# Patient Record
Sex: FEMALE | Race: WHITE | NOT HISPANIC OR LATINO | Employment: OTHER | URBAN - NONMETROPOLITAN AREA
[De-identification: names, ages, dates, MRNs, and addresses within clinical notes are randomized per-mention and may not be internally consistent; named-entity substitution may affect disease eponyms.]

---

## 2019-12-23 ENCOUNTER — TRANSCRIBE ORDERS (OUTPATIENT)
Dept: ADMINISTRATIVE | Facility: HOSPITAL | Age: 74
End: 2019-12-23

## 2019-12-23 ENCOUNTER — HOSPITAL ENCOUNTER (OUTPATIENT)
Dept: GENERAL RADIOLOGY | Facility: HOSPITAL | Age: 74
Discharge: HOME | End: 2019-12-23
Admitting: INTERNAL MEDICINE

## 2019-12-23 DIAGNOSIS — T14.8XXA FX: Primary | ICD-10-CM

## 2019-12-23 DIAGNOSIS — T14.8XXA FX: ICD-10-CM

## 2019-12-23 PROCEDURE — 73630 X-RAY EXAM OF FOOT: CPT | Performed by: RADIOLOGY

## 2019-12-23 PROCEDURE — 73630 X-RAY EXAM OF FOOT: CPT

## 2020-03-09 ENCOUNTER — TRANSCRIBE ORDERS (OUTPATIENT)
Dept: ADMINISTRATIVE | Facility: HOSPITAL | Age: 75
End: 2020-03-09

## 2020-03-09 ENCOUNTER — APPOINTMENT (OUTPATIENT)
Dept: LAB | Facility: HOSPITAL | Age: 75
End: 2020-03-09

## 2020-03-09 DIAGNOSIS — Z20.828 EXPOSURE TO THE FLU: Primary | ICD-10-CM

## 2020-03-09 LAB
FLUAV AG NPH QL: NEGATIVE
FLUBV AG NPH QL IA: NEGATIVE

## 2020-03-09 PROCEDURE — 87804 INFLUENZA ASSAY W/OPTIC: CPT | Performed by: INTERNAL MEDICINE

## 2020-03-21 ENCOUNTER — LAB REQUISITION (OUTPATIENT)
Dept: LAB | Facility: HOSPITAL | Age: 75
End: 2020-03-21

## 2020-03-21 DIAGNOSIS — R19.7 DIARRHEA, UNSPECIFIED: ICD-10-CM

## 2020-03-21 DIAGNOSIS — R11.2 NAUSEA WITH VOMITING, UNSPECIFIED: ICD-10-CM

## 2020-03-21 LAB
ALBUMIN SERPL-MCNC: 3.41 G/DL (ref 3.5–5.2)
ALBUMIN/GLOB SERPL: 1.2 G/DL
ALP SERPL-CCNC: 81 U/L (ref 39–117)
ALT SERPL W P-5'-P-CCNC: 11 U/L (ref 1–33)
ANION GAP SERPL CALCULATED.3IONS-SCNC: 21.5 MMOL/L (ref 5–15)
AST SERPL-CCNC: 18 U/L (ref 1–32)
BASOPHILS # BLD MANUAL: 0.18 10*3/MM3 (ref 0–0.2)
BASOPHILS NFR BLD AUTO: 1 % (ref 0–1.5)
BILIRUB SERPL-MCNC: 0.5 MG/DL (ref 0.2–1.2)
BUN BLD-MCNC: 29 MG/DL (ref 8–23)
BUN/CREAT SERPL: 29 (ref 7–25)
CALCIUM SPEC-SCNC: 9 MG/DL (ref 8.6–10.5)
CHLORIDE SERPL-SCNC: 84 MMOL/L (ref 98–107)
CO2 SERPL-SCNC: 28.5 MMOL/L (ref 22–29)
CREAT BLD-MCNC: 1 MG/DL (ref 0.57–1)
DEPRECATED RDW RBC AUTO: 51 FL (ref 37–54)
ERYTHROCYTE [DISTWIDTH] IN BLOOD BY AUTOMATED COUNT: 15.3 % (ref 12.3–15.4)
GFR SERPL CREATININE-BSD FRML MDRD: 54 ML/MIN/1.73
GLOBULIN UR ELPH-MCNC: 2.8 GM/DL
GLUCOSE BLD-MCNC: 109 MG/DL (ref 65–99)
HCT VFR BLD AUTO: 44.8 % (ref 34–46.6)
HGB BLD-MCNC: 14.6 G/DL (ref 12–15.9)
LYMPHOCYTES # BLD MANUAL: 1.27 10*3/MM3 (ref 0.7–3.1)
LYMPHOCYTES NFR BLD MANUAL: 26 % (ref 5–12)
LYMPHOCYTES NFR BLD MANUAL: 7 % (ref 19.6–45.3)
MCH RBC QN AUTO: 30.9 PG (ref 26.6–33)
MCHC RBC AUTO-ENTMCNC: 32.6 G/DL (ref 31.5–35.7)
MCV RBC AUTO: 94.7 FL (ref 79–97)
METAMYELOCYTES NFR BLD MANUAL: 4 % (ref 0–0)
MONOCYTES # BLD AUTO: 4.71 10*3/MM3 (ref 0.1–0.9)
NEUTROPHILS # BLD AUTO: 11.24 10*3/MM3 (ref 1.7–7)
NEUTROPHILS NFR BLD MANUAL: 45 % (ref 42.7–76)
NEUTS BAND NFR BLD MANUAL: 17 % (ref 0–5)
PLAT MORPH BLD: NORMAL
PLATELET # BLD AUTO: 204 10*3/MM3 (ref 140–450)
PMV BLD AUTO: 11 FL (ref 6–12)
POTASSIUM BLD-SCNC: 4.3 MMOL/L (ref 3.5–5.2)
PROT SERPL-MCNC: 6.2 G/DL (ref 6–8.5)
RBC # BLD AUTO: 4.73 10*6/MM3 (ref 3.77–5.28)
RBC MORPH BLD: NORMAL
SCAN SLIDE: NORMAL
SODIUM BLD-SCNC: 134 MMOL/L (ref 136–145)
WBC NRBC COR # BLD: 18.13 10*3/MM3 (ref 3.4–10.8)

## 2020-03-21 PROCEDURE — 85025 COMPLETE CBC W/AUTO DIFF WBC: CPT | Performed by: INTERNAL MEDICINE

## 2020-03-21 PROCEDURE — 80053 COMPREHEN METABOLIC PANEL: CPT | Performed by: INTERNAL MEDICINE

## 2020-03-22 ENCOUNTER — APPOINTMENT (OUTPATIENT)
Dept: CT IMAGING | Facility: HOSPITAL | Age: 75
End: 2020-03-22

## 2020-03-22 ENCOUNTER — HOSPITAL ENCOUNTER (EMERGENCY)
Facility: HOSPITAL | Age: 75
Discharge: NURSING, REHAB OR LONG-TERM CARE | End: 2020-03-22
Attending: EMERGENCY MEDICINE | Admitting: EMERGENCY MEDICINE

## 2020-03-22 ENCOUNTER — APPOINTMENT (OUTPATIENT)
Dept: GENERAL RADIOLOGY | Facility: HOSPITAL | Age: 75
End: 2020-03-22

## 2020-03-22 ENCOUNTER — LAB REQUISITION (OUTPATIENT)
Dept: LAB | Facility: HOSPITAL | Age: 75
End: 2020-03-22

## 2020-03-22 VITALS
OXYGEN SATURATION: 96 % | HEIGHT: 65 IN | TEMPERATURE: 98.1 F | BODY MASS INDEX: 18.83 KG/M2 | DIASTOLIC BLOOD PRESSURE: 77 MMHG | RESPIRATION RATE: 20 BRPM | HEART RATE: 75 BPM | WEIGHT: 113 LBS | SYSTOLIC BLOOD PRESSURE: 165 MMHG

## 2020-03-22 DIAGNOSIS — R11.2 NAUSEA WITH VOMITING, UNSPECIFIED: ICD-10-CM

## 2020-03-22 DIAGNOSIS — J44.1 COPD WITH ACUTE EXACERBATION (HCC): Primary | ICD-10-CM

## 2020-03-22 LAB
A-A DO2: 33.1 MMHG (ref 0–300)
A-A DO2: 45.6 MMHG (ref 0–300)
ALBUMIN SERPL-MCNC: 3.12 G/DL (ref 3.5–5.2)
ALBUMIN SERPL-MCNC: 3.3 G/DL (ref 3.5–5.2)
ALBUMIN/GLOB SERPL: 0.9 G/DL
ALBUMIN/GLOB SERPL: 1.2 G/DL
ALP SERPL-CCNC: 76 U/L (ref 39–117)
ALP SERPL-CCNC: 92 U/L (ref 39–117)
ALT SERPL W P-5'-P-CCNC: 10 U/L (ref 1–33)
ALT SERPL W P-5'-P-CCNC: 11 U/L (ref 1–33)
ANION GAP SERPL CALCULATED.3IONS-SCNC: 11.9 MMOL/L (ref 5–15)
ANION GAP SERPL CALCULATED.3IONS-SCNC: 12.9 MMOL/L (ref 5–15)
ANISOCYTOSIS BLD QL: ABNORMAL
ARTERIAL PATENCY WRIST A: POSITIVE
ARTERIAL PATENCY WRIST A: POSITIVE
AST SERPL-CCNC: 14 U/L (ref 1–32)
AST SERPL-CCNC: 18 U/L (ref 1–32)
ATMOSPHERIC PRESS: 731 MMHG
ATMOSPHERIC PRESS: 732 MMHG
BACTERIA UR QL AUTO: ABNORMAL /HPF
BASE EXCESS BLDA CALC-SCNC: 12.1 MMOL/L (ref 0–2)
BASE EXCESS BLDA CALC-SCNC: 14.6 MMOL/L (ref 0–2)
BDY SITE: ABNORMAL
BDY SITE: ABNORMAL
BILIRUB SERPL-MCNC: 0.4 MG/DL (ref 0.2–1.2)
BILIRUB SERPL-MCNC: 0.4 MG/DL (ref 0.2–1.2)
BILIRUB UR QL STRIP: ABNORMAL
BODY TEMPERATURE: 0 C
BODY TEMPERATURE: 0 C
BUN BLD-MCNC: 21 MG/DL (ref 8–23)
BUN BLD-MCNC: 23 MG/DL (ref 8–23)
BUN/CREAT SERPL: 24.1 (ref 7–25)
BUN/CREAT SERPL: 30.7 (ref 7–25)
CALCIUM SPEC-SCNC: 8.7 MG/DL (ref 8.6–10.5)
CALCIUM SPEC-SCNC: 9.3 MG/DL (ref 8.6–10.5)
CHLORIDE SERPL-SCNC: 90 MMOL/L (ref 98–107)
CHLORIDE SERPL-SCNC: 91 MMOL/L (ref 98–107)
CLARITY UR: CLEAR
CO2 BLDA-SCNC: 41.1 MMOL/L (ref 22–33)
CO2 BLDA-SCNC: 44.5 MMOL/L (ref 22–33)
CO2 SERPL-SCNC: 34.1 MMOL/L (ref 22–29)
CO2 SERPL-SCNC: 36.1 MMOL/L (ref 22–29)
COHGB MFR BLD: 1.8 % (ref 0–5)
COHGB MFR BLD: 2.2 % (ref 0–5)
COLOR UR: ABNORMAL
CREAT BLD-MCNC: 0.75 MG/DL (ref 0.57–1)
CREAT BLD-MCNC: 0.87 MG/DL (ref 0.57–1)
CRP SERPL-MCNC: 30.34 MG/DL (ref 0–0.5)
D-LACTATE SERPL-SCNC: 0.9 MMOL/L (ref 0.5–2)
D-LACTATE SERPL-SCNC: 1 MMOL/L (ref 0.5–2)
DEPRECATED RDW RBC AUTO: 51.6 FL (ref 37–54)
DEPRECATED RDW RBC AUTO: 52.8 FL (ref 37–54)
ERYTHROCYTE [DISTWIDTH] IN BLOOD BY AUTOMATED COUNT: 15.6 % (ref 12.3–15.4)
ERYTHROCYTE [DISTWIDTH] IN BLOOD BY AUTOMATED COUNT: 15.7 % (ref 12.3–15.4)
GFR SERPL CREATININE-BSD FRML MDRD: 64 ML/MIN/1.73
GFR SERPL CREATININE-BSD FRML MDRD: 76 ML/MIN/1.73
GLOBULIN UR ELPH-MCNC: 2.7 GM/DL
GLOBULIN UR ELPH-MCNC: 3.6 GM/DL
GLUCOSE BLD-MCNC: 196 MG/DL (ref 65–99)
GLUCOSE BLD-MCNC: 196 MG/DL (ref 65–99)
GLUCOSE UR STRIP-MCNC: NEGATIVE MG/DL
HCO3 BLDA-SCNC: 39.3 MMOL/L (ref 20–26)
HCO3 BLDA-SCNC: 42.5 MMOL/L (ref 20–26)
HCT VFR BLD AUTO: 42.9 % (ref 34–46.6)
HCT VFR BLD AUTO: 44.2 % (ref 34–46.6)
HCT VFR BLD CALC: 42.1 % (ref 38–51)
HCT VFR BLD CALC: 44.1 % (ref 38–51)
HGB BLD-MCNC: 14 G/DL (ref 12–15.9)
HGB BLD-MCNC: 14.2 G/DL (ref 12–15.9)
HGB BLDA-MCNC: 13.7 G/DL (ref 13.5–17.5)
HGB BLDA-MCNC: 14.4 G/DL (ref 13.5–17.5)
HGB UR QL STRIP.AUTO: NEGATIVE
HOROWITZ INDEX BLD+IHG-RTO: 21 %
HOROWITZ INDEX BLD+IHG-RTO: 32 %
HYALINE CASTS UR QL AUTO: ABNORMAL /LPF
HYPOCHROMIA BLD QL: ABNORMAL
KETONES UR QL STRIP: ABNORMAL
LEUKOCYTE ESTERASE UR QL STRIP.AUTO: ABNORMAL
LYMPHOCYTES # BLD MANUAL: 0.43 10*3/MM3 (ref 0.7–3.1)
LYMPHOCYTES # BLD MANUAL: 1.22 10*3/MM3 (ref 0.7–3.1)
LYMPHOCYTES NFR BLD MANUAL: 10 % (ref 5–12)
LYMPHOCYTES NFR BLD MANUAL: 16 % (ref 5–12)
LYMPHOCYTES NFR BLD MANUAL: 2 % (ref 19.6–45.3)
LYMPHOCYTES NFR BLD MANUAL: 6 % (ref 19.6–45.3)
Lab: ABNORMAL
MACROCYTES BLD QL SMEAR: ABNORMAL
MCH RBC QN AUTO: 30.8 PG (ref 26.6–33)
MCH RBC QN AUTO: 30.9 PG (ref 26.6–33)
MCHC RBC AUTO-ENTMCNC: 32.1 G/DL (ref 31.5–35.7)
MCHC RBC AUTO-ENTMCNC: 32.6 G/DL (ref 31.5–35.7)
MCV RBC AUTO: 94.3 FL (ref 79–97)
MCV RBC AUTO: 96.1 FL (ref 79–97)
METAMYELOCYTES NFR BLD MANUAL: 3 % (ref 0–0)
METHGB BLD QL: 0.6 % (ref 0–3)
METHGB BLD QL: 0.6 % (ref 0–3)
MODALITY: ABNORMAL
MODALITY: ABNORMAL
MONOCYTES # BLD AUTO: 2.17 10*3/MM3 (ref 0.1–0.9)
MONOCYTES # BLD AUTO: 3.24 10*3/MM3 (ref 0.1–0.9)
MUCOUS THREADS URNS QL MICRO: ABNORMAL /HPF
NEUTROPHILS # BLD AUTO: 15.2 10*3/MM3 (ref 1.7–7)
NEUTROPHILS # BLD AUTO: 19.07 10*3/MM3 (ref 1.7–7)
NEUTROPHILS NFR BLD MANUAL: 50 % (ref 42.7–76)
NEUTROPHILS NFR BLD MANUAL: 79 % (ref 42.7–76)
NEUTS BAND NFR BLD MANUAL: 25 % (ref 0–5)
NEUTS BAND NFR BLD MANUAL: 9 % (ref 0–5)
NITRITE UR QL STRIP: NEGATIVE
NOTE: ABNORMAL
NOTE: ABNORMAL
NOTIFIED BY: ABNORMAL
NOTIFIED WHO: ABNORMAL
NT-PROBNP SERPL-MCNC: 1477 PG/ML (ref 5–900)
OXYHGB MFR BLDV: 69.3 % (ref 94–99)
OXYHGB MFR BLDV: 95.7 % (ref 94–99)
PCO2 BLDA: 61 MM HG (ref 35–45)
PCO2 BLDA: 65.1 MM HG (ref 35–45)
PCO2 TEMP ADJ BLD: ABNORMAL MM[HG]
PCO2 TEMP ADJ BLD: ABNORMAL MM[HG]
PH BLDA: 7.42 PH UNITS (ref 7.35–7.45)
PH BLDA: 7.42 PH UNITS (ref 7.35–7.45)
PH UR STRIP.AUTO: <=5 [PH] (ref 5–8)
PH, TEMP CORRECTED: ABNORMAL
PH, TEMP CORRECTED: ABNORMAL
PLAT MORPH BLD: NORMAL
PLAT MORPH BLD: NORMAL
PLATELET # BLD AUTO: 182 10*3/MM3 (ref 140–450)
PLATELET # BLD AUTO: 196 10*3/MM3 (ref 140–450)
PMV BLD AUTO: 10.3 FL (ref 6–12)
PMV BLD AUTO: 10.6 FL (ref 6–12)
PO2 BLDA: 106 MM HG (ref 83–108)
PO2 BLDA: 37.2 MM HG (ref 83–108)
PO2 TEMP ADJ BLD: ABNORMAL MM[HG]
PO2 TEMP ADJ BLD: ABNORMAL MM[HG]
POTASSIUM BLD-SCNC: 3.6 MMOL/L (ref 3.5–5.2)
POTASSIUM BLD-SCNC: 4.3 MMOL/L (ref 3.5–5.2)
PROT SERPL-MCNC: 5.8 G/DL (ref 6–8.5)
PROT SERPL-MCNC: 6.9 G/DL (ref 6–8.5)
PROT UR QL STRIP: ABNORMAL
RBC # BLD AUTO: 4.55 10*6/MM3 (ref 3.77–5.28)
RBC # BLD AUTO: 4.6 10*6/MM3 (ref 3.77–5.28)
RBC # UR: ABNORMAL /HPF
RBC MORPH BLD: NORMAL
REF LAB TEST METHOD: ABNORMAL
SAO2 % BLDCOA: 71.3 % (ref 94–99)
SAO2 % BLDCOA: 98.1 % (ref 94–99)
SCAN SLIDE: NORMAL
SCAN SLIDE: NORMAL
SODIUM BLD-SCNC: 137 MMOL/L (ref 136–145)
SODIUM BLD-SCNC: 139 MMOL/L (ref 136–145)
SP GR UR STRIP: 1.02 (ref 1–1.03)
SQUAMOUS #/AREA URNS HPF: ABNORMAL /HPF
TROPONIN T SERPL-MCNC: 0.02 NG/ML (ref 0–0.03)
UROBILINOGEN UR QL STRIP: ABNORMAL
VENTILATOR MODE: ABNORMAL
VENTILATOR MODE: ABNORMAL
WBC NRBC COR # BLD: 20.26 10*3/MM3 (ref 3.4–10.8)
WBC NRBC COR # BLD: 21.67 10*3/MM3 (ref 3.4–10.8)
WBC UR QL AUTO: ABNORMAL /HPF

## 2020-03-22 PROCEDURE — 25010000002 ONDANSETRON PER 1 MG: Performed by: EMERGENCY MEDICINE

## 2020-03-22 PROCEDURE — 86140 C-REACTIVE PROTEIN: CPT | Performed by: EMERGENCY MEDICINE

## 2020-03-22 PROCEDURE — 25010000002 PIPERACILLIN SOD-TAZOBACTAM PER 1 G: Performed by: EMERGENCY MEDICINE

## 2020-03-22 PROCEDURE — 83605 ASSAY OF LACTIC ACID: CPT | Performed by: INTERNAL MEDICINE

## 2020-03-22 PROCEDURE — 82375 ASSAY CARBOXYHB QUANT: CPT

## 2020-03-22 PROCEDURE — 83605 ASSAY OF LACTIC ACID: CPT | Performed by: EMERGENCY MEDICINE

## 2020-03-22 PROCEDURE — 96375 TX/PRO/DX INJ NEW DRUG ADDON: CPT

## 2020-03-22 PROCEDURE — 96367 TX/PROPH/DG ADDL SEQ IV INF: CPT

## 2020-03-22 PROCEDURE — 71250 CT THORAX DX C-: CPT

## 2020-03-22 PROCEDURE — 87040 BLOOD CULTURE FOR BACTERIA: CPT | Performed by: EMERGENCY MEDICINE

## 2020-03-22 PROCEDURE — 82805 BLOOD GASES W/O2 SATURATION: CPT

## 2020-03-22 PROCEDURE — 80053 COMPREHEN METABOLIC PANEL: CPT | Performed by: EMERGENCY MEDICINE

## 2020-03-22 PROCEDURE — 85025 COMPLETE CBC W/AUTO DIFF WBC: CPT | Performed by: INTERNAL MEDICINE

## 2020-03-22 PROCEDURE — 81001 URINALYSIS AUTO W/SCOPE: CPT | Performed by: EMERGENCY MEDICINE

## 2020-03-22 PROCEDURE — P9612 CATHETERIZE FOR URINE SPEC: HCPCS

## 2020-03-22 PROCEDURE — 85007 BL SMEAR W/DIFF WBC COUNT: CPT | Performed by: EMERGENCY MEDICINE

## 2020-03-22 PROCEDURE — 36600 WITHDRAWAL OF ARTERIAL BLOOD: CPT

## 2020-03-22 PROCEDURE — 99285 EMERGENCY DEPT VISIT HI MDM: CPT

## 2020-03-22 PROCEDURE — 83880 ASSAY OF NATRIURETIC PEPTIDE: CPT | Performed by: EMERGENCY MEDICINE

## 2020-03-22 PROCEDURE — 83050 HGB METHEMOGLOBIN QUAN: CPT

## 2020-03-22 PROCEDURE — 96365 THER/PROPH/DIAG IV INF INIT: CPT

## 2020-03-22 PROCEDURE — 71045 X-RAY EXAM CHEST 1 VIEW: CPT

## 2020-03-22 PROCEDURE — 85025 COMPLETE CBC W/AUTO DIFF WBC: CPT | Performed by: EMERGENCY MEDICINE

## 2020-03-22 PROCEDURE — 80053 COMPREHEN METABOLIC PANEL: CPT | Performed by: INTERNAL MEDICINE

## 2020-03-22 PROCEDURE — 25010000002 VANCOMYCIN 5 G RECONSTITUTED SOLUTION 5,000 MG VIAL: Performed by: EMERGENCY MEDICINE

## 2020-03-22 PROCEDURE — 84484 ASSAY OF TROPONIN QUANT: CPT | Performed by: EMERGENCY MEDICINE

## 2020-03-22 RX ORDER — ONDANSETRON 2 MG/ML
4 INJECTION INTRAMUSCULAR; INTRAVENOUS ONCE
Status: COMPLETED | OUTPATIENT
Start: 2020-03-22 | End: 2020-03-22

## 2020-03-22 RX ORDER — DOXYCYCLINE 100 MG/1
100 CAPSULE ORAL 2 TIMES DAILY
Qty: 14 CAPSULE | Refills: 0 | Status: ON HOLD | OUTPATIENT
Start: 2020-03-22 | End: 2020-07-17

## 2020-03-22 RX ADMIN — PIPERACILLIN SODIUM AND TAZOBACTAM SODIUM 3.38 G: 3; .375 INJECTION, POWDER, LYOPHILIZED, FOR SOLUTION INTRAVENOUS at 19:48

## 2020-03-22 RX ADMIN — ONDANSETRON 4 MG: 2 INJECTION INTRAMUSCULAR; INTRAVENOUS at 18:44

## 2020-03-22 RX ADMIN — VANCOMYCIN HYDROCHLORIDE 1000 MG: 5 INJECTION, POWDER, LYOPHILIZED, FOR SOLUTION INTRAVENOUS at 20:43

## 2020-03-22 RX ADMIN — SODIUM CHLORIDE 1000 ML: 9 INJECTION, SOLUTION INTRAVENOUS at 18:44

## 2020-03-23 ENCOUNTER — LAB REQUISITION (OUTPATIENT)
Dept: LAB | Facility: HOSPITAL | Age: 75
End: 2020-03-23

## 2020-03-23 DIAGNOSIS — E86.0 DEHYDRATION: ICD-10-CM

## 2020-03-23 DIAGNOSIS — D72.829 ELEVATED WHITE BLOOD CELL COUNT, UNSPECIFIED: ICD-10-CM

## 2020-03-23 LAB
ANION GAP SERPL CALCULATED.3IONS-SCNC: 11.9 MMOL/L (ref 5–15)
ANISOCYTOSIS BLD QL: ABNORMAL
BUN BLD-MCNC: 18 MG/DL (ref 8–23)
BUN/CREAT SERPL: 24.7 (ref 7–25)
CALCIUM SPEC-SCNC: 8.9 MG/DL (ref 8.6–10.5)
CHLORIDE SERPL-SCNC: 93 MMOL/L (ref 98–107)
CO2 SERPL-SCNC: 33.1 MMOL/L (ref 22–29)
CREAT BLD-MCNC: 0.73 MG/DL (ref 0.57–1)
DEPRECATED RDW RBC AUTO: 52.8 FL (ref 37–54)
ERYTHROCYTE [DISTWIDTH] IN BLOOD BY AUTOMATED COUNT: 15.8 % (ref 12.3–15.4)
GFR SERPL CREATININE-BSD FRML MDRD: 78 ML/MIN/1.73
GLUCOSE BLD-MCNC: 178 MG/DL (ref 65–99)
HCT VFR BLD AUTO: 43.4 % (ref 34–46.6)
HGB BLD-MCNC: 14.2 G/DL (ref 12–15.9)
LYMPHOCYTES # BLD MANUAL: 1.37 10*3/MM3 (ref 0.7–3.1)
LYMPHOCYTES NFR BLD MANUAL: 15 % (ref 5–12)
LYMPHOCYTES NFR BLD MANUAL: 6 % (ref 19.6–45.3)
MCH RBC QN AUTO: 30.9 PG (ref 26.6–33)
MCHC RBC AUTO-ENTMCNC: 32.7 G/DL (ref 31.5–35.7)
MCV RBC AUTO: 94.3 FL (ref 79–97)
METAMYELOCYTES NFR BLD MANUAL: 1 % (ref 0–0)
MONOCYTES # BLD AUTO: 3.43 10*3/MM3 (ref 0.1–0.9)
NEUTROPHILS # BLD AUTO: 17.85 10*3/MM3 (ref 1.7–7)
NEUTROPHILS NFR BLD MANUAL: 61 % (ref 42.7–76)
NEUTS BAND NFR BLD MANUAL: 17 % (ref 0–5)
PLAT MORPH BLD: NORMAL
PLATELET # BLD AUTO: 186 10*3/MM3 (ref 140–450)
PMV BLD AUTO: 10.5 FL (ref 6–12)
POTASSIUM BLD-SCNC: 3.7 MMOL/L (ref 3.5–5.2)
RBC # BLD AUTO: 4.6 10*6/MM3 (ref 3.77–5.28)
SCAN SLIDE: NORMAL
SODIUM BLD-SCNC: 138 MMOL/L (ref 136–145)
WBC NRBC COR # BLD: 22.88 10*3/MM3 (ref 3.4–10.8)

## 2020-03-23 PROCEDURE — 85025 COMPLETE CBC W/AUTO DIFF WBC: CPT | Performed by: INTERNAL MEDICINE

## 2020-03-23 PROCEDURE — 80048 BASIC METABOLIC PNL TOTAL CA: CPT | Performed by: INTERNAL MEDICINE

## 2020-03-24 ENCOUNTER — LAB REQUISITION (OUTPATIENT)
Dept: LAB | Facility: HOSPITAL | Age: 75
End: 2020-03-24

## 2020-03-24 DIAGNOSIS — I10 ESSENTIAL (PRIMARY) HYPERTENSION: ICD-10-CM

## 2020-03-24 DIAGNOSIS — D64.9 ANEMIA, UNSPECIFIED: ICD-10-CM

## 2020-03-24 DIAGNOSIS — R11.2 NAUSEA WITH VOMITING, UNSPECIFIED: ICD-10-CM

## 2020-03-24 DIAGNOSIS — I50.9 HEART FAILURE, UNSPECIFIED (HCC): ICD-10-CM

## 2020-03-24 DIAGNOSIS — J18.9 PNEUMONIA, UNSPECIFIED ORGANISM: ICD-10-CM

## 2020-03-24 LAB
ALBUMIN SERPL-MCNC: 3.06 G/DL (ref 3.5–5.2)
ALBUMIN/GLOB SERPL: 1.4 G/DL
ALP SERPL-CCNC: 83 U/L (ref 39–117)
ALT SERPL W P-5'-P-CCNC: 9 U/L (ref 1–33)
AMYLASE SERPL-CCNC: 16 U/L (ref 28–100)
ANION GAP SERPL CALCULATED.3IONS-SCNC: 11.7 MMOL/L (ref 5–15)
ANION GAP SERPL CALCULATED.3IONS-SCNC: 12.5 MMOL/L (ref 5–15)
ANISOCYTOSIS BLD QL: ABNORMAL
ANISOCYTOSIS BLD QL: ABNORMAL
AST SERPL-CCNC: 13 U/L (ref 1–32)
BILIRUB SERPL-MCNC: 0.5 MG/DL (ref 0.2–1.2)
BUN BLD-MCNC: 17 MG/DL (ref 8–23)
BUN BLD-MCNC: 18 MG/DL (ref 8–23)
BUN/CREAT SERPL: 26.1 (ref 7–25)
BUN/CREAT SERPL: 26.2 (ref 7–25)
CALCIUM SPEC-SCNC: 8.6 MG/DL (ref 8.6–10.5)
CALCIUM SPEC-SCNC: 8.7 MG/DL (ref 8.6–10.5)
CHLORIDE SERPL-SCNC: 96 MMOL/L (ref 98–107)
CHLORIDE SERPL-SCNC: 97 MMOL/L (ref 98–107)
CO2 SERPL-SCNC: 31.5 MMOL/L (ref 22–29)
CO2 SERPL-SCNC: 33.3 MMOL/L (ref 22–29)
CREAT BLD-MCNC: 0.65 MG/DL (ref 0.57–1)
CREAT BLD-MCNC: 0.69 MG/DL (ref 0.57–1)
CRP SERPL-MCNC: 16.05 MG/DL (ref 0–0.5)
CRP SERPL-MCNC: 20.59 MG/DL (ref 0–0.5)
D-LACTATE SERPL-SCNC: 0.6 MMOL/L (ref 0.5–2)
DEPRECATED RDW RBC AUTO: 52.3 FL (ref 37–54)
DEPRECATED RDW RBC AUTO: 54 FL (ref 37–54)
EOSINOPHIL # BLD MANUAL: 0.2 10*3/MM3 (ref 0–0.4)
EOSINOPHIL NFR BLD MANUAL: 1 % (ref 0.3–6.2)
ERYTHROCYTE [DISTWIDTH] IN BLOOD BY AUTOMATED COUNT: 15.6 % (ref 12.3–15.4)
ERYTHROCYTE [DISTWIDTH] IN BLOOD BY AUTOMATED COUNT: 15.9 % (ref 12.3–15.4)
GFR SERPL CREATININE-BSD FRML MDRD: 83 ML/MIN/1.73
GFR SERPL CREATININE-BSD FRML MDRD: 89 ML/MIN/1.73
GLOBULIN UR ELPH-MCNC: 2.2 GM/DL
GLUCOSE BLD-MCNC: 111 MG/DL (ref 65–99)
GLUCOSE BLD-MCNC: 117 MG/DL (ref 65–99)
HCT VFR BLD AUTO: 40.1 % (ref 34–46.6)
HCT VFR BLD AUTO: 41.2 % (ref 34–46.6)
HGB BLD-MCNC: 13.1 G/DL (ref 12–15.9)
HGB BLD-MCNC: 13.4 G/DL (ref 12–15.9)
HYPOCHROMIA BLD QL: ABNORMAL
LIPASE SERPL-CCNC: 14 U/L (ref 13–60)
LYMPHOCYTES # BLD MANUAL: 0.74 10*3/MM3 (ref 0.7–3.1)
LYMPHOCYTES # BLD MANUAL: 0.82 10*3/MM3 (ref 0.7–3.1)
LYMPHOCYTES NFR BLD MANUAL: 12 % (ref 5–12)
LYMPHOCYTES NFR BLD MANUAL: 4 % (ref 19.6–45.3)
LYMPHOCYTES NFR BLD MANUAL: 4 % (ref 19.6–45.3)
LYMPHOCYTES NFR BLD MANUAL: 7 % (ref 5–12)
MACROCYTES BLD QL SMEAR: ABNORMAL
MACROCYTES BLD QL SMEAR: ABNORMAL
MCH RBC QN AUTO: 31 PG (ref 26.6–33)
MCH RBC QN AUTO: 31.2 PG (ref 26.6–33)
MCHC RBC AUTO-ENTMCNC: 32.5 G/DL (ref 31.5–35.7)
MCHC RBC AUTO-ENTMCNC: 32.7 G/DL (ref 31.5–35.7)
MCV RBC AUTO: 95.4 FL (ref 79–97)
MCV RBC AUTO: 95.5 FL (ref 79–97)
METAMYELOCYTES NFR BLD MANUAL: 4 % (ref 0–0)
MONOCYTES # BLD AUTO: 1.29 10*3/MM3 (ref 0.1–0.9)
MONOCYTES # BLD AUTO: 2.45 10*3/MM3 (ref 0.1–0.9)
NEUTROPHILS # BLD AUTO: 15.64 10*3/MM3 (ref 1.7–7)
NEUTROPHILS # BLD AUTO: 16.96 10*3/MM3 (ref 1.7–7)
NEUTROPHILS NFR BLD MANUAL: 73 % (ref 42.7–76)
NEUTROPHILS NFR BLD MANUAL: 74 % (ref 42.7–76)
NEUTS BAND NFR BLD MANUAL: 12 % (ref 0–5)
NEUTS BAND NFR BLD MANUAL: 9 % (ref 0–5)
PLAT MORPH BLD: NORMAL
PLAT MORPH BLD: NORMAL
PLATELET # BLD AUTO: 177 10*3/MM3 (ref 140–450)
PLATELET # BLD AUTO: 179 10*3/MM3 (ref 140–450)
PMV BLD AUTO: 11.1 FL (ref 6–12)
PMV BLD AUTO: 11.2 FL (ref 6–12)
POTASSIUM BLD-SCNC: 3.4 MMOL/L (ref 3.5–5.2)
POTASSIUM BLD-SCNC: 3.4 MMOL/L (ref 3.5–5.2)
PROT SERPL-MCNC: 5.3 G/DL (ref 6–8.5)
RBC # BLD AUTO: 4.2 10*6/MM3 (ref 3.77–5.28)
RBC # BLD AUTO: 4.32 10*6/MM3 (ref 3.77–5.28)
SCAN SLIDE: NORMAL
SCAN SLIDE: NORMAL
SODIUM BLD-SCNC: 141 MMOL/L (ref 136–145)
SODIUM BLD-SCNC: 141 MMOL/L (ref 136–145)
WBC NRBC COR # BLD: 18.4 10*3/MM3 (ref 3.4–10.8)
WBC NRBC COR # BLD: 20.43 10*3/MM3 (ref 3.4–10.8)

## 2020-03-24 PROCEDURE — 83690 ASSAY OF LIPASE: CPT | Performed by: INTERNAL MEDICINE

## 2020-03-24 PROCEDURE — 85025 COMPLETE CBC W/AUTO DIFF WBC: CPT | Performed by: INTERNAL MEDICINE

## 2020-03-24 PROCEDURE — 80053 COMPREHEN METABOLIC PANEL: CPT | Performed by: INTERNAL MEDICINE

## 2020-03-24 PROCEDURE — 83605 ASSAY OF LACTIC ACID: CPT | Performed by: INTERNAL MEDICINE

## 2020-03-24 PROCEDURE — 86140 C-REACTIVE PROTEIN: CPT | Performed by: INTERNAL MEDICINE

## 2020-03-24 PROCEDURE — 82150 ASSAY OF AMYLASE: CPT | Performed by: INTERNAL MEDICINE

## 2020-03-26 ENCOUNTER — LAB REQUISITION (OUTPATIENT)
Dept: LAB | Facility: HOSPITAL | Age: 75
End: 2020-03-26

## 2020-03-26 DIAGNOSIS — D64.9 ANEMIA, UNSPECIFIED: ICD-10-CM

## 2020-03-26 DIAGNOSIS — I10 ESSENTIAL (PRIMARY) HYPERTENSION: ICD-10-CM

## 2020-03-26 DIAGNOSIS — R50.9 FEVER, UNSPECIFIED: ICD-10-CM

## 2020-03-26 LAB
DEPRECATED RDW RBC AUTO: 59.1 FL (ref 37–54)
ERYTHROCYTE [DISTWIDTH] IN BLOOD BY AUTOMATED COUNT: 16.6 % (ref 12.3–15.4)
HCT VFR BLD AUTO: 46.1 % (ref 34–46.6)
HGB BLD-MCNC: 14.4 G/DL (ref 12–15.9)
MCH RBC QN AUTO: 31.3 PG (ref 26.6–33)
MCHC RBC AUTO-ENTMCNC: 31.2 G/DL (ref 31.5–35.7)
MCV RBC AUTO: 100.2 FL (ref 79–97)
PLATELET # BLD AUTO: 206 10*3/MM3 (ref 140–450)
PMV BLD AUTO: 10.6 FL (ref 6–12)
RBC # BLD AUTO: 4.6 10*6/MM3 (ref 3.77–5.28)
WBC NRBC COR # BLD: 14.68 10*3/MM3 (ref 3.4–10.8)

## 2020-03-26 PROCEDURE — 80048 BASIC METABOLIC PNL TOTAL CA: CPT | Performed by: INTERNAL MEDICINE

## 2020-03-26 PROCEDURE — 85027 COMPLETE CBC AUTOMATED: CPT | Performed by: INTERNAL MEDICINE

## 2020-03-27 LAB
ANION GAP SERPL CALCULATED.3IONS-SCNC: 23.9 MMOL/L (ref 5–15)
BACTERIA SPEC AEROBE CULT: NORMAL
BACTERIA SPEC AEROBE CULT: NORMAL
BUN BLD-MCNC: 11 MG/DL (ref 8–23)
BUN/CREAT SERPL: 13.6 (ref 7–25)
CALCIUM SPEC-SCNC: 8.2 MG/DL (ref 8.6–10.5)
CHLORIDE SERPL-SCNC: 98 MMOL/L (ref 98–107)
CO2 SERPL-SCNC: 22.1 MMOL/L (ref 22–29)
CREAT BLD-MCNC: 0.81 MG/DL (ref 0.57–1)
GFR SERPL CREATININE-BSD FRML MDRD: 69 ML/MIN/1.73
GLUCOSE BLD-MCNC: 151 MG/DL (ref 65–99)
POTASSIUM BLD-SCNC: 3.6 MMOL/L (ref 3.5–5.2)
SODIUM BLD-SCNC: 144 MMOL/L (ref 136–145)

## 2020-06-05 ENCOUNTER — LAB (OUTPATIENT)
Dept: LAB | Facility: HOSPITAL | Age: 75
End: 2020-06-05

## 2020-06-05 ENCOUNTER — TRANSCRIBE ORDERS (OUTPATIENT)
Dept: ADMINISTRATIVE | Facility: HOSPITAL | Age: 75
End: 2020-06-05

## 2020-06-05 DIAGNOSIS — Z01.818 PRE-OPERATIVE CLEARANCE: ICD-10-CM

## 2020-06-05 DIAGNOSIS — Z01.818 PRE-OPERATIVE CLEARANCE: Primary | ICD-10-CM

## 2020-06-05 LAB
REF LAB TEST METHOD: NORMAL
SARS-COV-2 RNA RESP QL NAA+PROBE: NOT DETECTED

## 2020-06-05 PROCEDURE — U0002 COVID-19 LAB TEST NON-CDC: HCPCS

## 2020-06-05 PROCEDURE — U0004 COV-19 TEST NON-CDC HGH THRU: HCPCS

## 2020-06-09 ENCOUNTER — OFFICE VISIT (OUTPATIENT)
Dept: SURGERY | Facility: CLINIC | Age: 75
End: 2020-06-09

## 2020-06-09 VITALS — HEIGHT: 65 IN | WEIGHT: 113 LBS | BODY MASS INDEX: 18.83 KG/M2

## 2020-06-09 DIAGNOSIS — K82.8 GALLBLADDER SLUDGE: Primary | ICD-10-CM

## 2020-06-09 PROCEDURE — 99203 OFFICE O/P NEW LOW 30 MIN: CPT | Performed by: SURGERY

## 2020-06-09 RX ORDER — ATORVASTATIN CALCIUM 10 MG/1
10 TABLET, FILM COATED ORAL NIGHTLY
COMMUNITY
Start: 2020-06-09 | End: 2021-06-01 | Stop reason: HOSPADM

## 2020-06-09 RX ORDER — PROMETHAZINE HYDROCHLORIDE 12.5 MG/1
12.5 TABLET ORAL EVERY 8 HOURS PRN
COMMUNITY
Start: 2020-04-10

## 2020-06-09 RX ORDER — LEVOTHYROXINE SODIUM 175 UG/1
175 TABLET ORAL DAILY
Status: ON HOLD | COMMUNITY
Start: 2020-06-04 | End: 2021-05-09

## 2020-06-09 RX ORDER — PANTOPRAZOLE SODIUM 20 MG/1
20 TABLET, DELAYED RELEASE ORAL DAILY
Status: ON HOLD | COMMUNITY
Start: 2020-05-13 | End: 2021-06-01 | Stop reason: SDUPTHER

## 2020-06-09 RX ORDER — ONDANSETRON 4 MG/1
TABLET, FILM COATED ORAL
Status: ON HOLD | COMMUNITY
Start: 2020-03-13 | End: 2020-07-17

## 2020-06-09 RX ORDER — POTASSIUM CHLORIDE 20 MEQ/1
20 TABLET, EXTENDED RELEASE ORAL DAILY
COMMUNITY
Start: 2020-05-17 | End: 2021-04-30 | Stop reason: HOSPADM

## 2020-06-09 RX ORDER — PIPERACILLIN SODIUM, TAZOBACTAM SODIUM 3; .375 G/15ML; G/15ML
INJECTION, POWDER, LYOPHILIZED, FOR SOLUTION INTRAVENOUS
Status: ON HOLD | COMMUNITY
Start: 2020-03-23 | End: 2020-07-17

## 2020-06-09 RX ORDER — METOPROLOL SUCCINATE 50 MG/1
TABLET, EXTENDED RELEASE ORAL
Status: ON HOLD | COMMUNITY
Start: 2013-07-08 | End: 2020-07-17

## 2020-06-09 RX ORDER — SODIUM CHLORIDE 9 MG/ML
INJECTION, SOLUTION INTRAVENOUS
Status: ON HOLD | COMMUNITY
Start: 2020-03-23 | End: 2020-07-17

## 2020-06-09 RX ORDER — TRAZODONE HYDROCHLORIDE 50 MG/1
50 TABLET ORAL NIGHTLY
COMMUNITY
Start: 2020-05-20 | End: 2021-11-20

## 2020-06-09 RX ORDER — PREDNISONE 1 MG/1
5 TABLET ORAL DAILY
COMMUNITY
Start: 2020-05-17 | End: 2021-04-30 | Stop reason: HOSPADM

## 2020-06-09 RX ORDER — BUMETANIDE 1 MG/1
1 TABLET ORAL DAILY
Status: ON HOLD | COMMUNITY
Start: 2020-06-08 | End: 2020-08-15 | Stop reason: SDUPTHER

## 2020-06-09 RX ORDER — MONTELUKAST SODIUM 10 MG/1
10 TABLET ORAL EVERY EVENING
COMMUNITY
Start: 2020-05-17

## 2020-06-09 RX ORDER — LISINOPRIL 10 MG/1
TABLET ORAL
Status: ON HOLD | COMMUNITY
Start: 2020-05-13 | End: 2020-07-17

## 2020-06-09 RX ORDER — SITAGLIPTIN 100 MG/1
TABLET, FILM COATED ORAL
Status: ON HOLD | COMMUNITY
Start: 2020-05-29 | End: 2020-08-11

## 2020-06-09 RX ORDER — MELOXICAM 7.5 MG/1
TABLET ORAL
Status: ON HOLD | COMMUNITY
Start: 2020-05-10 | End: 2020-08-11

## 2020-06-09 RX ORDER — TIOTROPIUM BROMIDE AND OLODATEROL 3.124; 2.736 UG/1; UG/1
2 SPRAY, METERED RESPIRATORY (INHALATION) DAILY
Status: ON HOLD | COMMUNITY
Start: 2020-06-06 | End: 2022-01-16

## 2020-06-09 NOTE — PROGRESS NOTES
Subjective   Mary Ly is a 74 y.o. female.     Chief Complaint:     History of Present Illness She has had upper abdominal pain with nausea and vomiting for a couple of months. She has sludge on US of gallbladder. No prior abdominal surgery. She does smoke and has not seen a cardiologist.     The following portions of the patient's history were reviewed and updated as appropriate: current medications, past family history, past medical history, past social history, past surgical history and problem list.    Review of Systems   Constitutional: Negative for activity change, appetite change, chills, fever and unexpected weight change.   HENT: Negative for congestion, facial swelling and sore throat.    Eyes: Negative for photophobia and visual disturbance.   Respiratory: Negative for chest tightness, shortness of breath and wheezing.    Cardiovascular: Negative for chest pain, palpitations and leg swelling.   Gastrointestinal: Positive for abdominal pain, nausea and vomiting. Negative for abdominal distention, anal bleeding, blood in stool, constipation, diarrhea and rectal pain.   Endocrine: Negative for cold intolerance, heat intolerance, polydipsia and polyuria.   Genitourinary: Negative for difficulty urinating, dysuria, flank pain and urgency.   Musculoskeletal: Negative for back pain and myalgias.   Skin: Negative for rash and wound.   Allergic/Immunologic: Negative for immunocompromised state.   Neurological: Negative for dizziness, seizures, syncope, light-headedness, numbness and headaches.   Hematological: Negative for adenopathy. Does not bruise/bleed easily.   Psychiatric/Behavioral: Negative for behavioral problems and confusion. The patient is not nervous/anxious.        Objective   Physical Exam   Constitutional: She is oriented to person, place, and time. She appears well-developed and well-nourished. She does not appear ill. No distress.   HENT:   Head: Normocephalic. Head is without laceration.  Hair is normal.   Right Ear: Hearing and ear canal normal.   Left Ear: Hearing and ear canal normal.   Nose: Nose normal. No sinus tenderness. No epistaxis. Right sinus exhibits no maxillary sinus tenderness and no frontal sinus tenderness. Left sinus exhibits no maxillary sinus tenderness and no frontal sinus tenderness.   Eyes: Pupils are equal, round, and reactive to light. Conjunctivae and lids are normal.   Neck: Normal range of motion. No JVD present. No tracheal tenderness present. No tracheal deviation present. No thyroid mass and no thyromegaly present.   Cardiovascular: Normal rate and regular rhythm. Exam reveals no gallop.   No murmur heard.  Pulmonary/Chest: Effort normal and breath sounds normal. No stridor. She has no wheezes. She exhibits no tenderness.   Abdominal: Soft. Bowel sounds are normal. She exhibits no distension, no ascites and no mass. There is no tenderness. There is no rebound and no guarding. No hernia.   Musculoskeletal: She exhibits no edema or deformity.   Lymphadenopathy:     She has no cervical adenopathy.     She has no axillary adenopathy.        Right: No inguinal and no supraclavicular adenopathy present.        Left: No inguinal and no supraclavicular adenopathy present.   Neurological: She is alert and oriented to person, place, and time. She exhibits normal muscle tone.   Skin: Skin is warm, dry and intact. No rash noted. No erythema. No pallor.   Psychiatric: She has a normal mood and affect. Her behavior is normal. Thought content normal.   Vitals reviewed.      Assessment/Plan   Mary was seen today for cholelithiasis.    Diagnoses and all orders for this visit:    Gallbladder sludge    do lap kiran if cardiac clearance is obtained.

## 2020-06-10 DIAGNOSIS — Z01.818 PREOPERATIVE CLEARANCE: Primary | ICD-10-CM

## 2020-06-14 ENCOUNTER — LAB (OUTPATIENT)
Dept: LAB | Facility: HOSPITAL | Age: 75
End: 2020-06-14

## 2020-06-14 ENCOUNTER — TRANSCRIBE ORDERS (OUTPATIENT)
Dept: ADMINISTRATIVE | Facility: HOSPITAL | Age: 75
End: 2020-06-14

## 2020-06-14 DIAGNOSIS — Z20.828 EXPOSURE TO SARS-ASSOCIATED CORONAVIRUS: Primary | ICD-10-CM

## 2020-06-14 DIAGNOSIS — Z20.828 EXPOSURE TO SARS-ASSOCIATED CORONAVIRUS: ICD-10-CM

## 2020-06-14 PROCEDURE — U0002 COVID-19 LAB TEST NON-CDC: HCPCS

## 2020-06-14 PROCEDURE — C9803 HOPD COVID-19 SPEC COLLECT: HCPCS

## 2020-06-14 PROCEDURE — U0004 COV-19 TEST NON-CDC HGH THRU: HCPCS

## 2020-06-15 LAB
REF LAB TEST METHOD: NORMAL
SARS-COV-2 RNA RESP QL NAA+PROBE: NOT DETECTED

## 2020-07-14 DIAGNOSIS — Z01.818 PREOP TESTING: Primary | ICD-10-CM

## 2020-07-15 ENCOUNTER — LAB REQUISITION (OUTPATIENT)
Dept: LAB | Facility: HOSPITAL | Age: 75
End: 2020-07-15

## 2020-07-15 ENCOUNTER — LAB (OUTPATIENT)
Dept: LAB | Facility: HOSPITAL | Age: 75
End: 2020-07-15

## 2020-07-15 DIAGNOSIS — Z01.818 PREOP TESTING: ICD-10-CM

## 2020-07-15 DIAGNOSIS — Z01.818 ENCOUNTER FOR OTHER PREPROCEDURAL EXAMINATION: ICD-10-CM

## 2020-07-15 LAB — SARS-COV-2 RNA RESP QL NAA+PROBE: NOT DETECTED

## 2020-07-15 PROCEDURE — 87635 SARS-COV-2 COVID-19 AMP PRB: CPT | Performed by: INTERNAL MEDICINE

## 2020-07-17 ENCOUNTER — HOSPITAL ENCOUNTER (OUTPATIENT)
Facility: HOSPITAL | Age: 75
Setting detail: HOSPITAL OUTPATIENT SURGERY
Discharge: HOME | End: 2020-07-17
Attending: SURGERY | Admitting: SURGERY

## 2020-07-17 ENCOUNTER — ANESTHESIA (OUTPATIENT)
Dept: PERIOP | Facility: HOSPITAL | Age: 75
End: 2020-07-17

## 2020-07-17 ENCOUNTER — ANESTHESIA EVENT (OUTPATIENT)
Dept: PERIOP | Facility: HOSPITAL | Age: 75
End: 2020-07-17

## 2020-07-17 VITALS
TEMPERATURE: 97.8 F | HEART RATE: 79 BPM | OXYGEN SATURATION: 98 % | SYSTOLIC BLOOD PRESSURE: 177 MMHG | BODY MASS INDEX: 18.18 KG/M2 | RESPIRATION RATE: 18 BRPM | DIASTOLIC BLOOD PRESSURE: 89 MMHG | WEIGHT: 113.13 LBS | HEIGHT: 66 IN

## 2020-07-17 DIAGNOSIS — K82.8 GALLBLADDER SLUDGE: ICD-10-CM

## 2020-07-17 LAB
GLUCOSE BLDC GLUCOMTR-MCNC: 115 MG/DL (ref 70–130)
GLUCOSE BLDC GLUCOMTR-MCNC: 124 MG/DL (ref 70–130)

## 2020-07-17 PROCEDURE — 47562 LAPAROSCOPIC CHOLECYSTECTOMY: CPT | Performed by: SURGERY

## 2020-07-17 PROCEDURE — 25010000002 NEOSTIGMINE 10 MG/10ML SOLUTION: Performed by: NURSE ANESTHETIST, CERTIFIED REGISTERED

## 2020-07-17 PROCEDURE — 94799 UNLISTED PULMONARY SVC/PX: CPT

## 2020-07-17 PROCEDURE — S0260 H&P FOR SURGERY: HCPCS | Performed by: SURGERY

## 2020-07-17 PROCEDURE — 25010000002 FENTANYL CITRATE (PF) 100 MCG/2ML SOLUTION: Performed by: NURSE ANESTHETIST, CERTIFIED REGISTERED

## 2020-07-17 PROCEDURE — 94640 AIRWAY INHALATION TREATMENT: CPT

## 2020-07-17 PROCEDURE — 88304 TISSUE EXAM BY PATHOLOGIST: CPT | Performed by: SURGERY

## 2020-07-17 PROCEDURE — 25010000002 PROPOFOL 10 MG/ML EMULSION: Performed by: NURSE ANESTHETIST, CERTIFIED REGISTERED

## 2020-07-17 PROCEDURE — 82962 GLUCOSE BLOOD TEST: CPT

## 2020-07-17 PROCEDURE — 25010000002 ONDANSETRON PER 1 MG: Performed by: NURSE ANESTHETIST, CERTIFIED REGISTERED

## 2020-07-17 DEVICE — ABSORBABLE HEMOSTAT (OXIDIZED REGENERATED CELLULOSE)
Type: IMPLANTABLE DEVICE | Site: LIVER | Status: FUNCTIONAL
Brand: SURGICEL NU-KNIT

## 2020-07-17 RX ORDER — MAGNESIUM HYDROXIDE 1200 MG/15ML
LIQUID ORAL AS NEEDED
Status: DISCONTINUED | OUTPATIENT
Start: 2020-07-17 | End: 2020-07-17 | Stop reason: HOSPADM

## 2020-07-17 RX ORDER — LIDOCAINE HYDROCHLORIDE 20 MG/ML
INJECTION, SOLUTION INFILTRATION; PERINEURAL AS NEEDED
Status: DISCONTINUED | OUTPATIENT
Start: 2020-07-17 | End: 2020-07-17 | Stop reason: SURG

## 2020-07-17 RX ORDER — BUPIVACAINE HYDROCHLORIDE AND EPINEPHRINE 5; 5 MG/ML; UG/ML
INJECTION, SOLUTION PERINEURAL AS NEEDED
Status: DISCONTINUED | OUTPATIENT
Start: 2020-07-17 | End: 2020-07-17 | Stop reason: HOSPADM

## 2020-07-17 RX ORDER — SODIUM CHLORIDE 9 MG/ML
INJECTION, SOLUTION INTRAVENOUS AS NEEDED
Status: DISCONTINUED | OUTPATIENT
Start: 2020-07-17 | End: 2020-07-17 | Stop reason: HOSPADM

## 2020-07-17 RX ORDER — HYDROCODONE BITARTRATE AND ACETAMINOPHEN 5; 325 MG/1; MG/1
1 TABLET ORAL EVERY 4 HOURS PRN
Status: DISCONTINUED | OUTPATIENT
Start: 2020-07-17 | End: 2020-07-17 | Stop reason: HOSPADM

## 2020-07-17 RX ORDER — GUAIFENESIN 200 MG/1
TABLET ORAL 2 TIMES DAILY
Status: ON HOLD | COMMUNITY
End: 2020-08-11

## 2020-07-17 RX ORDER — IPRATROPIUM BROMIDE AND ALBUTEROL SULFATE 2.5; .5 MG/3ML; MG/3ML
3 SOLUTION RESPIRATORY (INHALATION) ONCE AS NEEDED
Status: COMPLETED | OUTPATIENT
Start: 2020-07-17 | End: 2020-07-17

## 2020-07-17 RX ORDER — ONDANSETRON 2 MG/ML
4 INJECTION INTRAMUSCULAR; INTRAVENOUS AS NEEDED
Status: DISCONTINUED | OUTPATIENT
Start: 2020-07-17 | End: 2020-07-17 | Stop reason: HOSPADM

## 2020-07-17 RX ORDER — MEPERIDINE HYDROCHLORIDE 25 MG/ML
12.5 INJECTION INTRAMUSCULAR; INTRAVENOUS; SUBCUTANEOUS
Status: DISCONTINUED | OUTPATIENT
Start: 2020-07-17 | End: 2020-07-17 | Stop reason: HOSPADM

## 2020-07-17 RX ORDER — NEOSTIGMINE METHYLSULFATE 1 MG/ML
INJECTION, SOLUTION INTRAVENOUS AS NEEDED
Status: DISCONTINUED | OUTPATIENT
Start: 2020-07-17 | End: 2020-07-17 | Stop reason: SURG

## 2020-07-17 RX ORDER — SODIUM CHLORIDE, SODIUM LACTATE, POTASSIUM CHLORIDE, CALCIUM CHLORIDE 600; 310; 30; 20 MG/100ML; MG/100ML; MG/100ML; MG/100ML
125 INJECTION, SOLUTION INTRAVENOUS CONTINUOUS
Status: DISCONTINUED | OUTPATIENT
Start: 2020-07-17 | End: 2020-07-17 | Stop reason: HOSPADM

## 2020-07-17 RX ORDER — OXYCODONE HYDROCHLORIDE AND ACETAMINOPHEN 5; 325 MG/1; MG/1
1 TABLET ORAL ONCE AS NEEDED
Status: COMPLETED | OUTPATIENT
Start: 2020-07-17 | End: 2020-07-17

## 2020-07-17 RX ORDER — SODIUM CHLORIDE 0.9 % (FLUSH) 0.9 %
10 SYRINGE (ML) INJECTION EVERY 12 HOURS SCHEDULED
Status: DISCONTINUED | OUTPATIENT
Start: 2020-07-17 | End: 2020-07-17 | Stop reason: HOSPADM

## 2020-07-17 RX ORDER — HYDROCODONE BITARTRATE AND ACETAMINOPHEN 5; 325 MG/1; MG/1
1 TABLET ORAL EVERY 4 HOURS PRN
Qty: 15 TABLET | Refills: 0 | Status: SHIPPED | OUTPATIENT
Start: 2020-07-17 | End: 2020-07-27

## 2020-07-17 RX ORDER — METOPROLOL TARTRATE 5 MG/5ML
INJECTION INTRAVENOUS AS NEEDED
Status: DISCONTINUED | OUTPATIENT
Start: 2020-07-17 | End: 2020-07-17 | Stop reason: SURG

## 2020-07-17 RX ORDER — SODIUM CHLORIDE 0.9 % (FLUSH) 0.9 %
10 SYRINGE (ML) INJECTION AS NEEDED
Status: DISCONTINUED | OUTPATIENT
Start: 2020-07-17 | End: 2020-07-17 | Stop reason: HOSPADM

## 2020-07-17 RX ORDER — HYDROCODONE BITARTRATE AND ACETAMINOPHEN 5; 325 MG/1; MG/1
1 TABLET ORAL EVERY 4 HOURS PRN
Status: ON HOLD | COMMUNITY
End: 2020-08-11

## 2020-07-17 RX ORDER — ROCURONIUM BROMIDE 10 MG/ML
INJECTION, SOLUTION INTRAVENOUS AS NEEDED
Status: DISCONTINUED | OUTPATIENT
Start: 2020-07-17 | End: 2020-07-17 | Stop reason: SURG

## 2020-07-17 RX ORDER — DOCUSATE SODIUM 250 MG
250 CAPSULE ORAL DAILY
COMMUNITY

## 2020-07-17 RX ORDER — FAMOTIDINE 10 MG/ML
INJECTION, SOLUTION INTRAVENOUS AS NEEDED
Status: DISCONTINUED | OUTPATIENT
Start: 2020-07-17 | End: 2020-07-17 | Stop reason: SURG

## 2020-07-17 RX ORDER — BENZONATATE 100 MG/1
100 CAPSULE ORAL 3 TIMES DAILY PRN
Status: ON HOLD | COMMUNITY
End: 2020-08-11

## 2020-07-17 RX ORDER — AZELASTINE 1 MG/ML
1 SPRAY, METERED NASAL 2 TIMES DAILY
COMMUNITY
End: 2021-04-23

## 2020-07-17 RX ORDER — ONDANSETRON 2 MG/ML
INJECTION INTRAMUSCULAR; INTRAVENOUS AS NEEDED
Status: DISCONTINUED | OUTPATIENT
Start: 2020-07-17 | End: 2020-07-17 | Stop reason: SURG

## 2020-07-17 RX ORDER — PROPOFOL 10 MG/ML
VIAL (ML) INTRAVENOUS AS NEEDED
Status: DISCONTINUED | OUTPATIENT
Start: 2020-07-17 | End: 2020-07-17 | Stop reason: SURG

## 2020-07-17 RX ORDER — FENTANYL CITRATE 50 UG/ML
INJECTION, SOLUTION INTRAMUSCULAR; INTRAVENOUS AS NEEDED
Status: DISCONTINUED | OUTPATIENT
Start: 2020-07-17 | End: 2020-07-17 | Stop reason: SURG

## 2020-07-17 RX ORDER — MIDAZOLAM HYDROCHLORIDE 1 MG/ML
1 INJECTION INTRAMUSCULAR; INTRAVENOUS
Status: DISCONTINUED | OUTPATIENT
Start: 2020-07-17 | End: 2020-07-17 | Stop reason: HOSPADM

## 2020-07-17 RX ORDER — ASCORBIC ACID 500 MG
1 TABLET ORAL NIGHTLY
COMMUNITY

## 2020-07-17 RX ORDER — BISACODYL 10 MG
10 SUPPOSITORY, RECTAL RECTAL DAILY PRN
COMMUNITY
End: 2021-04-30 | Stop reason: HOSPADM

## 2020-07-17 RX ORDER — FENTANYL CITRATE 50 UG/ML
50 INJECTION, SOLUTION INTRAMUSCULAR; INTRAVENOUS
Status: DISCONTINUED | OUTPATIENT
Start: 2020-07-17 | End: 2020-07-17 | Stop reason: HOSPADM

## 2020-07-17 RX ORDER — LACTULOSE 10 G/15ML
10 SOLUTION ORAL DAILY PRN
COMMUNITY
End: 2021-04-30 | Stop reason: HOSPADM

## 2020-07-17 RX ORDER — SENNA PLUS 8.6 MG/1
1 TABLET ORAL DAILY PRN
COMMUNITY
End: 2021-11-20

## 2020-07-17 RX ORDER — GLYCOPYRROLATE 0.2 MG/ML
INJECTION INTRAMUSCULAR; INTRAVENOUS AS NEEDED
Status: DISCONTINUED | OUTPATIENT
Start: 2020-07-17 | End: 2020-07-17 | Stop reason: SURG

## 2020-07-17 RX ORDER — IPRATROPIUM BROMIDE AND ALBUTEROL SULFATE 2.5; .5 MG/3ML; MG/3ML
3 SOLUTION RESPIRATORY (INHALATION) 4 TIMES DAILY PRN
COMMUNITY

## 2020-07-17 RX ORDER — CARVEDILOL 6.25 MG/1
6.25 TABLET ORAL 2 TIMES DAILY WITH MEALS
COMMUNITY
End: 2021-04-23

## 2020-07-17 RX ADMIN — FENTANYL CITRATE 100 MCG: 50 INJECTION INTRAMUSCULAR; INTRAVENOUS at 09:42

## 2020-07-17 RX ADMIN — OXYCODONE HYDROCHLORIDE AND ACETAMINOPHEN 1 TABLET: 5; 325 TABLET ORAL at 11:24

## 2020-07-17 RX ADMIN — METOPROLOL TARTRATE 2 MG: 5 INJECTION, SOLUTION INTRAVENOUS at 10:07

## 2020-07-17 RX ADMIN — SODIUM CHLORIDE, POTASSIUM CHLORIDE, SODIUM LACTATE AND CALCIUM CHLORIDE 125 ML/HR: 600; 310; 30; 20 INJECTION, SOLUTION INTRAVENOUS at 09:34

## 2020-07-17 RX ADMIN — LIDOCAINE HYDROCHLORIDE 60 MG: 20 INJECTION, SOLUTION INFILTRATION; PERINEURAL at 09:42

## 2020-07-17 RX ADMIN — ROCURONIUM BROMIDE 15 MG: 10 SOLUTION INTRAVENOUS at 09:50

## 2020-07-17 RX ADMIN — IPRATROPIUM BROMIDE AND ALBUTEROL SULFATE 3 ML: .5; 3 SOLUTION RESPIRATORY (INHALATION) at 10:27

## 2020-07-17 RX ADMIN — PROPOFOL 30 MG: 10 INJECTION, EMULSION INTRAVENOUS at 09:50

## 2020-07-17 RX ADMIN — FAMOTIDINE 20 MG: 10 INJECTION INTRAVENOUS at 09:42

## 2020-07-17 RX ADMIN — GLYCOPYRROLATE 0.4 MG: 0.2 INJECTION, SOLUTION INTRAMUSCULAR; INTRAVENOUS at 10:13

## 2020-07-17 RX ADMIN — SODIUM CHLORIDE, POTASSIUM CHLORIDE, SODIUM LACTATE AND CALCIUM CHLORIDE: 600; 310; 30; 20 INJECTION, SOLUTION INTRAVENOUS at 09:42

## 2020-07-17 RX ADMIN — NEOSTIGMINE METHYLSULFATE 2 MG: 1 INJECTION, SOLUTION INTRAVENOUS at 10:13

## 2020-07-17 RX ADMIN — ONDANSETRON 4 MG: 2 INJECTION INTRAMUSCULAR; INTRAVENOUS at 09:42

## 2020-07-17 NOTE — CONSULTS
Mary Ly is a 74 y.o. female.      Chief Complaint:      History of Present Illness She has had upper abdominal pain with nausea and vomiting for a couple of months. She has sludge on US of gallbladder. No prior abdominal surgery. She does smoke and has not seen a cardiologist.      The following portions of the patient's history were reviewed and updated as appropriate: current medications, past family history, past medical history, past social history, past surgical history and problem list.     Review of Systems   Constitutional: Negative for activity change, appetite change, chills, fever and unexpected weight change.   HENT: Negative for congestion, facial swelling and sore throat.    Eyes: Negative for photophobia and visual disturbance.   Respiratory: Negative for chest tightness, shortness of breath and wheezing.    Cardiovascular: Negative for chest pain, palpitations and leg swelling.   Gastrointestinal: Positive for abdominal pain, nausea and vomiting. Negative for abdominal distention, anal bleeding, blood in stool, constipation, diarrhea and rectal pain.   Endocrine: Negative for cold intolerance, heat intolerance, polydipsia and polyuria.   Genitourinary: Negative for difficulty urinating, dysuria, flank pain and urgency.   Musculoskeletal: Negative for back pain and myalgias.   Skin: Negative for rash and wound.   Allergic/Immunologic: Negative for immunocompromised state.   Neurological: Negative for dizziness, seizures, syncope, light-headedness, numbness and headaches.   Hematological: Negative for adenopathy. Does not bruise/bleed easily.   Psychiatric/Behavioral: Negative for behavioral problems and confusion. The patient is not nervous/anxious.             Objective      Physical Exam   Constitutional: She is oriented to person, place, and time. She appears well-developed and well-nourished. She does not appear ill. No distress.   HENT:   Head: Normocephalic. Head is without laceration. Hair  is normal.   Right Ear: Hearing and ear canal normal.   Left Ear: Hearing and ear canal normal.   Nose: Nose normal. No sinus tenderness. No epistaxis. Right sinus exhibits no maxillary sinus tenderness and no frontal sinus tenderness. Left sinus exhibits no maxillary sinus tenderness and no frontal sinus tenderness.   Eyes: Pupils are equal, round, and reactive to light. Conjunctivae and lids are normal.   Neck: Normal range of motion. No JVD present. No tracheal tenderness present. No tracheal deviation present. No thyroid mass and no thyromegaly present.   Cardiovascular: Normal rate and regular rhythm. Exam reveals no gallop.   No murmur heard.  Pulmonary/Chest: Effort normal and breath sounds normal. No stridor. She has no wheezes. She exhibits no tenderness.   Abdominal: Soft. Bowel sounds are normal. She exhibits no distension, no ascites and no mass. There is no tenderness. There is no rebound and no guarding. No hernia.   Musculoskeletal: She exhibits no edema or deformity.   Lymphadenopathy:     She has no cervical adenopathy.     She has no axillary adenopathy.        Right: No inguinal and no supraclavicular adenopathy present.        Left: No inguinal and no supraclavicular adenopathy present.   Neurological: She is alert and oriented to person, place, and time. She exhibits normal muscle tone.   Skin: Skin is warm, dry and intact. No rash noted. No erythema. No pallor.   Psychiatric: She has a normal mood and affect. Her behavior is normal. Thought content normal.   Vitals reviewed.           Assessment/Plan      Mary was seen today for cholelithiasis.     Diagnoses and all orders for this visit:     Gallbladder sludge     do lap kiran

## 2020-07-17 NOTE — ANESTHESIA POSTPROCEDURE EVALUATION
Patient: Mary Ly    Procedure Summary     Date:  07/17/20 Room / Location:  [facility] OR  /  [facility] OR    Anesthesia Start:  0943 Anesthesia Stop:  1019    Procedure:  CHOLECYSTECTOMY LAPAROSCOPIC (N/A Abdomen) Diagnosis:       Gallbladder sludge      (Gallbladder sludge [K82.8])    Surgeon:  Lonnie Meneses MD Provider:  Christopher Damian MD    Anesthesia Type:  general ASA Status:  3          Anesthesia Type: general    Vitals  Vitals Value Taken Time   /95 7/17/2020 10:19 AM   Temp 97.3 °F (36.3 °C) 7/17/2020 10:19 AM   Pulse 87 7/17/2020 10:19 AM   Resp 16 7/17/2020 10:19 AM   SpO2 74 % 7/17/2020 10:19 AM           Post Anesthesia Care and Evaluation    Patient location during evaluation: bedside  Patient participation: complete - patient participated  Level of consciousness: awake and alert  Pain score: 1  Pain management: adequate  Airway patency: patent  Anesthetic complications: No anesthetic complications  PONV Status: none  Cardiovascular status: acceptable  Respiratory status: acceptable  Hydration status: acceptable

## 2020-07-17 NOTE — OP NOTE
CHOLECYSTECTOMY LAPAROSCOPIC  Procedure Note    Mary Ly  7/17/2020    Pre-op Diagnosis:   Gallbladder sludge [K82.8]    Post-op Diagnosis: same        Procedure(s):  CHOLECYSTECTOMY LAPAROSCOPIC    Surgeon(s):  Lonnie Meneses MD    Anesthesia: General    Staff:   Circulator: Cornelia Turcios RN  Scrub Person: SalasAgnieszka  Vendor Representative: Reta Pena  Assistant: Amandeep Cortez    Estimated Blood Loss: minimal    Specimens:                Order Name Source Comment Collection Info Order Time   SURGICAL PATHOLOGY EXAM Gallbladder  Collected By: Lonnie Meneses MD 7/17/2020 10:02 AM     Collection Date   7/17/2020          Collection Time   10:02 AM              Drains: * No LDAs found *    Procedure: The abdomen was prepped and draped. Two 5 mm and one 11 mm port placed. The cystic duct and artery were dissected out, clipped and divided. The gallbladder was taken off the liver with cautery and removed from the upper midline port. The liver oozed some so cautery was used and surgicel. After it was dried up the gas was allowed to escape and the ports removed. The port sites were closed with vicryl and local injected.     Findings:      Complications: none   Grafts / Implants N/A    Lonnie Meneses MD     Date: 7/17/2020  Time: 10:19

## 2020-07-17 NOTE — ANESTHESIA PROCEDURE NOTES
Airway  Urgency: elective    Date/Time: 7/17/2020 9:51 AM  Airway not difficult    General Information and Staff    Patient location during procedure: OR  Anesthesiologist: Christopher Damian MD  CRNA: Jimena Mcnally CRNA    Indications and Patient Condition  Indications for airway management: airway protection    Preoxygenated: yes  MILS maintained throughout  Mask difficulty assessment: 2 - vent by mask + OA or adjuvant +/- NMBA    Final Airway Details  Final airway type: endotracheal airway      Successful airway: ETT  Cuffed: yes   Successful intubation technique: direct laryngoscopy  Endotracheal tube insertion site: oral  Blade: Emmanuel  Blade size: 2  ETT size (mm): 7.0  Cormack-Lehane Classification: grade IIa - partial view of glottis  Placement verified by: chest auscultation   Number of attempts at approach: 1  Assessment: lips, teeth, and gum same as pre-op and atraumatic intubation

## 2020-07-17 NOTE — ANESTHESIA PREPROCEDURE EVALUATION
Anesthesia Evaluation     no history of anesthetic complications:  NPO Solid Status: > 8 hours  NPO Liquid Status: > 8 hours           Airway   Mallampati: II  TM distance: >3 FB  Neck ROM: full  No difficulty expected  Dental    (+) upper dentures and poor dentition    Pulmonary - normal exam   (+) COPD,   Cardiovascular - normal exam    (+) hypertension, CAD,       Neuro/Psych  (+) CVA,     GI/Hepatic/Renal/Endo    (+)  GERD,  diabetes mellitus,     Musculoskeletal     Abdominal  - normal exam   Substance History      OB/GYN          Other                      Anesthesia Plan    ASA 3     general     intravenous induction     Anesthetic plan, all risks, benefits, and alternatives have been provided, discussed and informed consent has been obtained with: patient.

## 2020-07-19 ENCOUNTER — HOSPITAL ENCOUNTER (EMERGENCY)
Facility: HOSPITAL | Age: 75
Discharge: ANOTHER HOSPITAL | End: 2020-07-20
Attending: FAMILY MEDICINE | Admitting: FAMILY MEDICINE

## 2020-07-19 DIAGNOSIS — J96.02 ACUTE RESPIRATORY FAILURE WITH HYPERCAPNIA (HCC): Primary | ICD-10-CM

## 2020-07-19 PROCEDURE — 99285 EMERGENCY DEPT VISIT HI MDM: CPT

## 2020-07-19 PROCEDURE — 93010 ELECTROCARDIOGRAM REPORT: CPT | Performed by: INTERNAL MEDICINE

## 2020-07-19 PROCEDURE — 96374 THER/PROPH/DIAG INJ IV PUSH: CPT

## 2020-07-20 ENCOUNTER — APPOINTMENT (OUTPATIENT)
Dept: CT IMAGING | Facility: HOSPITAL | Age: 75
End: 2020-07-20

## 2020-07-20 ENCOUNTER — APPOINTMENT (OUTPATIENT)
Dept: GENERAL RADIOLOGY | Facility: HOSPITAL | Age: 75
End: 2020-07-20

## 2020-07-20 VITALS
SYSTOLIC BLOOD PRESSURE: 130 MMHG | DIASTOLIC BLOOD PRESSURE: 87 MMHG | WEIGHT: 117 LBS | HEIGHT: 65 IN | OXYGEN SATURATION: 97 % | BODY MASS INDEX: 19.49 KG/M2 | HEART RATE: 73 BPM | RESPIRATION RATE: 20 BRPM | TEMPERATURE: 98.7 F

## 2020-07-20 LAB
A-A DO2: 43.2 MMHG (ref 0–300)
A-A DO2: 49.3 MMHG (ref 0–300)
A-A DO2: 57.8 MMHG (ref 0–300)
A-A DO2: 64 MMHG (ref 0–300)
ALBUMIN SERPL-MCNC: 3.68 G/DL (ref 3.5–5.2)
ALBUMIN/GLOB SERPL: 1.4 G/DL
ALP SERPL-CCNC: 58 U/L (ref 39–117)
ALT SERPL W P-5'-P-CCNC: 101 U/L (ref 1–33)
ANION GAP SERPL CALCULATED.3IONS-SCNC: 11.4 MMOL/L (ref 5–15)
ARTERIAL PATENCY WRIST A: ABNORMAL
ARTERIAL PATENCY WRIST A: POSITIVE
AST SERPL-CCNC: 67 U/L (ref 1–32)
ATMOSPHERIC PRESS: 728 MMHG
ATMOSPHERIC PRESS: 729 MMHG
BASE EXCESS BLDA CALC-SCNC: 12.1 MMOL/L (ref 0–2)
BASE EXCESS BLDA CALC-SCNC: 13.8 MMOL/L (ref 0–2)
BASE EXCESS BLDA CALC-SCNC: 14 MMOL/L (ref 0–2)
BASE EXCESS BLDA CALC-SCNC: 16.1 MMOL/L (ref 0–2)
BASOPHILS # BLD AUTO: 0.05 10*3/MM3 (ref 0–0.2)
BASOPHILS NFR BLD AUTO: 0.4 % (ref 0–1.5)
BDY SITE: ABNORMAL
BILIRUB SERPL-MCNC: 0.3 MG/DL (ref 0–1.2)
BODY TEMPERATURE: 0 C
BUN SERPL-MCNC: 24 MG/DL (ref 8–23)
BUN/CREAT SERPL: 28.6 (ref 7–25)
CALCIUM SPEC-SCNC: 9.5 MG/DL (ref 8.6–10.5)
CHLORIDE SERPL-SCNC: 86 MMOL/L (ref 98–107)
CO2 BLDA-SCNC: 42.9 MMOL/L (ref 22–33)
CO2 BLDA-SCNC: 44.4 MMOL/L (ref 22–33)
CO2 BLDA-SCNC: 44.6 MMOL/L (ref 22–33)
CO2 BLDA-SCNC: 47.7 MMOL/L (ref 22–33)
CO2 SERPL-SCNC: 37.6 MMOL/L (ref 22–29)
COHGB MFR BLD: 2.9 % (ref 0–5)
COHGB MFR BLD: 3.1 % (ref 0–5)
COHGB MFR BLD: 3.3 % (ref 0–5)
COHGB MFR BLD: 3.8 % (ref 0–5)
CREAT SERPL-MCNC: 0.84 MG/DL (ref 0.57–1)
CRP SERPL-MCNC: 2.75 MG/DL (ref 0–0.5)
DEPRECATED RDW RBC AUTO: 50.3 FL (ref 37–54)
EOSINOPHIL # BLD AUTO: 0.16 10*3/MM3 (ref 0–0.4)
EOSINOPHIL NFR BLD AUTO: 1.2 % (ref 0.3–6.2)
EPAP: 6
EPAP: 6
ERYTHROCYTE [DISTWIDTH] IN BLOOD BY AUTOMATED COUNT: 13.6 % (ref 12.3–15.4)
FERRITIN SERPL-MCNC: 88.85 NG/ML (ref 13–150)
GAS FLOW AIRWAY: 2 LPM
GFR SERPL CREATININE-BSD FRML MDRD: 66 ML/MIN/1.73
GLOBULIN UR ELPH-MCNC: 2.7 GM/DL
GLUCOSE SERPL-MCNC: 113 MG/DL (ref 65–99)
HCO3 BLDA-SCNC: 40.7 MMOL/L (ref 20–26)
HCO3 BLDA-SCNC: 42.2 MMOL/L (ref 20–26)
HCO3 BLDA-SCNC: 42.4 MMOL/L (ref 20–26)
HCO3 BLDA-SCNC: 45.3 MMOL/L (ref 20–26)
HCT VFR BLD AUTO: 40.5 % (ref 34–46.6)
HCT VFR BLD CALC: 39.8 % (ref 38–51)
HCT VFR BLD CALC: 39.8 % (ref 38–51)
HCT VFR BLD CALC: 40 % (ref 38–51)
HCT VFR BLD CALC: 40.5 % (ref 38–51)
HGB BLD-MCNC: 12.6 G/DL (ref 12–15.9)
HGB BLDA-MCNC: 13 G/DL (ref 13.5–17.5)
HGB BLDA-MCNC: 13 G/DL (ref 13.5–17.5)
HGB BLDA-MCNC: 13.1 G/DL (ref 13.5–17.5)
HGB BLDA-MCNC: 13.2 G/DL (ref 13.5–17.5)
HOLD SPECIMEN: NORMAL
HOLD SPECIMEN: NORMAL
IMM GRANULOCYTES # BLD AUTO: 0.03 10*3/MM3 (ref 0–0.05)
IMM GRANULOCYTES NFR BLD AUTO: 0.2 % (ref 0–0.5)
INHALED O2 CONCENTRATION: 28 %
INHALED O2 CONCENTRATION: 30 %
INHALED O2 CONCENTRATION: 30 %
INHALED O2 CONCENTRATION: 32 %
IPAP: 16
IPAP: 16
LAB AP CASE REPORT: NORMAL
LDH SERPL-CCNC: 234 U/L (ref 135–214)
LYMPHOCYTES # BLD AUTO: 1.02 10*3/MM3 (ref 0.7–3.1)
LYMPHOCYTES NFR BLD AUTO: 7.8 % (ref 19.6–45.3)
Lab: ABNORMAL
MCH RBC QN AUTO: 30.9 PG (ref 26.6–33)
MCHC RBC AUTO-ENTMCNC: 31.1 G/DL (ref 31.5–35.7)
MCV RBC AUTO: 99.3 FL (ref 79–97)
METHGB BLD QL: 0.1 % (ref 0–3)
METHGB BLD QL: 0.1 % (ref 0–3)
METHGB BLD QL: 0.4 % (ref 0–3)
METHGB BLD QL: 0.4 % (ref 0–3)
MODALITY: ABNORMAL
MONOCYTES # BLD AUTO: 1.23 10*3/MM3 (ref 0.1–0.9)
MONOCYTES NFR BLD AUTO: 9.5 % (ref 5–12)
NEUTROPHILS NFR BLD AUTO: 10.52 10*3/MM3 (ref 1.7–7)
NEUTROPHILS NFR BLD AUTO: 80.9 % (ref 42.7–76)
NOTE: ABNORMAL
NOTIFIED BY: ABNORMAL
NOTIFIED WHO: ABNORMAL
NRBC BLD AUTO-RTO: 0 /100 WBC (ref 0–0.2)
NT-PROBNP SERPL-MCNC: 2722 PG/ML (ref 0–900)
OXYHGB MFR BLDV: 82.9 % (ref 94–99)
OXYHGB MFR BLDV: 86.3 % (ref 94–99)
OXYHGB MFR BLDV: 89.6 % (ref 94–99)
OXYHGB MFR BLDV: 93.4 % (ref 94–99)
PATH REPORT.FINAL DX SPEC: NORMAL
PCO2 BLDA: 71.2 MM HG (ref 35–45)
PCO2 BLDA: 71.5 MM HG (ref 35–45)
PCO2 BLDA: 72.2 MM HG (ref 35–45)
PCO2 BLDA: 78.2 MM HG (ref 35–45)
PCO2 TEMP ADJ BLD: ABNORMAL MM[HG]
PH BLDA: 7.36 PH UNITS (ref 7.35–7.45)
PH BLDA: 7.37 PH UNITS (ref 7.35–7.45)
PH BLDA: 7.38 PH UNITS (ref 7.35–7.45)
PH BLDA: 7.38 PH UNITS (ref 7.35–7.45)
PH, TEMP CORRECTED: ABNORMAL
PLATELET # BLD AUTO: 163 10*3/MM3 (ref 140–450)
PMV BLD AUTO: 11.1 FL (ref 6–12)
PO2 BLDA: 54.1 MM HG (ref 83–108)
PO2 BLDA: 60.1 MM HG (ref 83–108)
PO2 BLDA: 67 MM HG (ref 83–108)
PO2 BLDA: 95.6 MM HG (ref 83–108)
PO2 TEMP ADJ BLD: ABNORMAL MM[HG]
POTASSIUM SERPL-SCNC: 4.5 MMOL/L (ref 3.5–5.2)
PROCALCITONIN SERPL-MCNC: 0.17 NG/ML (ref 0–0.25)
PROT SERPL-MCNC: 6.4 G/DL (ref 6–8.5)
RBC # BLD AUTO: 4.08 10*6/MM3 (ref 3.77–5.28)
SAO2 % BLDCOA: 86.1 % (ref 94–99)
SAO2 % BLDCOA: 89.2 % (ref 94–99)
SAO2 % BLDCOA: 92.6 % (ref 94–99)
SAO2 % BLDCOA: 97.2 % (ref 94–99)
SARS-COV-2 RDRP RESP QL NAA+PROBE: NOT DETECTED
SET MECH RESP RATE: 18
SET MECH RESP RATE: 18
SODIUM SERPL-SCNC: 135 MMOL/L (ref 136–145)
TROPONIN T SERPL-MCNC: 0.03 NG/ML (ref 0–0.03)
VENTILATOR MODE: ABNORMAL
WBC # BLD AUTO: 13.01 10*3/MM3 (ref 3.4–10.8)
WHOLE BLOOD HOLD SPECIMEN: NORMAL
WHOLE BLOOD HOLD SPECIMEN: NORMAL

## 2020-07-20 PROCEDURE — 94799 UNLISTED PULMONARY SVC/PX: CPT

## 2020-07-20 PROCEDURE — 87635 SARS-COV-2 COVID-19 AMP PRB: CPT | Performed by: PHYSICIAN ASSISTANT

## 2020-07-20 PROCEDURE — 82805 BLOOD GASES W/O2 SATURATION: CPT

## 2020-07-20 PROCEDURE — 71260 CT THORAX DX C+: CPT

## 2020-07-20 PROCEDURE — C9803 HOPD COVID-19 SPEC COLLECT: HCPCS

## 2020-07-20 PROCEDURE — 36600 WITHDRAWAL OF ARTERIAL BLOOD: CPT

## 2020-07-20 PROCEDURE — 82375 ASSAY CARBOXYHB QUANT: CPT

## 2020-07-20 PROCEDURE — 94640 AIRWAY INHALATION TREATMENT: CPT

## 2020-07-20 PROCEDURE — 93005 ELECTROCARDIOGRAM TRACING: CPT | Performed by: PHYSICIAN ASSISTANT

## 2020-07-20 PROCEDURE — 25010000002 METHYLPREDNISOLONE PER 125 MG: Performed by: PHYSICIAN ASSISTANT

## 2020-07-20 PROCEDURE — 82728 ASSAY OF FERRITIN: CPT | Performed by: PHYSICIAN ASSISTANT

## 2020-07-20 PROCEDURE — 94660 CPAP INITIATION&MGMT: CPT

## 2020-07-20 PROCEDURE — 71045 X-RAY EXAM CHEST 1 VIEW: CPT

## 2020-07-20 PROCEDURE — 96374 THER/PROPH/DIAG INJ IV PUSH: CPT

## 2020-07-20 PROCEDURE — 87040 BLOOD CULTURE FOR BACTERIA: CPT | Performed by: PHYSICIAN ASSISTANT

## 2020-07-20 PROCEDURE — 80053 COMPREHEN METABOLIC PANEL: CPT | Performed by: PHYSICIAN ASSISTANT

## 2020-07-20 PROCEDURE — 83880 ASSAY OF NATRIURETIC PEPTIDE: CPT | Performed by: PHYSICIAN ASSISTANT

## 2020-07-20 PROCEDURE — 0 IOVERSOL 68 % SOLUTION: Performed by: FAMILY MEDICINE

## 2020-07-20 PROCEDURE — 84145 PROCALCITONIN (PCT): CPT | Performed by: PHYSICIAN ASSISTANT

## 2020-07-20 PROCEDURE — 85025 COMPLETE CBC W/AUTO DIFF WBC: CPT | Performed by: PHYSICIAN ASSISTANT

## 2020-07-20 PROCEDURE — 86140 C-REACTIVE PROTEIN: CPT | Performed by: PHYSICIAN ASSISTANT

## 2020-07-20 PROCEDURE — 83050 HGB METHEMOGLOBIN QUAN: CPT

## 2020-07-20 PROCEDURE — 84484 ASSAY OF TROPONIN QUANT: CPT | Performed by: PHYSICIAN ASSISTANT

## 2020-07-20 PROCEDURE — 83615 LACTATE (LD) (LDH) ENZYME: CPT | Performed by: PHYSICIAN ASSISTANT

## 2020-07-20 RX ORDER — IPRATROPIUM BROMIDE AND ALBUTEROL SULFATE 2.5; .5 MG/3ML; MG/3ML
3 SOLUTION RESPIRATORY (INHALATION) ONCE
Status: COMPLETED | OUTPATIENT
Start: 2020-07-20 | End: 2020-07-20

## 2020-07-20 RX ORDER — METHYLPREDNISOLONE SODIUM SUCCINATE 125 MG/2ML
125 INJECTION, POWDER, LYOPHILIZED, FOR SOLUTION INTRAMUSCULAR; INTRAVENOUS ONCE
Status: COMPLETED | OUTPATIENT
Start: 2020-07-20 | End: 2020-07-20

## 2020-07-20 RX ORDER — SODIUM CHLORIDE 0.9 % (FLUSH) 0.9 %
10 SYRINGE (ML) INJECTION AS NEEDED
Status: DISCONTINUED | OUTPATIENT
Start: 2020-07-20 | End: 2020-07-20 | Stop reason: HOSPADM

## 2020-07-20 RX ADMIN — IPRATROPIUM BROMIDE AND ALBUTEROL SULFATE 3 ML: .5; 3 SOLUTION RESPIRATORY (INHALATION) at 03:32

## 2020-07-20 RX ADMIN — IOVERSOL 70 ML: 678 INJECTION INTRA-ARTERIAL; INTRAVENOUS at 01:47

## 2020-07-20 RX ADMIN — METHYLPREDNISOLONE SODIUM SUCCINATE 125 MG: 125 INJECTION, POWDER, FOR SOLUTION INTRAMUSCULAR; INTRAVENOUS at 03:51

## 2020-07-20 NOTE — ED NOTES
Called [facility] and had hospitalist paged for SATNAM Lynch.     Reta Brar  07/20/20 0003

## 2020-07-20 NOTE — ED PROVIDER NOTES
Subjective   Patient is a 74-year-old female with COPD, GERD, hypertension, coronary artery disease, hypothyroidism, diabetes, and history of stroke that presents to the ED with low oxygen level.  She is a resident at local nursing home.  She went out for a smoke this evening when she came back and she felt like she was in a pass out.  She states the nursing staff checked her O2 saturation and it was 45%.  She she denies shortness of breath or chest pain at the time.  EMS was called and she was brought to the emergency room.  She continues to deny chest pain, shortness of breath, fever, chills,, abdominal pain, or dysuria.  She is 3 days status post cholecystectomy.      History provided by:  Patient   used: No    Shortness of Breath   Severity:  Moderate  Onset quality:  Sudden  Duration:  1 day  Timing:  Constant  Progression:  Worsening  Chronicity:  New  Context: activity    Relieved by:  Nothing  Worsened by:  Nothing  Ineffective treatments:  None tried  Associated symptoms: cough and wheezing    Associated symptoms: no abdominal pain, no chest pain, no ear pain, no fever, no headaches, no sore throat, no sputum production, no syncope and no vomiting    Risk factors: recent surgery        Review of Systems   Constitutional: Negative.  Negative for chills, fatigue and fever.   HENT: Negative.  Negative for congestion, drooling, ear discharge, ear pain, postnasal drip, rhinorrhea, sinus pressure, sinus pain, sneezing, sore throat, tinnitus and trouble swallowing.    Eyes: Negative.  Negative for pain, discharge, redness and itching.   Respiratory: Positive for cough, shortness of breath and wheezing. Negative for sputum production.    Cardiovascular: Negative.  Negative for chest pain and syncope.   Gastrointestinal: Negative.  Negative for abdominal pain, diarrhea, nausea and vomiting.   Endocrine: Negative.    Genitourinary: Negative.  Negative for dysuria, flank pain, frequency and  urgency.   Musculoskeletal: Negative.    Skin: Negative.    Neurological: Negative.  Negative for dizziness, weakness, numbness and headaches.   Psychiatric/Behavioral: Negative.  Negative for confusion.   All other systems reviewed and are negative.      Past Medical History:   Diagnosis Date   • Arthritis    • COPD (chronic obstructive pulmonary disease) (CMS/HCC)    • Coronary artery disease    • Diabetes mellitus (CMS/HCC)    • Disease of thyroid gland    • Dysfunctional gallbladder    • GERD (gastroesophageal reflux disease)    • H/O heart artery stent    • Hypertension    • Stroke (CMS/[facility])        Allergies   Allergen Reactions   • Haldol [Haloperidol] Hallucinations       Past Surgical History:   Procedure Laterality Date   • BACK SURGERY     • CARDIAC CATHETERIZATION     • COLONOSCOPY     • CORONARY STENT PLACEMENT     • ENDOSCOPY     • TONSILLECTOMY         No family history on file.    Social History     Socioeconomic History   • Marital status:      Spouse name: Not on file   • Number of children: Not on file   • Years of education: Not on file   • Highest education level: Not on file   Tobacco Use   • Smoking status: Current Every Day Smoker     Packs/day: 1.00     Years: 55.00     Pack years: 55.00     Types: Cigarettes   • Smokeless tobacco: Never Used   Substance and Sexual Activity   • Alcohol use: Never     Frequency: Never   • Drug use: Never   • Sexual activity: Defer           Objective   Physical Exam   Constitutional: She is oriented to person, place, and time. She appears well-developed and well-nourished. No distress.   HENT:   Head: Normocephalic and atraumatic.   Right Ear: External ear normal.   Left Ear: External ear normal.   Nose: Nose normal.   Eyes: Pupils are equal, round, and reactive to light. Conjunctivae and EOM are normal.   Neck: Normal range of motion. Neck supple. No JVD present. No tracheal deviation present.   Cardiovascular: Normal rate, regular rhythm and normal  heart sounds.   No murmur heard.  Pulmonary/Chest: Effort normal. No respiratory distress. She has wheezes.   Abdominal: Soft. Bowel sounds are normal. She exhibits no distension and no ascites. There is no tenderness. There is no rebound and no guarding.   Musculoskeletal: Normal range of motion. She exhibits no edema or deformity.        Right lower leg: Normal.        Left lower leg: Normal.   Neurological: She is alert and oriented to person, place, and time. No cranial nerve deficit.   Skin: Skin is warm and dry. No rash noted. She is not diaphoretic. No erythema. No pallor.   Psychiatric: She has a normal mood and affect. Her behavior is normal. Thought content normal.   Nursing note and vitals reviewed.      Procedures           ED Course  ED Course as of Jul 20 0559   Mon Jul 20, 2020   0121 IMPRESSION:  1.  Possible mild diffuse or multifocal interstitial pulmonary infiltrates.  2.  Stable mild cardiomegaly.     Signer Name: Nicolas Pavon MD   Signed: 7/20/2020 12:58 AM   XR Chest AP [MH]   0313 IMPRESSION:  1.  No evidence of pulmonary embolism or other acute vascular abnormality within the chest.  2.  Mild emphysema. Central bronchial wall thickening and multifocal pulmonary air trapping likely reflecting acute or chronic bronchitis. No acute pulmonary infiltrate.  3.  Three indeterminate pulmonary nodules as previously detailed, unchanged since recent study 4 months ago. Continued CT imaging follow-up as previously recommended.  4.  Distal esophageal wall thickening may reflect esophagitis.  5.  Moderately fluid distended stomach. Postop changes recent cholecystectomy.     Signer Name: Nicolas Pavon MD   Signed: 7/20/2020 3:05 AM   CT Chest With Contrast [MH]   0339 Discussed case with Dr. Moya.    [MH]   1441 Discussed case with Dr. Chacko at [facility].  He does accept patient for admission of acute respiratory failure with hypercapnia.    [MH]      ED Course User Index  [MH] Cris  Ruchi Toney PA-C                                           [facility]    Final diagnoses:   Acute respiratory failure with hypercapnia (CMS/HCC)            Ruchi Lynch PA-C  07/20/20 0559

## 2020-07-20 NOTE — ED NOTES
HAL consent signed at this time; placed on patients chart at this time.      Joelle Figueroa RN  07/20/20 0624

## 2020-07-20 NOTE — ED NOTES
Contacted [facility] to let them know they could come  pt at this time.     Reta Brar  07/20/20 0604

## 2020-07-20 NOTE — ED NOTES
Report given to MANJINDER Lai. Patient alert and oriented. IV patent. Vital signs stable.     Collette, Ashley N, RN  07/20/20 0234

## 2020-07-20 NOTE — ED NOTES
Report given to MANJINDER Mckenna at [facility].      Joelle Figueroa RN  07/20/20 0617

## 2020-07-20 NOTE — ED NOTES
Called [facility] for weather check to [facility], transfer accepted, but 79 on stand by at this time.     Reta Brar  07/20/20 0604

## 2020-07-25 LAB
BACTERIA SPEC AEROBE CULT: NORMAL
BACTERIA SPEC AEROBE CULT: NORMAL

## 2020-07-30 ENCOUNTER — OFFICE VISIT (OUTPATIENT)
Dept: SURGERY | Facility: CLINIC | Age: 75
End: 2020-07-30

## 2020-07-30 VITALS — WEIGHT: 117 LBS | HEIGHT: 65 IN | BODY MASS INDEX: 19.49 KG/M2

## 2020-07-30 DIAGNOSIS — Z90.49 STATUS POST LAPAROSCOPIC CHOLECYSTECTOMY: Primary | ICD-10-CM

## 2020-07-30 PROCEDURE — 99024 POSTOP FOLLOW-UP VISIT: CPT | Performed by: SURGERY

## 2020-07-30 NOTE — PROGRESS NOTES
Subjective   Mary Ly is a 74 y.o. female.     Chief Complaint: post lap kiran    History of Present Illness she is doing well post lap kiran    The following portions of the patient's history were reviewed and updated as appropriate: current medications, past family history, past medical history, past social history, past surgical history and problem list.    Review of Systems    Objective   Physical Exam wounds ok,    Assessment/Plan   Mary was seen today for post-op follow-up.    Diagnoses and all orders for this visit:    Status post laparoscopic cholecystectomy    return prn

## 2020-08-10 ENCOUNTER — APPOINTMENT (OUTPATIENT)
Dept: CT IMAGING | Facility: HOSPITAL | Age: 75
End: 2020-08-10

## 2020-08-10 ENCOUNTER — HOSPITAL ENCOUNTER (INPATIENT)
Facility: HOSPITAL | Age: 75
LOS: 5 days | Discharge: NURSING, REHAB OR LONG-TERM CARE | End: 2020-08-15
Attending: EMERGENCY MEDICINE | Admitting: INTERNAL MEDICINE

## 2020-08-10 ENCOUNTER — APPOINTMENT (OUTPATIENT)
Dept: GENERAL RADIOLOGY | Facility: HOSPITAL | Age: 75
End: 2020-08-10

## 2020-08-10 DIAGNOSIS — T81.44XA: Primary | ICD-10-CM

## 2020-08-10 DIAGNOSIS — Z90.49 STATUS POST LAPAROSCOPIC CHOLECYSTECTOMY: ICD-10-CM

## 2020-08-10 PROBLEM — A41.9 SEPSIS (HCC): Status: ACTIVE | Noted: 2020-08-10

## 2020-08-10 LAB
ALBUMIN SERPL-MCNC: 3.01 G/DL (ref 3.5–5.2)
ALBUMIN/GLOB SERPL: 0.7 G/DL
ALP SERPL-CCNC: 119 U/L (ref 39–117)
ALT SERPL W P-5'-P-CCNC: 19 U/L (ref 1–33)
ANION GAP SERPL CALCULATED.3IONS-SCNC: 22.7 MMOL/L (ref 5–15)
AST SERPL-CCNC: 26 U/L (ref 1–32)
BILIRUB SERPL-MCNC: 0.2 MG/DL (ref 0–1.2)
BUN SERPL-MCNC: 63 MG/DL (ref 8–23)
BUN/CREAT SERPL: 38.4 (ref 7–25)
CALCIUM SPEC-SCNC: 10.1 MG/DL (ref 8.6–10.5)
CHLORIDE SERPL-SCNC: 88 MMOL/L (ref 98–107)
CK SERPL-CCNC: 25 U/L (ref 20–180)
CO2 SERPL-SCNC: 25.3 MMOL/L (ref 22–29)
CREAT SERPL-MCNC: 1.64 MG/DL (ref 0.57–1)
CRP SERPL-MCNC: 51.53 MG/DL (ref 0–0.5)
D-LACTATE SERPL-SCNC: 1.1 MMOL/L (ref 0.5–2)
DEPRECATED RDW RBC AUTO: 47.6 FL (ref 37–54)
ERYTHROCYTE [DISTWIDTH] IN BLOOD BY AUTOMATED COUNT: 13.7 % (ref 12.3–15.4)
ERYTHROCYTE [SEDIMENTATION RATE] IN BLOOD: 16 MM/HR (ref 0–30)
GFR SERPL CREATININE-BSD FRML MDRD: 31 ML/MIN/1.73
GLOBULIN UR ELPH-MCNC: 4.5 GM/DL
GLUCOSE SERPL-MCNC: 172 MG/DL (ref 65–99)
HCT VFR BLD AUTO: 46.8 % (ref 34–46.6)
HGB BLD-MCNC: 14.9 G/DL (ref 12–15.9)
HOLD SPECIMEN: NORMAL
HOLD SPECIMEN: NORMAL
HYPOCHROMIA BLD QL: ABNORMAL
INR PPP: 1.2 (ref 0.9–1.1)
LDH SERPL-CCNC: 235 U/L (ref 135–214)
LYMPHOCYTES # BLD MANUAL: 0.39 10*3/MM3 (ref 0.7–3.1)
LYMPHOCYTES NFR BLD MANUAL: 1 % (ref 19.6–45.3)
LYMPHOCYTES NFR BLD MANUAL: 7 % (ref 5–12)
MACROCYTES BLD QL SMEAR: ABNORMAL
MAGNESIUM SERPL-MCNC: 1.7 MG/DL (ref 1.6–2.4)
MCH RBC QN AUTO: 30.1 PG (ref 26.6–33)
MCHC RBC AUTO-ENTMCNC: 31.8 G/DL (ref 31.5–35.7)
MCV RBC AUTO: 94.5 FL (ref 79–97)
MONOCYTES # BLD AUTO: 2.76 10*3/MM3 (ref 0.1–0.9)
NEUTROPHILS # BLD AUTO: 36.33 10*3/MM3 (ref 1.7–7)
NEUTROPHILS NFR BLD MANUAL: 90 % (ref 42.7–76)
NEUTS BAND NFR BLD MANUAL: 2 % (ref 0–5)
NT-PROBNP SERPL-MCNC: 934.2 PG/ML (ref 0–900)
PHOSPHATE SERPL-MCNC: 5 MG/DL (ref 2.5–4.5)
PLAT MORPH BLD: NORMAL
PLATELET # BLD AUTO: 254 10*3/MM3 (ref 140–450)
PMV BLD AUTO: 11.5 FL (ref 6–12)
POTASSIUM SERPL-SCNC: 5 MMOL/L (ref 3.5–5.2)
PROT SERPL-MCNC: 7.5 G/DL (ref 6–8.5)
PROTHROMBIN TIME: 15 SECONDS (ref 11.9–14.1)
RBC # BLD AUTO: 4.95 10*6/MM3 (ref 3.77–5.28)
SARS-COV-2 RDRP RESP QL NAA+PROBE: NOT DETECTED
SCAN SLIDE: NORMAL
SODIUM SERPL-SCNC: 136 MMOL/L (ref 136–145)
T4 FREE SERPL-MCNC: 1.27 NG/DL (ref 0.93–1.7)
TROPONIN T SERPL-MCNC: 0.04 NG/ML (ref 0–0.03)
TSH SERPL DL<=0.05 MIU/L-ACNC: 0.59 UIU/ML (ref 0.27–4.2)
WBC # BLD AUTO: 39.49 10*3/MM3 (ref 3.4–10.8)
WHOLE BLOOD HOLD SPECIMEN: NORMAL
WHOLE BLOOD HOLD SPECIMEN: NORMAL

## 2020-08-10 PROCEDURE — 84145 PROCALCITONIN (PCT): CPT | Performed by: EMERGENCY MEDICINE

## 2020-08-10 PROCEDURE — 71045 X-RAY EXAM CHEST 1 VIEW: CPT | Performed by: RADIOLOGY

## 2020-08-10 PROCEDURE — 74176 CT ABD & PELVIS W/O CONTRAST: CPT

## 2020-08-10 PROCEDURE — 84100 ASSAY OF PHOSPHORUS: CPT | Performed by: EMERGENCY MEDICINE

## 2020-08-10 PROCEDURE — 83605 ASSAY OF LACTIC ACID: CPT | Performed by: EMERGENCY MEDICINE

## 2020-08-10 PROCEDURE — 71045 X-RAY EXAM CHEST 1 VIEW: CPT

## 2020-08-10 PROCEDURE — 25010000002 PIPERACILLIN-TAZOBACTAM: Performed by: EMERGENCY MEDICINE

## 2020-08-10 PROCEDURE — 80053 COMPREHEN METABOLIC PANEL: CPT | Performed by: EMERGENCY MEDICINE

## 2020-08-10 PROCEDURE — 82550 ASSAY OF CK (CPK): CPT | Performed by: EMERGENCY MEDICINE

## 2020-08-10 PROCEDURE — 93010 ELECTROCARDIOGRAM REPORT: CPT | Performed by: INTERNAL MEDICINE

## 2020-08-10 PROCEDURE — 84439 ASSAY OF FREE THYROXINE: CPT | Performed by: EMERGENCY MEDICINE

## 2020-08-10 PROCEDURE — 87040 BLOOD CULTURE FOR BACTERIA: CPT | Performed by: EMERGENCY MEDICINE

## 2020-08-10 PROCEDURE — 85025 COMPLETE CBC W/AUTO DIFF WBC: CPT | Performed by: EMERGENCY MEDICINE

## 2020-08-10 PROCEDURE — 83735 ASSAY OF MAGNESIUM: CPT | Performed by: EMERGENCY MEDICINE

## 2020-08-10 PROCEDURE — 85007 BL SMEAR W/DIFF WBC COUNT: CPT | Performed by: EMERGENCY MEDICINE

## 2020-08-10 PROCEDURE — 84484 ASSAY OF TROPONIN QUANT: CPT | Performed by: EMERGENCY MEDICINE

## 2020-08-10 PROCEDURE — 83615 LACTATE (LD) (LDH) ENZYME: CPT | Performed by: EMERGENCY MEDICINE

## 2020-08-10 PROCEDURE — 93005 ELECTROCARDIOGRAM TRACING: CPT | Performed by: EMERGENCY MEDICINE

## 2020-08-10 PROCEDURE — 86140 C-REACTIVE PROTEIN: CPT | Performed by: EMERGENCY MEDICINE

## 2020-08-10 PROCEDURE — 83880 ASSAY OF NATRIURETIC PEPTIDE: CPT | Performed by: EMERGENCY MEDICINE

## 2020-08-10 PROCEDURE — 36415 COLL VENOUS BLD VENIPUNCTURE: CPT

## 2020-08-10 PROCEDURE — 85652 RBC SED RATE AUTOMATED: CPT | Performed by: EMERGENCY MEDICINE

## 2020-08-10 PROCEDURE — 87635 SARS-COV-2 COVID-19 AMP PRB: CPT | Performed by: EMERGENCY MEDICINE

## 2020-08-10 PROCEDURE — P9612 CATHETERIZE FOR URINE SPEC: HCPCS

## 2020-08-10 PROCEDURE — 84443 ASSAY THYROID STIM HORMONE: CPT | Performed by: EMERGENCY MEDICINE

## 2020-08-10 PROCEDURE — 85610 PROTHROMBIN TIME: CPT | Performed by: EMERGENCY MEDICINE

## 2020-08-10 PROCEDURE — 99223 1ST HOSP IP/OBS HIGH 75: CPT | Performed by: INTERNAL MEDICINE

## 2020-08-10 PROCEDURE — 99284 EMERGENCY DEPT VISIT MOD MDM: CPT

## 2020-08-10 RX ORDER — SODIUM CHLORIDE 0.9 % (FLUSH) 0.9 %
10 SYRINGE (ML) INJECTION AS NEEDED
Status: DISCONTINUED | OUTPATIENT
Start: 2020-08-10 | End: 2020-08-15 | Stop reason: HOSPADM

## 2020-08-10 RX ADMIN — SODIUM CHLORIDE 1000 ML: 9 INJECTION, SOLUTION INTRAVENOUS at 20:31

## 2020-08-10 RX ADMIN — VANCOMYCIN HYDROCHLORIDE 1000 MG: 1 INJECTION, POWDER, LYOPHILIZED, FOR SOLUTION INTRAVENOUS at 22:39

## 2020-08-10 RX ADMIN — SODIUM CHLORIDE 1000 ML: 9 INJECTION, SOLUTION INTRAVENOUS at 21:33

## 2020-08-10 RX ADMIN — PIPERACILLIN AND TAZOBACTAM 4.5 G: 4; .5 INJECTION, POWDER, LYOPHILIZED, FOR SOLUTION INTRAVENOUS; PARENTERAL at 21:33

## 2020-08-11 ENCOUNTER — APPOINTMENT (OUTPATIENT)
Dept: CT IMAGING | Facility: HOSPITAL | Age: 75
End: 2020-08-11

## 2020-08-11 PROBLEM — N17.9 AKI (ACUTE KIDNEY INJURY) (HCC): Status: ACTIVE | Noted: 2020-08-11

## 2020-08-11 PROBLEM — R10.33 PERIUMBILICAL ABDOMINAL PAIN: Status: ACTIVE | Noted: 2020-08-11

## 2020-08-11 LAB
ALBUMIN SERPL-MCNC: 2.38 G/DL (ref 3.5–5.2)
ALBUMIN/GLOB SERPL: 0.7 G/DL
ALP SERPL-CCNC: 100 U/L (ref 39–117)
ALT SERPL W P-5'-P-CCNC: 16 U/L (ref 1–33)
ANION GAP SERPL CALCULATED.3IONS-SCNC: 20 MMOL/L (ref 5–15)
AST SERPL-CCNC: 24 U/L (ref 1–32)
BILIRUB SERPL-MCNC: 0.3 MG/DL (ref 0–1.2)
BUN SERPL-MCNC: 57 MG/DL (ref 8–23)
BUN/CREAT SERPL: 43.2 (ref 7–25)
CALCIUM SPEC-SCNC: 8.7 MG/DL (ref 8.6–10.5)
CHLORIDE SERPL-SCNC: 99 MMOL/L (ref 98–107)
CO2 SERPL-SCNC: 21 MMOL/L (ref 22–29)
CREAT SERPL-MCNC: 1.32 MG/DL (ref 0.57–1)
CRP SERPL-MCNC: 45.07 MG/DL (ref 0–0.5)
D-LACTATE SERPL-SCNC: 1.2 MMOL/L (ref 0.5–2)
DEPRECATED RDW RBC AUTO: 48.9 FL (ref 37–54)
ERYTHROCYTE [DISTWIDTH] IN BLOOD BY AUTOMATED COUNT: 13.9 % (ref 12.3–15.4)
GFR SERPL CREATININE-BSD FRML MDRD: 39 ML/MIN/1.73
GLOBULIN UR ELPH-MCNC: 3.6 GM/DL
GLUCOSE BLDC GLUCOMTR-MCNC: 111 MG/DL (ref 70–130)
GLUCOSE BLDC GLUCOMTR-MCNC: 114 MG/DL (ref 70–130)
GLUCOSE BLDC GLUCOMTR-MCNC: 136 MG/DL (ref 70–130)
GLUCOSE BLDC GLUCOMTR-MCNC: 148 MG/DL (ref 70–130)
GLUCOSE SERPL-MCNC: 151 MG/DL (ref 65–99)
HCT VFR BLD AUTO: 41.5 % (ref 34–46.6)
HGB BLD-MCNC: 13.3 G/DL (ref 12–15.9)
HYPOCHROMIA BLD QL: ABNORMAL
LYMPHOCYTES # BLD MANUAL: 0.78 10*3/MM3 (ref 0.7–3.1)
LYMPHOCYTES NFR BLD MANUAL: 2 % (ref 19.6–45.3)
LYMPHOCYTES NFR BLD MANUAL: 6 % (ref 5–12)
MACROCYTES BLD QL SMEAR: ABNORMAL
MCH RBC QN AUTO: 30.5 PG (ref 26.6–33)
MCHC RBC AUTO-ENTMCNC: 32 G/DL (ref 31.5–35.7)
MCV RBC AUTO: 95.2 FL (ref 79–97)
METAMYELOCYTES NFR BLD MANUAL: 1 % (ref 0–0)
MONOCYTES # BLD AUTO: 2.34 10*3/MM3 (ref 0.1–0.9)
NEUTROPHILS # BLD AUTO: 35.42 10*3/MM3 (ref 1.7–7)
NEUTROPHILS NFR BLD MANUAL: 84 % (ref 42.7–76)
NEUTS BAND NFR BLD MANUAL: 7 % (ref 0–5)
PLAT MORPH BLD: NORMAL
PLATELET # BLD AUTO: 195 10*3/MM3 (ref 140–450)
PMV BLD AUTO: 11.1 FL (ref 6–12)
POTASSIUM SERPL-SCNC: 5.1 MMOL/L (ref 3.5–5.2)
PROCALCITONIN SERPL-MCNC: 1.01 NG/ML (ref 0–0.25)
PROT SERPL-MCNC: 6 G/DL (ref 6–8.5)
RBC # BLD AUTO: 4.36 10*6/MM3 (ref 3.77–5.28)
SODIUM SERPL-SCNC: 140 MMOL/L (ref 136–145)
TROPONIN T SERPL-MCNC: 0.02 NG/ML (ref 0–0.03)
WBC # BLD AUTO: 38.92 10*3/MM3 (ref 3.4–10.8)

## 2020-08-11 PROCEDURE — 85007 BL SMEAR W/DIFF WBC COUNT: CPT | Performed by: INTERNAL MEDICINE

## 2020-08-11 PROCEDURE — 99232 SBSQ HOSP IP/OBS MODERATE 35: CPT | Performed by: INTERNAL MEDICINE

## 2020-08-11 PROCEDURE — 84484 ASSAY OF TROPONIN QUANT: CPT | Performed by: INTERNAL MEDICINE

## 2020-08-11 PROCEDURE — 99222 1ST HOSP IP/OBS MODERATE 55: CPT | Performed by: SURGERY

## 2020-08-11 PROCEDURE — 86140 C-REACTIVE PROTEIN: CPT | Performed by: INTERNAL MEDICINE

## 2020-08-11 PROCEDURE — 25010000002 PROMETHAZINE PER 50 MG: Performed by: INTERNAL MEDICINE

## 2020-08-11 PROCEDURE — 82962 GLUCOSE BLOOD TEST: CPT

## 2020-08-11 PROCEDURE — 25010000002 CEFEPIME PER 500 MG: Performed by: INTERNAL MEDICINE

## 2020-08-11 PROCEDURE — 83605 ASSAY OF LACTIC ACID: CPT | Performed by: INTERNAL MEDICINE

## 2020-08-11 PROCEDURE — 25010000002 HEPARIN (PORCINE) PER 1000 UNITS: Performed by: INTERNAL MEDICINE

## 2020-08-11 PROCEDURE — 80053 COMPREHEN METABOLIC PANEL: CPT | Performed by: INTERNAL MEDICINE

## 2020-08-11 PROCEDURE — 25010000003 MAGNESIUM SULFATE 4 GM/100ML SOLUTION: Performed by: INTERNAL MEDICINE

## 2020-08-11 PROCEDURE — 94799 UNLISTED PULMONARY SVC/PX: CPT

## 2020-08-11 PROCEDURE — 85025 COMPLETE CBC W/AUTO DIFF WBC: CPT | Performed by: INTERNAL MEDICINE

## 2020-08-11 PROCEDURE — 25010000002 MORPHINE PER 10 MG: Performed by: INTERNAL MEDICINE

## 2020-08-11 PROCEDURE — 63710000001 PREDNISONE PER 5 MG: Performed by: INTERNAL MEDICINE

## 2020-08-11 RX ORDER — SODIUM CHLORIDE 0.9 % (FLUSH) 0.9 %
10 SYRINGE (ML) INJECTION AS NEEDED
Status: DISCONTINUED | OUTPATIENT
Start: 2020-08-11 | End: 2020-08-15 | Stop reason: HOSPADM

## 2020-08-11 RX ORDER — NICOTINE 21 MG/24HR
1 PATCH, TRANSDERMAL 24 HOURS TRANSDERMAL
Status: DISCONTINUED | OUTPATIENT
Start: 2020-08-11 | End: 2020-08-15 | Stop reason: HOSPADM

## 2020-08-11 RX ORDER — LISINOPRIL 10 MG/1
10 TABLET ORAL DAILY
COMMUNITY
End: 2020-08-15 | Stop reason: HOSPADM

## 2020-08-11 RX ORDER — DOCUSATE SODIUM 100 MG/1
200 CAPSULE, LIQUID FILLED ORAL DAILY
Status: CANCELLED | OUTPATIENT
Start: 2020-08-11

## 2020-08-11 RX ORDER — AZELASTINE 1 MG/ML
1 SPRAY, METERED NASAL 2 TIMES DAILY
Status: CANCELLED | OUTPATIENT
Start: 2020-08-11

## 2020-08-11 RX ORDER — CARVEDILOL 6.25 MG/1
6.25 TABLET ORAL 2 TIMES DAILY WITH MEALS
Status: DISCONTINUED | OUTPATIENT
Start: 2020-08-11 | End: 2020-08-15 | Stop reason: HOSPADM

## 2020-08-11 RX ORDER — LEVOTHYROXINE SODIUM 175 UG/1
175 TABLET ORAL DAILY
Status: DISCONTINUED | OUTPATIENT
Start: 2020-08-11 | End: 2020-08-15 | Stop reason: HOSPADM

## 2020-08-11 RX ORDER — TRAZODONE HYDROCHLORIDE 50 MG/1
25 TABLET ORAL NIGHTLY
Status: DISCONTINUED | OUTPATIENT
Start: 2020-08-11 | End: 2020-08-15 | Stop reason: HOSPADM

## 2020-08-11 RX ORDER — PANTOPRAZOLE SODIUM 20 MG/1
20 TABLET, DELAYED RELEASE ORAL DAILY
Status: CANCELLED | OUTPATIENT
Start: 2020-08-11

## 2020-08-11 RX ORDER — MAGNESIUM SULFATE HEPTAHYDRATE 40 MG/ML
2 INJECTION, SOLUTION INTRAVENOUS AS NEEDED
Status: DISCONTINUED | OUTPATIENT
Start: 2020-08-11 | End: 2020-08-15 | Stop reason: HOSPADM

## 2020-08-11 RX ORDER — LACTULOSE 10 G/15ML
20 SOLUTION ORAL DAILY PRN
Status: CANCELLED | OUTPATIENT
Start: 2020-08-11

## 2020-08-11 RX ORDER — POTASSIUM CHLORIDE 20 MEQ/1
20 TABLET, EXTENDED RELEASE ORAL DAILY
Status: CANCELLED | OUTPATIENT
Start: 2020-08-11

## 2020-08-11 RX ORDER — DEXTROSE MONOHYDRATE 25 G/50ML
25 INJECTION, SOLUTION INTRAVENOUS
Status: DISCONTINUED | OUTPATIENT
Start: 2020-08-11 | End: 2020-08-15 | Stop reason: HOSPADM

## 2020-08-11 RX ORDER — SODIUM CHLORIDE 9 MG/ML
100 INJECTION, SOLUTION INTRAVENOUS CONTINUOUS
Status: DISCONTINUED | OUTPATIENT
Start: 2020-08-11 | End: 2020-08-12

## 2020-08-11 RX ORDER — SENNA PLUS 8.6 MG/1
1 TABLET ORAL AS NEEDED
Status: CANCELLED | OUTPATIENT
Start: 2020-08-11

## 2020-08-11 RX ORDER — L.ACID,PARA/B.BIFIDUM/S.THERM 8B CELL
1 CAPSULE ORAL DAILY
Status: DISCONTINUED | OUTPATIENT
Start: 2020-08-11 | End: 2020-08-15 | Stop reason: HOSPADM

## 2020-08-11 RX ORDER — MAGNESIUM SULFATE HEPTAHYDRATE 40 MG/ML
4 INJECTION, SOLUTION INTRAVENOUS ONCE
Status: COMPLETED | OUTPATIENT
Start: 2020-08-11 | End: 2020-08-11

## 2020-08-11 RX ORDER — PANTOPRAZOLE SODIUM 40 MG/1
40 TABLET, DELAYED RELEASE ORAL DAILY
Status: CANCELLED | OUTPATIENT
Start: 2020-08-11

## 2020-08-11 RX ORDER — IPRATROPIUM BROMIDE AND ALBUTEROL SULFATE 2.5; .5 MG/3ML; MG/3ML
3 SOLUTION RESPIRATORY (INHALATION)
Status: CANCELLED | OUTPATIENT
Start: 2020-08-11

## 2020-08-11 RX ORDER — NICOTINE POLACRILEX 4 MG
15 LOZENGE BUCCAL
Status: DISCONTINUED | OUTPATIENT
Start: 2020-08-11 | End: 2020-08-15 | Stop reason: HOSPADM

## 2020-08-11 RX ORDER — NITROGLYCERIN 0.4 MG/1
0.4 TABLET SUBLINGUAL
Status: DISCONTINUED | OUTPATIENT
Start: 2020-08-11 | End: 2020-08-15 | Stop reason: HOSPADM

## 2020-08-11 RX ORDER — HYDROCODONE BITARTRATE AND ACETAMINOPHEN 5; 325 MG/1; MG/1
1 TABLET ORAL EVERY 4 HOURS PRN
Status: CANCELLED | OUTPATIENT
Start: 2020-08-11

## 2020-08-11 RX ORDER — LISINOPRIL 10 MG/1
10 TABLET ORAL DAILY
Status: CANCELLED | OUTPATIENT
Start: 2020-08-11

## 2020-08-11 RX ORDER — MULTIVITAMIN
1 TABLET ORAL DAILY
Status: DISCONTINUED | OUTPATIENT
Start: 2020-08-11 | End: 2020-08-15 | Stop reason: HOSPADM

## 2020-08-11 RX ORDER — GUAIFENESIN 400 MG/1
400 TABLET ORAL EVERY 12 HOURS SCHEDULED
COMMUNITY
End: 2021-04-30 | Stop reason: HOSPADM

## 2020-08-11 RX ORDER — MONTELUKAST SODIUM 10 MG/1
10 TABLET ORAL DAILY
Status: DISCONTINUED | OUTPATIENT
Start: 2020-08-11 | End: 2020-08-15 | Stop reason: HOSPADM

## 2020-08-11 RX ORDER — HYDROCODONE BITARTRATE AND ACETAMINOPHEN 5; 325 MG/1; MG/1
1 TABLET ORAL EVERY 4 HOURS PRN
Status: ON HOLD | COMMUNITY
End: 2020-08-15 | Stop reason: SDUPTHER

## 2020-08-11 RX ORDER — MAGNESIUM SULFATE HEPTAHYDRATE 40 MG/ML
4 INJECTION, SOLUTION INTRAVENOUS AS NEEDED
Status: DISCONTINUED | OUTPATIENT
Start: 2020-08-11 | End: 2020-08-15 | Stop reason: HOSPADM

## 2020-08-11 RX ORDER — ATORVASTATIN CALCIUM 10 MG/1
10 TABLET, FILM COATED ORAL NIGHTLY
Status: DISCONTINUED | OUTPATIENT
Start: 2020-08-11 | End: 2020-08-15 | Stop reason: HOSPADM

## 2020-08-11 RX ORDER — GUAIFENESIN 600 MG/1
600 TABLET, EXTENDED RELEASE ORAL EVERY 12 HOURS SCHEDULED
Status: CANCELLED | OUTPATIENT
Start: 2020-08-11

## 2020-08-11 RX ORDER — IPRATROPIUM BROMIDE AND ALBUTEROL SULFATE 2.5; .5 MG/3ML; MG/3ML
3 SOLUTION RESPIRATORY (INHALATION) 4 TIMES DAILY PRN
Status: DISCONTINUED | OUTPATIENT
Start: 2020-08-11 | End: 2020-08-15 | Stop reason: HOSPADM

## 2020-08-11 RX ORDER — MORPHINE SULFATE 2 MG/ML
1 INJECTION, SOLUTION INTRAMUSCULAR; INTRAVENOUS EVERY 4 HOURS PRN
Status: DISCONTINUED | OUTPATIENT
Start: 2020-08-11 | End: 2020-08-15 | Stop reason: HOSPADM

## 2020-08-11 RX ORDER — PROMETHAZINE HYDROCHLORIDE 12.5 MG/1
12.5 TABLET ORAL EVERY 8 HOURS PRN
Status: DISCONTINUED | OUTPATIENT
Start: 2020-08-11 | End: 2020-08-15 | Stop reason: HOSPADM

## 2020-08-11 RX ORDER — PANTOPRAZOLE SODIUM 40 MG/10ML
40 INJECTION, POWDER, LYOPHILIZED, FOR SOLUTION INTRAVENOUS
Status: DISCONTINUED | OUTPATIENT
Start: 2020-08-11 | End: 2020-08-13 | Stop reason: ALTCHOICE

## 2020-08-11 RX ORDER — HEPARIN SODIUM 5000 [USP'U]/ML
5000 INJECTION, SOLUTION INTRAVENOUS; SUBCUTANEOUS EVERY 12 HOURS SCHEDULED
Status: DISCONTINUED | OUTPATIENT
Start: 2020-08-11 | End: 2020-08-15 | Stop reason: HOSPADM

## 2020-08-11 RX ORDER — SODIUM CHLORIDE 0.9 % (FLUSH) 0.9 %
10 SYRINGE (ML) INJECTION EVERY 12 HOURS SCHEDULED
Status: DISCONTINUED | OUTPATIENT
Start: 2020-08-11 | End: 2020-08-15 | Stop reason: HOSPADM

## 2020-08-11 RX ORDER — PREDNISONE 1 MG/1
5 TABLET ORAL DAILY
Status: DISCONTINUED | OUTPATIENT
Start: 2020-08-11 | End: 2020-08-15 | Stop reason: HOSPADM

## 2020-08-11 RX ORDER — BUMETANIDE 1 MG/1
1 TABLET ORAL DAILY
Status: CANCELLED | OUTPATIENT
Start: 2020-08-11

## 2020-08-11 RX ORDER — BISACODYL 10 MG
10 SUPPOSITORY, RECTAL RECTAL AS NEEDED
Status: CANCELLED | OUTPATIENT
Start: 2020-08-11

## 2020-08-11 RX ADMIN — MAGNESIUM SULFATE HEPTAHYDRATE 4 G: 40 INJECTION, SOLUTION INTRAVENOUS at 17:52

## 2020-08-11 RX ADMIN — ATORVASTATIN CALCIUM 10 MG: 10 TABLET, FILM COATED ORAL at 20:54

## 2020-08-11 RX ADMIN — Medication 1 CAPSULE: at 14:16

## 2020-08-11 RX ADMIN — MORPHINE SULFATE 1 MG: 2 INJECTION, SOLUTION INTRAMUSCULAR; INTRAVENOUS at 05:28

## 2020-08-11 RX ADMIN — METRONIDAZOLE 500 MG: 500 INJECTION, SOLUTION INTRAVENOUS at 18:30

## 2020-08-11 RX ADMIN — MONTELUKAST SODIUM 10 MG: 10 TABLET, COATED ORAL at 09:30

## 2020-08-11 RX ADMIN — CEFEPIME 2 G: 2 INJECTION, POWDER, FOR SOLUTION INTRAVENOUS at 02:45

## 2020-08-11 RX ADMIN — PROMETHAZINE HYDROCHLORIDE 12.5 MG: 25 INJECTION INTRAMUSCULAR; INTRAVENOUS at 05:28

## 2020-08-11 RX ADMIN — METRONIDAZOLE 500 MG: 500 INJECTION, SOLUTION INTRAVENOUS at 02:45

## 2020-08-11 RX ADMIN — Medication 1 TABLET: at 09:29

## 2020-08-11 RX ADMIN — LEVOTHYROXINE SODIUM 175 MCG: 175 TABLET ORAL at 09:29

## 2020-08-11 RX ADMIN — PANTOPRAZOLE SODIUM 40 MG: 40 INJECTION, POWDER, FOR SOLUTION INTRAVENOUS at 05:27

## 2020-08-11 RX ADMIN — METRONIDAZOLE 500 MG: 500 INJECTION, SOLUTION INTRAVENOUS at 10:33

## 2020-08-11 RX ADMIN — SODIUM CHLORIDE, PRESERVATIVE FREE 10 ML: 5 INJECTION INTRAVENOUS at 20:54

## 2020-08-11 RX ADMIN — CARVEDILOL 6.25 MG: 6.25 TABLET, FILM COATED ORAL at 09:30

## 2020-08-11 RX ADMIN — SODIUM CHLORIDE 100 ML/HR: 9 INJECTION, SOLUTION INTRAVENOUS at 14:12

## 2020-08-11 RX ADMIN — HEPARIN SODIUM 5000 UNITS: 5000 INJECTION INTRAVENOUS; SUBCUTANEOUS at 05:28

## 2020-08-11 RX ADMIN — TRAZODONE HYDROCHLORIDE 25 MG: 50 TABLET ORAL at 20:53

## 2020-08-11 RX ADMIN — HEPARIN SODIUM 5000 UNITS: 5000 INJECTION INTRAVENOUS; SUBCUTANEOUS at 20:53

## 2020-08-11 RX ADMIN — PREDNISONE 5 MG: 5 TABLET ORAL at 09:29

## 2020-08-11 RX ADMIN — CARVEDILOL 6.25 MG: 6.25 TABLET, FILM COATED ORAL at 18:30

## 2020-08-11 RX ADMIN — SODIUM CHLORIDE, PRESERVATIVE FREE 10 ML: 5 INJECTION INTRAVENOUS at 02:45

## 2020-08-12 ENCOUNTER — APPOINTMENT (OUTPATIENT)
Dept: CT IMAGING | Facility: HOSPITAL | Age: 75
End: 2020-08-12

## 2020-08-12 LAB
ALBUMIN SERPL-MCNC: 1.94 G/DL (ref 3.5–5.2)
ALBUMIN/GLOB SERPL: 0.6 G/DL
ALP SERPL-CCNC: 87 U/L (ref 39–117)
ALT SERPL W P-5'-P-CCNC: 12 U/L (ref 1–33)
ANION GAP SERPL CALCULATED.3IONS-SCNC: 16.1 MMOL/L (ref 5–15)
AST SERPL-CCNC: 19 U/L (ref 1–32)
BILIRUB SERPL-MCNC: 0.2 MG/DL (ref 0–1.2)
BUN SERPL-MCNC: 33 MG/DL (ref 8–23)
BUN/CREAT SERPL: 41.3 (ref 7–25)
CALCIUM SPEC-SCNC: 8.7 MG/DL (ref 8.6–10.5)
CHLORIDE SERPL-SCNC: 107 MMOL/L (ref 98–107)
CO2 SERPL-SCNC: 19.9 MMOL/L (ref 22–29)
CREAT SERPL-MCNC: 0.8 MG/DL (ref 0.57–1)
CRP SERPL-MCNC: 38.07 MG/DL (ref 0–0.5)
D-LACTATE SERPL-SCNC: 0.7 MMOL/L (ref 0.5–2)
DEPRECATED RDW RBC AUTO: 51 FL (ref 37–54)
ERYTHROCYTE [DISTWIDTH] IN BLOOD BY AUTOMATED COUNT: 14.5 % (ref 12.3–15.4)
GFR SERPL CREATININE-BSD FRML MDRD: 70 ML/MIN/1.73
GLOBULIN UR ELPH-MCNC: 3.5 GM/DL
GLUCOSE BLDC GLUCOMTR-MCNC: 100 MG/DL (ref 70–130)
GLUCOSE BLDC GLUCOMTR-MCNC: 101 MG/DL (ref 70–130)
GLUCOSE BLDC GLUCOMTR-MCNC: 120 MG/DL (ref 70–130)
GLUCOSE BLDC GLUCOMTR-MCNC: 142 MG/DL (ref 70–130)
GLUCOSE SERPL-MCNC: 123 MG/DL (ref 65–99)
HCT VFR BLD AUTO: 37.4 % (ref 34–46.6)
HGB BLD-MCNC: 12 G/DL (ref 12–15.9)
HYPOCHROMIA BLD QL: ABNORMAL
LYMPHOCYTES # BLD MANUAL: 0.37 10*3/MM3 (ref 0.7–3.1)
LYMPHOCYTES NFR BLD MANUAL: 1 % (ref 19.6–45.3)
LYMPHOCYTES NFR BLD MANUAL: 6 % (ref 5–12)
MACROCYTES BLD QL SMEAR: ABNORMAL
MAGNESIUM SERPL-MCNC: 2.1 MG/DL (ref 1.6–2.4)
MCH RBC QN AUTO: 30.8 PG (ref 26.6–33)
MCHC RBC AUTO-ENTMCNC: 32.1 G/DL (ref 31.5–35.7)
MCV RBC AUTO: 96.1 FL (ref 79–97)
MONOCYTES # BLD AUTO: 2.24 10*3/MM3 (ref 0.1–0.9)
NEUTROPHILS # BLD AUTO: 34.79 10*3/MM3 (ref 1.7–7)
NEUTROPHILS NFR BLD MANUAL: 78 % (ref 42.7–76)
NEUTS BAND NFR BLD MANUAL: 15 % (ref 0–5)
PHOSPHATE SERPL-MCNC: 2.4 MG/DL (ref 2.5–4.5)
PLAT MORPH BLD: NORMAL
PLATELET # BLD AUTO: 184 10*3/MM3 (ref 140–450)
PMV BLD AUTO: 11.2 FL (ref 6–12)
POTASSIUM SERPL-SCNC: 3.4 MMOL/L (ref 3.5–5.2)
PROT SERPL-MCNC: 5.4 G/DL (ref 6–8.5)
RBC # BLD AUTO: 3.89 10*6/MM3 (ref 3.77–5.28)
SCAN SLIDE: NORMAL
SODIUM SERPL-SCNC: 143 MMOL/L (ref 136–145)
WBC # BLD AUTO: 37.41 10*3/MM3 (ref 3.4–10.8)

## 2020-08-12 PROCEDURE — 25010000002 PROMETHAZINE PER 50 MG: Performed by: INTERNAL MEDICINE

## 2020-08-12 PROCEDURE — 84100 ASSAY OF PHOSPHORUS: CPT | Performed by: INTERNAL MEDICINE

## 2020-08-12 PROCEDURE — 25010000002 MORPHINE PER 10 MG: Performed by: INTERNAL MEDICINE

## 2020-08-12 PROCEDURE — 99231 SBSQ HOSP IP/OBS SF/LOW 25: CPT | Performed by: SURGERY

## 2020-08-12 PROCEDURE — 82962 GLUCOSE BLOOD TEST: CPT

## 2020-08-12 PROCEDURE — 63710000001 PREDNISONE PER 5 MG: Performed by: INTERNAL MEDICINE

## 2020-08-12 PROCEDURE — 75635 CT ANGIO ABDOMINAL ARTERIES: CPT | Performed by: RADIOLOGY

## 2020-08-12 PROCEDURE — 93010 ELECTROCARDIOGRAM REPORT: CPT | Performed by: INTERNAL MEDICINE

## 2020-08-12 PROCEDURE — 25010000002 HEPARIN (PORCINE) PER 1000 UNITS: Performed by: INTERNAL MEDICINE

## 2020-08-12 PROCEDURE — 25010000002 CEFEPIME PER 500 MG: Performed by: INTERNAL MEDICINE

## 2020-08-12 PROCEDURE — 83735 ASSAY OF MAGNESIUM: CPT | Performed by: INTERNAL MEDICINE

## 2020-08-12 PROCEDURE — 80053 COMPREHEN METABOLIC PANEL: CPT | Performed by: INTERNAL MEDICINE

## 2020-08-12 PROCEDURE — 85025 COMPLETE CBC W/AUTO DIFF WBC: CPT | Performed by: INTERNAL MEDICINE

## 2020-08-12 PROCEDURE — 99233 SBSQ HOSP IP/OBS HIGH 50: CPT | Performed by: INTERNAL MEDICINE

## 2020-08-12 PROCEDURE — 93005 ELECTROCARDIOGRAM TRACING: CPT | Performed by: INTERNAL MEDICINE

## 2020-08-12 PROCEDURE — 83605 ASSAY OF LACTIC ACID: CPT | Performed by: INTERNAL MEDICINE

## 2020-08-12 PROCEDURE — 85007 BL SMEAR W/DIFF WBC COUNT: CPT | Performed by: INTERNAL MEDICINE

## 2020-08-12 PROCEDURE — 25010000002 VANCOMYCIN 5 G RECONSTITUTED SOLUTION 5,000 MG VIAL: Performed by: INTERNAL MEDICINE

## 2020-08-12 PROCEDURE — C1751 CATH, INF, PER/CENT/MIDLINE: HCPCS

## 2020-08-12 PROCEDURE — 36410 VNPNXR 3YR/> PHY/QHP DX/THER: CPT

## 2020-08-12 PROCEDURE — 86140 C-REACTIVE PROTEIN: CPT | Performed by: INTERNAL MEDICINE

## 2020-08-12 PROCEDURE — 75635 CT ANGIO ABDOMINAL ARTERIES: CPT

## 2020-08-12 PROCEDURE — 0 IOVERSOL 74 % SOLUTION: Performed by: INTERNAL MEDICINE

## 2020-08-12 PROCEDURE — 94799 UNLISTED PULMONARY SVC/PX: CPT

## 2020-08-12 RX ORDER — SODIUM CHLORIDE 0.9 % (FLUSH) 0.9 %
10 SYRINGE (ML) INJECTION EVERY 12 HOURS SCHEDULED
Status: DISCONTINUED | OUTPATIENT
Start: 2020-08-12 | End: 2020-08-15 | Stop reason: HOSPADM

## 2020-08-12 RX ORDER — POTASSIUM CHLORIDE 750 MG/1
40 TABLET, FILM COATED, EXTENDED RELEASE ORAL AS NEEDED
Status: DISCONTINUED | OUTPATIENT
Start: 2020-08-12 | End: 2020-08-15 | Stop reason: HOSPADM

## 2020-08-12 RX ORDER — SODIUM CHLORIDE 0.9 % (FLUSH) 0.9 %
10 SYRINGE (ML) INJECTION AS NEEDED
Status: DISCONTINUED | OUTPATIENT
Start: 2020-08-12 | End: 2020-08-15 | Stop reason: HOSPADM

## 2020-08-12 RX ORDER — POTASSIUM CHLORIDE 20 MEQ/1
40 TABLET, EXTENDED RELEASE ORAL EVERY 4 HOURS
Status: COMPLETED | OUTPATIENT
Start: 2020-08-12 | End: 2020-08-12

## 2020-08-12 RX ORDER — POTASSIUM CHLORIDE 1.5 G/1.77G
40 POWDER, FOR SOLUTION ORAL AS NEEDED
Status: DISCONTINUED | OUTPATIENT
Start: 2020-08-12 | End: 2020-08-15 | Stop reason: HOSPADM

## 2020-08-12 RX ORDER — POTASSIUM CHLORIDE 7.45 MG/ML
10 INJECTION INTRAVENOUS
Status: DISCONTINUED | OUTPATIENT
Start: 2020-08-12 | End: 2020-08-15 | Stop reason: HOSPADM

## 2020-08-12 RX ADMIN — SODIUM CHLORIDE, PRESERVATIVE FREE 10 ML: 5 INJECTION INTRAVENOUS at 20:31

## 2020-08-12 RX ADMIN — Medication 1 CAPSULE: at 08:38

## 2020-08-12 RX ADMIN — HEPARIN SODIUM 5000 UNITS: 5000 INJECTION INTRAVENOUS; SUBCUTANEOUS at 08:38

## 2020-08-12 RX ADMIN — Medication 1 TABLET: at 08:38

## 2020-08-12 RX ADMIN — VANCOMYCIN HYDROCHLORIDE 500 MG: 5 INJECTION, POWDER, LYOPHILIZED, FOR SOLUTION INTRAVENOUS at 00:22

## 2020-08-12 RX ADMIN — VANCOMYCIN HYDROCHLORIDE 500 MG: 5 INJECTION, POWDER, LYOPHILIZED, FOR SOLUTION INTRAVENOUS at 18:33

## 2020-08-12 RX ADMIN — POTASSIUM CHLORIDE 40 MEQ: 1500 TABLET, EXTENDED RELEASE ORAL at 17:14

## 2020-08-12 RX ADMIN — CARVEDILOL 6.25 MG: 6.25 TABLET, FILM COATED ORAL at 17:14

## 2020-08-12 RX ADMIN — PROMETHAZINE HYDROCHLORIDE 12.5 MG: 25 INJECTION INTRAMUSCULAR; INTRAVENOUS at 20:31

## 2020-08-12 RX ADMIN — HEPARIN SODIUM 5000 UNITS: 5000 INJECTION INTRAVENOUS; SUBCUTANEOUS at 20:32

## 2020-08-12 RX ADMIN — SODIUM CHLORIDE, PRESERVATIVE FREE 10 ML: 5 INJECTION INTRAVENOUS at 20:32

## 2020-08-12 RX ADMIN — MONTELUKAST SODIUM 10 MG: 10 TABLET, COATED ORAL at 08:38

## 2020-08-12 RX ADMIN — POTASSIUM CHLORIDE 40 MEQ: 1500 TABLET, EXTENDED RELEASE ORAL at 20:30

## 2020-08-12 RX ADMIN — PREDNISONE 5 MG: 5 TABLET ORAL at 08:38

## 2020-08-12 RX ADMIN — PANTOPRAZOLE SODIUM 40 MG: 40 INJECTION, POWDER, FOR SOLUTION INTRAVENOUS at 10:38

## 2020-08-12 RX ADMIN — ATORVASTATIN CALCIUM 10 MG: 10 TABLET, FILM COATED ORAL at 20:30

## 2020-08-12 RX ADMIN — LEVOTHYROXINE SODIUM 175 MCG: 175 TABLET ORAL at 08:38

## 2020-08-12 RX ADMIN — METRONIDAZOLE 500 MG: 500 INJECTION, SOLUTION INTRAVENOUS at 02:00

## 2020-08-12 RX ADMIN — METRONIDAZOLE 500 MG: 500 INJECTION, SOLUTION INTRAVENOUS at 17:54

## 2020-08-12 RX ADMIN — TRAZODONE HYDROCHLORIDE 25 MG: 50 TABLET ORAL at 20:30

## 2020-08-12 RX ADMIN — POTASSIUM PHOSPHATE, MONOBASIC AND POTASSIUM PHOSPHATE, DIBASIC 15 MMOL: 224; 236 INJECTION, SOLUTION, CONCENTRATE INTRAVENOUS at 17:14

## 2020-08-12 RX ADMIN — CEFEPIME 2 G: 2 INJECTION, POWDER, FOR SOLUTION INTRAVENOUS at 02:43

## 2020-08-12 RX ADMIN — METRONIDAZOLE 500 MG: 500 INJECTION, SOLUTION INTRAVENOUS at 10:38

## 2020-08-12 RX ADMIN — MORPHINE SULFATE 1 MG: 2 INJECTION, SOLUTION INTRAMUSCULAR; INTRAVENOUS at 20:30

## 2020-08-12 RX ADMIN — IOVERSOL 100 ML: 741 INJECTION INTRA-ARTERIAL; INTRAVENOUS at 14:36

## 2020-08-12 RX ADMIN — CARVEDILOL 6.25 MG: 6.25 TABLET, FILM COATED ORAL at 08:37

## 2020-08-13 LAB
027 TOXIN: NORMAL
6-ACETYL MORPHINE: NEGATIVE
ALBUMIN SERPL-MCNC: 2.05 G/DL (ref 3.5–5.2)
ALBUMIN/GLOB SERPL: 0.6 G/DL
ALP SERPL-CCNC: 99 U/L (ref 39–117)
ALT SERPL W P-5'-P-CCNC: 12 U/L (ref 1–33)
AMPHET+METHAMPHET UR QL: NEGATIVE
ANION GAP SERPL CALCULATED.3IONS-SCNC: 12.4 MMOL/L (ref 5–15)
AST SERPL-CCNC: 22 U/L (ref 1–32)
BACTERIA UR QL AUTO: ABNORMAL /HPF
BARBITURATES UR QL SCN: NEGATIVE
BENZODIAZ UR QL SCN: NEGATIVE
BILIRUB SERPL-MCNC: 0.3 MG/DL (ref 0–1.2)
BILIRUB UR QL STRIP: NEGATIVE
BUN SERPL-MCNC: 17 MG/DL (ref 8–23)
BUN/CREAT SERPL: 26.2 (ref 7–25)
BUPRENORPHINE SERPL-MCNC: NEGATIVE NG/ML
C DIFF TOX GENS STL QL NAA+PROBE: NEGATIVE
CALCIUM SPEC-SCNC: 8.4 MG/DL (ref 8.6–10.5)
CANNABINOIDS SERPL QL: NEGATIVE
CHLORIDE SERPL-SCNC: 111 MMOL/L (ref 98–107)
CLARITY UR: ABNORMAL
CO2 SERPL-SCNC: 19.6 MMOL/L (ref 22–29)
COCAINE UR QL: NEGATIVE
COLOR UR: ABNORMAL
CREAT SERPL-MCNC: 0.65 MG/DL (ref 0.57–1)
CRP SERPL-MCNC: 23.6 MG/DL (ref 0–0.5)
D-LACTATE SERPL-SCNC: 1 MMOL/L (ref 0.5–2)
DEPRECATED RDW RBC AUTO: 51.2 FL (ref 37–54)
ERYTHROCYTE [DISTWIDTH] IN BLOOD BY AUTOMATED COUNT: 14.7 % (ref 12.3–15.4)
GFR SERPL CREATININE-BSD FRML MDRD: 89 ML/MIN/1.73
GLOBULIN UR ELPH-MCNC: 3.5 GM/DL
GLUCOSE BLDC GLUCOMTR-MCNC: 124 MG/DL (ref 70–130)
GLUCOSE BLDC GLUCOMTR-MCNC: 138 MG/DL (ref 70–130)
GLUCOSE BLDC GLUCOMTR-MCNC: 158 MG/DL (ref 70–130)
GLUCOSE BLDC GLUCOMTR-MCNC: 188 MG/DL (ref 70–130)
GLUCOSE SERPL-MCNC: 162 MG/DL (ref 65–99)
GLUCOSE UR STRIP-MCNC: NEGATIVE MG/DL
HCT VFR BLD AUTO: 38.4 % (ref 34–46.6)
HGB BLD-MCNC: 12.4 G/DL (ref 12–15.9)
HGB UR QL STRIP.AUTO: NEGATIVE
HYALINE CASTS UR QL AUTO: ABNORMAL /LPF
HYPOCHROMIA BLD QL: ABNORMAL
KETONES UR QL STRIP: ABNORMAL
LEUKOCYTE ESTERASE UR QL STRIP.AUTO: ABNORMAL
LYMPHOCYTES # BLD MANUAL: 1.06 10*3/MM3 (ref 0.7–3.1)
LYMPHOCYTES NFR BLD MANUAL: 3 % (ref 19.6–45.3)
LYMPHOCYTES NFR BLD MANUAL: 3 % (ref 5–12)
MACROCYTES BLD QL SMEAR: ABNORMAL
MAGNESIUM SERPL-MCNC: 2 MG/DL (ref 1.6–2.4)
MCH RBC QN AUTO: 30.6 PG (ref 26.6–33)
MCHC RBC AUTO-ENTMCNC: 32.3 G/DL (ref 31.5–35.7)
MCV RBC AUTO: 94.8 FL (ref 79–97)
METAMYELOCYTES NFR BLD MANUAL: 1 % (ref 0–0)
METHADONE UR QL SCN: NEGATIVE
MONOCYTES # BLD AUTO: 1.06 10*3/MM3 (ref 0.1–0.9)
NEUTROPHILS # BLD AUTO: 32.99 10*3/MM3 (ref 1.7–7)
NEUTROPHILS NFR BLD MANUAL: 87 % (ref 42.7–76)
NEUTS BAND NFR BLD MANUAL: 6 % (ref 0–5)
NITRITE UR QL STRIP: NEGATIVE
OPIATES UR QL: NEGATIVE
OXYCODONE UR QL SCN: NEGATIVE
PCP UR QL SCN: NEGATIVE
PH UR STRIP.AUTO: 5.5 [PH] (ref 5–8)
PHOSPHATE SERPL-MCNC: 1.8 MG/DL (ref 2.5–4.5)
PLAT MORPH BLD: NORMAL
PLATELET # BLD AUTO: 196 10*3/MM3 (ref 140–450)
PMV BLD AUTO: 11.4 FL (ref 6–12)
POTASSIUM SERPL-SCNC: 4.3 MMOL/L (ref 3.5–5.2)
PROT SERPL-MCNC: 5.5 G/DL (ref 6–8.5)
PROT UR QL STRIP: ABNORMAL
RBC # BLD AUTO: 4.05 10*6/MM3 (ref 3.77–5.28)
RBC # UR: ABNORMAL /HPF
REF LAB TEST METHOD: ABNORMAL
SODIUM SERPL-SCNC: 143 MMOL/L (ref 136–145)
SP GR UR STRIP: >=1.03 (ref 1–1.03)
SQUAMOUS #/AREA URNS HPF: ABNORMAL /HPF
UROBILINOGEN UR QL STRIP: ABNORMAL
WBC # BLD AUTO: 35.47 10*3/MM3 (ref 3.4–10.8)
WBC UR QL AUTO: ABNORMAL /HPF

## 2020-08-13 PROCEDURE — 83735 ASSAY OF MAGNESIUM: CPT | Performed by: INTERNAL MEDICINE

## 2020-08-13 PROCEDURE — 84100 ASSAY OF PHOSPHORUS: CPT | Performed by: INTERNAL MEDICINE

## 2020-08-13 PROCEDURE — 80307 DRUG TEST PRSMV CHEM ANLYZR: CPT | Performed by: INTERNAL MEDICINE

## 2020-08-13 PROCEDURE — 63710000001 PREDNISONE PER 5 MG: Performed by: INTERNAL MEDICINE

## 2020-08-13 PROCEDURE — 80053 COMPREHEN METABOLIC PANEL: CPT | Performed by: INTERNAL MEDICINE

## 2020-08-13 PROCEDURE — 86140 C-REACTIVE PROTEIN: CPT | Performed by: INTERNAL MEDICINE

## 2020-08-13 PROCEDURE — 82962 GLUCOSE BLOOD TEST: CPT

## 2020-08-13 PROCEDURE — 87427 SHIGA-LIKE TOXIN AG IA: CPT | Performed by: INTERNAL MEDICINE

## 2020-08-13 PROCEDURE — 83605 ASSAY OF LACTIC ACID: CPT | Performed by: INTERNAL MEDICINE

## 2020-08-13 PROCEDURE — 25010000002 HEPARIN (PORCINE) PER 1000 UNITS: Performed by: INTERNAL MEDICINE

## 2020-08-13 PROCEDURE — 99232 SBSQ HOSP IP/OBS MODERATE 35: CPT | Performed by: INTERNAL MEDICINE

## 2020-08-13 PROCEDURE — 25010000002 PROMETHAZINE PER 50 MG: Performed by: INTERNAL MEDICINE

## 2020-08-13 PROCEDURE — 81001 URINALYSIS AUTO W/SCOPE: CPT | Performed by: INTERNAL MEDICINE

## 2020-08-13 PROCEDURE — 87045 FECES CULTURE AEROBIC BACT: CPT | Performed by: INTERNAL MEDICINE

## 2020-08-13 PROCEDURE — 25010000002 VANCOMYCIN 5 G RECONSTITUTED SOLUTION 5,000 MG VIAL: Performed by: INTERNAL MEDICINE

## 2020-08-13 PROCEDURE — 85007 BL SMEAR W/DIFF WBC COUNT: CPT | Performed by: INTERNAL MEDICINE

## 2020-08-13 PROCEDURE — 85025 COMPLETE CBC W/AUTO DIFF WBC: CPT | Performed by: INTERNAL MEDICINE

## 2020-08-13 PROCEDURE — 87493 C DIFF AMPLIFIED PROBE: CPT | Performed by: INTERNAL MEDICINE

## 2020-08-13 PROCEDURE — 25010000002 CEFEPIME PER 500 MG: Performed by: INTERNAL MEDICINE

## 2020-08-13 PROCEDURE — 87046 STOOL CULTR AEROBIC BACT EA: CPT | Performed by: INTERNAL MEDICINE

## 2020-08-13 PROCEDURE — 94799 UNLISTED PULMONARY SVC/PX: CPT

## 2020-08-13 RX ORDER — ACETAMINOPHEN 325 MG/1
650 TABLET ORAL EVERY 6 HOURS PRN
Status: DISCONTINUED | OUTPATIENT
Start: 2020-08-13 | End: 2020-08-15 | Stop reason: HOSPADM

## 2020-08-13 RX ORDER — FAMOTIDINE 10 MG/ML
20 INJECTION, SOLUTION INTRAVENOUS DAILY
Status: DISCONTINUED | OUTPATIENT
Start: 2020-08-13 | End: 2020-08-15 | Stop reason: HOSPADM

## 2020-08-13 RX ORDER — LOPERAMIDE HYDROCHLORIDE 2 MG/1
2 CAPSULE ORAL 4 TIMES DAILY PRN
Status: DISCONTINUED | OUTPATIENT
Start: 2020-08-13 | End: 2020-08-15 | Stop reason: HOSPADM

## 2020-08-13 RX ADMIN — METRONIDAZOLE 500 MG: 500 INJECTION, SOLUTION INTRAVENOUS at 02:15

## 2020-08-13 RX ADMIN — LOPERAMIDE HYDROCHLORIDE 2 MG: 2 CAPSULE ORAL at 08:12

## 2020-08-13 RX ADMIN — TRAZODONE HYDROCHLORIDE 25 MG: 50 TABLET ORAL at 21:44

## 2020-08-13 RX ADMIN — FAMOTIDINE 20 MG: 10 INJECTION INTRAVENOUS at 08:12

## 2020-08-13 RX ADMIN — METRONIDAZOLE 500 MG: 500 INJECTION, SOLUTION INTRAVENOUS at 17:03

## 2020-08-13 RX ADMIN — POTASSIUM PHOSPHATE, MONOBASIC AND POTASSIUM PHOSPHATE, DIBASIC 15 MMOL: 224; 236 INJECTION, SOLUTION, CONCENTRATE INTRAVENOUS at 14:44

## 2020-08-13 RX ADMIN — Medication 1 CAPSULE: at 08:12

## 2020-08-13 RX ADMIN — PROMETHAZINE HYDROCHLORIDE 12.5 MG: 25 INJECTION INTRAMUSCULAR; INTRAVENOUS at 21:28

## 2020-08-13 RX ADMIN — CARVEDILOL 6.25 MG: 6.25 TABLET, FILM COATED ORAL at 08:12

## 2020-08-13 RX ADMIN — SODIUM CHLORIDE, PRESERVATIVE FREE 10 ML: 5 INJECTION INTRAVENOUS at 08:13

## 2020-08-13 RX ADMIN — PREDNISONE 5 MG: 5 TABLET ORAL at 08:12

## 2020-08-13 RX ADMIN — CEFEPIME 2 G: 2 INJECTION, POWDER, FOR SOLUTION INTRAVENOUS at 02:14

## 2020-08-13 RX ADMIN — ATORVASTATIN CALCIUM 10 MG: 10 TABLET, FILM COATED ORAL at 21:29

## 2020-08-13 RX ADMIN — CARVEDILOL 6.25 MG: 6.25 TABLET, FILM COATED ORAL at 17:03

## 2020-08-13 RX ADMIN — METRONIDAZOLE 500 MG: 500 INJECTION, SOLUTION INTRAVENOUS at 10:14

## 2020-08-13 RX ADMIN — HEPARIN SODIUM 5000 UNITS: 5000 INJECTION INTRAVENOUS; SUBCUTANEOUS at 08:12

## 2020-08-13 RX ADMIN — Medication 1 TABLET: at 08:12

## 2020-08-13 RX ADMIN — VANCOMYCIN HYDROCHLORIDE 500 MG: 5 INJECTION, POWDER, LYOPHILIZED, FOR SOLUTION INTRAVENOUS at 12:37

## 2020-08-13 RX ADMIN — MONTELUKAST SODIUM 10 MG: 10 TABLET, COATED ORAL at 08:12

## 2020-08-13 RX ADMIN — Medication: at 08:12

## 2020-08-13 RX ADMIN — HEPARIN SODIUM 5000 UNITS: 5000 INJECTION INTRAVENOUS; SUBCUTANEOUS at 21:29

## 2020-08-13 RX ADMIN — SODIUM PHOSPHATE, MONOBASIC, MONOHYDRATE 15 MMOL: 276; 142 INJECTION, SOLUTION INTRAVENOUS at 04:52

## 2020-08-13 RX ADMIN — LEVOTHYROXINE SODIUM 175 MCG: 175 TABLET ORAL at 08:12

## 2020-08-14 LAB
ALBUMIN SERPL-MCNC: 2.19 G/DL (ref 3.5–5.2)
ALBUMIN/GLOB SERPL: 0.7 G/DL
ALP SERPL-CCNC: 82 U/L (ref 39–117)
ALT SERPL W P-5'-P-CCNC: 11 U/L (ref 1–33)
ANION GAP SERPL CALCULATED.3IONS-SCNC: 8.3 MMOL/L (ref 5–15)
AST SERPL-CCNC: 20 U/L (ref 1–32)
BASOPHILS # BLD AUTO: 0.06 10*3/MM3 (ref 0–0.2)
BASOPHILS NFR BLD AUTO: 0.3 % (ref 0–1.5)
BILIRUB SERPL-MCNC: 0.2 MG/DL (ref 0–1.2)
BUN SERPL-MCNC: 11 MG/DL (ref 8–23)
BUN/CREAT SERPL: 20.4 (ref 7–25)
CALCIUM SPEC-SCNC: 8.1 MG/DL (ref 8.6–10.5)
CHLORIDE SERPL-SCNC: 109 MMOL/L (ref 98–107)
CO2 SERPL-SCNC: 22.7 MMOL/L (ref 22–29)
CREAT SERPL-MCNC: 0.54 MG/DL (ref 0.57–1)
CRP SERPL-MCNC: 9.73 MG/DL (ref 0–0.5)
D-LACTATE SERPL-SCNC: 1.2 MMOL/L (ref 0.5–2)
DEPRECATED RDW RBC AUTO: 52.1 FL (ref 37–54)
EOSINOPHIL # BLD AUTO: 0.03 10*3/MM3 (ref 0–0.4)
EOSINOPHIL NFR BLD AUTO: 0.1 % (ref 0.3–6.2)
ERYTHROCYTE [DISTWIDTH] IN BLOOD BY AUTOMATED COUNT: 15 % (ref 12.3–15.4)
GFR SERPL CREATININE-BSD FRML MDRD: 110 ML/MIN/1.73
GLOBULIN UR ELPH-MCNC: 3 GM/DL
GLUCOSE BLDC GLUCOMTR-MCNC: 139 MG/DL (ref 70–130)
GLUCOSE BLDC GLUCOMTR-MCNC: 173 MG/DL (ref 70–130)
GLUCOSE BLDC GLUCOMTR-MCNC: 198 MG/DL (ref 70–130)
GLUCOSE BLDC GLUCOMTR-MCNC: 239 MG/DL (ref 70–130)
GLUCOSE SERPL-MCNC: 170 MG/DL (ref 65–99)
HBA1C MFR BLD: 7.4 % (ref 4.8–5.6)
HCT VFR BLD AUTO: 34.3 % (ref 34–46.6)
HGB BLD-MCNC: 11 G/DL (ref 12–15.9)
IMM GRANULOCYTES # BLD AUTO: 0.43 10*3/MM3 (ref 0–0.05)
IMM GRANULOCYTES NFR BLD AUTO: 1.9 % (ref 0–0.5)
LYMPHOCYTES # BLD AUTO: 0.76 10*3/MM3 (ref 0.7–3.1)
LYMPHOCYTES NFR BLD AUTO: 3.4 % (ref 19.6–45.3)
MAGNESIUM SERPL-MCNC: 1.6 MG/DL (ref 1.6–2.4)
MCH RBC QN AUTO: 30.4 PG (ref 26.6–33)
MCHC RBC AUTO-ENTMCNC: 32.1 G/DL (ref 31.5–35.7)
MCV RBC AUTO: 94.8 FL (ref 79–97)
MONOCYTES # BLD AUTO: 0.93 10*3/MM3 (ref 0.1–0.9)
MONOCYTES NFR BLD AUTO: 4.1 % (ref 5–12)
NEUTROPHILS NFR BLD AUTO: 20.34 10*3/MM3 (ref 1.7–7)
NEUTROPHILS NFR BLD AUTO: 90.2 % (ref 42.7–76)
NRBC BLD AUTO-RTO: 0 /100 WBC (ref 0–0.2)
PHOSPHATE SERPL-MCNC: 1.8 MG/DL (ref 2.5–4.5)
PLATELET # BLD AUTO: 147 10*3/MM3 (ref 140–450)
PMV BLD AUTO: 10.8 FL (ref 6–12)
POTASSIUM SERPL-SCNC: 3.8 MMOL/L (ref 3.5–5.2)
PROT SERPL-MCNC: 5.2 G/DL (ref 6–8.5)
RBC # BLD AUTO: 3.62 10*6/MM3 (ref 3.77–5.28)
SODIUM SERPL-SCNC: 140 MMOL/L (ref 136–145)
VANCOMYCIN TROUGH SERPL-MCNC: 6.3 MCG/ML (ref 5–20)
WBC # BLD AUTO: 22.55 10*3/MM3 (ref 3.4–10.8)

## 2020-08-14 PROCEDURE — 63710000001 PROMETHAZINE PER 12.5 MG: Performed by: INTERNAL MEDICINE

## 2020-08-14 PROCEDURE — 82962 GLUCOSE BLOOD TEST: CPT

## 2020-08-14 PROCEDURE — 84100 ASSAY OF PHOSPHORUS: CPT | Performed by: INTERNAL MEDICINE

## 2020-08-14 PROCEDURE — 25010000003 MAGNESIUM SULFATE 4 GM/100ML SOLUTION: Performed by: INTERNAL MEDICINE

## 2020-08-14 PROCEDURE — 25010000002 HEPARIN (PORCINE) PER 1000 UNITS: Performed by: INTERNAL MEDICINE

## 2020-08-14 PROCEDURE — 86140 C-REACTIVE PROTEIN: CPT | Performed by: INTERNAL MEDICINE

## 2020-08-14 PROCEDURE — 83036 HEMOGLOBIN GLYCOSYLATED A1C: CPT | Performed by: INTERNAL MEDICINE

## 2020-08-14 PROCEDURE — 85025 COMPLETE CBC W/AUTO DIFF WBC: CPT | Performed by: INTERNAL MEDICINE

## 2020-08-14 PROCEDURE — 97162 PT EVAL MOD COMPLEX 30 MIN: CPT

## 2020-08-14 PROCEDURE — 25010000002 VANCOMYCIN 5 G RECONSTITUTED SOLUTION 5,000 MG VIAL: Performed by: INTERNAL MEDICINE

## 2020-08-14 PROCEDURE — 80053 COMPREHEN METABOLIC PANEL: CPT | Performed by: INTERNAL MEDICINE

## 2020-08-14 PROCEDURE — 25010000002 PROMETHAZINE PER 50 MG: Performed by: INTERNAL MEDICINE

## 2020-08-14 PROCEDURE — 83605 ASSAY OF LACTIC ACID: CPT | Performed by: INTERNAL MEDICINE

## 2020-08-14 PROCEDURE — 94799 UNLISTED PULMONARY SVC/PX: CPT

## 2020-08-14 PROCEDURE — 97166 OT EVAL MOD COMPLEX 45 MIN: CPT

## 2020-08-14 PROCEDURE — 25010000002 CEFEPIME PER 500 MG: Performed by: INTERNAL MEDICINE

## 2020-08-14 PROCEDURE — 83735 ASSAY OF MAGNESIUM: CPT | Performed by: INTERNAL MEDICINE

## 2020-08-14 PROCEDURE — 80202 ASSAY OF VANCOMYCIN: CPT | Performed by: INTERNAL MEDICINE

## 2020-08-14 PROCEDURE — 63710000001 PREDNISONE PER 5 MG: Performed by: INTERNAL MEDICINE

## 2020-08-14 PROCEDURE — 99232 SBSQ HOSP IP/OBS MODERATE 35: CPT | Performed by: INTERNAL MEDICINE

## 2020-08-14 RX ORDER — METRONIDAZOLE 250 MG/1
500 TABLET ORAL EVERY 8 HOURS SCHEDULED
Status: DISCONTINUED | OUTPATIENT
Start: 2020-08-14 | End: 2020-08-15 | Stop reason: HOSPADM

## 2020-08-14 RX ORDER — MAGNESIUM SULFATE HEPTAHYDRATE 40 MG/ML
4 INJECTION, SOLUTION INTRAVENOUS ONCE
Status: COMPLETED | OUTPATIENT
Start: 2020-08-14 | End: 2020-08-14

## 2020-08-14 RX ORDER — CEFDINIR 300 MG/1
300 CAPSULE ORAL EVERY 12 HOURS SCHEDULED
Status: DISCONTINUED | OUTPATIENT
Start: 2020-08-14 | End: 2020-08-15 | Stop reason: HOSPADM

## 2020-08-14 RX ADMIN — LEVOTHYROXINE SODIUM 175 MCG: 175 TABLET ORAL at 08:23

## 2020-08-14 RX ADMIN — PROMETHAZINE HYDROCHLORIDE 12.5 MG: 12.5 TABLET ORAL at 15:17

## 2020-08-14 RX ADMIN — POTASSIUM PHOSPHATE, MONOBASIC AND POTASSIUM PHOSPHATE, DIBASIC 15 MMOL: 224; 236 INJECTION, SOLUTION, CONCENTRATE INTRAVENOUS at 05:24

## 2020-08-14 RX ADMIN — Medication 1 TABLET: at 08:23

## 2020-08-14 RX ADMIN — SODIUM CHLORIDE, PRESERVATIVE FREE 10 ML: 5 INJECTION INTRAVENOUS at 08:25

## 2020-08-14 RX ADMIN — ACETAMINOPHEN 650 MG: 325 TABLET ORAL at 01:11

## 2020-08-14 RX ADMIN — HEPARIN SODIUM 5000 UNITS: 5000 INJECTION INTRAVENOUS; SUBCUTANEOUS at 08:23

## 2020-08-14 RX ADMIN — MAGNESIUM SULFATE HEPTAHYDRATE 4 G: 40 INJECTION, SOLUTION INTRAVENOUS at 02:29

## 2020-08-14 RX ADMIN — CEFDINIR 300 MG: 300 CAPSULE ORAL at 21:21

## 2020-08-14 RX ADMIN — METRONIDAZOLE 500 MG: 500 INJECTION, SOLUTION INTRAVENOUS at 17:54

## 2020-08-14 RX ADMIN — CARVEDILOL 6.25 MG: 6.25 TABLET, FILM COATED ORAL at 08:26

## 2020-08-14 RX ADMIN — CEFEPIME 2 G: 2 INJECTION, POWDER, FOR SOLUTION INTRAVENOUS at 02:12

## 2020-08-14 RX ADMIN — METRONIDAZOLE 500 MG: 500 INJECTION, SOLUTION INTRAVENOUS at 10:20

## 2020-08-14 RX ADMIN — LOPERAMIDE HYDROCHLORIDE 2 MG: 2 CAPSULE ORAL at 10:27

## 2020-08-14 RX ADMIN — VANCOMYCIN HYDROCHLORIDE 500 MG: 5 INJECTION, POWDER, LYOPHILIZED, FOR SOLUTION INTRAVENOUS at 10:26

## 2020-08-14 RX ADMIN — MONTELUKAST SODIUM 10 MG: 10 TABLET, COATED ORAL at 08:23

## 2020-08-14 RX ADMIN — SODIUM CHLORIDE, PRESERVATIVE FREE 10 ML: 5 INJECTION INTRAVENOUS at 21:22

## 2020-08-14 RX ADMIN — Medication: at 08:35

## 2020-08-14 RX ADMIN — METRONIDAZOLE 500 MG: 250 TABLET ORAL at 21:21

## 2020-08-14 RX ADMIN — ATORVASTATIN CALCIUM 10 MG: 10 TABLET, FILM COATED ORAL at 21:21

## 2020-08-14 RX ADMIN — TRAZODONE HYDROCHLORIDE 25 MG: 50 TABLET ORAL at 21:21

## 2020-08-14 RX ADMIN — CARVEDILOL 6.25 MG: 6.25 TABLET, FILM COATED ORAL at 17:53

## 2020-08-14 RX ADMIN — SODIUM CHLORIDE, PRESERVATIVE FREE 10 ML: 5 INJECTION INTRAVENOUS at 08:27

## 2020-08-14 RX ADMIN — LOPERAMIDE HYDROCHLORIDE 2 MG: 2 CAPSULE ORAL at 01:11

## 2020-08-14 RX ADMIN — METRONIDAZOLE 500 MG: 500 INJECTION, SOLUTION INTRAVENOUS at 01:09

## 2020-08-14 RX ADMIN — NICOTINE 1 PATCH: 21 PATCH TRANSDERMAL at 08:24

## 2020-08-14 RX ADMIN — PREDNISONE 5 MG: 5 TABLET ORAL at 08:23

## 2020-08-14 RX ADMIN — Medication 1 CAPSULE: at 08:24

## 2020-08-14 RX ADMIN — PROMETHAZINE HYDROCHLORIDE 12.5 MG: 25 INJECTION INTRAMUSCULAR; INTRAVENOUS at 22:33

## 2020-08-14 RX ADMIN — FAMOTIDINE 20 MG: 10 INJECTION INTRAVENOUS at 08:24

## 2020-08-14 RX ADMIN — HEPARIN SODIUM 5000 UNITS: 5000 INJECTION INTRAVENOUS; SUBCUTANEOUS at 21:21

## 2020-08-15 VITALS
WEIGHT: 105.1 LBS | SYSTOLIC BLOOD PRESSURE: 108 MMHG | OXYGEN SATURATION: 98 % | HEART RATE: 84 BPM | HEIGHT: 65 IN | TEMPERATURE: 98 F | RESPIRATION RATE: 20 BRPM | BODY MASS INDEX: 17.51 KG/M2 | DIASTOLIC BLOOD PRESSURE: 63 MMHG

## 2020-08-15 PROBLEM — N17.9 AKI (ACUTE KIDNEY INJURY): Status: RESOLVED | Noted: 2020-08-11 | Resolved: 2020-08-15

## 2020-08-15 PROBLEM — A41.9 SEPSIS: Status: RESOLVED | Noted: 2020-08-10 | Resolved: 2020-08-15

## 2020-08-15 PROBLEM — R10.33 PERIUMBILICAL ABDOMINAL PAIN: Status: RESOLVED | Noted: 2020-08-11 | Resolved: 2020-08-15

## 2020-08-15 LAB
ALBUMIN SERPL-MCNC: 2.23 G/DL (ref 3.5–5.2)
ALBUMIN/GLOB SERPL: 0.7 G/DL
ALP SERPL-CCNC: 80 U/L (ref 39–117)
ALT SERPL W P-5'-P-CCNC: 10 U/L (ref 1–33)
ANION GAP SERPL CALCULATED.3IONS-SCNC: 5.4 MMOL/L (ref 5–15)
AST SERPL-CCNC: 13 U/L (ref 1–32)
BACTERIA SPEC AEROBE CULT: NORMAL
BACTERIA SPEC AEROBE CULT: NORMAL
BILIRUB SERPL-MCNC: 0.2 MG/DL (ref 0–1.2)
BUN SERPL-MCNC: 7 MG/DL (ref 8–23)
BUN/CREAT SERPL: 13.7 (ref 7–25)
CALCIUM SPEC-SCNC: 7.7 MG/DL (ref 8.6–10.5)
CHLORIDE SERPL-SCNC: 103 MMOL/L (ref 98–107)
CO2 SERPL-SCNC: 22.6 MMOL/L (ref 22–29)
CREAT SERPL-MCNC: 0.51 MG/DL (ref 0.57–1)
CRP SERPL-MCNC: 4.92 MG/DL (ref 0–0.5)
DEPRECATED RDW RBC AUTO: 50.1 FL (ref 37–54)
EOSINOPHIL # BLD MANUAL: 0.42 10*3/MM3 (ref 0–0.4)
EOSINOPHIL NFR BLD MANUAL: 2 % (ref 0.3–6.2)
ERYTHROCYTE [DISTWIDTH] IN BLOOD BY AUTOMATED COUNT: 14.7 % (ref 12.3–15.4)
GFR SERPL CREATININE-BSD FRML MDRD: 118 ML/MIN/1.73
GLOBULIN UR ELPH-MCNC: 3.1 GM/DL
GLUCOSE BLDC GLUCOMTR-MCNC: 156 MG/DL (ref 70–130)
GLUCOSE BLDC GLUCOMTR-MCNC: 166 MG/DL (ref 70–130)
GLUCOSE SERPL-MCNC: 206 MG/DL (ref 65–99)
HCT VFR BLD AUTO: 39.3 % (ref 34–46.6)
HGB BLD-MCNC: 12.9 G/DL (ref 12–15.9)
LYMPHOCYTES # BLD MANUAL: 1.46 10*3/MM3 (ref 0.7–3.1)
LYMPHOCYTES NFR BLD MANUAL: 6 % (ref 5–12)
LYMPHOCYTES NFR BLD MANUAL: 7 % (ref 19.6–45.3)
MAGNESIUM SERPL-MCNC: 2 MG/DL (ref 1.6–2.4)
MCH RBC QN AUTO: 30.4 PG (ref 26.6–33)
MCHC RBC AUTO-ENTMCNC: 32.8 G/DL (ref 31.5–35.7)
MCV RBC AUTO: 92.5 FL (ref 79–97)
MONOCYTES # BLD AUTO: 1.25 10*3/MM3 (ref 0.1–0.9)
NEUTROPHILS # BLD AUTO: 17.73 10*3/MM3 (ref 1.7–7)
NEUTROPHILS NFR BLD MANUAL: 78 % (ref 42.7–76)
NEUTS BAND NFR BLD MANUAL: 7 % (ref 0–5)
PHOSPHATE SERPL-MCNC: 1.4 MG/DL (ref 2.5–4.5)
PLAT MORPH BLD: NORMAL
PLATELET # BLD AUTO: 163 10*3/MM3 (ref 140–450)
PMV BLD AUTO: 10.6 FL (ref 6–12)
POTASSIUM SERPL-SCNC: 3.7 MMOL/L (ref 3.5–5.2)
PROT SERPL-MCNC: 5.3 G/DL (ref 6–8.5)
RBC # BLD AUTO: 4.25 10*6/MM3 (ref 3.77–5.28)
RBC MORPH BLD: NORMAL
SODIUM SERPL-SCNC: 131 MMOL/L (ref 136–145)
WBC # BLD AUTO: 20.86 10*3/MM3 (ref 3.4–10.8)

## 2020-08-15 PROCEDURE — 25010000002 HEPARIN (PORCINE) PER 1000 UNITS: Performed by: INTERNAL MEDICINE

## 2020-08-15 PROCEDURE — 82962 GLUCOSE BLOOD TEST: CPT

## 2020-08-15 PROCEDURE — 86140 C-REACTIVE PROTEIN: CPT | Performed by: INTERNAL MEDICINE

## 2020-08-15 PROCEDURE — 85007 BL SMEAR W/DIFF WBC COUNT: CPT | Performed by: INTERNAL MEDICINE

## 2020-08-15 PROCEDURE — 83735 ASSAY OF MAGNESIUM: CPT | Performed by: INTERNAL MEDICINE

## 2020-08-15 PROCEDURE — 63710000001 PROMETHAZINE PER 12.5 MG: Performed by: INTERNAL MEDICINE

## 2020-08-15 PROCEDURE — 85025 COMPLETE CBC W/AUTO DIFF WBC: CPT | Performed by: INTERNAL MEDICINE

## 2020-08-15 PROCEDURE — 94799 UNLISTED PULMONARY SVC/PX: CPT

## 2020-08-15 PROCEDURE — 80053 COMPREHEN METABOLIC PANEL: CPT | Performed by: INTERNAL MEDICINE

## 2020-08-15 PROCEDURE — 84100 ASSAY OF PHOSPHORUS: CPT | Performed by: INTERNAL MEDICINE

## 2020-08-15 PROCEDURE — 63710000001 PREDNISONE PER 5 MG: Performed by: INTERNAL MEDICINE

## 2020-08-15 PROCEDURE — 99239 HOSP IP/OBS DSCHRG MGMT >30: CPT | Performed by: INTERNAL MEDICINE

## 2020-08-15 RX ORDER — METRONIDAZOLE 500 MG/1
500 TABLET ORAL EVERY 8 HOURS SCHEDULED
Qty: 16 TABLET | Refills: 0
Start: 2020-08-15 | End: 2020-08-21

## 2020-08-15 RX ORDER — BUMETANIDE 1 MG/1
1 TABLET ORAL DAILY PRN
Start: 2020-08-15 | End: 2021-04-23

## 2020-08-15 RX ORDER — HYDROCODONE BITARTRATE AND ACETAMINOPHEN 5; 325 MG/1; MG/1
1 TABLET ORAL EVERY 4 HOURS PRN
Qty: 18 TABLET | Refills: 0 | Status: SHIPPED | OUTPATIENT
Start: 2020-08-15 | End: 2020-08-18

## 2020-08-15 RX ORDER — CEFDINIR 300 MG/1
300 CAPSULE ORAL EVERY 12 HOURS SCHEDULED
Qty: 10 CAPSULE | Refills: 0
Start: 2020-08-15 | End: 2020-08-20

## 2020-08-15 RX ORDER — LOPERAMIDE HYDROCHLORIDE 2 MG/1
2 CAPSULE ORAL 4 TIMES DAILY PRN
Start: 2020-08-15 | End: 2021-04-23

## 2020-08-15 RX ADMIN — SODIUM CHLORIDE, PRESERVATIVE FREE 10 ML: 5 INJECTION INTRAVENOUS at 08:57

## 2020-08-15 RX ADMIN — PREDNISONE 5 MG: 5 TABLET ORAL at 08:56

## 2020-08-15 RX ADMIN — CARVEDILOL 6.25 MG: 6.25 TABLET, FILM COATED ORAL at 08:55

## 2020-08-15 RX ADMIN — LEVOTHYROXINE SODIUM 175 MCG: 175 TABLET ORAL at 08:56

## 2020-08-15 RX ADMIN — PROMETHAZINE HYDROCHLORIDE 12.5 MG: 12.5 TABLET ORAL at 06:46

## 2020-08-15 RX ADMIN — POTASSIUM PHOSPHATE, MONOBASIC AND POTASSIUM PHOSPHATE, DIBASIC 30 MMOL: 224; 236 INJECTION, SOLUTION, CONCENTRATE INTRAVENOUS at 06:48

## 2020-08-15 RX ADMIN — METRONIDAZOLE 500 MG: 250 TABLET ORAL at 05:33

## 2020-08-15 RX ADMIN — HEPARIN SODIUM 5000 UNITS: 5000 INJECTION INTRAVENOUS; SUBCUTANEOUS at 08:56

## 2020-08-15 RX ADMIN — Medication 1 CAPSULE: at 08:55

## 2020-08-15 RX ADMIN — ACETAMINOPHEN 650 MG: 325 TABLET ORAL at 02:15

## 2020-08-15 RX ADMIN — MONTELUKAST SODIUM 10 MG: 10 TABLET, COATED ORAL at 08:56

## 2020-08-15 RX ADMIN — FAMOTIDINE 20 MG: 10 INJECTION INTRAVENOUS at 08:56

## 2020-08-15 RX ADMIN — CEFDINIR 300 MG: 300 CAPSULE ORAL at 08:56

## 2020-08-15 RX ADMIN — Medication 1 TABLET: at 08:56

## 2020-08-15 RX ADMIN — NICOTINE 1 PATCH: 21 PATCH TRANSDERMAL at 08:58

## 2020-08-16 ENCOUNTER — READMISSION MANAGEMENT (OUTPATIENT)
Dept: CALL CENTER | Facility: HOSPITAL | Age: 75
End: 2020-08-16

## 2020-08-16 NOTE — OUTREACH NOTE
Prep Survey      Responses   [facility] facility patient discharged from?  [facility]   Is LACE score < 7 ?  No   Eligibility  Not Eligible   What are the reasons patient is not eligible?  [facility]   COVID-19 Test Status  Negative   Does the patient have one of the following disease processes/diagnoses(primary or secondary)?  Sepsis   Prep survey completed?  Yes          Yolanda Mireles RN

## 2020-08-17 LAB
CAMPYLOBACTER STL CULT: NORMAL
E COLI SXT STL QL IA: NEGATIVE
Lab: NORMAL
Lab: NORMAL
SALM + SHIG STL CULT: NORMAL

## 2020-08-20 ENCOUNTER — TRANSCRIBE ORDERS (OUTPATIENT)
Dept: ADMINISTRATIVE | Facility: HOSPITAL | Age: 75
End: 2020-08-20

## 2020-08-20 DIAGNOSIS — R10.817 GENERALIZED ABDOMINAL TENDERNESS, REBOUND TENDERNESS PRESENCE NOT SPECIFIED: Primary | ICD-10-CM

## 2020-08-22 ENCOUNTER — LAB REQUISITION (OUTPATIENT)
Dept: LAB | Facility: HOSPITAL | Age: 75
End: 2020-08-22

## 2020-08-22 DIAGNOSIS — R30.0 DYSURIA: ICD-10-CM

## 2020-08-22 LAB
BILIRUB UR QL STRIP: NEGATIVE
CLARITY UR: CLEAR
COLOR UR: YELLOW
GLUCOSE UR STRIP-MCNC: NEGATIVE MG/DL
HGB UR QL STRIP.AUTO: NEGATIVE
KETONES UR QL STRIP: NEGATIVE
LEUKOCYTE ESTERASE UR QL STRIP.AUTO: NEGATIVE
NITRITE UR QL STRIP: NEGATIVE
PH UR STRIP.AUTO: 7 [PH] (ref 5–8)
PROT UR QL STRIP: NEGATIVE
SP GR UR STRIP: 1.01 (ref 1–1.03)
UROBILINOGEN UR QL STRIP: NORMAL

## 2020-08-22 PROCEDURE — 81003 URINALYSIS AUTO W/O SCOPE: CPT | Performed by: INTERNAL MEDICINE

## 2020-08-25 ENCOUNTER — LAB REQUISITION (OUTPATIENT)
Dept: LAB | Facility: HOSPITAL | Age: 75
End: 2020-08-25

## 2020-08-25 DIAGNOSIS — I11.0 HYPERTENSIVE HEART DISEASE WITH HEART FAILURE (HCC): ICD-10-CM

## 2020-08-25 DIAGNOSIS — I50.22 CHRONIC SYSTOLIC (CONGESTIVE) HEART FAILURE (HCC): ICD-10-CM

## 2020-08-25 DIAGNOSIS — I10 ESSENTIAL (PRIMARY) HYPERTENSION: ICD-10-CM

## 2020-08-25 LAB
ANION GAP SERPL CALCULATED.3IONS-SCNC: 4.8 MMOL/L (ref 5–15)
BUN SERPL-MCNC: 11 MG/DL (ref 8–23)
BUN/CREAT SERPL: 23.4 (ref 7–25)
CALCIUM SPEC-SCNC: 8.7 MG/DL (ref 8.6–10.5)
CHLORIDE SERPL-SCNC: 96 MMOL/L (ref 98–107)
CO2 SERPL-SCNC: 36.2 MMOL/L (ref 22–29)
CREAT SERPL-MCNC: 0.47 MG/DL (ref 0.57–1)
CRP SERPL-MCNC: 1.12 MG/DL (ref 0–0.5)
GFR SERPL CREATININE-BSD FRML MDRD: 130 ML/MIN/1.73
GLUCOSE SERPL-MCNC: 112 MG/DL (ref 65–99)
POTASSIUM SERPL-SCNC: 4.5 MMOL/L (ref 3.5–5.2)
SODIUM SERPL-SCNC: 137 MMOL/L (ref 136–145)

## 2020-08-25 PROCEDURE — 80048 BASIC METABOLIC PNL TOTAL CA: CPT | Performed by: INTERNAL MEDICINE

## 2020-08-25 PROCEDURE — 86140 C-REACTIVE PROTEIN: CPT | Performed by: INTERNAL MEDICINE

## 2020-08-26 ENCOUNTER — APPOINTMENT (OUTPATIENT)
Dept: CT IMAGING | Facility: HOSPITAL | Age: 75
End: 2020-08-26

## 2020-09-01 ENCOUNTER — HOSPITAL ENCOUNTER (OUTPATIENT)
Dept: CT IMAGING | Facility: HOSPITAL | Age: 75
Discharge: HOME | End: 2020-09-01
Admitting: INTERNAL MEDICINE

## 2020-09-01 DIAGNOSIS — R10.817 GENERALIZED ABDOMINAL TENDERNESS, REBOUND TENDERNESS PRESENCE NOT SPECIFIED: ICD-10-CM

## 2020-09-01 LAB — CREAT BLDA-MCNC: 0.7 MG/DL (ref 0.6–1.3)

## 2020-09-01 PROCEDURE — 74150 CT ABDOMEN W/O CONTRAST: CPT

## 2020-09-01 PROCEDURE — 74177 CT ABD & PELVIS W/CONTRAST: CPT | Performed by: RADIOLOGY

## 2020-09-01 PROCEDURE — 82565 ASSAY OF CREATININE: CPT

## 2020-09-08 PROBLEM — E43 SEVERE MALNUTRITION (HCC): Status: ACTIVE | Noted: 2020-09-08

## 2020-12-31 ENCOUNTER — LAB REQUISITION (OUTPATIENT)
Dept: LAB | Facility: HOSPITAL | Age: 75
End: 2020-12-31

## 2020-12-31 DIAGNOSIS — I10 ESSENTIAL (PRIMARY) HYPERTENSION: ICD-10-CM

## 2020-12-31 DIAGNOSIS — I50.22 CHRONIC SYSTOLIC (CONGESTIVE) HEART FAILURE (HCC): ICD-10-CM

## 2020-12-31 LAB
ALBUMIN SERPL-MCNC: 3.99 G/DL (ref 3.5–5.2)
ALBUMIN/GLOB SERPL: 1.7 G/DL
ALP SERPL-CCNC: 66 U/L (ref 39–117)
ALT SERPL W P-5'-P-CCNC: 13 U/L (ref 1–33)
ANION GAP SERPL CALCULATED.3IONS-SCNC: 5.7 MMOL/L (ref 5–15)
AST SERPL-CCNC: 14 U/L (ref 1–32)
BACTERIA UR QL AUTO: ABNORMAL /HPF
BASOPHILS # BLD AUTO: 0.02 10*3/MM3 (ref 0–0.2)
BASOPHILS NFR BLD AUTO: 0.2 % (ref 0–1.5)
BILIRUB SERPL-MCNC: 0.2 MG/DL (ref 0–1.2)
BILIRUB UR QL STRIP: NEGATIVE
BUN SERPL-MCNC: 13 MG/DL (ref 8–23)
BUN/CREAT SERPL: 22 (ref 7–25)
CALCIUM SPEC-SCNC: 9.5 MG/DL (ref 8.6–10.5)
CHLORIDE SERPL-SCNC: 94 MMOL/L (ref 98–107)
CLARITY UR: CLEAR
CO2 SERPL-SCNC: 36.3 MMOL/L (ref 22–29)
COLOR UR: YELLOW
CREAT SERPL-MCNC: 0.59 MG/DL (ref 0.57–1)
DEPRECATED RDW RBC AUTO: 48.2 FL (ref 37–54)
EOSINOPHIL # BLD AUTO: 0.11 10*3/MM3 (ref 0–0.4)
EOSINOPHIL NFR BLD AUTO: 1.1 % (ref 0.3–6.2)
ERYTHROCYTE [DISTWIDTH] IN BLOOD BY AUTOMATED COUNT: 13.4 % (ref 12.3–15.4)
GFR SERPL CREATININE-BSD FRML MDRD: 99 ML/MIN/1.73
GLOBULIN UR ELPH-MCNC: 2.4 GM/DL
GLUCOSE SERPL-MCNC: 182 MG/DL (ref 65–99)
GLUCOSE UR STRIP-MCNC: NEGATIVE MG/DL
HCT VFR BLD AUTO: 45.6 % (ref 34–46.6)
HGB BLD-MCNC: 14.3 G/DL (ref 12–15.9)
HGB UR QL STRIP.AUTO: NEGATIVE
HYALINE CASTS UR QL AUTO: ABNORMAL /LPF
IMM GRANULOCYTES # BLD AUTO: 0.02 10*3/MM3 (ref 0–0.05)
IMM GRANULOCYTES NFR BLD AUTO: 0.2 % (ref 0–0.5)
KETONES UR QL STRIP: NEGATIVE
LEUKOCYTE ESTERASE UR QL STRIP.AUTO: ABNORMAL
LYMPHOCYTES # BLD AUTO: 1.38 10*3/MM3 (ref 0.7–3.1)
LYMPHOCYTES NFR BLD AUTO: 14.2 % (ref 19.6–45.3)
MCH RBC QN AUTO: 30.6 PG (ref 26.6–33)
MCHC RBC AUTO-ENTMCNC: 31.4 G/DL (ref 31.5–35.7)
MCV RBC AUTO: 97.6 FL (ref 79–97)
MONOCYTES # BLD AUTO: 0.65 10*3/MM3 (ref 0.1–0.9)
MONOCYTES NFR BLD AUTO: 6.7 % (ref 5–12)
NEUTROPHILS NFR BLD AUTO: 7.52 10*3/MM3 (ref 1.7–7)
NEUTROPHILS NFR BLD AUTO: 77.6 % (ref 42.7–76)
NITRITE UR QL STRIP: POSITIVE
NRBC BLD AUTO-RTO: 0 /100 WBC (ref 0–0.2)
PH UR STRIP.AUTO: 7 [PH] (ref 5–8)
PLATELET # BLD AUTO: 168 10*3/MM3 (ref 140–450)
PMV BLD AUTO: 11.1 FL (ref 6–12)
POTASSIUM SERPL-SCNC: 4.5 MMOL/L (ref 3.5–5.2)
PROT SERPL-MCNC: 6.4 G/DL (ref 6–8.5)
PROT UR QL STRIP: NEGATIVE
RBC # BLD AUTO: 4.67 10*6/MM3 (ref 3.77–5.28)
RBC # UR: ABNORMAL /HPF
REF LAB TEST METHOD: ABNORMAL
SODIUM SERPL-SCNC: 136 MMOL/L (ref 136–145)
SP GR UR STRIP: 1.01 (ref 1–1.03)
SQUAMOUS #/AREA URNS HPF: ABNORMAL /HPF
UROBILINOGEN UR QL STRIP: ABNORMAL
WBC # BLD AUTO: 9.7 10*3/MM3 (ref 3.4–10.8)
WBC UR QL AUTO: ABNORMAL /HPF

## 2020-12-31 PROCEDURE — 87086 URINE CULTURE/COLONY COUNT: CPT | Performed by: INTERNAL MEDICINE

## 2020-12-31 PROCEDURE — 87186 SC STD MICRODIL/AGAR DIL: CPT | Performed by: INTERNAL MEDICINE

## 2020-12-31 PROCEDURE — 80053 COMPREHEN METABOLIC PANEL: CPT | Performed by: INTERNAL MEDICINE

## 2020-12-31 PROCEDURE — 81001 URINALYSIS AUTO W/SCOPE: CPT | Performed by: INTERNAL MEDICINE

## 2020-12-31 PROCEDURE — 85025 COMPLETE CBC W/AUTO DIFF WBC: CPT | Performed by: INTERNAL MEDICINE

## 2020-12-31 PROCEDURE — 87077 CULTURE AEROBIC IDENTIFY: CPT | Performed by: INTERNAL MEDICINE

## 2021-01-02 LAB — BACTERIA SPEC AEROBE CULT: ABNORMAL

## 2021-01-04 ENCOUNTER — LAB REQUISITION (OUTPATIENT)
Dept: LAB | Facility: HOSPITAL | Age: 76
End: 2021-01-04

## 2021-01-04 DIAGNOSIS — E11.9 TYPE 2 DIABETES MELLITUS WITHOUT COMPLICATIONS (HCC): ICD-10-CM

## 2021-01-04 LAB — HBA1C MFR BLD: 7.6 % (ref 4.8–5.6)

## 2021-01-04 PROCEDURE — 83036 HEMOGLOBIN GLYCOSYLATED A1C: CPT | Performed by: INTERNAL MEDICINE

## 2021-03-09 ENCOUNTER — HOSPITAL ENCOUNTER (EMERGENCY)
Facility: HOSPITAL | Age: 76
Discharge: NURSING, REHAB OR LONG-TERM CARE | End: 2021-03-10
Attending: EMERGENCY MEDICINE | Admitting: EMERGENCY MEDICINE

## 2021-03-09 ENCOUNTER — APPOINTMENT (OUTPATIENT)
Dept: GENERAL RADIOLOGY | Facility: HOSPITAL | Age: 76
End: 2021-03-09

## 2021-03-09 DIAGNOSIS — J18.9 PNEUMONIA OF RIGHT LOWER LOBE DUE TO INFECTIOUS ORGANISM: Primary | ICD-10-CM

## 2021-03-09 LAB
A-A DO2: 57.8 MMHG (ref 0–300)
ALBUMIN SERPL-MCNC: 3.84 G/DL (ref 3.5–5.2)
ALBUMIN/GLOB SERPL: 1.4 G/DL
ALP SERPL-CCNC: 69 U/L (ref 39–117)
ALT SERPL W P-5'-P-CCNC: 15 U/L (ref 1–33)
ANION GAP SERPL CALCULATED.3IONS-SCNC: 8.3 MMOL/L (ref 5–15)
APTT PPP: 25 SECONDS (ref 25.6–35.3)
ARTERIAL PATENCY WRIST A: ABNORMAL
AST SERPL-CCNC: 15 U/L (ref 1–32)
ATMOSPHERIC PRESS: 735 MMHG
BASE EXCESS BLDA CALC-SCNC: 11.7 MMOL/L (ref 0–2)
BASOPHILS # BLD AUTO: 0.06 10*3/MM3 (ref 0–0.2)
BASOPHILS NFR BLD AUTO: 0.3 % (ref 0–1.5)
BDY SITE: ABNORMAL
BILIRUB SERPL-MCNC: 0.3 MG/DL (ref 0–1.2)
BODY TEMPERATURE: 0 C
BUN SERPL-MCNC: 17 MG/DL (ref 8–23)
BUN/CREAT SERPL: 32.1 (ref 7–25)
CALCIUM SPEC-SCNC: 9.5 MG/DL (ref 8.6–10.5)
CHLORIDE SERPL-SCNC: 97 MMOL/L (ref 98–107)
CK SERPL-CCNC: 78 U/L (ref 20–180)
CO2 BLDA-SCNC: 42.9 MMOL/L (ref 22–33)
CO2 SERPL-SCNC: 35.7 MMOL/L (ref 22–29)
COHGB MFR BLD: 2.4 % (ref 0–5)
CREAT SERPL-MCNC: 0.53 MG/DL (ref 0.57–1)
CRP SERPL-MCNC: <0.3 MG/DL (ref 0–0.5)
DEPRECATED RDW RBC AUTO: 50.9 FL (ref 37–54)
EOSINOPHIL # BLD AUTO: 0.22 10*3/MM3 (ref 0–0.4)
EOSINOPHIL NFR BLD AUTO: 1.1 % (ref 0.3–6.2)
ERYTHROCYTE [DISTWIDTH] IN BLOOD BY AUTOMATED COUNT: 13.9 % (ref 12.3–15.4)
ERYTHROCYTE [SEDIMENTATION RATE] IN BLOOD: 8 MM/HR (ref 0–30)
GFR SERPL CREATININE-BSD FRML MDRD: 112 ML/MIN/1.73
GLOBULIN UR ELPH-MCNC: 2.7 GM/DL
GLUCOSE SERPL-MCNC: 129 MG/DL (ref 65–99)
HCO3 BLDA-SCNC: 40.6 MMOL/L (ref 20–26)
HCT VFR BLD AUTO: 40.1 % (ref 34–46.6)
HCT VFR BLD CALC: 39.3 % (ref 38–51)
HGB BLD-MCNC: 12.4 G/DL (ref 12–15.9)
HGB BLDA-MCNC: 12.8 G/DL (ref 13.5–17.5)
HOLD SPECIMEN: NORMAL
HOLD SPECIMEN: NORMAL
IMM GRANULOCYTES # BLD AUTO: 0.08 10*3/MM3 (ref 0–0.05)
IMM GRANULOCYTES NFR BLD AUTO: 0.4 % (ref 0–0.5)
INHALED O2 CONCENTRATION: 32 %
INR PPP: 0.98 (ref 0.9–1.1)
LYMPHOCYTES # BLD AUTO: 1.28 10*3/MM3 (ref 0.7–3.1)
LYMPHOCYTES NFR BLD AUTO: 6.5 % (ref 19.6–45.3)
Lab: ABNORMAL
Lab: ABNORMAL
MAGNESIUM SERPL-MCNC: 1.8 MG/DL (ref 1.6–2.4)
MCH RBC QN AUTO: 31 PG (ref 26.6–33)
MCHC RBC AUTO-ENTMCNC: 30.9 G/DL (ref 31.5–35.7)
MCV RBC AUTO: 100.3 FL (ref 79–97)
METHGB BLD QL: 0 % (ref 0–3)
MODALITY: ABNORMAL
MONOCYTES # BLD AUTO: 1.51 10*3/MM3 (ref 0.1–0.9)
MONOCYTES NFR BLD AUTO: 7.6 % (ref 5–12)
NEUTROPHILS NFR BLD AUTO: 16.62 10*3/MM3 (ref 1.7–7)
NEUTROPHILS NFR BLD AUTO: 84.1 % (ref 42.7–76)
NOTE: ABNORMAL
NOTIFIED BY: ABNORMAL
NOTIFIED WHO: ABNORMAL
NRBC BLD AUTO-RTO: 0 /100 WBC (ref 0–0.2)
NT-PROBNP SERPL-MCNC: 1402 PG/ML (ref 0–1800)
OXYHGB MFR BLDV: 93 % (ref 94–99)
PCO2 BLDA: 75.1 MM HG (ref 35–45)
PCO2 TEMP ADJ BLD: ABNORMAL MM[HG]
PH BLDA: 7.34 PH UNITS (ref 7.35–7.45)
PH, TEMP CORRECTED: ABNORMAL
PLATELET # BLD AUTO: 210 10*3/MM3 (ref 140–450)
PMV BLD AUTO: 10.9 FL (ref 6–12)
PO2 BLDA: 79 MM HG (ref 83–108)
PO2 TEMP ADJ BLD: ABNORMAL MM[HG]
POTASSIUM SERPL-SCNC: 4.1 MMOL/L (ref 3.5–5.2)
PROT SERPL-MCNC: 6.5 G/DL (ref 6–8.5)
PROTHROMBIN TIME: 12.8 SECONDS (ref 11.9–14.1)
RBC # BLD AUTO: 4 10*6/MM3 (ref 3.77–5.28)
SAO2 % BLDCOA: 95.3 % (ref 94–99)
SODIUM SERPL-SCNC: 141 MMOL/L (ref 136–145)
TROPONIN T SERPL-MCNC: 0.01 NG/ML (ref 0–0.03)
VENTILATOR MODE: ABNORMAL
WBC # BLD AUTO: 19.77 10*3/MM3 (ref 3.4–10.8)
WHOLE BLOOD HOLD SPECIMEN: NORMAL
WHOLE BLOOD HOLD SPECIMEN: NORMAL

## 2021-03-09 PROCEDURE — 93010 ELECTROCARDIOGRAM REPORT: CPT | Performed by: INTERNAL MEDICINE

## 2021-03-09 PROCEDURE — 25010000002 PIPERACILLIN SOD-TAZOBACTAM PER 1 G: Performed by: EMERGENCY MEDICINE

## 2021-03-09 PROCEDURE — 80053 COMPREHEN METABOLIC PANEL: CPT | Performed by: EMERGENCY MEDICINE

## 2021-03-09 PROCEDURE — 82550 ASSAY OF CK (CPK): CPT | Performed by: EMERGENCY MEDICINE

## 2021-03-09 PROCEDURE — 83880 ASSAY OF NATRIURETIC PEPTIDE: CPT | Performed by: EMERGENCY MEDICINE

## 2021-03-09 PROCEDURE — 96365 THER/PROPH/DIAG IV INF INIT: CPT

## 2021-03-09 PROCEDURE — 82375 ASSAY CARBOXYHB QUANT: CPT

## 2021-03-09 PROCEDURE — 36600 WITHDRAWAL OF ARTERIAL BLOOD: CPT

## 2021-03-09 PROCEDURE — 83735 ASSAY OF MAGNESIUM: CPT | Performed by: EMERGENCY MEDICINE

## 2021-03-09 PROCEDURE — 93005 ELECTROCARDIOGRAM TRACING: CPT | Performed by: EMERGENCY MEDICINE

## 2021-03-09 PROCEDURE — 86140 C-REACTIVE PROTEIN: CPT | Performed by: EMERGENCY MEDICINE

## 2021-03-09 PROCEDURE — 85025 COMPLETE CBC W/AUTO DIFF WBC: CPT | Performed by: EMERGENCY MEDICINE

## 2021-03-09 PROCEDURE — 87636 SARSCOV2 & INF A&B AMP PRB: CPT | Performed by: EMERGENCY MEDICINE

## 2021-03-09 PROCEDURE — 71045 X-RAY EXAM CHEST 1 VIEW: CPT

## 2021-03-09 PROCEDURE — 82805 BLOOD GASES W/O2 SATURATION: CPT

## 2021-03-09 PROCEDURE — 85610 PROTHROMBIN TIME: CPT | Performed by: EMERGENCY MEDICINE

## 2021-03-09 PROCEDURE — 96375 TX/PRO/DX INJ NEW DRUG ADDON: CPT

## 2021-03-09 PROCEDURE — 99285 EMERGENCY DEPT VISIT HI MDM: CPT

## 2021-03-09 PROCEDURE — 36415 COLL VENOUS BLD VENIPUNCTURE: CPT

## 2021-03-09 PROCEDURE — 84484 ASSAY OF TROPONIN QUANT: CPT | Performed by: EMERGENCY MEDICINE

## 2021-03-09 PROCEDURE — 25010000002 HYDRALAZINE PER 20 MG: Performed by: EMERGENCY MEDICINE

## 2021-03-09 PROCEDURE — 83050 HGB METHEMOGLOBIN QUAN: CPT

## 2021-03-09 PROCEDURE — 25010000002 METHYLPREDNISOLONE PER 125 MG: Performed by: EMERGENCY MEDICINE

## 2021-03-09 PROCEDURE — 85730 THROMBOPLASTIN TIME PARTIAL: CPT | Performed by: EMERGENCY MEDICINE

## 2021-03-09 PROCEDURE — 85652 RBC SED RATE AUTOMATED: CPT | Performed by: EMERGENCY MEDICINE

## 2021-03-09 RX ORDER — HYDRALAZINE HYDROCHLORIDE 20 MG/ML
20 INJECTION INTRAMUSCULAR; INTRAVENOUS ONCE
Status: COMPLETED | OUTPATIENT
Start: 2021-03-09 | End: 2021-03-09

## 2021-03-09 RX ORDER — METHYLPREDNISOLONE SODIUM SUCCINATE 125 MG/2ML
125 INJECTION, POWDER, LYOPHILIZED, FOR SOLUTION INTRAMUSCULAR; INTRAVENOUS ONCE
Status: COMPLETED | OUTPATIENT
Start: 2021-03-09 | End: 2021-03-09

## 2021-03-09 RX ORDER — SODIUM CHLORIDE 0.9 % (FLUSH) 0.9 %
10 SYRINGE (ML) INJECTION AS NEEDED
Status: DISCONTINUED | OUTPATIENT
Start: 2021-03-09 | End: 2021-03-10 | Stop reason: HOSPADM

## 2021-03-09 RX ADMIN — PIPERACILLIN SODIUM AND TAZOBACTAM SODIUM 4.5 G: 4; .5 INJECTION, POWDER, LYOPHILIZED, FOR SOLUTION INTRAVENOUS at 23:23

## 2021-03-09 RX ADMIN — METHYLPREDNISOLONE SODIUM SUCCINATE 125 MG: 125 INJECTION, POWDER, FOR SOLUTION INTRAMUSCULAR; INTRAVENOUS at 21:52

## 2021-03-09 RX ADMIN — HYDRALAZINE HYDROCHLORIDE 20 MG: 20 INJECTION INTRAMUSCULAR; INTRAVENOUS at 22:08

## 2021-03-10 VITALS
SYSTOLIC BLOOD PRESSURE: 166 MMHG | HEIGHT: 65 IN | WEIGHT: 105 LBS | TEMPERATURE: 99.4 F | HEART RATE: 75 BPM | BODY MASS INDEX: 17.49 KG/M2 | RESPIRATION RATE: 22 BRPM | DIASTOLIC BLOOD PRESSURE: 74 MMHG | OXYGEN SATURATION: 96 %

## 2021-03-10 LAB
A-A DO2: 99.2 MMHG (ref 0–300)
ARTERIAL PATENCY WRIST A: ABNORMAL
ATMOSPHERIC PRESS: 735 MMHG
BASE EXCESS BLDA CALC-SCNC: 9.1 MMOL/L (ref 0–2)
BDY SITE: ABNORMAL
BODY TEMPERATURE: 0 C
CO2 BLDA-SCNC: 39.2 MMOL/L (ref 22–33)
COHGB MFR BLD: 1.8 % (ref 0–5)
EPAP: 6
FLUAV RNA RESP QL NAA+PROBE: NOT DETECTED
FLUBV RNA RESP QL NAA+PROBE: NOT DETECTED
HCO3 BLDA-SCNC: 37.1 MMOL/L (ref 20–26)
HCT VFR BLD CALC: 37.1 % (ref 38–51)
HGB BLDA-MCNC: 12.1 G/DL (ref 13.5–17.5)
INHALED O2 CONCENTRATION: 35 %
IPAP: 22
Lab: ABNORMAL
Lab: ABNORMAL
METHGB BLD QL: 0.3 % (ref 0–3)
MODALITY: ABNORMAL
NOTE: ABNORMAL
NOTIFIED BY: ABNORMAL
NOTIFIED WHO: ABNORMAL
OXYHGB MFR BLDV: 90.6 % (ref 94–99)
PCO2 BLDA: 67.9 MM HG (ref 35–45)
PCO2 TEMP ADJ BLD: ABNORMAL MM[HG]
PH BLDA: 7.35 PH UNITS (ref 7.35–7.45)
PH, TEMP CORRECTED: ABNORMAL
PO2 BLDA: 66.6 MM HG (ref 83–108)
PO2 TEMP ADJ BLD: ABNORMAL MM[HG]
QT INTERVAL: 348 MS
QTC INTERVAL: 459 MS
SAO2 % BLDCOA: 92.5 % (ref 94–99)
SARS-COV-2 RNA RESP QL NAA+PROBE: NOT DETECTED
SET MECH RESP RATE: 22
VENTILATOR MODE: ABNORMAL

## 2021-03-10 PROCEDURE — 82375 ASSAY CARBOXYHB QUANT: CPT

## 2021-03-10 PROCEDURE — 94799 UNLISTED PULMONARY SVC/PX: CPT

## 2021-03-10 PROCEDURE — 96367 TX/PROPH/DG ADDL SEQ IV INF: CPT

## 2021-03-10 PROCEDURE — 82805 BLOOD GASES W/O2 SATURATION: CPT

## 2021-03-10 PROCEDURE — 94660 CPAP INITIATION&MGMT: CPT

## 2021-03-10 PROCEDURE — 36600 WITHDRAWAL OF ARTERIAL BLOOD: CPT

## 2021-03-10 PROCEDURE — 83050 HGB METHEMOGLOBIN QUAN: CPT

## 2021-03-10 RX ORDER — CEFTRIAXONE 1 G/1
1 INJECTION, POWDER, FOR SOLUTION INTRAMUSCULAR; INTRAVENOUS EVERY 24 HOURS
Qty: 7 G | Refills: 0 | Status: SHIPPED | OUTPATIENT
Start: 2021-03-10 | End: 2021-03-17

## 2021-03-10 RX ADMIN — VANCOMYCIN HYDROCHLORIDE 1000 MG: 1 INJECTION, POWDER, LYOPHILIZED, FOR SOLUTION INTRAVENOUS at 00:17

## 2021-03-10 NOTE — ED NOTES
I called [facility] about transferring the patient, I spoke to house supervisor Laz. Jun called us back to speak with Dr. Singleton.     Brian Campbell  03/10/21 0146

## 2021-03-10 NOTE — ED NOTES
[facility] called and reports they have a truck in route now.      Leodan Venegas  03/10/21 0812

## 2021-03-10 NOTE — ED NOTES
Called EMS to check on the truck going back to nursing home. [facility] is calling [facility].      Leodan Venegas  03/10/21 0805

## 2021-03-10 NOTE — ED NOTES
I called [facility] to transport patient back to [facility].     Brian Campbell  03/10/21 0423

## 2021-03-10 NOTE — ED NOTES
I called [facility], I spoke with Leta, no tele or critical care beds.     Brian Campbell  03/10/21 0142

## 2021-03-10 NOTE — ED NOTES
I called [facility] about transferring the patient, I spoke with April, tiana chávez.     Brian Campbell  03/10/21 0141

## 2021-03-10 NOTE — ED NOTES
I called [facility] again about paging the truck, they said they would but they are currently out on a run so it may be the new crew coming in at shift change.     Brian Campbell  03/10/21 0700

## 2021-03-12 NOTE — ED PROVIDER NOTES
Subjective     History provided by:  Patient   used: No    Shortness of Breath  Severity:  Mild  Onset quality:  Gradual  Timing:  Constant  Progression:  Unchanged  Chronicity:  Chronic  Context: not activity, not animal exposure, not emotional upset, not fumes, not known allergens, not occupational exposure, not pollens, not smoke exposure, not strong odors, not URI and not weather changes    Context comment:  At rest.  Relieved by:  Nothing  Worsened by:  Activity, coughing, deep breathing, exertion and movement  Ineffective treatments:  Oxygen and rest  Associated symptoms: wheezing    Associated symptoms: no abdominal pain, no chest pain, no claudication, no cough, no diaphoresis, no ear pain, no fever, no headaches, no hemoptysis, no neck pain, no PND, no rash, no sore throat, no sputum production, no syncope, no swollen glands and no vomiting    Risk factors: no recent alcohol use, no family hx of DVT, no hx of cancer, no hx of PE/DVT, no obesity, no oral contraceptive use, no prolonged immobilization, no recent surgery and no tobacco use        Review of Systems   Constitutional: Negative for activity change, appetite change, chills, diaphoresis, fatigue and fever.   HENT: Negative for congestion, ear pain and sore throat.    Eyes: Negative for redness.   Respiratory: Positive for shortness of breath and wheezing. Negative for cough, hemoptysis, sputum production and chest tightness.    Cardiovascular: Negative for chest pain, palpitations, claudication, leg swelling, syncope and PND.   Gastrointestinal: Negative for abdominal pain, diarrhea, nausea and vomiting.   Genitourinary: Negative for dysuria and urgency.   Musculoskeletal: Negative for arthralgias, back pain, myalgias and neck pain.   Skin: Negative for pallor, rash and wound.   Neurological: Negative for dizziness, speech difficulty, weakness and headaches.   Psychiatric/Behavioral: Negative for agitation, behavioral problems,  confusion and decreased concentration.   All other systems reviewed and are negative.      Past Medical History:   Diagnosis Date   • Arthritis    • COPD (chronic obstructive pulmonary disease) (CMS/HCC)    • Coronary artery disease    • Diabetes mellitus (CMS/HCC)    • Disease of thyroid gland    • Dysfunctional gallbladder    • GERD (gastroesophageal reflux disease)    • H/O heart artery stent    • Hypertension    • Stroke (CMS/HCC)        Allergies   Allergen Reactions   • Haldol [Haloperidol] Hallucinations       Past Surgical History:   Procedure Laterality Date   • BACK SURGERY     • CARDIAC CATHETERIZATION     • CHOLECYSTECTOMY N/A 7/17/2020    Procedure: CHOLECYSTECTOMY LAPAROSCOPIC;  Surgeon: Lonnie Meneses MD;  Location: [facility];  Service: General;  Laterality: N/A;   • COLONOSCOPY     • CORONARY STENT PLACEMENT     • ENDOSCOPY     • TONSILLECTOMY         History reviewed. No pertinent family history.    Social History     Socioeconomic History   • Marital status:      Spouse name: Not on file   • Number of children: Not on file   • Years of education: Not on file   • Highest education level: Not on file   Tobacco Use   • Smoking status: Current Every Day Smoker     Packs/day: 1.00     Years: 55.00     Pack years: 55.00     Types: Cigarettes   • Smokeless tobacco: Never Used   Substance and Sexual Activity   • Alcohol use: Never   • Drug use: Never   • Sexual activity: Defer           Objective   Physical Exam  Vitals and nursing note reviewed.   Constitutional:       General: She is not in acute distress.     Appearance: Normal appearance. She is well-developed. She is ill-appearing. She is not toxic-appearing or diaphoretic.   HENT:      Head: Normocephalic and atraumatic.      Right Ear: External ear normal.      Left Ear: External ear normal.      Nose: Nose normal.      Mouth/Throat:      Pharynx: No oropharyngeal exudate.      Tonsils: No tonsillar exudate.   Eyes:      General: Lids are  normal.      Conjunctiva/sclera: Conjunctivae normal.      Pupils: Pupils are equal, round, and reactive to light.   Neck:      Thyroid: No thyromegaly.   Cardiovascular:      Rate and Rhythm: Normal rate and regular rhythm.      Pulses: Normal pulses.      Heart sounds: Normal heart sounds, S1 normal and S2 normal.   Pulmonary:      Effort: Pulmonary effort is normal. No tachypnea or respiratory distress.      Breath sounds: Decreased breath sounds and wheezing present. No rales.   Chest:      Chest wall: No tenderness.   Abdominal:      General: Bowel sounds are normal. There is no distension.      Palpations: Abdomen is soft.      Tenderness: There is no abdominal tenderness. There is no guarding or rebound.   Musculoskeletal:         General: No tenderness or deformity. Normal range of motion.      Cervical back: Full passive range of motion without pain, normal range of motion and neck supple.   Lymphadenopathy:      Cervical: No cervical adenopathy.   Skin:     General: Skin is warm and dry.      Coloration: Skin is not pale.      Findings: No erythema or rash.   Neurological:      Mental Status: She is alert and oriented to person, place, and time.      GCS: GCS eye subscore is 4. GCS verbal subscore is 5. GCS motor subscore is 6.      Cranial Nerves: No cranial nerve deficit.      Sensory: No sensory deficit.   Psychiatric:         Speech: Speech normal.         Behavior: Behavior normal.         Thought Content: Thought content normal.         Judgment: Judgment normal.         Procedures           ED Course  ED Course as of Mar 12 0119   Tue Mar 09, 2021   2300 IMPRESSION:  Right basilar pneumonia.   XR Chest 1 View [ES]   Fri Mar 12, 2021   0119 Vent. Rate : 105 BPM     Atrial Rate : 105 BPM     P-R Int : 184 ms          QRS Dur : 082 ms      QT Int : 348 ms       P-R-T Axes : 065 000 088 degrees     QTc Int : 459 ms     Sinus tachycardia  Possible Left atrial enlargement  Anterior infarct , age  undetermined  Abnormal ECG  When compared with ECG of 12-AUG-2020 08:53,  Sinus rhythm has replaced Atrial fibrillation  Nonspecific T wave abnormality no longer evident in Anterior leads   XR Chest 1 View [ES]      ED Course User Index  [ES] Erasmo Singleton MD                                           MDM  Number of Diagnoses or Management Options  Pneumonia of right lower lobe due to infectious organism: new and requires workup     Amount and/or Complexity of Data Reviewed  Clinical lab tests: reviewed and ordered  Tests in the radiology section of CPT®: reviewed and ordered  Tests in the medicine section of CPT®: reviewed and ordered  Review and summarize past medical records: yes  Independent visualization of images, tracings, or specimens: yes    Risk of Complications, Morbidity, and/or Mortality  Presenting problems: moderate  Diagnostic procedures: moderate  Management options: moderate    Patient Progress  Patient progress: stable      Final diagnoses:   Pneumonia of right lower lobe due to infectious organism            Erasmo Singleton MD  03/12/21 0119

## 2021-04-13 ENCOUNTER — APPOINTMENT (OUTPATIENT)
Dept: CT IMAGING | Facility: HOSPITAL | Age: 76
End: 2021-04-13

## 2021-04-13 ENCOUNTER — HOSPITAL ENCOUNTER (EMERGENCY)
Facility: HOSPITAL | Age: 76
Discharge: ANOTHER HOSPITAL | End: 2021-04-13
Attending: EMERGENCY MEDICINE | Admitting: EMERGENCY MEDICINE

## 2021-04-13 ENCOUNTER — APPOINTMENT (OUTPATIENT)
Dept: GENERAL RADIOLOGY | Facility: HOSPITAL | Age: 76
End: 2021-04-13

## 2021-04-13 VITALS
BODY MASS INDEX: 18.32 KG/M2 | WEIGHT: 114 LBS | SYSTOLIC BLOOD PRESSURE: 196 MMHG | DIASTOLIC BLOOD PRESSURE: 79 MMHG | TEMPERATURE: 99.3 F | HEIGHT: 66 IN | RESPIRATION RATE: 17 BRPM | OXYGEN SATURATION: 96 % | HEART RATE: 69 BPM

## 2021-04-13 DIAGNOSIS — A41.9 SEPSIS, DUE TO UNSPECIFIED ORGANISM, UNSPECIFIED WHETHER ACUTE ORGAN DYSFUNCTION PRESENT (HCC): ICD-10-CM

## 2021-04-13 DIAGNOSIS — R11.2 NAUSEA AND VOMITING, INTRACTABILITY OF VOMITING NOT SPECIFIED, UNSPECIFIED VOMITING TYPE: ICD-10-CM

## 2021-04-13 DIAGNOSIS — N30.01 ACUTE CYSTITIS WITH HEMATURIA: Primary | ICD-10-CM

## 2021-04-13 LAB
ALBUMIN SERPL-MCNC: 3.87 G/DL (ref 3.5–5.2)
ALBUMIN/GLOB SERPL: 1.2 G/DL
ALP SERPL-CCNC: 85 U/L (ref 39–117)
ALT SERPL W P-5'-P-CCNC: 15 U/L (ref 1–33)
ANION GAP SERPL CALCULATED.3IONS-SCNC: 16.4 MMOL/L (ref 5–15)
AST SERPL-CCNC: 22 U/L (ref 1–32)
BACTERIA UR QL AUTO: ABNORMAL /HPF
BASOPHILS # BLD AUTO: 0.1 10*3/MM3 (ref 0–0.2)
BASOPHILS NFR BLD AUTO: 0.3 % (ref 0–1.5)
BILIRUB SERPL-MCNC: 0.4 MG/DL (ref 0–1.2)
BILIRUB UR QL STRIP: NEGATIVE
BUN SERPL-MCNC: 34 MG/DL (ref 8–23)
BUN/CREAT SERPL: 39.5 (ref 7–25)
CALCIUM SPEC-SCNC: 10.1 MG/DL (ref 8.6–10.5)
CHLORIDE SERPL-SCNC: 87 MMOL/L (ref 98–107)
CK SERPL-CCNC: 54 U/L (ref 20–180)
CLARITY UR: ABNORMAL
CO2 SERPL-SCNC: 32.6 MMOL/L (ref 22–29)
COLOR UR: YELLOW
CREAT SERPL-MCNC: 0.86 MG/DL (ref 0.57–1)
CRP SERPL-MCNC: 12.84 MG/DL (ref 0–0.5)
D-LACTATE SERPL-SCNC: 0.9 MMOL/L (ref 0.5–2)
DEPRECATED RDW RBC AUTO: 47.2 FL (ref 37–54)
EOSINOPHIL # BLD AUTO: 0.01 10*3/MM3 (ref 0–0.4)
EOSINOPHIL NFR BLD AUTO: 0 % (ref 0.3–6.2)
ERYTHROCYTE [DISTWIDTH] IN BLOOD BY AUTOMATED COUNT: 13.2 % (ref 12.3–15.4)
ERYTHROCYTE [SEDIMENTATION RATE] IN BLOOD: 24 MM/HR (ref 0–30)
FLUAV RNA RESP QL NAA+PROBE: NOT DETECTED
FLUBV RNA RESP QL NAA+PROBE: NOT DETECTED
GFR SERPL CREATININE-BSD FRML MDRD: 64 ML/MIN/1.73
GLOBULIN UR ELPH-MCNC: 3.3 GM/DL
GLUCOSE SERPL-MCNC: 119 MG/DL (ref 65–99)
GLUCOSE UR STRIP-MCNC: NEGATIVE MG/DL
HCT VFR BLD AUTO: 40.6 % (ref 34–46.6)
HGB BLD-MCNC: 12.8 G/DL (ref 12–15.9)
HGB UR QL STRIP.AUTO: ABNORMAL
HYALINE CASTS UR QL AUTO: ABNORMAL /LPF
IMM GRANULOCYTES # BLD AUTO: 0.22 10*3/MM3 (ref 0–0.05)
IMM GRANULOCYTES NFR BLD AUTO: 0.7 % (ref 0–0.5)
KETONES UR QL STRIP: ABNORMAL
LEUKOCYTE ESTERASE UR QL STRIP.AUTO: ABNORMAL
LYMPHOCYTES # BLD AUTO: 1.03 10*3/MM3 (ref 0.7–3.1)
LYMPHOCYTES NFR BLD AUTO: 3.3 % (ref 19.6–45.3)
MAGNESIUM SERPL-MCNC: 1.7 MG/DL (ref 1.6–2.4)
MCH RBC QN AUTO: 31 PG (ref 26.6–33)
MCHC RBC AUTO-ENTMCNC: 31.5 G/DL (ref 31.5–35.7)
MCV RBC AUTO: 98.3 FL (ref 79–97)
MONOCYTES # BLD AUTO: 2.13 10*3/MM3 (ref 0.1–0.9)
MONOCYTES NFR BLD AUTO: 6.8 % (ref 5–12)
NEUTROPHILS NFR BLD AUTO: 27.64 10*3/MM3 (ref 1.7–7)
NEUTROPHILS NFR BLD AUTO: 88.9 % (ref 42.7–76)
NITRITE UR QL STRIP: POSITIVE
NRBC BLD AUTO-RTO: 0 /100 WBC (ref 0–0.2)
PH UR STRIP.AUTO: 6.5 [PH] (ref 5–8)
PLATELET # BLD AUTO: 281 10*3/MM3 (ref 140–450)
PMV BLD AUTO: 10.4 FL (ref 6–12)
POTASSIUM SERPL-SCNC: 4.1 MMOL/L (ref 3.5–5.2)
PROCALCITONIN SERPL-MCNC: 0.18 NG/ML (ref 0–0.25)
PROT SERPL-MCNC: 7.2 G/DL (ref 6–8.5)
PROT UR QL STRIP: NEGATIVE
RBC # BLD AUTO: 4.13 10*6/MM3 (ref 3.77–5.28)
RBC # UR: ABNORMAL /HPF
REF LAB TEST METHOD: ABNORMAL
SARS-COV-2 RNA RESP QL NAA+PROBE: NOT DETECTED
SODIUM SERPL-SCNC: 136 MMOL/L (ref 136–145)
SP GR UR STRIP: 1.01 (ref 1–1.03)
SQUAMOUS #/AREA URNS HPF: ABNORMAL /HPF
TROPONIN T SERPL-MCNC: 0.02 NG/ML (ref 0–0.03)
UROBILINOGEN UR QL STRIP: ABNORMAL
WBC # BLD AUTO: 31.13 10*3/MM3 (ref 3.4–10.8)
WBC UR QL AUTO: ABNORMAL /HPF

## 2021-04-13 PROCEDURE — 87040 BLOOD CULTURE FOR BACTERIA: CPT | Performed by: EMERGENCY MEDICINE

## 2021-04-13 PROCEDURE — 99284 EMERGENCY DEPT VISIT MOD MDM: CPT

## 2021-04-13 PROCEDURE — 96375 TX/PRO/DX INJ NEW DRUG ADDON: CPT

## 2021-04-13 PROCEDURE — 87186 SC STD MICRODIL/AGAR DIL: CPT | Performed by: EMERGENCY MEDICINE

## 2021-04-13 PROCEDURE — 80053 COMPREHEN METABOLIC PANEL: CPT | Performed by: EMERGENCY MEDICINE

## 2021-04-13 PROCEDURE — 87636 SARSCOV2 & INF A&B AMP PRB: CPT | Performed by: EMERGENCY MEDICINE

## 2021-04-13 PROCEDURE — 25010000002 ONDANSETRON PER 1 MG: Performed by: STUDENT IN AN ORGANIZED HEALTH CARE EDUCATION/TRAINING PROGRAM

## 2021-04-13 PROCEDURE — 25010000002 PIPERACILLIN-TAZOBACTAM: Performed by: EMERGENCY MEDICINE

## 2021-04-13 PROCEDURE — 74176 CT ABD & PELVIS W/O CONTRAST: CPT | Performed by: RADIOLOGY

## 2021-04-13 PROCEDURE — 96365 THER/PROPH/DIAG IV INF INIT: CPT

## 2021-04-13 PROCEDURE — 84484 ASSAY OF TROPONIN QUANT: CPT | Performed by: EMERGENCY MEDICINE

## 2021-04-13 PROCEDURE — 82550 ASSAY OF CK (CPK): CPT | Performed by: EMERGENCY MEDICINE

## 2021-04-13 PROCEDURE — 74176 CT ABD & PELVIS W/O CONTRAST: CPT

## 2021-04-13 PROCEDURE — 96376 TX/PRO/DX INJ SAME DRUG ADON: CPT

## 2021-04-13 PROCEDURE — 36415 COLL VENOUS BLD VENIPUNCTURE: CPT

## 2021-04-13 PROCEDURE — 87086 URINE CULTURE/COLONY COUNT: CPT | Performed by: EMERGENCY MEDICINE

## 2021-04-13 PROCEDURE — 25010000002 ONDANSETRON PER 1 MG: Performed by: EMERGENCY MEDICINE

## 2021-04-13 PROCEDURE — 83735 ASSAY OF MAGNESIUM: CPT | Performed by: EMERGENCY MEDICINE

## 2021-04-13 PROCEDURE — 96367 TX/PROPH/DG ADDL SEQ IV INF: CPT

## 2021-04-13 PROCEDURE — 85652 RBC SED RATE AUTOMATED: CPT | Performed by: EMERGENCY MEDICINE

## 2021-04-13 PROCEDURE — 84145 PROCALCITONIN (PCT): CPT | Performed by: EMERGENCY MEDICINE

## 2021-04-13 PROCEDURE — P9612 CATHETERIZE FOR URINE SPEC: HCPCS

## 2021-04-13 PROCEDURE — 71045 X-RAY EXAM CHEST 1 VIEW: CPT

## 2021-04-13 PROCEDURE — 83605 ASSAY OF LACTIC ACID: CPT | Performed by: EMERGENCY MEDICINE

## 2021-04-13 PROCEDURE — 81001 URINALYSIS AUTO W/SCOPE: CPT | Performed by: EMERGENCY MEDICINE

## 2021-04-13 PROCEDURE — 93010 ELECTROCARDIOGRAM REPORT: CPT | Performed by: INTERNAL MEDICINE

## 2021-04-13 PROCEDURE — 25010000002 PIPERACILLIN SOD-TAZOBACTAM PER 1 G: Performed by: STUDENT IN AN ORGANIZED HEALTH CARE EDUCATION/TRAINING PROGRAM

## 2021-04-13 PROCEDURE — 86140 C-REACTIVE PROTEIN: CPT | Performed by: EMERGENCY MEDICINE

## 2021-04-13 PROCEDURE — 93005 ELECTROCARDIOGRAM TRACING: CPT | Performed by: EMERGENCY MEDICINE

## 2021-04-13 PROCEDURE — 85025 COMPLETE CBC W/AUTO DIFF WBC: CPT | Performed by: EMERGENCY MEDICINE

## 2021-04-13 RX ORDER — ONDANSETRON 2 MG/ML
4 INJECTION INTRAMUSCULAR; INTRAVENOUS ONCE
Status: COMPLETED | OUTPATIENT
Start: 2021-04-13 | End: 2021-04-13

## 2021-04-13 RX ORDER — ONDANSETRON 2 MG/ML
4 INJECTION INTRAMUSCULAR; INTRAVENOUS EVERY 6 HOURS PRN
Status: DISCONTINUED | OUTPATIENT
Start: 2021-04-13 | End: 2021-04-13 | Stop reason: HOSPADM

## 2021-04-13 RX ORDER — SODIUM CHLORIDE 0.9 % (FLUSH) 0.9 %
10 SYRINGE (ML) INJECTION AS NEEDED
Status: DISCONTINUED | OUTPATIENT
Start: 2021-04-13 | End: 2021-04-13 | Stop reason: HOSPADM

## 2021-04-13 RX ORDER — FAMOTIDINE 10 MG/ML
20 INJECTION, SOLUTION INTRAVENOUS ONCE
Status: COMPLETED | OUTPATIENT
Start: 2021-04-13 | End: 2021-04-13

## 2021-04-13 RX ADMIN — FAMOTIDINE 20 MG: 10 INJECTION INTRAVENOUS at 11:39

## 2021-04-13 RX ADMIN — ONDANSETRON 4 MG: 2 INJECTION INTRAMUSCULAR; INTRAVENOUS at 20:32

## 2021-04-13 RX ADMIN — PIPERACILLIN SODIUM AND TAZOBACTAM SODIUM 3.38 G: 3; .375 INJECTION, POWDER, LYOPHILIZED, FOR SOLUTION INTRAVENOUS at 18:26

## 2021-04-13 RX ADMIN — BISMUTH SUBSALICYLATE 30 ML: 262 LIQUID ORAL at 13:57

## 2021-04-13 RX ADMIN — PIPERACILLIN AND TAZOBACTAM 4.5 G: 4; .5 INJECTION, POWDER, LYOPHILIZED, FOR SOLUTION INTRAVENOUS; PARENTERAL at 06:47

## 2021-04-13 RX ADMIN — SODIUM CHLORIDE 500 ML: 9 INJECTION, SOLUTION INTRAVENOUS at 06:46

## 2021-04-13 RX ADMIN — VANCOMYCIN HYDROCHLORIDE 1000 MG: 1 INJECTION, POWDER, LYOPHILIZED, FOR SOLUTION INTRAVENOUS at 08:03

## 2021-04-13 RX ADMIN — ONDANSETRON 4 MG: 2 INJECTION INTRAMUSCULAR; INTRAVENOUS at 06:46

## 2021-04-13 RX ADMIN — ONDANSETRON 4 MG: 2 INJECTION INTRAMUSCULAR; INTRAVENOUS at 08:10

## 2021-04-13 RX ADMIN — ONDANSETRON 4 MG: 2 INJECTION INTRAMUSCULAR; INTRAVENOUS at 13:57

## 2021-04-13 NOTE — ED NOTES
Called pt daughter michelle at [phone]. Provided update on poc.      Libertad Nunn RN  04/13/21 0939

## 2021-04-13 NOTE — ED NOTES
Pt had removed oxygen, oxygen therapy resumed at 2lpm nc. Education provided to pt on importance of oxygen use, pt verbalized understanding. Provider made aware.     Libertad Nunn, RN  04/13/21 8460

## 2021-04-13 NOTE — ED PROVIDER NOTES
Subjective     History provided by:  Patient   used: No    Abdominal Pain  Pain location:  Suprapubic  Pain quality: aching, cramping and dull    Pain radiates to:  Does not radiate  Pain severity:  Mild  Onset quality:  Gradual  Timing:  Constant  Progression:  Unchanged  Chronicity:  New  Context: not alcohol use, not awakening from sleep, not diet changes, not eating, not laxative use, not medication withdrawal, not previous surgeries, not recent illness, not recent sexual activity, not recent travel, not retching, not sick contacts, not suspicious food intake and not trauma    Relieved by:  Nothing  Worsened by:  Nothing  Ineffective treatments:  None tried  Associated symptoms: nausea and vomiting    Associated symptoms: no anorexia, no belching, no chest pain, no chills, no constipation, no cough, no diarrhea, no dysuria, no fatigue, no fever, no flatus, no hematemesis, no hematochezia, no hematuria, no melena, no shortness of breath, no sore throat, no vaginal bleeding and no vaginal discharge    Risk factors: being elderly    Risk factors: no alcohol abuse, no aspirin use, has not had multiple surgeries, no NSAID use, not obese, not pregnant and no recent hospitalization        Review of Systems   Constitutional: Negative for activity change, appetite change, chills, diaphoresis, fatigue and fever.   HENT: Negative for congestion, ear pain and sore throat.    Eyes: Negative for redness.   Respiratory: Negative for cough, chest tightness, shortness of breath and wheezing.    Cardiovascular: Negative for chest pain, palpitations and leg swelling.   Gastrointestinal: Positive for abdominal pain, nausea and vomiting. Negative for anorexia, constipation, diarrhea, flatus, hematemesis, hematochezia and melena.   Genitourinary: Negative for dysuria, hematuria, urgency, vaginal bleeding and vaginal discharge.   Musculoskeletal: Negative for arthralgias, back pain, myalgias and neck pain.   Skin:  Negative for pallor, rash and wound.   Neurological: Negative for dizziness, speech difficulty, weakness and headaches.   Psychiatric/Behavioral: Negative for agitation, behavioral problems, confusion and decreased concentration.   All other systems reviewed and are negative.      Past Medical History:   Diagnosis Date   • Arthritis    • COPD (chronic obstructive pulmonary disease) (CMS/HCC)    • Coronary artery disease    • Diabetes mellitus (CMS/HCC)    • Disease of thyroid gland    • Dysfunctional gallbladder    • GERD (gastroesophageal reflux disease)    • H/O heart artery stent    • Hypertension    • Stroke (CMS/HCC)        Allergies   Allergen Reactions   • Haldol [Haloperidol] Hallucinations       Past Surgical History:   Procedure Laterality Date   • BACK SURGERY     • CARDIAC CATHETERIZATION     • CHOLECYSTECTOMY N/A 7/17/2020    Procedure: CHOLECYSTECTOMY LAPAROSCOPIC;  Surgeon: Lonnie Meneses MD;  Location: [facility];  Service: General;  Laterality: N/A;   • COLONOSCOPY     • CORONARY STENT PLACEMENT     • ENDOSCOPY     • TONSILLECTOMY         History reviewed. No pertinent family history.    Social History     Socioeconomic History   • Marital status:      Spouse name: Not on file   • Number of children: Not on file   • Years of education: Not on file   • Highest education level: Not on file   Tobacco Use   • Smoking status: Current Every Day Smoker     Packs/day: 1.00     Years: 55.00     Pack years: 55.00     Types: Cigarettes   • Smokeless tobacco: Never Used   Substance and Sexual Activity   • Alcohol use: Never   • Drug use: Never   • Sexual activity: Defer           Objective   Physical Exam  Vitals and nursing note reviewed.   Constitutional:       General: She is not in acute distress.     Appearance: Normal appearance. She is well-developed. She is not toxic-appearing or diaphoretic.   HENT:      Head: Normocephalic and atraumatic.      Right Ear: External ear normal.      Left Ear:  External ear normal.      Nose: Nose normal.      Mouth/Throat:      Pharynx: No oropharyngeal exudate.      Tonsils: No tonsillar exudate.   Eyes:      General: Lids are normal.      Conjunctiva/sclera: Conjunctivae normal.      Pupils: Pupils are equal, round, and reactive to light.   Neck:      Thyroid: No thyromegaly.   Cardiovascular:      Rate and Rhythm: Normal rate and regular rhythm.      Pulses: Normal pulses.      Heart sounds: Normal heart sounds, S1 normal and S2 normal.   Pulmonary:      Effort: Pulmonary effort is normal. No tachypnea or respiratory distress.      Breath sounds: Normal breath sounds. No decreased breath sounds, wheezing or rales.   Chest:      Chest wall: No tenderness.   Abdominal:      General: Bowel sounds are normal. There is no distension.      Palpations: Abdomen is soft.      Tenderness: There is abdominal tenderness in the suprapubic area. There is no guarding or rebound.   Musculoskeletal:         General: No tenderness or deformity. Normal range of motion.      Cervical back: Full passive range of motion without pain, normal range of motion and neck supple.   Lymphadenopathy:      Cervical: No cervical adenopathy.   Skin:     General: Skin is warm and dry.      Coloration: Skin is not pale.      Findings: No erythema or rash.   Neurological:      Mental Status: She is alert and oriented to person, place, and time.      GCS: GCS eye subscore is 4. GCS verbal subscore is 5. GCS motor subscore is 6.      Cranial Nerves: No cranial nerve deficit.      Sensory: No sensory deficit.   Psychiatric:         Speech: Speech normal.         Behavior: Behavior normal.         Thought Content: Thought content normal.         Judgment: Judgment normal.         Procedures           ED Course  ED Course as of Apr 13 2107   Tue Apr 13, 2021   0634 Normal sinus rhythm  Left ventricular hypertrophy with repolarization abnormality  Abnormal ECG  When compared with ECG of 09-MAR-2021  21:34,  Inverted T waves have replaced nonspecific T wave abnormality in Lateral leads  Vent. rate 73 BPM  KS interval 174 ms  QRS duration 88 ms  QT/QTc 418/460 ms  P-R-T axes 67 31 127   ECG 12 Lead [ES]   0733 Paged our hospitalist to discuss the patient's improvement overnight and to see if they will reconsider admission at this time as there is not been a bed available at [facility] or [facility]    [JM]   0834 I spoke with Dr López. He says there are no beds and they are not boarding in the ED at this time      [JM]   0906 Spoke with the house supervisor unfortunately there is no beds available she has been moved to the top the list for admission she will likely require med telemetry.    [JM]   0906 Patient's abdomen is nontender to palpation.    [JM]   0906 Informed by nursing that the patient also just had a bowel movement , unlikely to be bowel obstruction.    [JM]   1030 Spoke with Dr De La Paz, she accepted the patient for inpatient treatment      [JM]   1136 EKG interpretation, ventricular rate 91, , QRS 82, QTc 418, normal sinus rhythm, no acute ST elevation or depression.    [JM]      ED Course User Index  [ES] Erasmo Singleton MD  [JM] Adan Toro, DO                                           MDM  Number of Diagnoses or Management Options     Amount and/or Complexity of Data Reviewed  Clinical lab tests: ordered and reviewed  Tests in the radiology section of CPT®: ordered and reviewed  Tests in the medicine section of CPT®: ordered and reviewed  Review and summarize past medical records: yes  Independent visualization of images, tracings, or specimens: yes    Risk of Complications, Morbidity, and/or Mortality  Presenting problems: moderate  Diagnostic procedures: moderate  Management options: moderate    Patient Progress  Patient progress: stable      Final diagnoses:   Acute cystitis with hematuria   Nausea and vomiting, intractability of vomiting not specified,  unspecified vomiting type   Sepsis, due to unspecified organism, unspecified whether acute organ dysfunction present (CMS/[facility])       ED Disposition  ED Disposition     ED Disposition Condition Comment    Transfer to Another Facility             No follow-up provider specified.       Medication List      No changes were made to your prescriptions during this visit.          Erasmo Singleton MD  04/13/21 7122

## 2021-04-13 NOTE — ED NOTES
PT COMPLAINING OF NAUSEA, PROVIDER MADE AWARE, WILL AWAIT NEW ORDERS.     Libertad Nunn, RN  04/13/21 0890

## 2021-04-13 NOTE — ED NOTES
Changed pt brief, pt had large amount of soft brown stool. Pt tolerated well.     Libertad Nunn, RN  04/13/21 0954

## 2021-04-13 NOTE — ED NOTES
[facility] called to notify us that they still have no beds. Will continue to update us.     Johana Mar  04/13/21 5104

## 2021-04-13 NOTE — ED NOTES
Annamarie with [facility] called to notify us of bed status. No beds as of now but will continue to update as they get discharges through the day. radha krause rn notified.      Johana Mar  04/13/21 3923

## 2021-04-13 NOTE — ED NOTES
[facility] accepted pt for transfer. State they do not have any open medsur beds at this time but are working on discharges, state they will call back when there is a bed available     Dinah Streeter RN  04/13/21 6135

## 2021-04-13 NOTE — ED NOTES
Changed pt brief, gown, and bed linens. Pt incontinent of urine, saturated bed and brief. Encouraged pt to use call light when she needs to void. Pt also placed on hospital bed at this time. poc updated, pt waiting on bed at [facility]. Call light within reach of pt.      Libertad Nunn, MANJINDER  04/13/21 1407

## 2021-04-13 NOTE — ED NOTES
Called [facility] for possible pt transfer. Daphne will page the hospitalist and call us back.      Johana Mar  04/13/21 1050

## 2021-04-13 NOTE — ED NOTES
Pt resting on stretcher with eyes closed, pt awakens to verbal stimuli, pt is alert and oriented, skin pwd, no resp distress. Pt remains on 2lpm nc. Ns noted. Pt able to move and self turn. Call light within reach of pt. Pt waiting on bed at [facility].      Libertad Nunn RN  04/13/21 3550

## 2021-04-13 NOTE — ED NOTES
Pt complains of nausea, provider made aware, will await new orders.     Libertad Nunn, RN  04/13/21 5477

## 2021-04-13 NOTE — ED NOTES
Pt resting on stretcher with eyes closed, pt awakens to verbal stimuli, pt is alert and oriented, skin pwd, no resp distress. Pt remains on 2lpm nc. Ns noted. Pt able to move and self turn.Call light within reach of pt. Pt waiting on bed at [facility].      Libertad Nunn RN  04/13/21 1158

## 2021-04-13 NOTE — ED NOTES
Dr. De La Paz from [facility] returning call for Dr. Toro at this time         Dinah Streeter, RN  04/13/21 1847

## 2021-04-13 NOTE — ED NOTES
Pt resting on stretcher, remains on 2lpm nc, pt able to self turn, pt is alert and oriented, skin pwd, no resp distress. Pt complains of abd discomfort, provider made aware, will await new orders. Call light within reach of pt.      Libertad Nunn, MANJINDER  04/13/21 0984

## 2021-04-13 NOTE — ED NOTES
Pt ate small amount of jello and 1 popsicle, reports it increased her nausea and she wasn't able to eat anymore.     Libertad Nunn, RN  04/13/21 1007

## 2021-04-13 NOTE — ED NOTES
Pt resting on stretcher with eyes closed, pt awakens to verbal stimuli, pt is alert and oriented, skin pwd, no resp distress. Pt remains on 2lpm nc. Ns noted. Call light within reach of pt. Pt waiting on bed at [facility].      Libertad Nunn RN  04/13/21 0206

## 2021-04-14 NOTE — ED NOTES
[facility] called back and stated that they toned a truck out but has no ETA.     Dre Hart  04/13/21 2031

## 2021-04-14 NOTE — ED NOTES
[facility] here to transfer pt at this time. Pt respirations even and unlabored, VSS, NADN. Pt IV patent and intact, pt's brief dry. Pt belongings being transferred with pt.        Alejo Oliveros, RN  04/13/21 7036

## 2021-04-14 NOTE — ED NOTES
Introduced myself to pt at this time. Pt is resting comfortably on hospital bed. Pt is updated on plan to transfer to [facility] once a bed becomes available. Pt is alert and oriented, respirations even and unlabored, VSWESLEY SANTOS. Pt voices no needs. Bed locked and low, call light in reach, will continue to monitor.     Alejo Oliveros, RN  04/13/21 7127

## 2021-04-15 LAB
BACTERIA SPEC AEROBE CULT: ABNORMAL
QT INTERVAL: 418 MS
QTC INTERVAL: 460 MS

## 2021-04-18 LAB
BACTERIA SPEC AEROBE CULT: NORMAL
BACTERIA SPEC AEROBE CULT: NORMAL

## 2021-04-22 ENCOUNTER — APPOINTMENT (OUTPATIENT)
Dept: CT IMAGING | Facility: HOSPITAL | Age: 76
End: 2021-04-22

## 2021-04-22 ENCOUNTER — APPOINTMENT (OUTPATIENT)
Dept: GENERAL RADIOLOGY | Facility: HOSPITAL | Age: 76
End: 2021-04-22

## 2021-04-22 ENCOUNTER — HOSPITAL ENCOUNTER (INPATIENT)
Facility: HOSPITAL | Age: 76
LOS: 7 days | Discharge: NURSING, REHAB OR LONG-TERM CARE | End: 2021-04-30
Attending: STUDENT IN AN ORGANIZED HEALTH CARE EDUCATION/TRAINING PROGRAM | Admitting: INTERNAL MEDICINE

## 2021-04-22 DIAGNOSIS — K52.9 COLITIS: Primary | ICD-10-CM

## 2021-04-22 DIAGNOSIS — A41.9 SEPSIS, DUE TO UNSPECIFIED ORGANISM, UNSPECIFIED WHETHER ACUTE ORGAN DYSFUNCTION PRESENT (HCC): ICD-10-CM

## 2021-04-22 PROCEDURE — 36415 COLL VENOUS BLD VENIPUNCTURE: CPT

## 2021-04-22 PROCEDURE — 74176 CT ABD & PELVIS W/O CONTRAST: CPT

## 2021-04-22 PROCEDURE — 85610 PROTHROMBIN TIME: CPT | Performed by: STUDENT IN AN ORGANIZED HEALTH CARE EDUCATION/TRAINING PROGRAM

## 2021-04-22 PROCEDURE — 85025 COMPLETE CBC W/AUTO DIFF WBC: CPT | Performed by: STUDENT IN AN ORGANIZED HEALTH CARE EDUCATION/TRAINING PROGRAM

## 2021-04-22 PROCEDURE — 93005 ELECTROCARDIOGRAM TRACING: CPT | Performed by: STUDENT IN AN ORGANIZED HEALTH CARE EDUCATION/TRAINING PROGRAM

## 2021-04-22 PROCEDURE — 87040 BLOOD CULTURE FOR BACTERIA: CPT | Performed by: STUDENT IN AN ORGANIZED HEALTH CARE EDUCATION/TRAINING PROGRAM

## 2021-04-22 PROCEDURE — 80074 ACUTE HEPATITIS PANEL: CPT | Performed by: INTERNAL MEDICINE

## 2021-04-22 PROCEDURE — 83036 HEMOGLOBIN GLYCOSYLATED A1C: CPT | Performed by: INTERNAL MEDICINE

## 2021-04-22 PROCEDURE — 71045 X-RAY EXAM CHEST 1 VIEW: CPT

## 2021-04-22 PROCEDURE — 85007 BL SMEAR W/DIFF WBC COUNT: CPT | Performed by: STUDENT IN AN ORGANIZED HEALTH CARE EDUCATION/TRAINING PROGRAM

## 2021-04-22 PROCEDURE — 85652 RBC SED RATE AUTOMATED: CPT | Performed by: STUDENT IN AN ORGANIZED HEALTH CARE EDUCATION/TRAINING PROGRAM

## 2021-04-22 PROCEDURE — 83605 ASSAY OF LACTIC ACID: CPT | Performed by: STUDENT IN AN ORGANIZED HEALTH CARE EDUCATION/TRAINING PROGRAM

## 2021-04-22 PROCEDURE — 99284 EMERGENCY DEPT VISIT MOD MDM: CPT

## 2021-04-22 RX ORDER — SODIUM CHLORIDE 0.9 % (FLUSH) 0.9 %
10 SYRINGE (ML) INJECTION AS NEEDED
Status: DISCONTINUED | OUTPATIENT
Start: 2021-04-22 | End: 2021-04-30 | Stop reason: HOSPADM

## 2021-04-22 RX ORDER — SODIUM CHLORIDE 9 MG/ML
125 INJECTION, SOLUTION INTRAVENOUS CONTINUOUS
Status: DISCONTINUED | OUTPATIENT
Start: 2021-04-22 | End: 2021-04-25

## 2021-04-23 PROBLEM — E03.9 HYPOTHYROIDISM: Chronic | Status: ACTIVE | Noted: 2021-04-23

## 2021-04-23 PROBLEM — E78.5 HYPERLIPIDEMIA: Chronic | Status: ACTIVE | Noted: 2021-04-23

## 2021-04-23 PROBLEM — E11.9 TYPE II DIABETES MELLITUS (HCC): Chronic | Status: ACTIVE | Noted: 2021-04-23

## 2021-04-23 PROBLEM — E44.0 MODERATE MALNUTRITION (HCC): Status: ACTIVE | Noted: 2021-04-23

## 2021-04-23 PROBLEM — K52.9 COLITIS: Status: ACTIVE | Noted: 2021-04-23

## 2021-04-23 PROBLEM — I50.32 DIASTOLIC CHF, CHRONIC (HCC): Chronic | Status: ACTIVE | Noted: 2021-04-23

## 2021-04-23 PROBLEM — J44.9 COPD (CHRONIC OBSTRUCTIVE PULMONARY DISEASE) (HCC): Chronic | Status: ACTIVE | Noted: 2021-04-23

## 2021-04-23 LAB
027 TOXIN: NORMAL
ADV 40+41 DNA STL QL NAA+NON-PROBE: NOT DETECTED
ALBUMIN SERPL-MCNC: 3.12 G/DL (ref 3.5–5.2)
ALBUMIN SERPL-MCNC: 3.2 G/DL (ref 3.5–5.2)
ALBUMIN/GLOB SERPL: 0.8 G/DL
ALBUMIN/GLOB SERPL: 0.9 G/DL
ALP SERPL-CCNC: 84 U/L (ref 39–117)
ALP SERPL-CCNC: 86 U/L (ref 39–117)
ALT SERPL W P-5'-P-CCNC: 76 U/L (ref 1–33)
ALT SERPL W P-5'-P-CCNC: 80 U/L (ref 1–33)
ANION GAP SERPL CALCULATED.3IONS-SCNC: 14.7 MMOL/L (ref 5–15)
ANION GAP SERPL CALCULATED.3IONS-SCNC: 16.7 MMOL/L (ref 5–15)
ANION GAP SERPL CALCULATED.3IONS-SCNC: 17 MMOL/L (ref 5–15)
AST SERPL-CCNC: 69 U/L (ref 1–32)
AST SERPL-CCNC: 85 U/L (ref 1–32)
ASTRO TYP 1-8 RNA STL QL NAA+NON-PROBE: NOT DETECTED
BACTERIA UR QL AUTO: ABNORMAL /HPF
BILIRUB SERPL-MCNC: 0.3 MG/DL (ref 0–1.2)
BILIRUB SERPL-MCNC: 0.3 MG/DL (ref 0–1.2)
BILIRUB UR QL STRIP: NEGATIVE
BUN SERPL-MCNC: 39 MG/DL (ref 8–23)
BUN SERPL-MCNC: 40 MG/DL (ref 8–23)
BUN SERPL-MCNC: 44 MG/DL (ref 8–23)
BUN/CREAT SERPL: 41.2 (ref 7–25)
BUN/CREAT SERPL: 47 (ref 7–25)
BUN/CREAT SERPL: 48.9 (ref 7–25)
C CAYETANENSIS DNA STL QL NAA+NON-PROBE: NOT DETECTED
C COLI+JEJ+UPSA DNA STL QL NAA+NON-PROBE: NOT DETECTED
C DIFF TOX GENS STL QL NAA+PROBE: NEGATIVE
CALCIUM SPEC-SCNC: 9.1 MG/DL (ref 8.6–10.5)
CALCIUM SPEC-SCNC: 9.4 MG/DL (ref 8.6–10.5)
CALCIUM SPEC-SCNC: 9.8 MG/DL (ref 8.6–10.5)
CHLORIDE SERPL-SCNC: 86 MMOL/L (ref 98–107)
CHLORIDE SERPL-SCNC: 87 MMOL/L (ref 98–107)
CHLORIDE SERPL-SCNC: 93 MMOL/L (ref 98–107)
CLARITY UR: ABNORMAL
CO2 SERPL-SCNC: 28.3 MMOL/L (ref 22–29)
CO2 SERPL-SCNC: 29.3 MMOL/L (ref 22–29)
CO2 SERPL-SCNC: 34 MMOL/L (ref 22–29)
COLOR UR: ABNORMAL
CREAT SERPL-MCNC: 0.83 MG/DL (ref 0.57–1)
CREAT SERPL-MCNC: 0.9 MG/DL (ref 0.57–1)
CREAT SERPL-MCNC: 0.97 MG/DL (ref 0.57–1)
CRP SERPL-MCNC: 18.67 MG/DL (ref 0–0.5)
CRP SERPL-MCNC: 20.02 MG/DL (ref 0–0.5)
CRYPTOSP DNA STL QL NAA+NON-PROBE: NOT DETECTED
D-LACTATE SERPL-SCNC: 0.9 MMOL/L (ref 0.5–2)
DEPRECATED RDW RBC AUTO: 47 FL (ref 37–54)
DEPRECATED RDW RBC AUTO: 49.1 FL (ref 37–54)
DEPRECATED RDW RBC AUTO: 51.7 FL (ref 37–54)
E HISTOLYT DNA STL QL NAA+NON-PROBE: NOT DETECTED
EAEC PAA PLAS AGGR+AATA ST NAA+NON-PRB: NOT DETECTED
EC STX1+STX2 GENES STL QL NAA+NON-PROBE: NOT DETECTED
EOSINOPHIL # BLD MANUAL: 0.66 10*3/MM3 (ref 0–0.4)
EOSINOPHIL NFR BLD MANUAL: 2 % (ref 0.3–6.2)
EPEC EAE GENE STL QL NAA+NON-PROBE: NOT DETECTED
ERYTHROCYTE [DISTWIDTH] IN BLOOD BY AUTOMATED COUNT: 13.2 % (ref 12.3–15.4)
ERYTHROCYTE [DISTWIDTH] IN BLOOD BY AUTOMATED COUNT: 13.5 % (ref 12.3–15.4)
ERYTHROCYTE [DISTWIDTH] IN BLOOD BY AUTOMATED COUNT: 13.6 % (ref 12.3–15.4)
ERYTHROCYTE [SEDIMENTATION RATE] IN BLOOD: 46 MM/HR (ref 0–30)
ETEC LTA+ST1A+ST1B TOX ST NAA+NON-PROBE: NOT DETECTED
FERRITIN SERPL-MCNC: 448.6 NG/ML (ref 13–150)
FLUAV RNA RESP QL NAA+PROBE: NOT DETECTED
FLUBV RNA RESP QL NAA+PROBE: NOT DETECTED
FOLATE SERPL-MCNC: >20 NG/ML (ref 4.78–24.2)
G LAMBLIA DNA STL QL NAA+NON-PROBE: NOT DETECTED
GFR SERPL CREATININE-BSD FRML MDRD: 56 ML/MIN/1.73
GFR SERPL CREATININE-BSD FRML MDRD: 61 ML/MIN/1.73
GFR SERPL CREATININE-BSD FRML MDRD: 67 ML/MIN/1.73
GLOBULIN UR ELPH-MCNC: 3.6 GM/DL
GLOBULIN UR ELPH-MCNC: 4.1 GM/DL
GLUCOSE BLDC GLUCOMTR-MCNC: 131 MG/DL (ref 70–130)
GLUCOSE BLDC GLUCOMTR-MCNC: 139 MG/DL (ref 70–130)
GLUCOSE BLDC GLUCOMTR-MCNC: 147 MG/DL (ref 70–130)
GLUCOSE SERPL-MCNC: 136 MG/DL (ref 65–99)
GLUCOSE SERPL-MCNC: 137 MG/DL (ref 65–99)
GLUCOSE SERPL-MCNC: 144 MG/DL (ref 65–99)
GLUCOSE UR STRIP-MCNC: NEGATIVE MG/DL
HAV IGM SERPL QL IA: NORMAL
HBA1C MFR BLD: 7.2 % (ref 4.8–5.6)
HBV CORE IGM SERPL QL IA: NORMAL
HBV SURFACE AG SERPL QL IA: NORMAL
HCT VFR BLD AUTO: 34 % (ref 34–46.6)
HCT VFR BLD AUTO: 34.4 % (ref 34–46.6)
HCT VFR BLD AUTO: 35.4 % (ref 34–46.6)
HCV AB SER DONR QL: NORMAL
HGB BLD-MCNC: 10.5 G/DL (ref 12–15.9)
HGB BLD-MCNC: 10.7 G/DL (ref 12–15.9)
HGB BLD-MCNC: 11.5 G/DL (ref 12–15.9)
HGB UR QL STRIP.AUTO: ABNORMAL
HOLD SPECIMEN: NORMAL
HOLD SPECIMEN: NORMAL
HYALINE CASTS UR QL AUTO: ABNORMAL /LPF
HYPOCHROMIA BLD QL: ABNORMAL
INR PPP: 1.24 (ref 0.9–1.1)
IRON 24H UR-MRATE: 27 MCG/DL (ref 37–145)
IRON SATN MFR SERPL: 10 % (ref 20–50)
KETONES UR QL STRIP: ABNORMAL
LEUKOCYTE ESTERASE UR QL STRIP.AUTO: NEGATIVE
LYMPHOCYTES # BLD MANUAL: 0.79 10*3/MM3 (ref 0.7–3.1)
LYMPHOCYTES # BLD MANUAL: 1.32 10*3/MM3 (ref 0.7–3.1)
LYMPHOCYTES NFR BLD MANUAL: 2 % (ref 19.6–45.3)
LYMPHOCYTES NFR BLD MANUAL: 4 % (ref 19.6–45.3)
LYMPHOCYTES NFR BLD MANUAL: 6 % (ref 5–12)
LYMPHOCYTES NFR BLD MANUAL: 9 % (ref 5–12)
MACROCYTES BLD QL SMEAR: ABNORMAL
MACROCYTES BLD QL SMEAR: ABNORMAL
MAGNESIUM SERPL-MCNC: 2.4 MG/DL (ref 1.6–2.4)
MCH RBC QN AUTO: 31.3 PG (ref 26.6–33)
MCH RBC QN AUTO: 31.4 PG (ref 26.6–33)
MCH RBC QN AUTO: 31.6 PG (ref 26.6–33)
MCHC RBC AUTO-ENTMCNC: 30.5 G/DL (ref 31.5–35.7)
MCHC RBC AUTO-ENTMCNC: 31.5 G/DL (ref 31.5–35.7)
MCHC RBC AUTO-ENTMCNC: 32.5 G/DL (ref 31.5–35.7)
MCV RBC AUTO: 100.3 FL (ref 79–97)
MCV RBC AUTO: 102.7 FL (ref 79–97)
MCV RBC AUTO: 96.7 FL (ref 79–97)
MONOCYTES # BLD AUTO: 1.99 10*3/MM3 (ref 0.1–0.9)
MONOCYTES # BLD AUTO: 3.54 10*3/MM3 (ref 0.1–0.9)
NEUTROPHILS # BLD AUTO: 29.13 10*3/MM3 (ref 1.7–7)
NEUTROPHILS # BLD AUTO: 34.99 10*3/MM3 (ref 1.7–7)
NEUTROPHILS NFR BLD MANUAL: 79 % (ref 42.7–76)
NEUTROPHILS NFR BLD MANUAL: 83 % (ref 42.7–76)
NEUTS BAND NFR BLD MANUAL: 6 % (ref 0–5)
NEUTS BAND NFR BLD MANUAL: 9 % (ref 0–5)
NITRITE UR QL STRIP: NEGATIVE
NOROVIRUS GI+II RNA STL QL NAA+NON-PROBE: NOT DETECTED
P SHIGELLOIDES DNA STL QL NAA+NON-PROBE: NOT DETECTED
PH UR STRIP.AUTO: <=5 [PH] (ref 5–8)
PHOSPHATE SERPL-MCNC: 4.6 MG/DL (ref 2.5–4.5)
PHOSPHATE SERPL-MCNC: 5.2 MG/DL (ref 2.5–4.5)
PLAT MORPH BLD: NORMAL
PLAT MORPH BLD: NORMAL
PLATELET # BLD AUTO: 322 10*3/MM3 (ref 140–450)
PLATELET # BLD AUTO: 353 10*3/MM3 (ref 140–450)
PLATELET # BLD AUTO: 366 10*3/MM3 (ref 140–450)
PMV BLD AUTO: 10.3 FL (ref 6–12)
PMV BLD AUTO: 10.6 FL (ref 6–12)
PMV BLD AUTO: 10.7 FL (ref 6–12)
POTASSIUM SERPL-SCNC: 4.1 MMOL/L (ref 3.5–5.2)
POTASSIUM SERPL-SCNC: 4.1 MMOL/L (ref 3.5–5.2)
POTASSIUM SERPL-SCNC: 4.4 MMOL/L (ref 3.5–5.2)
PROT SERPL-MCNC: 6.7 G/DL (ref 6–8.5)
PROT SERPL-MCNC: 7.3 G/DL (ref 6–8.5)
PROT UR QL STRIP: ABNORMAL
PROTHROMBIN TIME: 15.4 SECONDS (ref 11.9–14.1)
QT INTERVAL: 402 MS
QT INTERVAL: 442 MS
QTC INTERVAL: 454 MS
QTC INTERVAL: 490 MS
RBC # BLD AUTO: 3.35 10*6/MM3 (ref 3.77–5.28)
RBC # BLD AUTO: 3.39 10*6/MM3 (ref 3.77–5.28)
RBC # BLD AUTO: 3.66 10*6/MM3 (ref 3.77–5.28)
RBC # UR: ABNORMAL /HPF
REF LAB TEST METHOD: ABNORMAL
RVA RNA STL QL NAA+NON-PROBE: NOT DETECTED
S ENT+BONG DNA STL QL NAA+NON-PROBE: NOT DETECTED
SAPO I+II+IV+V RNA STL QL NAA+NON-PROBE: NOT DETECTED
SARS-COV-2 RNA RESP QL NAA+PROBE: NOT DETECTED
SCAN SLIDE: NORMAL
SHIGELLA SP+EIEC IPAH ST NAA+NON-PROBE: NOT DETECTED
SODIUM SERPL-SCNC: 132 MMOL/L (ref 136–145)
SODIUM SERPL-SCNC: 137 MMOL/L (ref 136–145)
SODIUM SERPL-SCNC: 137 MMOL/L (ref 136–145)
SP GR UR STRIP: 1.01 (ref 1–1.03)
SQUAMOUS #/AREA URNS HPF: ABNORMAL /HPF
TIBC SERPL-MCNC: 258 MCG/DL (ref 298–536)
TRANSFERRIN SERPL-MCNC: 173 MG/DL (ref 200–360)
TROPONIN T SERPL-MCNC: 0.02 NG/ML (ref 0–0.03)
TROPONIN T SERPL-MCNC: 0.02 NG/ML (ref 0–0.03)
TSH SERPL DL<=0.05 MIU/L-ACNC: 1.74 UIU/ML (ref 0.27–4.2)
UROBILINOGEN UR QL STRIP: ABNORMAL
V CHOL+PARA+VUL DNA STL QL NAA+NON-PROBE: NOT DETECTED
V CHOLERAE DNA STL QL NAA+NON-PROBE: NOT DETECTED
VIT B12 BLD-MCNC: 1311 PG/ML (ref 211–946)
WBC # BLD AUTO: 33.1 10*3/MM3 (ref 3.4–10.8)
WBC # BLD AUTO: 37.91 10*3/MM3 (ref 3.4–10.8)
WBC # BLD AUTO: 39.32 10*3/MM3 (ref 3.4–10.8)
WBC UR QL AUTO: ABNORMAL /HPF
WHOLE BLOOD HOLD SPECIMEN: NORMAL
WHOLE BLOOD HOLD SPECIMEN: NORMAL
Y ENTEROCOL DNA STL QL NAA+NON-PROBE: NOT DETECTED

## 2021-04-23 PROCEDURE — 80053 COMPREHEN METABOLIC PANEL: CPT | Performed by: STUDENT IN AN ORGANIZED HEALTH CARE EDUCATION/TRAINING PROGRAM

## 2021-04-23 PROCEDURE — 84100 ASSAY OF PHOSPHORUS: CPT | Performed by: PHYSICIAN ASSISTANT

## 2021-04-23 PROCEDURE — 84443 ASSAY THYROID STIM HORMONE: CPT | Performed by: STUDENT IN AN ORGANIZED HEALTH CARE EDUCATION/TRAINING PROGRAM

## 2021-04-23 PROCEDURE — 83735 ASSAY OF MAGNESIUM: CPT | Performed by: STUDENT IN AN ORGANIZED HEALTH CARE EDUCATION/TRAINING PROGRAM

## 2021-04-23 PROCEDURE — 86140 C-REACTIVE PROTEIN: CPT | Performed by: PHYSICIAN ASSISTANT

## 2021-04-23 PROCEDURE — 82607 VITAMIN B-12: CPT | Performed by: PHYSICIAN ASSISTANT

## 2021-04-23 PROCEDURE — 93005 ELECTROCARDIOGRAM TRACING: CPT | Performed by: STUDENT IN AN ORGANIZED HEALTH CARE EDUCATION/TRAINING PROGRAM

## 2021-04-23 PROCEDURE — 84466 ASSAY OF TRANSFERRIN: CPT | Performed by: PHYSICIAN ASSISTANT

## 2021-04-23 PROCEDURE — 81001 URINALYSIS AUTO W/SCOPE: CPT | Performed by: STUDENT IN AN ORGANIZED HEALTH CARE EDUCATION/TRAINING PROGRAM

## 2021-04-23 PROCEDURE — 99221 1ST HOSP IP/OBS SF/LOW 40: CPT | Performed by: SURGERY

## 2021-04-23 PROCEDURE — 82962 GLUCOSE BLOOD TEST: CPT

## 2021-04-23 PROCEDURE — 25010000002 ENOXAPARIN PER 10 MG: Performed by: STUDENT IN AN ORGANIZED HEALTH CARE EDUCATION/TRAINING PROGRAM

## 2021-04-23 PROCEDURE — 87636 SARSCOV2 & INF A&B AMP PRB: CPT | Performed by: STUDENT IN AN ORGANIZED HEALTH CARE EDUCATION/TRAINING PROGRAM

## 2021-04-23 PROCEDURE — 84484 ASSAY OF TROPONIN QUANT: CPT | Performed by: PHYSICIAN ASSISTANT

## 2021-04-23 PROCEDURE — 94799 UNLISTED PULMONARY SVC/PX: CPT

## 2021-04-23 PROCEDURE — 85025 COMPLETE CBC W/AUTO DIFF WBC: CPT | Performed by: PHYSICIAN ASSISTANT

## 2021-04-23 PROCEDURE — 25010000002 CEFEPIME PER 500 MG: Performed by: INTERNAL MEDICINE

## 2021-04-23 PROCEDURE — 85027 COMPLETE CBC AUTOMATED: CPT | Performed by: STUDENT IN AN ORGANIZED HEALTH CARE EDUCATION/TRAINING PROGRAM

## 2021-04-23 PROCEDURE — 0097U HC BIOFIRE FILMARRAY GI PANEL: CPT | Performed by: PHYSICIAN ASSISTANT

## 2021-04-23 PROCEDURE — 82746 ASSAY OF FOLIC ACID SERUM: CPT | Performed by: PHYSICIAN ASSISTANT

## 2021-04-23 PROCEDURE — 84100 ASSAY OF PHOSPHORUS: CPT | Performed by: STUDENT IN AN ORGANIZED HEALTH CARE EDUCATION/TRAINING PROGRAM

## 2021-04-23 PROCEDURE — 87040 BLOOD CULTURE FOR BACTERIA: CPT | Performed by: STUDENT IN AN ORGANIZED HEALTH CARE EDUCATION/TRAINING PROGRAM

## 2021-04-23 PROCEDURE — 25010000002 ONDANSETRON PER 1 MG: Performed by: STUDENT IN AN ORGANIZED HEALTH CARE EDUCATION/TRAINING PROGRAM

## 2021-04-23 PROCEDURE — 86140 C-REACTIVE PROTEIN: CPT | Performed by: STUDENT IN AN ORGANIZED HEALTH CARE EDUCATION/TRAINING PROGRAM

## 2021-04-23 PROCEDURE — P9612 CATHETERIZE FOR URINE SPEC: HCPCS

## 2021-04-23 PROCEDURE — 83540 ASSAY OF IRON: CPT | Performed by: PHYSICIAN ASSISTANT

## 2021-04-23 PROCEDURE — 85007 BL SMEAR W/DIFF WBC COUNT: CPT | Performed by: PHYSICIAN ASSISTANT

## 2021-04-23 PROCEDURE — 87493 C DIFF AMPLIFIED PROBE: CPT | Performed by: PHYSICIAN ASSISTANT

## 2021-04-23 PROCEDURE — 82728 ASSAY OF FERRITIN: CPT | Performed by: PHYSICIAN ASSISTANT

## 2021-04-23 PROCEDURE — 80053 COMPREHEN METABOLIC PANEL: CPT | Performed by: PHYSICIAN ASSISTANT

## 2021-04-23 PROCEDURE — 25010000002 MEROPENEM: Performed by: STUDENT IN AN ORGANIZED HEALTH CARE EDUCATION/TRAINING PROGRAM

## 2021-04-23 RX ORDER — CARVEDILOL 6.25 MG/1
12.5 TABLET ORAL 2 TIMES DAILY WITH MEALS
Status: CANCELLED | OUTPATIENT
Start: 2021-04-23

## 2021-04-23 RX ORDER — CHOLECALCIFEROL (VITAMIN D3) 125 MCG
5 CAPSULE ORAL NIGHTLY
COMMUNITY

## 2021-04-23 RX ORDER — L.ACID,PARA/B.BIFIDUM/S.THERM 8B CELL
1 CAPSULE ORAL DAILY
Status: DISCONTINUED | OUTPATIENT
Start: 2021-04-23 | End: 2021-04-30 | Stop reason: HOSPADM

## 2021-04-23 RX ORDER — TRAZODONE HYDROCHLORIDE 50 MG/1
50 TABLET ORAL NIGHTLY
Status: DISCONTINUED | OUTPATIENT
Start: 2021-04-23 | End: 2021-04-30 | Stop reason: HOSPADM

## 2021-04-23 RX ORDER — BISACODYL 10 MG
10 SUPPOSITORY, RECTAL RECTAL DAILY PRN
Status: CANCELLED | OUTPATIENT
Start: 2021-04-23

## 2021-04-23 RX ORDER — NICOTINE POLACRILEX 4 MG
15 LOZENGE BUCCAL
Status: DISCONTINUED | OUTPATIENT
Start: 2021-04-23 | End: 2021-04-30 | Stop reason: HOSPADM

## 2021-04-23 RX ORDER — LOPERAMIDE HYDROCHLORIDE 2 MG/1
2 CAPSULE ORAL EVERY 6 HOURS PRN
Status: CANCELLED | OUTPATIENT
Start: 2021-04-23

## 2021-04-23 RX ORDER — AMLODIPINE BESYLATE 10 MG/1
10 TABLET ORAL DAILY
COMMUNITY
End: 2021-10-08 | Stop reason: HOSPADM

## 2021-04-23 RX ORDER — IPRATROPIUM BROMIDE AND ALBUTEROL SULFATE 2.5; .5 MG/3ML; MG/3ML
3 SOLUTION RESPIRATORY (INHALATION) EVERY 4 HOURS PRN
Status: DISCONTINUED | OUTPATIENT
Start: 2021-04-23 | End: 2021-04-30 | Stop reason: HOSPADM

## 2021-04-23 RX ORDER — PANTOPRAZOLE SODIUM 40 MG/1
40 TABLET, DELAYED RELEASE ORAL DAILY
Status: CANCELLED | OUTPATIENT
Start: 2021-04-23

## 2021-04-23 RX ORDER — NITROGLYCERIN 0.4 MG/1
0.4 TABLET SUBLINGUAL
Status: DISCONTINUED | OUTPATIENT
Start: 2021-04-23 | End: 2021-04-30 | Stop reason: HOSPADM

## 2021-04-23 RX ORDER — ASPIRIN 81 MG/1
81 TABLET, CHEWABLE ORAL DAILY
COMMUNITY
End: 2021-10-08 | Stop reason: HOSPADM

## 2021-04-23 RX ORDER — PROMETHAZINE HYDROCHLORIDE 12.5 MG/1
12.5 TABLET ORAL EVERY 8 HOURS PRN
Status: CANCELLED | OUTPATIENT
Start: 2021-04-23

## 2021-04-23 RX ORDER — DEXTROSE MONOHYDRATE 25 G/50ML
25 INJECTION, SOLUTION INTRAVENOUS
Status: DISCONTINUED | OUTPATIENT
Start: 2021-04-23 | End: 2021-04-30 | Stop reason: HOSPADM

## 2021-04-23 RX ORDER — BUMETANIDE 1 MG/1
1 TABLET ORAL DAILY
Status: CANCELLED | OUTPATIENT
Start: 2021-04-23

## 2021-04-23 RX ORDER — LOPERAMIDE HYDROCHLORIDE 2 MG/1
2 CAPSULE ORAL EVERY 6 HOURS PRN
COMMUNITY
End: 2021-04-30 | Stop reason: HOSPADM

## 2021-04-23 RX ORDER — BUMETANIDE 1 MG/1
1 TABLET ORAL DAILY
COMMUNITY
End: 2021-04-30 | Stop reason: HOSPADM

## 2021-04-23 RX ORDER — BACLOFEN 10 MG/1
10 TABLET ORAL NIGHTLY
COMMUNITY
End: 2021-04-30 | Stop reason: HOSPADM

## 2021-04-23 RX ORDER — GUAIFENESIN 200 MG/1
400 TABLET ORAL EVERY 12 HOURS SCHEDULED
Status: CANCELLED | OUTPATIENT
Start: 2021-04-23

## 2021-04-23 RX ORDER — LISINOPRIL 10 MG/1
10 TABLET ORAL DAILY
Status: DISCONTINUED | OUTPATIENT
Start: 2021-04-23 | End: 2021-04-30 | Stop reason: HOSPADM

## 2021-04-23 RX ORDER — TRAZODONE HYDROCHLORIDE 50 MG/1
50 TABLET ORAL NIGHTLY
Status: CANCELLED | OUTPATIENT
Start: 2021-04-23

## 2021-04-23 RX ORDER — ASPIRIN 81 MG/1
81 TABLET, CHEWABLE ORAL DAILY
Status: CANCELLED | OUTPATIENT
Start: 2021-04-23

## 2021-04-23 RX ORDER — CHOLECALCIFEROL (VITAMIN D3) 125 MCG
5 CAPSULE ORAL NIGHTLY
Status: CANCELLED | OUTPATIENT
Start: 2021-04-23

## 2021-04-23 RX ORDER — CARVEDILOL 6.25 MG/1
12.5 TABLET ORAL 2 TIMES DAILY WITH MEALS
Status: DISCONTINUED | OUTPATIENT
Start: 2021-04-23 | End: 2021-04-30 | Stop reason: HOSPADM

## 2021-04-23 RX ORDER — ATORVASTATIN CALCIUM 10 MG/1
10 TABLET, FILM COATED ORAL NIGHTLY
Status: DISCONTINUED | OUTPATIENT
Start: 2021-04-23 | End: 2021-04-30 | Stop reason: HOSPADM

## 2021-04-23 RX ORDER — L. ACIDOPHILUS/PECTIN, CITRUS 25MM-100MG
1 TABLET ORAL 2 TIMES DAILY
COMMUNITY
End: 2021-04-30 | Stop reason: HOSPADM

## 2021-04-23 RX ORDER — ONDANSETRON 2 MG/ML
4 INJECTION INTRAMUSCULAR; INTRAVENOUS EVERY 6 HOURS PRN
Status: DISCONTINUED | OUTPATIENT
Start: 2021-04-23 | End: 2021-04-30 | Stop reason: HOSPADM

## 2021-04-23 RX ORDER — BACLOFEN 10 MG/1
10 TABLET ORAL NIGHTLY
Status: CANCELLED | OUTPATIENT
Start: 2021-04-23

## 2021-04-23 RX ORDER — L. ACIDOPHILUS/PECTIN, CITRUS 25MM-100MG
1 TABLET ORAL 2 TIMES DAILY
Status: CANCELLED | OUTPATIENT
Start: 2021-04-23

## 2021-04-23 RX ORDER — HYDROCODONE BITARTRATE AND ACETAMINOPHEN 5; 325 MG/1; MG/1
1 TABLET ORAL EVERY 8 HOURS PRN
COMMUNITY
End: 2021-04-30 | Stop reason: HOSPADM

## 2021-04-23 RX ORDER — POTASSIUM CHLORIDE 20 MEQ/1
20 TABLET, EXTENDED RELEASE ORAL DAILY
Status: CANCELLED | OUTPATIENT
Start: 2021-04-23

## 2021-04-23 RX ORDER — HYDROCODONE BITARTRATE AND ACETAMINOPHEN 5; 325 MG/1; MG/1
1 TABLET ORAL EVERY 8 HOURS PRN
Status: CANCELLED | OUTPATIENT
Start: 2021-04-23

## 2021-04-23 RX ORDER — CARVEDILOL 12.5 MG/1
12.5 TABLET ORAL 2 TIMES DAILY WITH MEALS
COMMUNITY
End: 2021-06-01 | Stop reason: HOSPADM

## 2021-04-23 RX ORDER — LISINOPRIL 10 MG/1
10 TABLET ORAL DAILY
Status: CANCELLED | OUTPATIENT
Start: 2021-04-23

## 2021-04-23 RX ORDER — LEVOTHYROXINE SODIUM 175 UG/1
175 TABLET ORAL DAILY
Status: CANCELLED | OUTPATIENT
Start: 2021-04-23

## 2021-04-23 RX ORDER — DIPHENOXYLATE HYDROCHLORIDE AND ATROPINE SULFATE 2.5; .025 MG/1; MG/1
1 TABLET ORAL NIGHTLY
Status: CANCELLED | OUTPATIENT
Start: 2021-04-23

## 2021-04-23 RX ORDER — SENNA PLUS 8.6 MG/1
1 TABLET ORAL DAILY PRN
Status: CANCELLED | OUTPATIENT
Start: 2021-04-23

## 2021-04-23 RX ORDER — ATORVASTATIN CALCIUM 10 MG/1
10 TABLET, FILM COATED ORAL NIGHTLY
Status: CANCELLED | OUTPATIENT
Start: 2021-04-23

## 2021-04-23 RX ORDER — LISINOPRIL 10 MG/1
10 TABLET ORAL DAILY
COMMUNITY
End: 2021-10-08 | Stop reason: HOSPADM

## 2021-04-23 RX ORDER — MONTELUKAST SODIUM 10 MG/1
10 TABLET ORAL DAILY
Status: CANCELLED | OUTPATIENT
Start: 2021-04-23

## 2021-04-23 RX ORDER — IPRATROPIUM BROMIDE AND ALBUTEROL SULFATE 2.5; .5 MG/3ML; MG/3ML
3 SOLUTION RESPIRATORY (INHALATION) 4 TIMES DAILY PRN
Status: CANCELLED | OUTPATIENT
Start: 2021-04-23

## 2021-04-23 RX ORDER — L.ACID,PARA/B.BIFIDUM/S.THERM 8B CELL
1 CAPSULE ORAL DAILY
Status: CANCELLED | OUTPATIENT
Start: 2021-04-23

## 2021-04-23 RX ORDER — ONDANSETRON 2 MG/ML
4 INJECTION INTRAMUSCULAR; INTRAVENOUS ONCE
Status: COMPLETED | OUTPATIENT
Start: 2021-04-23 | End: 2021-04-23

## 2021-04-23 RX ORDER — SODIUM CHLORIDE 0.9 % (FLUSH) 0.9 %
3 SYRINGE (ML) INJECTION EVERY 12 HOURS SCHEDULED
Status: DISCONTINUED | OUTPATIENT
Start: 2021-04-23 | End: 2021-04-30 | Stop reason: HOSPADM

## 2021-04-23 RX ORDER — DOCUSATE SODIUM 100 MG/1
200 CAPSULE, LIQUID FILLED ORAL DAILY
Status: CANCELLED | OUTPATIENT
Start: 2021-04-23

## 2021-04-23 RX ORDER — AMLODIPINE BESYLATE 10 MG/1
10 TABLET ORAL DAILY
Status: CANCELLED | OUTPATIENT
Start: 2021-04-23

## 2021-04-23 RX ORDER — SODIUM CHLORIDE 0.9 % (FLUSH) 0.9 %
3-10 SYRINGE (ML) INJECTION AS NEEDED
Status: DISCONTINUED | OUTPATIENT
Start: 2021-04-23 | End: 2021-04-30 | Stop reason: HOSPADM

## 2021-04-23 RX ORDER — LEVOTHYROXINE SODIUM 175 UG/1
175 TABLET ORAL DAILY
Status: DISCONTINUED | OUTPATIENT
Start: 2021-04-23 | End: 2021-04-30 | Stop reason: HOSPADM

## 2021-04-23 RX ORDER — LACTULOSE 10 G/15ML
10 SOLUTION ORAL DAILY PRN
Status: CANCELLED | OUTPATIENT
Start: 2021-04-23

## 2021-04-23 RX ORDER — PREDNISONE 1 MG/1
5 TABLET ORAL DAILY
Status: CANCELLED | OUTPATIENT
Start: 2021-04-23

## 2021-04-23 RX ORDER — FAMOTIDINE 10 MG/ML
20 INJECTION, SOLUTION INTRAVENOUS DAILY
Status: DISCONTINUED | OUTPATIENT
Start: 2021-04-23 | End: 2021-04-26

## 2021-04-23 RX ADMIN — CEFEPIME 2 G: 2 INJECTION, POWDER, FOR SOLUTION INTRAVENOUS at 17:28

## 2021-04-23 RX ADMIN — LISINOPRIL 10 MG: 10 TABLET ORAL at 15:01

## 2021-04-23 RX ADMIN — CARVEDILOL 12.5 MG: 6.25 TABLET, FILM COATED ORAL at 17:28

## 2021-04-23 RX ADMIN — NICOTINE 1 PATCH: 7 PATCH, EXTENDED RELEASE TRANSDERMAL at 10:13

## 2021-04-23 RX ADMIN — CARIPRAZINE 1.5 MG: 1.5 CAPSULE, GELATIN COATED ORAL at 15:01

## 2021-04-23 RX ADMIN — SODIUM CHLORIDE 125 ML/HR: 9 INJECTION, SOLUTION INTRAVENOUS at 15:01

## 2021-04-23 RX ADMIN — CEFEPIME 2 G: 2 INJECTION, POWDER, FOR SOLUTION INTRAVENOUS at 06:18

## 2021-04-23 RX ADMIN — METRONIDAZOLE 500 MG: 500 INJECTION, SOLUTION INTRAVENOUS at 15:01

## 2021-04-23 RX ADMIN — METRONIDAZOLE 500 MG: 500 INJECTION, SOLUTION INTRAVENOUS at 21:32

## 2021-04-23 RX ADMIN — SODIUM CHLORIDE, PRESERVATIVE FREE 3 ML: 5 INJECTION INTRAVENOUS at 21:32

## 2021-04-23 RX ADMIN — ONDANSETRON 4 MG: 2 INJECTION INTRAMUSCULAR; INTRAVENOUS at 23:50

## 2021-04-23 RX ADMIN — SODIUM CHLORIDE 125 ML/HR: 9 INJECTION, SOLUTION INTRAVENOUS at 00:05

## 2021-04-23 RX ADMIN — ATORVASTATIN CALCIUM 10 MG: 10 TABLET, FILM COATED ORAL at 21:32

## 2021-04-23 RX ADMIN — ENOXAPARIN SODIUM 50 MG: 60 INJECTION SUBCUTANEOUS at 10:13

## 2021-04-23 RX ADMIN — METRONIDAZOLE 500 MG: 500 INJECTION, SOLUTION INTRAVENOUS at 06:18

## 2021-04-23 RX ADMIN — FAMOTIDINE 20 MG: 10 INJECTION INTRAVENOUS at 10:13

## 2021-04-23 RX ADMIN — ONDANSETRON 4 MG: 2 INJECTION INTRAMUSCULAR; INTRAVENOUS at 04:51

## 2021-04-23 RX ADMIN — Medication 1 CAPSULE: at 10:13

## 2021-04-23 RX ADMIN — ONDANSETRON 4 MG: 2 INJECTION INTRAMUSCULAR; INTRAVENOUS at 17:30

## 2021-04-23 RX ADMIN — LEVOTHYROXINE SODIUM 175 MCG: 0.17 TABLET ORAL at 15:01

## 2021-04-23 RX ADMIN — MEROPENEM 1 G: 1 INJECTION, POWDER, FOR SOLUTION INTRAVENOUS at 02:52

## 2021-04-23 RX ADMIN — TRAZODONE HYDROCHLORIDE 50 MG: 50 TABLET ORAL at 21:32

## 2021-04-24 LAB
ALBUMIN SERPL-MCNC: 2.69 G/DL (ref 3.5–5.2)
ALBUMIN/GLOB SERPL: 0.8 G/DL
ALP SERPL-CCNC: 74 U/L (ref 39–117)
ALT SERPL W P-5'-P-CCNC: 56 U/L (ref 1–33)
ANION GAP SERPL CALCULATED.3IONS-SCNC: 13.1 MMOL/L (ref 5–15)
AST SERPL-CCNC: 39 U/L (ref 1–32)
BILIRUB SERPL-MCNC: 0.2 MG/DL (ref 0–1.2)
BUN SERPL-MCNC: 29 MG/DL (ref 8–23)
BUN/CREAT SERPL: 39.2 (ref 7–25)
CALCIUM SPEC-SCNC: 9.1 MG/DL (ref 8.6–10.5)
CHLORIDE SERPL-SCNC: 96 MMOL/L (ref 98–107)
CO2 SERPL-SCNC: 28.9 MMOL/L (ref 22–29)
CREAT SERPL-MCNC: 0.74 MG/DL (ref 0.57–1)
CRP SERPL-MCNC: 19.95 MG/DL (ref 0–0.5)
D-LACTATE SERPL-SCNC: 1 MMOL/L (ref 0.5–2)
DEPRECATED RDW RBC AUTO: 50.4 FL (ref 37–54)
ERYTHROCYTE [DISTWIDTH] IN BLOOD BY AUTOMATED COUNT: 13.6 % (ref 12.3–15.4)
GFR SERPL CREATININE-BSD FRML MDRD: 77 ML/MIN/1.73
GLOBULIN UR ELPH-MCNC: 3.2 GM/DL
GLUCOSE BLDC GLUCOMTR-MCNC: 127 MG/DL (ref 70–130)
GLUCOSE BLDC GLUCOMTR-MCNC: 141 MG/DL (ref 70–130)
GLUCOSE BLDC GLUCOMTR-MCNC: 151 MG/DL (ref 70–130)
GLUCOSE BLDC GLUCOMTR-MCNC: 156 MG/DL (ref 70–130)
GLUCOSE BLDC GLUCOMTR-MCNC: 157 MG/DL (ref 70–130)
GLUCOSE SERPL-MCNC: 142 MG/DL (ref 65–99)
HCT VFR BLD AUTO: 30.7 % (ref 34–46.6)
HGB BLD-MCNC: 9.5 G/DL (ref 12–15.9)
HYPOCHROMIA BLD QL: ABNORMAL
LYMPHOCYTES # BLD MANUAL: 1.49 10*3/MM3 (ref 0.7–3.1)
LYMPHOCYTES NFR BLD MANUAL: 4 % (ref 19.6–45.3)
LYMPHOCYTES NFR BLD MANUAL: 5 % (ref 5–12)
MACROCYTES BLD QL SMEAR: ABNORMAL
MAGNESIUM SERPL-MCNC: 1.8 MG/DL (ref 1.6–2.4)
MCH RBC QN AUTO: 31.4 PG (ref 26.6–33)
MCHC RBC AUTO-ENTMCNC: 30.9 G/DL (ref 31.5–35.7)
MCV RBC AUTO: 101.3 FL (ref 79–97)
MONOCYTES # BLD AUTO: 1.86 10*3/MM3 (ref 0.1–0.9)
NEUTROPHILS # BLD AUTO: 33.85 10*3/MM3 (ref 1.7–7)
NEUTROPHILS NFR BLD MANUAL: 85 % (ref 42.7–76)
NEUTS BAND NFR BLD MANUAL: 6 % (ref 0–5)
PLAT MORPH BLD: NORMAL
PLATELET # BLD AUTO: 317 10*3/MM3 (ref 140–450)
PMV BLD AUTO: 10.4 FL (ref 6–12)
POTASSIUM SERPL-SCNC: 3.5 MMOL/L (ref 3.5–5.2)
PROT SERPL-MCNC: 5.9 G/DL (ref 6–8.5)
RBC # BLD AUTO: 3.03 10*6/MM3 (ref 3.77–5.28)
SODIUM SERPL-SCNC: 138 MMOL/L (ref 136–145)
WBC # BLD AUTO: 37.2 10*3/MM3 (ref 3.4–10.8)

## 2021-04-24 PROCEDURE — 99233 SBSQ HOSP IP/OBS HIGH 50: CPT | Performed by: STUDENT IN AN ORGANIZED HEALTH CARE EDUCATION/TRAINING PROGRAM

## 2021-04-24 PROCEDURE — 99232 SBSQ HOSP IP/OBS MODERATE 35: CPT | Performed by: SURGERY

## 2021-04-24 PROCEDURE — 25010000002 ONDANSETRON PER 1 MG: Performed by: STUDENT IN AN ORGANIZED HEALTH CARE EDUCATION/TRAINING PROGRAM

## 2021-04-24 PROCEDURE — 94799 UNLISTED PULMONARY SVC/PX: CPT

## 2021-04-24 PROCEDURE — 63710000001 PROMETHAZINE PER 12.5 MG: Performed by: STUDENT IN AN ORGANIZED HEALTH CARE EDUCATION/TRAINING PROGRAM

## 2021-04-24 PROCEDURE — 83735 ASSAY OF MAGNESIUM: CPT | Performed by: STUDENT IN AN ORGANIZED HEALTH CARE EDUCATION/TRAINING PROGRAM

## 2021-04-24 PROCEDURE — 83605 ASSAY OF LACTIC ACID: CPT | Performed by: STUDENT IN AN ORGANIZED HEALTH CARE EDUCATION/TRAINING PROGRAM

## 2021-04-24 PROCEDURE — 86140 C-REACTIVE PROTEIN: CPT | Performed by: STUDENT IN AN ORGANIZED HEALTH CARE EDUCATION/TRAINING PROGRAM

## 2021-04-24 PROCEDURE — 80053 COMPREHEN METABOLIC PANEL: CPT | Performed by: STUDENT IN AN ORGANIZED HEALTH CARE EDUCATION/TRAINING PROGRAM

## 2021-04-24 PROCEDURE — 85025 COMPLETE CBC W/AUTO DIFF WBC: CPT | Performed by: STUDENT IN AN ORGANIZED HEALTH CARE EDUCATION/TRAINING PROGRAM

## 2021-04-24 PROCEDURE — 25010000002 ENOXAPARIN PER 10 MG: Performed by: STUDENT IN AN ORGANIZED HEALTH CARE EDUCATION/TRAINING PROGRAM

## 2021-04-24 PROCEDURE — 82962 GLUCOSE BLOOD TEST: CPT

## 2021-04-24 PROCEDURE — 25010000002 CEFEPIME PER 500 MG: Performed by: INTERNAL MEDICINE

## 2021-04-24 PROCEDURE — 85007 BL SMEAR W/DIFF WBC COUNT: CPT | Performed by: STUDENT IN AN ORGANIZED HEALTH CARE EDUCATION/TRAINING PROGRAM

## 2021-04-24 RX ORDER — PROMETHAZINE HYDROCHLORIDE 12.5 MG/1
12.5 TABLET ORAL EVERY 8 HOURS PRN
Status: DISCONTINUED | OUTPATIENT
Start: 2021-04-24 | End: 2021-04-30 | Stop reason: HOSPADM

## 2021-04-24 RX ORDER — DIPHENOXYLATE HYDROCHLORIDE AND ATROPINE SULFATE 2.5; .025 MG/1; MG/1
1 TABLET ORAL
Status: DISCONTINUED | OUTPATIENT
Start: 2021-04-24 | End: 2021-04-30 | Stop reason: HOSPADM

## 2021-04-24 RX ORDER — AMLODIPINE BESYLATE 10 MG/1
10 TABLET ORAL DAILY
Status: DISCONTINUED | OUTPATIENT
Start: 2021-04-24 | End: 2021-04-30 | Stop reason: HOSPADM

## 2021-04-24 RX ADMIN — PROMETHAZINE HYDROCHLORIDE 12.5 MG: 12.5 TABLET ORAL at 21:53

## 2021-04-24 RX ADMIN — CEFEPIME 2 G: 2 INJECTION, POWDER, FOR SOLUTION INTRAVENOUS at 06:09

## 2021-04-24 RX ADMIN — AMLODIPINE BESYLATE 10 MG: 10 TABLET ORAL at 16:59

## 2021-04-24 RX ADMIN — LISINOPRIL 10 MG: 10 TABLET ORAL at 08:32

## 2021-04-24 RX ADMIN — FAMOTIDINE 20 MG: 10 INJECTION INTRAVENOUS at 08:32

## 2021-04-24 RX ADMIN — ATORVASTATIN CALCIUM 10 MG: 10 TABLET, FILM COATED ORAL at 21:53

## 2021-04-24 RX ADMIN — TRAZODONE HYDROCHLORIDE 50 MG: 50 TABLET ORAL at 21:53

## 2021-04-24 RX ADMIN — ONDANSETRON 4 MG: 2 INJECTION INTRAMUSCULAR; INTRAVENOUS at 15:13

## 2021-04-24 RX ADMIN — NICOTINE 1 PATCH: 7 PATCH, EXTENDED RELEASE TRANSDERMAL at 08:33

## 2021-04-24 RX ADMIN — METRONIDAZOLE 500 MG: 500 INJECTION, SOLUTION INTRAVENOUS at 13:08

## 2021-04-24 RX ADMIN — ENOXAPARIN SODIUM 50 MG: 60 INJECTION SUBCUTANEOUS at 13:08

## 2021-04-24 RX ADMIN — CARVEDILOL 12.5 MG: 6.25 TABLET, FILM COATED ORAL at 17:00

## 2021-04-24 RX ADMIN — CARIPRAZINE 1.5 MG: 1.5 CAPSULE, GELATIN COATED ORAL at 08:32

## 2021-04-24 RX ADMIN — SODIUM CHLORIDE, PRESERVATIVE FREE 3 ML: 5 INJECTION INTRAVENOUS at 08:32

## 2021-04-24 RX ADMIN — DIPHENOXYLATE HYDROCHLORIDE AND ATROPINE SULFATE 1 TABLET: 2.5; .025 TABLET ORAL at 03:40

## 2021-04-24 RX ADMIN — SODIUM CHLORIDE, PRESERVATIVE FREE 3 ML: 5 INJECTION INTRAVENOUS at 21:53

## 2021-04-24 RX ADMIN — Medication 1 CAPSULE: at 08:32

## 2021-04-24 RX ADMIN — ENOXAPARIN SODIUM 50 MG: 60 INJECTION SUBCUTANEOUS at 00:31

## 2021-04-24 RX ADMIN — METRONIDAZOLE 500 MG: 500 INJECTION, SOLUTION INTRAVENOUS at 21:53

## 2021-04-24 RX ADMIN — CARVEDILOL 12.5 MG: 6.25 TABLET, FILM COATED ORAL at 08:32

## 2021-04-24 RX ADMIN — LEVOTHYROXINE SODIUM 175 MCG: 0.17 TABLET ORAL at 08:32

## 2021-04-24 RX ADMIN — METRONIDAZOLE 500 MG: 500 INJECTION, SOLUTION INTRAVENOUS at 06:09

## 2021-04-24 RX ADMIN — CEFEPIME 2 G: 2 INJECTION, POWDER, FOR SOLUTION INTRAVENOUS at 17:00

## 2021-04-24 RX ADMIN — SODIUM CHLORIDE 125 ML/HR: 9 INJECTION, SOLUTION INTRAVENOUS at 16:59

## 2021-04-25 LAB
ALBUMIN SERPL-MCNC: 2.36 G/DL (ref 3.5–5.2)
ALBUMIN/GLOB SERPL: 0.7 G/DL
ALP SERPL-CCNC: 72 U/L (ref 39–117)
ALT SERPL W P-5'-P-CCNC: 45 U/L (ref 1–33)
ANION GAP SERPL CALCULATED.3IONS-SCNC: 8.5 MMOL/L (ref 5–15)
AST SERPL-CCNC: 30 U/L (ref 1–32)
BASOPHILS # BLD AUTO: 0.09 10*3/MM3 (ref 0–0.2)
BASOPHILS NFR BLD AUTO: 0.3 % (ref 0–1.5)
BILIRUB SERPL-MCNC: <0.2 MG/DL (ref 0–1.2)
BUN SERPL-MCNC: 11 MG/DL (ref 8–23)
BUN/CREAT SERPL: 24.4 (ref 7–25)
CALCIUM SPEC-SCNC: 8.4 MG/DL (ref 8.6–10.5)
CHLORIDE SERPL-SCNC: 100 MMOL/L (ref 98–107)
CO2 SERPL-SCNC: 29.5 MMOL/L (ref 22–29)
CREAT SERPL-MCNC: 0.45 MG/DL (ref 0.57–1)
DEPRECATED RDW RBC AUTO: 50.1 FL (ref 37–54)
EOSINOPHIL # BLD AUTO: 0.02 10*3/MM3 (ref 0–0.4)
EOSINOPHIL NFR BLD AUTO: 0.1 % (ref 0.3–6.2)
ERYTHROCYTE [DISTWIDTH] IN BLOOD BY AUTOMATED COUNT: 13.6 % (ref 12.3–15.4)
GFR SERPL CREATININE-BSD FRML MDRD: 136 ML/MIN/1.73
GLOBULIN UR ELPH-MCNC: 3.2 GM/DL
GLUCOSE BLDC GLUCOMTR-MCNC: 117 MG/DL (ref 70–130)
GLUCOSE BLDC GLUCOMTR-MCNC: 123 MG/DL (ref 70–130)
GLUCOSE BLDC GLUCOMTR-MCNC: 156 MG/DL (ref 70–130)
GLUCOSE BLDC GLUCOMTR-MCNC: 157 MG/DL (ref 70–130)
GLUCOSE SERPL-MCNC: 127 MG/DL (ref 65–99)
HCT VFR BLD AUTO: 28.7 % (ref 34–46.6)
HGB BLD-MCNC: 8.9 G/DL (ref 12–15.9)
IMM GRANULOCYTES # BLD AUTO: 0.38 10*3/MM3 (ref 0–0.05)
IMM GRANULOCYTES NFR BLD AUTO: 1.2 % (ref 0–0.5)
LYMPHOCYTES # BLD AUTO: 1.23 10*3/MM3 (ref 0.7–3.1)
LYMPHOCYTES NFR BLD AUTO: 4 % (ref 19.6–45.3)
MCH RBC QN AUTO: 31.3 PG (ref 26.6–33)
MCHC RBC AUTO-ENTMCNC: 31 G/DL (ref 31.5–35.7)
MCV RBC AUTO: 101.1 FL (ref 79–97)
MONOCYTES # BLD AUTO: 1.21 10*3/MM3 (ref 0.1–0.9)
MONOCYTES NFR BLD AUTO: 3.9 % (ref 5–12)
NEUTROPHILS NFR BLD AUTO: 28.11 10*3/MM3 (ref 1.7–7)
NEUTROPHILS NFR BLD AUTO: 90.5 % (ref 42.7–76)
NRBC BLD AUTO-RTO: 0 /100 WBC (ref 0–0.2)
PLATELET # BLD AUTO: 311 10*3/MM3 (ref 140–450)
PMV BLD AUTO: 10.5 FL (ref 6–12)
POTASSIUM SERPL-SCNC: 3.1 MMOL/L (ref 3.5–5.2)
POTASSIUM SERPL-SCNC: 4 MMOL/L (ref 3.5–5.2)
PROT SERPL-MCNC: 5.6 G/DL (ref 6–8.5)
RBC # BLD AUTO: 2.84 10*6/MM3 (ref 3.77–5.28)
SODIUM SERPL-SCNC: 138 MMOL/L (ref 136–145)
WBC # BLD AUTO: 31.04 10*3/MM3 (ref 3.4–10.8)

## 2021-04-25 PROCEDURE — 99232 SBSQ HOSP IP/OBS MODERATE 35: CPT | Performed by: SURGERY

## 2021-04-25 PROCEDURE — 82962 GLUCOSE BLOOD TEST: CPT

## 2021-04-25 PROCEDURE — 63710000001 INSULIN ASPART PER 5 UNITS: Performed by: PHYSICIAN ASSISTANT

## 2021-04-25 PROCEDURE — 25010000002 ONDANSETRON PER 1 MG: Performed by: STUDENT IN AN ORGANIZED HEALTH CARE EDUCATION/TRAINING PROGRAM

## 2021-04-25 PROCEDURE — 63710000001 PROMETHAZINE PER 12.5 MG: Performed by: STUDENT IN AN ORGANIZED HEALTH CARE EDUCATION/TRAINING PROGRAM

## 2021-04-25 PROCEDURE — 25010000002 CEFEPIME PER 500 MG: Performed by: INTERNAL MEDICINE

## 2021-04-25 PROCEDURE — 94799 UNLISTED PULMONARY SVC/PX: CPT

## 2021-04-25 PROCEDURE — 99233 SBSQ HOSP IP/OBS HIGH 50: CPT | Performed by: STUDENT IN AN ORGANIZED HEALTH CARE EDUCATION/TRAINING PROGRAM

## 2021-04-25 PROCEDURE — 84132 ASSAY OF SERUM POTASSIUM: CPT | Performed by: STUDENT IN AN ORGANIZED HEALTH CARE EDUCATION/TRAINING PROGRAM

## 2021-04-25 PROCEDURE — 85025 COMPLETE CBC W/AUTO DIFF WBC: CPT | Performed by: STUDENT IN AN ORGANIZED HEALTH CARE EDUCATION/TRAINING PROGRAM

## 2021-04-25 PROCEDURE — 80053 COMPREHEN METABOLIC PANEL: CPT | Performed by: STUDENT IN AN ORGANIZED HEALTH CARE EDUCATION/TRAINING PROGRAM

## 2021-04-25 PROCEDURE — 25010000002 ENOXAPARIN PER 10 MG: Performed by: STUDENT IN AN ORGANIZED HEALTH CARE EDUCATION/TRAINING PROGRAM

## 2021-04-25 RX ORDER — POTASSIUM CHLORIDE 20 MEQ/1
40 TABLET, EXTENDED RELEASE ORAL AS NEEDED
Status: DISCONTINUED | OUTPATIENT
Start: 2021-04-25 | End: 2021-04-30 | Stop reason: HOSPADM

## 2021-04-25 RX ORDER — POTASSIUM CHLORIDE 20 MEQ/1
40 TABLET, EXTENDED RELEASE ORAL EVERY 4 HOURS
Status: COMPLETED | OUTPATIENT
Start: 2021-04-25 | End: 2021-04-25

## 2021-04-25 RX ORDER — POTASSIUM CHLORIDE 1.5 G/1.77G
40 POWDER, FOR SOLUTION ORAL AS NEEDED
Status: DISCONTINUED | OUTPATIENT
Start: 2021-04-25 | End: 2021-04-30 | Stop reason: HOSPADM

## 2021-04-25 RX ORDER — POTASSIUM CHLORIDE 7.45 MG/ML
10 INJECTION INTRAVENOUS
Status: DISCONTINUED | OUTPATIENT
Start: 2021-04-25 | End: 2021-04-30 | Stop reason: HOSPADM

## 2021-04-25 RX ADMIN — DIPHENOXYLATE HYDROCHLORIDE AND ATROPINE SULFATE 1 TABLET: 2.5; .025 TABLET ORAL at 13:33

## 2021-04-25 RX ADMIN — Medication 1 CAPSULE: at 08:12

## 2021-04-25 RX ADMIN — LEVOTHYROXINE SODIUM 175 MCG: 0.17 TABLET ORAL at 08:12

## 2021-04-25 RX ADMIN — ONDANSETRON 4 MG: 2 INJECTION INTRAMUSCULAR; INTRAVENOUS at 13:33

## 2021-04-25 RX ADMIN — CEFEPIME 2 G: 2 INJECTION, POWDER, FOR SOLUTION INTRAVENOUS at 17:44

## 2021-04-25 RX ADMIN — CARVEDILOL 12.5 MG: 6.25 TABLET, FILM COATED ORAL at 17:44

## 2021-04-25 RX ADMIN — NICOTINE 1 PATCH: 7 PATCH, EXTENDED RELEASE TRANSDERMAL at 08:12

## 2021-04-25 RX ADMIN — POTASSIUM CHLORIDE 40 MEQ: 20 TABLET, EXTENDED RELEASE ORAL at 11:02

## 2021-04-25 RX ADMIN — TRAZODONE HYDROCHLORIDE 50 MG: 50 TABLET ORAL at 21:25

## 2021-04-25 RX ADMIN — METRONIDAZOLE 500 MG: 500 INJECTION, SOLUTION INTRAVENOUS at 06:09

## 2021-04-25 RX ADMIN — SODIUM CHLORIDE, PRESERVATIVE FREE 3 ML: 5 INJECTION INTRAVENOUS at 21:25

## 2021-04-25 RX ADMIN — CARVEDILOL 12.5 MG: 6.25 TABLET, FILM COATED ORAL at 08:12

## 2021-04-25 RX ADMIN — AMLODIPINE BESYLATE 10 MG: 10 TABLET ORAL at 08:11

## 2021-04-25 RX ADMIN — ATORVASTATIN CALCIUM 10 MG: 10 TABLET, FILM COATED ORAL at 21:25

## 2021-04-25 RX ADMIN — METRONIDAZOLE 500 MG: 500 INJECTION, SOLUTION INTRAVENOUS at 13:33

## 2021-04-25 RX ADMIN — SODIUM CHLORIDE, PRESERVATIVE FREE 3 ML: 5 INJECTION INTRAVENOUS at 08:10

## 2021-04-25 RX ADMIN — INSULIN ASPART 2 UNITS: 100 INJECTION, SOLUTION INTRAVENOUS; SUBCUTANEOUS at 11:06

## 2021-04-25 RX ADMIN — METRONIDAZOLE 500 MG: 500 INJECTION, SOLUTION INTRAVENOUS at 21:25

## 2021-04-25 RX ADMIN — CARIPRAZINE 1.5 MG: 1.5 CAPSULE, GELATIN COATED ORAL at 08:11

## 2021-04-25 RX ADMIN — POTASSIUM CHLORIDE 40 MEQ: 20 TABLET, EXTENDED RELEASE ORAL at 08:11

## 2021-04-25 RX ADMIN — DIPHENOXYLATE HYDROCHLORIDE AND ATROPINE SULFATE 1 TABLET: 2.5; .025 TABLET ORAL at 11:02

## 2021-04-25 RX ADMIN — ENOXAPARIN SODIUM 50 MG: 60 INJECTION SUBCUTANEOUS at 11:06

## 2021-04-25 RX ADMIN — FAMOTIDINE 20 MG: 10 INJECTION INTRAVENOUS at 08:12

## 2021-04-25 RX ADMIN — APIXABAN 2.5 MG: 2.5 TABLET, FILM COATED ORAL at 21:25

## 2021-04-25 RX ADMIN — CEFEPIME 2 G: 2 INJECTION, POWDER, FOR SOLUTION INTRAVENOUS at 06:08

## 2021-04-25 RX ADMIN — LISINOPRIL 10 MG: 10 TABLET ORAL at 08:13

## 2021-04-25 RX ADMIN — PROMETHAZINE HYDROCHLORIDE 12.5 MG: 12.5 TABLET ORAL at 11:02

## 2021-04-26 ENCOUNTER — APPOINTMENT (OUTPATIENT)
Dept: CARDIOLOGY | Facility: HOSPITAL | Age: 76
End: 2021-04-26

## 2021-04-26 LAB
ANION GAP SERPL CALCULATED.3IONS-SCNC: 7.7 MMOL/L (ref 5–15)
BASOPHILS # BLD AUTO: 0.05 10*3/MM3 (ref 0–0.2)
BASOPHILS NFR BLD AUTO: 0.3 % (ref 0–1.5)
BH CV ECHO MEAS - % IVS THICK: -17 %
BH CV ECHO MEAS - % LVPW THICK: -16.5 %
BH CV ECHO MEAS - ACS: 1.5 CM
BH CV ECHO MEAS - AO MAX PG (FULL): 19.9 MMHG
BH CV ECHO MEAS - AO MAX PG: 22.9 MMHG
BH CV ECHO MEAS - AO MEAN PG (FULL): 8 MMHG
BH CV ECHO MEAS - AO MEAN PG: 9 MMHG
BH CV ECHO MEAS - AO ROOT AREA (BSA CORRECTED): 2.1
BH CV ECHO MEAS - AO ROOT AREA: 8 CM^2
BH CV ECHO MEAS - AO ROOT DIAM: 3.2 CM
BH CV ECHO MEAS - AO V2 MAX: 238.5 CM/SEC
BH CV ECHO MEAS - AO V2 MEAN: 129.5 CM/SEC
BH CV ECHO MEAS - AO V2 VTI: 46.4 CM
BH CV ECHO MEAS - AVA(I,A): 0.96 CM^2
BH CV ECHO MEAS - AVA(I,D): 0.96 CM^2
BH CV ECHO MEAS - AVA(V,A): 1.3 CM^2
BH CV ECHO MEAS - AVA(V,D): 1.3 CM^2
BH CV ECHO MEAS - BSA(HAYCOCK): 1.5 M^2
BH CV ECHO MEAS - BSA: 1.5 M^2
BH CV ECHO MEAS - BZI_BMI: 17.1 KILOGRAMS/M^2
BH CV ECHO MEAS - BZI_METRIC_HEIGHT: 167.6 CM
BH CV ECHO MEAS - BZI_METRIC_WEIGHT: 48.1 KG
BH CV ECHO MEAS - EDV(CUBED): 126.9 ML
BH CV ECHO MEAS - EDV(MOD-SP4): 99 ML
BH CV ECHO MEAS - EDV(TEICH): 119.6 ML
BH CV ECHO MEAS - EF(CUBED): 55.7 %
BH CV ECHO MEAS - EF(MOD-SP4): 53.9 %
BH CV ECHO MEAS - EF(TEICH): 47.2 %
BH CV ECHO MEAS - ESV(CUBED): 56.2 ML
BH CV ECHO MEAS - ESV(MOD-SP4): 45.6 ML
BH CV ECHO MEAS - ESV(TEICH): 63.1 ML
BH CV ECHO MEAS - FS: 23.8 %
BH CV ECHO MEAS - IVS/LVPW: 0.97
BH CV ECHO MEAS - IVSD: 1.2 CM
BH CV ECHO MEAS - IVSS: 1 CM
BH CV ECHO MEAS - LA DIMENSION: 3.6 CM
BH CV ECHO MEAS - LA/AO: 1.1
BH CV ECHO MEAS - LV DIASTOLIC VOL/BSA (35-75): 64.8 ML/M^2
BH CV ECHO MEAS - LV MASS(C)D: 242.5 GRAMS
BH CV ECHO MEAS - LV MASS(C)DI: 158.8 GRAMS/M^2
BH CV ECHO MEAS - LV MASS(C)S: 122.1 GRAMS
BH CV ECHO MEAS - LV MASS(C)SI: 80 GRAMS/M^2
BH CV ECHO MEAS - LV MAX PG: 3 MMHG
BH CV ECHO MEAS - LV MEAN PG: 1 MMHG
BH CV ECHO MEAS - LV SYSTOLIC VOL/BSA (12-30): 29.9 ML/M^2
BH CV ECHO MEAS - LV V1 MAX: 86.8 CM/SEC
BH CV ECHO MEAS - LV V1 MEAN: 45.2 CM/SEC
BH CV ECHO MEAS - LV V1 VTI: 12.8 CM
BH CV ECHO MEAS - LVIDD: 5 CM
BH CV ECHO MEAS - LVIDS: 3.8 CM
BH CV ECHO MEAS - LVLD AP4: 7.2 CM
BH CV ECHO MEAS - LVLS AP4: 5.2 CM
BH CV ECHO MEAS - LVOT AREA (M): 3.5 CM^2
BH CV ECHO MEAS - LVOT AREA: 3.5 CM^2
BH CV ECHO MEAS - LVOT DIAM: 2.1 CM
BH CV ECHO MEAS - LVPWD: 1.2 CM
BH CV ECHO MEAS - LVPWS: 1 CM
BH CV ECHO MEAS - MV A MAX VEL: 115 CM/SEC
BH CV ECHO MEAS - MV DEC SLOPE: 198 CM/SEC^2
BH CV ECHO MEAS - MV E MAX VEL: 69.4 CM/SEC
BH CV ECHO MEAS - MV E/A: 0.6
BH CV ECHO MEAS - MV P1/2T MAX VEL: 82.8 CM/SEC
BH CV ECHO MEAS - MV P1/2T: 122.5 MSEC
BH CV ECHO MEAS - MVA P1/2T LCG: 2.7 CM^2
BH CV ECHO MEAS - MVA(P1/2T): 1.8 CM^2
BH CV ECHO MEAS - PA ACC TIME: 0.11 SEC
BH CV ECHO MEAS - PA PR(ACCEL): 31.3 MMHG
BH CV ECHO MEAS - RAP SYSTOLE: 10 MMHG
BH CV ECHO MEAS - RVSP: 26.8 MMHG
BH CV ECHO MEAS - SI(AO): 244.1 ML/M^2
BH CV ECHO MEAS - SI(CUBED): 46.3 ML/M^2
BH CV ECHO MEAS - SI(LVOT): 29 ML/M^2
BH CV ECHO MEAS - SI(MOD-SP4): 35 ML/M^2
BH CV ECHO MEAS - SI(TEICH): 37 ML/M^2
BH CV ECHO MEAS - SV(AO): 372.8 ML
BH CV ECHO MEAS - SV(CUBED): 70.7 ML
BH CV ECHO MEAS - SV(LVOT): 44.3 ML
BH CV ECHO MEAS - SV(MOD-SP4): 53.4 ML
BH CV ECHO MEAS - SV(TEICH): 56.5 ML
BH CV ECHO MEAS - TR MAX VEL: 205 CM/SEC
BUN SERPL-MCNC: 6 MG/DL (ref 8–23)
BUN/CREAT SERPL: 15.4 (ref 7–25)
CALCIUM SPEC-SCNC: 8.1 MG/DL (ref 8.6–10.5)
CHLORIDE SERPL-SCNC: 99 MMOL/L (ref 98–107)
CO2 SERPL-SCNC: 29.3 MMOL/L (ref 22–29)
CREAT SERPL-MCNC: 0.39 MG/DL (ref 0.57–1)
DEPRECATED RDW RBC AUTO: 49.1 FL (ref 37–54)
EOSINOPHIL # BLD AUTO: 0.04 10*3/MM3 (ref 0–0.4)
EOSINOPHIL NFR BLD AUTO: 0.2 % (ref 0.3–6.2)
ERYTHROCYTE [DISTWIDTH] IN BLOOD BY AUTOMATED COUNT: 13.4 % (ref 12.3–15.4)
GFR SERPL CREATININE-BSD FRML MDRD: >150 ML/MIN/1.73
GLUCOSE BLDC GLUCOMTR-MCNC: 103 MG/DL (ref 70–130)
GLUCOSE BLDC GLUCOMTR-MCNC: 144 MG/DL (ref 70–130)
GLUCOSE BLDC GLUCOMTR-MCNC: 153 MG/DL (ref 70–130)
GLUCOSE BLDC GLUCOMTR-MCNC: 172 MG/DL (ref 70–130)
GLUCOSE SERPL-MCNC: 108 MG/DL (ref 65–99)
HCT VFR BLD AUTO: 26.2 % (ref 34–46.6)
HGB BLD-MCNC: 8.1 G/DL (ref 12–15.9)
IMM GRANULOCYTES # BLD AUTO: 0.09 10*3/MM3 (ref 0–0.05)
IMM GRANULOCYTES NFR BLD AUTO: 0.5 % (ref 0–0.5)
LYMPHOCYTES # BLD AUTO: 1.35 10*3/MM3 (ref 0.7–3.1)
LYMPHOCYTES NFR BLD AUTO: 7 % (ref 19.6–45.3)
MAXIMAL PREDICTED HEART RATE: 145 BPM
MCH RBC QN AUTO: 30.9 PG (ref 26.6–33)
MCHC RBC AUTO-ENTMCNC: 30.9 G/DL (ref 31.5–35.7)
MCV RBC AUTO: 100 FL (ref 79–97)
MONOCYTES # BLD AUTO: 1.08 10*3/MM3 (ref 0.1–0.9)
MONOCYTES NFR BLD AUTO: 5.6 % (ref 5–12)
NEUTROPHILS NFR BLD AUTO: 16.66 10*3/MM3 (ref 1.7–7)
NEUTROPHILS NFR BLD AUTO: 86.4 % (ref 42.7–76)
NRBC BLD AUTO-RTO: 0 /100 WBC (ref 0–0.2)
PLATELET # BLD AUTO: 323 10*3/MM3 (ref 140–450)
PMV BLD AUTO: 10.6 FL (ref 6–12)
POTASSIUM SERPL-SCNC: 3.7 MMOL/L (ref 3.5–5.2)
RBC # BLD AUTO: 2.62 10*6/MM3 (ref 3.77–5.28)
SODIUM SERPL-SCNC: 136 MMOL/L (ref 136–145)
STRESS TARGET HR: 123 BPM
WBC # BLD AUTO: 19.27 10*3/MM3 (ref 3.4–10.8)

## 2021-04-26 PROCEDURE — 82962 GLUCOSE BLOOD TEST: CPT

## 2021-04-26 PROCEDURE — 85025 COMPLETE CBC W/AUTO DIFF WBC: CPT | Performed by: STUDENT IN AN ORGANIZED HEALTH CARE EDUCATION/TRAINING PROGRAM

## 2021-04-26 PROCEDURE — 93306 TTE W/DOPPLER COMPLETE: CPT

## 2021-04-26 PROCEDURE — 99233 SBSQ HOSP IP/OBS HIGH 50: CPT | Performed by: INTERNAL MEDICINE

## 2021-04-26 PROCEDURE — 80048 BASIC METABOLIC PNL TOTAL CA: CPT | Performed by: STUDENT IN AN ORGANIZED HEALTH CARE EDUCATION/TRAINING PROGRAM

## 2021-04-26 PROCEDURE — 25010000002 CEFEPIME PER 500 MG: Performed by: INTERNAL MEDICINE

## 2021-04-26 PROCEDURE — 94799 UNLISTED PULMONARY SVC/PX: CPT

## 2021-04-26 PROCEDURE — 63710000001 PROMETHAZINE PER 12.5 MG: Performed by: STUDENT IN AN ORGANIZED HEALTH CARE EDUCATION/TRAINING PROGRAM

## 2021-04-26 PROCEDURE — 93306 TTE W/DOPPLER COMPLETE: CPT | Performed by: INTERNAL MEDICINE

## 2021-04-26 RX ORDER — PANTOPRAZOLE SODIUM 40 MG/1
40 TABLET, DELAYED RELEASE ORAL DAILY
Status: DISCONTINUED | OUTPATIENT
Start: 2021-04-26 | End: 2021-04-30 | Stop reason: HOSPADM

## 2021-04-26 RX ORDER — ASPIRIN 81 MG/1
81 TABLET, CHEWABLE ORAL DAILY
Status: DISCONTINUED | OUTPATIENT
Start: 2021-04-26 | End: 2021-04-30 | Stop reason: HOSPADM

## 2021-04-26 RX ADMIN — METRONIDAZOLE 500 MG: 500 INJECTION, SOLUTION INTRAVENOUS at 21:25

## 2021-04-26 RX ADMIN — Medication 1 CAPSULE: at 08:12

## 2021-04-26 RX ADMIN — FAMOTIDINE 20 MG: 10 INJECTION INTRAVENOUS at 08:12

## 2021-04-26 RX ADMIN — APIXABAN 2.5 MG: 2.5 TABLET, FILM COATED ORAL at 08:12

## 2021-04-26 RX ADMIN — PANTOPRAZOLE SODIUM 40 MG: 40 TABLET, DELAYED RELEASE ORAL at 11:31

## 2021-04-26 RX ADMIN — ASPIRIN 81 MG: 81 TABLET, CHEWABLE ORAL at 11:31

## 2021-04-26 RX ADMIN — METRONIDAZOLE 500 MG: 500 INJECTION, SOLUTION INTRAVENOUS at 06:32

## 2021-04-26 RX ADMIN — LISINOPRIL 10 MG: 10 TABLET ORAL at 08:12

## 2021-04-26 RX ADMIN — NICOTINE 1 PATCH: 7 PATCH, EXTENDED RELEASE TRANSDERMAL at 08:14

## 2021-04-26 RX ADMIN — SODIUM CHLORIDE, PRESERVATIVE FREE 3 ML: 5 INJECTION INTRAVENOUS at 21:25

## 2021-04-26 RX ADMIN — SODIUM CHLORIDE, PRESERVATIVE FREE 3 ML: 5 INJECTION INTRAVENOUS at 08:12

## 2021-04-26 RX ADMIN — TRAZODONE HYDROCHLORIDE 50 MG: 50 TABLET ORAL at 21:25

## 2021-04-26 RX ADMIN — CARIPRAZINE 1.5 MG: 1.5 CAPSULE, GELATIN COATED ORAL at 08:12

## 2021-04-26 RX ADMIN — PROMETHAZINE HYDROCHLORIDE 12.5 MG: 12.5 TABLET ORAL at 23:07

## 2021-04-26 RX ADMIN — PROMETHAZINE HYDROCHLORIDE 12.5 MG: 12.5 TABLET ORAL at 15:09

## 2021-04-26 RX ADMIN — METRONIDAZOLE 500 MG: 500 INJECTION, SOLUTION INTRAVENOUS at 15:08

## 2021-04-26 RX ADMIN — CEFEPIME 2 G: 2 INJECTION, POWDER, FOR SOLUTION INTRAVENOUS at 06:32

## 2021-04-26 RX ADMIN — CARVEDILOL 12.5 MG: 6.25 TABLET, FILM COATED ORAL at 08:11

## 2021-04-26 RX ADMIN — APIXABAN 2.5 MG: 2.5 TABLET, FILM COATED ORAL at 21:25

## 2021-04-26 RX ADMIN — AMLODIPINE BESYLATE 10 MG: 10 TABLET ORAL at 08:12

## 2021-04-26 RX ADMIN — CEFEPIME 2 G: 2 INJECTION, POWDER, FOR SOLUTION INTRAVENOUS at 17:33

## 2021-04-26 RX ADMIN — CARVEDILOL 12.5 MG: 6.25 TABLET, FILM COATED ORAL at 17:33

## 2021-04-26 RX ADMIN — DIPHENOXYLATE HYDROCHLORIDE AND ATROPINE SULFATE 1 TABLET: 2.5; .025 TABLET ORAL at 23:07

## 2021-04-26 RX ADMIN — LEVOTHYROXINE SODIUM 175 MCG: 0.17 TABLET ORAL at 08:12

## 2021-04-26 RX ADMIN — ATORVASTATIN CALCIUM 10 MG: 10 TABLET, FILM COATED ORAL at 21:25

## 2021-04-27 LAB
ANION GAP SERPL CALCULATED.3IONS-SCNC: 6.7 MMOL/L (ref 5–15)
BUN SERPL-MCNC: 5 MG/DL (ref 8–23)
BUN/CREAT SERPL: 13.2 (ref 7–25)
CALCIUM SPEC-SCNC: 8.4 MG/DL (ref 8.6–10.5)
CHLORIDE SERPL-SCNC: 97 MMOL/L (ref 98–107)
CO2 SERPL-SCNC: 32.3 MMOL/L (ref 22–29)
CREAT SERPL-MCNC: 0.38 MG/DL (ref 0.57–1)
DEPRECATED RDW RBC AUTO: 48.6 FL (ref 37–54)
ERYTHROCYTE [DISTWIDTH] IN BLOOD BY AUTOMATED COUNT: 13.4 % (ref 12.3–15.4)
GFR SERPL CREATININE-BSD FRML MDRD: >150 ML/MIN/1.73
GLUCOSE BLDC GLUCOMTR-MCNC: 124 MG/DL (ref 70–130)
GLUCOSE BLDC GLUCOMTR-MCNC: 137 MG/DL (ref 70–130)
GLUCOSE BLDC GLUCOMTR-MCNC: 169 MG/DL (ref 70–130)
GLUCOSE BLDC GLUCOMTR-MCNC: 174 MG/DL (ref 70–130)
GLUCOSE SERPL-MCNC: 159 MG/DL (ref 65–99)
HCT VFR BLD AUTO: 30.8 % (ref 34–46.6)
HGB BLD-MCNC: 9.7 G/DL (ref 12–15.9)
MCH RBC QN AUTO: 31.2 PG (ref 26.6–33)
MCHC RBC AUTO-ENTMCNC: 31.5 G/DL (ref 31.5–35.7)
MCV RBC AUTO: 99 FL (ref 79–97)
PLATELET # BLD AUTO: 361 10*3/MM3 (ref 140–450)
PMV BLD AUTO: 10.3 FL (ref 6–12)
POTASSIUM SERPL-SCNC: 3.5 MMOL/L (ref 3.5–5.2)
RBC # BLD AUTO: 3.11 10*6/MM3 (ref 3.77–5.28)
SODIUM SERPL-SCNC: 136 MMOL/L (ref 136–145)
WBC # BLD AUTO: 15.71 10*3/MM3 (ref 3.4–10.8)

## 2021-04-27 PROCEDURE — 99233 SBSQ HOSP IP/OBS HIGH 50: CPT | Performed by: INTERNAL MEDICINE

## 2021-04-27 PROCEDURE — 94799 UNLISTED PULMONARY SVC/PX: CPT

## 2021-04-27 PROCEDURE — 97165 OT EVAL LOW COMPLEX 30 MIN: CPT

## 2021-04-27 PROCEDURE — 25010000002 CEFEPIME PER 500 MG: Performed by: INTERNAL MEDICINE

## 2021-04-27 PROCEDURE — 80048 BASIC METABOLIC PNL TOTAL CA: CPT | Performed by: INTERNAL MEDICINE

## 2021-04-27 PROCEDURE — 97162 PT EVAL MOD COMPLEX 30 MIN: CPT

## 2021-04-27 PROCEDURE — 82962 GLUCOSE BLOOD TEST: CPT

## 2021-04-27 PROCEDURE — 25010000002 ONDANSETRON PER 1 MG: Performed by: STUDENT IN AN ORGANIZED HEALTH CARE EDUCATION/TRAINING PROGRAM

## 2021-04-27 PROCEDURE — 85027 COMPLETE CBC AUTOMATED: CPT | Performed by: INTERNAL MEDICINE

## 2021-04-27 PROCEDURE — 63710000001 PROMETHAZINE PER 12.5 MG: Performed by: STUDENT IN AN ORGANIZED HEALTH CARE EDUCATION/TRAINING PROGRAM

## 2021-04-27 RX ADMIN — SODIUM CHLORIDE, PRESERVATIVE FREE 3 ML: 5 INJECTION INTRAVENOUS at 09:00

## 2021-04-27 RX ADMIN — METRONIDAZOLE 500 MG: 500 INJECTION, SOLUTION INTRAVENOUS at 06:15

## 2021-04-27 RX ADMIN — ASPIRIN 81 MG: 81 TABLET, CHEWABLE ORAL at 08:59

## 2021-04-27 RX ADMIN — ONDANSETRON 4 MG: 2 INJECTION INTRAMUSCULAR; INTRAVENOUS at 03:04

## 2021-04-27 RX ADMIN — APIXABAN 2.5 MG: 2.5 TABLET, FILM COATED ORAL at 08:59

## 2021-04-27 RX ADMIN — CARVEDILOL 12.5 MG: 6.25 TABLET, FILM COATED ORAL at 17:26

## 2021-04-27 RX ADMIN — APIXABAN 2.5 MG: 2.5 TABLET, FILM COATED ORAL at 20:26

## 2021-04-27 RX ADMIN — NICOTINE 1 PATCH: 7 PATCH, EXTENDED RELEASE TRANSDERMAL at 09:02

## 2021-04-27 RX ADMIN — AMLODIPINE BESYLATE 10 MG: 10 TABLET ORAL at 08:59

## 2021-04-27 RX ADMIN — METRONIDAZOLE 500 MG: 500 INJECTION, SOLUTION INTRAVENOUS at 22:25

## 2021-04-27 RX ADMIN — CARIPRAZINE 1.5 MG: 1.5 CAPSULE, GELATIN COATED ORAL at 10:22

## 2021-04-27 RX ADMIN — TRAZODONE HYDROCHLORIDE 50 MG: 50 TABLET ORAL at 20:26

## 2021-04-27 RX ADMIN — SODIUM CHLORIDE, PRESERVATIVE FREE 3 ML: 5 INJECTION INTRAVENOUS at 20:27

## 2021-04-27 RX ADMIN — SODIUM CHLORIDE, PRESERVATIVE FREE 10 ML: 5 INJECTION INTRAVENOUS at 03:06

## 2021-04-27 RX ADMIN — Medication 1 CAPSULE: at 08:58

## 2021-04-27 RX ADMIN — CEFEPIME 2 G: 2 INJECTION, POWDER, FOR SOLUTION INTRAVENOUS at 06:15

## 2021-04-27 RX ADMIN — PROMETHAZINE HYDROCHLORIDE 12.5 MG: 12.5 TABLET ORAL at 17:26

## 2021-04-27 RX ADMIN — METRONIDAZOLE 500 MG: 500 INJECTION, SOLUTION INTRAVENOUS at 13:49

## 2021-04-27 RX ADMIN — PROMETHAZINE HYDROCHLORIDE 12.5 MG: 12.5 TABLET ORAL at 10:22

## 2021-04-27 RX ADMIN — LEVOTHYROXINE SODIUM 175 MCG: 0.17 TABLET ORAL at 08:58

## 2021-04-27 RX ADMIN — LISINOPRIL 10 MG: 10 TABLET ORAL at 08:58

## 2021-04-27 RX ADMIN — CEFEPIME 2 G: 2 INJECTION, POWDER, FOR SOLUTION INTRAVENOUS at 17:26

## 2021-04-27 RX ADMIN — PANTOPRAZOLE SODIUM 40 MG: 40 TABLET, DELAYED RELEASE ORAL at 08:58

## 2021-04-27 RX ADMIN — CARVEDILOL 12.5 MG: 6.25 TABLET, FILM COATED ORAL at 08:57

## 2021-04-27 RX ADMIN — ATORVASTATIN CALCIUM 10 MG: 10 TABLET, FILM COATED ORAL at 20:26

## 2021-04-28 LAB
ANION GAP SERPL CALCULATED.3IONS-SCNC: 7.4 MMOL/L (ref 5–15)
BACTERIA SPEC AEROBE CULT: NORMAL
BACTERIA SPEC AEROBE CULT: NORMAL
BUN SERPL-MCNC: 6 MG/DL (ref 8–23)
BUN/CREAT SERPL: 11.5 (ref 7–25)
CALCIUM SPEC-SCNC: 8.5 MG/DL (ref 8.6–10.5)
CHLORIDE SERPL-SCNC: 97 MMOL/L (ref 98–107)
CO2 SERPL-SCNC: 29.6 MMOL/L (ref 22–29)
CREAT SERPL-MCNC: 0.52 MG/DL (ref 0.57–1)
DEPRECATED RDW RBC AUTO: 49.5 FL (ref 37–54)
ERYTHROCYTE [DISTWIDTH] IN BLOOD BY AUTOMATED COUNT: 13.5 % (ref 12.3–15.4)
GFR SERPL CREATININE-BSD FRML MDRD: 115 ML/MIN/1.73
GLUCOSE BLDC GLUCOMTR-MCNC: 110 MG/DL (ref 70–130)
GLUCOSE BLDC GLUCOMTR-MCNC: 113 MG/DL (ref 70–130)
GLUCOSE BLDC GLUCOMTR-MCNC: 125 MG/DL (ref 70–130)
GLUCOSE BLDC GLUCOMTR-MCNC: 135 MG/DL (ref 70–130)
GLUCOSE SERPL-MCNC: 132 MG/DL (ref 65–99)
HCT VFR BLD AUTO: 30.3 % (ref 34–46.6)
HGB BLD-MCNC: 9.4 G/DL (ref 12–15.9)
MCH RBC QN AUTO: 30.9 PG (ref 26.6–33)
MCHC RBC AUTO-ENTMCNC: 31 G/DL (ref 31.5–35.7)
MCV RBC AUTO: 99.7 FL (ref 79–97)
PLATELET # BLD AUTO: 377 10*3/MM3 (ref 140–450)
PMV BLD AUTO: 10.2 FL (ref 6–12)
POTASSIUM SERPL-SCNC: 3.5 MMOL/L (ref 3.5–5.2)
POTASSIUM SERPL-SCNC: 4.3 MMOL/L (ref 3.5–5.2)
RBC # BLD AUTO: 3.04 10*6/MM3 (ref 3.77–5.28)
SODIUM SERPL-SCNC: 134 MMOL/L (ref 136–145)
WBC # BLD AUTO: 15.72 10*3/MM3 (ref 3.4–10.8)

## 2021-04-28 PROCEDURE — 25010000002 CEFEPIME PER 500 MG: Performed by: INTERNAL MEDICINE

## 2021-04-28 PROCEDURE — 80048 BASIC METABOLIC PNL TOTAL CA: CPT | Performed by: INTERNAL MEDICINE

## 2021-04-28 PROCEDURE — 82962 GLUCOSE BLOOD TEST: CPT

## 2021-04-28 PROCEDURE — 94799 UNLISTED PULMONARY SVC/PX: CPT

## 2021-04-28 PROCEDURE — 99232 SBSQ HOSP IP/OBS MODERATE 35: CPT | Performed by: INTERNAL MEDICINE

## 2021-04-28 PROCEDURE — 63710000001 PROMETHAZINE PER 12.5 MG: Performed by: STUDENT IN AN ORGANIZED HEALTH CARE EDUCATION/TRAINING PROGRAM

## 2021-04-28 PROCEDURE — 84132 ASSAY OF SERUM POTASSIUM: CPT | Performed by: INTERNAL MEDICINE

## 2021-04-28 PROCEDURE — 25010000002 ONDANSETRON PER 1 MG: Performed by: STUDENT IN AN ORGANIZED HEALTH CARE EDUCATION/TRAINING PROGRAM

## 2021-04-28 PROCEDURE — 85027 COMPLETE CBC AUTOMATED: CPT | Performed by: INTERNAL MEDICINE

## 2021-04-28 RX ORDER — POTASSIUM CHLORIDE 20 MEQ/1
40 TABLET, EXTENDED RELEASE ORAL EVERY 4 HOURS
Status: COMPLETED | OUTPATIENT
Start: 2021-04-28 | End: 2021-04-28

## 2021-04-28 RX ADMIN — POTASSIUM CHLORIDE 40 MEQ: 20 TABLET, EXTENDED RELEASE ORAL at 09:15

## 2021-04-28 RX ADMIN — TRAZODONE HYDROCHLORIDE 50 MG: 50 TABLET ORAL at 20:24

## 2021-04-28 RX ADMIN — ATORVASTATIN CALCIUM 10 MG: 10 TABLET, FILM COATED ORAL at 20:24

## 2021-04-28 RX ADMIN — METRONIDAZOLE 500 MG: 500 INJECTION, SOLUTION INTRAVENOUS at 15:05

## 2021-04-28 RX ADMIN — CEFEPIME 2 G: 2 INJECTION, POWDER, FOR SOLUTION INTRAVENOUS at 17:23

## 2021-04-28 RX ADMIN — NICOTINE 1 PATCH: 7 PATCH, EXTENDED RELEASE TRANSDERMAL at 09:15

## 2021-04-28 RX ADMIN — CARVEDILOL 12.5 MG: 6.25 TABLET, FILM COATED ORAL at 17:25

## 2021-04-28 RX ADMIN — ONDANSETRON 4 MG: 2 INJECTION INTRAMUSCULAR; INTRAVENOUS at 15:05

## 2021-04-28 RX ADMIN — DIPHENOXYLATE HYDROCHLORIDE AND ATROPINE SULFATE 1 TABLET: 2.5; .025 TABLET ORAL at 09:15

## 2021-04-28 RX ADMIN — APIXABAN 2.5 MG: 2.5 TABLET, FILM COATED ORAL at 09:17

## 2021-04-28 RX ADMIN — APIXABAN 2.5 MG: 2.5 TABLET, FILM COATED ORAL at 20:24

## 2021-04-28 RX ADMIN — Medication 1 CAPSULE: at 09:17

## 2021-04-28 RX ADMIN — METRONIDAZOLE 500 MG: 500 INJECTION, SOLUTION INTRAVENOUS at 21:59

## 2021-04-28 RX ADMIN — ASPIRIN 81 MG: 81 TABLET, CHEWABLE ORAL at 09:16

## 2021-04-28 RX ADMIN — AMLODIPINE BESYLATE 10 MG: 10 TABLET ORAL at 09:16

## 2021-04-28 RX ADMIN — METRONIDAZOLE 500 MG: 500 INJECTION, SOLUTION INTRAVENOUS at 06:04

## 2021-04-28 RX ADMIN — POTASSIUM CHLORIDE 40 MEQ: 20 TABLET, EXTENDED RELEASE ORAL at 15:05

## 2021-04-28 RX ADMIN — PANTOPRAZOLE SODIUM 40 MG: 40 TABLET, DELAYED RELEASE ORAL at 09:16

## 2021-04-28 RX ADMIN — SODIUM CHLORIDE, PRESERVATIVE FREE 3 ML: 5 INJECTION INTRAVENOUS at 20:24

## 2021-04-28 RX ADMIN — CARVEDILOL 12.5 MG: 6.25 TABLET, FILM COATED ORAL at 09:17

## 2021-04-28 RX ADMIN — CARIPRAZINE 1.5 MG: 1.5 CAPSULE, GELATIN COATED ORAL at 09:15

## 2021-04-28 RX ADMIN — LISINOPRIL 10 MG: 10 TABLET ORAL at 09:16

## 2021-04-28 RX ADMIN — LEVOTHYROXINE SODIUM 175 MCG: 0.17 TABLET ORAL at 09:15

## 2021-04-28 RX ADMIN — CEFEPIME 2 G: 2 INJECTION, POWDER, FOR SOLUTION INTRAVENOUS at 06:04

## 2021-04-28 RX ADMIN — PROMETHAZINE HYDROCHLORIDE 12.5 MG: 12.5 TABLET ORAL at 17:25

## 2021-04-28 RX ADMIN — ONDANSETRON 4 MG: 2 INJECTION INTRAMUSCULAR; INTRAVENOUS at 06:04

## 2021-04-28 RX ADMIN — ONDANSETRON 4 MG: 2 INJECTION INTRAMUSCULAR; INTRAVENOUS at 00:42

## 2021-04-29 LAB
ANION GAP SERPL CALCULATED.3IONS-SCNC: 6.3 MMOL/L (ref 5–15)
BUN SERPL-MCNC: 7 MG/DL (ref 8–23)
BUN/CREAT SERPL: 15.6 (ref 7–25)
CALCIUM SPEC-SCNC: 8.4 MG/DL (ref 8.6–10.5)
CHLORIDE SERPL-SCNC: 103 MMOL/L (ref 98–107)
CO2 SERPL-SCNC: 27.7 MMOL/L (ref 22–29)
CREAT SERPL-MCNC: 0.45 MG/DL (ref 0.57–1)
DEPRECATED RDW RBC AUTO: 50.5 FL (ref 37–54)
ERYTHROCYTE [DISTWIDTH] IN BLOOD BY AUTOMATED COUNT: 13.9 % (ref 12.3–15.4)
GFR SERPL CREATININE-BSD FRML MDRD: 136 ML/MIN/1.73
GLUCOSE BLDC GLUCOMTR-MCNC: 110 MG/DL (ref 70–130)
GLUCOSE BLDC GLUCOMTR-MCNC: 125 MG/DL (ref 70–130)
GLUCOSE BLDC GLUCOMTR-MCNC: 136 MG/DL (ref 70–130)
GLUCOSE BLDC GLUCOMTR-MCNC: 157 MG/DL (ref 70–130)
GLUCOSE SERPL-MCNC: 115 MG/DL (ref 65–99)
HCT VFR BLD AUTO: 29.1 % (ref 34–46.6)
HGB BLD-MCNC: 9 G/DL (ref 12–15.9)
MCH RBC QN AUTO: 31 PG (ref 26.6–33)
MCHC RBC AUTO-ENTMCNC: 30.9 G/DL (ref 31.5–35.7)
MCV RBC AUTO: 100.3 FL (ref 79–97)
PLATELET # BLD AUTO: 355 10*3/MM3 (ref 140–450)
PMV BLD AUTO: 10.4 FL (ref 6–12)
POTASSIUM SERPL-SCNC: 4.5 MMOL/L (ref 3.5–5.2)
RBC # BLD AUTO: 2.9 10*6/MM3 (ref 3.77–5.28)
SARS-COV-2 RDRP RESP QL NAA+PROBE: NORMAL
SODIUM SERPL-SCNC: 137 MMOL/L (ref 136–145)
WBC # BLD AUTO: 12.78 10*3/MM3 (ref 3.4–10.8)

## 2021-04-29 PROCEDURE — 82962 GLUCOSE BLOOD TEST: CPT

## 2021-04-29 PROCEDURE — 80048 BASIC METABOLIC PNL TOTAL CA: CPT | Performed by: INTERNAL MEDICINE

## 2021-04-29 PROCEDURE — 25010000002 ONDANSETRON PER 1 MG: Performed by: STUDENT IN AN ORGANIZED HEALTH CARE EDUCATION/TRAINING PROGRAM

## 2021-04-29 PROCEDURE — 85027 COMPLETE CBC AUTOMATED: CPT | Performed by: INTERNAL MEDICINE

## 2021-04-29 PROCEDURE — 99232 SBSQ HOSP IP/OBS MODERATE 35: CPT | Performed by: INTERNAL MEDICINE

## 2021-04-29 PROCEDURE — 63710000001 PROMETHAZINE PER 12.5 MG: Performed by: STUDENT IN AN ORGANIZED HEALTH CARE EDUCATION/TRAINING PROGRAM

## 2021-04-29 PROCEDURE — 87635 SARS-COV-2 COVID-19 AMP PRB: CPT | Performed by: INTERNAL MEDICINE

## 2021-04-29 PROCEDURE — 94799 UNLISTED PULMONARY SVC/PX: CPT

## 2021-04-29 PROCEDURE — 25010000002 CEFEPIME PER 500 MG: Performed by: INTERNAL MEDICINE

## 2021-04-29 RX ORDER — ACETAMINOPHEN 325 MG/1
650 TABLET ORAL EVERY 6 HOURS PRN
Status: DISCONTINUED | OUTPATIENT
Start: 2021-04-29 | End: 2021-04-30 | Stop reason: HOSPADM

## 2021-04-29 RX ORDER — HYDROCODONE BITARTRATE AND ACETAMINOPHEN 5; 325 MG/1; MG/1
1 TABLET ORAL ONCE AS NEEDED
Status: COMPLETED | OUTPATIENT
Start: 2021-04-29 | End: 2021-04-29

## 2021-04-29 RX ADMIN — METRONIDAZOLE 500 MG: 500 INJECTION, SOLUTION INTRAVENOUS at 06:03

## 2021-04-29 RX ADMIN — SODIUM CHLORIDE, PRESERVATIVE FREE 3 ML: 5 INJECTION INTRAVENOUS at 20:03

## 2021-04-29 RX ADMIN — ATORVASTATIN CALCIUM 10 MG: 10 TABLET, FILM COATED ORAL at 20:02

## 2021-04-29 RX ADMIN — PROMETHAZINE HYDROCHLORIDE 12.5 MG: 12.5 TABLET ORAL at 08:29

## 2021-04-29 RX ADMIN — HYDROCODONE BITARTRATE AND ACETAMINOPHEN 1 TABLET: 5; 325 TABLET ORAL at 08:37

## 2021-04-29 RX ADMIN — ONDANSETRON 4 MG: 2 INJECTION INTRAMUSCULAR; INTRAVENOUS at 08:37

## 2021-04-29 RX ADMIN — NICOTINE 1 PATCH: 7 PATCH, EXTENDED RELEASE TRANSDERMAL at 08:32

## 2021-04-29 RX ADMIN — METRONIDAZOLE 500 MG: 500 INJECTION, SOLUTION INTRAVENOUS at 13:09

## 2021-04-29 RX ADMIN — CARIPRAZINE 1.5 MG: 1.5 CAPSULE, GELATIN COATED ORAL at 08:29

## 2021-04-29 RX ADMIN — AMLODIPINE BESYLATE 10 MG: 10 TABLET ORAL at 08:38

## 2021-04-29 RX ADMIN — PROMETHAZINE HYDROCHLORIDE 12.5 MG: 12.5 TABLET ORAL at 17:38

## 2021-04-29 RX ADMIN — DIPHENOXYLATE HYDROCHLORIDE AND ATROPINE SULFATE 1 TABLET: 2.5; .025 TABLET ORAL at 08:37

## 2021-04-29 RX ADMIN — LISINOPRIL 10 MG: 10 TABLET ORAL at 08:29

## 2021-04-29 RX ADMIN — ONDANSETRON 4 MG: 2 INJECTION INTRAMUSCULAR; INTRAVENOUS at 20:02

## 2021-04-29 RX ADMIN — TRAZODONE HYDROCHLORIDE 50 MG: 50 TABLET ORAL at 20:02

## 2021-04-29 RX ADMIN — ASPIRIN 81 MG: 81 TABLET, CHEWABLE ORAL at 08:29

## 2021-04-29 RX ADMIN — CARVEDILOL 12.5 MG: 6.25 TABLET, FILM COATED ORAL at 17:38

## 2021-04-29 RX ADMIN — Medication 1 CAPSULE: at 08:29

## 2021-04-29 RX ADMIN — CEFEPIME 2 G: 2 INJECTION, POWDER, FOR SOLUTION INTRAVENOUS at 17:40

## 2021-04-29 RX ADMIN — CARVEDILOL 12.5 MG: 6.25 TABLET, FILM COATED ORAL at 08:29

## 2021-04-29 RX ADMIN — SODIUM CHLORIDE, PRESERVATIVE FREE 3 ML: 5 INJECTION INTRAVENOUS at 08:30

## 2021-04-29 RX ADMIN — PANTOPRAZOLE SODIUM 40 MG: 40 TABLET, DELAYED RELEASE ORAL at 08:29

## 2021-04-29 RX ADMIN — CEFEPIME 2 G: 2 INJECTION, POWDER, FOR SOLUTION INTRAVENOUS at 06:03

## 2021-04-29 RX ADMIN — APIXABAN 2.5 MG: 2.5 TABLET, FILM COATED ORAL at 08:29

## 2021-04-29 RX ADMIN — LEVOTHYROXINE SODIUM 175 MCG: 0.17 TABLET ORAL at 08:29

## 2021-04-29 RX ADMIN — APIXABAN 2.5 MG: 2.5 TABLET, FILM COATED ORAL at 20:02

## 2021-04-29 RX ADMIN — ACETAMINOPHEN 650 MG: 325 TABLET, FILM COATED ORAL at 23:03

## 2021-04-29 RX ADMIN — METRONIDAZOLE 500 MG: 500 INJECTION, SOLUTION INTRAVENOUS at 21:27

## 2021-04-30 VITALS
HEART RATE: 79 BPM | RESPIRATION RATE: 13 BRPM | HEIGHT: 66 IN | BODY MASS INDEX: 17.81 KG/M2 | OXYGEN SATURATION: 100 % | SYSTOLIC BLOOD PRESSURE: 154 MMHG | TEMPERATURE: 97 F | WEIGHT: 110.8 LBS | DIASTOLIC BLOOD PRESSURE: 100 MMHG

## 2021-04-30 LAB
ALBUMIN SERPL-MCNC: 2.27 G/DL (ref 3.5–5.2)
ALP SERPL-CCNC: 56 U/L (ref 39–117)
ALT SERPL W P-5'-P-CCNC: 19 U/L (ref 1–33)
ANION GAP SERPL CALCULATED.3IONS-SCNC: 8.7 MMOL/L (ref 5–15)
AST SERPL-CCNC: 13 U/L (ref 1–32)
BILIRUB CONJ SERPL-MCNC: <0.2 MG/DL (ref 0–0.3)
BILIRUB INDIRECT SERPL-MCNC: ABNORMAL MG/DL
BILIRUB SERPL-MCNC: <0.2 MG/DL (ref 0–1.2)
BUN SERPL-MCNC: 7 MG/DL (ref 8–23)
BUN/CREAT SERPL: 15.6 (ref 7–25)
CALCIUM SPEC-SCNC: 8.2 MG/DL (ref 8.6–10.5)
CHLORIDE SERPL-SCNC: 97 MMOL/L (ref 98–107)
CO2 SERPL-SCNC: 27.3 MMOL/L (ref 22–29)
CREAT SERPL-MCNC: 0.45 MG/DL (ref 0.57–1)
DEPRECATED RDW RBC AUTO: 51.5 FL (ref 37–54)
ERYTHROCYTE [DISTWIDTH] IN BLOOD BY AUTOMATED COUNT: 13.8 % (ref 12.3–15.4)
GFR SERPL CREATININE-BSD FRML MDRD: 136 ML/MIN/1.73
GLUCOSE BLDC GLUCOMTR-MCNC: 111 MG/DL (ref 70–130)
GLUCOSE BLDC GLUCOMTR-MCNC: 150 MG/DL (ref 70–130)
GLUCOSE SERPL-MCNC: 180 MG/DL (ref 65–99)
HCT VFR BLD AUTO: 28.5 % (ref 34–46.6)
HGB BLD-MCNC: 8.6 G/DL (ref 12–15.9)
MCH RBC QN AUTO: 30.6 PG (ref 26.6–33)
MCHC RBC AUTO-ENTMCNC: 30.2 G/DL (ref 31.5–35.7)
MCV RBC AUTO: 101.4 FL (ref 79–97)
PLATELET # BLD AUTO: 383 10*3/MM3 (ref 140–450)
PMV BLD AUTO: 10.5 FL (ref 6–12)
POTASSIUM SERPL-SCNC: 4.1 MMOL/L (ref 3.5–5.2)
PROT SERPL-MCNC: 5.6 G/DL (ref 6–8.5)
QT INTERVAL: 438 MS
QTC INTERVAL: 422 MS
RBC # BLD AUTO: 2.81 10*6/MM3 (ref 3.77–5.28)
SODIUM SERPL-SCNC: 133 MMOL/L (ref 136–145)
WBC # BLD AUTO: 12.74 10*3/MM3 (ref 3.4–10.8)

## 2021-04-30 PROCEDURE — 63710000001 INSULIN ASPART PER 5 UNITS: Performed by: PHYSICIAN ASSISTANT

## 2021-04-30 PROCEDURE — 85027 COMPLETE CBC AUTOMATED: CPT | Performed by: INTERNAL MEDICINE

## 2021-04-30 PROCEDURE — 25010000002 ONDANSETRON PER 1 MG: Performed by: STUDENT IN AN ORGANIZED HEALTH CARE EDUCATION/TRAINING PROGRAM

## 2021-04-30 PROCEDURE — 80048 BASIC METABOLIC PNL TOTAL CA: CPT | Performed by: INTERNAL MEDICINE

## 2021-04-30 PROCEDURE — 82962 GLUCOSE BLOOD TEST: CPT

## 2021-04-30 PROCEDURE — 93005 ELECTROCARDIOGRAM TRACING: CPT | Performed by: INTERNAL MEDICINE

## 2021-04-30 PROCEDURE — 99239 HOSP IP/OBS DSCHRG MGMT >30: CPT | Performed by: INTERNAL MEDICINE

## 2021-04-30 PROCEDURE — 93010 ELECTROCARDIOGRAM REPORT: CPT | Performed by: INTERNAL MEDICINE

## 2021-04-30 PROCEDURE — 94799 UNLISTED PULMONARY SVC/PX: CPT

## 2021-04-30 PROCEDURE — 80076 HEPATIC FUNCTION PANEL: CPT | Performed by: HOSPITALIST

## 2021-04-30 PROCEDURE — 63710000001 PROMETHAZINE PER 12.5 MG: Performed by: STUDENT IN AN ORGANIZED HEALTH CARE EDUCATION/TRAINING PROGRAM

## 2021-04-30 PROCEDURE — 25010000002 CEFEPIME PER 500 MG: Performed by: INTERNAL MEDICINE

## 2021-04-30 RX ADMIN — APIXABAN 2.5 MG: 2.5 TABLET, FILM COATED ORAL at 08:04

## 2021-04-30 RX ADMIN — CARVEDILOL 12.5 MG: 6.25 TABLET, FILM COATED ORAL at 08:04

## 2021-04-30 RX ADMIN — LISINOPRIL 10 MG: 10 TABLET ORAL at 08:04

## 2021-04-30 RX ADMIN — INSULIN ASPART 2 UNITS: 100 INJECTION, SOLUTION INTRAVENOUS; SUBCUTANEOUS at 08:03

## 2021-04-30 RX ADMIN — ONDANSETRON 4 MG: 2 INJECTION INTRAMUSCULAR; INTRAVENOUS at 04:51

## 2021-04-30 RX ADMIN — PROMETHAZINE HYDROCHLORIDE 12.5 MG: 12.5 TABLET ORAL at 01:24

## 2021-04-30 RX ADMIN — LEVOTHYROXINE SODIUM 175 MCG: 0.17 TABLET ORAL at 08:04

## 2021-04-30 RX ADMIN — CARIPRAZINE 1.5 MG: 1.5 CAPSULE, GELATIN COATED ORAL at 08:05

## 2021-04-30 RX ADMIN — ONDANSETRON 4 MG: 2 INJECTION INTRAMUSCULAR; INTRAVENOUS at 10:22

## 2021-04-30 RX ADMIN — ASPIRIN 81 MG: 81 TABLET, CHEWABLE ORAL at 08:04

## 2021-04-30 RX ADMIN — AMLODIPINE BESYLATE 10 MG: 10 TABLET ORAL at 08:04

## 2021-04-30 RX ADMIN — Medication 1 CAPSULE: at 08:04

## 2021-04-30 RX ADMIN — NICOTINE 1 PATCH: 7 PATCH, EXTENDED RELEASE TRANSDERMAL at 08:06

## 2021-04-30 RX ADMIN — CEFEPIME 2 G: 2 INJECTION, POWDER, FOR SOLUTION INTRAVENOUS at 05:00

## 2021-04-30 RX ADMIN — SODIUM CHLORIDE, PRESERVATIVE FREE 3 ML: 5 INJECTION INTRAVENOUS at 08:03

## 2021-04-30 RX ADMIN — ACETAMINOPHEN 650 MG: 325 TABLET, FILM COATED ORAL at 04:49

## 2021-04-30 RX ADMIN — PROMETHAZINE HYDROCHLORIDE 12.5 MG: 12.5 TABLET ORAL at 11:28

## 2021-04-30 RX ADMIN — PANTOPRAZOLE SODIUM 40 MG: 40 TABLET, DELAYED RELEASE ORAL at 08:04

## 2021-05-09 ENCOUNTER — APPOINTMENT (OUTPATIENT)
Dept: CT IMAGING | Facility: HOSPITAL | Age: 76
End: 2021-05-09

## 2021-05-09 ENCOUNTER — HOSPITAL ENCOUNTER (INPATIENT)
Facility: HOSPITAL | Age: 76
LOS: 8 days | Discharge: NURSING, REHAB OR LONG-TERM CARE | End: 2021-05-17
Attending: EMERGENCY MEDICINE | Admitting: STUDENT IN AN ORGANIZED HEALTH CARE EDUCATION/TRAINING PROGRAM

## 2021-05-09 ENCOUNTER — APPOINTMENT (OUTPATIENT)
Dept: GENERAL RADIOLOGY | Facility: HOSPITAL | Age: 76
End: 2021-05-09

## 2021-05-09 DIAGNOSIS — R10.32 LEFT LOWER QUADRANT ABDOMINAL PAIN: ICD-10-CM

## 2021-05-09 DIAGNOSIS — K52.9 COLITIS: Primary | ICD-10-CM

## 2021-05-09 DIAGNOSIS — E87.1 HYPONATREMIA: ICD-10-CM

## 2021-05-09 DIAGNOSIS — E83.42 HYPOMAGNESEMIA: ICD-10-CM

## 2021-05-09 DIAGNOSIS — E83.39 HYPOPHOSPHATEMIA: ICD-10-CM

## 2021-05-09 DIAGNOSIS — R10.12 LEFT UPPER QUADRANT ABDOMINAL PAIN: ICD-10-CM

## 2021-05-09 DIAGNOSIS — D72.829 LEUKOCYTOSIS, UNSPECIFIED TYPE: ICD-10-CM

## 2021-05-09 PROBLEM — A41.9 SEPSIS (HCC): Status: RESOLVED | Noted: 2020-08-10 | Resolved: 2021-05-09

## 2021-05-09 LAB
ALBUMIN SERPL-MCNC: 3.23 G/DL (ref 3.5–5.2)
ALBUMIN/GLOB SERPL: 0.8 G/DL
ALP SERPL-CCNC: 107 U/L (ref 39–117)
ALT SERPL W P-5'-P-CCNC: 18 U/L (ref 1–33)
ANION GAP SERPL CALCULATED.3IONS-SCNC: 14.4 MMOL/L (ref 5–15)
AST SERPL-CCNC: 33 U/L (ref 1–32)
BACTERIA UR QL AUTO: ABNORMAL /HPF
BASOPHILS # BLD AUTO: 0.08 10*3/MM3 (ref 0–0.2)
BASOPHILS NFR BLD AUTO: 0.3 % (ref 0–1.5)
BILIRUB SERPL-MCNC: 0.3 MG/DL (ref 0–1.2)
BILIRUB UR QL STRIP: NEGATIVE
BUN SERPL-MCNC: 31 MG/DL (ref 8–23)
BUN/CREAT SERPL: 36.5 (ref 7–25)
CALCIUM SPEC-SCNC: 9.7 MG/DL (ref 8.6–10.5)
CHLORIDE SERPL-SCNC: 81 MMOL/L (ref 98–107)
CLARITY UR: CLEAR
CO2 SERPL-SCNC: 34.6 MMOL/L (ref 22–29)
COLOR UR: YELLOW
CREAT SERPL-MCNC: 0.85 MG/DL (ref 0.57–1)
D-LACTATE SERPL-SCNC: 1.1 MMOL/L (ref 0.5–2)
DEPRECATED RDW RBC AUTO: 51.3 FL (ref 37–54)
EOSINOPHIL # BLD AUTO: 0.12 10*3/MM3 (ref 0–0.4)
EOSINOPHIL NFR BLD AUTO: 0.4 % (ref 0.3–6.2)
ERYTHROCYTE [DISTWIDTH] IN BLOOD BY AUTOMATED COUNT: 14.3 % (ref 12.3–15.4)
FLUAV RNA RESP QL NAA+PROBE: NOT DETECTED
FLUBV RNA RESP QL NAA+PROBE: NOT DETECTED
GFR SERPL CREATININE-BSD FRML MDRD: 65 ML/MIN/1.73
GLOBULIN UR ELPH-MCNC: 4 GM/DL
GLUCOSE BLDC GLUCOMTR-MCNC: 113 MG/DL (ref 70–130)
GLUCOSE BLDC GLUCOMTR-MCNC: 113 MG/DL (ref 70–130)
GLUCOSE SERPL-MCNC: 120 MG/DL (ref 65–99)
GLUCOSE UR STRIP-MCNC: ABNORMAL MG/DL
HCT VFR BLD AUTO: 30.7 % (ref 34–46.6)
HGB BLD-MCNC: 9.6 G/DL (ref 12–15.9)
HGB UR QL STRIP.AUTO: ABNORMAL
HOLD SPECIMEN: NORMAL
HYALINE CASTS UR QL AUTO: ABNORMAL /LPF
IMM GRANULOCYTES # BLD AUTO: 0.2 10*3/MM3 (ref 0–0.05)
IMM GRANULOCYTES NFR BLD AUTO: 0.7 % (ref 0–0.5)
KETONES UR QL STRIP: ABNORMAL
LEUKOCYTE ESTERASE UR QL STRIP.AUTO: ABNORMAL
LIPASE SERPL-CCNC: 16 U/L (ref 13–60)
LYMPHOCYTES # BLD AUTO: 1.14 10*3/MM3 (ref 0.7–3.1)
LYMPHOCYTES NFR BLD AUTO: 3.8 % (ref 19.6–45.3)
MAGNESIUM SERPL-MCNC: 1.7 MG/DL (ref 1.6–2.4)
MCH RBC QN AUTO: 30.6 PG (ref 26.6–33)
MCHC RBC AUTO-ENTMCNC: 31.3 G/DL (ref 31.5–35.7)
MCV RBC AUTO: 97.8 FL (ref 79–97)
MONOCYTES # BLD AUTO: 2.26 10*3/MM3 (ref 0.1–0.9)
MONOCYTES NFR BLD AUTO: 7.6 % (ref 5–12)
NEUTROPHILS NFR BLD AUTO: 25.98 10*3/MM3 (ref 1.7–7)
NEUTROPHILS NFR BLD AUTO: 87.2 % (ref 42.7–76)
NITRITE UR QL STRIP: NEGATIVE
NRBC BLD AUTO-RTO: 0 /100 WBC (ref 0–0.2)
PH UR STRIP.AUTO: <=5 [PH] (ref 5–8)
PHOSPHATE SERPL-MCNC: 3.4 MG/DL (ref 2.5–4.5)
PLATELET # BLD AUTO: 566 10*3/MM3 (ref 140–450)
PMV BLD AUTO: 10.4 FL (ref 6–12)
POTASSIUM SERPL-SCNC: 4.5 MMOL/L (ref 3.5–5.2)
PROT SERPL-MCNC: 7.2 G/DL (ref 6–8.5)
PROT UR QL STRIP: NEGATIVE
RBC # BLD AUTO: 3.14 10*6/MM3 (ref 3.77–5.28)
RBC # UR: ABNORMAL /HPF
REF LAB TEST METHOD: ABNORMAL
SARS-COV-2 RNA RESP QL NAA+PROBE: NOT DETECTED
SODIUM SERPL-SCNC: 130 MMOL/L (ref 136–145)
SP GR UR STRIP: 1.02 (ref 1–1.03)
SQUAMOUS #/AREA URNS HPF: ABNORMAL /HPF
TROPONIN T SERPL-MCNC: 0.01 NG/ML (ref 0–0.03)
UROBILINOGEN UR QL STRIP: ABNORMAL
WBC # BLD AUTO: 29.78 10*3/MM3 (ref 3.4–10.8)
WBC UR QL AUTO: ABNORMAL /HPF
WHOLE BLOOD HOLD SPECIMEN: NORMAL

## 2021-05-09 PROCEDURE — 87040 BLOOD CULTURE FOR BACTERIA: CPT | Performed by: EMERGENCY MEDICINE

## 2021-05-09 PROCEDURE — 25010000002 ENOXAPARIN PER 10 MG: Performed by: STUDENT IN AN ORGANIZED HEALTH CARE EDUCATION/TRAINING PROGRAM

## 2021-05-09 PROCEDURE — 80053 COMPREHEN METABOLIC PANEL: CPT | Performed by: EMERGENCY MEDICINE

## 2021-05-09 PROCEDURE — 84484 ASSAY OF TROPONIN QUANT: CPT | Performed by: PHYSICIAN ASSISTANT

## 2021-05-09 PROCEDURE — 83690 ASSAY OF LIPASE: CPT | Performed by: EMERGENCY MEDICINE

## 2021-05-09 PROCEDURE — 82962 GLUCOSE BLOOD TEST: CPT

## 2021-05-09 PROCEDURE — 74175 CTA ABDOMEN W/CONTRAST: CPT | Performed by: RADIOLOGY

## 2021-05-09 PROCEDURE — C1751 CATH, INF, PER/CENT/MIDLINE: HCPCS

## 2021-05-09 PROCEDURE — 83735 ASSAY OF MAGNESIUM: CPT | Performed by: PHYSICIAN ASSISTANT

## 2021-05-09 PROCEDURE — 74176 CT ABD & PELVIS W/O CONTRAST: CPT

## 2021-05-09 PROCEDURE — 71045 X-RAY EXAM CHEST 1 VIEW: CPT | Performed by: RADIOLOGY

## 2021-05-09 PROCEDURE — 85025 COMPLETE CBC W/AUTO DIFF WBC: CPT | Performed by: EMERGENCY MEDICINE

## 2021-05-09 PROCEDURE — 99284 EMERGENCY DEPT VISIT MOD MDM: CPT

## 2021-05-09 PROCEDURE — 81001 URINALYSIS AUTO W/SCOPE: CPT | Performed by: EMERGENCY MEDICINE

## 2021-05-09 PROCEDURE — 25010000002 CEFEPIME PER 500 MG: Performed by: STUDENT IN AN ORGANIZED HEALTH CARE EDUCATION/TRAINING PROGRAM

## 2021-05-09 PROCEDURE — 94799 UNLISTED PULMONARY SVC/PX: CPT

## 2021-05-09 PROCEDURE — 25010000002 MORPHINE PER 10 MG: Performed by: EMERGENCY MEDICINE

## 2021-05-09 PROCEDURE — 99223 1ST HOSP IP/OBS HIGH 75: CPT | Performed by: PHYSICIAN ASSISTANT

## 2021-05-09 PROCEDURE — 74175 CTA ABDOMEN W/CONTRAST: CPT

## 2021-05-09 PROCEDURE — 71045 X-RAY EXAM CHEST 1 VIEW: CPT

## 2021-05-09 PROCEDURE — 0 IOPAMIDOL PER 1 ML: Performed by: EMERGENCY MEDICINE

## 2021-05-09 PROCEDURE — 99222 1ST HOSP IP/OBS MODERATE 55: CPT | Performed by: SURGERY

## 2021-05-09 PROCEDURE — 25010000002 ONDANSETRON PER 1 MG: Performed by: EMERGENCY MEDICINE

## 2021-05-09 PROCEDURE — 83605 ASSAY OF LACTIC ACID: CPT | Performed by: EMERGENCY MEDICINE

## 2021-05-09 PROCEDURE — 25010000002 VANCOMYCIN 1 G RECONSTITUTED SOLUTION: Performed by: STUDENT IN AN ORGANIZED HEALTH CARE EDUCATION/TRAINING PROGRAM

## 2021-05-09 PROCEDURE — 87636 SARSCOV2 & INF A&B AMP PRB: CPT | Performed by: EMERGENCY MEDICINE

## 2021-05-09 PROCEDURE — 84100 ASSAY OF PHOSPHORUS: CPT | Performed by: PHYSICIAN ASSISTANT

## 2021-05-09 PROCEDURE — 25010000002 MORPHINE PER 10 MG: Performed by: STUDENT IN AN ORGANIZED HEALTH CARE EDUCATION/TRAINING PROGRAM

## 2021-05-09 PROCEDURE — 25010000002 ONDANSETRON PER 1 MG: Performed by: PHYSICIAN ASSISTANT

## 2021-05-09 RX ORDER — SODIUM CHLORIDE 0.9 % (FLUSH) 0.9 %
10 SYRINGE (ML) INJECTION AS NEEDED
Status: DISCONTINUED | OUTPATIENT
Start: 2021-05-09 | End: 2021-05-17 | Stop reason: HOSPADM

## 2021-05-09 RX ORDER — HYDROCODONE BITARTRATE AND ACETAMINOPHEN 5; 325 MG/1; MG/1
1 TABLET ORAL EVERY 8 HOURS PRN
Status: ON HOLD | COMMUNITY
End: 2021-05-17 | Stop reason: SDUPTHER

## 2021-05-09 RX ORDER — PREDNISONE 1 MG/1
5 TABLET ORAL EVERY MORNING
COMMUNITY
End: 2021-10-08 | Stop reason: HOSPADM

## 2021-05-09 RX ORDER — MORPHINE SULFATE 2 MG/ML
2 INJECTION, SOLUTION INTRAMUSCULAR; INTRAVENOUS EVERY 4 HOURS PRN
Status: DISPENSED | OUTPATIENT
Start: 2021-05-09 | End: 2021-05-16

## 2021-05-09 RX ORDER — IPRATROPIUM BROMIDE AND ALBUTEROL SULFATE 2.5; .5 MG/3ML; MG/3ML
3 SOLUTION RESPIRATORY (INHALATION) EVERY 6 HOURS PRN
Status: DISCONTINUED | OUTPATIENT
Start: 2021-05-09 | End: 2021-05-10 | Stop reason: DRUGHIGH

## 2021-05-09 RX ORDER — IPRATROPIUM BROMIDE AND ALBUTEROL SULFATE 2.5; .5 MG/3ML; MG/3ML
3 SOLUTION RESPIRATORY (INHALATION)
Status: DISCONTINUED | OUTPATIENT
Start: 2021-05-09 | End: 2021-05-11

## 2021-05-09 RX ORDER — SODIUM CHLORIDE 9 MG/ML
100 INJECTION, SOLUTION INTRAVENOUS CONTINUOUS
Status: DISCONTINUED | OUTPATIENT
Start: 2021-05-09 | End: 2021-05-10

## 2021-05-09 RX ORDER — ONDANSETRON 2 MG/ML
4 INJECTION INTRAMUSCULAR; INTRAVENOUS EVERY 6 HOURS PRN
Status: DISCONTINUED | OUTPATIENT
Start: 2021-05-09 | End: 2021-05-17 | Stop reason: HOSPADM

## 2021-05-09 RX ORDER — LACTULOSE 10 G/15ML
10 SOLUTION ORAL DAILY
COMMUNITY

## 2021-05-09 RX ORDER — BUMETANIDE 1 MG/1
1 TABLET ORAL DAILY
COMMUNITY
End: 2021-05-17 | Stop reason: HOSPADM

## 2021-05-09 RX ORDER — SODIUM CHLORIDE 0.9 % (FLUSH) 0.9 %
10 SYRINGE (ML) INJECTION EVERY 12 HOURS SCHEDULED
Status: DISCONTINUED | OUTPATIENT
Start: 2021-05-09 | End: 2021-05-17 | Stop reason: HOSPADM

## 2021-05-09 RX ORDER — POTASSIUM CHLORIDE 20 MEQ/1
20 TABLET, EXTENDED RELEASE ORAL DAILY
COMMUNITY
End: 2021-05-17 | Stop reason: HOSPADM

## 2021-05-09 RX ORDER — ONDANSETRON 2 MG/ML
4 INJECTION INTRAMUSCULAR; INTRAVENOUS ONCE
Status: COMPLETED | OUTPATIENT
Start: 2021-05-09 | End: 2021-05-09

## 2021-05-09 RX ORDER — LEVOTHYROXINE SODIUM 0.2 MG/1
200 TABLET ORAL DAILY
COMMUNITY
End: 2021-08-21

## 2021-05-09 RX ORDER — BACLOFEN 10 MG/1
10 TABLET ORAL NIGHTLY
COMMUNITY
End: 2021-11-20

## 2021-05-09 RX ORDER — EMPAGLIFLOZIN 10 MG/1
1 TABLET, FILM COATED ORAL EVERY MORNING
COMMUNITY
End: 2021-08-21

## 2021-05-09 RX ORDER — GUAIFENESIN 400 MG/1
400 TABLET ORAL EVERY 12 HOURS
COMMUNITY
End: 2021-09-21

## 2021-05-09 RX ORDER — LOPERAMIDE HYDROCHLORIDE 2 MG/1
2 CAPSULE ORAL EVERY 6 HOURS PRN
COMMUNITY
End: 2021-11-20

## 2021-05-09 RX ORDER — BISACODYL 10 MG
10 SUPPOSITORY, RECTAL RECTAL DAILY PRN
COMMUNITY
End: 2021-11-20

## 2021-05-09 RX ADMIN — ONDANSETRON 4 MG: 2 INJECTION INTRAMUSCULAR; INTRAVENOUS at 22:18

## 2021-05-09 RX ADMIN — CEFEPIME HYDROCHLORIDE 2 G: 2 INJECTION, POWDER, FOR SOLUTION INTRAVENOUS at 20:18

## 2021-05-09 RX ADMIN — VANCOMYCIN HYDROCHLORIDE 1000 MG: 1 INJECTION, POWDER, LYOPHILIZED, FOR SOLUTION INTRAVENOUS at 20:19

## 2021-05-09 RX ADMIN — MORPHINE SULFATE 4 MG: 4 INJECTION, SOLUTION INTRAMUSCULAR; INTRAVENOUS at 15:53

## 2021-05-09 RX ADMIN — MORPHINE SULFATE 2 MG: 2 INJECTION, SOLUTION INTRAMUSCULAR; INTRAVENOUS at 23:16

## 2021-05-09 RX ADMIN — SODIUM CHLORIDE 150 ML/HR: 9 INJECTION, SOLUTION INTRAVENOUS at 22:18

## 2021-05-09 RX ADMIN — ONDANSETRON 4 MG: 2 INJECTION INTRAMUSCULAR; INTRAVENOUS at 15:55

## 2021-05-09 RX ADMIN — DOXYCYCLINE 100 MG: 100 INJECTION, POWDER, LYOPHILIZED, FOR SOLUTION INTRAVENOUS at 20:18

## 2021-05-09 RX ADMIN — ENOXAPARIN SODIUM 40 MG: 40 INJECTION SUBCUTANEOUS at 20:19

## 2021-05-09 RX ADMIN — ONDANSETRON 4 MG: 2 INJECTION INTRAMUSCULAR; INTRAVENOUS at 08:11

## 2021-05-09 RX ADMIN — SODIUM CHLORIDE 1000 ML: 9 INJECTION, SOLUTION INTRAVENOUS at 08:11

## 2021-05-09 RX ADMIN — MORPHINE SULFATE 4 MG: 4 INJECTION, SOLUTION INTRAMUSCULAR; INTRAVENOUS at 08:11

## 2021-05-09 RX ADMIN — SODIUM CHLORIDE, PRESERVATIVE FREE 10 ML: 5 INJECTION INTRAVENOUS at 20:19

## 2021-05-09 RX ADMIN — SODIUM CHLORIDE 1000 ML: 9 INJECTION, SOLUTION INTRAVENOUS at 15:55

## 2021-05-09 RX ADMIN — IOPAMIDOL 60 ML: 755 INJECTION, SOLUTION INTRAVENOUS at 14:16

## 2021-05-10 ENCOUNTER — APPOINTMENT (OUTPATIENT)
Dept: GENERAL RADIOLOGY | Facility: HOSPITAL | Age: 76
End: 2021-05-10

## 2021-05-10 LAB
A-A DO2: 99.7 MMHG (ref 0–300)
ALBUMIN SERPL-MCNC: 2.52 G/DL (ref 3.5–5.2)
ALBUMIN/GLOB SERPL: 0.7 G/DL
ALP SERPL-CCNC: 134 U/L (ref 39–117)
ALT SERPL W P-5'-P-CCNC: 16 U/L (ref 1–33)
ANION GAP SERPL CALCULATED.3IONS-SCNC: 16.7 MMOL/L (ref 5–15)
ARTERIAL PATENCY WRIST A: ABNORMAL
AST SERPL-CCNC: 29 U/L (ref 1–32)
ATMOSPHERIC PRESS: 730 MMHG
BASE EXCESS BLDA CALC-SCNC: 8 MMOL/L (ref 0–2)
BDY SITE: ABNORMAL
BILIRUB SERPL-MCNC: 0.2 MG/DL (ref 0–1.2)
BODY TEMPERATURE: 0 C
BUN SERPL-MCNC: 28 MG/DL (ref 8–23)
BUN/CREAT SERPL: 37.8 (ref 7–25)
CALCIUM SPEC-SCNC: 8.3 MG/DL (ref 8.6–10.5)
CHLORIDE SERPL-SCNC: 95 MMOL/L (ref 98–107)
CO2 BLDA-SCNC: 35.9 MMOL/L (ref 22–33)
CO2 SERPL-SCNC: 27.3 MMOL/L (ref 22–29)
COHGB MFR BLD: 1.4 % (ref 0–5)
CREAT SERPL-MCNC: 0.74 MG/DL (ref 0.57–1)
CRP SERPL-MCNC: 26.63 MG/DL (ref 0–0.5)
DEPRECATED RDW RBC AUTO: 54.2 FL (ref 37–54)
ERYTHROCYTE [DISTWIDTH] IN BLOOD BY AUTOMATED COUNT: 14.6 % (ref 12.3–15.4)
GAS FLOW AIRWAY: 3 LPM
GFR SERPL CREATININE-BSD FRML MDRD: 77 ML/MIN/1.73
GLOBULIN UR ELPH-MCNC: 3.6 GM/DL
GLUCOSE BLDC GLUCOMTR-MCNC: 127 MG/DL (ref 70–130)
GLUCOSE BLDC GLUCOMTR-MCNC: 170 MG/DL (ref 70–130)
GLUCOSE SERPL-MCNC: 118 MG/DL (ref 65–99)
HCO3 BLDA-SCNC: 34.1 MMOL/L (ref 20–26)
HCT VFR BLD AUTO: 26.6 % (ref 34–46.6)
HCT VFR BLD CALC: 24.4 % (ref 38–51)
HGB BLD-MCNC: 8.1 G/DL (ref 12–15.9)
HGB BLDA-MCNC: 7.9 G/DL (ref 13.5–17.5)
HYPOCHROMIA BLD QL: ABNORMAL
INHALED O2 CONCENTRATION: 32 %
LYMPHOCYTES # BLD MANUAL: 1.62 10*3/MM3 (ref 0.7–3.1)
LYMPHOCYTES NFR BLD MANUAL: 5 % (ref 19.6–45.3)
LYMPHOCYTES NFR BLD MANUAL: 6 % (ref 5–12)
Lab: ABNORMAL
MACROCYTES BLD QL SMEAR: ABNORMAL
MCH RBC QN AUTO: 30.9 PG (ref 26.6–33)
MCHC RBC AUTO-ENTMCNC: 30.5 G/DL (ref 31.5–35.7)
MCV RBC AUTO: 101.5 FL (ref 79–97)
METAMYELOCYTES NFR BLD MANUAL: 1 % (ref 0–0)
METHGB BLD QL: <1 % (ref 0–3)
MODALITY: ABNORMAL
MONOCYTES # BLD AUTO: 1.95 10*3/MM3 (ref 0.1–0.9)
NEUTROPHILS # BLD AUTO: 28.58 10*3/MM3 (ref 1.7–7)
NEUTROPHILS NFR BLD MANUAL: 83 % (ref 42.7–76)
NEUTS BAND NFR BLD MANUAL: 5 % (ref 0–5)
NOTE: ABNORMAL
OXYHGB MFR BLDV: 86.4 % (ref 94–99)
PCO2 BLDA: 57.2 MM HG (ref 35–45)
PCO2 TEMP ADJ BLD: ABNORMAL MM[HG]
PH BLDA: 7.38 PH UNITS (ref 7.35–7.45)
PH, TEMP CORRECTED: ABNORMAL
PLAT MORPH BLD: NORMAL
PLATELET # BLD AUTO: 452 10*3/MM3 (ref 140–450)
PMV BLD AUTO: 10.5 FL (ref 6–12)
PO2 BLDA: 55.3 MM HG (ref 83–108)
PO2 TEMP ADJ BLD: ABNORMAL MM[HG]
POTASSIUM SERPL-SCNC: 4.4 MMOL/L (ref 3.5–5.2)
PROT SERPL-MCNC: 6.1 G/DL (ref 6–8.5)
RBC # BLD AUTO: 2.62 10*6/MM3 (ref 3.77–5.28)
SAO2 % BLDCOA: 87.9 % (ref 94–99)
SCAN SLIDE: NORMAL
SODIUM SERPL-SCNC: 139 MMOL/L (ref 136–145)
TROPONIN T SERPL-MCNC: 0.01 NG/ML (ref 0–0.03)
VENTILATOR MODE: ABNORMAL
WBC # BLD AUTO: 32.48 10*3/MM3 (ref 3.4–10.8)

## 2021-05-10 PROCEDURE — 84484 ASSAY OF TROPONIN QUANT: CPT | Performed by: PHYSICIAN ASSISTANT

## 2021-05-10 PROCEDURE — 82962 GLUCOSE BLOOD TEST: CPT

## 2021-05-10 PROCEDURE — 82375 ASSAY CARBOXYHB QUANT: CPT

## 2021-05-10 PROCEDURE — 97163 PT EVAL HIGH COMPLEX 45 MIN: CPT

## 2021-05-10 PROCEDURE — 63710000001 PREDNISONE PER 5 MG: Performed by: INTERNAL MEDICINE

## 2021-05-10 PROCEDURE — 25010000002 CEFEPIME PER 500 MG: Performed by: STUDENT IN AN ORGANIZED HEALTH CARE EDUCATION/TRAINING PROGRAM

## 2021-05-10 PROCEDURE — 86140 C-REACTIVE PROTEIN: CPT | Performed by: PHYSICIAN ASSISTANT

## 2021-05-10 PROCEDURE — 80053 COMPREHEN METABOLIC PANEL: CPT | Performed by: PHYSICIAN ASSISTANT

## 2021-05-10 PROCEDURE — 83050 HGB METHEMOGLOBIN QUAN: CPT

## 2021-05-10 PROCEDURE — 85007 BL SMEAR W/DIFF WBC COUNT: CPT | Performed by: PHYSICIAN ASSISTANT

## 2021-05-10 PROCEDURE — 36600 WITHDRAWAL OF ARTERIAL BLOOD: CPT

## 2021-05-10 PROCEDURE — 25010000003 MAGNESIUM SULFATE 4 GM/100ML SOLUTION

## 2021-05-10 PROCEDURE — 82805 BLOOD GASES W/O2 SATURATION: CPT

## 2021-05-10 PROCEDURE — 94799 UNLISTED PULMONARY SVC/PX: CPT

## 2021-05-10 PROCEDURE — 25010000002 FUROSEMIDE PER 20 MG: Performed by: INTERNAL MEDICINE

## 2021-05-10 PROCEDURE — 85025 COMPLETE CBC W/AUTO DIFF WBC: CPT | Performed by: PHYSICIAN ASSISTANT

## 2021-05-10 PROCEDURE — 99231 SBSQ HOSP IP/OBS SF/LOW 25: CPT | Performed by: SURGERY

## 2021-05-10 PROCEDURE — 25010000002 ONDANSETRON PER 1 MG: Performed by: PHYSICIAN ASSISTANT

## 2021-05-10 PROCEDURE — 71045 X-RAY EXAM CHEST 1 VIEW: CPT | Performed by: RADIOLOGY

## 2021-05-10 PROCEDURE — 94640 AIRWAY INHALATION TREATMENT: CPT

## 2021-05-10 PROCEDURE — 99233 SBSQ HOSP IP/OBS HIGH 50: CPT | Performed by: INTERNAL MEDICINE

## 2021-05-10 PROCEDURE — 71045 X-RAY EXAM CHEST 1 VIEW: CPT

## 2021-05-10 RX ORDER — LEVOTHYROXINE SODIUM 0.1 MG/1
200 TABLET ORAL
Status: DISCONTINUED | OUTPATIENT
Start: 2021-05-10 | End: 2021-05-17 | Stop reason: HOSPADM

## 2021-05-10 RX ORDER — L.ACID,PARA/B.BIFIDUM/S.THERM 8B CELL
1 CAPSULE ORAL 2 TIMES DAILY
Status: DISCONTINUED | OUTPATIENT
Start: 2021-05-10 | End: 2021-05-17 | Stop reason: HOSPADM

## 2021-05-10 RX ORDER — CHOLECALCIFEROL (VITAMIN D3) 125 MCG
5 CAPSULE ORAL NIGHTLY
Status: DISCONTINUED | OUTPATIENT
Start: 2021-05-10 | End: 2021-05-17 | Stop reason: HOSPADM

## 2021-05-10 RX ORDER — MAGNESIUM SULFATE HEPTAHYDRATE 40 MG/ML
4 INJECTION, SOLUTION INTRAVENOUS AS NEEDED
Status: DISCONTINUED | OUTPATIENT
Start: 2021-05-10 | End: 2021-05-17 | Stop reason: HOSPADM

## 2021-05-10 RX ORDER — POTASSIUM CHLORIDE 20 MEQ/1
20 TABLET, EXTENDED RELEASE ORAL
Status: DISCONTINUED | OUTPATIENT
Start: 2021-05-10 | End: 2021-05-16

## 2021-05-10 RX ORDER — PANTOPRAZOLE SODIUM 40 MG/1
40 TABLET, DELAYED RELEASE ORAL DAILY
Status: DISCONTINUED | OUTPATIENT
Start: 2021-05-10 | End: 2021-05-17 | Stop reason: HOSPADM

## 2021-05-10 RX ORDER — DIPHENOXYLATE HYDROCHLORIDE AND ATROPINE SULFATE 2.5; .025 MG/1; MG/1
1 TABLET ORAL NIGHTLY
Status: DISCONTINUED | OUTPATIENT
Start: 2021-05-10 | End: 2021-05-17 | Stop reason: HOSPADM

## 2021-05-10 RX ORDER — LACTULOSE 10 G/15ML
10 SOLUTION ORAL DAILY PRN
Status: DISCONTINUED | OUTPATIENT
Start: 2021-05-10 | End: 2021-05-17 | Stop reason: HOSPADM

## 2021-05-10 RX ORDER — DOCUSATE SODIUM 100 MG/1
200 CAPSULE, LIQUID FILLED ORAL DAILY
Status: DISCONTINUED | OUTPATIENT
Start: 2021-05-10 | End: 2021-05-17 | Stop reason: HOSPADM

## 2021-05-10 RX ORDER — MONTELUKAST SODIUM 10 MG/1
10 TABLET ORAL DAILY
Status: DISCONTINUED | OUTPATIENT
Start: 2021-05-10 | End: 2021-05-17 | Stop reason: HOSPADM

## 2021-05-10 RX ORDER — BACLOFEN 10 MG/1
10 TABLET ORAL NIGHTLY
Status: DISCONTINUED | OUTPATIENT
Start: 2021-05-10 | End: 2021-05-17 | Stop reason: HOSPADM

## 2021-05-10 RX ORDER — IPRATROPIUM BROMIDE AND ALBUTEROL SULFATE 2.5; .5 MG/3ML; MG/3ML
3 SOLUTION RESPIRATORY (INHALATION) 4 TIMES DAILY PRN
Status: DISCONTINUED | OUTPATIENT
Start: 2021-05-10 | End: 2021-05-17

## 2021-05-10 RX ORDER — ATORVASTATIN CALCIUM 10 MG/1
10 TABLET, FILM COATED ORAL NIGHTLY
Status: DISCONTINUED | OUTPATIENT
Start: 2021-05-10 | End: 2021-05-17 | Stop reason: HOSPADM

## 2021-05-10 RX ORDER — HYDROCODONE BITARTRATE AND ACETAMINOPHEN 5; 325 MG/1; MG/1
1 TABLET ORAL EVERY 8 HOURS PRN
Status: DISCONTINUED | OUTPATIENT
Start: 2021-05-10 | End: 2021-05-17 | Stop reason: HOSPADM

## 2021-05-10 RX ORDER — GUAIFENESIN 200 MG/1
400 TABLET ORAL EVERY 12 HOURS SCHEDULED
Status: DISCONTINUED | OUTPATIENT
Start: 2021-05-10 | End: 2021-05-17 | Stop reason: HOSPADM

## 2021-05-10 RX ORDER — MAGNESIUM SULFATE HEPTAHYDRATE 40 MG/ML
2 INJECTION, SOLUTION INTRAVENOUS AS NEEDED
Status: DISCONTINUED | OUTPATIENT
Start: 2021-05-10 | End: 2021-05-17 | Stop reason: HOSPADM

## 2021-05-10 RX ORDER — LOPERAMIDE HYDROCHLORIDE 2 MG/1
2 CAPSULE ORAL 4 TIMES DAILY PRN
Status: DISCONTINUED | OUTPATIENT
Start: 2021-05-10 | End: 2021-05-17 | Stop reason: HOSPADM

## 2021-05-10 RX ORDER — AMLODIPINE BESYLATE 10 MG/1
10 TABLET ORAL DAILY
Status: DISCONTINUED | OUTPATIENT
Start: 2021-05-10 | End: 2021-05-17 | Stop reason: HOSPADM

## 2021-05-10 RX ORDER — ARFORMOTEROL TARTRATE 15 UG/2ML
15 SOLUTION RESPIRATORY (INHALATION)
Status: DISCONTINUED | OUTPATIENT
Start: 2021-05-10 | End: 2021-05-11

## 2021-05-10 RX ORDER — MAGNESIUM SULFATE HEPTAHYDRATE 40 MG/ML
4 INJECTION, SOLUTION INTRAVENOUS ONCE
Status: COMPLETED | OUTPATIENT
Start: 2021-05-10 | End: 2021-05-10

## 2021-05-10 RX ORDER — BUMETANIDE 1 MG/1
1 TABLET ORAL DAILY
Status: CANCELLED | OUTPATIENT
Start: 2021-05-10

## 2021-05-10 RX ORDER — BISACODYL 10 MG
10 SUPPOSITORY, RECTAL RECTAL DAILY PRN
Status: DISCONTINUED | OUTPATIENT
Start: 2021-05-10 | End: 2021-05-17 | Stop reason: HOSPADM

## 2021-05-10 RX ORDER — SENNA PLUS 8.6 MG/1
1 TABLET ORAL DAILY PRN
Status: DISCONTINUED | OUTPATIENT
Start: 2021-05-10 | End: 2021-05-17 | Stop reason: HOSPADM

## 2021-05-10 RX ORDER — PROMETHAZINE HYDROCHLORIDE 12.5 MG/1
12.5 TABLET ORAL EVERY 8 HOURS PRN
Status: DISCONTINUED | OUTPATIENT
Start: 2021-05-10 | End: 2021-05-17 | Stop reason: HOSPADM

## 2021-05-10 RX ORDER — LISINOPRIL 10 MG/1
10 TABLET ORAL DAILY
Status: DISCONTINUED | OUTPATIENT
Start: 2021-05-10 | End: 2021-05-17 | Stop reason: HOSPADM

## 2021-05-10 RX ORDER — CARVEDILOL 6.25 MG/1
12.5 TABLET ORAL 2 TIMES DAILY WITH MEALS
Status: DISCONTINUED | OUTPATIENT
Start: 2021-05-10 | End: 2021-05-17 | Stop reason: HOSPADM

## 2021-05-10 RX ORDER — TRAZODONE HYDROCHLORIDE 50 MG/1
50 TABLET ORAL NIGHTLY
Status: DISCONTINUED | OUTPATIENT
Start: 2021-05-10 | End: 2021-05-17 | Stop reason: HOSPADM

## 2021-05-10 RX ORDER — PREDNISONE 1 MG/1
5 TABLET ORAL DAILY
Status: DISCONTINUED | OUTPATIENT
Start: 2021-05-10 | End: 2021-05-17 | Stop reason: HOSPADM

## 2021-05-10 RX ORDER — ASPIRIN 81 MG/1
81 TABLET, CHEWABLE ORAL DAILY
Status: DISCONTINUED | OUTPATIENT
Start: 2021-05-10 | End: 2021-05-17 | Stop reason: HOSPADM

## 2021-05-10 RX ORDER — FUROSEMIDE 10 MG/ML
20 INJECTION INTRAMUSCULAR; INTRAVENOUS ONCE
Status: COMPLETED | OUTPATIENT
Start: 2021-05-10 | End: 2021-05-10

## 2021-05-10 RX ADMIN — ONDANSETRON 4 MG: 2 INJECTION INTRAMUSCULAR; INTRAVENOUS at 08:16

## 2021-05-10 RX ADMIN — LISINOPRIL 10 MG: 10 TABLET ORAL at 11:52

## 2021-05-10 RX ADMIN — PREDNISONE 5 MG: 5 TABLET ORAL at 11:51

## 2021-05-10 RX ADMIN — Medication 1 CAPSULE: at 20:53

## 2021-05-10 RX ADMIN — Medication 1 TABLET: at 20:53

## 2021-05-10 RX ADMIN — CEFEPIME HYDROCHLORIDE 2 G: 2 INJECTION, POWDER, FOR SOLUTION INTRAVENOUS at 16:13

## 2021-05-10 RX ADMIN — AMLODIPINE BESYLATE 10 MG: 10 TABLET ORAL at 11:50

## 2021-05-10 RX ADMIN — CARVEDILOL 12.5 MG: 6.25 TABLET, FILM COATED ORAL at 11:52

## 2021-05-10 RX ADMIN — GUAIFENESIN 400 MG: 200 TABLET ORAL at 20:53

## 2021-05-10 RX ADMIN — SODIUM CHLORIDE 100 ML/HR: 9 INJECTION, SOLUTION INTRAVENOUS at 14:41

## 2021-05-10 RX ADMIN — BACLOFEN 10 MG: 10 TABLET ORAL at 20:53

## 2021-05-10 RX ADMIN — NICOTINE 1 PATCH: 7 PATCH, EXTENDED RELEASE TRANSDERMAL at 11:53

## 2021-05-10 RX ADMIN — ATORVASTATIN CALCIUM 10 MG: 10 TABLET, FILM COATED ORAL at 20:53

## 2021-05-10 RX ADMIN — LEVOTHYROXINE SODIUM 200 MCG: 100 TABLET ORAL at 11:51

## 2021-05-10 RX ADMIN — TRAZODONE HYDROCHLORIDE 50 MG: 50 TABLET ORAL at 20:53

## 2021-05-10 RX ADMIN — APIXABAN 2.5 MG: 2.5 TABLET, FILM COATED ORAL at 16:13

## 2021-05-10 RX ADMIN — POTASSIUM CHLORIDE 20 MEQ: 20 TABLET, EXTENDED RELEASE ORAL at 11:51

## 2021-05-10 RX ADMIN — MONTELUKAST SODIUM 10 MG: 10 TABLET, COATED ORAL at 11:52

## 2021-05-10 RX ADMIN — PANTOPRAZOLE SODIUM 40 MG: 40 TABLET, DELAYED RELEASE ORAL at 11:53

## 2021-05-10 RX ADMIN — FUROSEMIDE 20 MG: 10 INJECTION, SOLUTION INTRAMUSCULAR; INTRAVENOUS at 20:52

## 2021-05-10 RX ADMIN — METRONIDAZOLE 500 MG: 500 INJECTION, SOLUTION INTRAVENOUS at 21:08

## 2021-05-10 RX ADMIN — IPRATROPIUM BROMIDE AND ALBUTEROL SULFATE 3 ML: .5; 3 SOLUTION RESPIRATORY (INHALATION) at 18:30

## 2021-05-10 RX ADMIN — DOXYCYCLINE 100 MG: 100 INJECTION, POWDER, LYOPHILIZED, FOR SOLUTION INTRAVENOUS at 08:16

## 2021-05-10 RX ADMIN — DOCUSATE SODIUM 200 MG: 100 CAPSULE, LIQUID FILLED ORAL at 11:53

## 2021-05-10 RX ADMIN — SODIUM CHLORIDE 150 ML/HR: 9 INJECTION, SOLUTION INTRAVENOUS at 05:07

## 2021-05-10 RX ADMIN — Medication 1 CAPSULE: at 11:52

## 2021-05-10 RX ADMIN — GUAIFENESIN 400 MG: 200 TABLET ORAL at 11:52

## 2021-05-10 RX ADMIN — MAGNESIUM SULFATE HEPTAHYDRATE 4 G: 40 INJECTION, SOLUTION INTRAVENOUS at 11:55

## 2021-05-10 RX ADMIN — METRONIDAZOLE 500 MG: 500 INJECTION, SOLUTION INTRAVENOUS at 14:08

## 2021-05-10 RX ADMIN — CEFEPIME HYDROCHLORIDE 2 G: 2 INJECTION, POWDER, FOR SOLUTION INTRAVENOUS at 02:22

## 2021-05-10 RX ADMIN — Medication 5 MG: at 20:53

## 2021-05-10 RX ADMIN — APIXABAN 2.5 MG: 2.5 TABLET, FILM COATED ORAL at 20:53

## 2021-05-10 RX ADMIN — SODIUM CHLORIDE, PRESERVATIVE FREE 10 ML: 5 INJECTION INTRAVENOUS at 20:52

## 2021-05-11 LAB
ALBUMIN SERPL-MCNC: 2.32 G/DL (ref 3.5–5.2)
ALBUMIN/GLOB SERPL: 0.8 G/DL
ALP SERPL-CCNC: 83 U/L (ref 39–117)
ALT SERPL W P-5'-P-CCNC: 12 U/L (ref 1–33)
ANION GAP SERPL CALCULATED.3IONS-SCNC: 6.4 MMOL/L (ref 5–15)
AST SERPL-CCNC: 20 U/L (ref 1–32)
BASOPHILS # BLD AUTO: 0.04 10*3/MM3 (ref 0–0.2)
BASOPHILS NFR BLD AUTO: 0.1 % (ref 0–1.5)
BILIRUB SERPL-MCNC: 0.2 MG/DL (ref 0–1.2)
BUN SERPL-MCNC: 13 MG/DL (ref 8–23)
BUN/CREAT SERPL: 28.3 (ref 7–25)
CALCIUM SPEC-SCNC: 8.1 MG/DL (ref 8.6–10.5)
CHLORIDE SERPL-SCNC: 95 MMOL/L (ref 98–107)
CO2 SERPL-SCNC: 32.6 MMOL/L (ref 22–29)
CREAT SERPL-MCNC: 0.46 MG/DL (ref 0.57–1)
CRP SERPL-MCNC: 24.62 MG/DL (ref 0–0.5)
DEPRECATED RDW RBC AUTO: 52.3 FL (ref 37–54)
EOSINOPHIL # BLD AUTO: 0.11 10*3/MM3 (ref 0–0.4)
EOSINOPHIL NFR BLD AUTO: 0.4 % (ref 0.3–6.2)
ERYTHROCYTE [DISTWIDTH] IN BLOOD BY AUTOMATED COUNT: 14.5 % (ref 12.3–15.4)
GFR SERPL CREATININE-BSD FRML MDRD: 132 ML/MIN/1.73
GLOBULIN UR ELPH-MCNC: 3.1 GM/DL
GLUCOSE BLDC GLUCOMTR-MCNC: 123 MG/DL (ref 70–130)
GLUCOSE BLDC GLUCOMTR-MCNC: 129 MG/DL (ref 70–130)
GLUCOSE BLDC GLUCOMTR-MCNC: 159 MG/DL (ref 70–130)
GLUCOSE SERPL-MCNC: 152 MG/DL (ref 65–99)
HCT VFR BLD AUTO: 23.4 % (ref 34–46.6)
HGB BLD-MCNC: 7.3 G/DL (ref 12–15.9)
IMM GRANULOCYTES # BLD AUTO: 0.25 10*3/MM3 (ref 0–0.05)
IMM GRANULOCYTES NFR BLD AUTO: 0.9 % (ref 0–0.5)
LYMPHOCYTES # BLD AUTO: 0.87 10*3/MM3 (ref 0.7–3.1)
LYMPHOCYTES NFR BLD AUTO: 3 % (ref 19.6–45.3)
MAGNESIUM SERPL-MCNC: 2 MG/DL (ref 1.6–2.4)
MCH RBC QN AUTO: 31.1 PG (ref 26.6–33)
MCHC RBC AUTO-ENTMCNC: 31.2 G/DL (ref 31.5–35.7)
MCV RBC AUTO: 99.6 FL (ref 79–97)
MONOCYTES # BLD AUTO: 2.04 10*3/MM3 (ref 0.1–0.9)
MONOCYTES NFR BLD AUTO: 7.1 % (ref 5–12)
NEUTROPHILS NFR BLD AUTO: 25.24 10*3/MM3 (ref 1.7–7)
NEUTROPHILS NFR BLD AUTO: 88.5 % (ref 42.7–76)
NRBC BLD AUTO-RTO: 0 /100 WBC (ref 0–0.2)
PLATELET # BLD AUTO: 366 10*3/MM3 (ref 140–450)
PMV BLD AUTO: 10 FL (ref 6–12)
POTASSIUM SERPL-SCNC: 3 MMOL/L (ref 3.5–5.2)
PROT SERPL-MCNC: 5.4 G/DL (ref 6–8.5)
RBC # BLD AUTO: 2.35 10*6/MM3 (ref 3.77–5.28)
SODIUM SERPL-SCNC: 134 MMOL/L (ref 136–145)
WBC # BLD AUTO: 28.55 10*3/MM3 (ref 3.4–10.8)

## 2021-05-11 PROCEDURE — 94799 UNLISTED PULMONARY SVC/PX: CPT

## 2021-05-11 PROCEDURE — 85025 COMPLETE CBC W/AUTO DIFF WBC: CPT | Performed by: INTERNAL MEDICINE

## 2021-05-11 PROCEDURE — 99232 SBSQ HOSP IP/OBS MODERATE 35: CPT | Performed by: INTERNAL MEDICINE

## 2021-05-11 PROCEDURE — 25010000002 MORPHINE PER 10 MG: Performed by: STUDENT IN AN ORGANIZED HEALTH CARE EDUCATION/TRAINING PROGRAM

## 2021-05-11 PROCEDURE — 87305 ASPERGILLUS AG IA: CPT | Performed by: INTERNAL MEDICINE

## 2021-05-11 PROCEDURE — 80053 COMPREHEN METABOLIC PANEL: CPT | Performed by: INTERNAL MEDICINE

## 2021-05-11 PROCEDURE — 63710000001 PREDNISONE PER 5 MG: Performed by: INTERNAL MEDICINE

## 2021-05-11 PROCEDURE — 83735 ASSAY OF MAGNESIUM: CPT | Performed by: INTERNAL MEDICINE

## 2021-05-11 PROCEDURE — 82962 GLUCOSE BLOOD TEST: CPT

## 2021-05-11 PROCEDURE — 86635 COCCIDIOIDES ANTIBODY: CPT | Performed by: INTERNAL MEDICINE

## 2021-05-11 PROCEDURE — 25010000002 ONDANSETRON PER 1 MG: Performed by: PHYSICIAN ASSISTANT

## 2021-05-11 PROCEDURE — 25010000002 CEFEPIME PER 500 MG: Performed by: STUDENT IN AN ORGANIZED HEALTH CARE EDUCATION/TRAINING PROGRAM

## 2021-05-11 PROCEDURE — 86140 C-REACTIVE PROTEIN: CPT | Performed by: INTERNAL MEDICINE

## 2021-05-11 RX ORDER — POTASSIUM CHLORIDE 20 MEQ/1
40 TABLET, EXTENDED RELEASE ORAL EVERY 4 HOURS
Status: COMPLETED | OUTPATIENT
Start: 2021-05-11 | End: 2021-05-11

## 2021-05-11 RX ORDER — POTASSIUM CHLORIDE 750 MG/1
40 CAPSULE, EXTENDED RELEASE ORAL AS NEEDED
Status: DISCONTINUED | OUTPATIENT
Start: 2021-05-11 | End: 2021-05-17 | Stop reason: HOSPADM

## 2021-05-11 RX ORDER — POTASSIUM CHLORIDE 1.5 G/1.77G
40 POWDER, FOR SOLUTION ORAL AS NEEDED
Status: DISCONTINUED | OUTPATIENT
Start: 2021-05-11 | End: 2021-05-17 | Stop reason: HOSPADM

## 2021-05-11 RX ORDER — POTASSIUM CHLORIDE 7.45 MG/ML
10 INJECTION INTRAVENOUS
Status: DISCONTINUED | OUTPATIENT
Start: 2021-05-11 | End: 2021-05-17 | Stop reason: HOSPADM

## 2021-05-11 RX ADMIN — SODIUM CHLORIDE, PRESERVATIVE FREE 10 ML: 5 INJECTION INTRAVENOUS at 21:47

## 2021-05-11 RX ADMIN — Medication 5 MG: at 21:46

## 2021-05-11 RX ADMIN — MORPHINE SULFATE 2 MG: 2 INJECTION, SOLUTION INTRAMUSCULAR; INTRAVENOUS at 07:48

## 2021-05-11 RX ADMIN — LISINOPRIL 10 MG: 10 TABLET ORAL at 08:48

## 2021-05-11 RX ADMIN — CARIPRAZINE 1.5 MG: 1.5 CAPSULE, GELATIN COATED ORAL at 08:48

## 2021-05-11 RX ADMIN — AMLODIPINE BESYLATE 10 MG: 10 TABLET ORAL at 08:48

## 2021-05-11 RX ADMIN — GUAIFENESIN 400 MG: 200 TABLET ORAL at 21:46

## 2021-05-11 RX ADMIN — METRONIDAZOLE 500 MG: 500 INJECTION, SOLUTION INTRAVENOUS at 21:47

## 2021-05-11 RX ADMIN — APIXABAN 2.5 MG: 2.5 TABLET, FILM COATED ORAL at 08:48

## 2021-05-11 RX ADMIN — PANTOPRAZOLE SODIUM 40 MG: 40 TABLET, DELAYED RELEASE ORAL at 08:48

## 2021-05-11 RX ADMIN — ATORVASTATIN CALCIUM 10 MG: 10 TABLET, FILM COATED ORAL at 21:46

## 2021-05-11 RX ADMIN — TRAZODONE HYDROCHLORIDE 50 MG: 50 TABLET ORAL at 21:46

## 2021-05-11 RX ADMIN — METRONIDAZOLE 500 MG: 500 INJECTION, SOLUTION INTRAVENOUS at 06:03

## 2021-05-11 RX ADMIN — Medication 1 CAPSULE: at 21:46

## 2021-05-11 RX ADMIN — HYDROCODONE BITARTRATE AND ACETAMINOPHEN 1 TABLET: 5; 325 TABLET ORAL at 15:32

## 2021-05-11 RX ADMIN — LEVOTHYROXINE SODIUM 200 MCG: 100 TABLET ORAL at 06:03

## 2021-05-11 RX ADMIN — CEFEPIME HYDROCHLORIDE 2 G: 2 INJECTION, POWDER, FOR SOLUTION INTRAVENOUS at 14:49

## 2021-05-11 RX ADMIN — MORPHINE SULFATE 2 MG: 2 INJECTION, SOLUTION INTRAMUSCULAR; INTRAVENOUS at 21:55

## 2021-05-11 RX ADMIN — Medication 1 CAPSULE: at 08:48

## 2021-05-11 RX ADMIN — POTASSIUM CHLORIDE 40 MEQ: 20 TABLET, EXTENDED RELEASE ORAL at 13:38

## 2021-05-11 RX ADMIN — POTASSIUM CHLORIDE 40 MEQ: 20 TABLET, EXTENDED RELEASE ORAL at 21:46

## 2021-05-11 RX ADMIN — APIXABAN 2.5 MG: 2.5 TABLET, FILM COATED ORAL at 21:46

## 2021-05-11 RX ADMIN — METRONIDAZOLE 500 MG: 500 INJECTION, SOLUTION INTRAVENOUS at 14:49

## 2021-05-11 RX ADMIN — GUAIFENESIN 400 MG: 200 TABLET ORAL at 08:48

## 2021-05-11 RX ADMIN — POTASSIUM CHLORIDE 40 MEQ: 20 TABLET, EXTENDED RELEASE ORAL at 11:27

## 2021-05-11 RX ADMIN — CEFEPIME HYDROCHLORIDE 2 G: 2 INJECTION, POWDER, FOR SOLUTION INTRAVENOUS at 02:54

## 2021-05-11 RX ADMIN — MONTELUKAST SODIUM 10 MG: 10 TABLET, COATED ORAL at 08:48

## 2021-05-11 RX ADMIN — NICOTINE 1 PATCH: 7 PATCH, EXTENDED RELEASE TRANSDERMAL at 08:49

## 2021-05-11 RX ADMIN — DOCUSATE SODIUM 200 MG: 100 CAPSULE, LIQUID FILLED ORAL at 08:48

## 2021-05-11 RX ADMIN — BACLOFEN 10 MG: 10 TABLET ORAL at 21:46

## 2021-05-11 RX ADMIN — ONDANSETRON 4 MG: 2 INJECTION INTRAMUSCULAR; INTRAVENOUS at 18:21

## 2021-05-11 RX ADMIN — HYDROCODONE BITARTRATE AND ACETAMINOPHEN 1 TABLET: 5; 325 TABLET ORAL at 01:10

## 2021-05-11 RX ADMIN — Medication 1 TABLET: at 21:46

## 2021-05-11 RX ADMIN — ASPIRIN 81 MG: 81 TABLET, CHEWABLE ORAL at 08:48

## 2021-05-11 RX ADMIN — PREDNISONE 5 MG: 5 TABLET ORAL at 08:48

## 2021-05-12 LAB
A-A DO2: 69.9 MMHG (ref 0–300)
ALBUMIN SERPL-MCNC: 2.47 G/DL (ref 3.5–5.2)
ALBUMIN/GLOB SERPL: 0.7 G/DL
ALP SERPL-CCNC: 105 U/L (ref 39–117)
ALT SERPL W P-5'-P-CCNC: 13 U/L (ref 1–33)
ANION GAP SERPL CALCULATED.3IONS-SCNC: 8.3 MMOL/L (ref 5–15)
ARTERIAL PATENCY WRIST A: ABNORMAL
AST SERPL-CCNC: 18 U/L (ref 1–32)
ATMOSPHERIC PRESS: 733 MMHG
BASE EXCESS BLDA CALC-SCNC: 6.6 MMOL/L (ref 0–2)
BASOPHILS # BLD AUTO: 0.15 10*3/MM3 (ref 0–0.2)
BASOPHILS NFR BLD AUTO: 0.6 % (ref 0–1.5)
BDY SITE: ABNORMAL
BILIRUB SERPL-MCNC: 0.2 MG/DL (ref 0–1.2)
BODY TEMPERATURE: 0 C
BUN SERPL-MCNC: 9 MG/DL (ref 8–23)
BUN/CREAT SERPL: 22 (ref 7–25)
CALCIUM SPEC-SCNC: 8.8 MG/DL (ref 8.6–10.5)
CHLORIDE SERPL-SCNC: 95 MMOL/L (ref 98–107)
CO2 BLDA-SCNC: 33.9 MMOL/L (ref 22–33)
CO2 SERPL-SCNC: 29.7 MMOL/L (ref 22–29)
COHGB MFR BLD: 1.2 % (ref 0–5)
CREAT SERPL-MCNC: 0.41 MG/DL (ref 0.57–1)
CRP SERPL-MCNC: 22.25 MG/DL (ref 0–0.5)
DEPRECATED RDW RBC AUTO: 54.4 FL (ref 37–54)
EOSINOPHIL # BLD AUTO: 0.02 10*3/MM3 (ref 0–0.4)
EOSINOPHIL NFR BLD AUTO: 0.1 % (ref 0.3–6.2)
ERYTHROCYTE [DISTWIDTH] IN BLOOD BY AUTOMATED COUNT: 14.6 % (ref 12.3–15.4)
GAS FLOW AIRWAY: 2 LPM
GFR SERPL CREATININE-BSD FRML MDRD: >150 ML/MIN/1.73
GLOBULIN UR ELPH-MCNC: 3.7 GM/DL
GLUCOSE BLDC GLUCOMTR-MCNC: 102 MG/DL (ref 70–130)
GLUCOSE BLDC GLUCOMTR-MCNC: 106 MG/DL (ref 70–130)
GLUCOSE BLDC GLUCOMTR-MCNC: 112 MG/DL (ref 70–130)
GLUCOSE BLDC GLUCOMTR-MCNC: 116 MG/DL (ref 70–130)
GLUCOSE BLDC GLUCOMTR-MCNC: 118 MG/DL (ref 70–130)
GLUCOSE BLDC GLUCOMTR-MCNC: 98 MG/DL (ref 70–130)
GLUCOSE SERPL-MCNC: 113 MG/DL (ref 65–99)
HCO3 BLDA-SCNC: 32.3 MMOL/L (ref 20–26)
HCT VFR BLD AUTO: 29.5 % (ref 34–46.6)
HCT VFR BLD CALC: 22.5 % (ref 38–51)
HGB BLD-MCNC: 8.9 G/DL (ref 12–15.9)
HGB BLDA-MCNC: 7.3 G/DL (ref 13.5–17.5)
IMM GRANULOCYTES # BLD AUTO: 0.22 10*3/MM3 (ref 0–0.05)
IMM GRANULOCYTES NFR BLD AUTO: 0.9 % (ref 0–0.5)
INHALED O2 CONCENTRATION: 28 %
LYMPHOCYTES # BLD AUTO: 0.86 10*3/MM3 (ref 0.7–3.1)
LYMPHOCYTES NFR BLD AUTO: 3.5 % (ref 19.6–45.3)
Lab: ABNORMAL
MAGNESIUM SERPL-MCNC: 1.9 MG/DL (ref 1.6–2.4)
MCH RBC QN AUTO: 30.5 PG (ref 26.6–33)
MCHC RBC AUTO-ENTMCNC: 30.2 G/DL (ref 31.5–35.7)
MCV RBC AUTO: 101 FL (ref 79–97)
METHGB BLD QL: <1 % (ref 0–3)
MODALITY: ABNORMAL
MONOCYTES # BLD AUTO: 1.73 10*3/MM3 (ref 0.1–0.9)
MONOCYTES NFR BLD AUTO: 7 % (ref 5–12)
NEUTROPHILS NFR BLD AUTO: 21.82 10*3/MM3 (ref 1.7–7)
NEUTROPHILS NFR BLD AUTO: 87.9 % (ref 42.7–76)
NOTE: ABNORMAL
NRBC BLD AUTO-RTO: 0 /100 WBC (ref 0–0.2)
OXYHGB MFR BLDV: 91.6 % (ref 94–99)
PCO2 BLDA: 53 MM HG (ref 35–45)
PCO2 TEMP ADJ BLD: ABNORMAL MM[HG]
PH BLDA: 7.39 PH UNITS (ref 7.35–7.45)
PH, TEMP CORRECTED: ABNORMAL
PHOSPHATE SERPL-MCNC: 1.5 MG/DL (ref 2.5–4.5)
PHOSPHATE SERPL-MCNC: 2.2 MG/DL (ref 2.5–4.5)
PLATELET # BLD AUTO: 415 10*3/MM3 (ref 140–450)
PMV BLD AUTO: 9.9 FL (ref 6–12)
PO2 BLDA: 62.9 MM HG (ref 83–108)
PO2 TEMP ADJ BLD: ABNORMAL MM[HG]
POTASSIUM SERPL-SCNC: 3.7 MMOL/L (ref 3.5–5.2)
PROT SERPL-MCNC: 6.2 G/DL (ref 6–8.5)
RBC # BLD AUTO: 2.92 10*6/MM3 (ref 3.77–5.28)
SAO2 % BLDCOA: 92.8 % (ref 94–99)
SODIUM SERPL-SCNC: 133 MMOL/L (ref 136–145)
VENTILATOR MODE: ABNORMAL
WBC # BLD AUTO: 24.8 10*3/MM3 (ref 3.4–10.8)

## 2021-05-12 PROCEDURE — 82375 ASSAY CARBOXYHB QUANT: CPT

## 2021-05-12 PROCEDURE — 85060 BLOOD SMEAR INTERPRETATION: CPT | Performed by: INTERNAL MEDICINE

## 2021-05-12 PROCEDURE — 99232 SBSQ HOSP IP/OBS MODERATE 35: CPT | Performed by: INTERNAL MEDICINE

## 2021-05-12 PROCEDURE — 83050 HGB METHEMOGLOBIN QUAN: CPT

## 2021-05-12 PROCEDURE — 82805 BLOOD GASES W/O2 SATURATION: CPT

## 2021-05-12 PROCEDURE — 84100 ASSAY OF PHOSPHORUS: CPT | Performed by: INTERNAL MEDICINE

## 2021-05-12 PROCEDURE — 82962 GLUCOSE BLOOD TEST: CPT

## 2021-05-12 PROCEDURE — 85025 COMPLETE CBC W/AUTO DIFF WBC: CPT | Performed by: INTERNAL MEDICINE

## 2021-05-12 PROCEDURE — 83735 ASSAY OF MAGNESIUM: CPT | Performed by: INTERNAL MEDICINE

## 2021-05-12 PROCEDURE — 80053 COMPREHEN METABOLIC PANEL: CPT | Performed by: INTERNAL MEDICINE

## 2021-05-12 PROCEDURE — 36600 WITHDRAWAL OF ARTERIAL BLOOD: CPT

## 2021-05-12 PROCEDURE — 63710000001 PREDNISONE PER 5 MG: Performed by: INTERNAL MEDICINE

## 2021-05-12 PROCEDURE — 86140 C-REACTIVE PROTEIN: CPT | Performed by: INTERNAL MEDICINE

## 2021-05-12 PROCEDURE — 25010000002 CEFEPIME PER 500 MG: Performed by: STUDENT IN AN ORGANIZED HEALTH CARE EDUCATION/TRAINING PROGRAM

## 2021-05-12 PROCEDURE — 94799 UNLISTED PULMONARY SVC/PX: CPT

## 2021-05-12 RX ADMIN — APIXABAN 2.5 MG: 2.5 TABLET, FILM COATED ORAL at 08:40

## 2021-05-12 RX ADMIN — NICOTINE 1 PATCH: 7 PATCH, EXTENDED RELEASE TRANSDERMAL at 08:40

## 2021-05-12 RX ADMIN — BACLOFEN 10 MG: 10 TABLET ORAL at 20:19

## 2021-05-12 RX ADMIN — GUAIFENESIN 400 MG: 200 TABLET ORAL at 20:19

## 2021-05-12 RX ADMIN — METRONIDAZOLE 500 MG: 500 INJECTION, SOLUTION INTRAVENOUS at 13:57

## 2021-05-12 RX ADMIN — Medication 5 MG: at 20:19

## 2021-05-12 RX ADMIN — TRAZODONE HYDROCHLORIDE 50 MG: 50 TABLET ORAL at 20:20

## 2021-05-12 RX ADMIN — CARIPRAZINE 1.5 MG: 1.5 CAPSULE, GELATIN COATED ORAL at 08:40

## 2021-05-12 RX ADMIN — LISINOPRIL 10 MG: 10 TABLET ORAL at 08:40

## 2021-05-12 RX ADMIN — CEFEPIME HYDROCHLORIDE 2 G: 2 INJECTION, POWDER, FOR SOLUTION INTRAVENOUS at 02:57

## 2021-05-12 RX ADMIN — LEVOTHYROXINE SODIUM 200 MCG: 100 TABLET ORAL at 05:35

## 2021-05-12 RX ADMIN — PREDNISONE 5 MG: 5 TABLET ORAL at 08:39

## 2021-05-12 RX ADMIN — Medication 1 CAPSULE: at 08:39

## 2021-05-12 RX ADMIN — SODIUM CHLORIDE, PRESERVATIVE FREE 10 ML: 5 INJECTION INTRAVENOUS at 20:18

## 2021-05-12 RX ADMIN — MONTELUKAST SODIUM 10 MG: 10 TABLET, COATED ORAL at 08:39

## 2021-05-12 RX ADMIN — Medication 1 TABLET: at 20:20

## 2021-05-12 RX ADMIN — DOCUSATE SODIUM 200 MG: 100 CAPSULE, LIQUID FILLED ORAL at 08:39

## 2021-05-12 RX ADMIN — CEFEPIME HYDROCHLORIDE 2 G: 2 INJECTION, POWDER, FOR SOLUTION INTRAVENOUS at 13:57

## 2021-05-12 RX ADMIN — POTASSIUM CHLORIDE 20 MEQ: 20 TABLET, EXTENDED RELEASE ORAL at 08:39

## 2021-05-12 RX ADMIN — ASPIRIN 81 MG: 81 TABLET, CHEWABLE ORAL at 08:39

## 2021-05-12 RX ADMIN — AMLODIPINE BESYLATE 10 MG: 10 TABLET ORAL at 08:40

## 2021-05-12 RX ADMIN — GUAIFENESIN 400 MG: 200 TABLET ORAL at 08:39

## 2021-05-12 RX ADMIN — POTASSIUM PHOSPHATE, MONOBASIC AND POTASSIUM PHOSPHATE, DIBASIC 30 MMOL: 224; 236 INJECTION, SOLUTION, CONCENTRATE INTRAVENOUS at 05:35

## 2021-05-12 RX ADMIN — CARVEDILOL 12.5 MG: 6.25 TABLET, FILM COATED ORAL at 17:41

## 2021-05-12 RX ADMIN — Medication 1 CAPSULE: at 20:19

## 2021-05-12 RX ADMIN — CARVEDILOL 12.5 MG: 6.25 TABLET, FILM COATED ORAL at 08:39

## 2021-05-12 RX ADMIN — SODIUM CHLORIDE, PRESERVATIVE FREE 10 ML: 5 INJECTION INTRAVENOUS at 08:40

## 2021-05-12 RX ADMIN — METRONIDAZOLE 500 MG: 500 INJECTION, SOLUTION INTRAVENOUS at 05:35

## 2021-05-12 RX ADMIN — PANTOPRAZOLE SODIUM 40 MG: 40 TABLET, DELAYED RELEASE ORAL at 08:39

## 2021-05-12 RX ADMIN — APIXABAN 2.5 MG: 2.5 TABLET, FILM COATED ORAL at 20:19

## 2021-05-12 RX ADMIN — METRONIDAZOLE 500 MG: 500 INJECTION, SOLUTION INTRAVENOUS at 21:31

## 2021-05-12 RX ADMIN — ATORVASTATIN CALCIUM 10 MG: 10 TABLET, FILM COATED ORAL at 20:20

## 2021-05-13 LAB
ALBUMIN SERPL-MCNC: 1.97 G/DL (ref 3.5–5.2)
ALBUMIN/GLOB SERPL: 0.6 G/DL
ALP SERPL-CCNC: 71 U/L (ref 39–117)
ALT SERPL W P-5'-P-CCNC: 9 U/L (ref 1–33)
ANION GAP SERPL CALCULATED.3IONS-SCNC: 4 MMOL/L (ref 5–15)
AST SERPL-CCNC: 13 U/L (ref 1–32)
BASOPHILS # BLD AUTO: 0.06 10*3/MM3 (ref 0–0.2)
BASOPHILS NFR BLD AUTO: 0.3 % (ref 0–1.5)
BILIRUB SERPL-MCNC: 0.2 MG/DL (ref 0–1.2)
BUN SERPL-MCNC: 9 MG/DL (ref 8–23)
BUN/CREAT SERPL: 25 (ref 7–25)
CALCIUM SPEC-SCNC: 8 MG/DL (ref 8.6–10.5)
CHLORIDE SERPL-SCNC: 97 MMOL/L (ref 98–107)
CO2 SERPL-SCNC: 30 MMOL/L (ref 22–29)
CREAT SERPL-MCNC: 0.36 MG/DL (ref 0.57–1)
CRP SERPL-MCNC: 14.06 MG/DL (ref 0–0.5)
DEPRECATED RDW RBC AUTO: 53 FL (ref 37–54)
EOSINOPHIL # BLD AUTO: 0.07 10*3/MM3 (ref 0–0.4)
EOSINOPHIL NFR BLD AUTO: 0.4 % (ref 0.3–6.2)
ERYTHROCYTE [DISTWIDTH] IN BLOOD BY AUTOMATED COUNT: 14.7 % (ref 12.3–15.4)
GALACTOMANNAN AG SPEC IA-ACNC: 0.11 INDEX (ref 0–0.49)
GFR SERPL CREATININE-BSD FRML MDRD: >150 ML/MIN/1.73
GLOBULIN UR ELPH-MCNC: 3.1 GM/DL
GLUCOSE BLDC GLUCOMTR-MCNC: 139 MG/DL (ref 70–130)
GLUCOSE BLDC GLUCOMTR-MCNC: 142 MG/DL (ref 70–130)
GLUCOSE BLDC GLUCOMTR-MCNC: 176 MG/DL (ref 70–130)
GLUCOSE BLDC GLUCOMTR-MCNC: 191 MG/DL (ref 70–130)
GLUCOSE SERPL-MCNC: 121 MG/DL (ref 65–99)
HCT VFR BLD AUTO: 23.1 % (ref 34–46.6)
HCT VFR BLD AUTO: 24.3 % (ref 34–46.6)
HGB BLD-MCNC: 7.1 G/DL (ref 12–15.9)
HGB BLD-MCNC: 7.6 G/DL (ref 12–15.9)
IMM GRANULOCYTES # BLD AUTO: 0.22 10*3/MM3 (ref 0–0.05)
IMM GRANULOCYTES NFR BLD AUTO: 1.2 % (ref 0–0.5)
LYMPHOCYTES # BLD AUTO: 1.13 10*3/MM3 (ref 0.7–3.1)
LYMPHOCYTES NFR BLD AUTO: 6 % (ref 19.6–45.3)
MAGNESIUM SERPL-MCNC: 1.5 MG/DL (ref 1.6–2.4)
MCH RBC QN AUTO: 30.2 PG (ref 26.6–33)
MCHC RBC AUTO-ENTMCNC: 30.7 G/DL (ref 31.5–35.7)
MCV RBC AUTO: 98.3 FL (ref 79–97)
MONOCYTES # BLD AUTO: 1.51 10*3/MM3 (ref 0.1–0.9)
MONOCYTES NFR BLD AUTO: 8.1 % (ref 5–12)
NEUTROPHILS NFR BLD AUTO: 15.75 10*3/MM3 (ref 1.7–7)
NEUTROPHILS NFR BLD AUTO: 84 % (ref 42.7–76)
NRBC BLD AUTO-RTO: 0 /100 WBC (ref 0–0.2)
PHOSPHATE SERPL-MCNC: 1.6 MG/DL (ref 2.5–4.5)
PHOSPHATE SERPL-MCNC: 2.9 MG/DL (ref 2.5–4.5)
PLATELET # BLD AUTO: 358 10*3/MM3 (ref 140–450)
PMV BLD AUTO: 9.8 FL (ref 6–12)
POTASSIUM SERPL-SCNC: 4.6 MMOL/L (ref 3.5–5.2)
PROT SERPL-MCNC: 5.1 G/DL (ref 6–8.5)
RBC # BLD AUTO: 2.35 10*6/MM3 (ref 3.77–5.28)
SODIUM SERPL-SCNC: 131 MMOL/L (ref 136–145)
WBC # BLD AUTO: 18.74 10*3/MM3 (ref 3.4–10.8)

## 2021-05-13 PROCEDURE — 99232 SBSQ HOSP IP/OBS MODERATE 35: CPT | Performed by: INTERNAL MEDICINE

## 2021-05-13 PROCEDURE — 85025 COMPLETE CBC W/AUTO DIFF WBC: CPT | Performed by: INTERNAL MEDICINE

## 2021-05-13 PROCEDURE — 63710000001 PREDNISONE PER 5 MG: Performed by: INTERNAL MEDICINE

## 2021-05-13 PROCEDURE — 80053 COMPREHEN METABOLIC PANEL: CPT | Performed by: INTERNAL MEDICINE

## 2021-05-13 PROCEDURE — 85014 HEMATOCRIT: CPT | Performed by: INTERNAL MEDICINE

## 2021-05-13 PROCEDURE — 94799 UNLISTED PULMONARY SVC/PX: CPT

## 2021-05-13 PROCEDURE — 85018 HEMOGLOBIN: CPT | Performed by: INTERNAL MEDICINE

## 2021-05-13 PROCEDURE — 97165 OT EVAL LOW COMPLEX 30 MIN: CPT

## 2021-05-13 PROCEDURE — 25010000002 CEFEPIME PER 500 MG: Performed by: STUDENT IN AN ORGANIZED HEALTH CARE EDUCATION/TRAINING PROGRAM

## 2021-05-13 PROCEDURE — 86140 C-REACTIVE PROTEIN: CPT | Performed by: INTERNAL MEDICINE

## 2021-05-13 PROCEDURE — 83735 ASSAY OF MAGNESIUM: CPT | Performed by: INTERNAL MEDICINE

## 2021-05-13 PROCEDURE — 84100 ASSAY OF PHOSPHORUS: CPT | Performed by: INTERNAL MEDICINE

## 2021-05-13 PROCEDURE — 25010000002 MAGNESIUM SULFATE 2 GM/50ML SOLUTION: Performed by: INTERNAL MEDICINE

## 2021-05-13 PROCEDURE — 82962 GLUCOSE BLOOD TEST: CPT

## 2021-05-13 RX ORDER — MAGNESIUM SULFATE HEPTAHYDRATE 40 MG/ML
2 INJECTION, SOLUTION INTRAVENOUS
Status: COMPLETED | OUTPATIENT
Start: 2021-05-13 | End: 2021-05-13

## 2021-05-13 RX ORDER — ACETAMINOPHEN 325 MG/1
650 TABLET ORAL EVERY 6 HOURS PRN
Status: DISCONTINUED | OUTPATIENT
Start: 2021-05-13 | End: 2021-05-17 | Stop reason: HOSPADM

## 2021-05-13 RX ADMIN — CEFEPIME HYDROCHLORIDE 2 G: 2 INJECTION, POWDER, FOR SOLUTION INTRAVENOUS at 15:38

## 2021-05-13 RX ADMIN — POTASSIUM CHLORIDE 20 MEQ: 20 TABLET, EXTENDED RELEASE ORAL at 08:11

## 2021-05-13 RX ADMIN — APIXABAN 2.5 MG: 2.5 TABLET, FILM COATED ORAL at 22:03

## 2021-05-13 RX ADMIN — CARIPRAZINE 1.5 MG: 1.5 CAPSULE, GELATIN COATED ORAL at 08:10

## 2021-05-13 RX ADMIN — CARVEDILOL 12.5 MG: 6.25 TABLET, FILM COATED ORAL at 17:04

## 2021-05-13 RX ADMIN — LISINOPRIL 10 MG: 10 TABLET ORAL at 08:11

## 2021-05-13 RX ADMIN — MAGNESIUM SULFATE HEPTAHYDRATE 2 G: 40 INJECTION, SOLUTION INTRAVENOUS at 13:30

## 2021-05-13 RX ADMIN — Medication 1 CAPSULE: at 22:05

## 2021-05-13 RX ADMIN — NICOTINE 1 PATCH: 7 PATCH, EXTENDED RELEASE TRANSDERMAL at 08:12

## 2021-05-13 RX ADMIN — SODIUM CHLORIDE, PRESERVATIVE FREE 10 ML: 5 INJECTION INTRAVENOUS at 08:11

## 2021-05-13 RX ADMIN — MONTELUKAST SODIUM 10 MG: 10 TABLET, COATED ORAL at 08:11

## 2021-05-13 RX ADMIN — SODIUM CHLORIDE, PRESERVATIVE FREE 10 ML: 5 INJECTION INTRAVENOUS at 21:36

## 2021-05-13 RX ADMIN — MAGNESIUM SULFATE HEPTAHYDRATE 2 G: 40 INJECTION, SOLUTION INTRAVENOUS at 17:04

## 2021-05-13 RX ADMIN — CEFEPIME HYDROCHLORIDE 2 G: 2 INJECTION, POWDER, FOR SOLUTION INTRAVENOUS at 01:24

## 2021-05-13 RX ADMIN — GUAIFENESIN 400 MG: 200 TABLET ORAL at 22:04

## 2021-05-13 RX ADMIN — HYDROCODONE BITARTRATE AND ACETAMINOPHEN 1 TABLET: 5; 325 TABLET ORAL at 15:38

## 2021-05-13 RX ADMIN — PANTOPRAZOLE SODIUM 40 MG: 40 TABLET, DELAYED RELEASE ORAL at 08:11

## 2021-05-13 RX ADMIN — DOCUSATE SODIUM 200 MG: 100 CAPSULE, LIQUID FILLED ORAL at 08:11

## 2021-05-13 RX ADMIN — GUAIFENESIN 400 MG: 200 TABLET ORAL at 08:11

## 2021-05-13 RX ADMIN — SODIUM PHOSPHATE, MONOBASIC, MONOHYDRATE AND SODIUM PHOSPHATE, DIBASIC, ANHYDROUS 30 MMOL: 276; 142 INJECTION, SOLUTION INTRAVENOUS at 03:37

## 2021-05-13 RX ADMIN — METRONIDAZOLE 500 MG: 500 INJECTION, SOLUTION INTRAVENOUS at 05:40

## 2021-05-13 RX ADMIN — APIXABAN 2.5 MG: 2.5 TABLET, FILM COATED ORAL at 08:11

## 2021-05-13 RX ADMIN — TRAZODONE HYDROCHLORIDE 50 MG: 50 TABLET ORAL at 22:05

## 2021-05-13 RX ADMIN — ASPIRIN 81 MG: 81 TABLET, CHEWABLE ORAL at 08:11

## 2021-05-13 RX ADMIN — Medication 1 CAPSULE: at 08:11

## 2021-05-13 RX ADMIN — METRONIDAZOLE 500 MG: 500 INJECTION, SOLUTION INTRAVENOUS at 15:38

## 2021-05-13 RX ADMIN — ACETAMINOPHEN 650 MG: 325 TABLET ORAL at 21:36

## 2021-05-13 RX ADMIN — Medication 5 MG: at 22:04

## 2021-05-13 RX ADMIN — AMLODIPINE BESYLATE 10 MG: 10 TABLET ORAL at 08:11

## 2021-05-13 RX ADMIN — METRONIDAZOLE 500 MG: 500 INJECTION, SOLUTION INTRAVENOUS at 22:02

## 2021-05-13 RX ADMIN — ATORVASTATIN CALCIUM 10 MG: 10 TABLET, FILM COATED ORAL at 22:05

## 2021-05-13 RX ADMIN — BACLOFEN 10 MG: 10 TABLET ORAL at 22:05

## 2021-05-13 RX ADMIN — PREDNISONE 5 MG: 5 TABLET ORAL at 08:11

## 2021-05-13 RX ADMIN — LEVOTHYROXINE SODIUM 200 MCG: 100 TABLET ORAL at 05:40

## 2021-05-13 RX ADMIN — CARVEDILOL 12.5 MG: 6.25 TABLET, FILM COATED ORAL at 08:10

## 2021-05-13 RX ADMIN — Medication 1 TABLET: at 22:05

## 2021-05-13 RX ADMIN — MAGNESIUM SULFATE HEPTAHYDRATE 2 G: 40 INJECTION, SOLUTION INTRAVENOUS at 11:54

## 2021-05-14 LAB
ALBUMIN SERPL-MCNC: 2.15 G/DL (ref 3.5–5.2)
ALBUMIN/GLOB SERPL: 0.6 G/DL
ALP SERPL-CCNC: 78 U/L (ref 39–117)
ALT SERPL W P-5'-P-CCNC: 8 U/L (ref 1–33)
ANION GAP SERPL CALCULATED.3IONS-SCNC: 9.3 MMOL/L (ref 5–15)
AST SERPL-CCNC: 8 U/L (ref 1–32)
BACTERIA SPEC AEROBE CULT: NORMAL
BACTERIA SPEC AEROBE CULT: NORMAL
BASOPHILS # BLD AUTO: 0.05 10*3/MM3 (ref 0–0.2)
BASOPHILS NFR BLD AUTO: 0.2 % (ref 0–1.5)
BILIRUB SERPL-MCNC: <0.2 MG/DL (ref 0–1.2)
BUN SERPL-MCNC: 7 MG/DL (ref 8–23)
BUN/CREAT SERPL: 14.9 (ref 7–25)
C IMMITIS IGM SER-ACNC: 0.2 IV
CALCIUM SPEC-SCNC: 7.4 MG/DL (ref 8.6–10.5)
CHLORIDE SERPL-SCNC: 99 MMOL/L (ref 98–107)
CO2 SERPL-SCNC: 24.7 MMOL/L (ref 22–29)
CREAT SERPL-MCNC: 0.47 MG/DL (ref 0.57–1)
CRP SERPL-MCNC: 8.18 MG/DL (ref 0–0.5)
CYTOLOGIST CVX/VAG CYTO: NORMAL
DEPRECATED RDW RBC AUTO: 52 FL (ref 37–54)
EOSINOPHIL # BLD AUTO: 0.04 10*3/MM3 (ref 0–0.4)
EOSINOPHIL NFR BLD AUTO: 0.2 % (ref 0.3–6.2)
ERYTHROCYTE [DISTWIDTH] IN BLOOD BY AUTOMATED COUNT: 14.6 % (ref 12.3–15.4)
GFR SERPL CREATININE-BSD FRML MDRD: 129 ML/MIN/1.73
GLOBULIN UR ELPH-MCNC: 3.4 GM/DL
GLUCOSE BLDC GLUCOMTR-MCNC: 152 MG/DL (ref 70–130)
GLUCOSE BLDC GLUCOMTR-MCNC: 153 MG/DL (ref 70–130)
GLUCOSE BLDC GLUCOMTR-MCNC: 156 MG/DL (ref 70–130)
GLUCOSE BLDC GLUCOMTR-MCNC: 165 MG/DL (ref 70–130)
GLUCOSE BLDC GLUCOMTR-MCNC: 272 MG/DL (ref 70–130)
GLUCOSE SERPL-MCNC: 155 MG/DL (ref 65–99)
HCT VFR BLD AUTO: 22.6 % (ref 34–46.6)
HGB BLD-MCNC: 7.1 G/DL (ref 12–15.9)
IMM GRANULOCYTES # BLD AUTO: 0.32 10*3/MM3 (ref 0–0.05)
IMM GRANULOCYTES NFR BLD AUTO: 1.5 % (ref 0–0.5)
LYMPHOCYTES # BLD AUTO: 1.13 10*3/MM3 (ref 0.7–3.1)
LYMPHOCYTES NFR BLD AUTO: 5.4 % (ref 19.6–45.3)
MAGNESIUM SERPL-MCNC: 2.4 MG/DL (ref 1.6–2.4)
MCH RBC QN AUTO: 30.5 PG (ref 26.6–33)
MCHC RBC AUTO-ENTMCNC: 31.4 G/DL (ref 31.5–35.7)
MCV RBC AUTO: 97 FL (ref 79–97)
MONOCYTES # BLD AUTO: 1.98 10*3/MM3 (ref 0.1–0.9)
MONOCYTES NFR BLD AUTO: 9.5 % (ref 5–12)
NEUTROPHILS NFR BLD AUTO: 17.3 10*3/MM3 (ref 1.7–7)
NEUTROPHILS NFR BLD AUTO: 83.2 % (ref 42.7–76)
NRBC BLD AUTO-RTO: 0 /100 WBC (ref 0–0.2)
PATH INTERP BLD-IMP: NORMAL
PHOSPHATE SERPL-MCNC: 2.2 MG/DL (ref 2.5–4.5)
PHOSPHATE SERPL-MCNC: 2.3 MG/DL (ref 2.5–4.5)
PLATELET # BLD AUTO: 377 10*3/MM3 (ref 140–450)
PMV BLD AUTO: 9.6 FL (ref 6–12)
POTASSIUM SERPL-SCNC: 4 MMOL/L (ref 3.5–5.2)
PROT SERPL-MCNC: 5.5 G/DL (ref 6–8.5)
QT INTERVAL: 394 MS
QTC INTERVAL: 443 MS
RBC # BLD AUTO: 2.33 10*6/MM3 (ref 3.77–5.28)
SODIUM SERPL-SCNC: 133 MMOL/L (ref 136–145)
WBC # BLD AUTO: 20.82 10*3/MM3 (ref 3.4–10.8)

## 2021-05-14 PROCEDURE — 85025 COMPLETE CBC W/AUTO DIFF WBC: CPT | Performed by: INTERNAL MEDICINE

## 2021-05-14 PROCEDURE — 83735 ASSAY OF MAGNESIUM: CPT | Performed by: INTERNAL MEDICINE

## 2021-05-14 PROCEDURE — 25010000002 ONDANSETRON PER 1 MG: Performed by: INTERNAL MEDICINE

## 2021-05-14 PROCEDURE — 94799 UNLISTED PULMONARY SVC/PX: CPT

## 2021-05-14 PROCEDURE — 86140 C-REACTIVE PROTEIN: CPT | Performed by: INTERNAL MEDICINE

## 2021-05-14 PROCEDURE — 82962 GLUCOSE BLOOD TEST: CPT

## 2021-05-14 PROCEDURE — 25010000002 CEFEPIME PER 500 MG: Performed by: INTERNAL MEDICINE

## 2021-05-14 PROCEDURE — 84100 ASSAY OF PHOSPHORUS: CPT | Performed by: INTERNAL MEDICINE

## 2021-05-14 PROCEDURE — 05HA33Z INSERTION OF INFUSION DEVICE INTO LEFT BRACHIAL VEIN, PERCUTANEOUS APPROACH: ICD-10-PCS | Performed by: INTERNAL MEDICINE

## 2021-05-14 PROCEDURE — 25010000002 CEFEPIME PER 500 MG: Performed by: PHYSICIAN ASSISTANT

## 2021-05-14 PROCEDURE — 80053 COMPREHEN METABOLIC PANEL: CPT | Performed by: INTERNAL MEDICINE

## 2021-05-14 PROCEDURE — 63710000001 PROMETHAZINE PER 12.5 MG: Performed by: INTERNAL MEDICINE

## 2021-05-14 PROCEDURE — 84100 ASSAY OF PHOSPHORUS: CPT | Performed by: HOSPITALIST

## 2021-05-14 PROCEDURE — 93005 ELECTROCARDIOGRAM TRACING: CPT | Performed by: HOSPITALIST

## 2021-05-14 PROCEDURE — 63710000001 PREDNISONE PER 5 MG: Performed by: INTERNAL MEDICINE

## 2021-05-14 PROCEDURE — 99232 SBSQ HOSP IP/OBS MODERATE 35: CPT | Performed by: INTERNAL MEDICINE

## 2021-05-14 PROCEDURE — 93010 ELECTROCARDIOGRAM REPORT: CPT | Performed by: INTERNAL MEDICINE

## 2021-05-14 RX ORDER — METRONIDAZOLE 250 MG/1
500 TABLET ORAL EVERY 8 HOURS SCHEDULED
Status: DISCONTINUED | OUTPATIENT
Start: 2021-05-14 | End: 2021-05-17 | Stop reason: HOSPADM

## 2021-05-14 RX ADMIN — SODIUM CHLORIDE, PRESERVATIVE FREE 10 ML: 5 INJECTION INTRAVENOUS at 20:58

## 2021-05-14 RX ADMIN — TRAZODONE HYDROCHLORIDE 50 MG: 50 TABLET ORAL at 20:59

## 2021-05-14 RX ADMIN — Medication 1 TABLET: at 20:58

## 2021-05-14 RX ADMIN — POTASSIUM CHLORIDE 20 MEQ: 20 TABLET, EXTENDED RELEASE ORAL at 08:59

## 2021-05-14 RX ADMIN — LISINOPRIL 10 MG: 10 TABLET ORAL at 09:05

## 2021-05-14 RX ADMIN — BACLOFEN 10 MG: 10 TABLET ORAL at 20:58

## 2021-05-14 RX ADMIN — LEVOTHYROXINE SODIUM 200 MCG: 100 TABLET ORAL at 05:59

## 2021-05-14 RX ADMIN — METRONIDAZOLE 500 MG: 500 INJECTION, SOLUTION INTRAVENOUS at 05:19

## 2021-05-14 RX ADMIN — APIXABAN 2.5 MG: 2.5 TABLET, FILM COATED ORAL at 08:58

## 2021-05-14 RX ADMIN — GUAIFENESIN 400 MG: 200 TABLET ORAL at 08:58

## 2021-05-14 RX ADMIN — CEFEPIME HYDROCHLORIDE 2 G: 2 INJECTION, POWDER, FOR SOLUTION INTRAVENOUS at 14:13

## 2021-05-14 RX ADMIN — Medication 5 MG: at 20:58

## 2021-05-14 RX ADMIN — METRONIDAZOLE 500 MG: 250 TABLET ORAL at 21:00

## 2021-05-14 RX ADMIN — METRONIDAZOLE 500 MG: 250 TABLET ORAL at 14:13

## 2021-05-14 RX ADMIN — MONTELUKAST SODIUM 10 MG: 10 TABLET, COATED ORAL at 08:58

## 2021-05-14 RX ADMIN — HYDROCODONE BITARTRATE AND ACETAMINOPHEN 1 TABLET: 5; 325 TABLET ORAL at 16:16

## 2021-05-14 RX ADMIN — CARIPRAZINE 1.5 MG: 1.5 CAPSULE, GELATIN COATED ORAL at 09:01

## 2021-05-14 RX ADMIN — ASPIRIN 81 MG: 81 TABLET, CHEWABLE ORAL at 08:59

## 2021-05-14 RX ADMIN — GUAIFENESIN 400 MG: 200 TABLET ORAL at 20:58

## 2021-05-14 RX ADMIN — DOCUSATE SODIUM 200 MG: 100 CAPSULE, LIQUID FILLED ORAL at 09:04

## 2021-05-14 RX ADMIN — PREDNISONE 5 MG: 5 TABLET ORAL at 08:58

## 2021-05-14 RX ADMIN — PANTOPRAZOLE SODIUM 40 MG: 40 TABLET, DELAYED RELEASE ORAL at 08:58

## 2021-05-14 RX ADMIN — SODIUM CHLORIDE, PRESERVATIVE FREE 10 ML: 5 INJECTION INTRAVENOUS at 08:57

## 2021-05-14 RX ADMIN — SODIUM PHOSPHATE, MONOBASIC, MONOHYDRATE AND SODIUM PHOSPHATE, DIBASIC, ANHYDROUS 15 MMOL: 276; 142 INJECTION, SOLUTION INTRAVENOUS at 05:09

## 2021-05-14 RX ADMIN — Medication 1 CAPSULE: at 08:58

## 2021-05-14 RX ADMIN — CEFEPIME HYDROCHLORIDE 2 G: 2 INJECTION, POWDER, FOR SOLUTION INTRAVENOUS at 01:39

## 2021-05-14 RX ADMIN — ATORVASTATIN CALCIUM 10 MG: 10 TABLET, FILM COATED ORAL at 20:59

## 2021-05-14 RX ADMIN — AMLODIPINE BESYLATE 10 MG: 10 TABLET ORAL at 09:05

## 2021-05-14 RX ADMIN — APIXABAN 2.5 MG: 2.5 TABLET, FILM COATED ORAL at 20:58

## 2021-05-14 RX ADMIN — Medication 1 CAPSULE: at 20:58

## 2021-05-14 RX ADMIN — CARVEDILOL 12.5 MG: 6.25 TABLET, FILM COATED ORAL at 18:11

## 2021-05-14 RX ADMIN — CARVEDILOL 12.5 MG: 6.25 TABLET, FILM COATED ORAL at 09:05

## 2021-05-14 RX ADMIN — ONDANSETRON 4 MG: 2 INJECTION INTRAMUSCULAR; INTRAVENOUS at 06:00

## 2021-05-14 RX ADMIN — NICOTINE 1 PATCH: 7 PATCH, EXTENDED RELEASE TRANSDERMAL at 09:04

## 2021-05-14 RX ADMIN — PROMETHAZINE HYDROCHLORIDE 12.5 MG: 12.5 TABLET ORAL at 00:19

## 2021-05-15 LAB
ABO GROUP BLD: NORMAL
ABO GROUP BLD: NORMAL
ALBUMIN SERPL-MCNC: 2.11 G/DL (ref 3.5–5.2)
ALBUMIN/GLOB SERPL: 0.7 G/DL
ALP SERPL-CCNC: 67 U/L (ref 39–117)
ALT SERPL W P-5'-P-CCNC: 8 U/L (ref 1–33)
ANION GAP SERPL CALCULATED.3IONS-SCNC: 4.8 MMOL/L (ref 5–15)
AST SERPL-CCNC: 7 U/L (ref 1–32)
BASOPHILS # BLD AUTO: 0.04 10*3/MM3 (ref 0–0.2)
BASOPHILS NFR BLD AUTO: 0.2 % (ref 0–1.5)
BILIRUB SERPL-MCNC: <0.2 MG/DL (ref 0–1.2)
BLD GP AB SCN SERPL QL: NEGATIVE
BUN SERPL-MCNC: 10 MG/DL (ref 8–23)
BUN/CREAT SERPL: 26.3 (ref 7–25)
CALCIUM SPEC-SCNC: 7.4 MG/DL (ref 8.6–10.5)
CHLORIDE SERPL-SCNC: 99 MMOL/L (ref 98–107)
CO2 SERPL-SCNC: 28.2 MMOL/L (ref 22–29)
CREAT SERPL-MCNC: 0.38 MG/DL (ref 0.57–1)
CRP SERPL-MCNC: 4.71 MG/DL (ref 0–0.5)
DEPRECATED RDW RBC AUTO: 53.9 FL (ref 37–54)
EOSINOPHIL # BLD AUTO: 0.07 10*3/MM3 (ref 0–0.4)
EOSINOPHIL NFR BLD AUTO: 0.4 % (ref 0.3–6.2)
ERYTHROCYTE [DISTWIDTH] IN BLOOD BY AUTOMATED COUNT: 14.9 % (ref 12.3–15.4)
GFR SERPL CREATININE-BSD FRML MDRD: >150 ML/MIN/1.73
GLOBULIN UR ELPH-MCNC: 3 GM/DL
GLUCOSE BLDC GLUCOMTR-MCNC: 151 MG/DL (ref 70–130)
GLUCOSE BLDC GLUCOMTR-MCNC: 175 MG/DL (ref 70–130)
GLUCOSE BLDC GLUCOMTR-MCNC: 192 MG/DL (ref 70–130)
GLUCOSE BLDC GLUCOMTR-MCNC: 195 MG/DL (ref 70–130)
GLUCOSE SERPL-MCNC: 150 MG/DL (ref 65–99)
HCT VFR BLD AUTO: 19.8 % (ref 34–46.6)
HCT VFR BLD AUTO: 20.9 % (ref 34–46.6)
HCT VFR BLD AUTO: 26.8 % (ref 34–46.6)
HGB BLD-MCNC: 6.3 G/DL (ref 12–15.9)
HGB BLD-MCNC: 6.4 G/DL (ref 12–15.9)
HGB BLD-MCNC: 8.6 G/DL (ref 12–15.9)
IMM GRANULOCYTES # BLD AUTO: 0.43 10*3/MM3 (ref 0–0.05)
IMM GRANULOCYTES NFR BLD AUTO: 2.5 % (ref 0–0.5)
LYMPHOCYTES # BLD AUTO: 1.55 10*3/MM3 (ref 0.7–3.1)
LYMPHOCYTES NFR BLD AUTO: 9 % (ref 19.6–45.3)
MAGNESIUM SERPL-MCNC: 1.9 MG/DL (ref 1.6–2.4)
MCH RBC QN AUTO: 30.3 PG (ref 26.6–33)
MCHC RBC AUTO-ENTMCNC: 30.6 G/DL (ref 31.5–35.7)
MCV RBC AUTO: 99.1 FL (ref 79–97)
MONOCYTES # BLD AUTO: 1.99 10*3/MM3 (ref 0.1–0.9)
MONOCYTES NFR BLD AUTO: 11.6 % (ref 5–12)
NEUTROPHILS NFR BLD AUTO: 13.07 10*3/MM3 (ref 1.7–7)
NEUTROPHILS NFR BLD AUTO: 76.3 % (ref 42.7–76)
NRBC BLD AUTO-RTO: 0 /100 WBC (ref 0–0.2)
PHOSPHATE SERPL-MCNC: 1.9 MG/DL (ref 2.5–4.5)
PHOSPHATE SERPL-MCNC: 2.1 MG/DL (ref 2.5–4.5)
PLATELET # BLD AUTO: 360 10*3/MM3 (ref 140–450)
PMV BLD AUTO: 9.8 FL (ref 6–12)
POTASSIUM SERPL-SCNC: 4.5 MMOL/L (ref 3.5–5.2)
PROT SERPL-MCNC: 5.1 G/DL (ref 6–8.5)
RBC # BLD AUTO: 2.11 10*6/MM3 (ref 3.77–5.28)
RH BLD: POSITIVE
RH BLD: POSITIVE
SODIUM SERPL-SCNC: 132 MMOL/L (ref 136–145)
T&S EXPIRATION DATE: NORMAL
WBC # BLD AUTO: 17.15 10*3/MM3 (ref 3.4–10.8)

## 2021-05-15 PROCEDURE — 36430 TRANSFUSION BLD/BLD COMPNT: CPT

## 2021-05-15 PROCEDURE — 94799 UNLISTED PULMONARY SVC/PX: CPT

## 2021-05-15 PROCEDURE — 85014 HEMATOCRIT: CPT | Performed by: PHYSICIAN ASSISTANT

## 2021-05-15 PROCEDURE — 86901 BLOOD TYPING SEROLOGIC RH(D): CPT | Performed by: PHYSICIAN ASSISTANT

## 2021-05-15 PROCEDURE — P9016 RBC LEUKOCYTES REDUCED: HCPCS

## 2021-05-15 PROCEDURE — 82962 GLUCOSE BLOOD TEST: CPT

## 2021-05-15 PROCEDURE — 86850 RBC ANTIBODY SCREEN: CPT | Performed by: PHYSICIAN ASSISTANT

## 2021-05-15 PROCEDURE — 80053 COMPREHEN METABOLIC PANEL: CPT | Performed by: INTERNAL MEDICINE

## 2021-05-15 PROCEDURE — 86900 BLOOD TYPING SEROLOGIC ABO: CPT | Performed by: PHYSICIAN ASSISTANT

## 2021-05-15 PROCEDURE — 85014 HEMATOCRIT: CPT | Performed by: INTERNAL MEDICINE

## 2021-05-15 PROCEDURE — 85018 HEMOGLOBIN: CPT | Performed by: INTERNAL MEDICINE

## 2021-05-15 PROCEDURE — 63710000001 PREDNISONE PER 5 MG: Performed by: INTERNAL MEDICINE

## 2021-05-15 PROCEDURE — 86140 C-REACTIVE PROTEIN: CPT | Performed by: INTERNAL MEDICINE

## 2021-05-15 PROCEDURE — 86901 BLOOD TYPING SEROLOGIC RH(D): CPT

## 2021-05-15 PROCEDURE — 84100 ASSAY OF PHOSPHORUS: CPT | Performed by: INTERNAL MEDICINE

## 2021-05-15 PROCEDURE — 83735 ASSAY OF MAGNESIUM: CPT | Performed by: INTERNAL MEDICINE

## 2021-05-15 PROCEDURE — 99232 SBSQ HOSP IP/OBS MODERATE 35: CPT | Performed by: INTERNAL MEDICINE

## 2021-05-15 PROCEDURE — 25010000003 MAGNESIUM SULFATE 4 GM/100ML SOLUTION: Performed by: INTERNAL MEDICINE

## 2021-05-15 PROCEDURE — 86900 BLOOD TYPING SEROLOGIC ABO: CPT

## 2021-05-15 PROCEDURE — 85018 HEMOGLOBIN: CPT | Performed by: PHYSICIAN ASSISTANT

## 2021-05-15 PROCEDURE — 85025 COMPLETE CBC W/AUTO DIFF WBC: CPT | Performed by: INTERNAL MEDICINE

## 2021-05-15 PROCEDURE — 86923 COMPATIBILITY TEST ELECTRIC: CPT

## 2021-05-15 PROCEDURE — 25010000002 CEFEPIME PER 500 MG: Performed by: PHYSICIAN ASSISTANT

## 2021-05-15 RX ORDER — MAGNESIUM SULFATE HEPTAHYDRATE 40 MG/ML
4 INJECTION, SOLUTION INTRAVENOUS ONCE
Status: COMPLETED | OUTPATIENT
Start: 2021-05-15 | End: 2021-05-15

## 2021-05-15 RX ADMIN — NICOTINE 1 PATCH: 7 PATCH, EXTENDED RELEASE TRANSDERMAL at 12:49

## 2021-05-15 RX ADMIN — APIXABAN 2.5 MG: 2.5 TABLET, FILM COATED ORAL at 08:46

## 2021-05-15 RX ADMIN — PREDNISONE 5 MG: 5 TABLET ORAL at 08:47

## 2021-05-15 RX ADMIN — APIXABAN 2.5 MG: 2.5 TABLET, FILM COATED ORAL at 19:31

## 2021-05-15 RX ADMIN — LISINOPRIL 10 MG: 10 TABLET ORAL at 08:46

## 2021-05-15 RX ADMIN — SODIUM CHLORIDE, PRESERVATIVE FREE 10 ML: 5 INJECTION INTRAVENOUS at 08:47

## 2021-05-15 RX ADMIN — Medication 1 CAPSULE: at 08:46

## 2021-05-15 RX ADMIN — CEFEPIME HYDROCHLORIDE 2 G: 2 INJECTION, POWDER, FOR SOLUTION INTRAVENOUS at 01:40

## 2021-05-15 RX ADMIN — GUAIFENESIN 400 MG: 200 TABLET ORAL at 19:31

## 2021-05-15 RX ADMIN — CARVEDILOL 12.5 MG: 6.25 TABLET, FILM COATED ORAL at 08:46

## 2021-05-15 RX ADMIN — MAGNESIUM SULFATE 4 G: 4 INJECTION INTRAVENOUS at 12:48

## 2021-05-15 RX ADMIN — METRONIDAZOLE 500 MG: 250 TABLET ORAL at 05:38

## 2021-05-15 RX ADMIN — LEVOTHYROXINE SODIUM 200 MCG: 100 TABLET ORAL at 05:38

## 2021-05-15 RX ADMIN — SODIUM CHLORIDE, PRESERVATIVE FREE 10 ML: 5 INJECTION INTRAVENOUS at 19:32

## 2021-05-15 RX ADMIN — Medication 1 CAPSULE: at 19:32

## 2021-05-15 RX ADMIN — Medication 5 MG: at 19:32

## 2021-05-15 RX ADMIN — METRONIDAZOLE 500 MG: 250 TABLET ORAL at 19:32

## 2021-05-15 RX ADMIN — ASPIRIN 81 MG: 81 TABLET, CHEWABLE ORAL at 08:46

## 2021-05-15 RX ADMIN — BACLOFEN 10 MG: 10 TABLET ORAL at 19:32

## 2021-05-15 RX ADMIN — MONTELUKAST SODIUM 10 MG: 10 TABLET, COATED ORAL at 08:47

## 2021-05-15 RX ADMIN — HYDROCODONE BITARTRATE AND ACETAMINOPHEN 1 TABLET: 5; 325 TABLET ORAL at 19:31

## 2021-05-15 RX ADMIN — CARVEDILOL 12.5 MG: 6.25 TABLET, FILM COATED ORAL at 17:30

## 2021-05-15 RX ADMIN — TRAZODONE HYDROCHLORIDE 50 MG: 50 TABLET ORAL at 19:31

## 2021-05-15 RX ADMIN — CEFEPIME HYDROCHLORIDE 2 G: 2 INJECTION, POWDER, FOR SOLUTION INTRAVENOUS at 15:28

## 2021-05-15 RX ADMIN — HYDROCODONE BITARTRATE AND ACETAMINOPHEN 1 TABLET: 5; 325 TABLET ORAL at 01:40

## 2021-05-15 RX ADMIN — GUAIFENESIN 400 MG: 200 TABLET ORAL at 10:06

## 2021-05-15 RX ADMIN — PANTOPRAZOLE SODIUM 40 MG: 40 TABLET, DELAYED RELEASE ORAL at 08:46

## 2021-05-15 RX ADMIN — DOCUSATE SODIUM 200 MG: 100 CAPSULE, LIQUID FILLED ORAL at 08:47

## 2021-05-15 RX ADMIN — AMLODIPINE BESYLATE 10 MG: 10 TABLET ORAL at 10:07

## 2021-05-15 RX ADMIN — Medication 1 TABLET: at 19:32

## 2021-05-15 RX ADMIN — CARIPRAZINE 1.5 MG: 1.5 CAPSULE, GELATIN COATED ORAL at 08:47

## 2021-05-15 RX ADMIN — ATORVASTATIN CALCIUM 10 MG: 10 TABLET, FILM COATED ORAL at 19:32

## 2021-05-15 RX ADMIN — METRONIDAZOLE 500 MG: 250 TABLET ORAL at 15:31

## 2021-05-15 RX ADMIN — POTASSIUM CHLORIDE 20 MEQ: 20 TABLET, EXTENDED RELEASE ORAL at 08:46

## 2021-05-15 RX ADMIN — SODIUM PHOSPHATE, MONOBASIC, MONOHYDRATE AND SODIUM PHOSPHATE, DIBASIC, ANHYDROUS 15 MMOL: 276; 142 INJECTION, SOLUTION INTRAVENOUS at 08:46

## 2021-05-16 LAB
ALBUMIN SERPL-MCNC: 1.89 G/DL (ref 3.5–5.2)
ALBUMIN/GLOB SERPL: 0.6 G/DL
ALP SERPL-CCNC: 70 U/L (ref 39–117)
ALT SERPL W P-5'-P-CCNC: 9 U/L (ref 1–33)
ANION GAP SERPL CALCULATED.3IONS-SCNC: 1.6 MMOL/L (ref 5–15)
ANION GAP SERPL CALCULATED.3IONS-SCNC: 5 MMOL/L (ref 5–15)
ANISOCYTOSIS BLD QL: ABNORMAL
AST SERPL-CCNC: 23 U/L (ref 1–32)
BH BB BLOOD EXPIRATION DATE: NORMAL
BH BB BLOOD TYPE BARCODE: 7300
BH BB DISPENSE STATUS: NORMAL
BH BB PRODUCT CODE: NORMAL
BH BB UNIT NUMBER: NORMAL
BILIRUB SERPL-MCNC: <0.2 MG/DL (ref 0–1.2)
BUN SERPL-MCNC: 7 MG/DL (ref 8–23)
BUN SERPL-MCNC: 9 MG/DL (ref 8–23)
BUN/CREAT SERPL: 15.2 (ref 7–25)
BUN/CREAT SERPL: 20.9 (ref 7–25)
CALCIUM SPEC-SCNC: 7.7 MG/DL (ref 8.6–10.5)
CALCIUM SPEC-SCNC: 7.7 MG/DL (ref 8.6–10.5)
CHLORIDE SERPL-SCNC: 95 MMOL/L (ref 98–107)
CHLORIDE SERPL-SCNC: 96 MMOL/L (ref 98–107)
CO2 SERPL-SCNC: 28 MMOL/L (ref 22–29)
CO2 SERPL-SCNC: 32.4 MMOL/L (ref 22–29)
CREAT SERPL-MCNC: 0.43 MG/DL (ref 0.57–1)
CREAT SERPL-MCNC: 0.46 MG/DL (ref 0.57–1)
CROSSMATCH INTERPRETATION: NORMAL
CRP SERPL-MCNC: 3.27 MG/DL (ref 0–0.5)
DEPRECATED RDW RBC AUTO: 54 FL (ref 37–54)
EOSINOPHIL # BLD MANUAL: 0.16 10*3/MM3 (ref 0–0.4)
EOSINOPHIL NFR BLD MANUAL: 1 % (ref 0.3–6.2)
ERYTHROCYTE [DISTWIDTH] IN BLOOD BY AUTOMATED COUNT: 15.6 % (ref 12.3–15.4)
GFR SERPL CREATININE-BSD FRML MDRD: 132 ML/MIN/1.73
GFR SERPL CREATININE-BSD FRML MDRD: 143 ML/MIN/1.73
GLOBULIN UR ELPH-MCNC: 3.2 GM/DL
GLUCOSE BLDC GLUCOMTR-MCNC: 142 MG/DL (ref 70–130)
GLUCOSE BLDC GLUCOMTR-MCNC: 152 MG/DL (ref 70–130)
GLUCOSE BLDC GLUCOMTR-MCNC: 170 MG/DL (ref 70–130)
GLUCOSE BLDC GLUCOMTR-MCNC: 177 MG/DL (ref 70–130)
GLUCOSE SERPL-MCNC: 119 MG/DL (ref 65–99)
GLUCOSE SERPL-MCNC: 161 MG/DL (ref 65–99)
HCT VFR BLD AUTO: 23.9 % (ref 34–46.6)
HGB BLD-MCNC: 7.7 G/DL (ref 12–15.9)
HYPOCHROMIA BLD QL: ABNORMAL
LYMPHOCYTES # BLD MANUAL: 2.58 10*3/MM3 (ref 0.7–3.1)
LYMPHOCYTES NFR BLD MANUAL: 11 % (ref 5–12)
LYMPHOCYTES NFR BLD MANUAL: 16 % (ref 19.6–45.3)
MACROCYTES BLD QL SMEAR: ABNORMAL
MAGNESIUM SERPL-MCNC: 2.3 MG/DL (ref 1.6–2.4)
MCH RBC QN AUTO: 30.8 PG (ref 26.6–33)
MCHC RBC AUTO-ENTMCNC: 32.2 G/DL (ref 31.5–35.7)
MCV RBC AUTO: 95.6 FL (ref 79–97)
MONOCYTES # BLD AUTO: 1.78 10*3/MM3 (ref 0.1–0.9)
NEUTROPHILS # BLD AUTO: 11.63 10*3/MM3 (ref 1.7–7)
NEUTROPHILS NFR BLD MANUAL: 68 % (ref 42.7–76)
NEUTS BAND NFR BLD MANUAL: 4 % (ref 0–5)
PHOSPHATE SERPL-MCNC: 2.3 MG/DL (ref 2.5–4.5)
PHOSPHATE SERPL-MCNC: 2.5 MG/DL (ref 2.5–4.5)
PLATELET # BLD AUTO: 380 10*3/MM3 (ref 140–450)
PMV BLD AUTO: 10.7 FL (ref 6–12)
POTASSIUM SERPL-SCNC: 4.9 MMOL/L (ref 3.5–5.2)
POTASSIUM SERPL-SCNC: 5.6 MMOL/L (ref 3.5–5.2)
PROT SERPL-MCNC: 5.1 G/DL (ref 6–8.5)
RBC # BLD AUTO: 2.5 10*6/MM3 (ref 3.77–5.28)
SCAN SLIDE: NORMAL
SMALL PLATELETS BLD QL SMEAR: ADEQUATE
SODIUM SERPL-SCNC: 129 MMOL/L (ref 136–145)
SODIUM SERPL-SCNC: 129 MMOL/L (ref 136–145)
SODIUM UR-SCNC: 121 MMOL/L
UNIT  ABO: NORMAL
UNIT  RH: NORMAL
WBC # BLD AUTO: 16.15 10*3/MM3 (ref 3.4–10.8)

## 2021-05-16 PROCEDURE — 85025 COMPLETE CBC W/AUTO DIFF WBC: CPT | Performed by: INTERNAL MEDICINE

## 2021-05-16 PROCEDURE — 25010000002 CEFEPIME PER 500 MG: Performed by: PHYSICIAN ASSISTANT

## 2021-05-16 PROCEDURE — 63710000001 PREDNISONE PER 5 MG: Performed by: INTERNAL MEDICINE

## 2021-05-16 PROCEDURE — 86140 C-REACTIVE PROTEIN: CPT | Performed by: INTERNAL MEDICINE

## 2021-05-16 PROCEDURE — 80053 COMPREHEN METABOLIC PANEL: CPT | Performed by: INTERNAL MEDICINE

## 2021-05-16 PROCEDURE — 94799 UNLISTED PULMONARY SVC/PX: CPT

## 2021-05-16 PROCEDURE — 82962 GLUCOSE BLOOD TEST: CPT

## 2021-05-16 PROCEDURE — 85007 BL SMEAR W/DIFF WBC COUNT: CPT | Performed by: INTERNAL MEDICINE

## 2021-05-16 PROCEDURE — 84100 ASSAY OF PHOSPHORUS: CPT | Performed by: HOSPITALIST

## 2021-05-16 PROCEDURE — 99232 SBSQ HOSP IP/OBS MODERATE 35: CPT | Performed by: INTERNAL MEDICINE

## 2021-05-16 PROCEDURE — 25010000002 MORPHINE PER 10 MG: Performed by: INTERNAL MEDICINE

## 2021-05-16 PROCEDURE — 83735 ASSAY OF MAGNESIUM: CPT | Performed by: INTERNAL MEDICINE

## 2021-05-16 PROCEDURE — 84300 ASSAY OF URINE SODIUM: CPT | Performed by: INTERNAL MEDICINE

## 2021-05-16 PROCEDURE — 84100 ASSAY OF PHOSPHORUS: CPT | Performed by: INTERNAL MEDICINE

## 2021-05-16 RX ADMIN — SODIUM CHLORIDE, PRESERVATIVE FREE 10 ML: 5 INJECTION INTRAVENOUS at 07:54

## 2021-05-16 RX ADMIN — APIXABAN 2.5 MG: 2.5 TABLET, FILM COATED ORAL at 20:08

## 2021-05-16 RX ADMIN — GUAIFENESIN 400 MG: 200 TABLET ORAL at 07:56

## 2021-05-16 RX ADMIN — CARVEDILOL 12.5 MG: 6.25 TABLET, FILM COATED ORAL at 07:53

## 2021-05-16 RX ADMIN — METRONIDAZOLE 500 MG: 250 TABLET ORAL at 03:10

## 2021-05-16 RX ADMIN — TRAZODONE HYDROCHLORIDE 50 MG: 50 TABLET ORAL at 20:08

## 2021-05-16 RX ADMIN — HYDROCODONE BITARTRATE AND ACETAMINOPHEN 1 TABLET: 5; 325 TABLET ORAL at 20:08

## 2021-05-16 RX ADMIN — PREDNISONE 5 MG: 5 TABLET ORAL at 07:52

## 2021-05-16 RX ADMIN — CEFEPIME HYDROCHLORIDE 2 G: 2 INJECTION, POWDER, FOR SOLUTION INTRAVENOUS at 02:37

## 2021-05-16 RX ADMIN — HYDROCODONE BITARTRATE AND ACETAMINOPHEN 1 TABLET: 5; 325 TABLET ORAL at 06:21

## 2021-05-16 RX ADMIN — ATORVASTATIN CALCIUM 10 MG: 10 TABLET, FILM COATED ORAL at 20:09

## 2021-05-16 RX ADMIN — SODIUM PHOSPHATE, MONOBASIC, MONOHYDRATE AND SODIUM PHOSPHATE, DIBASIC, ANHYDROUS 15 MMOL: 276; 142 INJECTION, SOLUTION INTRAVENOUS at 03:07

## 2021-05-16 RX ADMIN — Medication 1 CAPSULE: at 07:56

## 2021-05-16 RX ADMIN — AMLODIPINE BESYLATE 10 MG: 10 TABLET ORAL at 07:52

## 2021-05-16 RX ADMIN — MORPHINE SULFATE 2 MG: 2 INJECTION, SOLUTION INTRAMUSCULAR; INTRAVENOUS at 02:42

## 2021-05-16 RX ADMIN — Medication 1 CAPSULE: at 20:08

## 2021-05-16 RX ADMIN — LEVOTHYROXINE SODIUM 200 MCG: 100 TABLET ORAL at 03:07

## 2021-05-16 RX ADMIN — Medication 1 TABLET: at 20:08

## 2021-05-16 RX ADMIN — SODIUM PHOSPHATE, MONOBASIC, MONOHYDRATE AND SODIUM PHOSPHATE, DIBASIC, ANHYDROUS 15 MMOL: 276; 142 INJECTION, SOLUTION INTRAVENOUS at 16:45

## 2021-05-16 RX ADMIN — CARVEDILOL 12.5 MG: 6.25 TABLET, FILM COATED ORAL at 16:46

## 2021-05-16 RX ADMIN — GUAIFENESIN 400 MG: 200 TABLET ORAL at 20:08

## 2021-05-16 RX ADMIN — APIXABAN 2.5 MG: 2.5 TABLET, FILM COATED ORAL at 07:52

## 2021-05-16 RX ADMIN — CEFEPIME HYDROCHLORIDE 2 G: 2 INJECTION, POWDER, FOR SOLUTION INTRAVENOUS at 14:11

## 2021-05-16 RX ADMIN — NICOTINE 1 PATCH: 7 PATCH, EXTENDED RELEASE TRANSDERMAL at 07:54

## 2021-05-16 RX ADMIN — MONTELUKAST SODIUM 10 MG: 10 TABLET, COATED ORAL at 07:53

## 2021-05-16 RX ADMIN — METRONIDAZOLE 500 MG: 250 TABLET ORAL at 14:56

## 2021-05-16 RX ADMIN — BACLOFEN 10 MG: 10 TABLET ORAL at 20:09

## 2021-05-16 RX ADMIN — ASPIRIN 81 MG: 81 TABLET, CHEWABLE ORAL at 07:52

## 2021-05-16 RX ADMIN — Medication 5 MG: at 20:08

## 2021-05-16 RX ADMIN — DOCUSATE SODIUM 200 MG: 100 CAPSULE, LIQUID FILLED ORAL at 07:56

## 2021-05-16 RX ADMIN — CARIPRAZINE 1.5 MG: 1.5 CAPSULE, GELATIN COATED ORAL at 07:51

## 2021-05-16 RX ADMIN — PANTOPRAZOLE SODIUM 40 MG: 40 TABLET, DELAYED RELEASE ORAL at 07:53

## 2021-05-16 RX ADMIN — LISINOPRIL 10 MG: 10 TABLET ORAL at 07:52

## 2021-05-16 RX ADMIN — METRONIDAZOLE 500 MG: 250 TABLET ORAL at 20:08

## 2021-05-16 RX ADMIN — SODIUM CHLORIDE, PRESERVATIVE FREE 10 ML: 5 INJECTION INTRAVENOUS at 20:09

## 2021-05-17 VITALS
RESPIRATION RATE: 18 BRPM | SYSTOLIC BLOOD PRESSURE: 111 MMHG | WEIGHT: 110.3 LBS | OXYGEN SATURATION: 96 % | HEIGHT: 66 IN | DIASTOLIC BLOOD PRESSURE: 55 MMHG | BODY MASS INDEX: 17.73 KG/M2 | TEMPERATURE: 98 F | HEART RATE: 75 BPM

## 2021-05-17 LAB
ALBUMIN SERPL-MCNC: 1.95 G/DL (ref 3.5–5.2)
ALBUMIN/GLOB SERPL: 0.6 G/DL
ALP SERPL-CCNC: 61 U/L (ref 39–117)
ALT SERPL W P-5'-P-CCNC: 8 U/L (ref 1–33)
ANION GAP SERPL CALCULATED.3IONS-SCNC: 2.1 MMOL/L (ref 5–15)
ANION GAP SERPL CALCULATED.3IONS-SCNC: 3.2 MMOL/L (ref 5–15)
ANION GAP SERPL CALCULATED.3IONS-SCNC: 6 MMOL/L (ref 5–15)
AST SERPL-CCNC: 21 U/L (ref 1–32)
BASOPHILS # BLD AUTO: 0.04 10*3/MM3 (ref 0–0.2)
BASOPHILS NFR BLD AUTO: 0.3 % (ref 0–1.5)
BILIRUB SERPL-MCNC: <0.2 MG/DL (ref 0–1.2)
BUN SERPL-MCNC: 10 MG/DL (ref 8–23)
BUN SERPL-MCNC: 11 MG/DL (ref 8–23)
BUN SERPL-MCNC: 11 MG/DL (ref 8–23)
BUN/CREAT SERPL: 22.9 (ref 7–25)
BUN/CREAT SERPL: 22.9 (ref 7–25)
BUN/CREAT SERPL: 24.4 (ref 7–25)
CALCIUM SPEC-SCNC: 7.8 MG/DL (ref 8.6–10.5)
CALCIUM SPEC-SCNC: 7.9 MG/DL (ref 8.6–10.5)
CALCIUM SPEC-SCNC: 7.9 MG/DL (ref 8.6–10.5)
CHLORIDE SERPL-SCNC: 94 MMOL/L (ref 98–107)
CHLORIDE SERPL-SCNC: 95 MMOL/L (ref 98–107)
CHLORIDE SERPL-SCNC: 96 MMOL/L (ref 98–107)
CO2 SERPL-SCNC: 30 MMOL/L (ref 22–29)
CO2 SERPL-SCNC: 30.9 MMOL/L (ref 22–29)
CO2 SERPL-SCNC: 32.8 MMOL/L (ref 22–29)
CREAT SERPL-MCNC: 0.41 MG/DL (ref 0.57–1)
CREAT SERPL-MCNC: 0.48 MG/DL (ref 0.57–1)
CREAT SERPL-MCNC: 0.48 MG/DL (ref 0.57–1)
CRP SERPL-MCNC: 2.1 MG/DL (ref 0–0.5)
DEPRECATED RDW RBC AUTO: 51.8 FL (ref 37–54)
EOSINOPHIL # BLD AUTO: 0.05 10*3/MM3 (ref 0–0.4)
EOSINOPHIL NFR BLD AUTO: 0.3 % (ref 0.3–6.2)
ERYTHROCYTE [DISTWIDTH] IN BLOOD BY AUTOMATED COUNT: 15.1 % (ref 12.3–15.4)
GFR SERPL CREATININE-BSD FRML MDRD: 126 ML/MIN/1.73
GFR SERPL CREATININE-BSD FRML MDRD: 126 ML/MIN/1.73
GFR SERPL CREATININE-BSD FRML MDRD: >150 ML/MIN/1.73
GLOBULIN UR ELPH-MCNC: 3.1 GM/DL
GLUCOSE BLDC GLUCOMTR-MCNC: 141 MG/DL (ref 70–130)
GLUCOSE BLDC GLUCOMTR-MCNC: 143 MG/DL (ref 70–130)
GLUCOSE BLDC GLUCOMTR-MCNC: 195 MG/DL (ref 70–130)
GLUCOSE BLDC GLUCOMTR-MCNC: 90 MG/DL (ref 70–130)
GLUCOSE SERPL-MCNC: 101 MG/DL (ref 65–99)
GLUCOSE SERPL-MCNC: 161 MG/DL (ref 65–99)
GLUCOSE SERPL-MCNC: 95 MG/DL (ref 65–99)
HCT VFR BLD AUTO: 23.7 % (ref 34–46.6)
HGB BLD-MCNC: 7.7 G/DL (ref 12–15.9)
IMM GRANULOCYTES # BLD AUTO: 0.29 10*3/MM3 (ref 0–0.05)
IMM GRANULOCYTES NFR BLD AUTO: 1.9 % (ref 0–0.5)
LYMPHOCYTES # BLD AUTO: 1.64 10*3/MM3 (ref 0.7–3.1)
LYMPHOCYTES NFR BLD AUTO: 10.6 % (ref 19.6–45.3)
MAGNESIUM SERPL-MCNC: 1.8 MG/DL (ref 1.6–2.4)
MCH RBC QN AUTO: 30.8 PG (ref 26.6–33)
MCHC RBC AUTO-ENTMCNC: 32.5 G/DL (ref 31.5–35.7)
MCV RBC AUTO: 94.8 FL (ref 79–97)
MONOCYTES # BLD AUTO: 1.55 10*3/MM3 (ref 0.1–0.9)
MONOCYTES NFR BLD AUTO: 10 % (ref 5–12)
NEUTROPHILS NFR BLD AUTO: 11.93 10*3/MM3 (ref 1.7–7)
NEUTROPHILS NFR BLD AUTO: 76.9 % (ref 42.7–76)
NRBC BLD AUTO-RTO: 0 /100 WBC (ref 0–0.2)
PHOSPHATE SERPL-MCNC: 2.8 MG/DL (ref 2.5–4.5)
PLATELET # BLD AUTO: 425 10*3/MM3 (ref 140–450)
PMV BLD AUTO: 10 FL (ref 6–12)
POTASSIUM SERPL-SCNC: 4.8 MMOL/L (ref 3.5–5.2)
POTASSIUM SERPL-SCNC: 4.9 MMOL/L (ref 3.5–5.2)
POTASSIUM SERPL-SCNC: 5.5 MMOL/L (ref 3.5–5.2)
PROT SERPL-MCNC: 5 G/DL (ref 6–8.5)
RBC # BLD AUTO: 2.5 10*6/MM3 (ref 3.77–5.28)
SODIUM SERPL-SCNC: 127 MMOL/L (ref 136–145)
SODIUM SERPL-SCNC: 131 MMOL/L (ref 136–145)
SODIUM SERPL-SCNC: 132 MMOL/L (ref 136–145)
WBC # BLD AUTO: 15.5 10*3/MM3 (ref 3.4–10.8)

## 2021-05-17 PROCEDURE — 86140 C-REACTIVE PROTEIN: CPT | Performed by: INTERNAL MEDICINE

## 2021-05-17 PROCEDURE — 25010000003 MAGNESIUM SULFATE 4 GM/100ML SOLUTION: Performed by: INTERNAL MEDICINE

## 2021-05-17 PROCEDURE — 97530 THERAPEUTIC ACTIVITIES: CPT

## 2021-05-17 PROCEDURE — 80053 COMPREHEN METABOLIC PANEL: CPT | Performed by: INTERNAL MEDICINE

## 2021-05-17 PROCEDURE — 25010000002 ONDANSETRON PER 1 MG: Performed by: INTERNAL MEDICINE

## 2021-05-17 PROCEDURE — 63710000001 PREDNISONE PER 5 MG: Performed by: INTERNAL MEDICINE

## 2021-05-17 PROCEDURE — 84100 ASSAY OF PHOSPHORUS: CPT | Performed by: HOSPITALIST

## 2021-05-17 PROCEDURE — 99239 HOSP IP/OBS DSCHRG MGMT >30: CPT | Performed by: INTERNAL MEDICINE

## 2021-05-17 PROCEDURE — 94799 UNLISTED PULMONARY SVC/PX: CPT

## 2021-05-17 PROCEDURE — 82962 GLUCOSE BLOOD TEST: CPT

## 2021-05-17 PROCEDURE — 63710000001 PROCHLORPERAZINE MALEATE PER 5 MG: Performed by: INTERNAL MEDICINE

## 2021-05-17 PROCEDURE — 83735 ASSAY OF MAGNESIUM: CPT | Performed by: INTERNAL MEDICINE

## 2021-05-17 PROCEDURE — 85025 COMPLETE CBC W/AUTO DIFF WBC: CPT | Performed by: INTERNAL MEDICINE

## 2021-05-17 PROCEDURE — 25010000002 CEFEPIME PER 500 MG: Performed by: PHYSICIAN ASSISTANT

## 2021-05-17 PROCEDURE — 25010000002 CEFEPIME 2 G/NS 100 ML SOLUTION: Performed by: PHYSICIAN ASSISTANT

## 2021-05-17 RX ORDER — MAGNESIUM SULFATE HEPTAHYDRATE 40 MG/ML
4 INJECTION, SOLUTION INTRAVENOUS ONCE
Status: COMPLETED | OUTPATIENT
Start: 2021-05-17 | End: 2021-05-17

## 2021-05-17 RX ORDER — METRONIDAZOLE 500 MG/1
500 TABLET ORAL EVERY 8 HOURS SCHEDULED
Qty: 6 TABLET | Refills: 0
Start: 2021-05-17 | End: 2021-05-19

## 2021-05-17 RX ORDER — HYDROCODONE BITARTRATE AND ACETAMINOPHEN 5; 325 MG/1; MG/1
1 TABLET ORAL EVERY 8 HOURS PRN
Qty: 9 TABLET | Refills: 0 | Status: SHIPPED | OUTPATIENT
Start: 2021-05-17 | End: 2021-11-20

## 2021-05-17 RX ORDER — PROCHLORPERAZINE MALEATE 5 MG/1
5 TABLET ORAL ONCE
Status: COMPLETED | OUTPATIENT
Start: 2021-05-17 | End: 2021-05-17

## 2021-05-17 RX ADMIN — PREDNISONE 5 MG: 5 TABLET ORAL at 08:55

## 2021-05-17 RX ADMIN — SODIUM CHLORIDE, PRESERVATIVE FREE 10 ML: 5 INJECTION INTRAVENOUS at 08:56

## 2021-05-17 RX ADMIN — ONDANSETRON 4 MG: 2 INJECTION INTRAMUSCULAR; INTRAVENOUS at 09:53

## 2021-05-17 RX ADMIN — ASPIRIN 81 MG: 81 TABLET, CHEWABLE ORAL at 08:55

## 2021-05-17 RX ADMIN — GUAIFENESIN 400 MG: 200 TABLET ORAL at 08:55

## 2021-05-17 RX ADMIN — METRONIDAZOLE 500 MG: 250 TABLET ORAL at 13:52

## 2021-05-17 RX ADMIN — CARVEDILOL 12.5 MG: 6.25 TABLET, FILM COATED ORAL at 08:55

## 2021-05-17 RX ADMIN — APIXABAN 2.5 MG: 2.5 TABLET, FILM COATED ORAL at 19:25

## 2021-05-17 RX ADMIN — Medication 1 CAPSULE: at 19:24

## 2021-05-17 RX ADMIN — METRONIDAZOLE 500 MG: 250 TABLET ORAL at 03:37

## 2021-05-17 RX ADMIN — APIXABAN 2.5 MG: 2.5 TABLET, FILM COATED ORAL at 08:55

## 2021-05-17 RX ADMIN — MAGNESIUM SULFATE HEPTAHYDRATE 4 G: 4 INJECTION, SOLUTION INTRAVENOUS at 08:54

## 2021-05-17 RX ADMIN — DOCUSATE SODIUM 200 MG: 100 CAPSULE, LIQUID FILLED ORAL at 08:55

## 2021-05-17 RX ADMIN — CEFEPIME HYDROCHLORIDE 2 G: 2 INJECTION, POWDER, FOR SOLUTION INTRAVENOUS at 01:18

## 2021-05-17 RX ADMIN — TRAZODONE HYDROCHLORIDE 50 MG: 50 TABLET ORAL at 19:25

## 2021-05-17 RX ADMIN — Medication 1 CAPSULE: at 08:55

## 2021-05-17 RX ADMIN — Medication 1 TABLET: at 19:24

## 2021-05-17 RX ADMIN — PROCHLORPERAZINE MALEATE 5 MG: 5 TABLET ORAL at 13:52

## 2021-05-17 RX ADMIN — ATORVASTATIN CALCIUM 10 MG: 10 TABLET, FILM COATED ORAL at 19:24

## 2021-05-17 RX ADMIN — GUAIFENESIN 400 MG: 200 TABLET ORAL at 19:24

## 2021-05-17 RX ADMIN — CARIPRAZINE 1.5 MG: 1.5 CAPSULE, GELATIN COATED ORAL at 08:55

## 2021-05-17 RX ADMIN — MONTELUKAST SODIUM 10 MG: 10 TABLET, COATED ORAL at 08:55

## 2021-05-17 RX ADMIN — HYDROCODONE BITARTRATE AND ACETAMINOPHEN 1 TABLET: 5; 325 TABLET ORAL at 03:33

## 2021-05-17 RX ADMIN — AMLODIPINE BESYLATE 10 MG: 10 TABLET ORAL at 08:55

## 2021-05-17 RX ADMIN — BACLOFEN 10 MG: 10 TABLET ORAL at 19:25

## 2021-05-17 RX ADMIN — LEVOTHYROXINE SODIUM 200 MCG: 100 TABLET ORAL at 03:38

## 2021-05-17 RX ADMIN — Medication 5 MG: at 19:25

## 2021-05-17 RX ADMIN — LISINOPRIL 10 MG: 10 TABLET ORAL at 08:55

## 2021-05-17 RX ADMIN — NICOTINE 1 PATCH: 7 PATCH, EXTENDED RELEASE TRANSDERMAL at 08:55

## 2021-05-17 RX ADMIN — PANTOPRAZOLE SODIUM 40 MG: 40 TABLET, DELAYED RELEASE ORAL at 08:55

## 2021-05-17 RX ADMIN — CEFEPIME HYDROCHLORIDE 2 G: 2 INJECTION, POWDER, FOR SOLUTION INTRAVENOUS at 13:52

## 2021-05-17 RX ADMIN — SODIUM CHLORIDE, PRESERVATIVE FREE 10 ML: 5 INJECTION INTRAVENOUS at 19:25

## 2021-05-21 ENCOUNTER — LAB REQUISITION (OUTPATIENT)
Dept: LAB | Facility: HOSPITAL | Age: 76
End: 2021-05-21

## 2021-05-21 DIAGNOSIS — E83.42 HYPOMAGNESEMIA: ICD-10-CM

## 2021-05-21 DIAGNOSIS — I10 ESSENTIAL (PRIMARY) HYPERTENSION: ICD-10-CM

## 2021-05-21 DIAGNOSIS — D50.0 IRON DEFICIENCY ANEMIA SECONDARY TO BLOOD LOSS (CHRONIC): ICD-10-CM

## 2021-05-21 DIAGNOSIS — I48.91 UNSPECIFIED ATRIAL FIBRILLATION (HCC): ICD-10-CM

## 2021-05-21 LAB
ANION GAP SERPL CALCULATED.3IONS-SCNC: 3 MMOL/L (ref 5–15)
BASOPHILS # BLD AUTO: 0.07 10*3/MM3 (ref 0–0.2)
BASOPHILS NFR BLD AUTO: 0.3 % (ref 0–1.5)
BUN SERPL-MCNC: 13 MG/DL (ref 8–23)
BUN/CREAT SERPL: 35.1 (ref 7–25)
CALCIUM SPEC-SCNC: 8.4 MG/DL (ref 8.6–10.5)
CHLORIDE SERPL-SCNC: 93 MMOL/L (ref 98–107)
CO2 SERPL-SCNC: 36 MMOL/L (ref 22–29)
CREAT SERPL-MCNC: 0.37 MG/DL (ref 0.57–1)
CRP SERPL-MCNC: 0.9 MG/DL (ref 0–0.5)
DEPRECATED RDW RBC AUTO: 49.9 FL (ref 37–54)
EOSINOPHIL # BLD AUTO: 0.01 10*3/MM3 (ref 0–0.4)
EOSINOPHIL NFR BLD AUTO: 0 % (ref 0.3–6.2)
ERYTHROCYTE [DISTWIDTH] IN BLOOD BY AUTOMATED COUNT: 14.6 % (ref 12.3–15.4)
GFR SERPL CREATININE-BSD FRML MDRD: >150 ML/MIN/1.73
GLUCOSE SERPL-MCNC: 147 MG/DL (ref 65–99)
HCT VFR BLD AUTO: 23.2 % (ref 34–46.6)
HGB BLD-MCNC: 7.5 G/DL (ref 12–15.9)
IMM GRANULOCYTES # BLD AUTO: 0.18 10*3/MM3 (ref 0–0.05)
IMM GRANULOCYTES NFR BLD AUTO: 0.8 % (ref 0–0.5)
LYMPHOCYTES # BLD AUTO: 1.6 10*3/MM3 (ref 0.7–3.1)
LYMPHOCYTES NFR BLD AUTO: 7.1 % (ref 19.6–45.3)
MAGNESIUM SERPL-MCNC: 1.7 MG/DL (ref 1.6–2.4)
MCH RBC QN AUTO: 30.5 PG (ref 26.6–33)
MCHC RBC AUTO-ENTMCNC: 32.3 G/DL (ref 31.5–35.7)
MCV RBC AUTO: 94.3 FL (ref 79–97)
MONOCYTES # BLD AUTO: 1.29 10*3/MM3 (ref 0.1–0.9)
MONOCYTES NFR BLD AUTO: 5.7 % (ref 5–12)
NEUTROPHILS NFR BLD AUTO: 19.36 10*3/MM3 (ref 1.7–7)
NEUTROPHILS NFR BLD AUTO: 86.1 % (ref 42.7–76)
NRBC BLD AUTO-RTO: 0 /100 WBC (ref 0–0.2)
PHOSPHATE SERPL-MCNC: 3.4 MG/DL (ref 2.5–4.5)
PLATELET # BLD AUTO: 562 10*3/MM3 (ref 140–450)
PMV BLD AUTO: 9.6 FL (ref 6–12)
POTASSIUM SERPL-SCNC: 4.4 MMOL/L (ref 3.5–5.2)
RBC # BLD AUTO: 2.46 10*6/MM3 (ref 3.77–5.28)
SODIUM SERPL-SCNC: 132 MMOL/L (ref 136–145)
WBC # BLD AUTO: 22.51 10*3/MM3 (ref 3.4–10.8)

## 2021-05-21 PROCEDURE — 83735 ASSAY OF MAGNESIUM: CPT | Performed by: INTERNAL MEDICINE

## 2021-05-21 PROCEDURE — 80048 BASIC METABOLIC PNL TOTAL CA: CPT | Performed by: INTERNAL MEDICINE

## 2021-05-21 PROCEDURE — 85025 COMPLETE CBC W/AUTO DIFF WBC: CPT | Performed by: INTERNAL MEDICINE

## 2021-05-21 PROCEDURE — 84100 ASSAY OF PHOSPHORUS: CPT | Performed by: INTERNAL MEDICINE

## 2021-05-21 PROCEDURE — 86140 C-REACTIVE PROTEIN: CPT | Performed by: INTERNAL MEDICINE

## 2021-05-22 ENCOUNTER — HOSPITAL ENCOUNTER (INPATIENT)
Facility: HOSPITAL | Age: 76
LOS: 9 days | Discharge: NURSING, REHAB OR LONG-TERM CARE | End: 2021-06-01
Attending: FAMILY MEDICINE | Admitting: INTERNAL MEDICINE

## 2021-05-22 DIAGNOSIS — K56.7 ILEUS (HCC): ICD-10-CM

## 2021-05-22 DIAGNOSIS — R11.2 INTRACTABLE VOMITING WITH NAUSEA, UNSPECIFIED VOMITING TYPE: Primary | ICD-10-CM

## 2021-05-22 LAB
A-A DO2: 23.1 MMHG (ref 0–300)
ALBUMIN SERPL-MCNC: 2.84 G/DL (ref 3.5–5.2)
ALBUMIN/GLOB SERPL: 0.8 G/DL
ALP SERPL-CCNC: 77 U/L (ref 39–117)
ALT SERPL W P-5'-P-CCNC: 10 U/L (ref 1–33)
ANION GAP SERPL CALCULATED.3IONS-SCNC: 10.2 MMOL/L (ref 5–15)
APTT PPP: 28.5 SECONDS (ref 25.6–35.3)
ARTERIAL PATENCY WRIST A: POSITIVE
AST SERPL-CCNC: 21 U/L (ref 1–32)
ATMOSPHERIC PRESS: 735 MMHG
BACTERIA UR QL AUTO: NORMAL /HPF
BASE EXCESS BLDA CALC-SCNC: 14.4 MMOL/L (ref 0–2)
BASOPHILS # BLD AUTO: 0.08 10*3/MM3 (ref 0–0.2)
BASOPHILS NFR BLD AUTO: 0.2 % (ref 0–1.5)
BDY SITE: ABNORMAL
BILIRUB SERPL-MCNC: 0.2 MG/DL (ref 0–1.2)
BILIRUB UR QL STRIP: NEGATIVE
BODY TEMPERATURE: 0 C
BUN SERPL-MCNC: 20 MG/DL (ref 8–23)
BUN/CREAT SERPL: 33.9 (ref 7–25)
CALCIUM SPEC-SCNC: 9.1 MG/DL (ref 8.6–10.5)
CHLORIDE SERPL-SCNC: 92 MMOL/L (ref 98–107)
CLARITY UR: CLEAR
CO2 BLDA-SCNC: 40.3 MMOL/L (ref 22–33)
CO2 SERPL-SCNC: 33.8 MMOL/L (ref 22–29)
COHGB MFR BLD: 2.5 % (ref 0–5)
COLOR UR: YELLOW
CREAT SERPL-MCNC: 0.59 MG/DL (ref 0.57–1)
CRP SERPL-MCNC: 3.36 MG/DL (ref 0–0.5)
D-LACTATE SERPL-SCNC: 1 MMOL/L (ref 0.5–2)
DEPRECATED RDW RBC AUTO: 49 FL (ref 37–54)
EOSINOPHIL # BLD AUTO: 0.04 10*3/MM3 (ref 0–0.4)
EOSINOPHIL NFR BLD AUTO: 0.1 % (ref 0.3–6.2)
ERYTHROCYTE [DISTWIDTH] IN BLOOD BY AUTOMATED COUNT: 14.6 % (ref 12.3–15.4)
GAS FLOW AIRWAY: 2 LPM
GFR SERPL CREATININE-BSD FRML MDRD: 99 ML/MIN/1.73
GLOBULIN UR ELPH-MCNC: 3.7 GM/DL
GLUCOSE SERPL-MCNC: 115 MG/DL (ref 65–99)
GLUCOSE UR STRIP-MCNC: ABNORMAL MG/DL
HCO3 BLDA-SCNC: 38.8 MMOL/L (ref 20–26)
HCT VFR BLD AUTO: 21.6 % (ref 34–46.6)
HCT VFR BLD CALC: 22.9 % (ref 38–51)
HGB BLD-MCNC: 7 G/DL (ref 12–15.9)
HGB BLDA-MCNC: 7.5 G/DL (ref 13.5–17.5)
HGB UR QL STRIP.AUTO: NEGATIVE
HYALINE CASTS UR QL AUTO: NORMAL /LPF
IMM GRANULOCYTES # BLD AUTO: 0.27 10*3/MM3 (ref 0–0.05)
IMM GRANULOCYTES NFR BLD AUTO: 0.7 % (ref 0–0.5)
INHALED O2 CONCENTRATION: 28 %
INR PPP: 1.26 (ref 0.9–1.1)
KETONES UR QL STRIP: NEGATIVE
LEUKOCYTE ESTERASE UR QL STRIP.AUTO: NEGATIVE
LIPASE SERPL-CCNC: 49 U/L (ref 13–60)
LYMPHOCYTES # BLD AUTO: 1.31 10*3/MM3 (ref 0.7–3.1)
LYMPHOCYTES NFR BLD AUTO: 3.6 % (ref 19.6–45.3)
Lab: ABNORMAL
MCH RBC QN AUTO: 30.7 PG (ref 26.6–33)
MCHC RBC AUTO-ENTMCNC: 32.4 G/DL (ref 31.5–35.7)
MCV RBC AUTO: 94.7 FL (ref 79–97)
METHGB BLD QL: 0.1 % (ref 0–3)
MODALITY: ABNORMAL
MONOCYTES # BLD AUTO: 1.47 10*3/MM3 (ref 0.1–0.9)
MONOCYTES NFR BLD AUTO: 4.1 % (ref 5–12)
NEUTROPHILS NFR BLD AUTO: 32.99 10*3/MM3 (ref 1.7–7)
NEUTROPHILS NFR BLD AUTO: 91.3 % (ref 42.7–76)
NITRITE UR QL STRIP: NEGATIVE
NOTE: ABNORMAL
NOTIFIED BY: ABNORMAL
NOTIFIED WHO: ABNORMAL
NRBC BLD AUTO-RTO: 0 /100 WBC (ref 0–0.2)
OXYHGB MFR BLDV: 96.6 % (ref 94–99)
PCO2 BLDA: 48.3 MM HG (ref 35–45)
PCO2 TEMP ADJ BLD: ABNORMAL MM[HG]
PH BLDA: 7.51 PH UNITS (ref 7.35–7.45)
PH UR STRIP.AUTO: 5.5 [PH] (ref 5–8)
PH, TEMP CORRECTED: ABNORMAL
PLATELET # BLD AUTO: 708 10*3/MM3 (ref 140–450)
PMV BLD AUTO: 9.4 FL (ref 6–12)
PO2 BLDA: 116 MM HG (ref 83–108)
PO2 TEMP ADJ BLD: ABNORMAL MM[HG]
POTASSIUM SERPL-SCNC: 5 MMOL/L (ref 3.5–5.2)
PROT SERPL-MCNC: 6.5 G/DL (ref 6–8.5)
PROT UR QL STRIP: ABNORMAL
PROTHROMBIN TIME: 15.6 SECONDS (ref 11.9–14.1)
RBC # BLD AUTO: 2.28 10*6/MM3 (ref 3.77–5.28)
RBC # UR: NORMAL /HPF
REF LAB TEST METHOD: NORMAL
SAO2 % BLDCOA: 99.2 % (ref 94–99)
SODIUM SERPL-SCNC: 136 MMOL/L (ref 136–145)
SP GR UR STRIP: 1.02 (ref 1–1.03)
SQUAMOUS #/AREA URNS HPF: NORMAL /HPF
TROPONIN T SERPL-MCNC: 0.01 NG/ML (ref 0–0.03)
UROBILINOGEN UR QL STRIP: ABNORMAL
VENTILATOR MODE: ABNORMAL
WBC # BLD AUTO: 36.16 10*3/MM3 (ref 3.4–10.8)
WBC UR QL AUTO: NORMAL /HPF

## 2021-05-22 PROCEDURE — 87040 BLOOD CULTURE FOR BACTERIA: CPT | Performed by: FAMILY MEDICINE

## 2021-05-22 PROCEDURE — 83050 HGB METHEMOGLOBIN QUAN: CPT

## 2021-05-22 PROCEDURE — 83690 ASSAY OF LIPASE: CPT | Performed by: FAMILY MEDICINE

## 2021-05-22 PROCEDURE — 83605 ASSAY OF LACTIC ACID: CPT | Performed by: FAMILY MEDICINE

## 2021-05-22 PROCEDURE — 99285 EMERGENCY DEPT VISIT HI MDM: CPT

## 2021-05-22 PROCEDURE — 82375 ASSAY CARBOXYHB QUANT: CPT

## 2021-05-22 PROCEDURE — 36600 WITHDRAWAL OF ARTERIAL BLOOD: CPT

## 2021-05-22 PROCEDURE — 84484 ASSAY OF TROPONIN QUANT: CPT | Performed by: FAMILY MEDICINE

## 2021-05-22 PROCEDURE — 82805 BLOOD GASES W/O2 SATURATION: CPT

## 2021-05-22 PROCEDURE — 86140 C-REACTIVE PROTEIN: CPT | Performed by: FAMILY MEDICINE

## 2021-05-22 PROCEDURE — 25010000002 ONDANSETRON PER 1 MG: Performed by: FAMILY MEDICINE

## 2021-05-22 PROCEDURE — 85730 THROMBOPLASTIN TIME PARTIAL: CPT | Performed by: FAMILY MEDICINE

## 2021-05-22 PROCEDURE — 81001 URINALYSIS AUTO W/SCOPE: CPT | Performed by: FAMILY MEDICINE

## 2021-05-22 PROCEDURE — 80053 COMPREHEN METABOLIC PANEL: CPT | Performed by: FAMILY MEDICINE

## 2021-05-22 PROCEDURE — P9612 CATHETERIZE FOR URINE SPEC: HCPCS

## 2021-05-22 PROCEDURE — 85025 COMPLETE CBC W/AUTO DIFF WBC: CPT | Performed by: FAMILY MEDICINE

## 2021-05-22 PROCEDURE — 85610 PROTHROMBIN TIME: CPT | Performed by: FAMILY MEDICINE

## 2021-05-22 RX ORDER — ONDANSETRON 2 MG/ML
4 INJECTION INTRAMUSCULAR; INTRAVENOUS ONCE
Status: COMPLETED | OUTPATIENT
Start: 2021-05-22 | End: 2021-05-22

## 2021-05-22 RX ORDER — SODIUM CHLORIDE 0.9 % (FLUSH) 0.9 %
10 SYRINGE (ML) INJECTION AS NEEDED
Status: DISCONTINUED | OUTPATIENT
Start: 2021-05-22 | End: 2021-06-01 | Stop reason: HOSPADM

## 2021-05-22 RX ADMIN — ONDANSETRON 4 MG: 2 INJECTION INTRAMUSCULAR; INTRAVENOUS at 22:33

## 2021-05-22 RX ADMIN — SODIUM CHLORIDE 1000 ML: 9 INJECTION, SOLUTION INTRAVENOUS at 22:33

## 2021-05-23 ENCOUNTER — APPOINTMENT (OUTPATIENT)
Dept: CT IMAGING | Facility: HOSPITAL | Age: 76
End: 2021-05-23

## 2021-05-23 ENCOUNTER — APPOINTMENT (OUTPATIENT)
Dept: GENERAL RADIOLOGY | Facility: HOSPITAL | Age: 76
End: 2021-05-23

## 2021-05-23 ENCOUNTER — APPOINTMENT (OUTPATIENT)
Dept: ULTRASOUND IMAGING | Facility: HOSPITAL | Age: 76
End: 2021-05-23

## 2021-05-23 PROBLEM — R11.10 VOMITING: Status: ACTIVE | Noted: 2021-05-23

## 2021-05-23 PROBLEM — K92.2 UPPER GI BLEED: Status: ACTIVE | Noted: 2021-05-23

## 2021-05-23 LAB
ABO GROUP BLD: NORMAL
ANION GAP SERPL CALCULATED.3IONS-SCNC: 7.9 MMOL/L (ref 5–15)
B PARAPERT DNA SPEC QL NAA+PROBE: NOT DETECTED
B PERT DNA SPEC QL NAA+PROBE: NOT DETECTED
BASOPHILS # BLD AUTO: 0.09 10*3/MM3 (ref 0–0.2)
BASOPHILS NFR BLD AUTO: 0.3 % (ref 0–1.5)
BLD GP AB SCN SERPL QL: NEGATIVE
BUN SERPL-MCNC: 15 MG/DL (ref 8–23)
BUN/CREAT SERPL: 34.1 (ref 7–25)
C PNEUM DNA NPH QL NAA+NON-PROBE: NOT DETECTED
CALCIUM SPEC-SCNC: 8.2 MG/DL (ref 8.6–10.5)
CHLORIDE SERPL-SCNC: 100 MMOL/L (ref 98–107)
CO2 SERPL-SCNC: 28.1 MMOL/L (ref 22–29)
CREAT SERPL-MCNC: 0.44 MG/DL (ref 0.57–1)
DEPRECATED RDW RBC AUTO: 49.5 FL (ref 37–54)
EOSINOPHIL # BLD AUTO: 0.01 10*3/MM3 (ref 0–0.4)
EOSINOPHIL NFR BLD AUTO: 0 % (ref 0.3–6.2)
ERYTHROCYTE [DISTWIDTH] IN BLOOD BY AUTOMATED COUNT: 14.8 % (ref 12.3–15.4)
FLUAV SUBTYP SPEC NAA+PROBE: NOT DETECTED
FLUBV RNA ISLT QL NAA+PROBE: NOT DETECTED
GFR SERPL CREATININE-BSD FRML MDRD: 139 ML/MIN/1.73
GLUCOSE BLDC GLUCOMTR-MCNC: 106 MG/DL (ref 70–130)
GLUCOSE BLDC GLUCOMTR-MCNC: 83 MG/DL (ref 70–130)
GLUCOSE BLDC GLUCOMTR-MCNC: 93 MG/DL (ref 70–130)
GLUCOSE BLDC GLUCOMTR-MCNC: 95 MG/DL (ref 70–130)
GLUCOSE SERPL-MCNC: 85 MG/DL (ref 65–99)
HADV DNA SPEC NAA+PROBE: NOT DETECTED
HCOV 229E RNA SPEC QL NAA+PROBE: NOT DETECTED
HCOV HKU1 RNA SPEC QL NAA+PROBE: NOT DETECTED
HCOV NL63 RNA SPEC QL NAA+PROBE: NOT DETECTED
HCOV OC43 RNA SPEC QL NAA+PROBE: NOT DETECTED
HCT VFR BLD AUTO: 18.9 % (ref 34–46.6)
HCT VFR BLD AUTO: 23.8 % (ref 34–46.6)
HGB BLD-MCNC: 6.2 G/DL (ref 12–15.9)
HGB BLD-MCNC: 7.4 G/DL (ref 12–15.9)
HMPV RNA NPH QL NAA+NON-PROBE: NOT DETECTED
HPIV1 RNA SPEC QL NAA+PROBE: NOT DETECTED
HPIV2 RNA SPEC QL NAA+PROBE: NOT DETECTED
HPIV3 RNA NPH QL NAA+PROBE: NOT DETECTED
HPIV4 P GENE NPH QL NAA+PROBE: NOT DETECTED
IMM GRANULOCYTES # BLD AUTO: 0.23 10*3/MM3 (ref 0–0.05)
IMM GRANULOCYTES NFR BLD AUTO: 0.8 % (ref 0–0.5)
LYMPHOCYTES # BLD AUTO: 1.7 10*3/MM3 (ref 0.7–3.1)
LYMPHOCYTES NFR BLD AUTO: 5.9 % (ref 19.6–45.3)
M PNEUMO IGG SER IA-ACNC: NOT DETECTED
MCH RBC QN AUTO: 31 PG (ref 26.6–33)
MCHC RBC AUTO-ENTMCNC: 32.8 G/DL (ref 31.5–35.7)
MCV RBC AUTO: 94.5 FL (ref 79–97)
MONOCYTES # BLD AUTO: 1.86 10*3/MM3 (ref 0.1–0.9)
MONOCYTES NFR BLD AUTO: 6.4 % (ref 5–12)
NEUTROPHILS NFR BLD AUTO: 24.97 10*3/MM3 (ref 1.7–7)
NEUTROPHILS NFR BLD AUTO: 86.6 % (ref 42.7–76)
NRBC BLD AUTO-RTO: 0 /100 WBC (ref 0–0.2)
PLATELET # BLD AUTO: 568 10*3/MM3 (ref 140–450)
PMV BLD AUTO: 9.2 FL (ref 6–12)
POTASSIUM SERPL-SCNC: 4.4 MMOL/L (ref 3.5–5.2)
RBC # BLD AUTO: 2 10*6/MM3 (ref 3.77–5.28)
RH BLD: POSITIVE
RHINOVIRUS RNA SPEC NAA+PROBE: NOT DETECTED
RSV RNA NPH QL NAA+NON-PROBE: NOT DETECTED
SARS-COV-2 RNA NPH QL NAA+NON-PROBE: NOT DETECTED
SODIUM SERPL-SCNC: 136 MMOL/L (ref 136–145)
T&S EXPIRATION DATE: NORMAL
TROPONIN T SERPL-MCNC: 0.01 NG/ML (ref 0–0.03)
WBC # BLD AUTO: 28.86 10*3/MM3 (ref 3.4–10.8)

## 2021-05-23 PROCEDURE — 85025 COMPLETE CBC W/AUTO DIFF WBC: CPT | Performed by: FAMILY MEDICINE

## 2021-05-23 PROCEDURE — 82962 GLUCOSE BLOOD TEST: CPT

## 2021-05-23 PROCEDURE — 86900 BLOOD TYPING SEROLOGIC ABO: CPT | Performed by: FAMILY MEDICINE

## 2021-05-23 PROCEDURE — 94799 UNLISTED PULMONARY SVC/PX: CPT

## 2021-05-23 PROCEDURE — 93975 VASCULAR STUDY: CPT | Performed by: RADIOLOGY

## 2021-05-23 PROCEDURE — 99223 1ST HOSP IP/OBS HIGH 75: CPT | Performed by: INTERNAL MEDICINE

## 2021-05-23 PROCEDURE — 85014 HEMATOCRIT: CPT | Performed by: INTERNAL MEDICINE

## 2021-05-23 PROCEDURE — 74177 CT ABD & PELVIS W/CONTRAST: CPT

## 2021-05-23 PROCEDURE — 86923 COMPATIBILITY TEST ELECTRIC: CPT

## 2021-05-23 PROCEDURE — 85018 HEMOGLOBIN: CPT | Performed by: INTERNAL MEDICINE

## 2021-05-23 PROCEDURE — 71045 X-RAY EXAM CHEST 1 VIEW: CPT

## 2021-05-23 PROCEDURE — 86900 BLOOD TYPING SEROLOGIC ABO: CPT

## 2021-05-23 PROCEDURE — 84484 ASSAY OF TROPONIN QUANT: CPT | Performed by: FAMILY MEDICINE

## 2021-05-23 PROCEDURE — 93005 ELECTROCARDIOGRAM TRACING: CPT | Performed by: FAMILY MEDICINE

## 2021-05-23 PROCEDURE — 80048 BASIC METABOLIC PNL TOTAL CA: CPT | Performed by: INTERNAL MEDICINE

## 2021-05-23 PROCEDURE — 86850 RBC ANTIBODY SCREEN: CPT | Performed by: FAMILY MEDICINE

## 2021-05-23 PROCEDURE — 25010000002 IOPAMIDOL 61 % SOLUTION: Performed by: FAMILY MEDICINE

## 2021-05-23 PROCEDURE — 93975 VASCULAR STUDY: CPT

## 2021-05-23 PROCEDURE — P9016 RBC LEUKOCYTES REDUCED: HCPCS

## 2021-05-23 PROCEDURE — 0202U NFCT DS 22 TRGT SARS-COV-2: CPT | Performed by: FAMILY MEDICINE

## 2021-05-23 PROCEDURE — 94640 AIRWAY INHALATION TREATMENT: CPT

## 2021-05-23 PROCEDURE — 86901 BLOOD TYPING SEROLOGIC RH(D): CPT | Performed by: FAMILY MEDICINE

## 2021-05-23 PROCEDURE — 36415 COLL VENOUS BLD VENIPUNCTURE: CPT

## 2021-05-23 PROCEDURE — 36430 TRANSFUSION BLD/BLD COMPNT: CPT

## 2021-05-23 PROCEDURE — 25010000002 CEFEPIME PER 500 MG: Performed by: INTERNAL MEDICINE

## 2021-05-23 PROCEDURE — 99222 1ST HOSP IP/OBS MODERATE 55: CPT | Performed by: SURGERY

## 2021-05-23 RX ORDER — LACTULOSE 10 G/15ML
10 SOLUTION ORAL DAILY PRN
Status: CANCELLED | OUTPATIENT
Start: 2021-05-23

## 2021-05-23 RX ORDER — IPRATROPIUM BROMIDE AND ALBUTEROL SULFATE 2.5; .5 MG/3ML; MG/3ML
3 SOLUTION RESPIRATORY (INHALATION) 4 TIMES DAILY PRN
Status: DISCONTINUED | OUTPATIENT
Start: 2021-05-23 | End: 2021-05-29

## 2021-05-23 RX ORDER — HYDROCODONE BITARTRATE AND ACETAMINOPHEN 5; 325 MG/1; MG/1
1 TABLET ORAL EVERY 8 HOURS PRN
Status: CANCELLED | OUTPATIENT
Start: 2021-05-23

## 2021-05-23 RX ORDER — PREDNISONE 1 MG/1
5 TABLET ORAL EVERY MORNING
Status: CANCELLED | OUTPATIENT
Start: 2021-05-23

## 2021-05-23 RX ORDER — MONTELUKAST SODIUM 10 MG/1
10 TABLET ORAL DAILY
Status: DISCONTINUED | OUTPATIENT
Start: 2021-05-23 | End: 2021-06-01 | Stop reason: HOSPADM

## 2021-05-23 RX ORDER — SODIUM CHLORIDE, SODIUM LACTATE, POTASSIUM CHLORIDE, CALCIUM CHLORIDE 600; 310; 30; 20 MG/100ML; MG/100ML; MG/100ML; MG/100ML
500 INJECTION, SOLUTION INTRAVENOUS CONTINUOUS
Status: ACTIVE | OUTPATIENT
Start: 2021-05-23 | End: 2021-05-23

## 2021-05-23 RX ORDER — PROMETHAZINE HYDROCHLORIDE 12.5 MG/1
12.5 TABLET ORAL EVERY 8 HOURS PRN
Status: DISCONTINUED | OUTPATIENT
Start: 2021-05-23 | End: 2021-06-01 | Stop reason: HOSPADM

## 2021-05-23 RX ORDER — PANTOPRAZOLE SODIUM 40 MG/10ML
40 INJECTION, POWDER, LYOPHILIZED, FOR SOLUTION INTRAVENOUS EVERY 12 HOURS SCHEDULED
Status: DISCONTINUED | OUTPATIENT
Start: 2021-05-23 | End: 2021-06-01 | Stop reason: HOSPADM

## 2021-05-23 RX ORDER — NITROGLYCERIN 0.4 MG/1
0.4 TABLET SUBLINGUAL
Status: DISCONTINUED | OUTPATIENT
Start: 2021-05-23 | End: 2021-06-01 | Stop reason: HOSPADM

## 2021-05-23 RX ORDER — ARFORMOTEROL TARTRATE 15 UG/2ML
15 SOLUTION RESPIRATORY (INHALATION)
Status: DISCONTINUED | OUTPATIENT
Start: 2021-05-23 | End: 2021-05-27

## 2021-05-23 RX ORDER — DEXTROSE MONOHYDRATE 25 G/50ML
25 INJECTION, SOLUTION INTRAVENOUS
Status: DISCONTINUED | OUTPATIENT
Start: 2021-05-23 | End: 2021-06-01 | Stop reason: HOSPADM

## 2021-05-23 RX ORDER — TRAZODONE HYDROCHLORIDE 50 MG/1
50 TABLET ORAL NIGHTLY
Status: CANCELLED | OUTPATIENT
Start: 2021-05-23

## 2021-05-23 RX ORDER — SODIUM CHLORIDE 0.9 % (FLUSH) 0.9 %
10 SYRINGE (ML) INJECTION EVERY 12 HOURS SCHEDULED
Status: DISCONTINUED | OUTPATIENT
Start: 2021-05-23 | End: 2021-06-01 | Stop reason: HOSPADM

## 2021-05-23 RX ORDER — ASPIRIN 81 MG/1
81 TABLET, CHEWABLE ORAL DAILY
Status: DISCONTINUED | OUTPATIENT
Start: 2021-05-23 | End: 2021-06-01 | Stop reason: HOSPADM

## 2021-05-23 RX ORDER — DIPHENOXYLATE HYDROCHLORIDE AND ATROPINE SULFATE 2.5; .025 MG/1; MG/1
1 TABLET ORAL NIGHTLY
Status: DISCONTINUED | OUTPATIENT
Start: 2021-05-23 | End: 2021-06-01 | Stop reason: HOSPADM

## 2021-05-23 RX ORDER — CHOLECALCIFEROL (VITAMIN D3) 125 MCG
5 CAPSULE ORAL NIGHTLY
Status: DISCONTINUED | OUTPATIENT
Start: 2021-05-23 | End: 2021-06-01 | Stop reason: HOSPADM

## 2021-05-23 RX ORDER — BISACODYL 10 MG
10 SUPPOSITORY, RECTAL RECTAL DAILY PRN
Status: DISCONTINUED | OUTPATIENT
Start: 2021-05-23 | End: 2021-06-01 | Stop reason: HOSPADM

## 2021-05-23 RX ORDER — ATORVASTATIN CALCIUM 10 MG/1
10 TABLET, FILM COATED ORAL NIGHTLY
Status: DISCONTINUED | OUTPATIENT
Start: 2021-05-23 | End: 2021-05-23

## 2021-05-23 RX ORDER — L.ACID,PARA/B.BIFIDUM/S.THERM 8B CELL
1 CAPSULE ORAL 2 TIMES DAILY
Status: CANCELLED | OUTPATIENT
Start: 2021-05-23

## 2021-05-23 RX ORDER — ATORVASTATIN CALCIUM 40 MG/1
40 TABLET, FILM COATED ORAL NIGHTLY
Status: DISCONTINUED | OUTPATIENT
Start: 2021-05-23 | End: 2021-06-01 | Stop reason: HOSPADM

## 2021-05-23 RX ORDER — PREDNISONE 1 MG/1
5 TABLET ORAL EVERY MORNING
Status: DISCONTINUED | OUTPATIENT
Start: 2021-05-23 | End: 2021-06-01 | Stop reason: HOSPADM

## 2021-05-23 RX ORDER — ASPIRIN 81 MG/1
81 TABLET, CHEWABLE ORAL DAILY
Status: CANCELLED | OUTPATIENT
Start: 2021-05-23

## 2021-05-23 RX ORDER — LISINOPRIL 10 MG/1
10 TABLET ORAL DAILY
Status: CANCELLED | OUTPATIENT
Start: 2021-05-23

## 2021-05-23 RX ORDER — LOPERAMIDE HYDROCHLORIDE 2 MG/1
2 CAPSULE ORAL 4 TIMES DAILY PRN
Status: CANCELLED | OUTPATIENT
Start: 2021-05-23

## 2021-05-23 RX ORDER — SODIUM CHLORIDE 0.9 % (FLUSH) 0.9 %
10 SYRINGE (ML) INJECTION AS NEEDED
Status: DISCONTINUED | OUTPATIENT
Start: 2021-05-23 | End: 2021-06-01 | Stop reason: HOSPADM

## 2021-05-23 RX ORDER — SENNA PLUS 8.6 MG/1
1 TABLET ORAL DAILY PRN
Status: DISCONTINUED | OUTPATIENT
Start: 2021-05-23 | End: 2021-06-01 | Stop reason: HOSPADM

## 2021-05-23 RX ORDER — DOCUSATE SODIUM 100 MG/1
200 CAPSULE, LIQUID FILLED ORAL DAILY
Status: DISCONTINUED | OUTPATIENT
Start: 2021-05-23 | End: 2021-06-01 | Stop reason: HOSPADM

## 2021-05-23 RX ORDER — GUAIFENESIN 200 MG/1
400 TABLET ORAL EVERY 12 HOURS SCHEDULED
Status: DISCONTINUED | OUTPATIENT
Start: 2021-05-23 | End: 2021-06-01 | Stop reason: HOSPADM

## 2021-05-23 RX ORDER — BACLOFEN 10 MG/1
10 TABLET ORAL NIGHTLY
Status: DISCONTINUED | OUTPATIENT
Start: 2021-05-23 | End: 2021-06-01 | Stop reason: HOSPADM

## 2021-05-23 RX ORDER — AMLODIPINE BESYLATE 10 MG/1
10 TABLET ORAL DAILY
Status: DISCONTINUED | OUTPATIENT
Start: 2021-05-23 | End: 2021-06-01 | Stop reason: HOSPADM

## 2021-05-23 RX ORDER — LEVOTHYROXINE SODIUM 0.1 MG/1
200 TABLET ORAL
Status: DISCONTINUED | OUTPATIENT
Start: 2021-05-24 | End: 2021-06-01 | Stop reason: HOSPADM

## 2021-05-23 RX ORDER — SODIUM CHLORIDE 9 MG/ML
50 INJECTION, SOLUTION INTRAVENOUS CONTINUOUS
Status: DISCONTINUED | OUTPATIENT
Start: 2021-05-23 | End: 2021-05-27

## 2021-05-23 RX ORDER — CARVEDILOL 6.25 MG/1
6.25 TABLET ORAL 2 TIMES DAILY WITH MEALS
Status: DISCONTINUED | OUTPATIENT
Start: 2021-05-23 | End: 2021-06-01 | Stop reason: HOSPADM

## 2021-05-23 RX ORDER — NICOTINE POLACRILEX 4 MG
15 LOZENGE BUCCAL
Status: DISCONTINUED | OUTPATIENT
Start: 2021-05-23 | End: 2021-06-01 | Stop reason: HOSPADM

## 2021-05-23 RX ORDER — PANTOPRAZOLE SODIUM 20 MG/1
20 TABLET, DELAYED RELEASE ORAL DAILY
Status: CANCELLED | OUTPATIENT
Start: 2021-05-23

## 2021-05-23 RX ADMIN — CEFEPIME HYDROCHLORIDE 2 G: 2 INJECTION, POWDER, FOR SOLUTION INTRAVENOUS at 20:25

## 2021-05-23 RX ADMIN — IOPAMIDOL 80 ML: 612 INJECTION, SOLUTION INTRAVENOUS at 01:22

## 2021-05-23 RX ADMIN — SODIUM CHLORIDE, PRESERVATIVE FREE 10 ML: 5 INJECTION INTRAVENOUS at 20:25

## 2021-05-23 RX ADMIN — BACLOFEN 10 MG: 10 TABLET ORAL at 20:25

## 2021-05-23 RX ADMIN — ATORVASTATIN CALCIUM 40 MG: 40 TABLET, FILM COATED ORAL at 20:25

## 2021-05-23 RX ADMIN — PANTOPRAZOLE SODIUM 40 MG: 40 INJECTION, POWDER, FOR SOLUTION INTRAVENOUS at 09:12

## 2021-05-23 RX ADMIN — CEFEPIME HYDROCHLORIDE 2 G: 2 INJECTION, POWDER, FOR SOLUTION INTRAVENOUS at 09:12

## 2021-05-23 RX ADMIN — METRONIDAZOLE 500 MG: 500 INJECTION, SOLUTION INTRAVENOUS at 17:19

## 2021-05-23 RX ADMIN — SODIUM CHLORIDE, POTASSIUM CHLORIDE, SODIUM LACTATE AND CALCIUM CHLORIDE 500 ML/HR: 600; 310; 30; 20 INJECTION, SOLUTION INTRAVENOUS at 14:55

## 2021-05-23 RX ADMIN — SODIUM CHLORIDE, PRESERVATIVE FREE 10 ML: 5 INJECTION INTRAVENOUS at 09:13

## 2021-05-23 RX ADMIN — IPRATROPIUM BROMIDE 0.5 MG: 0.5 SOLUTION RESPIRATORY (INHALATION) at 13:24

## 2021-05-23 RX ADMIN — PANTOPRAZOLE SODIUM 40 MG: 40 INJECTION, POWDER, FOR SOLUTION INTRAVENOUS at 20:25

## 2021-05-23 RX ADMIN — ARFORMOTEROL TARTRATE 15 MCG: 15 SOLUTION RESPIRATORY (INHALATION) at 13:22

## 2021-05-23 RX ADMIN — Medication 5 MG: at 20:25

## 2021-05-23 RX ADMIN — GUAIFENESIN 400 MG: 200 TABLET ORAL at 20:25

## 2021-05-23 RX ADMIN — METRONIDAZOLE 500 MG: 500 INJECTION, SOLUTION INTRAVENOUS at 09:43

## 2021-05-23 RX ADMIN — Medication 1 TABLET: at 20:25

## 2021-05-23 RX ADMIN — SODIUM CHLORIDE 1000 ML: 9 INJECTION, SOLUTION INTRAVENOUS at 05:19

## 2021-05-23 RX ADMIN — SODIUM CHLORIDE 50 ML/HR: 9 INJECTION, SOLUTION INTRAVENOUS at 12:29

## 2021-05-24 LAB
ALBUMIN SERPL-MCNC: 2.01 G/DL (ref 3.5–5.2)
ALBUMIN/GLOB SERPL: 0.7 G/DL
ALP SERPL-CCNC: 51 U/L (ref 39–117)
ALT SERPL W P-5'-P-CCNC: 8 U/L (ref 1–33)
ANION GAP SERPL CALCULATED.3IONS-SCNC: 5.7 MMOL/L (ref 5–15)
AST SERPL-CCNC: 19 U/L (ref 1–32)
BASOPHILS # BLD AUTO: 0.07 10*3/MM3 (ref 0–0.2)
BASOPHILS NFR BLD AUTO: 0.4 % (ref 0–1.5)
BH BB BLOOD EXPIRATION DATE: NORMAL
BH BB BLOOD TYPE BARCODE: 7300
BH BB DISPENSE STATUS: NORMAL
BH BB PRODUCT CODE: NORMAL
BH BB UNIT NUMBER: NORMAL
BILIRUB SERPL-MCNC: <0.2 MG/DL (ref 0–1.2)
BUN SERPL-MCNC: 8 MG/DL (ref 8–23)
BUN/CREAT SERPL: 21.6 (ref 7–25)
CALCIUM SPEC-SCNC: 7.6 MG/DL (ref 8.6–10.5)
CHLORIDE SERPL-SCNC: 101 MMOL/L (ref 98–107)
CO2 SERPL-SCNC: 29.3 MMOL/L (ref 22–29)
CREAT SERPL-MCNC: 0.37 MG/DL (ref 0.57–1)
CROSSMATCH INTERPRETATION: NORMAL
DEPRECATED RDW RBC AUTO: 51.8 FL (ref 37–54)
EOSINOPHIL # BLD AUTO: 0.03 10*3/MM3 (ref 0–0.4)
EOSINOPHIL NFR BLD AUTO: 0.2 % (ref 0.3–6.2)
ERYTHROCYTE [DISTWIDTH] IN BLOOD BY AUTOMATED COUNT: 15.4 % (ref 12.3–15.4)
GFR SERPL CREATININE-BSD FRML MDRD: >150 ML/MIN/1.73
GLOBULIN UR ELPH-MCNC: 2.7 GM/DL
GLUCOSE BLDC GLUCOMTR-MCNC: 118 MG/DL (ref 70–130)
GLUCOSE BLDC GLUCOMTR-MCNC: 144 MG/DL (ref 70–130)
GLUCOSE BLDC GLUCOMTR-MCNC: 97 MG/DL (ref 70–130)
GLUCOSE SERPL-MCNC: 100 MG/DL (ref 65–99)
HCT VFR BLD AUTO: 20.8 % (ref 34–46.6)
HCT VFR BLD AUTO: 21.3 % (ref 34–46.6)
HCT VFR BLD AUTO: 24.7 % (ref 34–46.6)
HGB BLD-MCNC: 6.6 G/DL (ref 12–15.9)
HGB BLD-MCNC: 6.9 G/DL (ref 12–15.9)
HGB BLD-MCNC: 8.1 G/DL (ref 12–15.9)
IMM GRANULOCYTES # BLD AUTO: 0.14 10*3/MM3 (ref 0–0.05)
IMM GRANULOCYTES NFR BLD AUTO: 0.7 % (ref 0–0.5)
LYMPHOCYTES # BLD AUTO: 1.33 10*3/MM3 (ref 0.7–3.1)
LYMPHOCYTES NFR BLD AUTO: 6.7 % (ref 19.6–45.3)
MCH RBC QN AUTO: 30.8 PG (ref 26.6–33)
MCHC RBC AUTO-ENTMCNC: 32.4 G/DL (ref 31.5–35.7)
MCV RBC AUTO: 95.1 FL (ref 79–97)
MONOCYTES # BLD AUTO: 1.72 10*3/MM3 (ref 0.1–0.9)
MONOCYTES NFR BLD AUTO: 8.7 % (ref 5–12)
NEUTROPHILS NFR BLD AUTO: 16.52 10*3/MM3 (ref 1.7–7)
NEUTROPHILS NFR BLD AUTO: 83.3 % (ref 42.7–76)
NRBC BLD AUTO-RTO: 0 /100 WBC (ref 0–0.2)
PLATELET # BLD AUTO: 476 10*3/MM3 (ref 140–450)
PMV BLD AUTO: 9.6 FL (ref 6–12)
POTASSIUM SERPL-SCNC: 3.6 MMOL/L (ref 3.5–5.2)
PROT SERPL-MCNC: 4.7 G/DL (ref 6–8.5)
QT INTERVAL: 398 MS
QT INTERVAL: 434 MS
QTC INTERVAL: 384 MS
QTC INTERVAL: 447 MS
RBC # BLD AUTO: 2.24 10*6/MM3 (ref 3.77–5.28)
SODIUM SERPL-SCNC: 136 MMOL/L (ref 136–145)
UNIT  ABO: NORMAL
UNIT  RH: NORMAL
WBC # BLD AUTO: 19.81 10*3/MM3 (ref 3.4–10.8)

## 2021-05-24 PROCEDURE — 93005 ELECTROCARDIOGRAM TRACING: CPT | Performed by: INTERNAL MEDICINE

## 2021-05-24 PROCEDURE — 94799 UNLISTED PULMONARY SVC/PX: CPT

## 2021-05-24 PROCEDURE — 85014 HEMATOCRIT: CPT | Performed by: INTERNAL MEDICINE

## 2021-05-24 PROCEDURE — P9016 RBC LEUKOCYTES REDUCED: HCPCS

## 2021-05-24 PROCEDURE — 85014 HEMATOCRIT: CPT | Performed by: PHYSICIAN ASSISTANT

## 2021-05-24 PROCEDURE — 63710000001 PREDNISONE PER 5 MG: Performed by: INTERNAL MEDICINE

## 2021-05-24 PROCEDURE — 85025 COMPLETE CBC W/AUTO DIFF WBC: CPT | Performed by: INTERNAL MEDICINE

## 2021-05-24 PROCEDURE — 80053 COMPREHEN METABOLIC PANEL: CPT | Performed by: INTERNAL MEDICINE

## 2021-05-24 PROCEDURE — 63710000001 DIPHENHYDRAMINE PER 50 MG: Performed by: PHYSICIAN ASSISTANT

## 2021-05-24 PROCEDURE — 25010000002 CEFEPIME PER 500 MG: Performed by: INTERNAL MEDICINE

## 2021-05-24 PROCEDURE — 82962 GLUCOSE BLOOD TEST: CPT

## 2021-05-24 PROCEDURE — 86900 BLOOD TYPING SEROLOGIC ABO: CPT

## 2021-05-24 PROCEDURE — 99233 SBSQ HOSP IP/OBS HIGH 50: CPT | Performed by: INTERNAL MEDICINE

## 2021-05-24 PROCEDURE — 85018 HEMOGLOBIN: CPT | Performed by: PHYSICIAN ASSISTANT

## 2021-05-24 PROCEDURE — 36430 TRANSFUSION BLD/BLD COMPNT: CPT

## 2021-05-24 PROCEDURE — 85018 HEMOGLOBIN: CPT | Performed by: INTERNAL MEDICINE

## 2021-05-24 RX ORDER — DIPHENHYDRAMINE HCL 25 MG
25 CAPSULE ORAL ONCE
Status: COMPLETED | OUTPATIENT
Start: 2021-05-24 | End: 2021-05-24

## 2021-05-24 RX ORDER — DIPHENHYDRAMINE HYDROCHLORIDE 50 MG/ML
25 INJECTION INTRAMUSCULAR; INTRAVENOUS ONCE
Status: COMPLETED | OUTPATIENT
Start: 2021-05-24 | End: 2021-05-24

## 2021-05-24 RX ORDER — ACETAMINOPHEN 160 MG/5ML
650 SOLUTION ORAL ONCE
Status: COMPLETED | OUTPATIENT
Start: 2021-05-24 | End: 2021-05-24

## 2021-05-24 RX ORDER — ACETAMINOPHEN 650 MG/1
650 SUPPOSITORY RECTAL ONCE
Status: COMPLETED | OUTPATIENT
Start: 2021-05-24 | End: 2021-05-24

## 2021-05-24 RX ORDER — ACETAMINOPHEN 325 MG/1
650 TABLET ORAL ONCE
Status: COMPLETED | OUTPATIENT
Start: 2021-05-24 | End: 2021-05-24

## 2021-05-24 RX ADMIN — SODIUM CHLORIDE, PRESERVATIVE FREE 10 ML: 5 INJECTION INTRAVENOUS at 20:02

## 2021-05-24 RX ADMIN — SODIUM CHLORIDE 50 ML/HR: 9 INJECTION, SOLUTION INTRAVENOUS at 17:13

## 2021-05-24 RX ADMIN — AMLODIPINE BESYLATE 10 MG: 10 TABLET ORAL at 08:31

## 2021-05-24 RX ADMIN — GUAIFENESIN 400 MG: 200 TABLET ORAL at 08:32

## 2021-05-24 RX ADMIN — ASPIRIN 81 MG: 81 TABLET, CHEWABLE ORAL at 08:31

## 2021-05-24 RX ADMIN — PANTOPRAZOLE SODIUM 40 MG: 40 INJECTION, POWDER, FOR SOLUTION INTRAVENOUS at 20:02

## 2021-05-24 RX ADMIN — LEVOTHYROXINE SODIUM 200 MCG: 100 TABLET ORAL at 05:16

## 2021-05-24 RX ADMIN — METRONIDAZOLE 500 MG: 500 INJECTION, SOLUTION INTRAVENOUS at 08:40

## 2021-05-24 RX ADMIN — SODIUM CHLORIDE, PRESERVATIVE FREE 10 ML: 5 INJECTION INTRAVENOUS at 08:41

## 2021-05-24 RX ADMIN — Medication 5 MG: at 20:02

## 2021-05-24 RX ADMIN — ATORVASTATIN CALCIUM 40 MG: 40 TABLET, FILM COATED ORAL at 20:02

## 2021-05-24 RX ADMIN — Medication 1 TABLET: at 20:02

## 2021-05-24 RX ADMIN — CEFEPIME HYDROCHLORIDE 2 G: 2 INJECTION, POWDER, FOR SOLUTION INTRAVENOUS at 20:01

## 2021-05-24 RX ADMIN — METRONIDAZOLE 500 MG: 500 INJECTION, SOLUTION INTRAVENOUS at 17:13

## 2021-05-24 RX ADMIN — CEFEPIME HYDROCHLORIDE 2 G: 2 INJECTION, POWDER, FOR SOLUTION INTRAVENOUS at 08:40

## 2021-05-24 RX ADMIN — GUAIFENESIN 400 MG: 200 TABLET ORAL at 20:02

## 2021-05-24 RX ADMIN — PANTOPRAZOLE SODIUM 40 MG: 40 INJECTION, POWDER, FOR SOLUTION INTRAVENOUS at 08:32

## 2021-05-24 RX ADMIN — MONTELUKAST SODIUM 10 MG: 10 TABLET, COATED ORAL at 08:32

## 2021-05-24 RX ADMIN — DOCUSATE SODIUM 200 MG: 100 CAPSULE, LIQUID FILLED ORAL at 08:32

## 2021-05-24 RX ADMIN — CARIPRAZINE 1.5 MG: 1.5 CAPSULE, GELATIN COATED ORAL at 08:31

## 2021-05-24 RX ADMIN — PREDNISONE 5 MG: 5 TABLET ORAL at 05:16

## 2021-05-24 RX ADMIN — METRONIDAZOLE 500 MG: 500 INJECTION, SOLUTION INTRAVENOUS at 02:23

## 2021-05-24 RX ADMIN — DIPHENHYDRAMINE HYDROCHLORIDE 25 MG: 25 CAPSULE ORAL at 08:32

## 2021-05-24 RX ADMIN — BACLOFEN 10 MG: 10 TABLET ORAL at 20:02

## 2021-05-24 RX ADMIN — ACETAMINOPHEN 650 MG: 325 TABLET ORAL at 08:39

## 2021-05-25 LAB
ANION GAP SERPL CALCULATED.3IONS-SCNC: 6.4 MMOL/L (ref 5–15)
BH BB BLOOD EXPIRATION DATE: NORMAL
BH BB BLOOD TYPE BARCODE: 7300
BH BB DISPENSE STATUS: NORMAL
BH BB PRODUCT CODE: NORMAL
BH BB UNIT NUMBER: NORMAL
BUN SERPL-MCNC: 5 MG/DL (ref 8–23)
BUN/CREAT SERPL: 14.7 (ref 7–25)
CALCIUM SPEC-SCNC: 7.7 MG/DL (ref 8.6–10.5)
CHLORIDE SERPL-SCNC: 98 MMOL/L (ref 98–107)
CO2 SERPL-SCNC: 30.6 MMOL/L (ref 22–29)
CREAT SERPL-MCNC: 0.34 MG/DL (ref 0.57–1)
CROSSMATCH INTERPRETATION: NORMAL
DEPRECATED RDW RBC AUTO: 50.6 FL (ref 37–54)
ERYTHROCYTE [DISTWIDTH] IN BLOOD BY AUTOMATED COUNT: 15.1 % (ref 12.3–15.4)
GFR SERPL CREATININE-BSD FRML MDRD: >150 ML/MIN/1.73
GLUCOSE BLDC GLUCOMTR-MCNC: 100 MG/DL (ref 70–130)
GLUCOSE BLDC GLUCOMTR-MCNC: 132 MG/DL (ref 70–130)
GLUCOSE BLDC GLUCOMTR-MCNC: 160 MG/DL (ref 70–130)
GLUCOSE BLDC GLUCOMTR-MCNC: 172 MG/DL (ref 70–130)
GLUCOSE BLDC GLUCOMTR-MCNC: 87 MG/DL (ref 70–130)
GLUCOSE SERPL-MCNC: 86 MG/DL (ref 65–99)
HCT VFR BLD AUTO: 26.2 % (ref 34–46.6)
HGB BLD-MCNC: 8.1 G/DL (ref 12–15.9)
MCH RBC QN AUTO: 30 PG (ref 26.6–33)
MCHC RBC AUTO-ENTMCNC: 30.9 G/DL (ref 31.5–35.7)
MCV RBC AUTO: 97 FL (ref 79–97)
PLATELET # BLD AUTO: 465 10*3/MM3 (ref 140–450)
PMV BLD AUTO: 9 FL (ref 6–12)
POTASSIUM SERPL-SCNC: 3.4 MMOL/L (ref 3.5–5.2)
RBC # BLD AUTO: 2.7 10*6/MM3 (ref 3.77–5.28)
SODIUM SERPL-SCNC: 135 MMOL/L (ref 136–145)
UNIT  ABO: NORMAL
UNIT  RH: NORMAL
WBC # BLD AUTO: 14 10*3/MM3 (ref 3.4–10.8)

## 2021-05-25 PROCEDURE — 99232 SBSQ HOSP IP/OBS MODERATE 35: CPT | Performed by: INTERNAL MEDICINE

## 2021-05-25 PROCEDURE — 25010000002 CEFEPIME PER 500 MG: Performed by: INTERNAL MEDICINE

## 2021-05-25 PROCEDURE — 82962 GLUCOSE BLOOD TEST: CPT

## 2021-05-25 PROCEDURE — 85027 COMPLETE CBC AUTOMATED: CPT | Performed by: INTERNAL MEDICINE

## 2021-05-25 PROCEDURE — 80048 BASIC METABOLIC PNL TOTAL CA: CPT | Performed by: INTERNAL MEDICINE

## 2021-05-25 PROCEDURE — 94799 UNLISTED PULMONARY SVC/PX: CPT

## 2021-05-25 PROCEDURE — 25010000002 CEFEPIME 2 G/NS 100 ML SOLUTION: Performed by: INTERNAL MEDICINE

## 2021-05-25 PROCEDURE — 63710000001 PREDNISONE PER 5 MG: Performed by: INTERNAL MEDICINE

## 2021-05-25 RX ADMIN — MONTELUKAST SODIUM 10 MG: 10 TABLET, COATED ORAL at 09:14

## 2021-05-25 RX ADMIN — CARVEDILOL 6.25 MG: 6.25 TABLET, FILM COATED ORAL at 16:25

## 2021-05-25 RX ADMIN — LEVOTHYROXINE SODIUM 200 MCG: 100 TABLET ORAL at 05:18

## 2021-05-25 RX ADMIN — DOCUSATE SODIUM 200 MG: 100 CAPSULE, LIQUID FILLED ORAL at 09:13

## 2021-05-25 RX ADMIN — APIXABAN 2.5 MG: 2.5 TABLET, FILM COATED ORAL at 20:48

## 2021-05-25 RX ADMIN — Medication 1 TABLET: at 20:49

## 2021-05-25 RX ADMIN — Medication 5 MG: at 20:48

## 2021-05-25 RX ADMIN — GUAIFENESIN 400 MG: 200 TABLET ORAL at 20:48

## 2021-05-25 RX ADMIN — CEFEPIME HYDROCHLORIDE 2 G: 2 INJECTION, POWDER, FOR SOLUTION INTRAVENOUS at 20:59

## 2021-05-25 RX ADMIN — CEFEPIME HYDROCHLORIDE 2 G: 2 INJECTION, POWDER, FOR SOLUTION INTRAVENOUS at 09:13

## 2021-05-25 RX ADMIN — METRONIDAZOLE 500 MG: 500 INJECTION, SOLUTION INTRAVENOUS at 01:18

## 2021-05-25 RX ADMIN — PANTOPRAZOLE SODIUM 40 MG: 40 INJECTION, POWDER, FOR SOLUTION INTRAVENOUS at 20:48

## 2021-05-25 RX ADMIN — METRONIDAZOLE 500 MG: 500 INJECTION, SOLUTION INTRAVENOUS at 11:05

## 2021-05-25 RX ADMIN — GUAIFENESIN 400 MG: 200 TABLET ORAL at 09:13

## 2021-05-25 RX ADMIN — AMLODIPINE BESYLATE 10 MG: 10 TABLET ORAL at 09:13

## 2021-05-25 RX ADMIN — PANTOPRAZOLE SODIUM 40 MG: 40 INJECTION, POWDER, FOR SOLUTION INTRAVENOUS at 09:13

## 2021-05-25 RX ADMIN — ATORVASTATIN CALCIUM 40 MG: 40 TABLET, FILM COATED ORAL at 20:48

## 2021-05-25 RX ADMIN — ASPIRIN 81 MG: 81 TABLET, CHEWABLE ORAL at 09:13

## 2021-05-25 RX ADMIN — CARVEDILOL 6.25 MG: 6.25 TABLET, FILM COATED ORAL at 09:14

## 2021-05-25 RX ADMIN — BACLOFEN 10 MG: 10 TABLET ORAL at 20:48

## 2021-05-25 RX ADMIN — METRONIDAZOLE 500 MG: 500 INJECTION, SOLUTION INTRAVENOUS at 16:25

## 2021-05-25 RX ADMIN — PREDNISONE 5 MG: 5 TABLET ORAL at 05:18

## 2021-05-25 RX ADMIN — SODIUM CHLORIDE, PRESERVATIVE FREE 10 ML: 5 INJECTION INTRAVENOUS at 20:49

## 2021-05-25 RX ADMIN — CARIPRAZINE 1.5 MG: 1.5 CAPSULE, GELATIN COATED ORAL at 09:14

## 2021-05-25 RX ADMIN — SODIUM CHLORIDE, PRESERVATIVE FREE 10 ML: 5 INJECTION INTRAVENOUS at 09:14

## 2021-05-25 RX ADMIN — APIXABAN 2.5 MG: 2.5 TABLET, FILM COATED ORAL at 14:25

## 2021-05-26 LAB
ANION GAP SERPL CALCULATED.3IONS-SCNC: 4.5 MMOL/L (ref 5–15)
BUN SERPL-MCNC: 6 MG/DL (ref 8–23)
BUN/CREAT SERPL: 15.4 (ref 7–25)
CALCIUM SPEC-SCNC: 8 MG/DL (ref 8.6–10.5)
CHLORIDE SERPL-SCNC: 96 MMOL/L (ref 98–107)
CO2 SERPL-SCNC: 33.5 MMOL/L (ref 22–29)
CREAT SERPL-MCNC: 0.39 MG/DL (ref 0.57–1)
DEPRECATED RDW RBC AUTO: 49 FL (ref 37–54)
ERYTHROCYTE [DISTWIDTH] IN BLOOD BY AUTOMATED COUNT: 15.4 % (ref 12.3–15.4)
GFR SERPL CREATININE-BSD FRML MDRD: >150 ML/MIN/1.73
GLUCOSE BLDC GLUCOMTR-MCNC: 142 MG/DL (ref 70–130)
GLUCOSE BLDC GLUCOMTR-MCNC: 152 MG/DL (ref 70–130)
GLUCOSE BLDC GLUCOMTR-MCNC: 157 MG/DL (ref 70–130)
GLUCOSE BLDC GLUCOMTR-MCNC: 190 MG/DL (ref 70–130)
GLUCOSE SERPL-MCNC: 115 MG/DL (ref 65–99)
HCT VFR BLD AUTO: 30.1 % (ref 34–46.6)
HGB BLD-MCNC: 9.6 G/DL (ref 12–15.9)
MCH RBC QN AUTO: 30.5 PG (ref 26.6–33)
MCHC RBC AUTO-ENTMCNC: 31.9 G/DL (ref 31.5–35.7)
MCV RBC AUTO: 95.6 FL (ref 79–97)
PLATELET # BLD AUTO: 559 10*3/MM3 (ref 140–450)
PMV BLD AUTO: 9.1 FL (ref 6–12)
POTASSIUM SERPL-SCNC: 3.4 MMOL/L (ref 3.5–5.2)
POTASSIUM SERPL-SCNC: 4.6 MMOL/L (ref 3.5–5.2)
RBC # BLD AUTO: 3.15 10*6/MM3 (ref 3.77–5.28)
SODIUM SERPL-SCNC: 134 MMOL/L (ref 136–145)
WBC # BLD AUTO: 9.77 10*3/MM3 (ref 3.4–10.8)

## 2021-05-26 PROCEDURE — 84132 ASSAY OF SERUM POTASSIUM: CPT | Performed by: INTERNAL MEDICINE

## 2021-05-26 PROCEDURE — 80048 BASIC METABOLIC PNL TOTAL CA: CPT | Performed by: INTERNAL MEDICINE

## 2021-05-26 PROCEDURE — 63710000001 PREDNISONE PER 5 MG: Performed by: INTERNAL MEDICINE

## 2021-05-26 PROCEDURE — 25010000002 CEFEPIME PER 500 MG: Performed by: INTERNAL MEDICINE

## 2021-05-26 PROCEDURE — 99232 SBSQ HOSP IP/OBS MODERATE 35: CPT | Performed by: INTERNAL MEDICINE

## 2021-05-26 PROCEDURE — 94799 UNLISTED PULMONARY SVC/PX: CPT

## 2021-05-26 PROCEDURE — 82962 GLUCOSE BLOOD TEST: CPT

## 2021-05-26 PROCEDURE — 85027 COMPLETE CBC AUTOMATED: CPT | Performed by: INTERNAL MEDICINE

## 2021-05-26 RX ORDER — POTASSIUM CHLORIDE 20 MEQ/1
40 TABLET, EXTENDED RELEASE ORAL AS NEEDED
Status: DISCONTINUED | OUTPATIENT
Start: 2021-05-26 | End: 2021-06-01 | Stop reason: HOSPADM

## 2021-05-26 RX ORDER — POTASSIUM CHLORIDE 1.5 G/1.77G
40 POWDER, FOR SOLUTION ORAL AS NEEDED
Status: DISCONTINUED | OUTPATIENT
Start: 2021-05-26 | End: 2021-06-01 | Stop reason: HOSPADM

## 2021-05-26 RX ORDER — POTASSIUM CHLORIDE 20 MEQ/1
40 TABLET, EXTENDED RELEASE ORAL EVERY 4 HOURS
Status: COMPLETED | OUTPATIENT
Start: 2021-05-26 | End: 2021-05-26

## 2021-05-26 RX ORDER — POTASSIUM CHLORIDE 7.45 MG/ML
10 INJECTION INTRAVENOUS
Status: DISCONTINUED | OUTPATIENT
Start: 2021-05-26 | End: 2021-06-01 | Stop reason: HOSPADM

## 2021-05-26 RX ADMIN — CEFEPIME HYDROCHLORIDE 2 G: 2 INJECTION, POWDER, FOR SOLUTION INTRAVENOUS at 09:11

## 2021-05-26 RX ADMIN — Medication 1 TABLET: at 20:09

## 2021-05-26 RX ADMIN — GUAIFENESIN 400 MG: 200 TABLET ORAL at 20:09

## 2021-05-26 RX ADMIN — PANTOPRAZOLE SODIUM 40 MG: 40 INJECTION, POWDER, FOR SOLUTION INTRAVENOUS at 09:13

## 2021-05-26 RX ADMIN — Medication 5 MG: at 20:09

## 2021-05-26 RX ADMIN — ATORVASTATIN CALCIUM 40 MG: 40 TABLET, FILM COATED ORAL at 20:09

## 2021-05-26 RX ADMIN — CARVEDILOL 6.25 MG: 6.25 TABLET, FILM COATED ORAL at 09:04

## 2021-05-26 RX ADMIN — APIXABAN 2.5 MG: 2.5 TABLET, FILM COATED ORAL at 09:04

## 2021-05-26 RX ADMIN — AMLODIPINE BESYLATE 10 MG: 10 TABLET ORAL at 09:04

## 2021-05-26 RX ADMIN — CARIPRAZINE 1.5 MG: 1.5 CAPSULE, GELATIN COATED ORAL at 09:04

## 2021-05-26 RX ADMIN — LEVOTHYROXINE SODIUM 200 MCG: 100 TABLET ORAL at 05:31

## 2021-05-26 RX ADMIN — APIXABAN 2.5 MG: 2.5 TABLET, FILM COATED ORAL at 20:09

## 2021-05-26 RX ADMIN — BACLOFEN 10 MG: 10 TABLET ORAL at 20:09

## 2021-05-26 RX ADMIN — METRONIDAZOLE 500 MG: 500 INJECTION, SOLUTION INTRAVENOUS at 17:37

## 2021-05-26 RX ADMIN — METRONIDAZOLE 500 MG: 500 INJECTION, SOLUTION INTRAVENOUS at 01:07

## 2021-05-26 RX ADMIN — POTASSIUM CHLORIDE 40 MEQ: 20 TABLET, EXTENDED RELEASE ORAL at 09:04

## 2021-05-26 RX ADMIN — PREDNISONE 5 MG: 5 TABLET ORAL at 06:00

## 2021-05-26 RX ADMIN — CARVEDILOL 6.25 MG: 6.25 TABLET, FILM COATED ORAL at 17:36

## 2021-05-26 RX ADMIN — SODIUM CHLORIDE, PRESERVATIVE FREE 10 ML: 5 INJECTION INTRAVENOUS at 09:13

## 2021-05-26 RX ADMIN — METRONIDAZOLE 500 MG: 500 INJECTION, SOLUTION INTRAVENOUS at 09:11

## 2021-05-26 RX ADMIN — CEFEPIME HYDROCHLORIDE 2 G: 2 INJECTION, POWDER, FOR SOLUTION INTRAVENOUS at 20:14

## 2021-05-26 RX ADMIN — POTASSIUM CHLORIDE 40 MEQ: 20 TABLET, EXTENDED RELEASE ORAL at 04:33

## 2021-05-26 RX ADMIN — GUAIFENESIN 400 MG: 200 TABLET ORAL at 09:04

## 2021-05-26 RX ADMIN — DOCUSATE SODIUM 200 MG: 100 CAPSULE, LIQUID FILLED ORAL at 09:04

## 2021-05-26 RX ADMIN — MONTELUKAST SODIUM 10 MG: 10 TABLET, COATED ORAL at 09:04

## 2021-05-26 RX ADMIN — PANTOPRAZOLE SODIUM 40 MG: 40 INJECTION, POWDER, FOR SOLUTION INTRAVENOUS at 20:09

## 2021-05-26 RX ADMIN — ASPIRIN 81 MG: 81 TABLET, CHEWABLE ORAL at 09:04

## 2021-05-27 LAB
ANION GAP SERPL CALCULATED.3IONS-SCNC: 5.1 MMOL/L (ref 5–15)
BACTERIA SPEC AEROBE CULT: NORMAL
BACTERIA SPEC AEROBE CULT: NORMAL
BUN SERPL-MCNC: 5 MG/DL (ref 8–23)
BUN/CREAT SERPL: 14.3 (ref 7–25)
CALCIUM SPEC-SCNC: 7.9 MG/DL (ref 8.6–10.5)
CHLORIDE SERPL-SCNC: 100 MMOL/L (ref 98–107)
CO2 SERPL-SCNC: 30.9 MMOL/L (ref 22–29)
CREAT SERPL-MCNC: 0.35 MG/DL (ref 0.57–1)
DEPRECATED RDW RBC AUTO: 48.6 FL (ref 37–54)
ERYTHROCYTE [DISTWIDTH] IN BLOOD BY AUTOMATED COUNT: 15.6 % (ref 12.3–15.4)
GFR SERPL CREATININE-BSD FRML MDRD: >150 ML/MIN/1.73
GLUCOSE BLDC GLUCOMTR-MCNC: 105 MG/DL (ref 70–130)
GLUCOSE BLDC GLUCOMTR-MCNC: 154 MG/DL (ref 70–130)
GLUCOSE BLDC GLUCOMTR-MCNC: 172 MG/DL (ref 70–130)
GLUCOSE BLDC GLUCOMTR-MCNC: 221 MG/DL (ref 70–130)
GLUCOSE SERPL-MCNC: 95 MG/DL (ref 65–99)
HCT VFR BLD AUTO: 28.4 % (ref 34–46.6)
HGB BLD-MCNC: 8.9 G/DL (ref 12–15.9)
MCH RBC QN AUTO: 30 PG (ref 26.6–33)
MCHC RBC AUTO-ENTMCNC: 31.3 G/DL (ref 31.5–35.7)
MCV RBC AUTO: 95.6 FL (ref 79–97)
PLATELET # BLD AUTO: 528 10*3/MM3 (ref 140–450)
PMV BLD AUTO: 9.2 FL (ref 6–12)
POTASSIUM SERPL-SCNC: 4 MMOL/L (ref 3.5–5.2)
RBC # BLD AUTO: 2.97 10*6/MM3 (ref 3.77–5.28)
SODIUM SERPL-SCNC: 136 MMOL/L (ref 136–145)
WBC # BLD AUTO: 5.74 10*3/MM3 (ref 3.4–10.8)

## 2021-05-27 PROCEDURE — 85027 COMPLETE CBC AUTOMATED: CPT | Performed by: INTERNAL MEDICINE

## 2021-05-27 PROCEDURE — 25010000002 CEFEPIME PER 500 MG: Performed by: INTERNAL MEDICINE

## 2021-05-27 PROCEDURE — 94799 UNLISTED PULMONARY SVC/PX: CPT

## 2021-05-27 PROCEDURE — 99231 SBSQ HOSP IP/OBS SF/LOW 25: CPT | Performed by: INTERNAL MEDICINE

## 2021-05-27 PROCEDURE — 82962 GLUCOSE BLOOD TEST: CPT

## 2021-05-27 PROCEDURE — 63710000001 PREDNISONE PER 5 MG: Performed by: INTERNAL MEDICINE

## 2021-05-27 PROCEDURE — 80048 BASIC METABOLIC PNL TOTAL CA: CPT | Performed by: INTERNAL MEDICINE

## 2021-05-27 RX ORDER — ARFORMOTEROL TARTRATE 15 UG/2ML
15 SOLUTION RESPIRATORY (INHALATION) 2 TIMES DAILY PRN
Status: DISCONTINUED | OUTPATIENT
Start: 2021-05-27 | End: 2021-05-29

## 2021-05-27 RX ADMIN — DOCUSATE SODIUM 200 MG: 100 CAPSULE, LIQUID FILLED ORAL at 08:48

## 2021-05-27 RX ADMIN — GUAIFENESIN 400 MG: 200 TABLET ORAL at 20:51

## 2021-05-27 RX ADMIN — SODIUM CHLORIDE, PRESERVATIVE FREE 10 ML: 5 INJECTION INTRAVENOUS at 20:52

## 2021-05-27 RX ADMIN — ATORVASTATIN CALCIUM 40 MG: 40 TABLET, FILM COATED ORAL at 20:51

## 2021-05-27 RX ADMIN — CEFEPIME HYDROCHLORIDE 2 G: 2 INJECTION, POWDER, FOR SOLUTION INTRAVENOUS at 20:51

## 2021-05-27 RX ADMIN — CARVEDILOL 6.25 MG: 6.25 TABLET, FILM COATED ORAL at 17:20

## 2021-05-27 RX ADMIN — Medication 5 MG: at 20:51

## 2021-05-27 RX ADMIN — APIXABAN 2.5 MG: 2.5 TABLET, FILM COATED ORAL at 20:51

## 2021-05-27 RX ADMIN — PANTOPRAZOLE SODIUM 40 MG: 40 INJECTION, POWDER, FOR SOLUTION INTRAVENOUS at 20:51

## 2021-05-27 RX ADMIN — AMLODIPINE BESYLATE 10 MG: 10 TABLET ORAL at 08:49

## 2021-05-27 RX ADMIN — GUAIFENESIN 400 MG: 200 TABLET ORAL at 08:49

## 2021-05-27 RX ADMIN — CARVEDILOL 6.25 MG: 6.25 TABLET, FILM COATED ORAL at 08:49

## 2021-05-27 RX ADMIN — Medication 1 TABLET: at 20:51

## 2021-05-27 RX ADMIN — SODIUM CHLORIDE, PRESERVATIVE FREE 10 ML: 5 INJECTION INTRAVENOUS at 08:49

## 2021-05-27 RX ADMIN — PREDNISONE 5 MG: 5 TABLET ORAL at 05:29

## 2021-05-27 RX ADMIN — CARIPRAZINE 1.5 MG: 1.5 CAPSULE, GELATIN COATED ORAL at 08:50

## 2021-05-27 RX ADMIN — PANTOPRAZOLE SODIUM 40 MG: 40 INJECTION, POWDER, FOR SOLUTION INTRAVENOUS at 08:49

## 2021-05-27 RX ADMIN — MONTELUKAST SODIUM 10 MG: 10 TABLET, COATED ORAL at 08:49

## 2021-05-27 RX ADMIN — BACLOFEN 10 MG: 10 TABLET ORAL at 20:51

## 2021-05-27 RX ADMIN — APIXABAN 2.5 MG: 2.5 TABLET, FILM COATED ORAL at 08:49

## 2021-05-27 RX ADMIN — METRONIDAZOLE 500 MG: 500 INJECTION, SOLUTION INTRAVENOUS at 01:23

## 2021-05-27 RX ADMIN — METRONIDAZOLE 500 MG: 500 INJECTION, SOLUTION INTRAVENOUS at 17:20

## 2021-05-27 RX ADMIN — ASPIRIN 81 MG: 81 TABLET, CHEWABLE ORAL at 08:49

## 2021-05-27 RX ADMIN — LEVOTHYROXINE SODIUM 200 MCG: 100 TABLET ORAL at 05:29

## 2021-05-27 RX ADMIN — CEFEPIME HYDROCHLORIDE 2 G: 2 INJECTION, POWDER, FOR SOLUTION INTRAVENOUS at 08:49

## 2021-05-27 RX ADMIN — METRONIDAZOLE 500 MG: 500 INJECTION, SOLUTION INTRAVENOUS at 08:49

## 2021-05-28 LAB
ANION GAP SERPL CALCULATED.3IONS-SCNC: 6.1 MMOL/L (ref 5–15)
BUN SERPL-MCNC: 13 MG/DL (ref 8–23)
BUN/CREAT SERPL: 29.5 (ref 7–25)
CALCIUM SPEC-SCNC: 7.9 MG/DL (ref 8.6–10.5)
CHLORIDE SERPL-SCNC: 99 MMOL/L (ref 98–107)
CO2 SERPL-SCNC: 29.9 MMOL/L (ref 22–29)
CREAT SERPL-MCNC: 0.44 MG/DL (ref 0.57–1)
DEPRECATED RDW RBC AUTO: 52.5 FL (ref 37–54)
ERYTHROCYTE [DISTWIDTH] IN BLOOD BY AUTOMATED COUNT: 16 % (ref 12.3–15.4)
GFR SERPL CREATININE-BSD FRML MDRD: 139 ML/MIN/1.73
GLUCOSE BLDC GLUCOMTR-MCNC: 123 MG/DL (ref 70–130)
GLUCOSE BLDC GLUCOMTR-MCNC: 166 MG/DL (ref 70–130)
GLUCOSE BLDC GLUCOMTR-MCNC: 169 MG/DL (ref 70–130)
GLUCOSE BLDC GLUCOMTR-MCNC: 200 MG/DL (ref 70–130)
GLUCOSE BLDC GLUCOMTR-MCNC: 246 MG/DL (ref 70–130)
GLUCOSE SERPL-MCNC: 158 MG/DL (ref 65–99)
HCT VFR BLD AUTO: 30.6 % (ref 34–46.6)
HGB BLD-MCNC: 9.1 G/DL (ref 12–15.9)
MCH RBC QN AUTO: 29.7 PG (ref 26.6–33)
MCHC RBC AUTO-ENTMCNC: 29.7 G/DL (ref 31.5–35.7)
MCV RBC AUTO: 100 FL (ref 79–97)
PLATELET # BLD AUTO: 561 10*3/MM3 (ref 140–450)
PMV BLD AUTO: 9.7 FL (ref 6–12)
POTASSIUM SERPL-SCNC: 4.5 MMOL/L (ref 3.5–5.2)
RBC # BLD AUTO: 3.06 10*6/MM3 (ref 3.77–5.28)
SODIUM SERPL-SCNC: 135 MMOL/L (ref 136–145)
WBC # BLD AUTO: 7.12 10*3/MM3 (ref 3.4–10.8)

## 2021-05-28 PROCEDURE — 25010000002 CEFEPIME PER 500 MG: Performed by: INTERNAL MEDICINE

## 2021-05-28 PROCEDURE — 94799 UNLISTED PULMONARY SVC/PX: CPT

## 2021-05-28 PROCEDURE — 80048 BASIC METABOLIC PNL TOTAL CA: CPT | Performed by: INTERNAL MEDICINE

## 2021-05-28 PROCEDURE — 63710000001 PROMETHAZINE PER 12.5 MG: Performed by: INTERNAL MEDICINE

## 2021-05-28 PROCEDURE — 63710000001 PREDNISONE PER 5 MG: Performed by: INTERNAL MEDICINE

## 2021-05-28 PROCEDURE — 85027 COMPLETE CBC AUTOMATED: CPT | Performed by: INTERNAL MEDICINE

## 2021-05-28 PROCEDURE — 97162 PT EVAL MOD COMPLEX 30 MIN: CPT

## 2021-05-28 PROCEDURE — 82962 GLUCOSE BLOOD TEST: CPT

## 2021-05-28 PROCEDURE — 97166 OT EVAL MOD COMPLEX 45 MIN: CPT

## 2021-05-28 PROCEDURE — 99231 SBSQ HOSP IP/OBS SF/LOW 25: CPT | Performed by: INTERNAL MEDICINE

## 2021-05-28 RX ADMIN — GUAIFENESIN 400 MG: 200 TABLET ORAL at 20:29

## 2021-05-28 RX ADMIN — DOCUSATE SODIUM 200 MG: 100 CAPSULE, LIQUID FILLED ORAL at 08:35

## 2021-05-28 RX ADMIN — ATORVASTATIN CALCIUM 40 MG: 40 TABLET, FILM COATED ORAL at 20:29

## 2021-05-28 RX ADMIN — APIXABAN 2.5 MG: 2.5 TABLET, FILM COATED ORAL at 20:29

## 2021-05-28 RX ADMIN — Medication 1 TABLET: at 20:28

## 2021-05-28 RX ADMIN — PROMETHAZINE HYDROCHLORIDE 12.5 MG: 12.5 TABLET ORAL at 20:31

## 2021-05-28 RX ADMIN — PREDNISONE 5 MG: 5 TABLET ORAL at 06:06

## 2021-05-28 RX ADMIN — SODIUM CHLORIDE, PRESERVATIVE FREE 10 ML: 5 INJECTION INTRAVENOUS at 08:36

## 2021-05-28 RX ADMIN — APIXABAN 2.5 MG: 2.5 TABLET, FILM COATED ORAL at 08:35

## 2021-05-28 RX ADMIN — CARVEDILOL 6.25 MG: 6.25 TABLET, FILM COATED ORAL at 17:30

## 2021-05-28 RX ADMIN — METRONIDAZOLE 500 MG: 500 INJECTION, SOLUTION INTRAVENOUS at 08:36

## 2021-05-28 RX ADMIN — MONTELUKAST SODIUM 10 MG: 10 TABLET, COATED ORAL at 08:35

## 2021-05-28 RX ADMIN — METRONIDAZOLE 500 MG: 500 INJECTION, SOLUTION INTRAVENOUS at 17:31

## 2021-05-28 RX ADMIN — PANTOPRAZOLE SODIUM 40 MG: 40 INJECTION, POWDER, FOR SOLUTION INTRAVENOUS at 20:29

## 2021-05-28 RX ADMIN — LEVOTHYROXINE SODIUM 200 MCG: 100 TABLET ORAL at 06:06

## 2021-05-28 RX ADMIN — AMLODIPINE BESYLATE 10 MG: 10 TABLET ORAL at 08:35

## 2021-05-28 RX ADMIN — CARIPRAZINE 1.5 MG: 1.5 CAPSULE, GELATIN COATED ORAL at 08:35

## 2021-05-28 RX ADMIN — PANTOPRAZOLE SODIUM 40 MG: 40 INJECTION, POWDER, FOR SOLUTION INTRAVENOUS at 08:36

## 2021-05-28 RX ADMIN — PROMETHAZINE HYDROCHLORIDE 12.5 MG: 12.5 TABLET ORAL at 03:28

## 2021-05-28 RX ADMIN — SODIUM CHLORIDE, PRESERVATIVE FREE 10 ML: 5 INJECTION INTRAVENOUS at 20:30

## 2021-05-28 RX ADMIN — Medication 5 MG: at 20:28

## 2021-05-28 RX ADMIN — BACLOFEN 10 MG: 10 TABLET ORAL at 20:29

## 2021-05-28 RX ADMIN — METRONIDAZOLE 500 MG: 500 INJECTION, SOLUTION INTRAVENOUS at 01:47

## 2021-05-28 RX ADMIN — GUAIFENESIN 400 MG: 200 TABLET ORAL at 08:35

## 2021-05-28 RX ADMIN — ASPIRIN 81 MG: 81 TABLET, CHEWABLE ORAL at 08:35

## 2021-05-28 RX ADMIN — CEFEPIME HYDROCHLORIDE 2 G: 2 INJECTION, POWDER, FOR SOLUTION INTRAVENOUS at 20:25

## 2021-05-28 RX ADMIN — CARVEDILOL 6.25 MG: 6.25 TABLET, FILM COATED ORAL at 08:35

## 2021-05-28 RX ADMIN — CEFEPIME HYDROCHLORIDE 2 G: 2 INJECTION, POWDER, FOR SOLUTION INTRAVENOUS at 11:28

## 2021-05-29 LAB
ANION GAP SERPL CALCULATED.3IONS-SCNC: 4.2 MMOL/L (ref 5–15)
BUN SERPL-MCNC: 16 MG/DL (ref 8–23)
BUN/CREAT SERPL: 39 (ref 7–25)
CALCIUM SPEC-SCNC: 8.1 MG/DL (ref 8.6–10.5)
CHLORIDE SERPL-SCNC: 100 MMOL/L (ref 98–107)
CO2 SERPL-SCNC: 30.8 MMOL/L (ref 22–29)
CREAT SERPL-MCNC: 0.41 MG/DL (ref 0.57–1)
DEPRECATED RDW RBC AUTO: 53.4 FL (ref 37–54)
ERYTHROCYTE [DISTWIDTH] IN BLOOD BY AUTOMATED COUNT: 16.1 % (ref 12.3–15.4)
GFR SERPL CREATININE-BSD FRML MDRD: >150 ML/MIN/1.73
GLUCOSE BLDC GLUCOMTR-MCNC: 133 MG/DL (ref 70–130)
GLUCOSE BLDC GLUCOMTR-MCNC: 146 MG/DL (ref 70–130)
GLUCOSE BLDC GLUCOMTR-MCNC: 156 MG/DL (ref 70–130)
GLUCOSE BLDC GLUCOMTR-MCNC: 176 MG/DL (ref 70–130)
GLUCOSE BLDC GLUCOMTR-MCNC: 185 MG/DL (ref 70–130)
GLUCOSE SERPL-MCNC: 172 MG/DL (ref 65–99)
HCT VFR BLD AUTO: 24.5 % (ref 34–46.6)
HGB BLD-MCNC: 7.7 G/DL (ref 12–15.9)
MCH RBC QN AUTO: 30.7 PG (ref 26.6–33)
MCHC RBC AUTO-ENTMCNC: 31.4 G/DL (ref 31.5–35.7)
MCV RBC AUTO: 97.6 FL (ref 79–97)
PLATELET # BLD AUTO: 470 10*3/MM3 (ref 140–450)
PMV BLD AUTO: 9.6 FL (ref 6–12)
POTASSIUM SERPL-SCNC: 3.8 MMOL/L (ref 3.5–5.2)
QT INTERVAL: 398 MS
QT INTERVAL: 416 MS
QTC INTERVAL: 436 MS
QTC INTERVAL: 459 MS
RBC # BLD AUTO: 2.51 10*6/MM3 (ref 3.77–5.28)
SODIUM SERPL-SCNC: 135 MMOL/L (ref 136–145)
WBC # BLD AUTO: 15.13 10*3/MM3 (ref 3.4–10.8)

## 2021-05-29 PROCEDURE — 25010000002 CEFEPIME PER 500 MG: Performed by: INTERNAL MEDICINE

## 2021-05-29 PROCEDURE — 94799 UNLISTED PULMONARY SVC/PX: CPT

## 2021-05-29 PROCEDURE — 82962 GLUCOSE BLOOD TEST: CPT

## 2021-05-29 PROCEDURE — 63710000001 PREDNISONE PER 5 MG: Performed by: INTERNAL MEDICINE

## 2021-05-29 PROCEDURE — 85027 COMPLETE CBC AUTOMATED: CPT | Performed by: INTERNAL MEDICINE

## 2021-05-29 PROCEDURE — 93005 ELECTROCARDIOGRAM TRACING: CPT | Performed by: INTERNAL MEDICINE

## 2021-05-29 PROCEDURE — 99231 SBSQ HOSP IP/OBS SF/LOW 25: CPT | Performed by: INTERNAL MEDICINE

## 2021-05-29 PROCEDURE — 80048 BASIC METABOLIC PNL TOTAL CA: CPT | Performed by: INTERNAL MEDICINE

## 2021-05-29 RX ORDER — ACETAMINOPHEN 325 MG/1
650 TABLET ORAL EVERY 8 HOURS PRN
Status: DISCONTINUED | OUTPATIENT
Start: 2021-05-29 | End: 2021-06-01 | Stop reason: HOSPADM

## 2021-05-29 RX ADMIN — METRONIDAZOLE 500 MG: 500 INJECTION, SOLUTION INTRAVENOUS at 17:03

## 2021-05-29 RX ADMIN — ASPIRIN 81 MG: 81 TABLET, CHEWABLE ORAL at 09:32

## 2021-05-29 RX ADMIN — SODIUM CHLORIDE, PRESERVATIVE FREE 10 ML: 5 INJECTION INTRAVENOUS at 19:53

## 2021-05-29 RX ADMIN — LEVOTHYROXINE SODIUM 200 MCG: 100 TABLET ORAL at 05:29

## 2021-05-29 RX ADMIN — SODIUM CHLORIDE, PRESERVATIVE FREE 10 ML: 5 INJECTION INTRAVENOUS at 10:08

## 2021-05-29 RX ADMIN — CEFEPIME HYDROCHLORIDE 2 G: 2 INJECTION, POWDER, FOR SOLUTION INTRAVENOUS at 09:33

## 2021-05-29 RX ADMIN — CEFEPIME HYDROCHLORIDE 2 G: 2 INJECTION, POWDER, FOR SOLUTION INTRAVENOUS at 19:53

## 2021-05-29 RX ADMIN — GUAIFENESIN 400 MG: 200 TABLET ORAL at 09:32

## 2021-05-29 RX ADMIN — METRONIDAZOLE 500 MG: 500 INJECTION, SOLUTION INTRAVENOUS at 01:10

## 2021-05-29 RX ADMIN — CARIPRAZINE 1.5 MG: 1.5 CAPSULE, GELATIN COATED ORAL at 10:08

## 2021-05-29 RX ADMIN — Medication 1 TABLET: at 19:54

## 2021-05-29 RX ADMIN — Medication 5 MG: at 19:53

## 2021-05-29 RX ADMIN — METRONIDAZOLE 500 MG: 500 INJECTION, SOLUTION INTRAVENOUS at 09:33

## 2021-05-29 RX ADMIN — PANTOPRAZOLE SODIUM 40 MG: 40 INJECTION, POWDER, FOR SOLUTION INTRAVENOUS at 09:32

## 2021-05-29 RX ADMIN — PREDNISONE 5 MG: 5 TABLET ORAL at 05:29

## 2021-05-29 RX ADMIN — MONTELUKAST SODIUM 10 MG: 10 TABLET, COATED ORAL at 09:32

## 2021-05-29 RX ADMIN — CARVEDILOL 6.25 MG: 6.25 TABLET, FILM COATED ORAL at 17:08

## 2021-05-29 RX ADMIN — ATORVASTATIN CALCIUM 40 MG: 40 TABLET, FILM COATED ORAL at 19:53

## 2021-05-29 RX ADMIN — DOCUSATE SODIUM 200 MG: 100 CAPSULE, LIQUID FILLED ORAL at 09:32

## 2021-05-29 RX ADMIN — METRONIDAZOLE 500 MG: 500 INJECTION, SOLUTION INTRAVENOUS at 23:42

## 2021-05-29 RX ADMIN — PANTOPRAZOLE SODIUM 40 MG: 40 INJECTION, POWDER, FOR SOLUTION INTRAVENOUS at 19:53

## 2021-05-29 RX ADMIN — BACLOFEN 10 MG: 10 TABLET ORAL at 19:53

## 2021-05-29 RX ADMIN — ACETAMINOPHEN 650 MG: 325 TABLET ORAL at 12:36

## 2021-05-29 RX ADMIN — GUAIFENESIN 400 MG: 200 TABLET ORAL at 19:54

## 2021-05-30 LAB
ABO GROUP BLD: NORMAL
ANION GAP SERPL CALCULATED.3IONS-SCNC: 3.1 MMOL/L (ref 5–15)
BLD GP AB SCN SERPL QL: NEGATIVE
BUN SERPL-MCNC: 12 MG/DL (ref 8–23)
BUN/CREAT SERPL: 25 (ref 7–25)
CALCIUM SPEC-SCNC: 8 MG/DL (ref 8.6–10.5)
CHLORIDE SERPL-SCNC: 102 MMOL/L (ref 98–107)
CO2 SERPL-SCNC: 29.9 MMOL/L (ref 22–29)
CREAT SERPL-MCNC: 0.48 MG/DL (ref 0.57–1)
DEPRECATED RDW RBC AUTO: 55.4 FL (ref 37–54)
ERYTHROCYTE [DISTWIDTH] IN BLOOD BY AUTOMATED COUNT: 16.3 % (ref 12.3–15.4)
GFR SERPL CREATININE-BSD FRML MDRD: 126 ML/MIN/1.73
GLUCOSE BLDC GLUCOMTR-MCNC: 115 MG/DL (ref 70–130)
GLUCOSE BLDC GLUCOMTR-MCNC: 143 MG/DL (ref 70–130)
GLUCOSE BLDC GLUCOMTR-MCNC: 147 MG/DL (ref 70–130)
GLUCOSE BLDC GLUCOMTR-MCNC: 180 MG/DL (ref 70–130)
GLUCOSE SERPL-MCNC: 119 MG/DL (ref 65–99)
HCT VFR BLD AUTO: 21.3 % (ref 34–46.6)
HCT VFR BLD AUTO: 21.4 % (ref 34–46.6)
HCT VFR BLD AUTO: 26.7 % (ref 34–46.6)
HGB BLD-MCNC: 6.7 G/DL (ref 12–15.9)
HGB BLD-MCNC: 6.8 G/DL (ref 12–15.9)
HGB BLD-MCNC: 8.1 G/DL (ref 12–15.9)
MCH RBC QN AUTO: 31 PG (ref 26.6–33)
MCHC RBC AUTO-ENTMCNC: 31.5 G/DL (ref 31.5–35.7)
MCV RBC AUTO: 98.6 FL (ref 79–97)
PLATELET # BLD AUTO: 403 10*3/MM3 (ref 140–450)
PMV BLD AUTO: 9.5 FL (ref 6–12)
POTASSIUM SERPL-SCNC: 3.8 MMOL/L (ref 3.5–5.2)
RBC # BLD AUTO: 2.16 10*6/MM3 (ref 3.77–5.28)
RH BLD: POSITIVE
SODIUM SERPL-SCNC: 135 MMOL/L (ref 136–145)
T&S EXPIRATION DATE: NORMAL
WBC # BLD AUTO: 14.13 10*3/MM3 (ref 3.4–10.8)

## 2021-05-30 PROCEDURE — 86901 BLOOD TYPING SEROLOGIC RH(D): CPT | Performed by: INTERNAL MEDICINE

## 2021-05-30 PROCEDURE — 85014 HEMATOCRIT: CPT | Performed by: INTERNAL MEDICINE

## 2021-05-30 PROCEDURE — 85018 HEMOGLOBIN: CPT | Performed by: PHYSICIAN ASSISTANT

## 2021-05-30 PROCEDURE — 82962 GLUCOSE BLOOD TEST: CPT

## 2021-05-30 PROCEDURE — 80048 BASIC METABOLIC PNL TOTAL CA: CPT | Performed by: INTERNAL MEDICINE

## 2021-05-30 PROCEDURE — 25010000002 CEFEPIME PER 500 MG: Performed by: INTERNAL MEDICINE

## 2021-05-30 PROCEDURE — 85027 COMPLETE CBC AUTOMATED: CPT | Performed by: INTERNAL MEDICINE

## 2021-05-30 PROCEDURE — 63710000001 PREDNISONE PER 5 MG: Performed by: INTERNAL MEDICINE

## 2021-05-30 PROCEDURE — 86850 RBC ANTIBODY SCREEN: CPT | Performed by: INTERNAL MEDICINE

## 2021-05-30 PROCEDURE — 99233 SBSQ HOSP IP/OBS HIGH 50: CPT | Performed by: INTERNAL MEDICINE

## 2021-05-30 PROCEDURE — P9016 RBC LEUKOCYTES REDUCED: HCPCS

## 2021-05-30 PROCEDURE — 85018 HEMOGLOBIN: CPT | Performed by: INTERNAL MEDICINE

## 2021-05-30 PROCEDURE — 36430 TRANSFUSION BLD/BLD COMPNT: CPT

## 2021-05-30 PROCEDURE — 86923 COMPATIBILITY TEST ELECTRIC: CPT

## 2021-05-30 PROCEDURE — 86900 BLOOD TYPING SEROLOGIC ABO: CPT

## 2021-05-30 PROCEDURE — 85014 HEMATOCRIT: CPT | Performed by: PHYSICIAN ASSISTANT

## 2021-05-30 PROCEDURE — 86900 BLOOD TYPING SEROLOGIC ABO: CPT | Performed by: INTERNAL MEDICINE

## 2021-05-30 RX ADMIN — CARVEDILOL 6.25 MG: 6.25 TABLET, FILM COATED ORAL at 17:46

## 2021-05-30 RX ADMIN — ATORVASTATIN CALCIUM 40 MG: 40 TABLET, FILM COATED ORAL at 20:22

## 2021-05-30 RX ADMIN — GUAIFENESIN 400 MG: 200 TABLET ORAL at 09:19

## 2021-05-30 RX ADMIN — CEFEPIME HYDROCHLORIDE 2 G: 2 INJECTION, POWDER, FOR SOLUTION INTRAVENOUS at 09:17

## 2021-05-30 RX ADMIN — AMLODIPINE BESYLATE 10 MG: 10 TABLET ORAL at 09:19

## 2021-05-30 RX ADMIN — PANTOPRAZOLE SODIUM 40 MG: 40 INJECTION, POWDER, FOR SOLUTION INTRAVENOUS at 09:21

## 2021-05-30 RX ADMIN — DOCUSATE SODIUM 200 MG: 100 CAPSULE, LIQUID FILLED ORAL at 09:18

## 2021-05-30 RX ADMIN — SODIUM CHLORIDE, PRESERVATIVE FREE 10 ML: 5 INJECTION INTRAVENOUS at 20:22

## 2021-05-30 RX ADMIN — BACLOFEN 10 MG: 10 TABLET ORAL at 20:22

## 2021-05-30 RX ADMIN — GUAIFENESIN 400 MG: 200 TABLET ORAL at 20:22

## 2021-05-30 RX ADMIN — PREDNISONE 5 MG: 5 TABLET ORAL at 04:58

## 2021-05-30 RX ADMIN — PANTOPRAZOLE SODIUM 40 MG: 40 INJECTION, POWDER, FOR SOLUTION INTRAVENOUS at 20:21

## 2021-05-30 RX ADMIN — LEVOTHYROXINE SODIUM 200 MCG: 100 TABLET ORAL at 04:58

## 2021-05-30 RX ADMIN — SODIUM CHLORIDE, PRESERVATIVE FREE 10 ML: 5 INJECTION INTRAVENOUS at 09:22

## 2021-05-30 RX ADMIN — ASPIRIN 81 MG: 81 TABLET, CHEWABLE ORAL at 09:26

## 2021-05-30 RX ADMIN — CARIPRAZINE 1.5 MG: 1.5 CAPSULE, GELATIN COATED ORAL at 09:18

## 2021-05-30 RX ADMIN — Medication 1 TABLET: at 20:22

## 2021-05-30 RX ADMIN — MONTELUKAST SODIUM 10 MG: 10 TABLET, COATED ORAL at 09:18

## 2021-05-30 RX ADMIN — CARVEDILOL 6.25 MG: 6.25 TABLET, FILM COATED ORAL at 09:19

## 2021-05-30 RX ADMIN — Medication 5 MG: at 20:22

## 2021-05-31 LAB
ANION GAP SERPL CALCULATED.3IONS-SCNC: 4.7 MMOL/L (ref 5–15)
BH BB BLOOD EXPIRATION DATE: NORMAL
BH BB BLOOD TYPE BARCODE: 1700
BH BB DISPENSE STATUS: NORMAL
BH BB PRODUCT CODE: NORMAL
BH BB UNIT NUMBER: NORMAL
BUN SERPL-MCNC: 10 MG/DL (ref 8–23)
BUN/CREAT SERPL: 22.7 (ref 7–25)
CALCIUM SPEC-SCNC: 7.7 MG/DL (ref 8.6–10.5)
CHLORIDE SERPL-SCNC: 99 MMOL/L (ref 98–107)
CO2 SERPL-SCNC: 29.3 MMOL/L (ref 22–29)
CREAT SERPL-MCNC: 0.44 MG/DL (ref 0.57–1)
CROSSMATCH INTERPRETATION: NORMAL
DEPRECATED RDW RBC AUTO: 75.1 FL (ref 37–54)
ERYTHROCYTE [DISTWIDTH] IN BLOOD BY AUTOMATED COUNT: 22.7 % (ref 12.3–15.4)
GFR SERPL CREATININE-BSD FRML MDRD: 139 ML/MIN/1.73
GLUCOSE BLDC GLUCOMTR-MCNC: 123 MG/DL (ref 70–130)
GLUCOSE BLDC GLUCOMTR-MCNC: 168 MG/DL (ref 70–130)
GLUCOSE BLDC GLUCOMTR-MCNC: 179 MG/DL (ref 70–130)
GLUCOSE BLDC GLUCOMTR-MCNC: 185 MG/DL (ref 70–130)
GLUCOSE BLDC GLUCOMTR-MCNC: 194 MG/DL (ref 70–130)
GLUCOSE SERPL-MCNC: 155 MG/DL (ref 65–99)
HCT VFR BLD AUTO: 25 % (ref 34–46.6)
HGB BLD-MCNC: 7.9 G/DL (ref 12–15.9)
MCH RBC QN AUTO: 28.7 PG (ref 26.6–33)
MCHC RBC AUTO-ENTMCNC: 31.6 G/DL (ref 31.5–35.7)
MCV RBC AUTO: 90.9 FL (ref 79–97)
PLATELET # BLD AUTO: 349 10*3/MM3 (ref 140–450)
PMV BLD AUTO: 9.6 FL (ref 6–12)
POTASSIUM SERPL-SCNC: 4 MMOL/L (ref 3.5–5.2)
RBC # BLD AUTO: 2.75 10*6/MM3 (ref 3.77–5.28)
SODIUM SERPL-SCNC: 133 MMOL/L (ref 136–145)
UNIT  ABO: NORMAL
UNIT  RH: NORMAL
WBC # BLD AUTO: 10.17 10*3/MM3 (ref 3.4–10.8)

## 2021-05-31 PROCEDURE — 63710000001 PROMETHAZINE PER 12.5 MG: Performed by: INTERNAL MEDICINE

## 2021-05-31 PROCEDURE — 80048 BASIC METABOLIC PNL TOTAL CA: CPT | Performed by: INTERNAL MEDICINE

## 2021-05-31 PROCEDURE — 82962 GLUCOSE BLOOD TEST: CPT

## 2021-05-31 PROCEDURE — 85027 COMPLETE CBC AUTOMATED: CPT | Performed by: INTERNAL MEDICINE

## 2021-05-31 PROCEDURE — 99233 SBSQ HOSP IP/OBS HIGH 50: CPT | Performed by: STUDENT IN AN ORGANIZED HEALTH CARE EDUCATION/TRAINING PROGRAM

## 2021-05-31 PROCEDURE — 63710000001 PREDNISONE PER 5 MG: Performed by: INTERNAL MEDICINE

## 2021-05-31 RX ORDER — LISINOPRIL 10 MG/1
10 TABLET ORAL DAILY
Status: DISCONTINUED | OUTPATIENT
Start: 2021-05-31 | End: 2021-06-01 | Stop reason: HOSPADM

## 2021-05-31 RX ADMIN — PANTOPRAZOLE SODIUM 40 MG: 40 INJECTION, POWDER, FOR SOLUTION INTRAVENOUS at 20:23

## 2021-05-31 RX ADMIN — ASPIRIN 81 MG: 81 TABLET, CHEWABLE ORAL at 08:26

## 2021-05-31 RX ADMIN — PANTOPRAZOLE SODIUM 40 MG: 40 INJECTION, POWDER, FOR SOLUTION INTRAVENOUS at 08:26

## 2021-05-31 RX ADMIN — Medication 1 TABLET: at 20:23

## 2021-05-31 RX ADMIN — GUAIFENESIN 400 MG: 200 TABLET ORAL at 08:26

## 2021-05-31 RX ADMIN — ATORVASTATIN CALCIUM 40 MG: 40 TABLET, FILM COATED ORAL at 20:23

## 2021-05-31 RX ADMIN — CARIPRAZINE 1.5 MG: 1.5 CAPSULE, GELATIN COATED ORAL at 08:26

## 2021-05-31 RX ADMIN — SODIUM CHLORIDE, PRESERVATIVE FREE 10 ML: 5 INJECTION INTRAVENOUS at 20:23

## 2021-05-31 RX ADMIN — BACLOFEN 10 MG: 10 TABLET ORAL at 20:23

## 2021-05-31 RX ADMIN — Medication 5 MG: at 20:23

## 2021-05-31 RX ADMIN — LEVOTHYROXINE SODIUM 200 MCG: 100 TABLET ORAL at 04:27

## 2021-05-31 RX ADMIN — CARVEDILOL 6.25 MG: 6.25 TABLET, FILM COATED ORAL at 17:16

## 2021-05-31 RX ADMIN — LISINOPRIL 10 MG: 10 TABLET ORAL at 11:33

## 2021-05-31 RX ADMIN — MONTELUKAST SODIUM 10 MG: 10 TABLET, COATED ORAL at 08:26

## 2021-05-31 RX ADMIN — AMLODIPINE BESYLATE 10 MG: 10 TABLET ORAL at 11:33

## 2021-05-31 RX ADMIN — GUAIFENESIN 400 MG: 200 TABLET ORAL at 20:23

## 2021-05-31 RX ADMIN — PREDNISONE 5 MG: 5 TABLET ORAL at 08:26

## 2021-05-31 RX ADMIN — SODIUM CHLORIDE, PRESERVATIVE FREE 10 ML: 5 INJECTION INTRAVENOUS at 08:34

## 2021-05-31 RX ADMIN — PROMETHAZINE HYDROCHLORIDE 12.5 MG: 12.5 TABLET ORAL at 13:43

## 2021-05-31 RX ADMIN — DOCUSATE SODIUM 200 MG: 100 CAPSULE, LIQUID FILLED ORAL at 08:26

## 2021-06-01 VITALS
OXYGEN SATURATION: 99 % | TEMPERATURE: 97.9 F | RESPIRATION RATE: 16 BRPM | WEIGHT: 108.3 LBS | HEIGHT: 66 IN | HEART RATE: 68 BPM | BODY MASS INDEX: 17.4 KG/M2 | SYSTOLIC BLOOD PRESSURE: 148 MMHG | DIASTOLIC BLOOD PRESSURE: 71 MMHG

## 2021-06-01 LAB
ANION GAP SERPL CALCULATED.3IONS-SCNC: 5 MMOL/L (ref 5–15)
BUN SERPL-MCNC: 9 MG/DL (ref 8–23)
BUN/CREAT SERPL: 23.7 (ref 7–25)
CALCIUM SPEC-SCNC: 7.7 MG/DL (ref 8.6–10.5)
CHLORIDE SERPL-SCNC: 100 MMOL/L (ref 98–107)
CO2 SERPL-SCNC: 31 MMOL/L (ref 22–29)
CREAT SERPL-MCNC: 0.38 MG/DL (ref 0.57–1)
DEPRECATED RDW RBC AUTO: 72 FL (ref 37–54)
ERYTHROCYTE [DISTWIDTH] IN BLOOD BY AUTOMATED COUNT: 22.1 % (ref 12.3–15.4)
GFR SERPL CREATININE-BSD FRML MDRD: >150 ML/MIN/1.73
GLUCOSE BLDC GLUCOMTR-MCNC: 151 MG/DL (ref 70–130)
GLUCOSE BLDC GLUCOMTR-MCNC: 188 MG/DL (ref 70–130)
GLUCOSE BLDC GLUCOMTR-MCNC: 239 MG/DL (ref 70–130)
GLUCOSE SERPL-MCNC: 176 MG/DL (ref 65–99)
HCT VFR BLD AUTO: 25.3 % (ref 34–46.6)
HGB BLD-MCNC: 7.8 G/DL (ref 12–15.9)
MCH RBC QN AUTO: 28.8 PG (ref 26.6–33)
MCHC RBC AUTO-ENTMCNC: 30.8 G/DL (ref 31.5–35.7)
MCV RBC AUTO: 93.4 FL (ref 79–97)
PLATELET # BLD AUTO: 376 10*3/MM3 (ref 140–450)
PMV BLD AUTO: 9.6 FL (ref 6–12)
POTASSIUM SERPL-SCNC: 3.7 MMOL/L (ref 3.5–5.2)
RBC # BLD AUTO: 2.71 10*6/MM3 (ref 3.77–5.28)
SODIUM SERPL-SCNC: 136 MMOL/L (ref 136–145)
WBC # BLD AUTO: 9.27 10*3/MM3 (ref 3.4–10.8)

## 2021-06-01 PROCEDURE — 97116 GAIT TRAINING THERAPY: CPT

## 2021-06-01 PROCEDURE — 82962 GLUCOSE BLOOD TEST: CPT

## 2021-06-01 PROCEDURE — 80048 BASIC METABOLIC PNL TOTAL CA: CPT | Performed by: INTERNAL MEDICINE

## 2021-06-01 PROCEDURE — 99239 HOSP IP/OBS DSCHRG MGMT >30: CPT | Performed by: STUDENT IN AN ORGANIZED HEALTH CARE EDUCATION/TRAINING PROGRAM

## 2021-06-01 PROCEDURE — 97530 THERAPEUTIC ACTIVITIES: CPT

## 2021-06-01 PROCEDURE — 99222 1ST HOSP IP/OBS MODERATE 55: CPT | Performed by: INTERNAL MEDICINE

## 2021-06-01 PROCEDURE — 63710000001 PREDNISONE PER 5 MG: Performed by: INTERNAL MEDICINE

## 2021-06-01 PROCEDURE — 85027 COMPLETE CBC AUTOMATED: CPT | Performed by: INTERNAL MEDICINE

## 2021-06-01 RX ORDER — PANTOPRAZOLE SODIUM 20 MG/1
40 TABLET, DELAYED RELEASE ORAL DAILY
Qty: 60 TABLET | Refills: 0 | Status: SHIPPED | OUTPATIENT
Start: 2021-06-01 | End: 2021-08-21

## 2021-06-01 RX ORDER — ATORVASTATIN CALCIUM 40 MG/1
40 TABLET, FILM COATED ORAL NIGHTLY
Qty: 30 TABLET | Refills: 0 | Status: SHIPPED | OUTPATIENT
Start: 2021-06-01 | End: 2021-08-21

## 2021-06-01 RX ORDER — CARVEDILOL 6.25 MG/1
6.25 TABLET ORAL 2 TIMES DAILY WITH MEALS
Qty: 60 TABLET | Refills: 0 | Status: SHIPPED | OUTPATIENT
Start: 2021-06-01 | End: 2021-08-21

## 2021-06-01 RX ADMIN — PREDNISONE 5 MG: 5 TABLET ORAL at 08:42

## 2021-06-01 RX ADMIN — LEVOTHYROXINE SODIUM 200 MCG: 100 TABLET ORAL at 05:34

## 2021-06-01 RX ADMIN — SODIUM CHLORIDE, PRESERVATIVE FREE 10 ML: 5 INJECTION INTRAVENOUS at 08:47

## 2021-06-01 RX ADMIN — ASPIRIN 81 MG: 81 TABLET, CHEWABLE ORAL at 08:42

## 2021-06-01 RX ADMIN — DOCUSATE SODIUM 200 MG: 100 CAPSULE, LIQUID FILLED ORAL at 08:42

## 2021-06-01 RX ADMIN — GUAIFENESIN 400 MG: 200 TABLET ORAL at 08:42

## 2021-06-01 RX ADMIN — PANTOPRAZOLE SODIUM 40 MG: 40 INJECTION, POWDER, FOR SOLUTION INTRAVENOUS at 08:42

## 2021-06-01 RX ADMIN — MONTELUKAST SODIUM 10 MG: 10 TABLET, COATED ORAL at 08:42

## 2021-06-01 RX ADMIN — CARIPRAZINE 1.5 MG: 1.5 CAPSULE, GELATIN COATED ORAL at 11:30

## 2021-06-01 NOTE — CASE MANAGEMENT/SOCIAL WORK
Discharge Planning Assessment  [facility]     Patient Name: Mary Ly  MRN: [id number]  Today's Date: 6/1/2021    Admit Date: 5/22/2021      Discharge Plan     Row Name 06/01/21 1445       Plan    Plan  [facility] scheduled at [phone].    Final Discharge Disposition Code  03 - skilled nursing facility (SNF)    Final Note  Pt to be discharged back to [facility] on this date.  Facility aware and agreeable per Nathan.  Pt aware and agreeable.  Pt to be transported via [facility].  SS completed PCS form and made copy for medical records.            LANE SweenyeW

## 2021-06-01 NOTE — THERAPY TREATMENT NOTE
Acute Care - Physical Therapy Treatment Note   Nik     Patient Name: Mary Ly  : 1945  MRN: [id number]  Today's Date: 2021   Onset of Illness/Injury or Date of Surgery: 21       PT Assessment (last 12 hours)      PT Evaluation and Treatment     Row Name 21 1133          Physical Therapy Time and Intention    Subjective Information  complains of;weakness;fatigue  -CT     Document Type  therapy note (daily note)  -CT     Mode of Treatment  physical therapy  -CT     Patient Effort  good  -CT     Comment  Pt agreeable to therapy and tolerated well. Pt declined to sit up in chair but did stand and side step to head of bed   -CT     Row Name 21 1133          General Information    Patient Profile Reviewed  yes  -CT     Equipment Currently Used at Home  wheelchair;walker, rolling  -CT     Existing Precautions/Restrictions  fall;oxygen therapy device and L/min  -CT     Limitations/Impairments  safety/cognitive  -CT     Row Name 21 1133          Cognition    Affect/Mental Status (Cognitive)  WFL  -CT     Orientation Status (Cognition)  oriented x 3  -CT     Follows Commands (Cognition)  follows multi-step commands  -CT     Row Name 21 1133          Bed Mobility    Bed Mobility  bed mobility (all) activities  -CT     All Activities, Glenn (Bed Mobility)  minimum assist (75% patient effort)  -CT     Assistive Device (Bed Mobility)  bed rails  -CT     Row Name 21 1133          Transfers    Transfers  sit-stand transfer;stand-sit transfer  -CT     Sit-Stand Glenn (Transfers)  minimum assist (75% patient effort)  -CT     Stand-Sit Glenn (Transfers)  minimum assist (75% patient effort)  -CT     Row Name 21 1133          Sit-Stand Transfer    Assistive Device (Sit-Stand Transfers)  walker, front-wheeled  -CT     Row Name 21 1133          Stand-Sit Transfer    Assistive Device (Stand-Sit Transfers)  walker, front-wheeled  -CT     Row Name  06/01/21 1133          Gait/Stairs (Locomotion)    Stephens Level (Gait)  minimum assist (75% patient effort)  -CT     Assistive Device (Gait)  walker, front-wheeled  -CT     Distance in Feet (Gait)  3 side steps to head of bed   -CT     Pattern (Gait)  step-to  -CT     Row Name 06/01/21 1133          Coping    Observed Emotional State  calm;cooperative  -CT     Verbalized Emotional State  acceptance  -CT     Row Name 06/01/21 1133          Plan of Care Review    Plan of Care Reviewed With  patient  -CT     Row Name 06/01/21 1133          Positioning and Restraints    Pre-Treatment Position  in bed  -CT     Post Treatment Position  bed  -CT     In Bed  supine;call light within reach;encouraged to call for assist;exit alarm on;side rails up x3  -CT     Row Name 06/01/21 1133          Therapy Assessment/Plan (PT)    Rehab Potential (PT)  good, to achieve stated therapy goals  -CT     Criteria for Skilled Interventions Met (PT)  yes;skilled treatment is necessary  -CT       User Key  (r) = Recorded By, (t) = Taken By, (c) = Cosigned By    Initials Name Provider Type    CT Ashley Escamilla, JESSICA Physical Therapist        Physical Therapy Education                 Title: PT OT SLP Therapies (Done)     Topic: Physical Therapy (Done)     Point: Mobility training (Done)     Learning Progress Summary           Patient Acceptance, E,TB, VU by CT at 6/1/2021 1136    Acceptance, E, VU by CL at 5/30/2021 1301    Acceptance, E, VU by LT at 5/28/2021 1221    Acceptance, E,TB, VU by CT at 5/28/2021 0953                   Point: Home exercise program (Done)     Learning Progress Summary           Patient Acceptance, E,TB, VU by CT at 6/1/2021 1136    Acceptance, E, VU by CL at 5/30/2021 1301    Acceptance, E, VU by LT at 5/28/2021 1221    Acceptance, E,TB, VU by CT at 5/28/2021 0953                   Point: Body mechanics (Done)     Learning Progress Summary           Patient Acceptance, E,TB, VU by CT at 6/1/2021 1136     Acceptance, E, VU by CL at 5/30/2021 1301    Acceptance, E, VU by LT at 5/28/2021 1221    Acceptance, E,TB, VU by CT at 5/28/2021 0953                   Point: Precautions (Done)     Learning Progress Summary           Patient Acceptance, E,TB, VU by CT at 6/1/2021 1136    Acceptance, E, VU by CL at 5/30/2021 1301    Acceptance, E, VU by LT at 5/28/2021 1221    Acceptance, E,TB, VU by CT at 5/28/2021 0953                               User Key     Initials Effective Dates Name Provider Type Discipline    CL 06/16/16 -  Kallie Lackey, RN Registered Nurse Nurse    LT 06/16/16 -  Nahomy Rene, RN Registered Nurse Nurse    CT 04/03/18 -  Ashley Escamilla PT Physical Therapist PT              PT Recommendation and Plan  Anticipated Discharge Disposition (PT): skilled nursing facility  Planned Therapy Interventions (PT): balance training, bed mobility training, gait training, home exercise program, manual therapy techniques, motor coordination training, patient/family education, postural re-education, strengthening, transfer training  Therapy Frequency (PT): 3 times/wk (3-5 times/wk )  Plan of Care Reviewed With: patient       Time Calculation:   PT Charges     Row Name 06/01/21 1136             Time Calculation    PT Received On  06/01/21  -CT      PT Goal Re-Cert Due Date  07/11/21  -CT         Time Calculation- PT    Total Timed Code Minutes- PT  32 minute(s)  -CT        User Key  (r) = Recorded By, (t) = Taken By, (c) = Cosigned By    Initials Name Provider Type    CT Ashley Escamilla PT Physical Therapist        Therapy Charges for Today     Code Description Service Date Service Provider Modifiers Qty    98093791477 HC PT THERAPEUTIC ACT EA 15 MIN 6/1/2021 Ashley Escamilla, PT GP 1    99455615854 HC GAIT TRAINING EA 15 MIN 6/1/2021 Ashley Escamilla, PT GP 1               Ashley Escamilla PT  6/1/2021

## 2021-06-01 NOTE — DISCHARGE PLACEMENT REQUEST
"Mary Ly (75 y.o. Female)     Date of Birth Social Security Number Address Home Phone MRN    1945  [facility]  [facility] [place] [address] [facility] [address] [phone] [id number]    Roman Catholic Marital Status          Mormon        Admission Date Admission Type Admitting Provider Attending Provider Department, Room/Bed    5/22/21 Emergency Alba Royal DO Cagle, William, DO [address], 3312/1P    Discharge Date Discharge Disposition Discharge Destination                       Attending Provider: Yariel Shaw DO    Allergies: Haldol [Haloperidol]    Isolation: None   Infection: None   Code Status: No CPR    Ht: 167.6 cm (66\")   Wt: 49.1 kg (108 lb 4.8 oz)    Admission Cmt: None   Principal Problem: Upper GI bleed [K92.2]                 Active Insurance as of 5/22/2021     Primary Coverage     Payor Plan Insurance Group Employer/Plan Group    ANTHEM MEDICARE REPLACEMENT ANTHEM MEDICARE ADVANTAGE [id number]     Payor Plan Address Payor Plan Phone Number Payor Plan Fax Number Effective Dates    [address] [phone]  9/1/2019 - None Entered    [facility] [address]       Subscriber Name Subscriber Birth Date Member ID       MARY LY 1945 [id number]           Secondary Coverage     Payor Plan Insurance Group Employer/Plan Group    [place]      Payor Plan Address Payor Plan Phone Number Payor Plan Fax Number Effective Dates    [address] [phone]  12/1/2019 - None Entered    [facility] [id number]       Subscriber Name Subscriber Birth Date Member ID       MARY LY 1945 [id number]                 Emergency Contacts      (Rel.) Home Phone Work Phone Mobile Phone    ZORAIDA SCHULER (Daughter) [phone] -- --    MARE ANNA (Grandchild) [phone] -- --    HELENEALEXX (Son) [phone] -- --    MARYCHUY SOLORIO (Friend) -- -- [phone]            Emergency Contact Information     Name " Relation Home Work Mobile    ZORAIDA SCHULER Daughter [phone]      MARE ANNA Grandchild [phone]      ALEXX LY Son [phone]      MARYCHUY SOLORIO Friend   [phone]          Insurance Information                ANTHEM MEDICARE REPLACEMENT/ANTHEM MEDICARE ADVANTAGE Phone: [phone]    Subscriber: Mary Ly Subscriber#: [id number]    Group#: [id number] Precert#:         [place] Phone: [phone]    Subscriber: Mary Ly Subscriber#: [id number]    Group#:  Precert#:           Treatment Team  Chat With All Active Members    Provider Relationship Specialty Contact    Yariel Shaw DO  Attending --  [phone]    Roslaee Hand, PCT  Patient Care Technician --     Raissa Donato, RRT  Respiratory Therapist --  [phone]    Valeriano Alanis MD  Consulting Physician Cardiology  [phone]    Any Gregory, RN  Registered Nurse --     Ashley Escamilla, JESSICA  Physical Therapist Physical Therapy     Adriana Velazco RN  Registered Nurse --           Problem List         Codes Noted - Resolved       Hospital    Vomiting ICD-10-CM: R11.10  ICD-9-CM: 787.03 5/23/2021 - Present    * (Principal) Upper GI bleed ICD-10-CM: K92.2  ICD-9-CM: 578.9 5/23/2021 - Present       Non-Hospital    Colitis ICD-10-CM: K52.9  ICD-9-CM: 558.9 4/23/2021 - Present    COPD (chronic obstructive pulmonary disease) (CMS/HCC) (Chronic) ICD-10-CM: J44.9  ICD-9-CM: 496 4/23/2021 - Present    Type II diabetes mellitus (CMS/HCC) (Chronic) ICD-10-CM: E11.9  ICD-9-CM: 250.00 4/23/2021 - Present    Hypothyroidism (Chronic) ICD-10-CM: E03.9  ICD-9-CM: 244.9 4/23/2021 - Present    Hyperlipidemia (Chronic) ICD-10-CM: E78.5  ICD-9-CM: 272.4 4/23/2021 - Present    Diastolic CHF, chronic (CMS/HCC) (Chronic) ICD-10-CM: I50.32  ICD-9-CM: 428.32, 428.0 4/23/2021 - Present    Moderate malnutrition (CMS/HCC) ICD-10-CM: E44.0  ICD-9-CM: 263.0 4/23/2021 - Present    Severe malnutrition (CMS/HCC) ICD-10-CM:  E43  ICD-9-CM: 261 2020 - Present    Status post laparoscopic cholecystectomy ICD-10-CM: Z90.49  ICD-9-CM: V45.89 2020 - Present             History & Physical      Alba Royal DO at 21 0646          Hospitalist History and Physical    Patient Identification:  Name: Mary Ly  Age/Sex: 75 y.o. female  :  1945  MRN: [id number]        Admit Date: 2021   Primary Care Physician: Leta Gardner MD    Chief Complaint   Patient presents with   • Vomiting   • Nausea   • Abdominal Pain       History of Present Illness  Patient is a 75 y.o. female presents with the following: vomiting and diarrhea     The patient is a 76 yo female with PMHx significant for stroke, paroxysmal atrial fibrillation, diabetes mellitus, chronic anticoagulation with Eliquis, hypertension and AAA status post repair [facility] for vomiting, abdominal pain and diarrhea.    Patient reports a 2-day history of intermittent vomiting.  She denies any hematemesis.  Patient reports that to her knowledge she has had 1 episode of diarrhea.  Nursing staff reports that patient was noted to have dark stools.  The patient reports left lower quadrant abdominal pain that she describes as a dull ache.    Upon further questioning, the patient denies any fevers and/or chills.  She states that food does not seem to exacerbate her symptoms.  She does not report any chest pains or pressure.  No shortness of air.  Patient states that she is wheelchair-bound at the nursing home and can transfer from bed to chair.  Patient states that she smokes approximately 1 pack of cigarettes per day.    Work-up in the emergency department revealed an initial white blood cell count of 36.16 with a hemoglobin of 7.0 and hematocrit of 21.6.  After fluids, the patient's white blood cell count improved to 28.86, however, her repeat hemoglobin and hematocrit were 6.2 and 18.9 respectively.  CMP was largely  unremarkable; BUN to creatinine ratio was only 33.9.  C-RP 3.36.  CT scan of the abdomen and pelvis with contrast revealed possible ileus with scattered air-fluid levels and mild gastric distention.  No acute inflammatory changes were seen in the abdomen or pelvis otherwise.  Stool burden reportedly improved since previous scan.    Patient was most recently on our service from 5/9/20/2021 through 5/17/2021 where she was treated for sepsis, and acute colitis in the setting of previous history of ischemic colitis and significant mesenteric vessel disease.  Patient did receive 1 unit of packed red blood cells during that hospitalization.  CTA revealed poor delineation of the origin of the celiac artery but distally appeared to be widely patent.  Patient also found to have some thickening of the colon wall at that time but without adjacent stranding in addition to aneurysmal dilatation of the lower thoracic aorta as well as the abdominal aorta but similar to previous exam.  Patient was discharged back to the nursing facility with recommendations to continue Cefepime and Flagyl.    Patient has been admitted to the telemetry unit for further evaluation and management.    Past History:  Past Medical History:   Diagnosis Date   • AAA (abdominal aortic aneurysm) (CMS/[facility])     s/p repair    • Arthritis    • CAD (coronary artery disease)     s/p stenting in the past    • Chronic kidney disease (CKD), stage III (moderate) (CMS/HCC)    • COPD (chronic obstructive pulmonary disease) (CMS/HCC)    • Debility    • Diastolic CHF, chronic (CMS/HCC) 4/23/2021   • GERD (gastroesophageal reflux disease)    • History of CVA (cerebrovascular accident)    • History of ischemic colitis    • Hyperlipidemia 4/23/2021   • Hypertension    • Hypothyroidism    • Stroke (CMS/HCC)    • Type II diabetes mellitus (CMS/HCC)      Past Surgical History:   Procedure Laterality Date   • BACK SURGERY     • CARDIAC CATHETERIZATION     • CHOLECYSTECTOMY N/A  7/17/2020    Procedure: CHOLECYSTECTOMY LAPAROSCOPIC;  Surgeon: Lonnie Meneses MD;  Location: [facility];  Service: General;  Laterality: N/A;   • COLONOSCOPY     • CORONARY STENT PLACEMENT     • ENDOSCOPY     • TONSILLECTOMY       No family history on file.  Social History     Tobacco Use   • Smoking status: Current Every Day Smoker     Packs/day: 1.00     Years: 55.00     Pack years: 55.00     Types: Cigarettes   • Smokeless tobacco: Never Used   Substance Use Topics   • Alcohol use: Never   • Drug use: Never     Medications Prior to Admission   Medication Sig Dispense Refill Last Dose   • amLODIPine (NORVASC) 10 MG tablet Take 10 mg by mouth Daily.      • apixaban (ELIQUIS) 2.5 MG tablet tablet Take 2.5 mg by mouth Every 12 (Twelve) Hours.      • aspirin 81 MG chewable tablet Chew 81 mg Daily.      • atorvastatin (LIPITOR) 10 MG tablet Take 10 mg by mouth Every Night.      • baclofen (LIORESAL) 10 MG tablet Take 10 mg by mouth Every Night.      • bisacodyl (DULCOLAX) 10 MG suppository Insert 10 mg into the rectum Daily As Needed for Constipation.      • Cariprazine HCl (VRAYLAR) 1.5 MG capsule capsule Take 1.5 mg by mouth Daily.      • carvedilol (COREG) 12.5 MG tablet Take 12.5 mg by mouth 2 (Two) Times a Day With Meals.      • docusate sodium (COLACE) 250 MG capsule Take 250 mg by mouth Daily.      • Empagliflozin (Jardiance) 10 MG tablet Take 1 tablet by mouth Every Morning.      • guaiFENesin (Mucus Relief) 400 MG tablet Take 400 mg by mouth Every 12 (Twelve) Hours.      • HYDROcodone-acetaminophen (NORCO) 5-325 MG per tablet Take 1 tablet by mouth Every 8 (Eight) Hours As Needed for Mild Pain  or Moderate Pain . 9 tablet 0    • ipratropium-albuterol (DUO-NEB) 0.5-2.5 mg/3 ml nebulizer Take 3 mL by nebulization 4 (Four) Times a Day As Needed for Wheezing or Shortness of Air.      • Lactobacillus (ACIDOPHILUS/PECTIN PO) Take 1 capsule by mouth 2 (two) times a day.      • lactulose (CHRONULAC) 10 GM/15ML  solution Take 10 g by mouth Daily As Needed.      • levothyroxine (SYNTHROID, LEVOTHROID) 200 MCG tablet Take 200 mcg by mouth Daily.      • lisinopril (PRINIVIL,ZESTRIL) 10 MG tablet Take 10 mg by mouth Daily.      • loperamide (IMODIUM) 2 MG capsule Take 2 mg by mouth 4 (Four) Times a Day As Needed for Diarrhea.      • melatonin 5 MG tablet tablet Take 5 mg by mouth Every Night.      • montelukast (SINGULAIR) 10 MG tablet Take 10 mg by mouth Daily.      • Multiple Vitamin (MULTIVITAMIN) tablet tablet Take 1 tablet by mouth Every Night.      • nicotine (NICODERM CQ) 7 MG/24HR patch Place 1 patch on the skin as directed by provider Daily. Prior to [facility] Admission, Patient was on: Use for 14 days ending on 5/14/21      • pantoprazole (PROTONIX) 20 MG EC tablet Take 20 mg by mouth Daily.      • predniSONE (DELTASONE) 5 MG tablet Take 5 mg by mouth Every Morning.      • promethazine (PHENERGAN) 12.5 MG tablet Take 12.5 mg by mouth Every 8 (Eight) Hours As Needed for Nausea or Vomiting.      • senna (Senna) 8.6 MG tablet Take 1 tablet by mouth Daily As Needed for Constipation.      • STIOLTO RESPIMAT 2.5-2.5 MCG/ACT aerosol solution inhaler Inhale 2 puffs Daily.      • traZODone (DESYREL) 50 MG tablet Take 50 mg by mouth Every Night.        Allergies: Haldol [haloperidol]    Review of Systems:  Review of Systems   Constitutional: Negative for chills, diaphoresis and fever.   HENT: Negative for hearing loss, tinnitus and trouble swallowing.    Eyes: Negative for discharge and visual disturbance.   Respiratory: Negative for cough, shortness of breath and wheezing.    Cardiovascular: Negative for chest pain, palpitations and leg swelling.   Gastrointestinal: Positive for abdominal pain, diarrhea, nausea and vomiting. Negative for constipation.   Endocrine: Negative for polydipsia, polyphagia and polyuria.   Genitourinary: Negative for dysuria, frequency and hematuria.   Musculoskeletal: Negative for gait problem,  myalgias and neck pain.   Skin: Negative for rash and wound.   Neurological: Negative for dizziness, tremors, seizures, syncope, weakness and light-headedness.   Hematological: Bruises/bleeds easily.   Psychiatric/Behavioral: Negative for confusion, hallucinations and suicidal ideas.      Vital Signs  Temp:  [98.5 °F (36.9 °C)] 98.5 °F (36.9 °C)  Heart Rate:  [54-84] 71  Resp:  [18] 18  BP: ()/(52-95) 140/61  Body mass index is 18.4 kg/m².    Physical Exam:  Physical Exam  Constitutional:       General: She is not in acute distress.     Appearance: She is well-developed.   HENT:      Head: Normocephalic and atraumatic.   Eyes:      Conjunctiva/sclera: Conjunctivae normal.   Neck:      Trachea: No tracheal deviation.   Cardiovascular:      Rate and Rhythm: Normal rate. Rhythm regularly irregular.      Pulses:           Posterior tibial pulses are 2+ on the right side and 2+ on the left side.      Heart sounds: No murmur heard.   No friction rub. No gallop.    Pulmonary:      Effort: No respiratory distress.      Breath sounds: Normal breath sounds. No wheezing or rales.   Abdominal:      General: Bowel sounds are normal. There is no distension.      Palpations: Abdomen is soft.      Tenderness: There is no abdominal tenderness. There is no guarding.   Musculoskeletal:         General: No tenderness. Normal range of motion.   Skin:     General: Skin is warm and dry.      Findings: No erythema or rash.   Neurological:      Mental Status: She is alert and oriented to person, place, and time.      Cranial Nerves: No cranial nerve deficit.         Results Review:    Results from last 7 days   Lab Units 05/22/21  2231   PH, ARTERIAL pH units 7.514*   PO2 ART mm Hg 116.0*   PCO2, ARTERIAL mm Hg 48.3*   HCO3 ART mmol/L 38.8*       Results from last 7 days   Lab Units 05/23/21  0520 05/22/21  2219 05/21/21  0407 05/17/21  0416   WBC 10*3/mm3 28.86* 36.16* 22.51* 15.50*   HEMOGLOBIN g/dL 6.2* 7.0* 7.5* 7.7*   HEMATOCRIT  % 18.9* 21.6* 23.2* 23.7*   PLATELETS 10*3/mm3 568* 708* 562* 425     Results from last 7 days   Lab Units 05/22/21 2219 05/21/21  0407 05/17/21  1159 05/17/21  0803 05/17/21  0416 05/16/21  0839   SODIUM mmol/L 136 132* 131* 127* 132* 129*   POTASSIUM mmol/L 5.0 4.4 4.9 4.8 5.5* 4.9   CHLORIDE mmol/L 92* 93* 95* 94* 96* 96*   CO2 mmol/L 33.8* 36.0* 30.0* 30.9* 32.8* 28.0   BUN mg/dL 20 13 10 11 11 9   CREATININE mg/dL 0.59 0.37* 0.41* 0.48* 0.48* 0.43*   CALCIUM mg/dL 9.1 8.4* 7.9* 7.9* 7.8* 7.7*   GLUCOSE mg/dL 115* 147* 161* 95 101* 119*     Results from last 7 days   Lab Units 05/22/21 2219 05/17/21  0416   BILIRUBIN mg/dL 0.2 <0.2   ALK PHOS U/L 77 61   AST (SGOT) U/L 21 21   ALT (SGPT) U/L 10 8     Results from last 7 days   Lab Units 05/22/21 2219 05/21/21  0407 05/17/21  0416   CRP mg/dL 3.36* 0.90* 2.10*     Results from last 7 days   Lab Units 05/21/21  0407 05/17/21  0416   MAGNESIUM mg/dL 1.7 1.8     Results from last 7 days   Lab Units 05/23/21  0046 05/22/21 2219   TROPONIN T ng/mL 0.012 0.013     Results from last 7 days   Lab Units 05/22/21 2219   INR  1.26*       Imaging:    I have personally reviewed the EKG. Sinus bradycardia with occasional PVC and T wave inversion in the inferior leads    CT images reviewed; consistent with ileus    Imaging Results (Most Recent)     Procedure Component Value Units Date/Time    XR Chest 1 View [[id number]] Collected: 05/23/21 0259     Updated: 05/23/21 0301    Narrative:      CR Chest 1 Vw    INDICATION:   Nausea and vomiting     COMPARISON:    5/10/2021    FINDINGS:  Single portable AP view(s) of the chest.  Stable cardiomegaly. Tortuous calcified aorta. There is a left base infiltrate unchanged from the previous examination. The right lung is clear. Vascular markings are normal.      Impression:      Left basilar consolidation is again noted and not significantly changed. No new infiltrates are seen.    Signer Name: Octavio Wheeler MD   Signed: 5/23/2021 2:59  AM   Workstation Name: [id number]    [facility]    CT Abdomen Pelvis With Contrast [[id number]] Collected: 05/23/21 0136     Updated: 05/23/21 0138    Narrative:      CT Abdomen Pelvis W    INDICATION:   Abdominal pain beginning prior to arrival    TECHNIQUE:   CT of the abdomen and pelvis with 100 cc of Isovue-300 IV contrast. Coronal and sagittal reconstructions were obtained.  Radiation dose reduction techniques included automated exposure control or exposure modulation based on body size. Count of known CT  and cardiac nuc med studies performed in previous 12 months: 7.     COMPARISON:   5/9/2021    FINDINGS:  Abdomen: No acute findings in the upper abdomen. No evidence of hydronephrosis. The stomach is moderately distended but there is no evidence of obstruction. Stool burden in the colon has improved since the previous examination. No evidence of free air.  No evidence of abscess.    Pelvis: No evidence of adenopathy, mass or fluid collection. Scattered air-fluid levels are seen in the colon with a nonspecific pattern.      Impression:      Possible ileus with scattered air-fluid levels and mild gastric distention. No acute inflammatory changes are seen in the abdomen or pelvis otherwise. Stool burden has improved since the previous scan.          Signer Name: Octavio Wheeler MD   Signed: 5/23/2021 1:36 AM   [id number]    [facility]          Assessment/Plan     -Vomiting and diarrhea with melena; concern for upper gastrointestinal bleed and history of recent colitis  -Sepsis, present upon admission and due to above  -Acute on chronic anemia, macrocytic and due to above  -Poor delineation of the celiac artery but appeared widely patent distally  -Aneurysmal dilatation of the lower thoracic aorta as well as the abdominal aorta similar to previous exam  -Non-specific EKG changes  -Thrombocytosis  -Chronic anticoagulation  -Essential  hypertension  -CAD status post stenting in the past  -AAA status post repair  -COPD, not in acute exacerbation  -Type II Diabetes mellitus  -Hypothyroidism  -Tobacco use  -Cerebrovascular accident    Patient has been admitted to the telemetry unit.  She is currently nothing by mouth.  Patient is to receive 1 unit of packed red blood cells with a repeat H/H in approximately 2 hours after her transfusion is complete.  Patient has been started on IV pantoprazole every 12 hours.  Hold Eliquis.  Patient will be started on cefepime and Flagyl at this time.  Consider general surgery consultation, however, during last visit, patient was not felt to be in need of surgical intervention at that time.    We will repeat an EKG in a.m.    Patient will be started on Accu-Cheks and the hypoglycemia protocol as needed.    I am currently awaiting her home medication list.  Plan to hold nonessential medications.    Patient offered nicotine patch and declined at this time.    DVT/GI prophylaxis: SCDs/IV PPI    Estimated Length of Stay: > 2 MNs    Disposition Back to nursing home when medically stable    I discussed the patients findings and my recommendations with patient and nursing staff/MANJINDER Jay DO  05/23/21  06:46 EDT    Electronically signed by Alba Royal DO at 05/23/21 0733       Lines, Drains & Airways    Active LDAs     Name:   Placement date:   Placement time:   Site:   Days:    Midline Catheter - Single Lumen 05/24/21 Left Basilic   05/24/21    1608     7    Peripheral IV 05/24/21 1541 Anterior;Proximal;Right Forearm   05/24/21    1541    Forearm   7                  Current Facility-Administered Medications   Medication Dose Route Frequency Provider Last Rate Last Admin   • acetaminophen (TYLENOL) tablet 650 mg  650 mg Oral Q8H PRN Pierre Delgado MD   650 mg at 05/29/21 1236   • amLODIPine (NORVASC) tablet 10 mg  10 mg Oral Daily Pierre Delgado MD   10 mg at 05/31/21 1133   • aspirin  chewable tablet 81 mg  81 mg Oral Daily Pierre Delgado MD   81 mg at 06/01/21 0842   • atorvastatin (LIPITOR) tablet 40 mg  40 mg Oral Nightly Edgar Casillas MD   40 mg at 05/31/21 2023   • baclofen (LIORESAL) tablet 10 mg  10 mg Oral Nightly Pierre Delgado MD   10 mg at 05/31/21 2023   • bisacodyl (DULCOLAX) suppository 10 mg  10 mg Rectal Daily PRN Pierre Delgado MD       • Cariprazine HCl (VRAYLAR) capsule capsule 1.5 mg  1.5 mg Oral Daily Pierre Delgado MD   1.5 mg at 05/31/21 0826   • carvedilol (COREG) tablet 6.25 mg  6.25 mg Oral BID With Meals Pierre Delgado MD   6.25 mg at 05/31/21 1716   • dextrose (D50W) 25 g/ 50mL Intravenous Solution 25 g  25 g Intravenous Q15 Min PRN Alba Royal DO       • dextrose (GLUTOSE) oral gel 15 g  15 g Oral Q15 Min PRN Alba Royal DO       • docusate sodium (COLACE) capsule 200 mg  200 mg Oral Daily Pierre Delgado MD   200 mg at 06/01/21 0842   • glucagon (human recombinant) (GLUCAGEN DIAGNOSTIC) injection 1 mg  1 mg Subcutaneous Q15 Min PRN Alba Royal, DO       • guaiFENesin tablet 400 mg  400 mg Oral Q12H Pierre Delgado MD   400 mg at 06/01/21 0842   • ipratropium (ATROVENT) nebulizer solution 0.5 mg  0.5 mg Nebulization Q6H PRN Pierre Delgado MD       • levothyroxine (SYNTHROID, LEVOTHROID) tablet 200 mcg  200 mcg Oral Q AM Pierre Delgado MD   200 mcg at 06/01/21 0534   • lisinopril (PRINIVIL,ZESTRIL) tablet 10 mg  10 mg Oral Daily Yariel Shaw DO   10 mg at 05/31/21 1133   • melatonin tablet 5 mg  5 mg Oral Nightly Pierre Delgado MD   5 mg at 05/31/21 2023   • montelukast (SINGULAIR) tablet 10 mg  10 mg Oral Daily Pierre Delgado MD   10 mg at 06/01/21 0842   • multivitamin (THERAGRAN) tablet 1 tablet  1 tablet Oral Nightly Pierre Delgado MD   1 tablet at 05/31/21 2023   • nitroglycerin (NITROSTAT) SL tablet 0.4 mg  0.4 mg Sublingual Q5 Min PRN Alba Royal DO       • pantoprazole (PROTONIX) injection 40 mg  40 mg Intravenous  Q12H Alba Royal DO   40 mg at 06/01/21 0842   • potassium chloride (K-DUR,KLOR-CON) CR tablet 40 mEq  40 mEq Oral PRN Halima Oshea PA-C        Or   • potassium chloride (KLOR-CON) packet 40 mEq  40 mEq Oral PRN Halima Oshea PA-C        Or   • potassium chloride 10 mEq in 100 mL IVPB  10 mEq Intravenous Q1H PRN Halima Oshea PA-C       • predniSONE (DELTASONE) tablet 5 mg  5 mg Oral QAM Pierre Delgado MD   5 mg at 06/01/21 0842   • promethazine (PHENERGAN) tablet 12.5 mg  12.5 mg Oral Q8H PRN Pierre Delgado MD   12.5 mg at 05/31/21 1343   • senna (SENOKOT) tablet 1 tablet  1 tablet Oral Daily PRN Pierre Delgado MD       • sodium chloride 0.9 % flush 10 mL  10 mL Intravenous PRN Alba Royal DO       • sodium chloride 0.9 % flush 10 mL  10 mL Intravenous Q12H Alba Royal DO   10 mL at 06/01/21 0847   • sodium chloride 0.9 % flush 10 mL  10 mL Intravenous PRN Alba Royal DO           Lab Results (last 24 hours)     Procedure Component Value Units Date/Time    POC Glucose Once [[id number]]  (Abnormal) Collected: 06/01/21 0709    Specimen: Blood Updated: 06/01/21 0726     Glucose 188 mg/dL     Basic Metabolic Panel [[id number]]  (Abnormal) Collected: 06/01/21 0253    Specimen: Blood Updated: 06/01/21 0339     Glucose 176 mg/dL      BUN 9 mg/dL      Creatinine 0.38 mg/dL      Sodium 136 mmol/L      Potassium 3.7 mmol/L      Chloride 100 mmol/L      CO2 31.0 mmol/L      Calcium 7.7 mg/dL      eGFR Non African Amer >150 mL/min/1.73      BUN/Creatinine Ratio 23.7     Anion Gap 5.0 mmol/L     Narrative:      GFR Normal >60  Chronic Kidney Disease <60  Kidney Failure <15      CBC (No Diff) [[id number]]  (Abnormal) Collected: 06/01/21 0253    Specimen: Blood Updated: 06/01/21 0318     WBC 9.27 10*3/mm3      RBC 2.71 10*6/mm3      Hemoglobin 7.8 g/dL      Hematocrit 25.3 %      MCV 93.4 fL      MCH 28.8 pg      MCHC 30.8 g/dL      RDW 22.1 %      RDW-SD 72.0 fl       MPV 9.6 fL      Platelets 376 10*3/mm3     POC Glucose Once [[id number]]  (Abnormal) Collected: 06/01/21 0013    Specimen: Blood Updated: 06/01/21 0021     Glucose 151 mg/dL     POC Glucose Once [[id number]]  (Abnormal) Collected: 05/31/21 1941    Specimen: Blood Updated: 05/31/21 1946     Glucose 168 mg/dL     POC Glucose Once [[id number]]  (Abnormal) Collected: 05/31/21 1621    Specimen: Blood Updated: 05/31/21 1627     Glucose 194 mg/dL         Orders (last 24 hrs)      Start     Ordered    06/01/21 0830  Inpatient Cardiology Consult  Once     Specialty:  Cardiology  Provider:  (Not yet assigned)    06/01/21 0829    06/01/21 0823  Inpatient Cardiology Consult  Once,   Status:  Canceled     Specialty:  Cardiology  Provider:  (Not yet assigned)    06/01/21 0828    06/01/21 0727  POC Glucose Once  Once      06/01/21 0709    06/01/21 0022  POC Glucose Once  Once      06/01/21 0013    05/31/21 1947  POC Glucose Once  Once      05/31/21 1941    05/31/21 1628  POC Glucose Once  Once      05/31/21 1621    05/31/21 1100  lisinopril (PRINIVIL,ZESTRIL) tablet 10 mg  Daily      05/31/21 0917    05/31/21 1038  POC Glucose Once  Once      05/31/21 1019    05/29/21 1232  acetaminophen (TYLENOL) tablet 650 mg  Every 8 Hours PRN      05/29/21 1232    05/27/21 1115  ipratropium (ATROVENT) nebulizer solution 0.5 mg  Every 6 Hours PRN      05/27/21 1111    05/26/21 1800  Dietary Nutrition Supplements Boost Breeze (Ensure Clear); mixed berry  Daily With Breakfast, Lunch & Dinner      05/26/21 1431    05/26/21 0236  Patient Currently On Electrolyte Replacement Protocol - Please Refer to MAR for Protocol Details  Misc Nursing Order (Specify)  Daily     Comments: Patient Currently On Electrolyte Replacement Protocol - Please Refer to MAR for Protocol Details    05/26/21 0235    05/26/21 0235  potassium chloride (K-DUR,KLOR-CON) CR tablet 40 mEq  As Needed      05/26/21 0235    05/26/21 0235  potassium chloride (KLOR-CON) packet 40 mEq  As  Needed      05/26/21 0235    05/26/21 0235  potassium chloride 10 mEq in 100 mL IVPB  Every 1 Hour PRN      05/26/21 0235    05/24/21 0600  levothyroxine (SYNTHROID, LEVOTHROID) tablet 200 mcg  Every Early Morning      05/23/21 1147    05/24/21 0600  CBC (No Diff)  Daily      05/23/21 1505    05/23/21 2100  multivitamin (THERAGRAN) tablet 1 tablet  Nightly      05/23/21 1147    05/23/21 2100  melatonin tablet 5 mg  Nightly      05/23/21 1147    05/23/21 2100  baclofen (LIORESAL) tablet 10 mg  Nightly      05/23/21 1147    05/23/21 2100  atorvastatin (LIPITOR) tablet 40 mg  Nightly      05/23/21 1316    05/23/21 1800  carvedilol (COREG) tablet 6.25 mg  2 Times Daily With Meals      05/23/21 1147    05/23/21 1300  montelukast (SINGULAIR) tablet 10 mg  Daily      05/23/21 1147    05/23/21 1300  docusate sodium (COLACE) capsule 200 mg  Daily      05/23/21 1147    05/23/21 1300  Cariprazine HCl (VRAYLAR) capsule capsule 1.5 mg  Daily      05/23/21 1147    05/23/21 1300  amLODIPine (NORVASC) tablet 10 mg  Daily      05/23/21 1147    05/23/21 1300  guaiFENesin tablet 400 mg  Every 12 Hours Scheduled      05/23/21 1147    05/23/21 1300  predniSONE (DELTASONE) tablet 5 mg  Every Morning      05/23/21 1147    05/23/21 1300  aspirin chewable tablet 81 mg  Daily      05/23/21 1147    05/23/21 1200  POC Glucose Finger Q6H  Every 6 Hours      05/23/21 0735    05/23/21 1145  bisacodyl (DULCOLAX) suppository 10 mg  Daily PRN      05/23/21 1147    05/23/21 1145  promethazine (PHENERGAN) tablet 12.5 mg  Every 8 Hours PRN      05/23/21 1147    05/23/21 1145  senna (SENOKOT) tablet 1 tablet  Daily PRN      05/23/21 1147    05/23/21 1144  Basic Metabolic Panel  Daily      05/23/21 1143    05/23/21 0900  sodium chloride 0.9 % flush 10 mL  Every 12 Hours Scheduled      05/23/21 0641    05/23/21 0800  Vital Signs  Every 4 Hours     Comments: Per per hospital policy    05/23/21 0641    05/23/21 0735  dextrose (GLUTOSE) oral gel 15 g  Every  15 Minutes PRN      05/23/21 0735    05/23/21 0735  dextrose (D50W) 25 g/ 50mL Intravenous Solution 25 g  Every 15 Minutes PRN      05/23/21 0735    05/23/21 0735  glucagon (human recombinant) (GLUCAGEN DIAGNOSTIC) injection 1 mg  Every 15 Minutes PRN      05/23/21 0735    05/23/21 0730  pantoprazole (PROTONIX) injection 40 mg  Every 12 Hours Scheduled      05/23/21 0641    05/23/21 0642  Intake & Output  Every Shift      05/23/21 0641    05/23/21 0641  sodium chloride 0.9 % flush 10 mL  As Needed      05/23/21 0641    05/23/21 0641  nitroglycerin (NITROSTAT) SL tablet 0.4 mg  Every 5 Minutes PRN      05/23/21 0641    05/22/21 2158  sodium chloride 0.9 % flush 10 mL  As Needed      05/22/21 2158    Unscheduled  Telemetry - Pulse Oximetry  Continuous PRN     Comments: If Patient Develops Unresponsiveness, Acute Dyspnea, Cyanosis or Suspected Hypoxemia Start Continuous Pulse Ox Monitoring, Apply Oxygen & Notify Provider    05/23/21 0641    Unscheduled  Oxygen Therapy- Nasal Cannula; Titrate for SPO2: 90% - 95%  Continuous PRN     Comments: If Patient Develops Unresponsiveness, Acute Dyspnea, Cyanosis or Suspected Hypoxemia Start Continuous Pulse Ox Monitoring, Apply Oxygen & Notify Provider    05/23/21 0641    Unscheduled  ECG 12 Lead  As Needed     Comments: Nurse to Release if Patient Expericences Acute Chest Pain or Dysrhythmias    05/23/21 0641    Unscheduled  Potassium  As Needed     Comments: For Ventricular Arrhythmias      05/23/21 0641    Unscheduled  Magnesium  As Needed     Comments: For Ventricular Arrhythmias      05/23/21 0641    Unscheduled  Blood Gas, Arterial -With Co-Ox Panel: Yes  As Needed     Comments: Per O2 PolicyNotify Physician      05/23/21 0641    Unscheduled  Up With Assistance  As Needed      05/23/21 0641    Unscheduled  Magnesium  As Needed      05/26/21 0235    Unscheduled  Potassium  As Needed      05/26/21 0235    --  SCANNED - TELEMETRY        05/22/21 0000    --  SCANNED - TELEMETRY         21 0000    --  SCANNED - TELEMETRY        21 0000    --  SCANNED - TELEMETRY        21 0000    --  SCANNED - TELEMETRY        21 0000    --  SCANNED - TELEMETRY        21 0000    --  SCANNED - TELEMETRY        21 0000    --  SCANNED - TELEMETRY        21 0000    --  SCANNED - TELEMETRY        21 0000    --  SCANNED - TELEMETRY        21 0000    --  SCANNED - TELEMETRY        21 0000    --  SCANNED - TELEMETRY        21 0000                   Physician Progress Notes (last 24 hours) (Notes from 21 1034 through 21 1034)      Yariel Shaw DO at 21 2018              [facility]     Patient Identification:  Name:  Mary Ly  Age:  75 y.o.  Sex:  female  :  1945  MRN:  [id number]  Visit Number:  [id number]  ROOM: [address] [place] [address]     Primary Care Provider:  Leta Gardner MD    Length of stay in inpatient status:  8    Subjective     Chief Compliant:    Chief Complaint   Patient presents with   • Vomiting   • Nausea   • Abdominal Pain       History of Presenting Illness: Patient seen and evaluated in follow-up for sepsis and possible ileus secondary to infectious colitis in the setting of history of ischemic colitis with occluded celiac artery, severe SMA stenosis with reconstitution and CHRISTOPHER covered by prior EVAR.  Patient this morning still complaining of some mild amount of nausea and abdominal pain but states much better compared to prior and questioning when she will be able to return to SNF.    Objective     Current Hospital Meds:amLODIPine, 10 mg, Oral, Daily  aspirin, 81 mg, Oral, Daily  atorvastatin, 40 mg, Oral, Nightly  baclofen, 10 mg, Oral, Nightly  Cariprazine HCl, 1.5 mg, Oral, Daily  carvedilol, 6.25 mg, Oral, BID With Meals  docusate sodium, 200 mg, Oral, Daily  guaiFENesin, 400 mg, Oral, Q12H  levothyroxine, 200 mcg, Oral, Q AM  lisinopril, 10 mg, Oral, Daily  melatonin,  5 mg, Oral, Nightly  montelukast, 10 mg, Oral, Daily  multivitamin, 1 tablet, Oral, Nightly  pantoprazole, 40 mg, Intravenous, Q12H  predniSONE, 5 mg, Oral, QAM  sodium chloride, 10 mL, Intravenous, Q12H         Current Antimicrobial Therapy:  Anti-Infectives (From admission, onward)    Ordered     Dose/Rate Route Frequency Start Stop    05/23/21 0721  cefepime (MAXIPIME) 2 g/100 mL 0.9% NS (mbp)     Ordering Provider: Alba Royal DO    2 g  over 4 Hours Intravenous Every 12 Hours 05/23/21 2000 05/30/21 1317    05/23/21 0721  metroNIDAZOLE (FLAGYL) 500 mg/100mL IVPB     Ordering Provider: Alba Royal DO    500 mg  over 60 Minutes Intravenous Every 8 Hours 05/23/21 0900 05/30/21 0042    05/23/21 0721  cefepime (MAXIPIME) 2 g/100 mL 0.9% NS (mbp)     Ordering Provider: Alba Royal DO    2 g  200 mL/hr over 30 Minutes Intravenous Once 05/23/21 0800 05/23/21 0942        Current Diuretic Therapy:  Diuretics (From admission, onward)    None        ----------------------------------------------------------------------------------------------------------------------  Vital Signs:  Temp:  [97.9 °F (36.6 °C)-98.3 °F (36.8 °C)] 98.3 °F (36.8 °C)  Heart Rate:  [55-90] 90  Resp:  [18-20] 18  BP: (125-183)/(55-80) 143/66  SpO2:  [96 %-97 %] 96 %  on  Flow (L/min):  [2] 2;   Device (Oxygen Therapy): nasal cannula  Body mass index is 17.19 kg/m².    Wt Readings from Last 3 Encounters:   05/31/21 48.3 kg (106 lb 8 oz)   05/17/21 50 kg (110 lb 4.8 oz)   04/30/21 50.3 kg (110 lb 12.8 oz)     Intake & Output (last 3 days)       05/29 0701 - 05/30 0700 05/30 0701 - 05/31 0700 05/31 0701 - 06/01 0700    P.O. 600 1320 840    I.V. (mL/kg) 550 (11.3)      Blood  300     Total Intake(mL/kg) 1150 (23.7) 1620 (33.5) 840 (17.4)    Net +1150 +1620 +840           Urine Unmeasured Occurrence 3 x 5 x 3 x        Diet  Regular  ----------------------------------------------------------------------------------------------------------------------  Physical exam:  Constitutional: Chronically ill-appearing elderly female, frail.  No acute distress.      HENT:  Head:  Normocephalic and atraumatic.  Mouth:  Moist mucous membranes.    Eyes:  Conjunctivae and EOM are normal. No scleral icterus.    Neck:  Neck supple.  No JVD present.    Cardiovascular:  Normal rate, regular rhythm and normal heart sounds with no murmur.  Pulmonary/Chest:  No respiratory distress, no wheezes, no crackles.  Abdominal:  Soft.  Bowel sounds are normal.  No distension and no tenderness.   Musculoskeletal:  No tenderness, and no deformity.  No red or swollen joints anywhere.    Neurological:  Alert and oriented to person, place, and time.  No cranial nerve deficit. Intact Sensation throughout  Skin:  Skin is warm and dry. No rash or lesion noted. No pallor.   Peripheral vascular:  Pulses in all 4 extremities with no clubbing, no cyanosis, no edema.  Psychiatric: Appropriate mood and affect, pleasant.   ----------------------------------------------------------------------------------------------------------------------  Tele:    ----------------------------------------------------------------------------------------------------------------------  Results from last 7 days   Lab Units 05/31/21  0254 05/30/21  1444 05/30/21  0635 05/30/21  0519 05/29/21  0520   WBC 10*3/mm3 10.17  --   --  14.13* 15.13*   HEMOGLOBIN g/dL 7.9* 8.1* 6.8* 6.7* 7.7*   HEMATOCRIT % 25.0* 26.7* 21.4* 21.3* 24.5*   MCV fL 90.9  --   --  98.6* 97.6*   MCHC g/dL 31.6  --   --  31.5 31.4*   PLATELETS 10*3/mm3 349  --   --  403 470*         Results from last 7 days   Lab Units 05/31/21 0254 05/30/21 0519 05/29/21  0520   SODIUM mmol/L 133* 135* 135*   POTASSIUM mmol/L 4.0 3.8 3.8   CHLORIDE mmol/L 99 102 100   CO2 mmol/L 29.3* 29.9* 30.8*   BUN mg/dL 10 12 16   CREATININE mg/dL 0.44* 0.48*  0.41*   EGFR IF NONAFRICN AM mL/min/1.73 139 126 >150   CALCIUM mg/dL 7.7* 8.0* 8.1*   GLUCOSE mg/dL 155* 119* 172*   Estimated Creatinine Clearance: 46.3 mL/min (A) (by C-G formula based on SCr of 0.44 mg/dL (L)).  No results found for: AMMONIA              Glucose   Date/Time Value Ref Range Status   05/31/2021 1941 168 (H) 70 - 130 mg/dL Final   05/31/2021 1621 194 (H) 70 - 130 mg/dL Final   05/31/2021 1019 185 (H) 70 - 130 mg/dL Final   05/31/2021 0606 179 (H) 70 - 130 mg/dL Final   05/30/2021 2348 123 70 - 130 mg/dL Final   05/30/2021 1907 143 (H) 70 - 130 mg/dL Final   05/30/2021 1647 180 (H) 70 - 130 mg/dL Final   05/30/2021 1037 147 (H) 70 - 130 mg/dL Final     Lab Results   Component Value Date    TSH 1.740 04/23/2021    FREET4 1.27 08/10/2020     No results found for: PREGTESTUR, PREGSERUM, HCG, HCGQUANT  Pain Management Panel     Pain Management Panel Latest Ref Rng & Units 8/13/2020    AMPHETAMINES SCREEN, URINE Negative Negative    BARBITURATES SCREEN Negative Negative    BENZODIAZEPINE SCREEN, URINE Negative Negative    BUPRENORPHINEUR Negative Negative    COCAINE SCREEN, URINE Negative Negative    METHADONE SCREEN, URINE Negative Negative        Brief Urine Lab Results  (Last result in the past 365 days)      Color   Clarity   Blood   Leuk Est   Nitrite   Protein   CREAT   Urine HCG        05/22/21 2245 Yellow Clear Negative Negative Negative 30 mg/dL (1+)             No results found for: BLOODCX  No results found for: URINECX  No results found for: WOUNDCX  No results found for: STOOLCX  No results found for: RESPCX  No results found for: AFBCX        I have personally looked at the labs and they are summarized above.  ----------------------------------------------------------------------------------------------------------------------  Detailed radiology reports for the last 24 hours:    Imaging Results (Last 24 Hours)     ** No results found for the last 24 hours. **        Assessment & Plan       Sepsis  Possible ileus secondary to infectious colitis  History of ischemic colitis with occluded celiac artery, severe SMA stenosis with reconstitution and CHRISTOPHER overlaid by EVAR.    -Patient with many recent admissions for recurrent colitis that improved with IV antibiotics presenting this time similarly with vomiting, diarrhea and possible lower GI bleed with dark stools and further decreased hemoglobin requiring transfusion.    -Patient has required transfusion with melena while being on anticoagulation which she is chronically on with Eliquis, currently on hold.    -Hemoglobin stable today however if further decline tomorrow will need to discuss with surgery possible need for endoscopy.    -CTA of the abdomen on 8/2020 with poor visualization of origin of celiac or superior mesenteric arteries with inability to exclude ischemia of colon due to diffuse colonic wall thickening.    -CT abdomen pelvis this admission with possible ileus with scattered air-fluid levels and mild gastric distention although no definitive inflammatory processes were noted    -Abdominal vascular ultrasound showed incomplete visualization of origins of celiac and superior mesenteric arteries    -General surgery consulted and following, recommends no indication for acute surgical intervention at this time but recommends outpatient referral to be seen by vascular surgery to discuss SMA stenting versus bypass.    -Completed 7-day course of cefepime and Flagyl    -Continue to monitor hemoglobin closely as well as PPI and currently holding Eliquis with threshold to transfuse hemoglobin less than 7    Hypertension  Hyperlipidemia  CAD s/p PCI  HFpEF, not in exacerbation  Severe bilateral PAD of the lower extremities  Descending thoracic aortic aneurysm  Abdominal aortic aneurysm status post EVAR    -Echo from April 2020 with EF of 61 to 65% with mild concentric LVH and diastolic dysfunction with mild left ear and myxomatous changes of the  mitral valve and mild mitral regurg    -CTA of the abdomen did show multifocal plaque through superficial femoral arteries bilaterally with borderline hemodynamically significant stenosis but no occlusive segments with celiac and SMA as above and descending thoracic aortic aneurysm.    -Continue statin and aspirin 81 mg    -Continue beta-blockade and home amlodipine and ACE inhibitor    -Eliquis held as above     Diabetes mellitus type 2    -Hemoglobin A1c of 7.2%    -Continue current insulin regiment, currently with very little requirements    CKD stage III    -Avoid nephrotoxins, NSAIDs, volume depletion and contrast as able to    Hypothyroidism    -Continue home levothyroxine    Chronic hypoxic respiratory failure  COPD, not in exacerbation  Continued tobacco abuse    -Continue home O2    -As needed DuoNebs    -Discussed critical need for tobacco cessation    GERD    -Continue PPI    History of stroke    -Statin and aspirin as above     Chronic moderate malnutrition    -BMI 17, nutrition services consulted       VTE Prophylaxis:   Mechanical Order History:      Ordered        05/30/21 1002  Place Sequential Compression Device  Once         05/30/21 1002  Maintain Sequential Compression Device  Continuous         05/23/21 0641  Place Sequential Compression Device  Once         05/23/21 0641  Maintain Sequential Compression Device  Continuous,   Status:  Canceled                 Pharmalogical Order History:      Ordered     Dose Route Frequency Stop    05/25/21 1141  apixaban (ELIQUIS) tablet 2.5 mg  Status:  Discontinued      2.5 mg PO Every 12 Hours Scheduled 05/30/21 1000                Disposition likely return to skilled nursing facility within the next 48 hours if hemoglobin stable.    Yariel Shaw DO  [facility]  05/31/21  20:18 EDT      Electronically signed by Yariel Shaw DO at 05/31/21 2029          Consult Notes (last 24 hours) (Notes from 05/31/21 1034 through 06/01/21 1034)       Tremaine Rust MD at 06/01/21 0836      Consult Orders    1. Inpatient Cardiology Consult [[id number]] ordered by Yariel Shaw DO at 06/01/21 0829               Date of Admit: 5/22/2021  Date of Consult: 06/01/21  No ref. provider found        Upper GI bleed    Vomiting      Assessment      1. Paroxysmal atrial fibrillation with controlled ventricular response.  2. Recurrent GI bleed with blood loss anemia requiring blood transfusion due to apparently ischemic colitis.  3. History of abdominal aortic aneurysm, status post endovascular repair.  4. ASCVD, status post PCI, clinically asymptomatic and stable.  5. Essential hypertension with elevated systolic blood pressures.      Recommendations     1. Since she is having recurrent GI bleed with severe blood loss anemia requiring blood transfusion, obviously the potential risk of bleeding with the anticoagulant is higher than the potential benefit.  Hence would agree with holding the anticoagulation for now but would like to get her back on it as soon as her bleeding is stabilized.  2. I have discussed briefly with her about the option of watchman device placement.  She may not be a good candidate for this either at this time if she is having ongoing GI bleed as patient has to be able to take anticoagulant for at least 6 to 8 weeks post device implantation and then be on antiplatelet therapy for at least 6 months.  She being a nursing home resident and had to be taken to [place] for consultation and placement of the device would make it logistically difficult too.      Reason for consultation: Risk/benefit with anticoagulation in the setting of chronic ischemic colitis with melena     Subjective       Subjective     History of Present Illness     Mary Ly is a 75 year old female with a past medical history significant for history of CVA, paroxysmal atrial fibrillation, diabetes mellitus type 2, essential hypertension, history of abdominal aortic aneurysm  status post repair, coronary disease status post PCI, chronic kidney disease and ischemic colitis.  Patient presented to the ER with complaints of vomiting, melena and diarrhea. She was found to have sepsis secondary to infectious colitis in the setting of ischemic colitis with occluded celiac artery. Patient has a history of ischemic colitis with occluded celiac artery, severe SMA stenosis with reconstitution and CHRISTOPHER covered by prior EVAR. Per chart review patient has has several admission in august 2021, April 2021 and may 2021 for ischemic colitis and anemia requiring blood transfusion. She has a history of CVA with right sided weakness which she has recovered from. She also has paroxysmal atrial fibrillation and is on low dose eliquis at home. Eliquis has been held since admission secondary to melena and anemia requiring blood transfusion. She also has a history of descending thoracic aortic aneurysm status post repair. On evaluation today she denies any chest pain, shortness of breath or bloody stools. She was seen by general surgery who stated there was no indication for surgical evaluation but that would need immediate referral to vascular surgery on discharge for severe PAD to discuss SMA stenting versus SMA bypass. Recent echocardiogram showed normal LV function with grade I diastolic dysfunction. She is a current smoker for the last 55 years.     Cardiac risk factors:arteriosclerotic heart disease, cerebral vascular disease, diabetes mellitus, hypertension, peripheral vascular disease, smoking and Sedentary life style    Last Echo: 4/26/2021  · Normal left ventricular cavity size noted. Left ventricular wall thickness is consistent with mild concentric hypertrophy  · Left ventricular ejection fraction appears to be 61 - 65%.  · Left ventricular diastolic function is consistent with (grade I) impaired relaxation.  · There is mild calcification of the aortic valvular cusps.  · No aortic valve regurgitation  is present. Mild aortic valve stenosis is present.  · There are myxomatous changes of the mitral valve apparatus present. Mild mitral valve regurgitation is present. No significant mitral valve stenosis is present.  · . There is no evidence of pericardial effusion.    Past Medical History:   Diagnosis Date   • AAA (abdominal aortic aneurysm) (CMS/[facility])     s/p repair    • Arthritis    • CAD (coronary artery disease)     s/p stenting in the past    • Chronic kidney disease (CKD), stage III (moderate) (CMS/[facility])    • COPD (chronic obstructive pulmonary disease) (CMS/[facility])    • Debility    • Diastolic CHF, chronic (CMS/HCC) 4/23/2021   • GERD (gastroesophageal reflux disease)    • History of CVA (cerebrovascular accident)    • History of ischemic colitis    • Hyperlipidemia 4/23/2021   • Hypertension    • Hypothyroidism    • Stroke (CMS/[facility])    • Type II diabetes mellitus (CMS/[facility])      Past Surgical History:   Procedure Laterality Date   • BACK SURGERY     • CARDIAC CATHETERIZATION     • CHOLECYSTECTOMY N/A 7/17/2020    Procedure: CHOLECYSTECTOMY LAPAROSCOPIC;  Surgeon: Lonnie Meneses MD;  Location: [facility];  Service: General;  Laterality: N/A;   • COLONOSCOPY     • CORONARY STENT PLACEMENT     • ENDOSCOPY     • TONSILLECTOMY       No family history on file.     Social History     Tobacco Use   • Smoking status: Current Every Day Smoker     Packs/day: 1.00     Years: 55.00     Pack years: 55.00     Types: Cigarettes   • Smokeless tobacco: Never Used   Substance Use Topics   • Alcohol use: Never   • Drug use: Never     Medications Prior to Admission   Medication Sig Dispense Refill Last Dose   • amLODIPine (NORVASC) 10 MG tablet Take 10 mg by mouth Daily.   5/22/2021 at Unknown time   • apixaban (ELIQUIS) 2.5 MG tablet tablet Take 2.5 mg by mouth Every 12 (Twelve) Hours.   5/22/2021 at Unknown time   • aspirin 81 MG chewable tablet Chew 81 mg Daily.   5/22/2021 at Unknown time   • atorvastatin (LIPITOR) 10 MG tablet  Take 10 mg by mouth Every Night.   5/22/2021 at Unknown time   • baclofen (LIORESAL) 10 MG tablet Take 10 mg by mouth Every Night.   5/22/2021 at Unknown time   • Cariprazine HCl (VRAYLAR) 1.5 MG capsule capsule Take 1.5 mg by mouth Daily.   5/22/2021 at Unknown time   • carvedilol (COREG) 12.5 MG tablet Take 12.5 mg by mouth 2 (Two) Times a Day With Meals.   5/22/2021 at Unknown time   • docusate sodium (COLACE) 250 MG capsule Take 250 mg by mouth Daily.   5/22/2021 at Unknown time   • Empagliflozin (Jardiance) 10 MG tablet Take 1 tablet by mouth Every Morning.   5/22/2021 at Unknown time   • guaiFENesin (Mucus Relief) 400 MG tablet Take 400 mg by mouth Every 12 (Twelve) Hours.   5/22/2021 at Unknown time   • HYDROcodone-acetaminophen (NORCO) 5-325 MG per tablet Take 1 tablet by mouth Every 8 (Eight) Hours As Needed for Mild Pain  or Moderate Pain . 9 tablet 0 5/22/2021 at Unknown time   • Lactobacillus (ACIDOPHILUS/PECTIN PO) Take 1 capsule by mouth 2 (two) times a day.   5/22/2021 at Unknown time   • lactulose (CHRONULAC) 10 GM/15ML solution Take 10 g by mouth Daily As Needed.   Past Month at Unknown time   • levothyroxine (SYNTHROID, LEVOTHROID) 200 MCG tablet Take 200 mcg by mouth Daily.   5/22/2021 at Unknown time   • lisinopril (PRINIVIL,ZESTRIL) 10 MG tablet Take 10 mg by mouth Daily.   5/22/2021 at Unknown time   • melatonin 5 MG tablet tablet Take 5 mg by mouth Every Night.   5/22/2021 at Unknown time   • montelukast (SINGULAIR) 10 MG tablet Take 10 mg by mouth Daily.   5/22/2021 at Unknown time   • Multiple Vitamin (MULTIVITAMIN) tablet tablet Take 1 tablet by mouth Every Night.   5/22/2021 at Unknown time   • pantoprazole (PROTONIX) 20 MG EC tablet Take 20 mg by mouth Daily.   5/22/2021 at Unknown time   • predniSONE (DELTASONE) 5 MG tablet Take 5 mg by mouth Every Morning.   5/22/2021 at Unknown time   • promethazine (PHENERGAN) 12.5 MG tablet Take 12.5 mg by mouth Every 8 (Eight) Hours As Needed for  Nausea or Vomiting.   5/22/2021 at Unknown time   • STIOLTO RESPIMAT 2.5-2.5 MCG/ACT aerosol solution inhaler Inhale 2 puffs Daily.   5/22/2021 at Unknown time   • traZODone (DESYREL) 50 MG tablet Take 50 mg by mouth Every Night.   5/22/2021 at Unknown time   • bisacodyl (DULCOLAX) 10 MG suppository Insert 10 mg into the rectum Daily As Needed for Constipation.   Unknown at Unknown time   • ipratropium-albuterol (DUO-NEB) 0.5-2.5 mg/3 ml nebulizer Take 3 mL by nebulization 4 (Four) Times a Day As Needed for Wheezing or Shortness of Air.   Unknown at Unknown time   • loperamide (IMODIUM) 2 MG capsule Take 2 mg by mouth 4 (Four) Times a Day As Needed for Diarrhea.   Unknown at Unknown time   • senna (Senna) 8.6 MG tablet Take 1 tablet by mouth Daily As Needed for Constipation.   Unknown at Unknown time     Allergies:  Haldol [haloperidol]    Review of Systems   Constitutional: Negative for chills, diaphoresis, fatigue and fever.   HENT: Negative for congestion and trouble swallowing.    Eyes: Negative for photophobia and visual disturbance.   Respiratory: Negative for chest tightness, shortness of breath and wheezing.    Cardiovascular: Negative for chest pain and palpitations.   Gastrointestinal: Positive for abdominal pain, blood in stool, diarrhea, nausea and vomiting.   Endocrine: Negative for polyphagia and polyuria.   Genitourinary: Negative for dysuria and hematuria.   Musculoskeletal: Negative for neck pain and neck stiffness.   Skin: Negative for rash and wound.   Allergic/Immunologic: Negative for food allergies and immunocompromised state.   Neurological: Negative for dizziness, weakness and light-headedness.   Hematological: Negative for adenopathy. Does not bruise/bleed easily.   Psychiatric/Behavioral: Negative for confusion and suicidal ideas.       Objective     Objective      Vital Signs  Temp:  [97.5 °F (36.4 °C)-98.3 °F (36.8 °C)] 97.9 °F (36.6 °C)  Heart Rate:  [60-90] 63  Resp:  [18] 18  BP:  (142-160)/(63-80) 160/73     Vital Signs (last 72 hrs)       05/29 0700  -  05/30 0659 05/30 0700  -  05/31 0659 05/31 0700  -  06/01 0659 06/01 0700  -  06/01 0836   Most Recent    Temp (°F) 98.1 -  98.4    97.3 -  98    97.5 -  98.3      97.9     97.9 (36.6)    Heart Rate 57 -  97    55 -  72    55 -  90      63     63    Resp   18    18 -  20      18      18     18    BP 98/53 -  148/80    125/55 -  157/65    142/77 -  183/67      160/73     160/73    SpO2 (%) 97 -  98    96 -  99    96 -  99      96     96        Body mass index is 17.48 kg/m².  Documented weights    05/22/21 2142 05/23/21 1242 05/24/21 0500 05/25/21 0440   Weight: 51.7 kg (114 lb) 50 kg (110 lb 3 oz) 49.7 kg (109 lb 9.6 oz) 47.6 kg (105 lb)    05/26/21 0500 05/27/21 0500 05/28/21 0500 05/29/21 0500   Weight: 47.7 kg (105 lb 1.6 oz) 48.2 kg (106 lb 4.8 oz) 48.2 kg (106 lb 4.8 oz) 48.3 kg (106 lb 6.4 oz)    05/30/21 0500 05/31/21 0500 06/01/21 0500   Weight: 48.6 kg (107 lb 3.2 oz) 48.3 kg (106 lb 8 oz) 49.1 kg (108 lb 4.8 oz)            Intake/Output Summary (Last 24 hours) at 6/1/2021 0836  Last data filed at 6/1/2021 0821  Gross per 24 hour   Intake 1860 ml   Output --   Net 1860 ml     Physical Exam  Constitutional:       General: She is not in acute distress.     Appearance: Normal appearance. She is well-developed and normal weight.   HENT:      Head: Normocephalic and atraumatic.   Eyes:      General: Lids are normal.      Conjunctiva/sclera: Conjunctivae normal.      Pupils: Pupils are equal, round, and reactive to light.   Neck:      Vascular: No carotid bruit or JVD.   Cardiovascular:      Rate and Rhythm: Normal rate. Rhythm irregular.      Pulses: Normal pulses.      Heart sounds: S1 normal and S2 normal. Murmur (Grade 3 x 6 systolic ejection murmur at left lower sternal border with radiation to the left axilla.) heard.     Pulmonary:      Effort: Pulmonary effort is normal. No respiratory distress.      Breath sounds: Normal breath  sounds. No wheezing.   Abdominal:      General: Bowel sounds are normal. There is no distension.      Palpations: Abdomen is soft. There is no hepatomegaly or splenomegaly.      Tenderness: There is no abdominal tenderness.   Musculoskeletal:         General: No swelling. Normal range of motion.      Cervical back: Normal range of motion and neck supple.      Right lower leg: No edema.      Left lower leg: No edema.   Skin:     General: Skin is warm and dry.      Coloration: Skin is not jaundiced.      Findings: No rash.   Neurological:      General: No focal deficit present.      Mental Status: She is alert and oriented to person, place, and time. Mental status is at baseline.   Psychiatric:         Mood and Affect: Mood normal.         Speech: Speech normal.         Behavior: Behavior normal.         Thought Content: Thought content normal.         Judgment: Judgment normal.         Results review     Results Review:    I reviewed the patient's new clinical results.      Results from last 7 days   Lab Units 06/01/21  0253 05/31/21  0254 05/30/21  1444 05/30/21  0635 05/30/21  0519 05/29/21  0520 05/28/21  0156 05/27/21  0446 05/26/21  0131   WBC 10*3/mm3 9.27 10.17  --   --  14.13* 15.13* 7.12 5.74 9.77   HEMOGLOBIN g/dL 7.8* 7.9* 8.1* 6.8* 6.7* 7.7* 9.1* 8.9* 9.6*   PLATELETS 10*3/mm3 376 349  --   --  403 470* 561* 528* 559*     Results from last 7 days   Lab Units 06/01/21  0253 05/31/21  0254 05/30/21  0519 05/29/21  0520 05/28/21  0156 05/27/21  0446 05/26/21  1305 05/26/21  0131   SODIUM mmol/L 136 133* 135* 135* 135* 136  --  134*   POTASSIUM mmol/L 3.7 4.0 3.8 3.8 4.5 4.0 4.6 3.4*   CHLORIDE mmol/L 100 99 102 100 99 100  --  96*   CO2 mmol/L 31.0* 29.3* 29.9* 30.8* 29.9* 30.9*  --  33.5*   BUN mg/dL 9 10 12 16 13 5*  --  6*   CREATININE mg/dL 0.38* 0.44* 0.48* 0.41* 0.44* 0.35*  --  0.39*   CALCIUM mg/dL 7.7* 7.7* 8.0* 8.1* 7.9* 7.9*  --  8.0*   GLUCOSE mg/dL 176* 155* 119* 172* 158* 95  --  115*     Lab  Results   Component Value Date    INR 1.26 (H) 2021    INR 1.24 (H) 2021    INR 0.98 2021    INR 1.20 (H) 08/10/2020     Lab Results   Component Value Date    MG 1.7 2021    MG 1.8 2021    MG 2.3 2021     Lab Results   Component Value Date    TSH 1.740 2021      Lab Results   Component Value Date    PROBNP 1,402.0 2021    PROBNP 934.2 (H) 08/10/2020    PROBNP 2,722.0 (H) 2020       ECG             Imaging Results (Last 72 Hours)     ** No results found for the last 72 hours. **        INDICATION:   Nausea and vomiting      COMPARISON:    5/10/2021     FINDINGS:  Single portable AP view(s) of the chest.  Stable cardiomegaly. Tortuous calcified aorta. There is a left base infiltrate unchanged from the previous examination. The right lung is clear. Vascular markings are normal.     IMPRESSION:  Left basilar consolidation is again noted and not significantly changed. No new infiltrates are seen.     Signer Name: Octavio Wheeler MD   Signed: 2021 2:59 AM   Work[facility]    [facility]    I have discussed my impression and recommendations with the patient and family.      Thank you very much for asking us to be involved in this patient's care.  We will follow along with you.      Electronically signed by QUIQUE Solitario, 21, 8:36 AM EDT.    Electronically signed by Tremaine Rust MD, 21, 9:32 AM EDT.    Please note that portions of this note were completed with a voice recognition program.          Electronically signed by Tremaine Rust MD at 21 0932       Physical Therapy Notes (last 24 hours) (Notes from 21 1034 through 21 1034)    No notes exist for this encounter.            Occupational Therapy Notes (most recent note)      Arturo Roper OT at 21 1036          Acute Care - Occupational Therapy Initial Evaluation  [facility]     Patient Name: Mary Malachi  : 1945  MRN:  [id number]  Today's Date: 5/28/2021  Onset of Illness/Injury or Date of Surgery: 05/22/21     Referring Physician: Sandy     Admit Date: 5/22/2021       ICD-10-CM ICD-9-CM   1. Intractable vomiting with nausea, unspecified vomiting type  R11.2 536.2   2. Ileus (CMS/[facility])  K56.7 560.1     Patient Active Problem List   Diagnosis   • Status post laparoscopic cholecystectomy   • Severe malnutrition (CMS/[facility])   • Colitis   • COPD (chronic obstructive pulmonary disease) (CMS/HCC)   • Type II diabetes mellitus (CMS/HCC)   • Hypothyroidism   • Hyperlipidemia   • Diastolic CHF, chronic (CMS/HCC)   • Moderate malnutrition (CMS/HCC)   • Vomiting   • Upper GI bleed     Past Medical History:   Diagnosis Date   • AAA (abdominal aortic aneurysm) (CMS/[facility])     s/p repair    • Arthritis    • CAD (coronary artery disease)     s/p stenting in the past    • Chronic kidney disease (CKD), stage III (moderate) (CMS/[facility])    • COPD (chronic obstructive pulmonary disease) (CMS/[facility])    • Debility    • Diastolic CHF, chronic (CMS/HCC) 4/23/2021   • GERD (gastroesophageal reflux disease)    • History of CVA (cerebrovascular accident)    • History of ischemic colitis    • Hyperlipidemia 4/23/2021   • Hypertension    • Hypothyroidism    • Stroke (CMS/[facility])    • Type II diabetes mellitus (CMS/[facility])      Past Surgical History:   Procedure Laterality Date   • BACK SURGERY     • CARDIAC CATHETERIZATION     • CHOLECYSTECTOMY N/A 7/17/2020    Procedure: CHOLECYSTECTOMY LAPAROSCOPIC;  Surgeon: Lonnie Meneses MD;  Location: [facility];  Service: General;  Laterality: N/A;   • COLONOSCOPY     • CORONARY STENT PLACEMENT     • ENDOSCOPY     • TONSILLECTOMY              OT ASSESSMENT FLOWSHEET (last 12 hours)      OT Evaluation and Treatment     Row Name 05/28/21 1024                   OT Time and Intention    Document Type  evaluation  -KR        Mode of Treatment  occupational therapy  -KR        Patient Effort  adequate  -KR           General Information     General Observations of Patient  alert/cooperative  -KR        Prior Level of Function  mod assist:  -KR           Cognition    Affect/Mental Status (Cognitive)  WFL  -KR        Orientation Status (Cognition)  oriented to;person;place;situation  -KR        Follows Commands (Cognition)  WFL  -KR           Range of Motion Comprehensive    Comment, General Range of Motion  BUE WFL  -KR           Strength Comprehensive (MMT)    Comment, General Manual Muscle Testing (MMT) Assessment  BUE 3-/5  -KR           Activities of Daily Living    BADL Assessment/Intervention  bathing;upper body dressing;lower body dressing;grooming;feeding;toileting  -KR           Bathing Assessment/Intervention    [place] Level (Bathing)  bathing skills;moderate assist (50% patient effort)  -KR           Upper Body Dressing Assessment/Training    [place] Level (Upper Body Dressing)  upper body dressing skills;minimum assist (75% patient effort)  -KR           Lower Body Dressing Assessment/Training    [place] Level (Lower Body Dressing)  lower body dressing skills;moderate assist (50% patient effort)  -KR           Grooming Assessment/Training    [place] Level (Grooming)  grooming skills;minimum assist (75% patient effort)  -KR           Self-Feeding Assessment/Training    [place] Level (Feeding)  feeding skills;set up  -KR           Toileting Assessment/Training    [place] Level (Toileting)  toileting skills;maximum assist (25% patient effort)  -KR           Plan of Care Review    Plan of Care Reviewed With  patient  -KR           OT Goals    Strength Goal Selection (OT)  strength, OT goal 1  -KR           Strength Goal 1 (OT)    Strength Goal 1 (OT)  BUE increase x 1 to enhance ability to assist with self care/ mobility tasks  -KR        Time Frame (Strength Goal 1, OT)  by discharge  -KR           Therapy Assessment/Plan (OT)    Planned Therapy Interventions (OT)  strengthening exercise;ROM/therapeutic  exercise;activity tolerance training  -KR           Therapy Plan Review/Discharge Plan (OT)    Anticipated Discharge Disposition (OT)  [facility] nursing [facility]  -KR          User Key  (r) = Recorded By, (t) = Taken By, (c) = Cosigned By    Initials Name Effective Dates    KR Arturo Roper OT 04/03/18 -                OT Recommendation and Plan  Planned Therapy Interventions (OT): strengthening exercise, ROM/therapeutic exercise, activity tolerance training  Plan of Care Review  Plan of Care Reviewed With: patient  Plan of Care Reviewed With: patient        Time Calculation:     Therapy Charges for Today     Code Description Service Date Service Provider Modifiers Qty    [id number]  OT EVAL MOD COMPLEXITY 4 5/28/2021 Arturo Roper OT GO 1               Arturo Roper OT  5/28/2021    Electronically signed by Arturo Roper OT at 05/28/21 1037       Speech Language Pathology Notes (most recent note)    No notes exist for this encounter.         Respiratory Therapy Notes (most recent note)    No notes exist for this encounter.            Nursing Assessments (last 24 hours)      Adult PCS Body System     Row Name 06/01/21 0842 06/01/21 0300 05/31/21 2020 05/31/21 1646 05/31/21 1500       Pain/Comfort/Sleep    Preferred Pain Scale  number (Numeric Rating Pain Scale)  --  --  number (Numeric Rating Pain Scale)  number (Numeric Rating Pain Scale)    Pain Goal/Acceptable Level  0  --  --  0  0    (0-10) Pain Rating: Rest  0  0  0  0  0    (0-10) Pain Rating: Activity  0  --  --  0  0    POSS ([place] Opioid-Induced Sed Scale)  1 - Awake and alert  --  --  1 - Awake and alert  1 - Awake and alert    Sleep/Rest/Relaxation  awake  sleeping between care  awake  awake  awake       Coping/Psychosocial    Observed Emotional State  calm;cooperative  --  calm;cooperative  --  --    Verbalized Emotional State  acceptance  --  --  --  --    Trust Relationship/Rapport  care explained;choices provided  --  care explained;choices  provided;emotional support provided;empathic listening provided;questions answered;questions encouraged;reassurance provided;thoughts/feelings acknowledged  --  --    Family/Support Persons  family  --  --  --  --    Involvement in Care  not present at bedside  --  --  --  --    Family/Support System Care  self-care encouraged;support provided  --  self-care encouraged  --  --    Diversional Activities  television  --  television  --  --       HEENT    HEENT WDL  WDL  --  WDL  --  --       Mouth/Teeth WDL    Mouth/Teeth WDL  WDL except;teeth  --  --  --  --    Teeth Symptoms  tooth/teeth missing  --  --  --  --       Cognitive    Cognitive/Neuro/Behavioral WDL  WDL except;all  --  WDL  --  --    Communication Enhancement Strategies  call light answered in person  --  --  --  --    Level of Consciousness  Alert  --  Alert  --  --    Arousal Level  opens eyes spontaneously  --  --  --  --    Orientation  oriented x 4  --  oriented x 4  --  --    Speech  clear;spontaneous  --  --  --  --    Mood/Behavior  calm;cooperative  --  --  --  --       Pupils    Pupil PERRLA  yes  --  --  --  --       Kevin Coma Scale    Best Eye Response  4-->(E4) spontaneous  --  --  --  --    Best Motor Response  6-->(M6) obeys commands  --  --  --  --    Best Verbal Response  5-->(V5) oriented  --  --  --  --    [place] Coma Scale Score  15  --  --  --  --    Assessment Qualifiers  patient not sedated/intubated  --  --  --  --       Motor Response    Motor Response  general motor response  --  --  --  --    General Motor Response  purposeful motor response  --  --  --  --       Hand /Ankle Strength    Hand , Left  moderate  --  moderate  --  --    Hand , Right  moderate  --  moderate  --  --    Dorsiflexion, Left  moderate  --  moderate  --  --    Dorsiflexion, Right  moderate  --  moderate  --  --    Plantarflexion, Left  moderate  --  moderate  --  --    Plantarflexion, Right  moderate  --  moderate  --  --       Behavior  WDL    Behavior WDL  WDL  --  --  --  --       Emotion Mood WDL    Emotion/Mood/Affect WDL  WDL  --  --  --  --       Respiratory    Respiratory WDL  WDL except;all  --  WDL  --  --    Rhythm/Pattern, Respiratory  unlabored;pattern regular;depth regular  --  no shortness of breath reported  --  --    Expansion/Accessory Muscles/Retractions  no use of accessory muscles  --  --  --  --    Nailbeds  no discoloration  --  --  --  --    Mucous Membranes  pink;intact;moist  --  --  --  --    Cough Frequency  no cough  --  --  --  --    Cough Type  nonproductive  --  --  --  --    Cough And Deep Breathing  done independently per patient  --  done independently per patient  --  --       Breath Sounds    Breath Sounds  All Fields  --  All Fields  --  --    All Lung Fields Breath Sounds  diminished  --  clear;diminished  --  --       Oxygen Therapy    Flow (L/min)  2  --  2  --  --    Device (Oxygen Therapy)  nasal cannula  --  nasal cannula  --  --       Cardiac    Cardiac WDL  WDL  --  WDL  --  --    Cardiac Rhythm  apical pulse regular;radial pulse regular  --  radial pulse regular  --  --    Heart Sounds  S1, S2  --  --  --  --    Additional Documentation  ECG (Group)  --  --  --  --       ECG    Lead Monitored  Lead II  --  Lead II  --  --    Rhythm  sinus bradycardia  --  normal sinus rhythm  --  --       Chest Pain Assessment    Chest Pain Location  other (see comments) denies   --  other (see comments) pt denies  --  --       Peripheral Neurovascular    Peripheral Neurovascular WDL  WDL  --  WDL  --  --    VTE Prevention/Management  bleeding risk factor(s) identified Pt refuses scds   --  bilateral;dorsiflexion/plantar flexion performed;other (see comments) pt refused scds  --  --       Gastrointestinal    Gastrointestinal WDL  WDL  --  WDL  --  --    Abdominal Appearance  flat;nondistended  --  flat  --  --    Abdominal Palpation  All Quadrants  --  All Quadrants  --  --    All Quadrants Abdominal Palpation   soft;nontender  --  nontender  --  --    Bowel Sounds  All Quadrants  --  All Quadrants  --  --    All Quadrants Bowel Sounds  normoactive;audible  --  audible;normoactive  --  --    Last Bowel Movement  05/29/21  --  --  --  --       Genitourinary    [facility] except;voiding ability/characteristics  --  WDL except  --  --    Voiding Characteristics  incontinence brief   --  incontinence  --  --       Skin    Skin WDL  WDL  --  WDL except  --  --    Skin Color/Characteristics  without discoloration  --  pale;without discoloration  --  --    Skin Temperature  warm  --  warm  --  --    Skin Moisture  dry  --  dry  --  --    Skin Elasticity  quick return to original state  --  slow return to original state  --  --    Skin Integrity  bruised (ecchymotic);intact coccyx and heels red/blanchable   --  bruised (ecchymotic);scab bilat buttocks/heels red and blanchable  --  --       Stalin Risk Assessment    Sensory Perception  3-->slightly limited  --  3-->slightly limited  --  --    Moisture  3-->occasionally moist  --  3-->occasionally moist  --  --    Activity  2-->chairfast  --  2-->chairfast  --  --    Mobility  3-->slightly limited  --  3-->slightly limited  --  --    Nutrition  3-->adequate  --  3-->adequate  --  --    Friction and Shear  2-->potential problem  --  2-->potential problem  --  --    Stalin Score  16  --  16  --  --       Skin Interventions    Pressure Reduction Devices  pressure-redistributing mattress utilized  --  pressure-redistributing mattress utilized;positioning supports utilized  --  --    Pressure Reduction Techniques  frequent weight shift encouraged  --  frequent weight shift encouraged;weight shift assistance provided  --  --    Skin Protection  adhesive use limited  --  --  --  --       Musculoskeletal    Musculoskeletal WDL  WDL except  --  WDL except  --  --    General Mobility  generalized weakness;moderately impaired  --  generalized weakness;moderately impaired  --  --        Functional Screen (every 3 days/change)    Ambulation  2 - assistive person  --  3 - assistive equipment and person  --  --    Transferring  2 - assistive person  --  2 - assistive person  --  --    Toileting  2 - assistive person  --  2 - assistive person  --  --    Bathing  2 - assistive person  --  2 - assistive person  --  --    Dressing  2 - assistive person  --  2 - assistive person  --  --    Eating  0 - independent  --  0 - independent  --  --    Communication  0 - understands/communicates without difficulty  --  0 - understands/communicates without difficulty  --  --    Swallowing  0 - swallows foods/liquids without difficulty  --  0 - swallows foods/liquids without difficulty  --  --       Nutrition    Diet/Nutrition Received  regular;consistent carb/diabetic diet  --  --  --  --    Diet/Feeding Assistance  tray set-up  --  tray set-up  --  --    Nutrition Risk Screen  no indicators present  --  --  --  --       Nutrition Interventions    Glycemic Management  --  --  blood glucose monitoring  --  --       Midline Catheter - Single Lumen 05/24/21 Left Basilic    Midline Catheter - Properties Group Placement Date: 05/24/21 Placement Time: 1608 Site Prep: Chlorhexidine All 5 Sterile Barriers Used (Gloves, Gown, Cap, Mask, Large Sterile Drape): Yes Orientation: Left Location: Basilic Size (Fr): 18 , 10cm power glide  Inserted by: britt akbar RN Total insertion attempts: 1 Patient Tolerance: Tolerated well    Site Assessment  Clean;Dry;Intact  --  Clean;Dry  --  --    Line Status  Flushed  --  Flushed;Blood return noted;Saline locked;Capped  --  --    Dressing Type  Transparent  --  Transparent;Antimicrobial dressing/disc  --  --    Dressing Status  Clean;Dry;Intact  --  Clean;Dry;Intact  --  --       Peripheral IV 05/24/21 1541 Anterior;Proximal;Right Forearm    IV Properties Placement Date: 05/24/21 Placement Time: 1541 Size (Gauge): 22 G Orientation: Anterior;Proximal;Right Location: Forearm Total  insertion attempts: 1    Site Assessment  Clean;Dry;Intact  --  Clean;Dry;Intact  --  --    Dressing Type  Transparent  --  Transparent  --  --    Line Status  Occluded  --  Saline locked  --  --    Dressing Status  Clean;Dry;Intact  --  Clean;Dry;Intact  --  --    Reason Not Rotated  Not due  --  --  --  --    Phlebitis  0-->no symptoms  --  0-->no symptoms  --  --       Sepsis Screening Options    Which Sepsis Screen to be Used?  Adult Sepsis Screen  --  --  --  --       Adult Sepsis Screening Tool    Previous adult screen positive in last 48 hrs?  no  --  no  --  --    Are 2 or > of the above criteria present?  no: STOP/negative screen  --  no: STOP/negative screen  --  --       Safety    Safety WDL  WDL  WDL  WDL  WDL  WDL    Safety Factors  ID band on;upper side rails raised x 2;call light in reach;wheels locked;bed in low position  --  --  ID band on;upper side rails raised x 2;call light in reach;wheels locked;bed in low position  ID band on;upper side rails raised x 2;call light in reach;wheels locked;bed in low position    All Alarms  alarm(s) activated and audible  alarm(s) activated and audible  alarm(s) activated and audible  alarm(s) activated and audible  alarm(s) activated and audible    Infection Prevention  rest/sleep promoted  --  --  rest/sleep promoted  rest/sleep promoted    Additional Documentation  --  --  --  --  All Alarms (Row)       [facility] High Risk Falls Assessment (If Fall score is >/=13, add the Fall Risk CPG to the care plan)     Fallen in past 6 months  0--> No  --  0--> No  --  --    Mental Status  0--> no mental status change  --  0--> no mental status change  --  --    Elimination  0--> No elimination issues  --  0--> No elimination issues  --  --    Mobility  2--> Requires assistance- transfer, walker, etc.  --  2--> Requires assistance- transfer, walker, etc.  --  --    Medications  0--> No meds  --  0--> No meds  --  --    Nurses' Clinical Judgement  9  --  10  --  --     Total Fall Risk Score  13  --  14  --  --       Safety Management    Safety Promotion/Fall Prevention  safety round/check completed  safety round/check completed  safety round/check completed  safety round/check completed  safety round/check completed    Enhanced Safety Measures  bed alarm set  bed alarm set  bed alarm set  bed alarm set  bed alarm set    Medication Review/Management  medications reviewed  --  medications reviewed  medications reviewed  medications reviewed       Daily Care    Activity Management  --  --  bedrest  --  --    Activity Assistance Provided  --  --  assistance, 1 person  --  --    Row Name 05/31/21 1343 05/31/21 1128                Pain/Comfort/Sleep    Preferred Pain Scale  number (Numeric Rating Pain Scale)  number (Numeric Rating Pain Scale)       Pain Goal/Acceptable Level  0  0       (0-10) Pain Rating: Rest  0  0       (0-10) Pain Rating: Activity  0  0       POSS (Pasero Opioid-Induced Sed Scale)  1 - Awake and alert  1 - Awake and alert       Sleep/Rest/Relaxation  awake  awake          Nausea/Vomiting    Nausea/Vomiting Signs/Symptoms  nausea intermittent  --       Nausea/Vomiting Interventions  see MAR  --          Midline Catheter - Single Lumen 05/24/21 Left Basilic    Midline Catheter - Properties Group Placement Date: 05/24/21 Placement Time: 1608 Site Prep: Chlorhexidine All 5 Sterile Barriers Used (Gloves, Gown, Cap, Mask, Large Sterile Drape): Yes Orientation: Left Location: Basilic Size (Fr): 18 , 10cm power glide  Inserted by: britt akbar RN Total insertion attempts: 1 Patient Tolerance: Tolerated well       Peripheral IV 05/24/21 1541 Anterior;Proximal;Right Forearm    IV Properties Placement Date: 05/24/21 Placement Time: 1541 Size (Gauge): 22 G Orientation: Anterior;Proximal;Right Location: Forearm Total insertion attempts: 1       Safety    Safety WDL  WDL  WDL       Safety Factors  ID band on;wheels locked;bed in low position;upper side rails raised x 2;call  light in reach  ID band on;call light in reach;upper side rails raised x 2;wheels locked;bed in low position       All Alarms  alarm(s) activated and audible  alarm(s) activated and audible       Infection Prevention  rest/sleep promoted  rest/sleep promoted       Additional Documentation  All Alarms (Row)  --          Safety Management    Safety Promotion/Fall Prevention  safety round/check completed  safety round/check completed       Enhanced Safety Measures  bed alarm set  bed alarm set       Medication Review/Management  medications reviewed  medications reviewed           ADL Documentation (last day)     Date/Time Transferring Toileting Bathing Dressing Eating Communication Swallowing    06/01/21 0842  2 - assistive person  2 - assistive person  2 - assistive person  2 - assistive person  0 - independent  0 - understands/communicates without difficulty  0 - swallows foods/liquids without difficulty    05/31/21 2020  2 - assistive person  2 - assistive person  2 - assistive person  2 - assistive person  0 - independent  0 - understands/communicates without difficulty  0 - swallows foods/liquids without difficulty    05/31/21 0836  2 - assistive person  2 - assistive person  2 - assistive person  2 - assistive person  0 - independent  0 - understands/communicates without difficulty  0 - swallows foods/liquids without difficulty

## 2021-06-01 NOTE — NURSING NOTE
Gave report to MANJINDER Brasher @ [facility]. Discussed patients appointment with vascular surgeon @ [facility], nurse verbalized understanding. No questions at this time.

## 2021-06-01 NOTE — CONSULTS
Date of Admit: 5/22/2021  Date of Consult: 06/01/21  No ref. provider found        Upper GI bleed    Vomiting      Assessment      Paroxysmal atrial fibrillation with controlled ventricular response.  Recurrent GI bleed with blood loss anemia requiring blood transfusion due to apparently ischemic colitis.  History of abdominal aortic aneurysm, status post endovascular repair.  ASCVD, status post PCI, clinically asymptomatic and stable.  Essential hypertension with elevated systolic blood pressures.      Recommendations     Since she is having recurrent GI bleed with severe blood loss anemia requiring blood transfusion, obviously the potential risk of bleeding with the anticoagulant is higher than the potential benefit.  Hence would agree with holding the anticoagulation for now but would like to get her back on it as soon as her bleeding is stabilized.  I have discussed briefly with her about the option of watchman device placement.  She may not be a good candidate for this either at this time if she is having ongoing GI bleed as patient has to be able to take anticoagulant for at least 6 to 8 weeks post device implantation and then be on antiplatelet therapy for at least 6 months.  She being a nursing home resident and had to be taken to [facility] for consultation and placement of the device would make it logistically difficult too.      Reason for consultation: Risk/benefit with anticoagulation in the setting of chronic ischemic colitis with melena     Subjective       Subjective     History of Present Illness     Mary Ly is a 75 year old female with a past medical history significant for history of CVA, paroxysmal atrial fibrillation, diabetes mellitus type 2, essential hypertension, history of abdominal aortic aneurysm status post repair, coronary disease status post PCI, chronic kidney disease and ischemic colitis.  Patient presented to the ER with complaints of vomiting, melena and diarrhea. She was found  to have sepsis secondary to infectious colitis in the setting of ischemic colitis with occluded celiac artery. Patient has a history of ischemic colitis with occluded celiac artery, severe SMA stenosis with reconstitution and CHRISTOPHER covered by prior EVAR. Per chart review patient has has several admission in august 2021, April 2021 and may 2021 for ischemic colitis and anemia requiring blood transfusion. She has a history of CVA with right sided weakness which she has recovered from. She also has paroxysmal atrial fibrillation and is on low dose eliquis at home. Eliquis has been held since admission secondary to melena and anemia requiring blood transfusion. She also has a history of descending thoracic aortic aneurysm status post repair. On evaluation today she denies any chest pain, shortness of breath or bloody stools. She was seen by general surgery who stated there was no indication for surgical evaluation but that would need immediate referral to vascular surgery on discharge for severe PAD to discuss SMA stenting versus SMA bypass. Recent echocardiogram showed normal LV function with grade I diastolic dysfunction. She is a current smoker for the last 55 years.     Cardiac risk factors:arteriosclerotic heart disease, cerebral vascular disease, diabetes mellitus, hypertension, peripheral vascular disease, smoking and Sedentary life style    Last Echo: 4/26/2021  Normal left ventricular cavity size noted. Left ventricular wall thickness is consistent with mild concentric hypertrophy  Left ventricular ejection fraction appears to be 61 - 65%.  Left ventricular diastolic function is consistent with (grade I) impaired relaxation.  There is mild calcification of the aortic valvular cusps.  No aortic valve regurgitation is present. Mild aortic valve stenosis is present.  There are myxomatous changes of the mitral valve apparatus present. Mild mitral valve regurgitation is present. No significant mitral valve stenosis is  present.  . There is no evidence of pericardial effusion.    Past Medical History:   Diagnosis Date   • AAA (abdominal aortic aneurysm) (CMS/Formerly [facility])     s/p repair    • Arthritis    • CAD (coronary artery disease)     s/p stenting in the past    • Chronic kidney disease (CKD), stage III (moderate) (CMS/Formerly [facility])    • COPD (chronic obstructive pulmonary disease) (CMS/Formerly [facility])    • Debility    • Diastolic CHF, chronic (CMS/Formerly [facility]) 4/23/2021   • GERD (gastroesophageal reflux disease)    • History of CVA (cerebrovascular accident)    • History of ischemic colitis    • Hyperlipidemia 4/23/2021   • Hypertension    • Hypothyroidism    • Stroke (CMS/[facility])    • Type II diabetes mellitus (CMS/Formerly [facility])      Past Surgical History:   Procedure Laterality Date   • BACK SURGERY     • CARDIAC CATHETERIZATION     • CHOLECYSTECTOMY N/A 7/17/2020    Procedure: CHOLECYSTECTOMY LAPAROSCOPIC;  Surgeon: Lonnie Meneses MD;  Location: [facility];  Service: General;  Laterality: N/A;   • COLONOSCOPY     • CORONARY STENT PLACEMENT     • ENDOSCOPY     • TONSILLECTOMY       No family history on file.     Social History     Tobacco Use   • Smoking status: Current Every Day Smoker     Packs/day: 1.00     Years: 55.00     Pack years: 55.00     Types: Cigarettes   • Smokeless tobacco: Never Used   Substance Use Topics   • Alcohol use: Never   • Drug use: Never     Medications Prior to Admission   Medication Sig Dispense Refill Last Dose   • amLODIPine (NORVASC) 10 MG tablet Take 10 mg by mouth Daily.   5/22/2021 at Unknown time   • apixaban (ELIQUIS) 2.5 MG tablet tablet Take 2.5 mg by mouth Every 12 (Twelve) Hours.   5/22/2021 at Unknown time   • aspirin 81 MG chewable tablet Chew 81 mg Daily.   5/22/2021 at Unknown time   • atorvastatin (LIPITOR) 10 MG tablet Take 10 mg by mouth Every Night.   5/22/2021 at Unknown time   • baclofen (LIORESAL) 10 MG tablet Take 10 mg by mouth Every Night.   5/22/2021 at Unknown time   • Cariprazine HCl (VRAYLAR) 1.5 MG capsule  capsule Take 1.5 mg by mouth Daily.   5/22/2021 at Unknown time   • carvedilol (COREG) 12.5 MG tablet Take 12.5 mg by mouth 2 (Two) Times a Day With Meals.   5/22/2021 at Unknown time   • docusate sodium (COLACE) 250 MG capsule Take 250 mg by mouth Daily.   5/22/2021 at Unknown time   • Empagliflozin (Jardiance) 10 MG tablet Take 1 tablet by mouth Every Morning.   5/22/2021 at Unknown time   • guaiFENesin (Mucus Relief) 400 MG tablet Take 400 mg by mouth Every 12 (Twelve) Hours.   5/22/2021 at Unknown time   • HYDROcodone-acetaminophen (NORCO) 5-325 MG per tablet Take 1 tablet by mouth Every 8 (Eight) Hours As Needed for Mild Pain  or Moderate Pain . 9 tablet 0 5/22/2021 at Unknown time   • Lactobacillus (ACIDOPHILUS/PECTIN PO) Take 1 capsule by mouth 2 (two) times a day.   5/22/2021 at Unknown time   • lactulose (CHRONULAC) 10 GM/15ML solution Take 10 g by mouth Daily As Needed.   Past Month at Unknown time   • levothyroxine (SYNTHROID, LEVOTHROID) 200 MCG tablet Take 200 mcg by mouth Daily.   5/22/2021 at Unknown time   • lisinopril (PRINIVIL,ZESTRIL) 10 MG tablet Take 10 mg by mouth Daily.   5/22/2021 at Unknown time   • melatonin 5 MG tablet tablet Take 5 mg by mouth Every Night.   5/22/2021 at Unknown time   • montelukast (SINGULAIR) 10 MG tablet Take 10 mg by mouth Daily.   5/22/2021 at Unknown time   • Multiple Vitamin (MULTIVITAMIN) tablet tablet Take 1 tablet by mouth Every Night.   5/22/2021 at Unknown time   • pantoprazole (PROTONIX) 20 MG EC tablet Take 20 mg by mouth Daily.   5/22/2021 at Unknown time   • predniSONE (DELTASONE) 5 MG tablet Take 5 mg by mouth Every Morning.   5/22/2021 at Unknown time   • promethazine (PHENERGAN) 12.5 MG tablet Take 12.5 mg by mouth Every 8 (Eight) Hours As Needed for Nausea or Vomiting.   5/22/2021 at Unknown time   • STIOLTO RESPIMAT 2.5-2.5 MCG/ACT aerosol solution inhaler Inhale 2 puffs Daily.   5/22/2021 at Unknown time   • traZODone (DESYREL) 50 MG tablet Take 50  mg by mouth Every Night.   5/22/2021 at Unknown time   • bisacodyl (DULCOLAX) 10 MG suppository Insert 10 mg into the rectum Daily As Needed for Constipation.   Unknown at Unknown time   • ipratropium-albuterol (DUO-NEB) 0.5-2.5 mg/3 ml nebulizer Take 3 mL by nebulization 4 (Four) Times a Day As Needed for Wheezing or Shortness of Air.   Unknown at Unknown time   • loperamide (IMODIUM) 2 MG capsule Take 2 mg by mouth 4 (Four) Times a Day As Needed for Diarrhea.   Unknown at Unknown time   • senna (Senna) 8.6 MG tablet Take 1 tablet by mouth Daily As Needed for Constipation.   Unknown at Unknown time     Allergies:  Haldol [haloperidol]    Review of Systems   Constitutional: Negative for chills, diaphoresis, fatigue and fever.   HENT: Negative for congestion and trouble swallowing.    Eyes: Negative for photophobia and visual disturbance.   Respiratory: Negative for chest tightness, shortness of breath and wheezing.    Cardiovascular: Negative for chest pain and palpitations.   Gastrointestinal: Positive for abdominal pain, blood in stool, diarrhea, nausea and vomiting.   Endocrine: Negative for polyphagia and polyuria.   Genitourinary: Negative for dysuria and hematuria.   Musculoskeletal: Negative for neck pain and neck stiffness.   Skin: Negative for rash and wound.   Allergic/Immunologic: Negative for food allergies and immunocompromised state.   Neurological: Negative for dizziness, weakness and light-headedness.   Hematological: Negative for adenopathy. Does not bruise/bleed easily.   Psychiatric/Behavioral: Negative for confusion and suicidal ideas.       Objective     Objective      Vital Signs  Temp:  [97.5 °F (36.4 °C)-98.3 °F (36.8 °C)] 97.9 °F (36.6 °C)  Heart Rate:  [60-90] 63  Resp:  [18] 18  BP: (142-160)/(63-80) 160/73     Vital Signs (last 72 hrs)       05/29 0700  -  05/30 0659 05/30 0700  -  05/31 0659 05/31 0700  -  06/01 0659 06/01 0700  -  06/01 0836   Most Recent    Temp (°F) 98.1 -  98.4     97.3 -  98    97.5 -  98.3      97.9     97.9 (36.6)    Heart Rate 57 -  97    55 -  72    55 -  90      63     63    Resp   18    18 -  20      18      18     18    BP 98/53 -  148/80    125/55 -  157/65    142/77 -  183/67      160/73     160/73    SpO2 (%) 97 -  98    96 -  99    96 -  99      96     96        Body mass index is 17.48 kg/m².  Documented weights    05/22/21 2142 05/23/21 1242 05/24/21 0500 05/25/21 0440   Weight: 51.7 kg (114 lb) 50 kg (110 lb 3 oz) 49.7 kg (109 lb 9.6 oz) 47.6 kg (105 lb)    05/26/21 0500 05/27/21 0500 05/28/21 0500 05/29/21 0500   Weight: 47.7 kg (105 lb 1.6 oz) 48.2 kg (106 lb 4.8 oz) 48.2 kg (106 lb 4.8 oz) 48.3 kg (106 lb 6.4 oz)    05/30/21 0500 05/31/21 0500 06/01/21 0500   Weight: 48.6 kg (107 lb 3.2 oz) 48.3 kg (106 lb 8 oz) 49.1 kg (108 lb 4.8 oz)            Intake/Output Summary (Last 24 hours) at 6/1/2021 0836  Last data filed at 6/1/2021 0821  Gross per 24 hour   Intake 1860 ml   Output --   Net 1860 ml     Physical Exam  Constitutional:       General: She is not in acute distress.     Appearance: Normal appearance. She is well-developed and normal weight.   HENT:      Head: Normocephalic and atraumatic.   Eyes:      General: Lids are normal.      Conjunctiva/sclera: Conjunctivae normal.      Pupils: Pupils are equal, round, and reactive to light.   Neck:      Vascular: No carotid bruit or JVD.   Cardiovascular:      Rate and Rhythm: Normal rate. Rhythm irregular.      Pulses: Normal pulses.      Heart sounds: S1 normal and S2 normal. Murmur (Grade 3 x 6 systolic ejection murmur at left lower sternal border with radiation to the left axilla.) heard.     Pulmonary:      Effort: Pulmonary effort is normal. No respiratory distress.      Breath sounds: Normal breath sounds. No wheezing.   Abdominal:      General: Bowel sounds are normal. There is no distension.      Palpations: Abdomen is soft. There is no hepatomegaly or splenomegaly.      Tenderness: There is no  abdominal tenderness.   Musculoskeletal:         General: No swelling. Normal range of motion.      Cervical back: Normal range of motion and neck supple.      Right lower leg: No edema.      Left lower leg: No edema.   Skin:     General: Skin is warm and dry.      Coloration: Skin is not jaundiced.      Findings: No rash.   Neurological:      General: No focal deficit present.      Mental Status: She is alert and oriented to person, place, and time. Mental status is at baseline.   Psychiatric:         Mood and Affect: Mood normal.         Speech: Speech normal.         Behavior: Behavior normal.         Thought Content: Thought content normal.         Judgment: Judgment normal.         Results review     Results Review:    I reviewed the patient's new clinical results.      Results from last 7 days   Lab Units 06/01/21  0253 05/31/21  0254 05/30/21  1444 05/30/21  0635 05/30/21  0519 05/29/21  0520 05/28/21  0156 05/27/21  0446 05/26/21  0131   WBC 10*3/mm3 9.27 10.17  --   --  14.13* 15.13* 7.12 5.74 9.77   HEMOGLOBIN g/dL 7.8* 7.9* 8.1* 6.8* 6.7* 7.7* 9.1* 8.9* 9.6*   PLATELETS 10*3/mm3 376 349  --   --  403 470* 561* 528* 559*     Results from last 7 days   Lab Units 06/01/21  0253 05/31/21  0254 05/30/21  0519 05/29/21  0520 05/28/21  0156 05/27/21  0446 05/26/21  1305 05/26/21  0131   SODIUM mmol/L 136 133* 135* 135* 135* 136  --  134*   POTASSIUM mmol/L 3.7 4.0 3.8 3.8 4.5 4.0 4.6 3.4*   CHLORIDE mmol/L 100 99 102 100 99 100  --  96*   CO2 mmol/L 31.0* 29.3* 29.9* 30.8* 29.9* 30.9*  --  33.5*   BUN mg/dL 9 10 12 16 13 5*  --  6*   CREATININE mg/dL 0.38* 0.44* 0.48* 0.41* 0.44* 0.35*  --  0.39*   CALCIUM mg/dL 7.7* 7.7* 8.0* 8.1* 7.9* 7.9*  --  8.0*   GLUCOSE mg/dL 176* 155* 119* 172* 158* 95  --  115*     Lab Results   Component Value Date    INR 1.26 (H) 05/22/2021    INR 1.24 (H) 04/22/2021    INR 0.98 03/09/2021    INR 1.20 (H) 08/10/2020     Lab Results   Component Value Date    MG 1.7 05/21/2021    MG  1.8 05/17/2021    MG 2.3 05/16/2021     Lab Results   Component Value Date    TSH 1.740 04/23/2021      Lab Results   Component Value Date    PROBNP 1,402.0 03/09/2021    PROBNP 934.2 (H) 08/10/2020    PROBNP 2,722.0 (H) 07/20/2020       ECG             Imaging Results (Last 72 Hours)     ** No results found for the last 72 hours. **        INDICATION:   Nausea and vomiting      COMPARISON:    5/10/2021     FINDINGS:  Single portable AP view(s) of the chest.  Stable cardiomegaly. Tortuous calcified aorta. There is a left base infiltrate unchanged from the previous examination. The right lung is clear. Vascular markings are normal.     IMPRESSION:  Left basilar consolidation is again noted and not significantly changed. No new infiltrates are seen.     Signer Name: Octavio Wheeler MD   Signed: 5/23/2021 2:59 AM   Workstation Name: [facility]    [facility]    I have discussed my impression and recommendations with the patient and family.      Thank you very much for asking us to be involved in this patient's care.  We will follow along with you.      Electronically signed by QUIQUE Solitario, 06/01/21, 8:36 AM EDT.    Electronically signed by Tremaine Rust MD, 06/01/21, 9:32 AM EDT.    Please note that portions of this note were completed with a voice recognition program.

## 2021-06-01 NOTE — PLAN OF CARE
Goal Outcome Evaluation:  Plan of Care Reviewed With: patient  Progress: no change  Outcome Summary: Pt resting well with no complaints. Hgb 7.8. No s/s of distress noted. Continue with plan of care.

## 2021-06-01 NOTE — PLAN OF CARE
Goal Outcome Evaluation:  Patient to be discharged back to nursing home this shift. Patient has had no complaints. No s/s of acute distress noted. Will continue with plan of care.

## 2021-06-01 NOTE — DISCHARGE SUMMARY
[facility] HOSPITALISTS DISCHARGE SUMMARY    Patient Identification:  Name:  Mary Ly  Age:  75 y.o.  Sex:  female  :  1945  MRN:  [id number]  Visit Number:  [id number]    Date of Admission: 2021  Date of Discharge:  2021     PCP: Leta Gardner MD    DISCHARGE DIAGNOSIS    Sepsis  Possible ileus secondary to infectious colitis  History of ischemic colitis with occluded celiac artery, severe SMA stenosis with reconstitution and CHRISTOPHER overlaid by EVAR.  Hypertension  Hyperlipidemia  CAD s/p PCI  HFpEF, not in exacerbation  Severe bilateral PAD of the lower extremities  Descending thoracic aortic aneurysm  Abdominal aortic aneurysm status post EVAR  Diabetes mellitus type 2  CKD stage III  Hypothyroidism  Chronic hypoxic respiratory failure  COPD, not in exacerbation  Continued tobacco abuse  GERD  History of stroke  Chronic moderate malnutrition  CONSULTS     General surgery  Cardiology  Diabetes education  PROCEDURES PERFORMED      HOSPITAL COURSE  Patient is a 75 y.o. female presented to [facility] complaining of nausea, vomiting, abdominal pain and diarrhea for 2 days.  Please see the admitting history and physical for further details.      Patient admitted to the hospitalist service with sepsis and possible ileus secondary to suspected infectious colitis.    75F PMH significant for stroke, paroxysmal atrial fibrillation, diabetes mellitus, chronic anticoagulation with Eliquis, hypertension and AAA status post repair who presents from [facility] and rehabilitation for vomiting, abdominal pain and diarrhea, reports a 2-day history of intermittent vomiting.  There was also concern for lower GI bleeding as patient had dark stools and low hemoglobin requiring transfusion with significantly elevated white blood cell count.  General surgery was consulted given patient's history of ischemic colitis with occluded celiac artery, severe SMA stenosis with reconstitution,  and CHRISTOPHER covered by prior EVAR.  Patient was seen and evaluated by general surgery who recommended no surgical intervention at this time but the patient needed prompt follow-up with vascular surgery as patient likely to require SMA stenting versus bypass.  Patient was transfused with improvement in hemoglobin and restarted on Eliquis, however approximately 5 days post reinitiate Eliquis she had another episode of melena and drop in hemoglobin.  Eliquis was discontinued at that time as risk was felt to far outweigh the benefits.  However patient was seen and evaluated by cardiology given the risks and benefits and cardiology does agree that at this point in time continued anticoagulation for her A. fib is outweighed by the risk of bleeding given her recurrent acute anemia.  Patient did complete a 7-day course of cefepime and Flagyl with overall improvement.  Prior CTA of the abdomen in August 2020 showed poor visualization of the origin of the celiac and superior mesenteric arteries with inability to exclude ischemia of colon due to diffuse colonic wall thickening additionally there was also noted multifocal plaque throughout the superficial femoral arteries bilaterally with noted descending thoracic aortic aneurysm..  A CT of abdomen and pelvis during this admission showed possible ileus with scattered air-fluid levels and mild gastric distention although no definitive inflammatory processes were noted.  Abdominal vasculature ultrasound obtained showed incomplete visualization of the origins of celiac and SMA.  Patient was continued on Protonix.  Patient was continued on aspirin 81 mg daily and statin given her peripheral arterial disease.  General surgery was gracious enough to help arrange outpatient follow-up for patient with vascular surgery on August 2 at 8:40 AM at [facility] with Dr. Velasquez.  Patient will also follow-up with cardiology in 3 weeks for continued monitoring given her peripheral arterial  disease and atrial fibrillation this patient may be candidate for resumption of anticoagulation or even a watchman device at a later date and time.  Patient will follow up with PCP in 1 week for post hospital follow-up.  Patient was medically stable and feeling well and wishing to return home at time of her discharge was felt to obtain maximal benefit of hospitalization.    VITAL SIGNS:  Temp:  [97.5 °F (36.4 °C)-98.3 °F (36.8 °C)] 97.9 °F (36.6 °C)  Heart Rate:  [53-90] 58  Resp:  [16-18] 16  BP: (136-162)/(55-74) 136/55  SpO2:  [96 %-99 %] 99 %  on  Flow (L/min):  [2] 2;   Device (Oxygen Therapy): nasal cannula    Body mass index is 17.48 kg/m².  Wt Readings from Last 3 Encounters:   06/01/21 49.1 kg (108 lb 4.8 oz)   05/17/21 50 kg (110 lb 4.8 oz)   04/30/21 50.3 kg (110 lb 12.8 oz)       PHYSICAL EXAM:  Constitutional: Chronically ill-appearing elderly female, frail.  No acute distress.      HENT:  Head:  Normocephalic and atraumatic.  Mouth:  Moist mucous membranes.    Eyes:  Conjunctivae and EOM are normal. No scleral icterus.    Neck:  Neck supple.  No JVD present.    Cardiovascular:  Normal rate, regular rhythm and normal heart sounds with no murmur.  Pulmonary/Chest:  No respiratory distress, no wheezes, no crackles.  Abdominal:  Soft.  Bowel sounds are normal.  No distension and no tenderness.   Musculoskeletal:  No tenderness, and no deformity.  No red or swollen joints anywhere.    Neurological:  Alert and oriented to person, place, and time.  No cranial nerve deficit. Intact Sensation throughout  Skin:  Skin is warm and dry. No rash or lesion noted. No pallor.   Peripheral vascular:  Pulses in all 4 extremities with no clubbing, no cyanosis, no edema.  Psychiatric: Appropriate mood and affect, pleasant.     DISCHARGE DISPOSITION   Stable    DISCHARGE MEDICATIONS:     Discharge Medications      Changes to Medications      Instructions Start Date   atorvastatin 40 MG tablet  Commonly known as:  LIPITOR  What changed:   · medication strength  · how much to take   40 mg, Oral, Nightly      carvedilol 6.25 MG tablet  Commonly known as: COREG  What changed:   · medication strength  · how much to take   6.25 mg, Oral, 2 Times Daily With Meals      pantoprazole 20 MG EC tablet  Commonly known as: PROTONIX  What changed: how much to take   40 mg, Oral, Daily         Continue These Medications      Instructions Start Date   ACIDOPHILUS/PECTIN PO   1 capsule, Oral, 2 times daily      amLODIPine 10 MG tablet  Commonly known as: NORVASC   10 mg, Oral, Daily      aspirin 81 MG chewable tablet   81 mg, Oral, Daily      baclofen 10 MG tablet  Commonly known as: LIORESAL   10 mg, Oral, Nightly      bisacodyl 10 MG suppository  Commonly known as: DULCOLAX   10 mg, Rectal, Daily PRN      Cariprazine HCl 1.5 MG capsule capsule  Commonly known as: VRAYLAR   1.5 mg, Oral, Daily      docusate sodium 250 MG capsule  Commonly known as: COLACE   250 mg, Oral, Daily      HYDROcodone-acetaminophen 5-325 MG per tablet  Commonly known as: NORCO   1 tablet, Oral, Every 8 Hours PRN      ipratropium-albuterol 0.5-2.5 mg/3 ml nebulizer  Commonly known as: DUO-NEB   3 mL, Nebulization, 4 Times Daily PRN      Jardiance 10 MG tablet  Generic drug: Empagliflozin   1 tablet, Oral, Every Morning      lactulose 10 GM/15ML solution  Commonly known as: CHRONULAC   10 g, Oral, Daily PRN      levothyroxine 200 MCG tablet  Commonly known as: SYNTHROID, LEVOTHROID   200 mcg, Oral, Daily      lisinopril 10 MG tablet  Commonly known as: PRINIVIL,ZESTRIL   10 mg, Oral, Daily      loperamide 2 MG capsule  Commonly known as: IMODIUM   2 mg, Oral, 4 Times Daily PRN      melatonin 5 MG tablet tablet   5 mg, Oral, Nightly      montelukast 10 MG tablet  Commonly known as: SINGULAIR   10 mg, Oral, Daily      Mucus Relief 400 MG tablet  Generic drug: guaiFENesin   400 mg, Oral, Every 12 Hours      multivitamin tablet tablet  Generic drug: multivitamin   1  tablet, Oral, Nightly      predniSONE 5 MG tablet  Commonly known as: DELTASONE   5 mg, Oral, Every Morning      promethazine 12.5 MG tablet  Commonly known as: PHENERGAN   12.5 mg, Oral, Every 8 Hours PRN      Senna 8.6 MG tablet  Generic drug: senna   1 tablet, Oral, Daily PRN      Stiolto Respimat 2.5-2.5 MCG/ACT aerosol solution inhaler  Generic drug: tiotropium bromide-olodaterol   2 puffs, Inhalation, Daily      traZODone 50 MG tablet  Commonly known as: DESYREL   50 mg, Oral, Nightly         Stop These Medications    apixaban 2.5 MG tablet tablet  Commonly known as: ELIQUIS              Additional Instructions for the Follow-ups that You Need to Schedule     Discharge Follow-up with PCP   As directed       Currently Documented PCP:    Leta Gardner MD    PCP Phone Number:    [phone]     Follow Up Details: 1 week post hospital follow up         Discharge Follow-up with Specialty: Cardiology; 3 Weeks   As directed      Specialty: Cardiology    Follow Up: 3 Weeks    Follow Up Details: Dr. Rust, [facility] and Dr. Rust to be seen by Dr. Rust himself at follow up.           Follow-up Information     Leta Gardner MD .    Specialty: Geriatric Medicine  Why: 1 week post hospital follow up  Contact information:  [place] [address]  [phone]                    TEST  RESULTS PENDING AT DISCHARGE       CODE STATUS  Code Status and Medical Interventions:   Ordered at: 05/23/21 0712     Limited Support to NOT Include:    Artificial Nutrition    Cardioversion/Defibrillation    Intubation     Code Status:    No CPR     Medical Interventions (Level of Support Prior to Arrest):    Mark Shaw DO  [facility] Hospitalist  06/01/21  13:17 EDT    Please note that this discharge summary required more than 30 minutes to complete.

## 2021-06-01 NOTE — PROGRESS NOTES
[facility] HOSPITALIST PROGRESS NOTE     Patient Identification:  Name:  Mary Ly  Age:  75 y.o.  Sex:  female  :  1945  MRN:  [id number]  Visit Number:  [id number]  ROOM: [address] [place] [address]     Primary Care Provider:  Leta Gardner MD    Length of stay in inpatient status:  8    Subjective     Chief Compliant:    Chief Complaint   Patient presents with   • Vomiting   • Nausea   • Abdominal Pain       History of Presenting Illness: Patient seen and evaluated in follow-up for sepsis and possible ileus secondary to infectious colitis in the setting of history of ischemic colitis with occluded celiac artery, severe SMA stenosis with reconstitution and CHRISTOPHER covered by prior EVAR.  Patient this morning still complaining of some mild amount of nausea and abdominal pain but states much better compared to prior and questioning when she will be able to return to SNF.    Objective     Current Hospital Meds:amLODIPine, 10 mg, Oral, Daily  aspirin, 81 mg, Oral, Daily  atorvastatin, 40 mg, Oral, Nightly  baclofen, 10 mg, Oral, Nightly  Cariprazine HCl, 1.5 mg, Oral, Daily  carvedilol, 6.25 mg, Oral, BID With Meals  docusate sodium, 200 mg, Oral, Daily  guaiFENesin, 400 mg, Oral, Q12H  levothyroxine, 200 mcg, Oral, Q AM  lisinopril, 10 mg, Oral, Daily  melatonin, 5 mg, Oral, Nightly  montelukast, 10 mg, Oral, Daily  multivitamin, 1 tablet, Oral, Nightly  pantoprazole, 40 mg, Intravenous, Q12H  predniSONE, 5 mg, Oral, QAM  sodium chloride, 10 mL, Intravenous, Q12H         Current Antimicrobial Therapy:  Anti-Infectives (From admission, onward)    Ordered     Dose/Rate Route Frequency Start Stop    21 0721  cefepime (MAXIPIME) 2 g/100 mL 0.9% NS (mbp)     Ordering Provider: Alba Royal DO    2 g  over 4 Hours Intravenous Every 12 Hours 21 1317    21 0721  metroNIDAZOLE (FLAGYL) 500 mg/100mL IVPB     Ordering Provider: Alba Royal DO    500 mg  over 60 Minutes  Intravenous Every 8 Hours 05/23/21 0900 05/30/21 0042    05/23/21 0721  cefepime (MAXIPIME) 2 g/100 mL 0.9% NS (mbp)     Ordering Provider: Alba Royal DO    2 g  200 mL/hr over 30 Minutes Intravenous Once 05/23/21 0800 05/23/21 0942        Current Diuretic Therapy:  Diuretics (From admission, onward)    None        ----------------------------------------------------------------------------------------------------------------------  Vital Signs:  Temp:  [97.9 °F (36.6 °C)-98.3 °F (36.8 °C)] 98.3 °F (36.8 °C)  Heart Rate:  [55-90] 90  Resp:  [18-20] 18  BP: (125-183)/(55-80) 143/66  SpO2:  [96 %-97 %] 96 %  on  Flow (L/min):  [2] 2;   Device (Oxygen Therapy): nasal cannula  Body mass index is 17.19 kg/m².    Wt Readings from Last 3 Encounters:   05/31/21 48.3 kg (106 lb 8 oz)   05/17/21 50 kg (110 lb 4.8 oz)   04/30/21 50.3 kg (110 lb 12.8 oz)     Intake & Output (last 3 days)       05/29 0701 - 05/30 0700 05/30 0701 - 05/31 0700 05/31 0701 - 06/01 0700    P.O. 600 1320 840    I.V. (mL/kg) 550 (11.3)      Blood  300     Total Intake(mL/kg) 1150 (23.7) 1620 (33.5) 840 (17.4)    Net +1150 +1620 +840           Urine Unmeasured Occurrence 3 x 5 x 3 x        Diet Regular  ----------------------------------------------------------------------------------------------------------------------  Physical exam:  Constitutional: Chronically ill-appearing elderly female, frail.  No acute distress.      HENT:  Head:  Normocephalic and atraumatic.  Mouth:  Moist mucous membranes.    Eyes:  Conjunctivae and EOM are normal. No scleral icterus.    Neck:  Neck supple.  No JVD present.    Cardiovascular:  Normal rate, regular rhythm and normal heart sounds with no murmur.  Pulmonary/Chest:  No respiratory distress, no wheezes, no crackles.  Abdominal:  Soft.  Bowel sounds are normal.  No distension and no tenderness.   Musculoskeletal:  No tenderness, and no deformity.  No red or swollen joints anywhere.    Neurological:   Alert and oriented to person, place, and time.  No cranial nerve deficit. Intact Sensation throughout  Skin:  Skin is warm and dry. No rash or lesion noted. No pallor.   Peripheral vascular:  Pulses in all 4 extremities with no clubbing, no cyanosis, no edema.  Psychiatric: Appropriate mood and affect, pleasant.   ----------------------------------------------------------------------------------------------------------------------  Tele:    ----------------------------------------------------------------------------------------------------------------------  Results from last 7 days   Lab Units 05/31/21 0254 05/30/21  1444 05/30/21  0635 05/30/21 0519 05/29/21  0520   WBC 10*3/mm3 10.17  --   --  14.13* 15.13*   HEMOGLOBIN g/dL 7.9* 8.1* 6.8* 6.7* 7.7*   HEMATOCRIT % 25.0* 26.7* 21.4* 21.3* 24.5*   MCV fL 90.9  --   --  98.6* 97.6*   MCHC g/dL 31.6  --   --  31.5 31.4*   PLATELETS 10*3/mm3 349  --   --  403 470*         Results from last 7 days   Lab Units 05/31/21 0254 05/30/21 0519 05/29/21  0520   SODIUM mmol/L 133* 135* 135*   POTASSIUM mmol/L 4.0 3.8 3.8   CHLORIDE mmol/L 99 102 100   CO2 mmol/L 29.3* 29.9* 30.8*   BUN mg/dL 10 12 16   CREATININE mg/dL 0.44* 0.48* 0.41*   EGFR IF NONAFRICN AM mL/min/1.73 139 126 >150   CALCIUM mg/dL 7.7* 8.0* 8.1*   GLUCOSE mg/dL 155* 119* 172*   Estimated Creatinine Clearance: 46.3 mL/min (A) (by C-G formula based on SCr of 0.44 mg/dL (L)).  No results found for: AMMONIA              Glucose   Date/Time Value Ref Range Status   05/31/2021 1941 168 (H) 70 - 130 mg/dL Final   05/31/2021 1621 194 (H) 70 - 130 mg/dL Final   05/31/2021 1019 185 (H) 70 - 130 mg/dL Final   05/31/2021 0606 179 (H) 70 - 130 mg/dL Final   05/30/2021 2348 123 70 - 130 mg/dL Final   05/30/2021 1907 143 (H) 70 - 130 mg/dL Final   05/30/2021 1647 180 (H) 70 - 130 mg/dL Final   05/30/2021 1037 147 (H) 70 - 130 mg/dL Final     Lab Results   Component Value Date    TSH 1.740 04/23/2021    FREET4 1.27  08/10/2020     No results found for: PREGTESTUR, PREGSERUM, HCG, HCGQUANT  Pain Management Panel     Pain Management Panel Latest Ref Rng & Units 8/13/2020    AMPHETAMINES SCREEN, URINE Negative Negative    BARBITURATES SCREEN Negative Negative    BENZODIAZEPINE SCREEN, URINE Negative Negative    BUPRENORPHINEUR Negative Negative    COCAINE SCREEN, URINE Negative Negative    METHADONE SCREEN, URINE Negative Negative        Brief Urine Lab Results  (Last result in the past 365 days)      Color   Clarity   Blood   Leuk Est   Nitrite   Protein   CREAT   Urine HCG        05/22/21 2245 Yellow Clear Negative Negative Negative 30 mg/dL (1+)             No results found for: BLOODCX  No results found for: URINECX  No results found for: WOUNDCX  No results found for: STOOLCX  No results found for: RESPCX  No results found for: AFBCX        I have personally looked at the labs and they are summarized above.  ----------------------------------------------------------------------------------------------------------------------  Detailed radiology reports for the last 24 hours:    Imaging Results (Last 24 Hours)     ** No results found for the last 24 hours. **        Assessment & Plan      Sepsis  Possible ileus secondary to infectious colitis  History of ischemic colitis with occluded celiac artery, severe SMA stenosis with reconstitution and CHRISTOPHER overlaid by EVAR.    -Patient with many recent admissions for recurrent colitis that improved with IV antibiotics presenting this time similarly with vomiting, diarrhea and possible lower GI bleed with dark stools and further decreased hemoglobin requiring transfusion.    -Patient has required transfusion with melena while being on anticoagulation which she is chronically on with Eliquis, currently on hold.    -Hemoglobin stable today however if further decline tomorrow will need to discuss with surgery possible need for endoscopy.    -CTA of the abdomen on 8/2020 with poor  visualization of origin of celiac or superior mesenteric arteries with inability to exclude ischemia of colon due to diffuse colonic wall thickening.    -CT abdomen pelvis this admission with possible ileus with scattered air-fluid levels and mild gastric distention although no definitive inflammatory processes were noted    -Abdominal vascular ultrasound showed incomplete visualization of origins of celiac and superior mesenteric arteries    -General surgery consulted and following, recommends no indication for acute surgical intervention at this time but recommends outpatient referral to be seen by vascular surgery to discuss SMA stenting versus bypass.    -Completed 7-day course of cefepime and Flagyl    -Continue to monitor hemoglobin closely as well as PPI and currently holding Eliquis with threshold to transfuse hemoglobin less than 7    Hypertension  Hyperlipidemia  CAD s/p PCI  HFpEF, not in exacerbation  Severe bilateral PAD of the lower extremities  Descending thoracic aortic aneurysm  Abdominal aortic aneurysm status post EVAR    -Echo from April 2020 with EF of 61 to 65% with mild concentric LVH and diastolic dysfunction with mild left ear and myxomatous changes of the mitral valve and mild mitral regurg    -CTA of the abdomen did show multifocal plaque through superficial femoral arteries bilaterally with borderline hemodynamically significant stenosis but no occlusive segments with celiac and SMA as above and descending thoracic aortic aneurysm.    -Continue statin and aspirin 81 mg    -Continue beta-blockade and home amlodipine and ACE inhibitor    -Eliquis held as above     Diabetes mellitus type 2    -Hemoglobin A1c of 7.2%    -Continue current insulin regiment, currently with very little requirements    CKD stage III    -Avoid nephrotoxins, NSAIDs, volume depletion and contrast as able to    Hypothyroidism    -Continue home levothyroxine    Chronic hypoxic respiratory failure  COPD, not in  exacerbation  Continued tobacco abuse    -Continue home O2    -As needed DuoNebs    -Discussed critical need for tobacco cessation    GERD    -Continue PPI    History of stroke    -Statin and aspirin as above     Chronic moderate malnutrition    -BMI 17, nutrition services consulted       VTE Prophylaxis:   Mechanical Order History:      Ordered        05/30/21 1002  Place Sequential Compression Device  Once         05/30/21 1002  Maintain Sequential Compression Device  Continuous         05/23/21 0641  Place Sequential Compression Device  Once         05/23/21 0641  Maintain Sequential Compression Device  Continuous,   Status:  Canceled                 Pharmalogical Order History:      Ordered     Dose Route Frequency Stop    05/25/21 1141  apixaban (ELIQUIS) tablet 2.5 mg  Status:  Discontinued      2.5 mg PO Every 12 Hours Scheduled 05/30/21 1000                Disposition likely return to skilled nursing facility within the next 48 hours if hemoglobin stable.    Yariel Shaw DO  [facility] Hospitalist  05/31/21  20:18 EDT

## 2021-06-01 NOTE — DISCHARGE PLACEMENT REQUEST
"Mary Ly (75 y.o. Female)     Date of Birth Social Security Number Address Home Phone MRN    1945  [facility]  [facility] [address] [facility] [id number] [phone] [id number]    Confucianist Marital Status          Yarsani        Admission Date Admission Type Admitting Provider Attending Provider Department, Room/Bed    5/22/21 Emergency Alba Royal DO Cagle, William, DO [address], 3312/1P    Discharge Date Discharge Disposition Discharge Destination         Skilled Nursing Facility (DC - External)              Attending Provider: Yariel Shaw DO    Allergies: Haldol [Haloperidol]    Isolation: None   Infection: None   Code Status: No CPR    Ht: 167.6 cm (66\")   Wt: 49.1 kg (108 lb 4.8 oz)    Admission Cmt: None   Principal Problem: Upper GI bleed [K92.2]                 Active Insurance as of 5/22/2021     Primary Coverage     Payor Plan Insurance Group Employer/Plan Group    ANTHEM MEDICARE REPLACEMENT ANTHEM MEDICARE ADVANTAGE [id number]     Payor Plan Address Payor Plan Phone Number Payor Plan Fax Number Effective Dates    [address] [phone]  9/1/2019 - None Entered    [facility] [address]       Subscriber Name Subscriber Birth Date Member ID       MARY LY 1945 [id number]           Secondary Coverage     Payor Plan Insurance Group Employer/Plan Group    [place]      Payor Plan Address Payor Plan Phone Number Payor Plan Fax Number Effective Dates    [address] [phone]  12/1/2019 - None Entered    [facility] [id number]       Subscriber Name Subscriber Birth Date Member ID       MARY LY 1945 [id number]                 Emergency Contacts      (Rel.) Home Phone Work Phone Mobile Phone    ZORAIDA SCHULER (Daughter) [phone] -- --    MARE ANNA (Grandchild) [phone] -- --    HELENEALEXX (Son) [phone] -- --    MARYCHUY SOLOROI (Friend) -- -- [phone]    "         Emergency Contact Information     Name Relation Home Work Mobile    ZORAIDA SCHULER Daughter [phone]      MARE ANNA Grandchild [phone]      ALEXX LY Son [phone]      MARYCHUY SOLORIO Friend   [phone]          Insurance Information                ANTHEM MEDICARE REPLACEMENT/ANTHEM MEDICARE ADVANTAGE Phone: [phone]    Subscriber: Mary Ly Subscriber#: [id number]    Group#: [id number] Precert#:         [place] Phone: [phone]    Subscriber: Mary Ly Subscriber#: [id number]    Group#:  Precert#:           Treatment Team  Chat With All Active Members    Provider Relationship Specialty Contact    Yariel Shaw DO  Attending, Physician of Record Hospitalist  [phone]    Rosalee Hand, PCT  Patient Care Technician --     Raissa Donato, RRT  Respiratory Therapist --  [phone]    Valeriano Alanis MD  Consulting Physician Cardiology  [phone]    Any Gregory, RN  Registered Nurse --     Ashley Escamilla, JESSICA  Physical Therapist Physical Therapy     Adriana Velazco RN  Registered Nurse --           Problem List         Codes Noted - Resolved       Hospital    Vomiting ICD-10-CM: R11.10  ICD-9-CM: 787.03 5/23/2021 - Present    * (Principal) Upper GI bleed ICD-10-CM: K92.2  ICD-9-CM: 578.9 5/23/2021 - Present       Non-Hospital    Colitis ICD-10-CM: K52.9  ICD-9-CM: 558.9 4/23/2021 - Present    COPD (chronic obstructive pulmonary disease) (CMS/HCC) (Chronic) ICD-10-CM: J44.9  ICD-9-CM: 496 4/23/2021 - Present    Type II diabetes mellitus (CMS/HCC) (Chronic) ICD-10-CM: E11.9  ICD-9-CM: 250.00 4/23/2021 - Present    Hypothyroidism (Chronic) ICD-10-CM: E03.9  ICD-9-CM: 244.9 4/23/2021 - Present    Hyperlipidemia (Chronic) ICD-10-CM: E78.5  ICD-9-CM: 272.4 4/23/2021 - Present    Diastolic CHF, chronic (CMS/HCC) (Chronic) ICD-10-CM: I50.32  ICD-9-CM: 428.32, 428.0 4/23/2021 - Present    Moderate malnutrition (CMS/HCC) ICD-10-CM: E44.0  ICD-9-CM:  263.0 2021 - Present    Severe malnutrition (CMS/HCC) ICD-10-CM: E43  ICD-9-CM: 261 2020 - Present    Status post laparoscopic cholecystectomy ICD-10-CM: Z90.49  ICD-9-CM: V45.89 2020 - Present             History & Physical      Alba Royal DO at 21 0646          Hospitalist History and Physical    Patient Identification:  Name: Mary Ly  Age/Sex: 75 y.o. female  :  1945  MRN: [id number]        Admit Date: 2021   Primary Care Physician: Leta Gardner MD    Chief Complaint   Patient presents with   • Vomiting   • Nausea   • Abdominal Pain       History of Present Illness  Patient is a 75 y.o. female presents with the following: vomiting and diarrhea     The patient is a 74 yo female with PMHx significant for stroke, paroxysmal atrial fibrillation, diabetes mellitus, chronic anticoagulation with Eliquis, hypertension and AAA status post repair who presents from [facility] for vomiting, abdominal pain and diarrhea.    Patient reports a 2-day history of intermittent vomiting.  She denies any hematemesis.  Patient reports that to her knowledge she has had 1 episode of diarrhea.  Nursing staff reports that patient was noted to have dark stools.  The patient reports left lower quadrant abdominal pain that she describes as a dull ache.    Upon further questioning, the patient denies any fevers and/or chills.  She states that food does not seem to exacerbate her symptoms.  She does not report any chest pains or pressure.  No shortness of air.  Patient states that she is wheelchair-bound at the nursing home and can transfer from bed to chair.  Patient states that she smokes approximately 1 pack of cigarettes per day.    Work-up in the emergency department revealed an initial white blood cell count of 36.16 with a hemoglobin of 7.0 and hematocrit of 21.6.  After fluids, the patient's white blood cell count improved to 28.86, however, her repeat  hemoglobin and hematocrit were 6.2 and 18.9 respectively.  CMP was largely unremarkable; BUN to creatinine ratio was only 33.9.  C-RP 3.36.  CT scan of the abdomen and pelvis with contrast revealed possible ileus with scattered air-fluid levels and mild gastric distention.  No acute inflammatory changes were seen in the abdomen or pelvis otherwise.  Stool burden reportedly improved since previous scan.    Patient was most recently on our service from 5/9/20/2021 through 5/17/2021 where she was treated for sepsis, and acute colitis in the setting of previous history of ischemic colitis and significant mesenteric vessel disease.  Patient did receive 1 unit of packed red blood cells during that hospitalization.  CTA revealed poor delineation of the origin of the celiac artery but distally appeared to be widely patent.  Patient also found to have some thickening of the colon wall at that time but without adjacent stranding in addition to aneurysmal dilatation of the lower thoracic aorta as well as the abdominal aorta but similar to previous exam.  Patient was discharged back to the nursing facility with recommendations to continue Cefepime and Flagyl.    Patient has been admitted to the telemetry unit for further evaluation and management.    Past History:  Past Medical History:   Diagnosis Date   • AAA (abdominal aortic aneurysm) (CMS/[facility])     s/p repair    • Arthritis    • CAD (coronary artery disease)     s/p stenting in the past    • Chronic kidney disease (CKD), stage III (moderate) (CMS/[facility])    • COPD (chronic obstructive pulmonary disease) (CMS/[facility])    • Debility    • Diastolic CHF, chronic (CMS/[facility]) 4/23/2021   • GERD (gastroesophageal reflux disease)    • History of CVA (cerebrovascular accident)    • History of ischemic colitis    • Hyperlipidemia 4/23/2021   • Hypertension    • Hypothyroidism    • Stroke (CMS/[facility])    • Type II diabetes mellitus (CMS/[facility])      Past Surgical History:   Procedure Laterality Date    • BACK SURGERY     • CARDIAC CATHETERIZATION     • CHOLECYSTECTOMY N/A 7/17/2020    Procedure: CHOLECYSTECTOMY LAPAROSCOPIC;  Surgeon: Lonnie Meneses MD;  Location: [facility];  Service: General;  Laterality: N/A;   • COLONOSCOPY     • CORONARY STENT PLACEMENT     • ENDOSCOPY     • TONSILLECTOMY       No family history on file.  Social History     Tobacco Use   • Smoking status: Current Every Day Smoker     Packs/day: 1.00     Years: 55.00     Pack years: 55.00     Types: Cigarettes   • Smokeless tobacco: Never Used   Substance Use Topics   • Alcohol use: Never   • Drug use: Never     Medications Prior to Admission   Medication Sig Dispense Refill Last Dose   • amLODIPine (NORVASC) 10 MG tablet Take 10 mg by mouth Daily.      • apixaban (ELIQUIS) 2.5 MG tablet tablet Take 2.5 mg by mouth Every 12 (Twelve) Hours.      • aspirin 81 MG chewable tablet Chew 81 mg Daily.      • atorvastatin (LIPITOR) 10 MG tablet Take 10 mg by mouth Every Night.      • baclofen (LIORESAL) 10 MG tablet Take 10 mg by mouth Every Night.      • bisacodyl (DULCOLAX) 10 MG suppository Insert 10 mg into the rectum Daily As Needed for Constipation.      • Cariprazine HCl (VRAYLAR) 1.5 MG capsule capsule Take 1.5 mg by mouth Daily.      • carvedilol (COREG) 12.5 MG tablet Take 12.5 mg by mouth 2 (Two) Times a Day With Meals.      • docusate sodium (COLACE) 250 MG capsule Take 250 mg by mouth Daily.      • Empagliflozin (Jardiance) 10 MG tablet Take 1 tablet by mouth Every Morning.      • guaiFENesin (Mucus Relief) 400 MG tablet Take 400 mg by mouth Every 12 (Twelve) Hours.      • HYDROcodone-acetaminophen (NORCO) 5-325 MG per tablet Take 1 tablet by mouth Every 8 (Eight) Hours As Needed for Mild Pain  or Moderate Pain . 9 tablet 0    • ipratropium-albuterol (DUO-NEB) 0.5-2.5 mg/3 ml nebulizer Take 3 mL by nebulization 4 (Four) Times a Day As Needed for Wheezing or Shortness of Air.      • Lactobacillus (ACIDOPHILUS/PECTIN PO) Take 1 capsule by  mouth 2 (two) times a day.      • lactulose (CHRONULAC) 10 GM/15ML solution Take 10 g by mouth Daily As Needed.      • levothyroxine (SYNTHROID, LEVOTHROID) 200 MCG tablet Take 200 mcg by mouth Daily.      • lisinopril (PRINIVIL,ZESTRIL) 10 MG tablet Take 10 mg by mouth Daily.      • loperamide (IMODIUM) 2 MG capsule Take 2 mg by mouth 4 (Four) Times a Day As Needed for Diarrhea.      • melatonin 5 MG tablet tablet Take 5 mg by mouth Every Night.      • montelukast (SINGULAIR) 10 MG tablet Take 10 mg by mouth Daily.      • Multiple Vitamin (MULTIVITAMIN) tablet tablet Take 1 tablet by mouth Every Night.      • nicotine (NICODERM CQ) 7 MG/24HR patch Place 1 patch on the skin as directed by provider Daily. Prior to [facility] Admission, Patient was on: Use for 14 days ending on 5/14/21      • pantoprazole (PROTONIX) 20 MG EC tablet Take 20 mg by mouth Daily.      • predniSONE (DELTASONE) 5 MG tablet Take 5 mg by mouth Every Morning.      • promethazine (PHENERGAN) 12.5 MG tablet Take 12.5 mg by mouth Every 8 (Eight) Hours As Needed for Nausea or Vomiting.      • senna (Senna) 8.6 MG tablet Take 1 tablet by mouth Daily As Needed for Constipation.      • STIOLTO RESPIMAT 2.5-2.5 MCG/ACT aerosol solution inhaler Inhale 2 puffs Daily.      • traZODone (DESYREL) 50 MG tablet Take 50 mg by mouth Every Night.        Allergies: Haldol [haloperidol]    Review of Systems:  Review of Systems   Constitutional: Negative for chills, diaphoresis and fever.   HENT: Negative for hearing loss, tinnitus and trouble swallowing.    Eyes: Negative for discharge and visual disturbance.   Respiratory: Negative for cough, shortness of breath and wheezing.    Cardiovascular: Negative for chest pain, palpitations and leg swelling.   Gastrointestinal: Positive for abdominal pain, diarrhea, nausea and vomiting. Negative for constipation.   Endocrine: Negative for polydipsia, polyphagia and polyuria.   Genitourinary: Negative for dysuria, frequency  and hematuria.   Musculoskeletal: Negative for gait problem, myalgias and neck pain.   Skin: Negative for rash and wound.   Neurological: Negative for dizziness, tremors, seizures, syncope, weakness and light-headedness.   Hematological: Bruises/bleeds easily.   Psychiatric/Behavioral: Negative for confusion, hallucinations and suicidal ideas.      Vital Signs  Temp:  [98.5 °F (36.9 °C)] 98.5 °F (36.9 °C)  Heart Rate:  [54-84] 71  Resp:  [18] 18  BP: ()/(52-95) 140/61  Body mass index is 18.4 kg/m².    Physical Exam:  Physical Exam  Constitutional:       General: She is not in acute distress.     Appearance: She is well-developed.   HENT:      Head: Normocephalic and atraumatic.   Eyes:      Conjunctiva/sclera: Conjunctivae normal.   Neck:      Trachea: No tracheal deviation.   Cardiovascular:      Rate and Rhythm: Normal rate. Rhythm regularly irregular.      Pulses:           Posterior tibial pulses are 2+ on the right side and 2+ on the left side.      Heart sounds: No murmur heard.   No friction rub. No gallop.    Pulmonary:      Effort: No respiratory distress.      Breath sounds: Normal breath sounds. No wheezing or rales.   Abdominal:      General: Bowel sounds are normal. There is no distension.      Palpations: Abdomen is soft.      Tenderness: There is no abdominal tenderness. There is no guarding.   Musculoskeletal:         General: No tenderness. Normal range of motion.   Skin:     General: Skin is warm and dry.      Findings: No erythema or rash.   Neurological:      Mental Status: She is alert and oriented to person, place, and time.      Cranial Nerves: No cranial nerve deficit.         Results Review:    Results from last 7 days   Lab Units 05/22/21  2231   PH, ARTERIAL pH units 7.514*   PO2 ART mm Hg 116.0*   PCO2, ARTERIAL mm Hg 48.3*   HCO3 ART mmol/L 38.8*       Results from last 7 days   Lab Units 05/23/21  0520 05/22/21  2219 05/21/21  0407 05/17/21  0416   WBC 10*3/mm3 28.86* 36.16*  22.51* 15.50*   HEMOGLOBIN g/dL 6.2* 7.0* 7.5* 7.7*   HEMATOCRIT % 18.9* 21.6* 23.2* 23.7*   PLATELETS 10*3/mm3 568* 708* 562* 425     Results from last 7 days   Lab Units 05/22/21 2219 05/21/21  0407 05/17/21  1159 05/17/21  0803 05/17/21  0416 05/16/21  0839   SODIUM mmol/L 136 132* 131* 127* 132* 129*   POTASSIUM mmol/L 5.0 4.4 4.9 4.8 5.5* 4.9   CHLORIDE mmol/L 92* 93* 95* 94* 96* 96*   CO2 mmol/L 33.8* 36.0* 30.0* 30.9* 32.8* 28.0   BUN mg/dL 20 13 10 11 11 9   CREATININE mg/dL 0.59 0.37* 0.41* 0.48* 0.48* 0.43*   CALCIUM mg/dL 9.1 8.4* 7.9* 7.9* 7.8* 7.7*   GLUCOSE mg/dL 115* 147* 161* 95 101* 119*     Results from last 7 days   Lab Units 05/22/21 2219 05/17/21  0416   BILIRUBIN mg/dL 0.2 <0.2   ALK PHOS U/L 77 61   AST (SGOT) U/L 21 21   ALT (SGPT) U/L 10 8     Results from last 7 days   Lab Units 05/22/21 2219 05/21/21  0407 05/17/21  0416   CRP mg/dL 3.36* 0.90* 2.10*     Results from last 7 days   Lab Units 05/21/21  0407 05/17/21  0416   MAGNESIUM mg/dL 1.7 1.8     Results from last 7 days   Lab Units 05/23/21  0046 05/22/21 2219   TROPONIN T ng/mL 0.012 0.013     Results from last 7 days   Lab Units 05/22/21 2219   INR  1.26*       Imaging:    I have personally reviewed the EKG. Sinus bradycardia with occasional PVC and T wave inversion in the inferior leads    CT images reviewed; consistent with ileus    Imaging Results (Most Recent)     Procedure Component Value Units Date/Time    XR Chest 1 View [[id number]] Collected: 05/23/21 0259     Updated: 05/23/21 0301    Narrative:      CR Chest 1 Vw    INDICATION:   Nausea and vomiting     COMPARISON:    5/10/2021    FINDINGS:  Single portable AP view(s) of the chest.  Stable cardiomegaly. Tortuous calcified aorta. There is a left base infiltrate unchanged from the previous examination. The right lung is clear. Vascular markings are normal.      Impression:      Left basilar consolidation is again noted and not significantly changed. No new infiltrates  are seen.    Signer Name: Octavio Wheeler MD   Signed: 5/23/2021 2:59 AM   Workstation Name: [facility]    [facility]    CT Abdomen Pelvis With Contrast [[id number]] Collected: 05/23/21 0136     Updated: 05/23/21 0138    Narrative:      CT Abdomen Pelvis W    INDICATION:   Abdominal pain beginning prior to arrival    TECHNIQUE:   CT of the abdomen and pelvis with 100 cc of Isovue-300 IV contrast. Coronal and sagittal reconstructions were obtained.  Radiation dose reduction techniques included automated exposure control or exposure modulation based on body size. Count of known CT  and cardiac nuc med studies performed in previous 12 months: 7.     COMPARISON:   5/9/2021    FINDINGS:  Abdomen: No acute findings in the upper abdomen. No evidence of hydronephrosis. The stomach is moderately distended but there is no evidence of obstruction. Stool burden in the colon has improved since the previous examination. No evidence of free air.  No evidence of abscess.    Pelvis: No evidence of adenopathy, mass or fluid collection. Scattered air-fluid levels are seen in the colon with a nonspecific pattern.      Impression:      Possible ileus with scattered air-fluid levels and mild gastric distention. No acute inflammatory changes are seen in the abdomen or pelvis otherwise. Stool burden has improved since the previous scan.          Signer Name: Octavio Wheeler MD   Signed: 5/23/2021 1:36 AM   Workstation Name: [id number]    [facility]          Assessment/Plan     -Vomiting and diarrhea with melena; concern for upper gastrointestinal bleed and history of recent colitis  -Sepsis, present upon admission and due to above  -Acute on chronic anemia, macrocytic and due to above  -Poor delineation of the celiac artery but appeared widely patent distally  -Aneurysmal dilatation of the lower thoracic aorta as well as the abdominal aorta similar to previous exam  -Non-specific EKG  changes  -Thrombocytosis  -Chronic anticoagulation  -Essential hypertension  -CAD status post stenting in the past  -AAA status post repair  -COPD, not in acute exacerbation  -Type II Diabetes mellitus  -Hypothyroidism  -Tobacco use  -Cerebrovascular accident    Patient has been admitted to the telemetry unit.  She is currently nothing by mouth.  Patient is to receive 1 unit of packed red blood cells with a repeat H/H in approximately 2 hours after her transfusion is complete.  Patient has been started on IV pantoprazole every 12 hours.  Hold Eliquis.  Patient will be started on cefepime and Flagyl at this time.  Consider general surgery consultation, however, during last visit, patient was not felt to be in need of surgical intervention at that time.    We will repeat an EKG in a.m.    Patient will be started on Accu-Cheks and the hypoglycemia protocol as needed.    I am currently awaiting her home medication list.  Plan to hold nonessential medications.    Patient offered nicotine patch and declined at this time.    DVT/GI prophylaxis: SCDs/IV PPI    Estimated Length of Stay: > 2 MNs    Disposition Back to nursing home when medically stable    I discussed the patients findings and my recommendations with patient and nursing staff/MANJINDER Jay DO  05/23/21  06:46 EDT    Electronically signed by Alba Royal DO at 05/23/21 0733       Lines, Drains & Airways    Active LDAs     Name:   Placement date:   Placement time:   Site:   Days:    Midline Catheter - Single Lumen 05/24/21 Left Basilic   05/24/21    1608     7    Peripheral IV 05/24/21 1541 Anterior;Proximal;Right Forearm   05/24/21    1541    Forearm   7                  Current Facility-Administered Medications   Medication Dose Route Frequency Provider Last Rate Last Admin   • acetaminophen (TYLENOL) tablet 650 mg  650 mg Oral Q8H PRN Pierre Delgado MD   650 mg at 05/29/21 1236   • amLODIPine (NORVASC) tablet 10 mg  10 mg Oral  Daily Pierre Delgado MD   10 mg at 05/31/21 1133   • aspirin chewable tablet 81 mg  81 mg Oral Daily Pierre Delgado MD   81 mg at 06/01/21 0842   • atorvastatin (LIPITOR) tablet 40 mg  40 mg Oral Nightly Edgar Casillas MD   40 mg at 05/31/21 2023   • baclofen (LIORESAL) tablet 10 mg  10 mg Oral Nightly Pierre Delgado MD   10 mg at 05/31/21 2023   • bisacodyl (DULCOLAX) suppository 10 mg  10 mg Rectal Daily PRN Pierre Delgado MD       • Cariprazine HCl (VRAYLAR) capsule capsule 1.5 mg  1.5 mg Oral Daily Pierre Delgado MD   1.5 mg at 06/01/21 1130   • carvedilol (COREG) tablet 6.25 mg  6.25 mg Oral BID With Meals Pierre Delgado MD   6.25 mg at 05/31/21 1716   • dextrose (D50W) 25 g/ 50mL Intravenous Solution 25 g  25 g Intravenous Q15 Min PRN Alba Royal, DO       • dextrose (GLUTOSE) oral gel 15 g  15 g Oral Q15 Min PRN Alba Royal, DO       • docusate sodium (COLACE) capsule 200 mg  200 mg Oral Daily Pierre Delgado MD   200 mg at 06/01/21 0842   • glucagon (human recombinant) (GLUCAGEN DIAGNOSTIC) injection 1 mg  1 mg Subcutaneous Q15 Min PRN Alba Royal, DO       • guaiFENesin tablet 400 mg  400 mg Oral Q12H Pierre Delgado MD   400 mg at 06/01/21 0842   • ipratropium (ATROVENT) nebulizer solution 0.5 mg  0.5 mg Nebulization Q6H PRN Pierre Delgado MD       • levothyroxine (SYNTHROID, LEVOTHROID) tablet 200 mcg  200 mcg Oral Q AM Pierre Delgado MD   200 mcg at 06/01/21 0534   • lisinopril (PRINIVIL,ZESTRIL) tablet 10 mg  10 mg Oral Daily Yariel Shaw DO   10 mg at 05/31/21 1133   • melatonin tablet 5 mg  5 mg Oral Nightly Pierre Delgado MD   5 mg at 05/31/21 2023   • montelukast (SINGULAIR) tablet 10 mg  10 mg Oral Daily Pierre Delgado MD   10 mg at 06/01/21 0842   • multivitamin (THERAGRAN) tablet 1 tablet  1 tablet Oral Nightly Pierre Delgado MD   1 tablet at 05/31/21 2023   • nitroglycerin (NITROSTAT) SL tablet 0.4 mg  0.4 mg Sublingual Q5 Min PRN Alba Royal DO       •  pantoprazole (PROTONIX) injection 40 mg  40 mg Intravenous Q12H Alba Royal DO   40 mg at 06/01/21 0842   • potassium chloride (K-DUR,KLOR-CON) CR tablet 40 mEq  40 mEq Oral PRN Halima Oshea PA-C        Or   • potassium chloride (KLOR-CON) packet 40 mEq  40 mEq Oral PRN Halima Oshea PA-C        Or   • potassium chloride 10 mEq in 100 mL IVPB  10 mEq Intravenous Q1H PRN Halima Oshea PA-C       • predniSONE (DELTASONE) tablet 5 mg  5 mg Oral QAM Pierre Delgado MD   5 mg at 06/01/21 0842   • promethazine (PHENERGAN) tablet 12.5 mg  12.5 mg Oral Q8H PRN Pierre Delgado MD   12.5 mg at 05/31/21 1343   • senna (SENOKOT) tablet 1 tablet  1 tablet Oral Daily PRN Pierre Delgado MD       • sodium chloride 0.9 % flush 10 mL  10 mL Intravenous PRN Alba Royal DO       • sodium chloride 0.9 % flush 10 mL  10 mL Intravenous Q12H Alba Royal DO   10 mL at 06/01/21 0847   • sodium chloride 0.9 % flush 10 mL  10 mL Intravenous PRN Alba Royal DO           Lab Results (last 24 hours)     Procedure Component Value Units Date/Time    POC Glucose Once [[id number]]  (Abnormal) Collected: 06/01/21 1053    Specimen: Blood Updated: 06/01/21 1059     Glucose 239 mg/dL     POC Glucose Once [[id number]]  (Abnormal) Collected: 06/01/21 0709    Specimen: Blood Updated: 06/01/21 0726     Glucose 188 mg/dL     Basic Metabolic Panel [[id number]]  (Abnormal) Collected: 06/01/21 0253    Specimen: Blood Updated: 06/01/21 0339     Glucose 176 mg/dL      BUN 9 mg/dL      Creatinine 0.38 mg/dL      Sodium 136 mmol/L      Potassium 3.7 mmol/L      Chloride 100 mmol/L      CO2 31.0 mmol/L      Calcium 7.7 mg/dL      eGFR Non African Amer >150 mL/min/1.73      BUN/Creatinine Ratio 23.7     Anion Gap 5.0 mmol/L     Narrative:      GFR Normal >60  Chronic Kidney Disease <60  Kidney Failure <15      CBC (No Diff) [[id number]]  (Abnormal) Collected: 06/01/21 0253    Specimen: Blood Updated: 06/01/21  0318     WBC 9.27 10*3/mm3      RBC 2.71 10*6/mm3      Hemoglobin 7.8 g/dL      Hematocrit 25.3 %      MCV 93.4 fL      MCH 28.8 pg      MCHC 30.8 g/dL      RDW 22.1 %      RDW-SD 72.0 fl      MPV 9.6 fL      Platelets 376 10*3/mm3     POC Glucose Once [[id number]]  (Abnormal) Collected: 06/01/21 0013    Specimen: Blood Updated: 06/01/21 0021     Glucose 151 mg/dL     POC Glucose Once [[id number]]  (Abnormal) Collected: 05/31/21 1941    Specimen: Blood Updated: 05/31/21 1946     Glucose 168 mg/dL         Orders (last 24 hrs)      Start     Ordered    06/01/21 1315  When report called to SNF please inform of already scheduled appointment with Dr. Velasquez of Vascular Surgery on August 2nd at 8:40am at the [facility]  Nursing Communication  Once     Comments: When report called to SNF please inform of already scheduled appointment with Dr. Velasquez of Vascular Surgery on August 2nd at 8:40am at the [facility]    06/01/21 1316    06/01/21 1307  Discharge patient  Once      06/01/21 1314    06/01/21 1100  POC Glucose Once  Once      06/01/21 1053    06/01/21 0830  Inpatient Cardiology Consult  Once     Provider:  Valeriano Alanis MD    06/01/21 0829    06/01/21 0823  Inpatient Cardiology Consult  Once,   Status:  Canceled     Specialty:  Cardiology  Provider:  (Not yet assigned)    06/01/21 0828    06/01/21 0727  POC Glucose Once  Once      06/01/21 0709    06/01/21 0022  POC Glucose Once  Once      06/01/21 0013    06/01/21 0000  atorvastatin (LIPITOR) 40 MG tablet  Nightly      06/01/21 1314    06/01/21 0000  carvedilol (COREG) 6.25 MG tablet  2 Times Daily With Meals      06/01/21 1314    06/01/21 0000  pantoprazole (PROTONIX) 20 MG EC tablet  Daily      06/01/21 1314    06/01/21 0000  Discharge Follow-up with PCP      06/01/21 1314    06/01/21 0000  Discharge Follow-up with Specialty: Cardiology; 3 Weeks      06/01/21 1314    05/31/21 1947  POC Glucose Once  Once      05/31/21 1941 05/31/21 1628  POC  Glucose Once  Once      05/31/21 1621    05/31/21 1100  lisinopril (PRINIVIL,ZESTRIL) tablet 10 mg  Daily      05/31/21 0917    05/29/21 1232  acetaminophen (TYLENOL) tablet 650 mg  Every 8 Hours PRN      05/29/21 1232    05/27/21 1115  ipratropium (ATROVENT) nebulizer solution 0.5 mg  Every 6 Hours PRN      05/27/21 1111    05/26/21 1800  Dietary Nutrition Supplements Boost Breeze (Ensure Clear); mixed berry  Daily With Breakfast, Lunch & Dinner      05/26/21 1431    05/26/21 0236  Patient Currently On Electrolyte Replacement Protocol - Please Refer to MAR for Protocol Details  Misc Nursing Order (Specify)  Daily     Comments: Patient Currently On Electrolyte Replacement Protocol - Please Refer to MAR for Protocol Details    05/26/21 0235    05/26/21 0235  potassium chloride (K-DUR,KLOR-CON) CR tablet 40 mEq  As Needed      05/26/21 0235    05/26/21 0235  potassium chloride (KLOR-CON) packet 40 mEq  As Needed      05/26/21 0235    05/26/21 0235  potassium chloride 10 mEq in 100 mL IVPB  Every 1 Hour PRN      05/26/21 0235    05/24/21 0600  levothyroxine (SYNTHROID, LEVOTHROID) tablet 200 mcg  Every Early Morning      05/23/21 1147    05/24/21 0600  CBC (No Diff)  Daily      05/23/21 1505    05/23/21 2100  multivitamin (THERAGRAN) tablet 1 tablet  Nightly      05/23/21 1147    05/23/21 2100  melatonin tablet 5 mg  Nightly      05/23/21 1147    05/23/21 2100  baclofen (LIORESAL) tablet 10 mg  Nightly      05/23/21 1147    05/23/21 2100  atorvastatin (LIPITOR) tablet 40 mg  Nightly      05/23/21 1316    05/23/21 1800  carvedilol (COREG) tablet 6.25 mg  2 Times Daily With Meals      05/23/21 1147    05/23/21 1300  montelukast (SINGULAIR) tablet 10 mg  Daily      05/23/21 1147    05/23/21 1300  docusate sodium (COLACE) capsule 200 mg  Daily      05/23/21 1147    05/23/21 1300  Cariprazine HCl (VRAYLAR) capsule capsule 1.5 mg  Daily      05/23/21 1147    05/23/21 1300  amLODIPine (NORVASC) tablet 10 mg  Daily       05/23/21 1147    05/23/21 1300  guaiFENesin tablet 400 mg  Every 12 Hours Scheduled      05/23/21 1147    05/23/21 1300  predniSONE (DELTASONE) tablet 5 mg  Every Morning      05/23/21 1147    05/23/21 1300  aspirin chewable tablet 81 mg  Daily      05/23/21 1147    05/23/21 1200  POC Glucose Finger Q6H  Every 6 Hours      05/23/21 0735    05/23/21 1145  bisacodyl (DULCOLAX) suppository 10 mg  Daily PRN      05/23/21 1147    05/23/21 1145  promethazine (PHENERGAN) tablet 12.5 mg  Every 8 Hours PRN      05/23/21 1147    05/23/21 1145  senna (SENOKOT) tablet 1 tablet  Daily PRN      05/23/21 1147    05/23/21 1144  Basic Metabolic Panel  Daily      05/23/21 1143    05/23/21 0900  sodium chloride 0.9 % flush 10 mL  Every 12 Hours Scheduled      05/23/21 0641    05/23/21 0800  Vital Signs  Every 4 Hours     Comments: Per per hospital policy    05/23/21 0641    05/23/21 0735  dextrose (GLUTOSE) oral gel 15 g  Every 15 Minutes PRN      05/23/21 0735    05/23/21 0735  dextrose (D50W) 25 g/ 50mL Intravenous Solution 25 g  Every 15 Minutes PRN      05/23/21 0735    05/23/21 0735  glucagon (human recombinant) (GLUCAGEN DIAGNOSTIC) injection 1 mg  Every 15 Minutes PRN      05/23/21 0735    05/23/21 0730  pantoprazole (PROTONIX) injection 40 mg  Every 12 Hours Scheduled      05/23/21 0641    05/23/21 0642  Intake & Output  Every Shift      05/23/21 0641    05/23/21 0641  sodium chloride 0.9 % flush 10 mL  As Needed      05/23/21 0641    05/23/21 0641  nitroglycerin (NITROSTAT) SL tablet 0.4 mg  Every 5 Minutes PRN      05/23/21 0641    05/22/21 2158  sodium chloride 0.9 % flush 10 mL  As Needed      05/22/21 2158    Unscheduled  Telemetry - Pulse Oximetry  Continuous PRN     Comments: If Patient Develops Unresponsiveness, Acute Dyspnea, Cyanosis or Suspected Hypoxemia Start Continuous Pulse Ox Monitoring, Apply Oxygen & Notify Provider    05/23/21 0641    Unscheduled  Oxygen Therapy- Nasal Cannula; Titrate for SPO2: 90% - 95%   Continuous PRN     Comments: If Patient Develops Unresponsiveness, Acute Dyspnea, Cyanosis or Suspected Hypoxemia Start Continuous Pulse Ox Monitoring, Apply Oxygen & Notify Provider    21    Unscheduled  ECG 12 Lead  As Needed     Comments: Nurse to Release if Patient Expericences Acute Chest Pain or Dysrhythmias    21 06    Unscheduled  Potassium  As Needed     Comments: For Ventricular Arrhythmias      21    Unscheduled  Magnesium  As Needed     Comments: For Ventricular Arrhythmias      21    Unscheduled  Blood Gas, Arterial -With Co-Ox Panel: Yes  As Needed     Comments: Per O2 PolicyNotify Physician      21    Unscheduled  Up With Assistance  As Needed      21    Unscheduled  Magnesium  As Needed      21    Unscheduled  Potassium  As Needed      21 0235    --  SCANNED - TELEMETRY        21 0000    --  SCANNED - TELEMETRY        21 0000    --  SCANNED - TELEMETRY        21 0000    --  SCANNED - TELEMETRY        21 0000    --  SCANNED - TELEMETRY        21 0000    --  SCANNED - TELEMETRY        21 0000    --  SCANNED - TELEMETRY        21 0000    --  SCANNED - TELEMETRY        21 0000    --  SCANNED - TELEMETRY        21 0000    --  SCANNED - TELEMETRY        21 0000    --  SCANNED - TELEMETRY        21 0000    --  SCANNED - TELEMETRY        21 0000    --  SCANNED - TELEMETRY        21 0000                   Physician Progress Notes (most recent note)      Yariel Shaw DO at 21 2018              [facility]     Patient Identification:  Name:  Mary Ly  Age:  75 y.o.  Sex:  female  :  1945  MRN:  [id number]  Visit Number:  [id number]  ROOM: [address] [place] [address]     Primary Care Provider:  Leta Gardner MD    Length of stay in inpatient status:  8    Subjective     Chief Compliant:    Chief Complaint   Patient  presents with   • Vomiting   • Nausea   • Abdominal Pain       History of Presenting Illness: Patient seen and evaluated in follow-up for sepsis and possible ileus secondary to infectious colitis in the setting of history of ischemic colitis with occluded celiac artery, severe SMA stenosis with reconstitution and CHRISTOPHER covered by prior EVAR.  Patient this morning still complaining of some mild amount of nausea and abdominal pain but states much better compared to prior and questioning when she will be able to return to SNF.    Objective     Current Hospital Meds:amLODIPine, 10 mg, Oral, Daily  aspirin, 81 mg, Oral, Daily  atorvastatin, 40 mg, Oral, Nightly  baclofen, 10 mg, Oral, Nightly  Cariprazine HCl, 1.5 mg, Oral, Daily  carvedilol, 6.25 mg, Oral, BID With Meals  docusate sodium, 200 mg, Oral, Daily  guaiFENesin, 400 mg, Oral, Q12H  levothyroxine, 200 mcg, Oral, Q AM  lisinopril, 10 mg, Oral, Daily  melatonin, 5 mg, Oral, Nightly  montelukast, 10 mg, Oral, Daily  multivitamin, 1 tablet, Oral, Nightly  pantoprazole, 40 mg, Intravenous, Q12H  predniSONE, 5 mg, Oral, QAM  sodium chloride, 10 mL, Intravenous, Q12H         Current Antimicrobial Therapy:  Anti-Infectives (From admission, onward)    Ordered     Dose/Rate Route Frequency Start Stop    05/23/21 0721  cefepime (MAXIPIME) 2 g/100 mL 0.9% NS (mbp)     Ordering Provider: Alba Royal DO    2 g  over 4 Hours Intravenous Every 12 Hours 05/23/21 2000 05/30/21 1317    05/23/21 0721  metroNIDAZOLE (FLAGYL) 500 mg/100mL IVPB     Ordering Provider: Alba Royal DO    500 mg  over 60 Minutes Intravenous Every 8 Hours 05/23/21 0900 05/30/21 0042    05/23/21 0721  cefepime (MAXIPIME) 2 g/100 mL 0.9% NS (mbp)     Ordering Provider: Alba Royal DO    2 g  200 mL/hr over 30 Minutes Intravenous Once 05/23/21 0800 05/23/21 0942        Current Diuretic Therapy:  Diuretics (From admission, onward)    None         ----------------------------------------------------------------------------------------------------------------------  Vital Signs:  Temp:  [97.9 °F (36.6 °C)-98.3 °F (36.8 °C)] 98.3 °F (36.8 °C)  Heart Rate:  [55-90] 90  Resp:  [18-20] 18  BP: (125-183)/(55-80) 143/66  SpO2:  [96 %-97 %] 96 %  on  Flow (L/min):  [2] 2;   Device (Oxygen Therapy): nasal cannula  Body mass index is 17.19 kg/m².    Wt Readings from Last 3 Encounters:   05/31/21 48.3 kg (106 lb 8 oz)   05/17/21 50 kg (110 lb 4.8 oz)   04/30/21 50.3 kg (110 lb 12.8 oz)     Intake & Output (last 3 days)       05/29 0701 - 05/30 0700 05/30 0701 - 05/31 0700 05/31 0701 - 06/01 0700    P.O. 600 1320 840    I.V. (mL/kg) 550 (11.3)      Blood  300     Total Intake(mL/kg) 1150 (23.7) 1620 (33.5) 840 (17.4)    Net +1150 +1620 +840           Urine Unmeasured Occurrence 3 x 5 x 3 x        Diet Regular  ----------------------------------------------------------------------------------------------------------------------  Physical exam:  Constitutional: Chronically ill-appearing elderly female, frail.  No acute distress.      HENT:  Head:  Normocephalic and atraumatic.  Mouth:  Moist mucous membranes.    Eyes:  Conjunctivae and EOM are normal. No scleral icterus.    Neck:  Neck supple.  No JVD present.    Cardiovascular:  Normal rate, regular rhythm and normal heart sounds with no murmur.  Pulmonary/Chest:  No respiratory distress, no wheezes, no crackles.  Abdominal:  Soft.  Bowel sounds are normal.  No distension and no tenderness.   Musculoskeletal:  No tenderness, and no deformity.  No red or swollen joints anywhere.    Neurological:  Alert and oriented to person, place, and time.  No cranial nerve deficit. Intact Sensation throughout  Skin:  Skin is warm and dry. No rash or lesion noted. No pallor.   Peripheral vascular:  Pulses in all 4 extremities with no clubbing, no cyanosis, no edema.  Psychiatric: Appropriate mood and affect, pleasant.    ----------------------------------------------------------------------------------------------------------------------  Tele:    ----------------------------------------------------------------------------------------------------------------------  Results from last 7 days   Lab Units 05/31/21 0254 05/30/21  1444 05/30/21  0635 05/30/21 0519 05/29/21 0520   WBC 10*3/mm3 10.17  --   --  14.13* 15.13*   HEMOGLOBIN g/dL 7.9* 8.1* 6.8* 6.7* 7.7*   HEMATOCRIT % 25.0* 26.7* 21.4* 21.3* 24.5*   MCV fL 90.9  --   --  98.6* 97.6*   MCHC g/dL 31.6  --   --  31.5 31.4*   PLATELETS 10*3/mm3 349  --   --  403 470*         Results from last 7 days   Lab Units 05/31/21 0254 05/30/21 0519 05/29/21 0520   SODIUM mmol/L 133* 135* 135*   POTASSIUM mmol/L 4.0 3.8 3.8   CHLORIDE mmol/L 99 102 100   CO2 mmol/L 29.3* 29.9* 30.8*   BUN mg/dL 10 12 16   CREATININE mg/dL 0.44* 0.48* 0.41*   EGFR IF NONAFRICN AM mL/min/1.73 139 126 >150   CALCIUM mg/dL 7.7* 8.0* 8.1*   GLUCOSE mg/dL 155* 119* 172*   Estimated Creatinine Clearance: 46.3 mL/min (A) (by C-G formula based on SCr of 0.44 mg/dL (L)).  No results found for: AMMONIA              Glucose   Date/Time Value Ref Range Status   05/31/2021 1941 168 (H) 70 - 130 mg/dL Final   05/31/2021 1621 194 (H) 70 - 130 mg/dL Final   05/31/2021 1019 185 (H) 70 - 130 mg/dL Final   05/31/2021 0606 179 (H) 70 - 130 mg/dL Final   05/30/2021 2348 123 70 - 130 mg/dL Final   05/30/2021 1907 143 (H) 70 - 130 mg/dL Final   05/30/2021 1647 180 (H) 70 - 130 mg/dL Final   05/30/2021 1037 147 (H) 70 - 130 mg/dL Final     Lab Results   Component Value Date    TSH 1.740 04/23/2021    FREET4 1.27 08/10/2020     No results found for: PREGTESTUR, PREGSERUM, HCG, HCGQUANT  Pain Management Panel     Pain Management Panel Latest Ref Rng & Units 8/13/2020    AMPHETAMINES SCREEN, URINE Negative Negative    BARBITURATES SCREEN Negative Negative    BENZODIAZEPINE SCREEN, URINE Negative Negative    BUPRENORPHINEUR  Negative Negative    COCAINE SCREEN, URINE Negative Negative    METHADONE SCREEN, URINE Negative Negative        Brief Urine Lab Results  (Last result in the past 365 days)      Color   Clarity   Blood   Leuk Est   Nitrite   Protein   CREAT   Urine HCG        05/22/21 2245 Yellow Clear Negative Negative Negative 30 mg/dL (1+)             No results found for: BLOODCX  No results found for: URINECX  No results found for: WOUNDCX  No results found for: STOOLCX  No results found for: RESPCX  No results found for: AFBCX        I have personally looked at the labs and they are summarized above.  ----------------------------------------------------------------------------------------------------------------------  Detailed radiology reports for the last 24 hours:    Imaging Results (Last 24 Hours)     ** No results found for the last 24 hours. **        Assessment & Plan      Sepsis  Possible ileus secondary to infectious colitis  History of ischemic colitis with occluded celiac artery, severe SMA stenosis with reconstitution and CHRISTOPHER overlaid by EVAR.    -Patient with many recent admissions for recurrent colitis that improved with IV antibiotics presenting this time similarly with vomiting, diarrhea and possible lower GI bleed with dark stools and further decreased hemoglobin requiring transfusion.    -Patient has required transfusion with melena while being on anticoagulation which she is chronically on with Eliquis, currently on hold.    -Hemoglobin stable today however if further decline tomorrow will need to discuss with surgery possible need for endoscopy.    -CTA of the abdomen on 8/2020 with poor visualization of origin of celiac or superior mesenteric arteries with inability to exclude ischemia of colon due to diffuse colonic wall thickening.    -CT abdomen pelvis this admission with possible ileus with scattered air-fluid levels and mild gastric distention although no definitive inflammatory processes were  noted    -Abdominal vascular ultrasound showed incomplete visualization of origins of celiac and superior mesenteric arteries    -General surgery consulted and following, recommends no indication for acute surgical intervention at this time but recommends outpatient referral to be seen by vascular surgery to discuss SMA stenting versus bypass.    -Completed 7-day course of cefepime and Flagyl    -Continue to monitor hemoglobin closely as well as PPI and currently holding Eliquis with threshold to transfuse hemoglobin less than 7    Hypertension  Hyperlipidemia  CAD s/p PCI  HFpEF, not in exacerbation  Severe bilateral PAD of the lower extremities  Descending thoracic aortic aneurysm  Abdominal aortic aneurysm status post EVAR    -Echo from April 2020 with EF of 61 to 65% with mild concentric LVH and diastolic dysfunction with mild left ear and myxomatous changes of the mitral valve and mild mitral regurg    -CTA of the abdomen did show multifocal plaque through superficial femoral arteries bilaterally with borderline hemodynamically significant stenosis but no occlusive segments with celiac and SMA as above and descending thoracic aortic aneurysm.    -Continue statin and aspirin 81 mg    -Continue beta-blockade and home amlodipine and ACE inhibitor    -Eliquis held as above     Diabetes mellitus type 2    -Hemoglobin A1c of 7.2%    -Continue current insulin regiment, currently with very little requirements    CKD stage III    -Avoid nephrotoxins, NSAIDs, volume depletion and contrast as able to    Hypothyroidism    -Continue home levothyroxine    Chronic hypoxic respiratory failure  COPD, not in exacerbation  Continued tobacco abuse    -Continue home O2    -As needed DuoNebs    -Discussed critical need for tobacco cessation    GERD    -Continue PPI    History of stroke    -Statin and aspirin as above     Chronic moderate malnutrition    -BMI 17, nutrition services consulted       VTE Prophylaxis:   Mechanical Order  History:      Ordered        05/30/21 1002  Place Sequential Compression Device  Once         05/30/21 1002  Maintain Sequential Compression Device  Continuous         05/23/21 0641  Place Sequential Compression Device  Once         05/23/21 0641  Maintain Sequential Compression Device  Continuous,   Status:  Canceled                 Pharmalogical Order History:      Ordered     Dose Route Frequency Stop    05/25/21 1141  apixaban (ELIQUIS) tablet 2.5 mg  Status:  Discontinued      2.5 mg PO Every 12 Hours Scheduled 05/30/21 1000                Disposition likely return to skilled nursing facility within the next 48 hours if hemoglobin stable.    Yariel Shaw DO  [facility]  05/31/21  20:18 EDT      Electronically signed by Yariel Shaw DO at 05/31/21 2029          Consult Notes (most recent note)      Tremaine Rust MD at 06/01/21 0836      Consult Orders    1. Inpatient Cardiology Consult [[id number]] ordered by Yariel Shaw DO at 06/01/21 0829               Date of Admit: 5/22/2021  Date of Consult: 06/01/21  No ref. provider found        Upper GI bleed    Vomiting      Assessment      1. Paroxysmal atrial fibrillation with controlled ventricular response.  2. Recurrent GI bleed with blood loss anemia requiring blood transfusion due to apparently ischemic colitis.  3. History of abdominal aortic aneurysm, status post endovascular repair.  4. ASCVD, status post PCI, clinically asymptomatic and stable.  5. Essential hypertension with elevated systolic blood pressures.      Recommendations     1. Since she is having recurrent GI bleed with severe blood loss anemia requiring blood transfusion, obviously the potential risk of bleeding with the anticoagulant is higher than the potential benefit.  Hence would agree with holding the anticoagulation for now but would like to get her back on it as soon as her bleeding is stabilized.  2. I have discussed briefly with her about the option of  watchman device placement.  She may not be a good candidate for this either at this time if she is having ongoing GI bleed as patient has to be able to take anticoagulant for at least 6 to 8 weeks post device implantation and then be on antiplatelet therapy for at least 6 months.  She being a nursing home resident and had to be taken to [place] for consultation and placement of the device would make it logistically difficult too.      Reason for consultation: Risk/benefit with anticoagulation in the setting of chronic ischemic colitis with melena     Subjective       Subjective     History of Present Illness     Mary Ly is a 75 year old female with a past medical history significant for history of CVA, paroxysmal atrial fibrillation, diabetes mellitus type 2, essential hypertension, history of abdominal aortic aneurysm status post repair, coronary disease status post PCI, chronic kidney disease and ischemic colitis.  Patient presented to the ER with complaints of vomiting, melena and diarrhea. She was found to have sepsis secondary to infectious colitis in the setting of ischemic colitis with occluded celiac artery. Patient has a history of ischemic colitis with occluded celiac artery, severe SMA stenosis with reconstitution and CHRISTOPHER covered by prior EVAR. Per chart review patient has has several admission in august 2021, April 2021 and may 2021 for ischemic colitis and anemia requiring blood transfusion. She has a history of CVA with right sided weakness which she has recovered from. She also has paroxysmal atrial fibrillation and is on low dose eliquis at home. Eliquis has been held since admission secondary to melena and anemia requiring blood transfusion. She also has a history of descending thoracic aortic aneurysm status post repair. On evaluation today she denies any chest pain, shortness of breath or bloody stools. She was seen by general surgery who stated there was no indication for surgical  evaluation but that would need immediate referral to vascular surgery on discharge for severe PAD to discuss SMA stenting versus SMA bypass. Recent echocardiogram showed normal LV function with grade I diastolic dysfunction. She is a current smoker for the last 55 years.     Cardiac risk factors:arteriosclerotic heart disease, cerebral vascular disease, diabetes mellitus, hypertension, peripheral vascular disease, smoking and Sedentary life style    Last Echo: 4/26/2021  · Normal left ventricular cavity size noted. Left ventricular wall thickness is consistent with mild concentric hypertrophy  · Left ventricular ejection fraction appears to be 61 - 65%.  · Left ventricular diastolic function is consistent with (grade I) impaired relaxation.  · There is mild calcification of the aortic valvular cusps.  · No aortic valve regurgitation is present. Mild aortic valve stenosis is present.  · There are myxomatous changes of the mitral valve apparatus present. Mild mitral valve regurgitation is present. No significant mitral valve stenosis is present.  · . There is no evidence of pericardial effusion.    Past Medical History:   Diagnosis Date   • AAA (abdominal aortic aneurysm) (CMS/[facility])     s/p repair    • Arthritis    • CAD (coronary artery disease)     s/p stenting in the past    • Chronic kidney disease (CKD), stage III (moderate) (CMS/[facility])    • COPD (chronic obstructive pulmonary disease) (CMS/[facility])    • Debility    • Diastolic CHF, chronic (CMS/[facility]) 4/23/2021   • GERD (gastroesophageal reflux disease)    • History of CVA (cerebrovascular accident)    • History of ischemic colitis    • Hyperlipidemia 4/23/2021   • Hypertension    • Hypothyroidism    • Stroke (CMS/[facility])    • Type II diabetes mellitus (CMS/[facility])      Past Surgical History:   Procedure Laterality Date   • BACK SURGERY     • CARDIAC CATHETERIZATION     • CHOLECYSTECTOMY N/A 7/17/2020    Procedure: CHOLECYSTECTOMY LAPAROSCOPIC;  Surgeon: Lonnie Meneses MD;   Location: [facility] OR;  Service: General;  Laterality: N/A;   • COLONOSCOPY     • CORONARY STENT PLACEMENT     • ENDOSCOPY     • TONSILLECTOMY       No family history on file.     Social History     Tobacco Use   • Smoking status: Current Every Day Smoker     Packs/day: 1.00     Years: 55.00     Pack years: 55.00     Types: Cigarettes   • Smokeless tobacco: Never Used   Substance Use Topics   • Alcohol use: Never   • Drug use: Never     Medications Prior to Admission   Medication Sig Dispense Refill Last Dose   • amLODIPine (NORVASC) 10 MG tablet Take 10 mg by mouth Daily.   5/22/2021 at Unknown time   • apixaban (ELIQUIS) 2.5 MG tablet tablet Take 2.5 mg by mouth Every 12 (Twelve) Hours.   5/22/2021 at Unknown time   • aspirin 81 MG chewable tablet Chew 81 mg Daily.   5/22/2021 at Unknown time   • atorvastatin (LIPITOR) 10 MG tablet Take 10 mg by mouth Every Night.   5/22/2021 at Unknown time   • baclofen (LIORESAL) 10 MG tablet Take 10 mg by mouth Every Night.   5/22/2021 at Unknown time   • Cariprazine HCl (VRAYLAR) 1.5 MG capsule capsule Take 1.5 mg by mouth Daily.   5/22/2021 at Unknown time   • carvedilol (COREG) 12.5 MG tablet Take 12.5 mg by mouth 2 (Two) Times a Day With Meals.   5/22/2021 at Unknown time   • docusate sodium (COLACE) 250 MG capsule Take 250 mg by mouth Daily.   5/22/2021 at Unknown time   • Empagliflozin (Jardiance) 10 MG tablet Take 1 tablet by mouth Every Morning.   5/22/2021 at Unknown time   • guaiFENesin (Mucus Relief) 400 MG tablet Take 400 mg by mouth Every 12 (Twelve) Hours.   5/22/2021 at Unknown time   • HYDROcodone-acetaminophen (NORCO) 5-325 MG per tablet Take 1 tablet by mouth Every 8 (Eight) Hours As Needed for Mild Pain  or Moderate Pain . 9 tablet 0 5/22/2021 at Unknown time   • Lactobacillus (ACIDOPHILUS/PECTIN PO) Take 1 capsule by mouth 2 (two) times a day.   5/22/2021 at Unknown time   • lactulose (CHRONULAC) 10 GM/15ML solution Take 10 g by mouth Daily As Needed.   Past  Month at Unknown time   • levothyroxine (SYNTHROID, LEVOTHROID) 200 MCG tablet Take 200 mcg by mouth Daily.   5/22/2021 at Unknown time   • lisinopril (PRINIVIL,ZESTRIL) 10 MG tablet Take 10 mg by mouth Daily.   5/22/2021 at Unknown time   • melatonin 5 MG tablet tablet Take 5 mg by mouth Every Night.   5/22/2021 at Unknown time   • montelukast (SINGULAIR) 10 MG tablet Take 10 mg by mouth Daily.   5/22/2021 at Unknown time   • Multiple Vitamin (MULTIVITAMIN) tablet tablet Take 1 tablet by mouth Every Night.   5/22/2021 at Unknown time   • pantoprazole (PROTONIX) 20 MG EC tablet Take 20 mg by mouth Daily.   5/22/2021 at Unknown time   • predniSONE (DELTASONE) 5 MG tablet Take 5 mg by mouth Every Morning.   5/22/2021 at Unknown time   • promethazine (PHENERGAN) 12.5 MG tablet Take 12.5 mg by mouth Every 8 (Eight) Hours As Needed for Nausea or Vomiting.   5/22/2021 at Unknown time   • STIOLTO RESPIMAT 2.5-2.5 MCG/ACT aerosol solution inhaler Inhale 2 puffs Daily.   5/22/2021 at Unknown time   • traZODone (DESYREL) 50 MG tablet Take 50 mg by mouth Every Night.   5/22/2021 at Unknown time   • bisacodyl (DULCOLAX) 10 MG suppository Insert 10 mg into the rectum Daily As Needed for Constipation.   Unknown at Unknown time   • ipratropium-albuterol (DUO-NEB) 0.5-2.5 mg/3 ml nebulizer Take 3 mL by nebulization 4 (Four) Times a Day As Needed for Wheezing or Shortness of Air.   Unknown at Unknown time   • loperamide (IMODIUM) 2 MG capsule Take 2 mg by mouth 4 (Four) Times a Day As Needed for Diarrhea.   Unknown at Unknown time   • senna (Senna) 8.6 MG tablet Take 1 tablet by mouth Daily As Needed for Constipation.   Unknown at Unknown time     Allergies:  Haldol [haloperidol]    Review of Systems   Constitutional: Negative for chills, diaphoresis, fatigue and fever.   HENT: Negative for congestion and trouble swallowing.    Eyes: Negative for photophobia and visual disturbance.   Respiratory: Negative for chest tightness,  shortness of breath and wheezing.    Cardiovascular: Negative for chest pain and palpitations.   Gastrointestinal: Positive for abdominal pain, blood in stool, diarrhea, nausea and vomiting.   Endocrine: Negative for polyphagia and polyuria.   Genitourinary: Negative for dysuria and hematuria.   Musculoskeletal: Negative for neck pain and neck stiffness.   Skin: Negative for rash and wound.   Allergic/Immunologic: Negative for food allergies and immunocompromised state.   Neurological: Negative for dizziness, weakness and light-headedness.   Hematological: Negative for adenopathy. Does not bruise/bleed easily.   Psychiatric/Behavioral: Negative for confusion and suicidal ideas.       Objective     Objective      Vital Signs  Temp:  [97.5 °F (36.4 °C)-98.3 °F (36.8 °C)] 97.9 °F (36.6 °C)  Heart Rate:  [60-90] 63  Resp:  [18] 18  BP: (142-160)/(63-80) 160/73     Vital Signs (last 72 hrs)       05/29 0700  -  05/30 0659 05/30 0700  -  05/31 0659 05/31 0700  -  06/01 0659 06/01 0700  -  06/01 0836   Most Recent    Temp (°F) 98.1 -  98.4    97.3 -  98    97.5 -  98.3      97.9     97.9 (36.6)    Heart Rate 57 -  97    55 -  72    55 -  90      63     63    Resp   18    18 -  20      18      18     18    BP 98/53 -  148/80    125/55 -  157/65    142/77 -  183/67      160/73     160/73    SpO2 (%) 97 -  98    96 -  99    96 -  99      96     96        Body mass index is 17.48 kg/m².  Documented weights    05/22/21 2142 05/23/21 1242 05/24/21 0500 05/25/21 0440   Weight: 51.7 kg (114 lb) 50 kg (110 lb 3 oz) 49.7 kg (109 lb 9.6 oz) 47.6 kg (105 lb)    05/26/21 0500 05/27/21 0500 05/28/21 0500 05/29/21 0500   Weight: 47.7 kg (105 lb 1.6 oz) 48.2 kg (106 lb 4.8 oz) 48.2 kg (106 lb 4.8 oz) 48.3 kg (106 lb 6.4 oz)    05/30/21 0500 05/31/21 0500 06/01/21 0500   Weight: 48.6 kg (107 lb 3.2 oz) 48.3 kg (106 lb 8 oz) 49.1 kg (108 lb 4.8 oz)            Intake/Output Summary (Last 24 hours) at 6/1/2021 0809  Last data filed at  6/1/2021 0821  Gross per 24 hour   Intake 1860 ml   Output --   Net 1860 ml     Physical Exam  Constitutional:       General: She is not in acute distress.     Appearance: Normal appearance. She is well-developed and normal weight.   HENT:      Head: Normocephalic and atraumatic.   Eyes:      General: Lids are normal.      Conjunctiva/sclera: Conjunctivae normal.      Pupils: Pupils are equal, round, and reactive to light.   Neck:      Vascular: No carotid bruit or JVD.   Cardiovascular:      Rate and Rhythm: Normal rate. Rhythm irregular.      Pulses: Normal pulses.      Heart sounds: S1 normal and S2 normal. Murmur (Grade 3 x 6 systolic ejection murmur at left lower sternal border with radiation to the left axilla.) heard.     Pulmonary:      Effort: Pulmonary effort is normal. No respiratory distress.      Breath sounds: Normal breath sounds. No wheezing.   Abdominal:      General: Bowel sounds are normal. There is no distension.      Palpations: Abdomen is soft. There is no hepatomegaly or splenomegaly.      Tenderness: There is no abdominal tenderness.   Musculoskeletal:         General: No swelling. Normal range of motion.      Cervical back: Normal range of motion and neck supple.      Right lower leg: No edema.      Left lower leg: No edema.   Skin:     General: Skin is warm and dry.      Coloration: Skin is not jaundiced.      Findings: No rash.   Neurological:      General: No focal deficit present.      Mental Status: She is alert and oriented to person, place, and time. Mental status is at baseline.   Psychiatric:         Mood and Affect: Mood normal.         Speech: Speech normal.         Behavior: Behavior normal.         Thought Content: Thought content normal.         Judgment: Judgment normal.         Results review     Results Review:    I reviewed the patient's new clinical results.      Results from last 7 days   Lab Units 06/01/21  0253 05/31/21  0254 05/30/21  1444 05/30/21  0635 05/30/21  0519  05/29/21  0520 05/28/21  0156 05/27/21  0446 05/26/21  0131   WBC 10*3/mm3 9.27 10.17  --   --  14.13* 15.13* 7.12 5.74 9.77   HEMOGLOBIN g/dL 7.8* 7.9* 8.1* 6.8* 6.7* 7.7* 9.1* 8.9* 9.6*   PLATELETS 10*3/mm3 376 349  --   --  403 470* 561* 528* 559*     Results from last 7 days   Lab Units 06/01/21  0253 05/31/21  0254 05/30/21  0519 05/29/21  0520 05/28/21  0156 05/27/21  0446 05/26/21  1305 05/26/21  0131   SODIUM mmol/L 136 133* 135* 135* 135* 136  --  134*   POTASSIUM mmol/L 3.7 4.0 3.8 3.8 4.5 4.0 4.6 3.4*   CHLORIDE mmol/L 100 99 102 100 99 100  --  96*   CO2 mmol/L 31.0* 29.3* 29.9* 30.8* 29.9* 30.9*  --  33.5*   BUN mg/dL 9 10 12 16 13 5*  --  6*   CREATININE mg/dL 0.38* 0.44* 0.48* 0.41* 0.44* 0.35*  --  0.39*   CALCIUM mg/dL 7.7* 7.7* 8.0* 8.1* 7.9* 7.9*  --  8.0*   GLUCOSE mg/dL 176* 155* 119* 172* 158* 95  --  115*     Lab Results   Component Value Date    INR 1.26 (H) 05/22/2021    INR 1.24 (H) 04/22/2021    INR 0.98 03/09/2021    INR 1.20 (H) 08/10/2020     Lab Results   Component Value Date    MG 1.7 05/21/2021    MG 1.8 05/17/2021    MG 2.3 05/16/2021     Lab Results   Component Value Date    TSH 1.740 04/23/2021      Lab Results   Component Value Date    PROBNP 1,402.0 03/09/2021    PROBNP 934.2 (H) 08/10/2020    PROBNP 2,722.0 (H) 07/20/2020       ECG             Imaging Results (Last 72 Hours)     ** No results found for the last 72 hours. **        INDICATION:   Nausea and vomiting      COMPARISON:    5/10/2021     FINDINGS:  Single portable AP view(s) of the chest.  Stable cardiomegaly. Tortuous calcified aorta. There is a left base infiltrate unchanged from the previous examination. The right lung is clear. Vascular markings are normal.     IMPRESSION:  Left basilar consolidation is again noted and not significantly changed. No new infiltrates are seen.     Signer Name: Octavio Wheeler MD   Signed: 5/23/2021 2:59 AM   Workstation Name: [email or web address]    [facility]    I have  discussed my impression and recommendations with the patient and family.      Thank you very much for asking us to be involved in this patient's care.  We will follow along with you.      Electronically signed by QUIQUE Solitario, 21, 8:36 AM EDT.    Electronically signed by Tremaine Rust MD, 21, 9:32 AM EDT.    Please note that portions of this note were completed with a voice recognition program.          Electronically signed by Tremaine Rust MD at 21 0932          Physical Therapy Notes (most recent note)      Ashley Escamilla, PT at 21 1137  Version 1 of 1         Acute Care - Physical Therapy Treatment Note  [facility] [place]     Patient Name: Mary Ly  : 1945  MRN: [id number]  Today's Date: 2021   Onset of Illness/Injury or Date of Surgery: 21       PT Assessment (last 12 hours)      PT Evaluation and Treatment     Row Name 21 1133          Physical Therapy Time and Intention    Subjective Information  complains of;weakness;fatigue  -CT     Document Type  therapy note (daily note)  -CT     Mode of Treatment  physical therapy  -CT     Patient Effort  good  -CT     Comment  Pt agreeable to therapy and tolerated well. Pt declined to sit up in chair but did stand and side step to head of bed   -CT     Row Name 21 1133          General Information    Patient Profile Reviewed  yes  -CT     Equipment Currently Used at Home  wheelchair;walker, rolling  -CT     Existing Precautions/Restrictions  fall;oxygen therapy device and L/min  -CT     Limitations/Impairments  safety/cognitive  -CT     Row Name 21 1133          Cognition    Affect/Mental Status (Cognitive)  WFL  -CT     Orientation Status (Cognition)  oriented x 3  -CT     Follows Commands (Cognition)  follows multi-step commands  -CT     Row Name 21 1133          Bed Mobility    Bed Mobility  bed mobility (all) activities  -CT     All Activities, Shannon (Bed Mobility)  minimum assist  (75% patient effort)  -CT     Assistive Device (Bed Mobility)  bed rails  -CT     Row Name 06/01/21 1133          Transfers    Transfers  sit-stand transfer;stand-sit transfer  -CT     Sit-Stand Leighton (Transfers)  minimum assist (75% patient effort)  -CT     Stand-Sit Leighton (Transfers)  minimum assist (75% patient effort)  -CT     Row Name 06/01/21 1133          Sit-Stand Transfer    Assistive Device (Sit-Stand Transfers)  walker, front-wheeled  -CT     Row Name 06/01/21 1133          Stand-Sit Transfer    Assistive Device (Stand-Sit Transfers)  walker, front-wheeled  -CT     Row Name 06/01/21 1133          Gait/Stairs (Locomotion)    Leighton Level (Gait)  minimum assist (75% patient effort)  -CT     Assistive Device (Gait)  walker, front-wheeled  -CT     Distance in Feet (Gait)  3 side steps to head of bed   -CT     Pattern (Gait)  step-to  -CT     Row Name 06/01/21 1133          Coping    Observed Emotional State  calm;cooperative  -CT     Verbalized Emotional State  acceptance  -CT     Row Name 06/01/21 1133          Plan of Care Review    Plan of Care Reviewed With  patient  -CT     Row Name 06/01/21 1133          Positioning and Restraints    Pre-Treatment Position  in bed  -CT     Post Treatment Position  bed  -CT     In Bed  supine;call light within reach;encouraged to call for assist;exit alarm on;side rails up x3  -CT     Row Name 06/01/21 1133          Therapy Assessment/Plan (PT)    Rehab Potential (PT)  good, to achieve stated therapy goals  -CT     Criteria for Skilled Interventions Met (PT)  yes;skilled treatment is necessary  -CT       User Key  (r) = Recorded By, (t) = Taken By, (c) = Cosigned By    Initials Name Provider Type    CT Ashley Escamilla PT Physical Therapist        Physical Therapy Education                 Title: PT OT SLP Therapies (Done)     Topic: Physical Therapy (Done)     Point: Mobility training (Done)     Learning Progress Summary           Patient Acceptance,  E,TB, VU by CT at 6/1/2021 1136    Acceptance, E, VU by CL at 5/30/2021 1301    Acceptance, E, VU by LT at 5/28/2021 1221    Acceptance, E,TB, VU by CT at 5/28/2021 0953                   Point: Home exercise program (Done)     Learning Progress Summary           Patient Acceptance, E,TB, VU by CT at 6/1/2021 1136    Acceptance, E, VU by CL at 5/30/2021 1301    Acceptance, E, VU by LT at 5/28/2021 1221    Acceptance, E,TB, VU by CT at 5/28/2021 0953                   Point: Body mechanics (Done)     Learning Progress Summary           Patient Acceptance, E,TB, VU by CT at 6/1/2021 1136    Acceptance, E, VU by CL at 5/30/2021 1301    Acceptance, E, VU by LT at 5/28/2021 1221    Acceptance, E,TB, VU by CT at 5/28/2021 0953                   Point: Precautions (Done)     Learning Progress Summary           Patient Acceptance, E,TB, VU by CT at 6/1/2021 1136    Acceptance, E, VU by CL at 5/30/2021 1301    Acceptance, E, VU by LT at 5/28/2021 1221    Acceptance, E,TB, VU by CT at 5/28/2021 0953                               User Key     Initials Effective Dates Name Provider Type Discipline    CL 06/16/16 -  Kallie Lackey, RN Registered Nurse Nurse    LT 06/16/16 -  Nahomy Rene, RN Registered Nurse Nurse    CT 04/03/18 -  Ashley Escamilla PT Physical Therapist PT              PT Recommendation and Plan  Anticipated Discharge Disposition (PT): skilled nursing facility  Planned Therapy Interventions (PT): balance training, bed mobility training, gait training, home exercise program, manual therapy techniques, motor coordination training, patient/family education, postural re-education, strengthening, transfer training  Therapy Frequency (PT): 3 times/wk (3-5 times/wk )  Plan of Care Reviewed With: patient       Time Calculation:   PT Charges     Row Name 06/01/21 1136             Time Calculation    PT Received On  06/01/21  -CT      PT Goal Re-Cert Due Date  07/11/21  -CT         Time Calculation- PT     Total Timed Code Minutes- PT  32 minute(s)  -CT        User Key  (r) = Recorded By, (t) = Taken By, (c) = Cosigned By    Initials Name Provider Type    CT Ashley Escamilla PT Physical Therapist        Therapy Charges for Today     Code Description Service Date Service Provider Modifiers Qty    18833047391  PT THERAPEUTIC ACT EA 15 MIN 2021 Ashley Escamilla, PT GP 1    [id number] HC GAIT TRAINING EA 15 MIN 2021 Ashley Escamilla, PT GP 1               Ashley Escamilla PT  2021      Electronically signed by Ashley Escamilla PT at 21 1138          Occupational Therapy Notes (most recent note)      Arturo Roper, OT at 21 1036          Acute Care - Occupational Therapy Initial Evaluation   [facility]     Patient Name: Mary Ly  : 1945  MRN: [id number]  Today's Date: 2021  Onset of Illness/Injury or Date of Surgery: 21     Referring Physician: Sandy     Admit Date: 2021       ICD-10-CM ICD-9-CM   1. Intractable vomiting with nausea, unspecified vomiting type  R11.2 536.2   2. Ileus (CMS/HCC)  K56.7 560.1     Patient Active Problem List   Diagnosis   • Status post laparoscopic cholecystectomy   • Severe malnutrition (CMS/HCC)   • Colitis   • COPD (chronic obstructive pulmonary disease) (CMS/HCC)   • Type II diabetes mellitus (CMS/HCC)   • Hypothyroidism   • Hyperlipidemia   • Diastolic CHF, chronic (CMS/HCC)   • Moderate malnutrition (CMS/HCC)   • Vomiting   • Upper GI bleed     Past Medical History:   Diagnosis Date   • AAA (abdominal aortic aneurysm) (CMS/HCC)     s/p repair    • Arthritis    • CAD (coronary artery disease)     s/p stenting in the past    • Chronic kidney disease (CKD), stage III (moderate) (CMS/HCC)    • COPD (chronic obstructive pulmonary disease) (CMS/HCC)    • Debility    • Diastolic CHF, chronic (CMS/HCC) 2021   • GERD (gastroesophageal reflux disease)    • History of CVA (cerebrovascular accident)    • History of ischemic colitis    •  Hyperlipidemia 4/23/2021   • Hypertension    • Hypothyroidism    • Stroke (CMS/HCC)    • Type II diabetes mellitus (CMS/HCC)      Past Surgical History:   Procedure Laterality Date   • BACK SURGERY     • CARDIAC CATHETERIZATION     • CHOLECYSTECTOMY N/A 7/17/2020    Procedure: CHOLECYSTECTOMY LAPAROSCOPIC;  Surgeon: Lonnie Meneses MD;  Location: [facility];  Service: General;  Laterality: N/A;   • COLONOSCOPY     • CORONARY STENT PLACEMENT     • ENDOSCOPY     • TONSILLECTOMY              OT ASSESSMENT FLOWSHEET (last 12 hours)      OT Evaluation and Treatment     Row Name 05/28/21 [facility]                   OT Time and Intention    Document Type  evaluation  -KR        Mode of Treatment  occupational therapy  -KR        Patient Effort  adequate  -KR           General Information    General Observations of Patient  alert/cooperative  -KR        Prior Level of Function  mod assist:  -KR           Cognition    Affect/Mental Status (Cognitive)  WFL  -KR        Orientation Status (Cognition)  oriented to;person;place;situation  -KR        Follows Commands (Cognition)  WFL  -KR           Range of Motion Comprehensive    Comment, General Range of Motion  BUE WFL  -KR           Strength Comprehensive (MMT)    Comment, General Manual Muscle Testing (MMT) Assessment  BUE 3-/5  -KR           Activities of Daily Living    BADL Assessment/Intervention  bathing;upper body dressing;lower body dressing;grooming;feeding;toileting  -KR           Bathing Assessment/Intervention    [place] Level (Bathing)  bathing skills;moderate assist (50% patient effort)  -KR           Upper Body Dressing Assessment/Training    [place] Level (Upper Body Dressing)  upper body dressing skills;minimum assist (75% patient effort)  -KR           Lower Body Dressing Assessment/Training    [place] Level (Lower Body Dressing)  lower body dressing skills;moderate assist (50% patient effort)  -KR           Grooming Assessment/Training     [place] Level (Grooming)  grooming skills;minimum assist (75% patient effort)  -KR           Self-Feeding Assessment/Training    [place] Level (Feeding)  feeding skills;set up  -KR           Toileting Assessment/Training    [place] Level (Toileting)  toileting skills;maximum assist (25% patient effort)  -KR           Plan of Care Review    Plan of Care Reviewed With  patient  -KR           OT Goals    Strength Goal Selection (OT)  strength, OT goal 1  -KR           Strength Goal 1 (OT)    Strength Goal 1 (OT)  BUE increase x 1 to enhance ability to assist with self care/ mobility tasks  -KR        Time Frame (Strength Goal 1, OT)  by discharge  -KR           Therapy Assessment/Plan (OT)    Planned Therapy Interventions (OT)  strengthening exercise;ROM/therapeutic exercise;activity tolerance training  -KR           Therapy Plan Review/Discharge Plan (OT)    Anticipated Discharge Disposition (OT)  skilled nursing facility  -KR          User Key  (r) = Recorded By, (t) = Taken By, (c) = Cosigned By    Initials Name Effective Dates    KR Arturo Roper OT 04/03/18 -                OT Recommendation and Plan  Planned Therapy Interventions (OT): strengthening exercise, ROM/therapeutic exercise, activity tolerance training  Plan of Care Review  Plan of Care Reviewed With: patient  Plan of Care Reviewed With: patient        Time Calculation:     Therapy Charges for Today     Code Description Service Date Service Provider Modifiers Qty    [id number]  OT EVAL MOD COMPLEXITY 4 5/28/2021 Arturo Roper OT GO 1               Arturo Roper OT  5/28/2021    Electronically signed by Arturo Roper OT at 05/28/21 1037       ADL Documentation (last day)     Date/Time Transferring Toileting Bathing Dressing Eating Communication Swallowing    06/01/21 0842  2 - assistive person  2 - assistive person  2 - assistive person  2 - assistive person  0 - independent  0 - understands/communicates without difficulty  0 -  swallows foods/liquids without difficulty    05/31/21 2020  2 - assistive person  2 - assistive person  2 - assistive person  2 - assistive person  0 - independent  0 - understands/communicates without difficulty  0 - swallows foods/liquids without difficulty    05/31/21 0836  2 - assistive person  2 - assistive person  2 - assistive person  2 - assistive person  0 - independent  0 - understands/communicates without difficulty  0 - swallows foods/liquids without difficulty            After Visit Summary  Mary Ly  MRN: [id number]    Icon primary diagnosis          Bleeding from upper gastrointestinal tract   Icon Date   5/23/2021 - 6/1/2021   Icon Location   [facility]   After Visit Summary  Instructions     Icon medication changes this visit           Your medications have changed    Icon medications to change how you take   CHANGE how you take:   atorvastatin (LIPITOR) -- medication strength, how much to take   carvedilol (COREG) -- medication strength, how much to take   pantoprazole (PROTONIX) -- how much to take  Icon medications to stop taking   STOP taking:   apixaban 2.5 MG tablet tablet (ELIQUIS)   Review your updated medication list below.  Icon Checklist header    Your Next Steps    Icon do   Do   Icon Checkbox     these medications from [facility]. - [place], [place] - [address] [place] [address] [phone] [facility] [phone] FX  1 atorvastatin  2 carvedilol  3 pantoprazole  If you have any questions about your recovery, please call the [facility] Nurse Call Center at [phone]. A registered nurse is available 24 hours a day 7 days a week to assist you.   If you have any COVID-19 related questions, please call [phone].   Icon Orders placed today                  Other instructions    Discharge Follow-up with PCP   Currently Documented PCP:   Leta Gardner MD   PCP Phone Number:   [phone]   Discharge Follow-up with Specialty: Cardiology; 3 Weeks   What's  Next    What's Next          Follow up with Leta Gardner MD  1 week post hospital follow up [address] [place] [address]  [phone]   Your Allergies  Date Reviewed: 5/23/2021  Your Allergies   Allergen Reactions   Haldol (Haloperidol) Hallucinations   Patient Belongings Returned    Document Return of Belongings Flowsheet    Were the patient bedside belongings sent home? --   Medications Retrieved from Pharmacy & Sent Home --   Belongings Sent to Safe --   Belongings sent with: --   Belongings Retrieved from Security & Sent Home --       MyChart Signup    Our records indicate that you have declined [facility] signup. If you would like to sign up for [employer], please email [email or web address] or call [phone] to obtain an activation code.  Medication List  Medication List     Morning Afternoon Evening Bedtime As Needed    ACIDOPHILUS/PECTIN PO  Take 1 capsule by mouth 2 (two) times a day.         amLODIPine 10 MG tablet  Commonly known as: NORVASC  Take 10 mg by mouth Daily.  Last time this was given: 10 mg on May 31, 2021 11:33 AM         aspirin 81 MG chewable tablet  Chew 81 mg Daily.  Last time this was given: 81 mg on June 1, 2021  8:42 AM        Icon medications to change how you take   atorvastatin 40 MG tablet  Commonly known as: LIPITOR  Take 1 tablet by mouth Every Night for 30 days.  What changed:   0 medication strength  0 how much to take  Last time this was given: 40 mg on May 31, 2021  8:23 PM         baclofen 10 MG tablet  Commonly known as: LIORESAL  Take 10 mg by mouth Every Night.  Last time this was given: 10 mg on May 31, 2021  8:23 PM         bisacodyl 10 MG suppository  Commonly known as: DULCOLAX  Insert 10 mg into the rectum Daily As Needed for Constipation.         Cariprazine HCl 1.5 MG capsule capsule  Commonly known as: VRAYLAR  Take 1.5 mg by mouth Daily.  Last time this was given: 1.5 mg on June 1, 2021 11:30 AM        Icon medications to change how  you take   carvedilol 6.25 MG tablet  Commonly known as: COREG  Take 1 tablet by mouth 2 (Two) Times a Day With Meals for 30 days.  What changed:   0 medication strength  0 how much to take  Last time this was given: 6.25 mg on May 31, 2021  5:16 PM         docusate sodium 250 MG capsule  Commonly known as: COLACE  Take 250 mg by mouth Daily.  Last time this was given: 200 mg on June 1, 2021  8:42 AM         HYDROcodone-acetaminophen 5-325 MG per tablet  Commonly known as: NORCO  Take 1 tablet by mouth Every 8 (Eight) Hours As Needed for Mild Pain or Moderate Pain .  For diagnoses: Inflammation of large intestine         ipratropium-albuterol 0.5-2.5 mg/3 ml nebulizer  Commonly known as: DUO-NEB  Take 3 mL by nebulization 4 (Four) Times a Day As Needed for Wheezing or Shortness of Air.         Jardiance 10 MG tablet  Take 1 tablet by mouth Every Morning.  Generic drug: Empagliflozin         lactulose 10 GM/15ML solution  Commonly known as: CHRONULAC  Take 10 g by mouth Daily As Needed.         levothyroxine 200 MCG tablet  Commonly known as: SYNTHROID, LEVOTHROID  Take 200 mcg by mouth Daily.  Last time this was given: 200 mcg on June 1, 2021  5:34 AM         lisinopril 10 MG tablet  Commonly known as: PRINIVIL,ZESTRIL  Take 10 mg by mouth Daily.  Last time this was given: 10 mg on May 31, 2021 11:33 AM         loperamide 2 MG capsule  Commonly known as: IMODIUM  Take 2 mg by mouth 4 (Four) Times a Day As Needed for Diarrhea.         melatonin 5 MG tablet tablet  Take 5 mg by mouth Every Night.  Last time this was given: 5 mg on May 31, 2021  8:23 PM         montelukast 10 MG tablet  Commonly known as: SINGULAIR  Take 10 mg by mouth Daily.  Last time this was given: 10 mg on June 1, 2021  8:42 AM         Mucus Relief 400 MG tablet  Take 400 mg by mouth Every 12 (Twelve) Hours.  Generic drug: guaiFENesin  Last time this was given: 400 mg on June 1, 2021  8:42 AM         multivitamin tablet tablet  Take 1 tablet by  mouth Every Night.  Generic drug: multivitamin  Last time this was given: 1 tablet on May 31, 2021  8:23 PM        Icon medications to change how you take   pantoprazole 20 MG EC tablet  Commonly known as: PROTONIX  Take 2 tablets by mouth Daily for 30 days.  What changed: how much to take  Last time this was given: Ask your nurse or doctor         predniSONE 5 MG tablet  Commonly known as: DELTASONE  Take 5 mg by mouth Every Morning.  Last time this was given: 5 mg on June 1, 2021  8:42 AM         promethazine 12.5 MG tablet  Commonly known as: PHENERGAN  Take 12.5 mg by mouth Every 8 (Eight) Hours As Needed for Nausea or Vomiting.  Last time this was given: 12.5 mg on May 31, 2021  1:43 PM         Senna 8.6 MG tablet  Take 1 tablet by mouth Daily As Needed for Constipation.  Generic drug: senna         Stiolto Respimat 2.5-2.5 MCG/ACT aerosol solution inhaler  Inhale 2 puffs Daily.  Generic drug: tiotropium bromide-olodaterol         traZODone 50 MG tablet  Commonly known as: DESYREL  Take 50 mg by mouth Every Night.        Where to  your medications     Icon medication  information                   these medications at [facility]. - [place], [place] - [address] [place] [address] [phone] [place] [phone] FX     atorvastatin • carvedilol • pantoprazole  Address: [address] [place] [address]   Phone: [phone]   Always carry an updated list of your medications with you. If there is an emergency, a responder can quickly see what medications you are taking. Take this paperwork with you the next time you see your health care provider.        Pneumonia Vaccination    Please follow up with your primary care provider or retail pharmacy to see if you are eligible for a pneumonia vaccination.        Opioid Resource    If you or someone you know needs information on substance abuse, please visit   https://www.findhelpnowky.org/ for listings of facilities and resources across [place].  Stroke  Symptoms    · Call 911 or have someone take you to the Emergency Department if you have any of the following:  · Sudden numbness or weakness of your face, arm or leg especially on one side of the body  · Sudden confusion, difficulty speaking or trouble understanding   · Changes in your vision or loss of sight in one eye  · Sudden severe headache with no known cause  · Sudden dizziness, trouble walking, loss of balance or coordination     It is important to seek emergency care right away if you have further stroke symptoms. If you get emergency help quickly, the powerful clot-dissolving medicines can reduce the disabilities caused by a stroke.      For more information:  American Stroke Association  9-041-2-STROKE  www.strokeassociation.org  Smoking Cessation    IF YOU SMOKE OR USE TOBACCO PLEASE READ THE FOLLOWING:  Why is smoking bad for me?  Smoking increases the risk of heart disease, lung disease, vascular disease, stroke, and cancer. If you smoke, STOP!     For more information:  Quit Now [place]  1-800-QUIT-NOW  https://[place].[employer].org/en-US/  Suicidal Feelings    If you feel like life is too tough and are thinking of suicide or injuring yourself, get help right away!  · Call 911  · Call a suicide hotline to speak to a counselor. 6-292-356-TALK or 0-127-ZGFQCMG   Patient Experience    Thank you for choosing [facility]. You may receive a survey following your visit. Please take a moment to share what went well, where we need improvement, and which staff members deserve recognition. We value your input.           ? ?              YOU ARE THE MOST IMPORTANT FACTOR IN YOUR RECOVERY.      Follow all instructions carefully.      I have reviewed my discharge instructions with my nurse, including the following information, if applicable:                 Information about my illness and diagnosis              Follow up appointments (including lab draws)              Wound Care              Equipment Needs               Medications (new and continuing) along with side effects              Preventative information such as vaccines and smoking cessations              Diet              Pain              I know when to contact my Doctor's office or seek emergency care        I want my nurse to describe the side effects of my medications: YES NO   If the answer is no, I understand the side effects of my medications: YES NO   My nurse described the side effects of my medications in a way that I could understand: YES NO   I have taken my personal belongings and my own medications with me at discharge: YES NO       I have received this information and my questions have been answered. I have discussed any concerns I see with this plan with the nurse or physician. I understand these instructions.     Signature of Patient or Responsible Person: _____________________________________     Date: _________________  Time: __________________     Signature of Healthcare Provider: _______________________________________  Date: _________________  Time: __________________              Discharge Summary    No notes of this type exist for this encounter.         Discharge Order (From admission, onward)     Start     Ordered    06/01/21 1307  Discharge patient  Once     Expected Discharge Date: 06/01/21    Expected Discharge Time: Midday    Discharge Disposition: Skilled Nursing Facility (DC - External)    Physician of Record for Attribution - Please select from Treatment Team: ALAINA WATT [[id number]]    Review needed by CMO to determine Physician of Record: No       Question Answer Comment   Physician of Record for Attribution - Please select from Treatment Team ALAINA WATT    Review needed by CMO to determine Physician of Record No        06/01/21 131

## 2021-06-01 NOTE — CASE MANAGEMENT/SOCIAL WORK
Discharge Planning Assessment  [facility]     Patient Name: Mary Ly  MRN: [id number]  Today's Date: 6/1/2021    Admit Date: 5/22/2021        Discharge Plan     Row Name 06/01/21 1035       Plan    Plan  SS sent updated information to [facility] and [facility] on this date for review.  SS will follow.        Continued Care and Services - Admitted Since 5/22/2021     Destination     Service Provider Request Status Selected Services Address Phone Fax Patient Preferred    [facility]  P[facility] - [facility] N[facility]A [address] [place] [address] [phone] --             [address]

## 2021-06-02 NOTE — PAYOR COMM NOTE
"[facility]  NPI:[id number]    Utilization Review  Contact: Meg Martinez RN  Phone: [phone]  Fax:[phone]    DISCHARGE NOTIFICATION      [id number]    Mary Ly (75 y.o. Female)     Date of Birth Social Security Number Address Home Phone MRN    1945  [facility]  [facility] [address] [facility] [address] [phone] [id number]    [place] Marital Status          Islam        Admission Date Admission Type Admitting Provider Attending Provider Department, Room/Bed    5/22/21 Emergency Alba Royal DO  [facility] 3 SOUTH, 3312/1P    Discharge Date Discharge Disposition Discharge Destination        6/1/2021 Skilled Nursing Facility (DC - External)              Attending Provider: (none)   Allergies: Haldol [Haloperidol]    Isolation: None   Infection: None   Code Status: Prior    Ht: 167.6 cm (66\")   Wt: 49.1 kg (108 lb 4.8 oz)    Admission Cmt: None   Principal Problem: Upper GI bleed [K92.2]                 Active Insurance as of 5/22/2021     Primary Coverage     Payor Plan Insurance Group Employer/Plan Group    ANTHEM MEDICARE REPLACEMENT ANTHEM MEDICARE ADVANTAGE [id number]     Payor Plan Address Payor Plan Phone Number Payor Plan Fax Number Effective Dates    [address] [phone]  9/1/2019 - None Entered    [facility] [address]       Subscriber Name Subscriber Birth Date Member ID       MARY LY 1945 [id number]           Secondary Coverage     Payor Plan Insurance Group Employer/Plan Group    [place]      Payor Plan Address Payor Plan Phone Number Payor Plan Fax Number Effective Dates    [address] [phone]  12/1/2019 - None Entered    [facility] [address]       Subscriber Name Subscriber Birth Date Member ID       MARY LY 1945 [id number]                 Emergency Contacts      (Rel.) Home Phone Work Phone Mobile Phone    GANGA ZORAIDA (Daughter) " [phone] -- --    MARE ANNA (Grandchild) [phone] -- --    ALEXX LY (Son) [phone] -- --    OSMINMARYCHUY (Friend) -- -- [phone]               Discharge Summary      Yariel Shaw DO at 21 1316              [facility] HOSPITALISTS DISCHARGE SUMMARY    Patient Identification:  Name:  Mary Ly  Age:  75 y.o.  Sex:  female  :  1945  MRN:  [id number]  Visit Number:  [id number]    Date of Admission: 2021  Date of Discharge:  2021     PCP: Leta Gardner MD    DISCHARGE DIAGNOSIS    Sepsis  Possible ileus secondary to infectious colitis  History of ischemic colitis with occluded celiac artery, severe SMA stenosis with reconstitution and CHRISTOPHER overlaid by EVAR.  Hypertension  Hyperlipidemia  CAD s/p PCI  HFpEF, not in exacerbation  Severe bilateral PAD of the lower extremities  Descending thoracic aortic aneurysm  Abdominal aortic aneurysm status post EVAR  Diabetes mellitus type 2  CKD stage III  Hypothyroidism  Chronic hypoxic respiratory failure  COPD, not in exacerbation  Continued tobacco abuse  GERD  History of stroke  Chronic moderate malnutrition  CONSULTS     General surgery  Cardiology  Diabetes education  PROCEDURES PERFORMED      HOSPITAL COURSE  Patient is a 75 y.o. female presented to [facility] complaining of nausea, vomiting, abdominal pain and diarrhea for 2 days.  Please see the admitting history and physical for further details.      Patient admitted to the hospitalist service with sepsis and possible ileus secondary to suspected infectious colitis.    75F PMH significant for stroke, paroxysmal atrial fibrillation, diabetes mellitus, chronic anticoagulation with Eliquis, hypertension and AAA status post repair who presents from [facility] and rehabilitation for vomiting, abdominal pain and diarrhea, reports a 2-day history of intermittent vomiting.  There was also concern for lower GI bleeding as patient had dark stools and  low hemoglobin requiring transfusion with significantly elevated white blood cell count.  General surgery was consulted given patient's history of ischemic colitis with occluded celiac artery, severe SMA stenosis with reconstitution, and CHRISTOPHER covered by prior EVAR.  Patient was seen and evaluated by general surgery who recommended no surgical intervention at this time but the patient needed prompt follow-up with vascular surgery as patient likely to require SMA stenting versus bypass.  Patient was transfused with improvement in hemoglobin and restarted on Eliquis, however approximately 5 days post reinitiate Eliquis she had another episode of melena and drop in hemoglobin.  Eliquis was discontinued at that time as risk was felt to far outweigh the benefits.  However patient was seen and evaluated by cardiology given the risks and benefits and cardiology does agree that at this point in time continued anticoagulation for her A. fib is outweighed by the risk of bleeding given her recurrent acute anemia.  Patient did complete a 7-day course of cefepime and Flagyl with overall improvement.  Prior CTA of the abdomen in August 2020 showed poor visualization of the origin of the celiac and superior mesenteric arteries with inability to exclude ischemia of colon due to diffuse colonic wall thickening additionally there was also noted multifocal plaque throughout the superficial femoral arteries bilaterally with noted descending thoracic aortic aneurysm..  A CT of abdomen and pelvis during this admission showed possible ileus with scattered air-fluid levels and mild gastric distention although no definitive inflammatory processes were noted.  Abdominal vasculature ultrasound obtained showed incomplete visualization of the origins of celiac and SMA.  Patient was continued on Protonix.  Patient was continued on aspirin 81 mg daily and statin given her peripheral arterial disease.  General surgery was gracious enough to help  arrange outpatient follow-up for patient with vascular surgery on August 2 at 8:40 AM at [facility] with Dr. Velasquez.  Patient will also follow-up with cardiology in 3 weeks for continued monitoring given her peripheral arterial disease and atrial fibrillation this patient may be candidate for resumption of anticoagulation or even a watchman device at a later date and time.  Patient will follow up with PCP in 1 week for post hospital follow-up.  Patient was medically stable and feeling well and wishing to return home at time of her discharge was felt to obtain maximal benefit of hospitalization.    VITAL SIGNS:  Temp:  [97.5 °F (36.4 °C)-98.3 °F (36.8 °C)] 97.9 °F (36.6 °C)  Heart Rate:  [53-90] 58  Resp:  [16-18] 16  BP: (136-162)/(55-74) 136/55  SpO2:  [96 %-99 %] 99 %  on  Flow (L/min):  [2] 2;   Device (Oxygen Therapy): nasal cannula    Body mass index is 17.48 kg/m².  Wt Readings from Last 3 Encounters:   06/01/21 49.1 kg (108 lb 4.8 oz)   05/17/21 50 kg (110 lb 4.8 oz)   04/30/21 50.3 kg (110 lb 12.8 oz)       PHYSICAL EXAM:  Constitutional: Chronically ill-appearing elderly female, frail.  No acute distress.      HENT:  Head:  Normocephalic and atraumatic.  Mouth:  Moist mucous membranes.    Eyes:  Conjunctivae and EOM are normal. No scleral icterus.    Neck:  Neck supple.  No JVD present.    Cardiovascular:  Normal rate, regular rhythm and normal heart sounds with no murmur.  Pulmonary/Chest:  No respiratory distress, no wheezes, no crackles.  Abdominal:  Soft.  Bowel sounds are normal.  No distension and no tenderness.   Musculoskeletal:  No tenderness, and no deformity.  No red or swollen joints anywhere.    Neurological:  Alert and oriented to person, place, and time.  No cranial nerve deficit. Intact Sensation throughout  Skin:  Skin is warm and dry. No rash or lesion noted. No pallor.   Peripheral vascular:  Pulses in all 4 extremities with no clubbing, no cyanosis, no edema.  Psychiatric:  Appropriate mood and affect, pleasant.     DISCHARGE DISPOSITION   Stable    DISCHARGE MEDICATIONS:     Discharge Medications      Changes to Medications      Instructions Start Date   atorvastatin 40 MG tablet  Commonly known as: LIPITOR  What changed:   · medication strength  · how much to take   40 mg, Oral, Nightly      carvedilol 6.25 MG tablet  Commonly known as: COREG  What changed:   · medication strength  · how much to take   6.25 mg, Oral, 2 Times Daily With Meals      pantoprazole 20 MG EC tablet  Commonly known as: PROTONIX  What changed: how much to take   40 mg, Oral, Daily         Continue These Medications      Instructions Start Date   ACIDOPHILUS/PECTIN PO   1 capsule, Oral, 2 times daily      amLODIPine 10 MG tablet  Commonly known as: NORVASC   10 mg, Oral, Daily      aspirin 81 MG chewable tablet   81 mg, Oral, Daily      baclofen 10 MG tablet  Commonly known as: LIORESAL   10 mg, Oral, Nightly      bisacodyl 10 MG suppository  Commonly known as: DULCOLAX   10 mg, Rectal, Daily PRN      Cariprazine HCl 1.5 MG capsule capsule  Commonly known as: VRAYLAR   1.5 mg, Oral, Daily      docusate sodium 250 MG capsule  Commonly known as: COLACE   250 mg, Oral, Daily      HYDROcodone-acetaminophen 5-325 MG per tablet  Commonly known as: NORCO   1 tablet, Oral, Every 8 Hours PRN      ipratropium-albuterol 0.5-2.5 mg/3 ml nebulizer  Commonly known as: DUO-NEB   3 mL, Nebulization, 4 Times Daily PRN      Jardiance 10 MG tablet  Generic drug: Empagliflozin   1 tablet, Oral, Every Morning      lactulose 10 GM/15ML solution  Commonly known as: CHRONULAC   10 g, Oral, Daily PRN      levothyroxine 200 MCG tablet  Commonly known as: SYNTHROID, LEVOTHROID   200 mcg, Oral, Daily      lisinopril 10 MG tablet  Commonly known as: PRINIVIL,ZESTRIL   10 mg, Oral, Daily      loperamide 2 MG capsule  Commonly known as: IMODIUM   2 mg, Oral, 4 Times Daily PRN      melatonin 5 MG tablet tablet   5 mg, Oral, Nightly       montelukast 10 MG tablet  Commonly known as: SINGULAIR   10 mg, Oral, Daily      Mucus Relief 400 MG tablet  Generic drug: guaiFENesin   400 mg, Oral, Every 12 Hours      multivitamin tablet tablet  Generic drug: multivitamin   1 tablet, Oral, Nightly      predniSONE 5 MG tablet  Commonly known as: DELTASONE   5 mg, Oral, Every Morning      promethazine 12.5 MG tablet  Commonly known as: PHENERGAN   12.5 mg, Oral, Every 8 Hours PRN      Senna 8.6 MG tablet  Generic drug: senna   1 tablet, Oral, Daily PRN      Stiolto Respimat 2.5-2.5 MCG/ACT aerosol solution inhaler  Generic drug: tiotropium bromide-olodaterol   2 puffs, Inhalation, Daily      traZODone 50 MG tablet  Commonly known as: DESYREL   50 mg, Oral, Nightly         Stop These Medications    apixaban 2.5 MG tablet tablet  Commonly known as: ELIQUIS              Additional Instructions for the Follow-ups that You Need to Schedule     Discharge Follow-up with PCP   As directed       Currently Documented PCP:    Leta Gardner MD    PCP Phone Number:    [phone]     Follow Up Details: 1 week post hospital follow up         Discharge Follow-up with Specialty: Cardiology; 3 Weeks   As directed      Specialty: Cardiology    Follow Up: 3 Weeks    Follow Up Details: Dr. Rust, [facility] and Dr. Rust to be seen by Dr. Rust himself at follow up.           Follow-up Information     Leta Gardner MD .    Specialty: Geriatric Medicine  Why: 1 week post hospital follow up  Contact information:  [facility] [place] [address]  [phone]                    TEST  RESULTS PENDING AT DISCHARGE       CODE STATUS  Code Status and Medical Interventions:   Ordered at: 05/23/21 0712     Limited Support to NOT Include:    Artificial Nutrition    Cardioversion/Defibrillation    Intubation     Code Status:    No CPR     Medical Interventions (Level of Support Prior to Arrest):    Mark Shaw DO  [facility]  [facility]ist  06/01/21  13:17 EDT    Please note that this discharge summary required more than 30 minutes to complete.      Electronically signed by Yariel Shaw DO at 06/01/21 5043

## 2021-07-23 ENCOUNTER — LAB REQUISITION (OUTPATIENT)
Dept: LAB | Facility: HOSPITAL | Age: 76
End: 2021-07-23

## 2021-07-23 DIAGNOSIS — D64.9 ANEMIA, UNSPECIFIED: ICD-10-CM

## 2021-07-23 LAB
BASOPHILS # BLD AUTO: 0.04 10*3/MM3 (ref 0–0.2)
BASOPHILS NFR BLD AUTO: 0.3 % (ref 0–1.5)
DEPRECATED RDW RBC AUTO: 60.4 FL (ref 37–54)
EOSINOPHIL # BLD AUTO: 0.34 10*3/MM3 (ref 0–0.4)
EOSINOPHIL NFR BLD AUTO: 2.9 % (ref 0.3–6.2)
ERYTHROCYTE [DISTWIDTH] IN BLOOD BY AUTOMATED COUNT: 16.6 % (ref 12.3–15.4)
HCT VFR BLD AUTO: 28.3 % (ref 34–46.6)
HGB BLD-MCNC: 8.6 G/DL (ref 12–15.9)
IMM GRANULOCYTES # BLD AUTO: 0.04 10*3/MM3 (ref 0–0.05)
IMM GRANULOCYTES NFR BLD AUTO: 0.3 % (ref 0–0.5)
LYMPHOCYTES # BLD AUTO: 1.27 10*3/MM3 (ref 0.7–3.1)
LYMPHOCYTES NFR BLD AUTO: 10.8 % (ref 19.6–45.3)
MCH RBC QN AUTO: 30.5 PG (ref 26.6–33)
MCHC RBC AUTO-ENTMCNC: 30.4 G/DL (ref 31.5–35.7)
MCV RBC AUTO: 100.4 FL (ref 79–97)
MONOCYTES # BLD AUTO: 1.06 10*3/MM3 (ref 0.1–0.9)
MONOCYTES NFR BLD AUTO: 9 % (ref 5–12)
NEUTROPHILS NFR BLD AUTO: 76.7 % (ref 42.7–76)
NEUTROPHILS NFR BLD AUTO: 8.98 10*3/MM3 (ref 1.7–7)
NRBC BLD AUTO-RTO: 0 /100 WBC (ref 0–0.2)
PLATELET # BLD AUTO: 341 10*3/MM3 (ref 140–450)
PMV BLD AUTO: 11.7 FL (ref 6–12)
RBC # BLD AUTO: 2.82 10*6/MM3 (ref 3.77–5.28)
WBC # BLD AUTO: 11.73 10*3/MM3 (ref 3.4–10.8)

## 2021-07-23 PROCEDURE — 85025 COMPLETE CBC W/AUTO DIFF WBC: CPT | Performed by: INTERNAL MEDICINE

## 2021-08-03 ENCOUNTER — OFFICE VISIT (OUTPATIENT)
Dept: CARDIOLOGY | Facility: CLINIC | Age: 76
End: 2021-08-03

## 2021-08-03 VITALS
OXYGEN SATURATION: 85 % | HEIGHT: 66 IN | SYSTOLIC BLOOD PRESSURE: 151 MMHG | DIASTOLIC BLOOD PRESSURE: 77 MMHG | HEART RATE: 69 BPM | BODY MASS INDEX: 17.48 KG/M2 | TEMPERATURE: 97.8 F

## 2021-08-03 DIAGNOSIS — I50.32 DIASTOLIC CHF, CHRONIC (HCC): Chronic | ICD-10-CM

## 2021-08-03 DIAGNOSIS — K52.9 COLITIS: ICD-10-CM

## 2021-08-03 DIAGNOSIS — K92.2 UPPER GI BLEED: ICD-10-CM

## 2021-08-03 DIAGNOSIS — R06.02 SHORTNESS OF BREATH: Primary | ICD-10-CM

## 2021-08-03 PROCEDURE — 99214 OFFICE O/P EST MOD 30 MIN: CPT | Performed by: PHYSICIAN ASSISTANT

## 2021-08-03 RX ORDER — FERROUS SULFATE 325(65) MG
325 TABLET ORAL
Status: ON HOLD | COMMUNITY
End: 2021-10-08 | Stop reason: SDUPTHER

## 2021-08-03 NOTE — PROGRESS NOTES
Leta Gardner MD  Mary Ly  1945 08/03/2021    Patient Active Problem List   Diagnosis   • Status post laparoscopic cholecystectomy   • Severe malnutrition (CMS/HCC)   • Colitis   • COPD (chronic obstructive pulmonary disease) (CMS/HCC)   • Type II diabetes mellitus (CMS/HCC)   • Hypothyroidism   • Hyperlipidemia   • Diastolic CHF, chronic (CMS/HCC)   • Moderate malnutrition (CMS/HCC)   • Vomiting   • Upper GI bleed       Dear Leta Gardner MD:    Subjective     History of Present Illness:    Chief Complaint   Patient presents with   • Follow-up     HOSPITAL FOLLOW UP-patient staes valeria       Mary Ly is a pleasant 75 y.o. female with a past medical history significant for history of CVA in the past, diabetes mellitus type 2, essential hypertension, coronary artery disease status post PCI, CKD, ischemic colitis, and paroxysmal atrial fibrillation.  Patient was recently hospitalized after found to have severe GI bleed with low hemoglobin levels required multiple blood transfusions.  She had recurrent GI bleed after the addition of Eliquis and this was deemed that the risk of anticoagulation was higher than the benefit.  Have signs concerning for possible ischemic bowel disease and was referred to vascular surgery.  She comes into our office today for hospital follow-up.    Mary reports from clinical standpoint she has been improving well her hemoglobin has been trending up currently at 8.6.  She has not followed with vascular surgeon yet but nursing staff is with her today reports that she does have an appointment although she cannot recall the exact date.  She denies any further known bleeding issues since discharge such as blood in her stool or urine or sputum.  She does have chronic shortness of breath and is oxygen dependent.  Blood pressure is also been well controlled in the nursing home as well.    Allergies   Allergen Reactions   • Haldol [Haloperidol] Hallucinations    :      Current Outpatient Medications:   •  amLODIPine (NORVASC) 10 MG tablet, Take 10 mg by mouth Daily., Disp: , Rfl:   •  aspirin 81 MG chewable tablet, Chew 81 mg Daily., Disp: , Rfl:   •  atorvastatin (LIPITOR) 40 MG tablet, Take 1 tablet by mouth Every Night for 30 days., Disp: 30 tablet, Rfl: 0  •  baclofen (LIORESAL) 10 MG tablet, Take 10 mg by mouth Every Night., Disp: , Rfl:   •  bisacodyl (DULCOLAX) 10 MG suppository, Insert 10 mg into the rectum Daily As Needed for Constipation., Disp: , Rfl:   •  Cariprazine HCl (VRAYLAR) 1.5 MG capsule capsule, Take 1.5 mg by mouth Daily., Disp: , Rfl:   •  carvedilol (COREG) 6.25 MG tablet, Take 1 tablet by mouth 2 (Two) Times a Day With Meals for 30 days., Disp: 60 tablet, Rfl: 0  •  docusate sodium (COLACE) 250 MG capsule, Take 250 mg by mouth Daily., Disp: , Rfl:   •  ferrous sulfate 325 (65 FE) MG tablet, Take 325 mg by mouth Daily With Breakfast., Disp: , Rfl:   •  guaiFENesin (Mucus Relief) 400 MG tablet, Take 400 mg by mouth Every 12 (Twelve) Hours., Disp: , Rfl:   •  HYDROcodone-acetaminophen (NORCO) 5-325 MG per tablet, Take 1 tablet by mouth Every 8 (Eight) Hours As Needed for Mild Pain  or Moderate Pain ., Disp: 9 tablet, Rfl: 0  •  ipratropium-albuterol (DUO-NEB) 0.5-2.5 mg/3 ml nebulizer, Take 3 mL by nebulization 4 (Four) Times a Day As Needed for Wheezing or Shortness of Air., Disp: , Rfl:   •  Lactobacillus (ACIDOPHILUS/PECTIN PO), Take 1 capsule by mouth 2 (two) times a day., Disp: , Rfl:   •  lactulose (CHRONULAC) 10 GM/15ML solution, Take 10 g by mouth Daily As Needed., Disp: , Rfl:   •  levothyroxine (SYNTHROID, LEVOTHROID) 200 MCG tablet, Take 200 mcg by mouth Daily., Disp: , Rfl:   •  lisinopril (PRINIVIL,ZESTRIL) 10 MG tablet, Take 10 mg by mouth Daily., Disp: , Rfl:   •  loperamide (IMODIUM) 2 MG capsule, Take 2 mg by mouth 4 (Four) Times a Day As Needed for Diarrhea., Disp: , Rfl:   •  melatonin 5 MG tablet tablet, Take 5 mg by mouth Every  "Night., Disp: , Rfl:   •  montelukast (SINGULAIR) 10 MG tablet, Take 10 mg by mouth Daily., Disp: , Rfl:   •  Multiple Vitamin (MULTIVITAMIN) tablet tablet, Take 1 tablet by mouth Every Night., Disp: , Rfl:   •  pantoprazole (PROTONIX) 20 MG EC tablet, Take 2 tablets by mouth Daily for 30 days., Disp: 60 tablet, Rfl: 0  •  predniSONE (DELTASONE) 5 MG tablet, Take 5 mg by mouth Every Morning., Disp: , Rfl:   •  promethazine (PHENERGAN) 12.5 MG tablet, Take 12.5 mg by mouth Every 8 (Eight) Hours As Needed for Nausea or Vomiting., Disp: , Rfl:   •  senna (Senna) 8.6 MG tablet, Take 1 tablet by mouth Daily As Needed for Constipation., Disp: , Rfl:   •  SITagliptin (JANUVIA) 100 MG tablet, Take 100 mg by mouth Daily., Disp: , Rfl:   •  STIOLTO RESPIMAT 2.5-2.5 MCG/ACT aerosol solution inhaler, Inhale 2 puffs Daily., Disp: , Rfl:   •  traZODone (DESYREL) 50 MG tablet, Take 50 mg by mouth Every Night., Disp: , Rfl:   •  Empagliflozin (Jardiance) 10 MG tablet, Take 1 tablet by mouth Every Morning., Disp: , Rfl:     The following portions of the patient's history were reviewed and updated as appropriate: allergies, current medications, past family history, past medical history, past social history, past surgical history and problem list.    Social History     Tobacco Use   • Smoking status: Current Every Day Smoker     Packs/day: 1.00     Years: 55.00     Pack years: 55.00     Types: Cigarettes   • Smokeless tobacco: Never Used   Substance Use Topics   • Alcohol use: Never   • Drug use: Never         Objective   Vitals:    08/03/21 1455   BP: 151/77   Pulse: 69   Temp: 97.8 °F (36.6 °C)   SpO2: (!) 85%   Height: 167.6 cm (66\")     Body mass index is 17.48 kg/m².    Constitutional:       General: Not in acute distress.     Appearance: Healthy appearance. Well-developed and not in distress. Not diaphoretic.   Eyes:      Conjunctiva/sclera: Conjunctivae normal.      Pupils: Pupils are equal, round, and reactive to light. "   HENT:      Head: Normocephalic and atraumatic.   Neck:      Vascular: No carotid bruit or JVD.   Pulmonary:      Effort: Pulmonary effort is normal. No respiratory distress.      Breath sounds: Normal breath sounds.   Cardiovascular:      Normal rate. Regular rhythm.   Skin:     General: Skin is cool.   Neurological:      Mental Status: Alert, oriented to person, place, and time and oriented to person, place and time.         Lab Results   Component Value Date     06/01/2021    K 3.7 06/01/2021     06/01/2021    CO2 31.0 (H) 06/01/2021    BUN 9 06/01/2021    CREATININE 0.38 (L) 06/01/2021    GLUCOSE 176 (H) 06/01/2021    CALCIUM 7.7 (L) 06/01/2021    AST 19 05/24/2021    ALT 8 05/24/2021    ALKPHOS 51 05/24/2021     Lab Results   Component Value Date    CKTOTAL 54 04/13/2021     Lab Results   Component Value Date    WBC 11.73 (H) 07/23/2021    HGB 8.6 (L) 07/23/2021    HCT 28.3 (L) 07/23/2021     07/23/2021     Lab Results   Component Value Date    INR 1.26 (H) 05/22/2021    INR 1.24 (H) 04/22/2021    INR 0.98 03/09/2021     Lab Results   Component Value Date    MG 1.7 05/21/2021     Lab Results   Component Value Date    TSH 1.740 04/23/2021      No results found for: BNP    During this visit the following were done:  Labs Reviewed []    Labs Ordered []    Radiology Reports Reviewed []    Radiology Ordered []    PCP Records Reviewed []    Referring Provider Records Reviewed []    ER Records Reviewed []    Hospital Records Reviewed []    History Obtained From Family []    Radiology Images Reviewed []    Other Reviewed []    Records Requested []       Procedures    Assessment/Plan    Diagnosis Plan   1. Shortness of breath  XR Chest 2 View            Recommendations:  1. Paroxysmal atrial fibrillation  1. Patient unable to tolerate anticoagulation due to severe GI bleed that was recurrent on Eliquis.  I did discuss option of possible watchman's device although even this would be difficult as she  would require anticoagulation for 4 to 6 weeks.  She does have an appointment with gastroenterology and vascular surgery perhaps if they can clear her for anticoagulation for a short course can consider watchman's device.  For now she will have to remain without anticoagulation.  She did understand these risks.  2. Dyspnea  1. Initially patient's oxygen was 85% she was not wearing oxygen which she does wear throughout most of the day.  Once oxygen was placed her oxygen did come up to 99%.  She also reports increased coughing she has been vaccinated for COVID-19 however I will order chest x-ray to rule out pneumonia.      No follow-ups on file.    As always, I appreciate very much the opportunity to participate in the cardiovascular care of your patients.      With Best Regards,    Santiago Vallejo PA-C

## 2021-08-13 ENCOUNTER — LAB REQUISITION (OUTPATIENT)
Dept: LAB | Facility: HOSPITAL | Age: 76
End: 2021-08-13

## 2021-08-13 DIAGNOSIS — D64.9 ANEMIA, UNSPECIFIED: ICD-10-CM

## 2021-08-13 LAB
BASOPHILS # BLD AUTO: 0.08 10*3/MM3 (ref 0–0.2)
BASOPHILS NFR BLD AUTO: 0.6 % (ref 0–1.5)
CRP SERPL-MCNC: 0.91 MG/DL (ref 0–0.5)
DEPRECATED RDW RBC AUTO: 58.2 FL (ref 37–54)
EOSINOPHIL # BLD AUTO: 0.17 10*3/MM3 (ref 0–0.4)
EOSINOPHIL NFR BLD AUTO: 1.2 % (ref 0.3–6.2)
ERYTHROCYTE [DISTWIDTH] IN BLOOD BY AUTOMATED COUNT: 15.9 % (ref 12.3–15.4)
HCT VFR BLD AUTO: 23.2 % (ref 34–46.6)
HGB BLD-MCNC: 7.2 G/DL (ref 12–15.9)
IMM GRANULOCYTES # BLD AUTO: 0.07 10*3/MM3 (ref 0–0.05)
IMM GRANULOCYTES NFR BLD AUTO: 0.5 % (ref 0–0.5)
LYMPHOCYTES # BLD AUTO: 1.17 10*3/MM3 (ref 0.7–3.1)
LYMPHOCYTES NFR BLD AUTO: 8.1 % (ref 19.6–45.3)
MCH RBC QN AUTO: 31.4 PG (ref 26.6–33)
MCHC RBC AUTO-ENTMCNC: 31 G/DL (ref 31.5–35.7)
MCV RBC AUTO: 101.3 FL (ref 79–97)
MONOCYTES # BLD AUTO: 1.31 10*3/MM3 (ref 0.1–0.9)
MONOCYTES NFR BLD AUTO: 9.1 % (ref 5–12)
NEUTROPHILS NFR BLD AUTO: 11.57 10*3/MM3 (ref 1.7–7)
NEUTROPHILS NFR BLD AUTO: 80.5 % (ref 42.7–76)
NRBC BLD AUTO-RTO: 0 /100 WBC (ref 0–0.2)
PLATELET # BLD AUTO: 355 10*3/MM3 (ref 140–450)
PMV BLD AUTO: 10.6 FL (ref 6–12)
RBC # BLD AUTO: 2.29 10*6/MM3 (ref 3.77–5.28)
WBC # BLD AUTO: 14.37 10*3/MM3 (ref 3.4–10.8)

## 2021-08-13 PROCEDURE — 85025 COMPLETE CBC W/AUTO DIFF WBC: CPT | Performed by: INTERNAL MEDICINE

## 2021-08-13 PROCEDURE — 86140 C-REACTIVE PROTEIN: CPT | Performed by: INTERNAL MEDICINE

## 2021-08-20 ENCOUNTER — APPOINTMENT (OUTPATIENT)
Dept: CT IMAGING | Facility: HOSPITAL | Age: 76
End: 2021-08-20

## 2021-08-20 ENCOUNTER — APPOINTMENT (OUTPATIENT)
Dept: GENERAL RADIOLOGY | Facility: HOSPITAL | Age: 76
End: 2021-08-20

## 2021-08-20 ENCOUNTER — HOSPITAL ENCOUNTER (EMERGENCY)
Facility: HOSPITAL | Age: 76
Discharge: NURSING, REHAB OR LONG-TERM CARE | End: 2021-08-21
Attending: STUDENT IN AN ORGANIZED HEALTH CARE EDUCATION/TRAINING PROGRAM | Admitting: EMERGENCY MEDICINE

## 2021-08-20 DIAGNOSIS — J44.1 COPD WITH ACUTE EXACERBATION (HCC): ICD-10-CM

## 2021-08-20 DIAGNOSIS — I48.91 ATRIAL FIBRILLATION, UNSPECIFIED TYPE (HCC): Primary | ICD-10-CM

## 2021-08-20 DIAGNOSIS — R77.8 ELEVATED TROPONIN: ICD-10-CM

## 2021-08-20 DIAGNOSIS — D72.829 LEUKOCYTOSIS, UNSPECIFIED TYPE: ICD-10-CM

## 2021-08-20 LAB
A-A DO2: 42.1 MMHG (ref 0–300)
A-A DO2: 71.1 MMHG (ref 0–300)
ALBUMIN SERPL-MCNC: 3.55 G/DL (ref 3.5–5.2)
ALBUMIN/GLOB SERPL: 1.2 G/DL
ALP SERPL-CCNC: 92 U/L (ref 39–117)
ALT SERPL W P-5'-P-CCNC: 12 U/L (ref 1–33)
ANION GAP SERPL CALCULATED.3IONS-SCNC: 20.2 MMOL/L (ref 5–15)
ARTERIAL PATENCY WRIST A: ABNORMAL
ARTERIAL PATENCY WRIST A: ABNORMAL
AST SERPL-CCNC: 25 U/L (ref 1–32)
ATMOSPHERIC PRESS: 725 MMHG
ATMOSPHERIC PRESS: 725 MMHG
BACTERIA UR QL AUTO: ABNORMAL /HPF
BASE EXCESS BLDA CALC-SCNC: 3.9 MMOL/L (ref 0–2)
BASE EXCESS BLDA CALC-SCNC: 4.5 MMOL/L (ref 0–2)
BASOPHILS # BLD AUTO: 0.07 10*3/MM3 (ref 0–0.2)
BASOPHILS NFR BLD AUTO: 0.4 % (ref 0–1.5)
BDY SITE: ABNORMAL
BDY SITE: ABNORMAL
BILIRUB SERPL-MCNC: 0.2 MG/DL (ref 0–1.2)
BILIRUB UR QL STRIP: NEGATIVE
BODY TEMPERATURE: 0 C
BODY TEMPERATURE: 0 C
BUN SERPL-MCNC: 26 MG/DL (ref 8–23)
BUN/CREAT SERPL: 32.9 (ref 7–25)
CALCIUM SPEC-SCNC: 9.5 MG/DL (ref 8.6–10.5)
CHLORIDE SERPL-SCNC: 90 MMOL/L (ref 98–107)
CLARITY UR: CLEAR
CO2 BLDA-SCNC: 30.9 MMOL/L (ref 22–33)
CO2 BLDA-SCNC: 31.8 MMOL/L (ref 22–33)
CO2 SERPL-SCNC: 23.8 MMOL/L (ref 22–29)
COHGB MFR BLD: 2.1 % (ref 0–5)
COHGB MFR BLD: 3 % (ref 0–5)
COLOR UR: YELLOW
CREAT SERPL-MCNC: 0.79 MG/DL (ref 0.57–1)
CRP SERPL-MCNC: 12.47 MG/DL (ref 0–0.5)
D-LACTATE SERPL-SCNC: 0.8 MMOL/L (ref 0.5–2)
DEPRECATED RDW RBC AUTO: 55.4 FL (ref 37–54)
EOSINOPHIL # BLD AUTO: 0.01 10*3/MM3 (ref 0–0.4)
EOSINOPHIL NFR BLD AUTO: 0.1 % (ref 0.3–6.2)
ERYTHROCYTE [DISTWIDTH] IN BLOOD BY AUTOMATED COUNT: 15.3 % (ref 12.3–15.4)
FLUAV RNA RESP QL NAA+PROBE: NOT DETECTED
FLUBV RNA RESP QL NAA+PROBE: NOT DETECTED
GAS FLOW AIRWAY: 2 LPM
GAS FLOW AIRWAY: 3 LPM
GFR SERPL CREATININE-BSD FRML MDRD: 71 ML/MIN/1.73
GLOBULIN UR ELPH-MCNC: 2.9 GM/DL
GLUCOSE SERPL-MCNC: 96 MG/DL (ref 65–99)
GLUCOSE UR STRIP-MCNC: NEGATIVE MG/DL
HCO3 BLDA-SCNC: 29.4 MMOL/L (ref 20–26)
HCO3 BLDA-SCNC: 30.2 MMOL/L (ref 20–26)
HCT VFR BLD AUTO: 24.7 % (ref 34–46.6)
HCT VFR BLD CALC: 24.5 % (ref 38–51)
HCT VFR BLD CALC: 24.6 % (ref 38–51)
HGB BLD-MCNC: 7.5 G/DL (ref 12–15.9)
HGB BLDA-MCNC: 8 G/DL (ref 13.5–17.5)
HGB BLDA-MCNC: 8 G/DL (ref 13.5–17.5)
HGB UR QL STRIP.AUTO: NEGATIVE
HOLD SPECIMEN: NORMAL
HOLD SPECIMEN: NORMAL
HYALINE CASTS UR QL AUTO: ABNORMAL /LPF
IMM GRANULOCYTES # BLD AUTO: 0.05 10*3/MM3 (ref 0–0.05)
IMM GRANULOCYTES NFR BLD AUTO: 0.3 % (ref 0–0.5)
INHALED O2 CONCENTRATION: 28 %
INHALED O2 CONCENTRATION: 32 %
KETONES UR QL STRIP: ABNORMAL
LEUKOCYTE ESTERASE UR QL STRIP.AUTO: ABNORMAL
LIPASE SERPL-CCNC: 14 U/L (ref 13–60)
LYMPHOCYTES # BLD AUTO: 0.56 10*3/MM3 (ref 0.7–3.1)
LYMPHOCYTES NFR BLD AUTO: 3.1 % (ref 19.6–45.3)
Lab: ABNORMAL
Lab: ABNORMAL
MCH RBC QN AUTO: 30.2 PG (ref 26.6–33)
MCHC RBC AUTO-ENTMCNC: 30.4 G/DL (ref 31.5–35.7)
MCV RBC AUTO: 99.6 FL (ref 79–97)
METHGB BLD QL: 0.1 % (ref 0–3)
METHGB BLD QL: 0.1 % (ref 0–3)
MODALITY: ABNORMAL
MODALITY: ABNORMAL
MONOCYTES # BLD AUTO: 1.54 10*3/MM3 (ref 0.1–0.9)
MONOCYTES NFR BLD AUTO: 8.5 % (ref 5–12)
NEUTROPHILS NFR BLD AUTO: 15.95 10*3/MM3 (ref 1.7–7)
NEUTROPHILS NFR BLD AUTO: 87.6 % (ref 42.7–76)
NITRITE UR QL STRIP: NEGATIVE
NOTE: ABNORMAL
NOTE: ABNORMAL
NRBC BLD AUTO-RTO: 0 /100 WBC (ref 0–0.2)
NT-PROBNP SERPL-MCNC: 738 PG/ML (ref 0–1800)
OXYHGB MFR BLDV: 86.9 % (ref 94–99)
OXYHGB MFR BLDV: 97.2 % (ref 94–99)
PCO2 BLDA: 48.7 MM HG (ref 35–45)
PCO2 BLDA: 51.2 MM HG (ref 35–45)
PCO2 TEMP ADJ BLD: ABNORMAL MM[HG]
PCO2 TEMP ADJ BLD: ABNORMAL MM[HG]
PH BLDA: 7.38 PH UNITS (ref 7.35–7.45)
PH BLDA: 7.39 PH UNITS (ref 7.35–7.45)
PH UR STRIP.AUTO: <=5 [PH] (ref 5–8)
PH, TEMP CORRECTED: ABNORMAL
PH, TEMP CORRECTED: ABNORMAL
PLATELET # BLD AUTO: 347 10*3/MM3 (ref 140–450)
PMV BLD AUTO: 10.5 FL (ref 6–12)
PO2 BLDA: 121 MM HG (ref 83–108)
PO2 BLDA: 61.6 MM HG (ref 83–108)
PO2 TEMP ADJ BLD: ABNORMAL MM[HG]
PO2 TEMP ADJ BLD: ABNORMAL MM[HG]
POTASSIUM SERPL-SCNC: 4.2 MMOL/L (ref 3.5–5.2)
PROT SERPL-MCNC: 6.4 G/DL (ref 6–8.5)
PROT UR QL STRIP: NEGATIVE
RBC # BLD AUTO: 2.48 10*6/MM3 (ref 3.77–5.28)
RBC # UR: ABNORMAL /HPF
REF LAB TEST METHOD: ABNORMAL
SAO2 % BLDCOA: 89.7 % (ref 94–99)
SAO2 % BLDCOA: >99.2 % (ref 94–99)
SARS-COV-2 RNA RESP QL NAA+PROBE: NOT DETECTED
SODIUM SERPL-SCNC: 134 MMOL/L (ref 136–145)
SP GR UR STRIP: 1.02 (ref 1–1.03)
SQUAMOUS #/AREA URNS HPF: ABNORMAL /HPF
TROPONIN T SERPL-MCNC: 0.05 NG/ML (ref 0–0.03)
TROPONIN T SERPL-MCNC: 0.06 NG/ML (ref 0–0.03)
UROBILINOGEN UR QL STRIP: ABNORMAL
VENTILATOR MODE: ABNORMAL
VENTILATOR MODE: ABNORMAL
WBC # BLD AUTO: 18.18 10*3/MM3 (ref 3.4–10.8)
WBC UR QL AUTO: ABNORMAL /HPF
WHOLE BLOOD HOLD SPECIMEN: NORMAL
YEAST URNS QL MICRO: ABNORMAL /HPF

## 2021-08-20 PROCEDURE — 36600 WITHDRAWAL OF ARTERIAL BLOOD: CPT

## 2021-08-20 PROCEDURE — 80053 COMPREHEN METABOLIC PANEL: CPT | Performed by: PHYSICIAN ASSISTANT

## 2021-08-20 PROCEDURE — 87147 CULTURE TYPE IMMUNOLOGIC: CPT | Performed by: PHYSICIAN ASSISTANT

## 2021-08-20 PROCEDURE — 86140 C-REACTIVE PROTEIN: CPT | Performed by: PHYSICIAN ASSISTANT

## 2021-08-20 PROCEDURE — 71250 CT THORAX DX C-: CPT | Performed by: RADIOLOGY

## 2021-08-20 PROCEDURE — 83605 ASSAY OF LACTIC ACID: CPT | Performed by: PHYSICIAN ASSISTANT

## 2021-08-20 PROCEDURE — 99284 EMERGENCY DEPT VISIT MOD MDM: CPT

## 2021-08-20 PROCEDURE — 94799 UNLISTED PULMONARY SVC/PX: CPT

## 2021-08-20 PROCEDURE — 93005 ELECTROCARDIOGRAM TRACING: CPT | Performed by: PHYSICIAN ASSISTANT

## 2021-08-20 PROCEDURE — 87150 DNA/RNA AMPLIFIED PROBE: CPT | Performed by: PHYSICIAN ASSISTANT

## 2021-08-20 PROCEDURE — 83690 ASSAY OF LIPASE: CPT | Performed by: PHYSICIAN ASSISTANT

## 2021-08-20 PROCEDURE — 94640 AIRWAY INHALATION TREATMENT: CPT

## 2021-08-20 PROCEDURE — 96365 THER/PROPH/DIAG IV INF INIT: CPT

## 2021-08-20 PROCEDURE — 25010000002 CEFTRIAXONE PER 250 MG: Performed by: PHYSICIAN ASSISTANT

## 2021-08-20 PROCEDURE — 87040 BLOOD CULTURE FOR BACTERIA: CPT | Performed by: PHYSICIAN ASSISTANT

## 2021-08-20 PROCEDURE — 96367 TX/PROPH/DG ADDL SEQ IV INF: CPT

## 2021-08-20 PROCEDURE — 71250 CT THORAX DX C-: CPT

## 2021-08-20 PROCEDURE — 96372 THER/PROPH/DIAG INJ SC/IM: CPT

## 2021-08-20 PROCEDURE — 83880 ASSAY OF NATRIURETIC PEPTIDE: CPT | Performed by: PHYSICIAN ASSISTANT

## 2021-08-20 PROCEDURE — 82375 ASSAY CARBOXYHB QUANT: CPT

## 2021-08-20 PROCEDURE — 82805 BLOOD GASES W/O2 SATURATION: CPT

## 2021-08-20 PROCEDURE — 25010000002 ENOXAPARIN PER 10 MG: Performed by: PHYSICIAN ASSISTANT

## 2021-08-20 PROCEDURE — 83050 HGB METHEMOGLOBIN QUAN: CPT

## 2021-08-20 PROCEDURE — 85025 COMPLETE CBC W/AUTO DIFF WBC: CPT | Performed by: PHYSICIAN ASSISTANT

## 2021-08-20 PROCEDURE — P9612 CATHETERIZE FOR URINE SPEC: HCPCS

## 2021-08-20 PROCEDURE — 84484 ASSAY OF TROPONIN QUANT: CPT | Performed by: PHYSICIAN ASSISTANT

## 2021-08-20 PROCEDURE — 96375 TX/PRO/DX INJ NEW DRUG ADDON: CPT

## 2021-08-20 PROCEDURE — 81001 URINALYSIS AUTO W/SCOPE: CPT | Performed by: PHYSICIAN ASSISTANT

## 2021-08-20 PROCEDURE — 74176 CT ABD & PELVIS W/O CONTRAST: CPT

## 2021-08-20 PROCEDURE — 25010000002 METHYLPREDNISOLONE PER 125 MG: Performed by: PHYSICIAN ASSISTANT

## 2021-08-20 PROCEDURE — 93010 ELECTROCARDIOGRAM REPORT: CPT | Performed by: SPECIALIST

## 2021-08-20 PROCEDURE — 71045 X-RAY EXAM CHEST 1 VIEW: CPT

## 2021-08-20 PROCEDURE — 87636 SARSCOV2 & INF A&B AMP PRB: CPT | Performed by: PHYSICIAN ASSISTANT

## 2021-08-20 PROCEDURE — 71045 X-RAY EXAM CHEST 1 VIEW: CPT | Performed by: RADIOLOGY

## 2021-08-20 PROCEDURE — 74176 CT ABD & PELVIS W/O CONTRAST: CPT | Performed by: RADIOLOGY

## 2021-08-20 RX ORDER — METHYLPREDNISOLONE SODIUM SUCCINATE 125 MG/2ML
125 INJECTION, POWDER, LYOPHILIZED, FOR SOLUTION INTRAMUSCULAR; INTRAVENOUS ONCE
Status: COMPLETED | OUTPATIENT
Start: 2021-08-20 | End: 2021-08-20

## 2021-08-20 RX ORDER — IPRATROPIUM BROMIDE AND ALBUTEROL SULFATE 2.5; .5 MG/3ML; MG/3ML
3 SOLUTION RESPIRATORY (INHALATION) ONCE
Status: COMPLETED | OUTPATIENT
Start: 2021-08-20 | End: 2021-08-20

## 2021-08-20 RX ORDER — ASPIRIN 81 MG/1
324 TABLET, CHEWABLE ORAL ONCE
Status: DISCONTINUED | OUTPATIENT
Start: 2021-08-20 | End: 2021-08-20

## 2021-08-20 RX ORDER — SODIUM CHLORIDE 0.9 % (FLUSH) 0.9 %
10 SYRINGE (ML) INJECTION AS NEEDED
Status: DISCONTINUED | OUTPATIENT
Start: 2021-08-20 | End: 2021-08-21 | Stop reason: HOSPADM

## 2021-08-20 RX ORDER — ASPIRIN 81 MG/1
324 TABLET, CHEWABLE ORAL ONCE
Status: COMPLETED | OUTPATIENT
Start: 2021-08-20 | End: 2021-08-20

## 2021-08-20 RX ADMIN — ASPIRIN 324 MG: 81 TABLET, CHEWABLE ORAL at 14:06

## 2021-08-20 RX ADMIN — ENOXAPARIN SODIUM 50 MG: 60 INJECTION SUBCUTANEOUS at 20:50

## 2021-08-20 RX ADMIN — METHYLPREDNISOLONE SODIUM SUCCINATE 125 MG: 125 INJECTION, POWDER, FOR SOLUTION INTRAMUSCULAR; INTRAVENOUS at 18:48

## 2021-08-20 RX ADMIN — SODIUM CHLORIDE 2 G: 9 INJECTION, SOLUTION INTRAVENOUS at 15:14

## 2021-08-20 RX ADMIN — SODIUM CHLORIDE 500 ML: 9 INJECTION, SOLUTION INTRAVENOUS at 14:06

## 2021-08-20 RX ADMIN — DOXYCYCLINE 100 MG: 100 INJECTION, POWDER, LYOPHILIZED, FOR SOLUTION INTRAVENOUS at 20:22

## 2021-08-20 RX ADMIN — IPRATROPIUM BROMIDE AND ALBUTEROL SULFATE 3 ML: .5; 3 SOLUTION RESPIRATORY (INHALATION) at 18:54

## 2021-08-20 NOTE — ED NOTES
Titrated pt oxygen to 3L via nc per Dinah Obrien PA-C verbal order     Clary Nunn, RN  08/20/21 2388

## 2021-08-20 NOTE — ED NOTES
Fadumo from [facility] called requesting an update. Fadumo updated on poc.        Clary Nunn RN  08/20/21 5615

## 2021-08-20 NOTE — ED NOTES
Patient noted to have urine and stool in brief. Patient cleaned and placed into a new brief and gown. Patient repositioned up in bed.      Clary Nunn RN  08/20/21 5447

## 2021-08-21 VITALS
DIASTOLIC BLOOD PRESSURE: 76 MMHG | BODY MASS INDEX: 18 KG/M2 | OXYGEN SATURATION: 99 % | SYSTOLIC BLOOD PRESSURE: 128 MMHG | HEART RATE: 71 BPM | RESPIRATION RATE: 17 BRPM | TEMPERATURE: 97.7 F | HEIGHT: 66 IN | WEIGHT: 112 LBS

## 2021-08-21 LAB
A-A DO2: 61.4 MMHG (ref 0–300)
ARTERIAL PATENCY WRIST A: ABNORMAL
ATMOSPHERIC PRESS: 724 MMHG
BACTERIA BLD CULT: ABNORMAL
BASE EXCESS BLDA CALC-SCNC: 1.8 MMOL/L (ref 0–2)
BASOPHILS # BLD AUTO: 0.05 10*3/MM3 (ref 0–0.2)
BASOPHILS NFR BLD AUTO: 0.2 % (ref 0–1.5)
BDY SITE: ABNORMAL
BODY TEMPERATURE: 0 C
BOTTLE TYPE: ABNORMAL
CO2 BLDA-SCNC: 28.2 MMOL/L (ref 22–33)
COHGB MFR BLD: 1.3 % (ref 0–5)
DEPRECATED RDW RBC AUTO: 55.9 FL (ref 37–54)
EOSINOPHIL # BLD AUTO: 0.14 10*3/MM3 (ref 0–0.4)
EOSINOPHIL NFR BLD AUTO: 0.6 % (ref 0.3–6.2)
ERYTHROCYTE [DISTWIDTH] IN BLOOD BY AUTOMATED COUNT: 15.2 % (ref 12.3–15.4)
GAS FLOW AIRWAY: 3 LPM
HCO3 BLDA-SCNC: 26.9 MMOL/L (ref 20–26)
HCT VFR BLD AUTO: 28.1 % (ref 34–46.6)
HCT VFR BLD CALC: 22.5 % (ref 38–51)
HGB BLD-MCNC: 8.3 G/DL (ref 12–15.9)
HGB BLDA-MCNC: 7.4 G/DL (ref 13.5–17.5)
IMM GRANULOCYTES # BLD AUTO: 0.08 10*3/MM3 (ref 0–0.05)
IMM GRANULOCYTES NFR BLD AUTO: 0.4 % (ref 0–0.5)
INHALED O2 CONCENTRATION: 32 %
LYMPHOCYTES # BLD AUTO: 0.82 10*3/MM3 (ref 0.7–3.1)
LYMPHOCYTES NFR BLD AUTO: 3.7 % (ref 19.6–45.3)
Lab: ABNORMAL
MCH RBC QN AUTO: 29.9 PG (ref 26.6–33)
MCHC RBC AUTO-ENTMCNC: 29.5 G/DL (ref 31.5–35.7)
MCV RBC AUTO: 101.1 FL (ref 79–97)
METHGB BLD QL: 0.3 % (ref 0–3)
MODALITY: ABNORMAL
MONOCYTES # BLD AUTO: 2.04 10*3/MM3 (ref 0.1–0.9)
MONOCYTES NFR BLD AUTO: 9.1 % (ref 5–12)
NEUTROPHILS NFR BLD AUTO: 19.26 10*3/MM3 (ref 1.7–7)
NEUTROPHILS NFR BLD AUTO: 86 % (ref 42.7–76)
NOTE: ABNORMAL
NRBC BLD AUTO-RTO: 0 /100 WBC (ref 0–0.2)
OXYHGB MFR BLDV: 97.2 % (ref 94–99)
PCO2 BLDA: 44.1 MM HG (ref 35–45)
PCO2 TEMP ADJ BLD: ABNORMAL MM[HG]
PH BLDA: 7.39 PH UNITS (ref 7.35–7.45)
PH, TEMP CORRECTED: ABNORMAL
PLATELET # BLD AUTO: 363 10*3/MM3 (ref 140–450)
PMV BLD AUTO: 10.4 FL (ref 6–12)
PO2 BLDA: 106 MM HG (ref 83–108)
PO2 TEMP ADJ BLD: ABNORMAL MM[HG]
QT INTERVAL: 374 MS
QT INTERVAL: 378 MS
QTC INTERVAL: 467 MS
QTC INTERVAL: 472 MS
RBC # BLD AUTO: 2.78 10*6/MM3 (ref 3.77–5.28)
SAO2 % BLDCOA: 98.8 % (ref 94–99)
VENTILATOR MODE: ABNORMAL
WBC # BLD AUTO: 22.39 10*3/MM3 (ref 3.4–10.8)

## 2021-08-21 PROCEDURE — 82805 BLOOD GASES W/O2 SATURATION: CPT

## 2021-08-21 PROCEDURE — 83050 HGB METHEMOGLOBIN QUAN: CPT

## 2021-08-21 PROCEDURE — 85025 COMPLETE CBC W/AUTO DIFF WBC: CPT | Performed by: PHYSICIAN ASSISTANT

## 2021-08-21 PROCEDURE — 82375 ASSAY CARBOXYHB QUANT: CPT

## 2021-08-21 PROCEDURE — 36600 WITHDRAWAL OF ARTERIAL BLOOD: CPT

## 2021-08-21 RX ORDER — FERROUS SULFATE 325(65) MG
325 TABLET ORAL
Status: CANCELLED | OUTPATIENT
Start: 2021-08-21

## 2021-08-21 RX ORDER — ASPIRIN 81 MG/1
81 TABLET, CHEWABLE ORAL DAILY
Status: CANCELLED | OUTPATIENT
Start: 2021-08-21

## 2021-08-21 RX ORDER — L.ACID,PARA/B.BIFIDUM/S.THERM 8B CELL
1 CAPSULE ORAL 2 TIMES DAILY
Status: CANCELLED | OUTPATIENT
Start: 2021-08-21

## 2021-08-21 RX ORDER — DOCUSATE SODIUM 100 MG/1
200 CAPSULE, LIQUID FILLED ORAL DAILY
Status: CANCELLED | OUTPATIENT
Start: 2021-08-21

## 2021-08-21 RX ORDER — IPRATROPIUM BROMIDE AND ALBUTEROL SULFATE 2.5; .5 MG/3ML; MG/3ML
3 SOLUTION RESPIRATORY (INHALATION) 4 TIMES DAILY PRN
Status: CANCELLED | OUTPATIENT
Start: 2021-08-21

## 2021-08-21 RX ORDER — AMLODIPINE BESYLATE 5 MG/1
10 TABLET ORAL DAILY
Status: CANCELLED | OUTPATIENT
Start: 2021-08-21

## 2021-08-21 RX ORDER — TRAZODONE HYDROCHLORIDE 50 MG/1
50 TABLET ORAL NIGHTLY
Status: CANCELLED | OUTPATIENT
Start: 2021-08-21

## 2021-08-21 RX ORDER — BISACODYL 10 MG
10 SUPPOSITORY, RECTAL RECTAL DAILY PRN
Status: CANCELLED | OUTPATIENT
Start: 2021-08-21

## 2021-08-21 RX ORDER — CARVEDILOL 6.25 MG/1
6.25 TABLET ORAL 2 TIMES DAILY WITH MEALS
Status: CANCELLED | OUTPATIENT
Start: 2021-08-21

## 2021-08-21 RX ORDER — DIPHENOXYLATE HYDROCHLORIDE AND ATROPINE SULFATE 2.5; .025 MG/1; MG/1
1 TABLET ORAL NIGHTLY
Status: CANCELLED | OUTPATIENT
Start: 2021-08-21

## 2021-08-21 RX ORDER — PANTOPRAZOLE SODIUM 40 MG/1
40 TABLET, DELAYED RELEASE ORAL DAILY
Status: ON HOLD | COMMUNITY
End: 2021-10-08 | Stop reason: SDUPTHER

## 2021-08-21 RX ORDER — ATORVASTATIN CALCIUM 40 MG/1
40 TABLET, FILM COATED ORAL NIGHTLY
COMMUNITY
End: 2021-11-20

## 2021-08-21 RX ORDER — GUAIFENESIN 200 MG/1
400 TABLET ORAL EVERY 12 HOURS
Status: CANCELLED | OUTPATIENT
Start: 2021-08-21

## 2021-08-21 RX ORDER — ONDANSETRON 4 MG/1
4 TABLET, FILM COATED ORAL EVERY 6 HOURS PRN
COMMUNITY
End: 2021-11-20

## 2021-08-21 RX ORDER — PROMETHAZINE HYDROCHLORIDE 25 MG/1
12.5 TABLET ORAL EVERY 8 HOURS PRN
Status: CANCELLED | OUTPATIENT
Start: 2021-08-21

## 2021-08-21 RX ORDER — LOPERAMIDE HYDROCHLORIDE 2 MG/1
2 CAPSULE ORAL EVERY 6 HOURS PRN
Status: CANCELLED | OUTPATIENT
Start: 2021-08-21

## 2021-08-21 RX ORDER — ATORVASTATIN CALCIUM 40 MG/1
40 TABLET, FILM COATED ORAL NIGHTLY
Status: CANCELLED | OUTPATIENT
Start: 2021-08-21

## 2021-08-21 RX ORDER — MONTELUKAST SODIUM 10 MG/1
10 TABLET ORAL DAILY
Status: CANCELLED | OUTPATIENT
Start: 2021-08-21

## 2021-08-21 RX ORDER — LEVOTHYROXINE SODIUM 0.12 MG/1
250 TABLET ORAL DAILY
COMMUNITY
End: 2021-11-20

## 2021-08-21 RX ORDER — HYDROCODONE BITARTRATE AND ACETAMINOPHEN 5; 325 MG/1; MG/1
1 TABLET ORAL EVERY 8 HOURS PRN
Status: CANCELLED | OUTPATIENT
Start: 2021-08-21

## 2021-08-21 RX ORDER — PANTOPRAZOLE SODIUM 40 MG/1
40 TABLET, DELAYED RELEASE ORAL DAILY
Status: CANCELLED | OUTPATIENT
Start: 2021-08-21

## 2021-08-21 RX ORDER — LACTULOSE 10 G/15ML
10 SOLUTION ORAL DAILY PRN
Status: CANCELLED | OUTPATIENT
Start: 2021-08-21

## 2021-08-21 RX ORDER — LEVOTHYROXINE SODIUM 0.12 MG/1
250 TABLET ORAL DAILY
Status: CANCELLED | OUTPATIENT
Start: 2021-08-21

## 2021-08-21 RX ORDER — LISINOPRIL 10 MG/1
10 TABLET ORAL DAILY
Status: CANCELLED | OUTPATIENT
Start: 2021-08-21

## 2021-08-21 RX ORDER — BACLOFEN 10 MG/1
10 TABLET ORAL NIGHTLY
Status: CANCELLED | OUTPATIENT
Start: 2021-08-21

## 2021-08-21 RX ORDER — ONDANSETRON 4 MG/1
4 TABLET, FILM COATED ORAL EVERY 6 HOURS PRN
Status: CANCELLED | OUTPATIENT
Start: 2021-08-21

## 2021-08-21 RX ORDER — PREDNISONE 10 MG/1
5 TABLET ORAL EVERY MORNING
Status: CANCELLED | OUTPATIENT
Start: 2021-08-21

## 2021-08-21 RX ORDER — SENNA PLUS 8.6 MG/1
1 TABLET ORAL DAILY PRN
Status: CANCELLED | OUTPATIENT
Start: 2021-08-21

## 2021-08-21 RX ORDER — CHOLECALCIFEROL (VITAMIN D3) 125 MCG
5 CAPSULE ORAL NIGHTLY
Status: CANCELLED | OUTPATIENT
Start: 2021-08-21

## 2021-08-21 RX ORDER — CARVEDILOL 6.25 MG/1
6.25 TABLET ORAL 2 TIMES DAILY WITH MEALS
COMMUNITY
End: 2021-11-20

## 2021-08-21 NOTE — ED NOTES
Pt assisted to turn and reposition, pillow support provided. Pt is clean and dry. Pt remains on 3lpm nc. afib noted. Call light within reach of pt.      Libertad Nunn RN  08/21/21 9258

## 2021-08-21 NOTE — ED NOTES
Pt assisted to turn and reposition, pillow support provided. Pt is clean and dry. Pt remains on 3lpm nc. afib noted. Call light within reach of pt.      Libertad Nunn RN  08/21/21 1395

## 2021-08-21 NOTE — ED NOTES
Pt assisted to turn and reposition, pillow support provided. Oral care performed. Pt remains on 3lpm nc. afib noted. poc updated, call light within reach of pt.      Libertad Nunn RN  08/21/21 9226

## 2021-08-21 NOTE — ED NOTES
Pt assisted to turn and reposition, pillow support provided. Pt is clean and dry. Oral care performed. Pt remains npo per evon newton. Pt remains on 3lpm nc. afib noted. Call light within reach of pt.      Libertad Nunn RN  08/21/21 4533

## 2021-08-21 NOTE — ED NOTES
Changed pt brief, small amount of loose black stool noted, provider made aware.     Libertad Nunn, RN  08/21/21 3317

## 2021-08-21 NOTE — ED PROVIDER NOTES
Subjective   76 yo female patient presents to the ED with complaints of upper abdominal pain, nausea, and vomiting x 2 days. Patient denies any CP or SOB. Patient has hx of COPD, CAD, HTN, CHF, hyperlipidemia, CKD, and DM. Patient denies any alleviating or worsening factors. Patient states that she has not had any vomiting today.       History provided by:  Patient   used: No        Review of Systems   Constitutional: Negative.    Eyes: Negative.    Respiratory: Positive for cough and wheezing.    Cardiovascular: Negative.    Gastrointestinal: Positive for abdominal pain, nausea and vomiting.   Endocrine: Negative.    Genitourinary: Negative.    Musculoskeletal: Negative.    Skin: Negative.    Allergic/Immunologic: Negative.    Neurological: Negative.    Hematological: Negative.    Psychiatric/Behavioral: Negative.    All other systems reviewed and are negative.      Past Medical History:   Diagnosis Date   • AAA (abdominal aortic aneurysm) (CMS/[facility])     s/p repair    • Arthritis    • CAD (coronary artery disease)     s/p stenting in the past    • Chronic kidney disease (CKD), stage III (moderate) (CMS/[facility])    • COPD (chronic obstructive pulmonary disease) (CMS/[facility])    • Debility    • Diastolic CHF, chronic (CMS/HCC) 4/23/2021   • GERD (gastroesophageal reflux disease)    • History of CVA (cerebrovascular accident)    • History of ischemic colitis    • Hyperlipidemia 4/23/2021   • Hypertension    • Hypothyroidism    • Stroke (CMS/[facility])    • Type II diabetes mellitus (CMS/[facility])        Allergies   Allergen Reactions   • Haldol [Haloperidol] Hallucinations       Past Surgical History:   Procedure Laterality Date   • BACK SURGERY     • CARDIAC CATHETERIZATION     • CHOLECYSTECTOMY N/A 7/17/2020    Procedure: CHOLECYSTECTOMY LAPAROSCOPIC;  Surgeon: Lonnie Meneses MD;  [facility] [facility];  Service: General;  Laterality: N/A;   • COLONOSCOPY     • CORONARY STENT PLACEMENT     • ENDOSCOPY     •  TONSILLECTOMY         No family history on file.    Social History     Socioeconomic History   • Marital status:      Spouse name: Not on file   • Number of children: Not on file   • Years of education: Not on file   • Highest education level: Not on file   Tobacco Use   • Smoking status: Current Every Day Smoker     Packs/day: 1.00     Years: 55.00     Pack years: 55.00     Types: Cigarettes   • Smokeless tobacco: Never Used   Substance and Sexual Activity   • Alcohol use: Never   • Drug use: Never   • Sexual activity: Defer           Objective   Physical Exam  Vitals and nursing note reviewed.   Constitutional:       Appearance: She is well-developed and normal weight.   HENT:      Head: Normocephalic and atraumatic.      Mouth/Throat:      Mouth: Mucous membranes are moist.      Pharynx: Oropharynx is clear.   Eyes:      Extraocular Movements: Extraocular movements intact.      Pupils: Pupils are equal, round, and reactive to light.   Cardiovascular:      Rate and Rhythm: Normal rate and regular rhythm.      Heart sounds: Normal heart sounds.   Pulmonary:      Effort: Pulmonary effort is normal.      Breath sounds: Wheezing present.   Abdominal:      General: Abdomen is flat. Bowel sounds are normal.      Palpations: Abdomen is soft.   Skin:     General: Skin is warm and dry.      Capillary Refill: Capillary refill takes less than 2 seconds.   Neurological:      General: No focal deficit present.      Mental Status: She is alert and oriented to person, place, and time.   Psychiatric:         Mood and Affect: Mood normal.         Behavior: Behavior normal.         Procedures           ED Course  ED Course as of Aug 20 2025   Fri Aug 20, 2021   1402 EKG interpretation, ventricular rate 92, QRS 88, QTc 467, A. fib, PVCs noted    [JM]   1422    1. COPD  2. Minimal bibasilar airspace disease     This report was finalized on 8/20/2021 1:58 PM by Dr. River Zaidi MD.           Specimen Collected: 08/20/21  13:58         XR Chest 1 View [ML]   1833 IMPRESSION:     1. Parenchymal nodules in the lungs as above  2. Coronary artery calcifications  3. Thoracic aortic aneurysm         CT Chest Without Contrast Diagnostic [ML]   1933 IMPRESSION:     1. Nonobstructing intrarenal calcifications bilaterally     2.There are fluid-filled loops of nondistended small bowel     3. Other findings as above              This report was finalized on 8/20/2021 6:37 PM by Dr. River Zaidi MD.           Specimen Collected: 08/20/21 18:36         CT Abdomen Pelvis Without Contrast [ML]   2017 Discussed with Dr. Hendrix - he recommends admission due to new onset a fib, elevated trops, and COPD exacerbation.     [ML]   2021 No beds. Patient refuses transfer and wishes to stay in the ED Until bed is available here.     [ML]   2024 Will continue to monitor and consult hospitalist in the morning.     [ML]      ED Course User Index  [JM] Adan Toro,   [ML] Dinah Obrien PA                                           [facility]    Final diagnoses:   Atrial fibrillation, unspecified type (CMS/HCC)   COPD with acute exacerbation (CMS/HCC)   Elevated troponin       ED Disposition  ED Disposition     ED Disposition Condition Comment    Decision to Admit            No follow-up provider specified.       Medication List      No changes were made to your prescriptions during this visit.          Dinah Obrien PA  08/20/21 2028

## 2021-08-21 NOTE — ED PROVIDER NOTES
Subjective   History of Present Illness    Review of Systems    Past Medical History:   Diagnosis Date   • AAA (abdominal aortic aneurysm) (CMS/[facility])     s/p repair    • Arthritis    • CAD (coronary artery disease)     s/p stenting in the past    • Chronic kidney disease (CKD), stage III (moderate) (CMS/[facility])    • COPD (chronic obstructive pulmonary disease) (CMS/[facility])    • Debility    • Diastolic CHF, chronic (CMS/[facility]) 4/23/2021   • GERD (gastroesophageal reflux disease)    • History of CVA (cerebrovascular accident)    • History of ischemic colitis    • Hyperlipidemia 4/23/2021   • Hypertension    • Hypothyroidism    • Stroke (CMS/[facility])    • Type II diabetes mellitus (CMS/[facility])        Allergies   Allergen Reactions   • Haldol [Haloperidol] Hallucinations       Past Surgical History:   Procedure Laterality Date   • BACK SURGERY     • CARDIAC CATHETERIZATION     • CHOLECYSTECTOMY N/A 7/17/2020    Procedure: CHOLECYSTECTOMY LAPAROSCOPIC;  Surgeon: Lonnie Meneses MD;  Location: [facility];  Service: General;  Laterality: N/A;   • COLONOSCOPY     • CORONARY STENT PLACEMENT     • ENDOSCOPY     • TONSILLECTOMY         No family history on file.    Social History     Socioeconomic History   • Marital status:      Spouse name: Not on file   • Number of children: Not on file   • Years of education: Not on file   • Highest education level: Not on file   Tobacco Use   • Smoking status: Current Every Day Smoker     Packs/day: 1.00     Years: 55.00     Pack years: 55.00     Types: Cigarettes   • Smokeless tobacco: Never Used   Substance and Sexual Activity   • Alcohol use: Never   • Drug use: Never   • Sexual activity: Defer           Objective   Physical Exam    Procedures           ED Course  ED Course as of Aug 22 1118   Fri Aug 20, 2021   1402 EKG interpretation, ventricular rate 92, QRS 88, QTc 467, A. fib, PVCs noted    [JM]   1422    1. COPD  2. Minimal bibasilar airspace disease     This report was finalized on  8/20/2021 1:58 PM by Dr. River Zaidi MD.           Specimen Collected: 08/20/21 13:58         XR Chest 1 View [ML]   1833 IMPRESSION:     1. Parenchymal nodules in the lungs as above  2. Coronary artery calcifications  3. Thoracic aortic aneurysm         CT Chest Without Contrast Diagnostic [ML]   1933 IMPRESSION:     1. Nonobstructing intrarenal calcifications bilaterally     2.There are fluid-filled loops of nondistended small bowel     3. Other findings as above              This report was finalized on 8/20/2021 6:37 PM by Dr. River Zaidi MD.           Specimen Collected: 08/20/21 18:36         CT Abdomen Pelvis Without Contrast [ML]   2017 Discussed with Dr. Hendrix - he recommends admission due to new onset a fib, elevated trops, and COPD exacerbation.     [ML]   2021 No beds. Patient refuses transfer and wishes to stay in the ED Until bed is available here.     [ML]   2024 Will continue to monitor and consult hospitalist in the morning.     [ML]   2205 Sign out to Simeon     [ML]   Sat Aug 21, 2021   0204 CT abd pelvis rad interpreted:  1. Nonobstructing intrarenal calcifications bilaterally     2.There are fluid-filled loops of nondistended small bowel     3. Other findings as above    [RB]   1511 Patient states she is feeling much better.  Patient came in for acute nausea vomiting and was diagnosed with COPD exacerbation by the 2 previous providers that saw her.  Patient had a elevated white count reported from nursing home that was over 32,000.  On arrival 24 hours ago patient did have a white count of 18,000.  This does seem to be improving.  Patient has a 22,000 white count at this time most likely secondary to Solu-Medrol injections that was given.  Patient's blood gas has been improved.  Patient does not have any problems or difficulty breathing.  Patient did have abnormal blood culture that is most likely contaminant.  Did have 1 of 2 blood cultures positive    [BH]      ED Course User Index  [BH]  Nacho Lynch PA-C  [JM] Adan Toro DO  [ML] Dinah Obrien PA  [RB] Luis Miguel Pierre II, PA                                           [facility]    Final diagnoses:   Atrial fibrillation, unspecified type (CMS/HCC)   COPD with acute exacerbation (CMS/HCC)   Elevated troponin   Leukocytosis, unspecified type       ED Disposition  ED Disposition     ED Disposition Condition Comment    Discharge Stable           Leta Gardner MD  [address] [facility] [address] [phone]    Call in 1 day           Medication List      No changes were made to your prescriptions during this visit.          Nacho Lynch PA-C  08/21/21 6203       Nacho Lynch PA-C  08/22/21 1117

## 2021-08-21 NOTE — ED NOTES
Pt alert with confusion, skin pwd, no resp distress. Pt remains on 3lpm nc at time of transport home. Bedside report given to [facility].     Libertad Nunn, RN  08/21/21 5537

## 2021-08-21 NOTE — ED NOTES
[facility] called and was given an updated on pt and denied any questions.      Diane Cabral RN  08/20/21 9301

## 2021-08-21 NOTE — ED NOTES
Pt changed at this time and cleaned up with sheets changed and blanket provided.      Diane Cabral RN  08/20/21 9663

## 2021-08-22 LAB
BACTERIA SPEC AEROBE CULT: ABNORMAL
GRAM STN SPEC: ABNORMAL
ISOLATED FROM: ABNORMAL

## 2021-08-25 LAB — BACTERIA SPEC AEROBE CULT: NORMAL

## 2021-09-16 ENCOUNTER — LAB REQUISITION (OUTPATIENT)
Dept: LAB | Facility: HOSPITAL | Age: 76
End: 2021-09-16

## 2021-09-16 DIAGNOSIS — U07.1 COVID-19: ICD-10-CM

## 2021-09-16 LAB — SARS-COV-2 RNA RESP QL NAA+PROBE: DETECTED

## 2021-09-16 PROCEDURE — 87635 SARS-COV-2 COVID-19 AMP PRB: CPT | Performed by: INTERNAL MEDICINE

## 2021-09-21 ENCOUNTER — HOSPITAL ENCOUNTER (INPATIENT)
Facility: HOSPITAL | Age: 76
LOS: 17 days | Discharge: NURSING, REHAB OR LONG-TERM CARE | End: 2021-10-08
Attending: EMERGENCY MEDICINE | Admitting: INTERNAL MEDICINE

## 2021-09-21 ENCOUNTER — APPOINTMENT (OUTPATIENT)
Dept: GENERAL RADIOLOGY | Facility: HOSPITAL | Age: 76
End: 2021-09-21

## 2021-09-21 DIAGNOSIS — J96.11 CHRONIC RESPIRATORY FAILURE WITH HYPOXIA (HCC): ICD-10-CM

## 2021-09-21 DIAGNOSIS — D72.829 LEUKOCYTOSIS, UNSPECIFIED TYPE: ICD-10-CM

## 2021-09-21 DIAGNOSIS — K92.2 UPPER GI BLEED: ICD-10-CM

## 2021-09-21 DIAGNOSIS — U07.1 PNEUMONIA DUE TO COVID-19 VIRUS: Primary | ICD-10-CM

## 2021-09-21 DIAGNOSIS — J12.82 PNEUMONIA DUE TO COVID-19 VIRUS: Primary | ICD-10-CM

## 2021-09-21 LAB
A-A DO2: 7.8 MMHG (ref 0–300)
ALBUMIN SERPL-MCNC: 2.43 G/DL (ref 3.5–5.2)
ALBUMIN SERPL-MCNC: 2.55 G/DL (ref 3.5–5.2)
ALBUMIN/GLOB SERPL: 0.8 G/DL
ALBUMIN/GLOB SERPL: 0.8 G/DL
ALP SERPL-CCNC: 76 U/L (ref 39–117)
ALP SERPL-CCNC: 82 U/L (ref 39–117)
ALT SERPL W P-5'-P-CCNC: 10 U/L (ref 1–33)
ALT SERPL W P-5'-P-CCNC: 10 U/L (ref 1–33)
AMPHET+METHAMPHET UR QL: NEGATIVE
AMPHETAMINES UR QL: NEGATIVE
ANION GAP SERPL CALCULATED.3IONS-SCNC: 11.1 MMOL/L (ref 5–15)
ANION GAP SERPL CALCULATED.3IONS-SCNC: 12.3 MMOL/L (ref 5–15)
ANISOCYTOSIS BLD QL: ABNORMAL
ANISOCYTOSIS BLD QL: ABNORMAL
APTT PPP: 31.4 SECONDS (ref 25.5–35.4)
ARTERIAL PATENCY WRIST A: POSITIVE
AST SERPL-CCNC: 19 U/L (ref 1–32)
AST SERPL-CCNC: 20 U/L (ref 1–32)
ATMOSPHERIC PRESS: 727 MMHG
BACTERIA UR QL AUTO: ABNORMAL /HPF
BARBITURATES UR QL SCN: NEGATIVE
BASE EXCESS BLDA CALC-SCNC: 6.1 MMOL/L (ref 0–2)
BDY SITE: ABNORMAL
BENZODIAZ UR QL SCN: NEGATIVE
BILIRUB SERPL-MCNC: <0.2 MG/DL (ref 0–1.2)
BILIRUB SERPL-MCNC: <0.2 MG/DL (ref 0–1.2)
BILIRUB UR QL STRIP: NEGATIVE
BODY TEMPERATURE: 0 C
BUN SERPL-MCNC: 17 MG/DL (ref 8–23)
BUN SERPL-MCNC: 19 MG/DL (ref 8–23)
BUN/CREAT SERPL: 24.3 (ref 7–25)
BUN/CREAT SERPL: 31.7 (ref 7–25)
BUPRENORPHINE SERPL-MCNC: NEGATIVE NG/ML
CALCIUM SPEC-SCNC: 8.4 MG/DL (ref 8.6–10.5)
CALCIUM SPEC-SCNC: 8.6 MG/DL (ref 8.6–10.5)
CANNABINOIDS SERPL QL: NEGATIVE
CHLORIDE SERPL-SCNC: 98 MMOL/L (ref 98–107)
CHLORIDE SERPL-SCNC: 98 MMOL/L (ref 98–107)
CK SERPL-CCNC: 27 U/L (ref 20–180)
CLARITY UR: ABNORMAL
CO2 BLDA-SCNC: 33 MMOL/L (ref 22–33)
CO2 SERPL-SCNC: 24.9 MMOL/L (ref 22–29)
CO2 SERPL-SCNC: 25.7 MMOL/L (ref 22–29)
COCAINE UR QL: NEGATIVE
COHGB MFR BLD: 0.9 % (ref 0–5)
COLOR UR: YELLOW
CREAT SERPL-MCNC: 0.6 MG/DL (ref 0.57–1)
CREAT SERPL-MCNC: 0.7 MG/DL (ref 0.57–1)
CRP SERPL-MCNC: 14.8 MG/DL (ref 0–0.5)
D-LACTATE SERPL-SCNC: 0.5 MMOL/L (ref 0.5–2)
D-LACTATE SERPL-SCNC: 0.5 MMOL/L (ref 0.5–2)
DEPRECATED RDW RBC AUTO: 54.4 FL (ref 37–54)
DEPRECATED RDW RBC AUTO: 57.7 FL (ref 37–54)
ERYTHROCYTE [DISTWIDTH] IN BLOOD BY AUTOMATED COUNT: 16.7 % (ref 12.3–15.4)
ERYTHROCYTE [DISTWIDTH] IN BLOOD BY AUTOMATED COUNT: 17.1 % (ref 12.3–15.4)
ERYTHROCYTE [SEDIMENTATION RATE] IN BLOOD: 34 MM/HR (ref 0–30)
GFR SERPL CREATININE-BSD FRML MDRD: 81 ML/MIN/1.73
GFR SERPL CREATININE-BSD FRML MDRD: 97 ML/MIN/1.73
GLOBULIN UR ELPH-MCNC: 3.1 GM/DL
GLOBULIN UR ELPH-MCNC: 3.3 GM/DL
GLUCOSE BLDC GLUCOMTR-MCNC: 126 MG/DL (ref 70–130)
GLUCOSE SERPL-MCNC: 103 MG/DL (ref 65–99)
GLUCOSE SERPL-MCNC: 124 MG/DL (ref 65–99)
GLUCOSE UR STRIP-MCNC: NEGATIVE MG/DL
HCO3 BLDA-SCNC: 31.5 MMOL/L (ref 20–26)
HCT VFR BLD AUTO: 24 % (ref 34–46.6)
HCT VFR BLD AUTO: 26.7 % (ref 34–46.6)
HCT VFR BLD CALC: 25.2 % (ref 38–51)
HGB BLD-MCNC: 7.8 G/DL (ref 12–15.9)
HGB BLD-MCNC: 8.5 G/DL (ref 12–15.9)
HGB BLDA-MCNC: 8.2 G/DL (ref 13.5–17.5)
HGB UR QL STRIP.AUTO: ABNORMAL
HYALINE CASTS UR QL AUTO: ABNORMAL /LPF
INHALED O2 CONCENTRATION: 21 %
INR PPP: 0.99 (ref 0.9–1.1)
KETONES UR QL STRIP: ABNORMAL
LEUKOCYTE ESTERASE UR QL STRIP.AUTO: ABNORMAL
LYMPHOCYTES # BLD MANUAL: 0 10*3/MM3 (ref 0.7–3.1)
LYMPHOCYTES # BLD MANUAL: 1.36 10*3/MM3 (ref 0.7–3.1)
LYMPHOCYTES NFR BLD MANUAL: 0 % (ref 19.6–45.3)
LYMPHOCYTES NFR BLD MANUAL: 4 % (ref 19.6–45.3)
LYMPHOCYTES NFR BLD MANUAL: 4 % (ref 5–12)
Lab: ABNORMAL
MAGNESIUM SERPL-MCNC: 1.4 MG/DL (ref 1.6–2.4)
MCH RBC QN AUTO: 29.3 PG (ref 26.6–33)
MCH RBC QN AUTO: 29.5 PG (ref 26.6–33)
MCHC RBC AUTO-ENTMCNC: 31.8 G/DL (ref 31.5–35.7)
MCHC RBC AUTO-ENTMCNC: 32.5 G/DL (ref 31.5–35.7)
MCV RBC AUTO: 90.2 FL (ref 79–97)
MCV RBC AUTO: 92.7 FL (ref 79–97)
METHADONE UR QL SCN: NEGATIVE
METHGB BLD QL: 0.3 % (ref 0–3)
MODALITY: ABNORMAL
MONOCYTES # BLD AUTO: 1.36 10*3/MM3 (ref 0.1–0.9)
NEUTROPHILS # BLD AUTO: 31.24 10*3/MM3 (ref 1.7–7)
NEUTROPHILS # BLD AUTO: 46.07 10*3/MM3 (ref 1.7–7)
NEUTROPHILS NFR BLD MANUAL: 80 % (ref 42.7–76)
NEUTROPHILS NFR BLD MANUAL: 90 % (ref 42.7–76)
NEUTS BAND NFR BLD MANUAL: 10 % (ref 0–5)
NEUTS BAND NFR BLD MANUAL: 12 % (ref 0–5)
NITRITE UR QL STRIP: NEGATIVE
NOTE: ABNORMAL
NT-PROBNP SERPL-MCNC: 1572 PG/ML (ref 0–1800)
OPIATES UR QL: NEGATIVE
OXYCODONE UR QL SCN: NEGATIVE
OXYHGB MFR BLDV: 94.5 % (ref 94–99)
PCO2 BLDA: 49.3 MM HG (ref 35–45)
PCO2 TEMP ADJ BLD: ABNORMAL MM[HG]
PCP UR QL SCN: NEGATIVE
PH BLDA: 7.41 PH UNITS (ref 7.35–7.45)
PH UR STRIP.AUTO: 5.5 [PH] (ref 5–8)
PH, TEMP CORRECTED: ABNORMAL
PHOSPHATE SERPL-MCNC: 3.1 MG/DL (ref 2.5–4.5)
PLAT MORPH BLD: NORMAL
PLATELET # BLD AUTO: 395 10*3/MM3 (ref 140–450)
PLATELET # BLD AUTO: 412 10*3/MM3 (ref 140–450)
PMV BLD AUTO: 10.2 FL (ref 6–12)
PMV BLD AUTO: 10.4 FL (ref 6–12)
PO2 BLDA: 79.4 MM HG (ref 83–108)
PO2 TEMP ADJ BLD: ABNORMAL MM[HG]
POTASSIUM SERPL-SCNC: 3.3 MMOL/L (ref 3.5–5.2)
POTASSIUM SERPL-SCNC: 3.5 MMOL/L (ref 3.5–5.2)
PROCALCITONIN SERPL-MCNC: 0.21 NG/ML (ref 0–0.25)
PROPOXYPH UR QL: NEGATIVE
PROT SERPL-MCNC: 5.5 G/DL (ref 6–8.5)
PROT SERPL-MCNC: 5.8 G/DL (ref 6–8.5)
PROT UR QL STRIP: ABNORMAL
PROTHROMBIN TIME: 13.5 SECONDS (ref 12.8–14.5)
RBC # BLD AUTO: 2.66 10*6/MM3 (ref 3.77–5.28)
RBC # BLD AUTO: 2.88 10*6/MM3 (ref 3.77–5.28)
RBC # UR: ABNORMAL /HPF
REF LAB TEST METHOD: ABNORMAL
SAO2 % BLDCOA: 95.6 % (ref 94–99)
SCAN SLIDE: NORMAL
SMALL PLATELETS BLD QL SMEAR: ABNORMAL
SODIUM SERPL-SCNC: 134 MMOL/L (ref 136–145)
SODIUM SERPL-SCNC: 136 MMOL/L (ref 136–145)
SP GR UR STRIP: 1.02 (ref 1–1.03)
SQUAMOUS #/AREA URNS HPF: ABNORMAL /HPF
T4 FREE SERPL-MCNC: 1.5 NG/DL (ref 0.93–1.7)
TOXIC GRANULATION: ABNORMAL
TRICYCLICS UR QL SCN: NEGATIVE
TROPONIN T SERPL-MCNC: 0.01 NG/ML (ref 0–0.03)
TROPONIN T SERPL-MCNC: 0.01 NG/ML (ref 0–0.03)
TSH SERPL DL<=0.05 MIU/L-ACNC: 1.57 UIU/ML (ref 0.27–4.2)
UROBILINOGEN UR QL STRIP: ABNORMAL
VENTILATOR MODE: ABNORMAL
WBC # BLD AUTO: 33.96 10*3/MM3 (ref 3.4–10.8)
WBC # BLD AUTO: 46.07 10*3/MM3 (ref 3.4–10.8)
WBC UR QL AUTO: ABNORMAL /HPF
YEAST URNS QL MICRO: ABNORMAL /HPF

## 2021-09-21 PROCEDURE — 25010000002 PIPERACILLIN SOD-TAZOBACTAM PER 1 G: Performed by: EMERGENCY MEDICINE

## 2021-09-21 PROCEDURE — 80306 DRUG TEST PRSMV INSTRMNT: CPT | Performed by: EMERGENCY MEDICINE

## 2021-09-21 PROCEDURE — 80053 COMPREHEN METABOLIC PANEL: CPT | Performed by: EMERGENCY MEDICINE

## 2021-09-21 PROCEDURE — 82375 ASSAY CARBOXYHB QUANT: CPT

## 2021-09-21 PROCEDURE — 85007 BL SMEAR W/DIFF WBC COUNT: CPT | Performed by: EMERGENCY MEDICINE

## 2021-09-21 PROCEDURE — 25010000002 MAGNESIUM SULFATE 2 GM/50ML SOLUTION: Performed by: EMERGENCY MEDICINE

## 2021-09-21 PROCEDURE — 85025 COMPLETE CBC W/AUTO DIFF WBC: CPT | Performed by: EMERGENCY MEDICINE

## 2021-09-21 PROCEDURE — 84439 ASSAY OF FREE THYROXINE: CPT | Performed by: EMERGENCY MEDICINE

## 2021-09-21 PROCEDURE — 25010000002 ENOXAPARIN PER 10 MG: Performed by: INTERNAL MEDICINE

## 2021-09-21 PROCEDURE — 84100 ASSAY OF PHOSPHORUS: CPT | Performed by: EMERGENCY MEDICINE

## 2021-09-21 PROCEDURE — 86140 C-REACTIVE PROTEIN: CPT | Performed by: EMERGENCY MEDICINE

## 2021-09-21 PROCEDURE — 85610 PROTHROMBIN TIME: CPT | Performed by: EMERGENCY MEDICINE

## 2021-09-21 PROCEDURE — 85652 RBC SED RATE AUTOMATED: CPT | Performed by: EMERGENCY MEDICINE

## 2021-09-21 PROCEDURE — 25010000002 DEXAMETHASONE PER 1 MG: Performed by: EMERGENCY MEDICINE

## 2021-09-21 PROCEDURE — 81001 URINALYSIS AUTO W/SCOPE: CPT | Performed by: EMERGENCY MEDICINE

## 2021-09-21 PROCEDURE — 83050 HGB METHEMOGLOBIN QUAN: CPT

## 2021-09-21 PROCEDURE — 83735 ASSAY OF MAGNESIUM: CPT | Performed by: EMERGENCY MEDICINE

## 2021-09-21 PROCEDURE — 93005 ELECTROCARDIOGRAM TRACING: CPT | Performed by: EMERGENCY MEDICINE

## 2021-09-21 PROCEDURE — 36600 WITHDRAWAL OF ARTERIAL BLOOD: CPT

## 2021-09-21 PROCEDURE — 82550 ASSAY OF CK (CPK): CPT | Performed by: EMERGENCY MEDICINE

## 2021-09-21 PROCEDURE — P9612 CATHETERIZE FOR URINE SPEC: HCPCS

## 2021-09-21 PROCEDURE — 99285 EMERGENCY DEPT VISIT HI MDM: CPT

## 2021-09-21 PROCEDURE — 84443 ASSAY THYROID STIM HORMONE: CPT | Performed by: EMERGENCY MEDICINE

## 2021-09-21 PROCEDURE — 84145 PROCALCITONIN (PCT): CPT | Performed by: EMERGENCY MEDICINE

## 2021-09-21 PROCEDURE — 83605 ASSAY OF LACTIC ACID: CPT | Performed by: EMERGENCY MEDICINE

## 2021-09-21 PROCEDURE — 82962 GLUCOSE BLOOD TEST: CPT

## 2021-09-21 PROCEDURE — 83880 ASSAY OF NATRIURETIC PEPTIDE: CPT | Performed by: EMERGENCY MEDICINE

## 2021-09-21 PROCEDURE — 82805 BLOOD GASES W/O2 SATURATION: CPT

## 2021-09-21 PROCEDURE — 71045 X-RAY EXAM CHEST 1 VIEW: CPT

## 2021-09-21 PROCEDURE — 99223 1ST HOSP IP/OBS HIGH 75: CPT | Performed by: PHYSICIAN ASSISTANT

## 2021-09-21 PROCEDURE — 85730 THROMBOPLASTIN TIME PARTIAL: CPT | Performed by: EMERGENCY MEDICINE

## 2021-09-21 PROCEDURE — 84484 ASSAY OF TROPONIN QUANT: CPT | Performed by: EMERGENCY MEDICINE

## 2021-09-21 PROCEDURE — 87040 BLOOD CULTURE FOR BACTERIA: CPT | Performed by: EMERGENCY MEDICINE

## 2021-09-21 PROCEDURE — 93010 ELECTROCARDIOGRAM REPORT: CPT | Performed by: INTERNAL MEDICINE

## 2021-09-21 RX ORDER — PREDNISONE 1 MG/1
5 TABLET ORAL EVERY MORNING
Status: DISCONTINUED | OUTPATIENT
Start: 2021-09-22 | End: 2021-09-22

## 2021-09-21 RX ORDER — HYDROCODONE BITARTRATE AND ACETAMINOPHEN 5; 325 MG/1; MG/1
1 TABLET ORAL EVERY 8 HOURS PRN
Status: DISCONTINUED | OUTPATIENT
Start: 2021-09-21 | End: 2021-10-08 | Stop reason: HOSPADM

## 2021-09-21 RX ORDER — MAGNESIUM SULFATE HEPTAHYDRATE 40 MG/ML
2 INJECTION, SOLUTION INTRAVENOUS ONCE
Status: COMPLETED | OUTPATIENT
Start: 2021-09-21 | End: 2021-09-21

## 2021-09-21 RX ORDER — DOCUSATE SODIUM 100 MG/1
200 CAPSULE, LIQUID FILLED ORAL DAILY
Status: DISCONTINUED | OUTPATIENT
Start: 2021-09-21 | End: 2021-09-24

## 2021-09-21 RX ORDER — MONTELUKAST SODIUM 10 MG/1
10 TABLET ORAL DAILY
Status: DISCONTINUED | OUTPATIENT
Start: 2021-09-21 | End: 2021-09-24

## 2021-09-21 RX ORDER — CARVEDILOL 6.25 MG/1
6.25 TABLET ORAL 2 TIMES DAILY WITH MEALS
Status: DISCONTINUED | OUTPATIENT
Start: 2021-09-21 | End: 2021-09-26

## 2021-09-21 RX ORDER — CLOPIDOGREL BISULFATE 75 MG/1
75 TABLET ORAL DAILY
Status: DISCONTINUED | OUTPATIENT
Start: 2021-09-21 | End: 2021-09-24

## 2021-09-21 RX ORDER — L.ACID,PARA/B.BIFIDUM/S.THERM 8B CELL
1 CAPSULE ORAL 2 TIMES DAILY
Status: DISCONTINUED | OUTPATIENT
Start: 2021-09-21 | End: 2021-09-24

## 2021-09-21 RX ORDER — LACTULOSE 10 G/15ML
10 SOLUTION ORAL DAILY PRN
Status: DISCONTINUED | OUTPATIENT
Start: 2021-09-21 | End: 2021-10-08 | Stop reason: HOSPADM

## 2021-09-21 RX ORDER — SENNA PLUS 8.6 MG/1
1 TABLET ORAL DAILY PRN
Status: DISCONTINUED | OUTPATIENT
Start: 2021-09-21 | End: 2021-10-08 | Stop reason: HOSPADM

## 2021-09-21 RX ORDER — SODIUM CHLORIDE 0.9 % (FLUSH) 0.9 %
10 SYRINGE (ML) INJECTION AS NEEDED
Status: DISCONTINUED | OUTPATIENT
Start: 2021-09-21 | End: 2021-10-08 | Stop reason: HOSPADM

## 2021-09-21 RX ORDER — LISINOPRIL 10 MG/1
10 TABLET ORAL DAILY
Status: DISCONTINUED | OUTPATIENT
Start: 2021-09-21 | End: 2021-09-24

## 2021-09-21 RX ORDER — POTASSIUM CHLORIDE 7.45 MG/ML
10 INJECTION INTRAVENOUS
Status: DISCONTINUED | OUTPATIENT
Start: 2021-09-21 | End: 2021-10-08 | Stop reason: HOSPADM

## 2021-09-21 RX ORDER — DEXAMETHASONE SODIUM PHOSPHATE 10 MG/ML
6 INJECTION INTRAMUSCULAR; INTRAVENOUS ONCE
Status: COMPLETED | OUTPATIENT
Start: 2021-09-21 | End: 2021-09-21

## 2021-09-21 RX ORDER — LOPERAMIDE HYDROCHLORIDE 2 MG/1
2 CAPSULE ORAL EVERY 6 HOURS PRN
Status: DISCONTINUED | OUTPATIENT
Start: 2021-09-21 | End: 2021-10-08 | Stop reason: HOSPADM

## 2021-09-21 RX ORDER — LEVOTHYROXINE SODIUM 0.12 MG/1
250 TABLET ORAL DAILY
Status: DISCONTINUED | OUTPATIENT
Start: 2021-09-21 | End: 2021-09-24

## 2021-09-21 RX ORDER — SODIUM CHLORIDE 9 MG/ML
50 INJECTION, SOLUTION INTRAVENOUS CONTINUOUS
Status: DISCONTINUED | OUTPATIENT
Start: 2021-09-21 | End: 2021-09-23

## 2021-09-21 RX ORDER — POTASSIUM CHLORIDE 1.5 G/1.77G
40 POWDER, FOR SOLUTION ORAL AS NEEDED
Status: DISCONTINUED | OUTPATIENT
Start: 2021-09-21 | End: 2021-10-08 | Stop reason: HOSPADM

## 2021-09-21 RX ORDER — CHOLECALCIFEROL (VITAMIN D3) 125 MCG
5 CAPSULE ORAL NIGHTLY
Status: DISCONTINUED | OUTPATIENT
Start: 2021-09-21 | End: 2021-09-24

## 2021-09-21 RX ORDER — DEXTROSE MONOHYDRATE 25 G/50ML
25 INJECTION, SOLUTION INTRAVENOUS
Status: DISCONTINUED | OUTPATIENT
Start: 2021-09-21 | End: 2021-10-08 | Stop reason: HOSPADM

## 2021-09-21 RX ORDER — DIPHENOXYLATE HYDROCHLORIDE AND ATROPINE SULFATE 2.5; .025 MG/1; MG/1
1 TABLET ORAL NIGHTLY
Status: DISCONTINUED | OUTPATIENT
Start: 2021-09-21 | End: 2021-09-24

## 2021-09-21 RX ORDER — PROMETHAZINE HYDROCHLORIDE 12.5 MG/1
12.5 TABLET ORAL EVERY 8 HOURS PRN
Status: DISCONTINUED | OUTPATIENT
Start: 2021-09-21 | End: 2021-10-08 | Stop reason: HOSPADM

## 2021-09-21 RX ORDER — ASPIRIN 81 MG/1
81 TABLET, CHEWABLE ORAL DAILY
Status: DISCONTINUED | OUTPATIENT
Start: 2021-09-21 | End: 2021-09-23

## 2021-09-21 RX ORDER — IPRATROPIUM BROMIDE AND ALBUTEROL SULFATE 2.5; .5 MG/3ML; MG/3ML
3 SOLUTION RESPIRATORY (INHALATION) 4 TIMES DAILY PRN
Status: DISCONTINUED | OUTPATIENT
Start: 2021-09-21 | End: 2021-10-07

## 2021-09-21 RX ORDER — MAGNESIUM SULFATE HEPTAHYDRATE 40 MG/ML
2 INJECTION, SOLUTION INTRAVENOUS AS NEEDED
Status: DISCONTINUED | OUTPATIENT
Start: 2021-09-21 | End: 2021-10-08 | Stop reason: HOSPADM

## 2021-09-21 RX ORDER — ATORVASTATIN CALCIUM 40 MG/1
40 TABLET, FILM COATED ORAL NIGHTLY
Status: DISCONTINUED | OUTPATIENT
Start: 2021-09-21 | End: 2021-09-24

## 2021-09-21 RX ORDER — POTASSIUM CHLORIDE 20 MEQ/1
40 TABLET, EXTENDED RELEASE ORAL AS NEEDED
Status: DISCONTINUED | OUTPATIENT
Start: 2021-09-21 | End: 2021-10-08 | Stop reason: HOSPADM

## 2021-09-21 RX ORDER — AMLODIPINE BESYLATE 10 MG/1
10 TABLET ORAL DAILY
Status: DISCONTINUED | OUTPATIENT
Start: 2021-09-21 | End: 2021-09-23

## 2021-09-21 RX ORDER — FERROUS SULFATE 325(65) MG
325 TABLET ORAL
Status: DISCONTINUED | OUTPATIENT
Start: 2021-09-22 | End: 2021-09-24

## 2021-09-21 RX ORDER — NICOTINE POLACRILEX 4 MG
15 LOZENGE BUCCAL
Status: DISCONTINUED | OUTPATIENT
Start: 2021-09-21 | End: 2021-10-08 | Stop reason: HOSPADM

## 2021-09-21 RX ORDER — ALBUTEROL SULFATE 90 UG/1
2 AEROSOL, METERED RESPIRATORY (INHALATION) EVERY 6 HOURS PRN
Status: DISCONTINUED | OUTPATIENT
Start: 2021-09-21 | End: 2021-10-07

## 2021-09-21 RX ORDER — ONDANSETRON 4 MG/1
4 TABLET, FILM COATED ORAL EVERY 6 HOURS PRN
Status: DISCONTINUED | OUTPATIENT
Start: 2021-09-21 | End: 2021-10-08 | Stop reason: HOSPADM

## 2021-09-21 RX ORDER — CLOPIDOGREL BISULFATE 75 MG/1
75 TABLET ORAL DAILY
COMMUNITY
End: 2021-11-20

## 2021-09-21 RX ORDER — NITROGLYCERIN 0.4 MG/1
0.4 TABLET SUBLINGUAL
Status: DISCONTINUED | OUTPATIENT
Start: 2021-09-21 | End: 2021-10-08 | Stop reason: HOSPADM

## 2021-09-21 RX ORDER — TRAZODONE HYDROCHLORIDE 50 MG/1
50 TABLET ORAL NIGHTLY
Status: DISCONTINUED | OUTPATIENT
Start: 2021-09-21 | End: 2021-10-08 | Stop reason: HOSPADM

## 2021-09-21 RX ORDER — MAGNESIUM SULFATE HEPTAHYDRATE 40 MG/ML
4 INJECTION, SOLUTION INTRAVENOUS AS NEEDED
Status: DISCONTINUED | OUTPATIENT
Start: 2021-09-21 | End: 2021-10-08 | Stop reason: HOSPADM

## 2021-09-21 RX ORDER — BACLOFEN 10 MG/1
10 TABLET ORAL NIGHTLY
Status: DISCONTINUED | OUTPATIENT
Start: 2021-09-21 | End: 2021-10-08 | Stop reason: HOSPADM

## 2021-09-21 RX ORDER — ALBUTEROL SULFATE 90 UG/1
2 AEROSOL, METERED RESPIRATORY (INHALATION)
Status: DISCONTINUED | OUTPATIENT
Start: 2021-09-21 | End: 2021-09-21

## 2021-09-21 RX ORDER — BISACODYL 10 MG
10 SUPPOSITORY, RECTAL RECTAL DAILY PRN
Status: DISCONTINUED | OUTPATIENT
Start: 2021-09-21 | End: 2021-10-08 | Stop reason: HOSPADM

## 2021-09-21 RX ORDER — DIPHENOXYLATE HYDROCHLORIDE AND ATROPINE SULFATE 2.5; .025 MG/1; MG/1
1 TABLET ORAL DAILY
Status: DISCONTINUED | OUTPATIENT
Start: 2021-09-22 | End: 2021-09-24

## 2021-09-21 RX ORDER — PANTOPRAZOLE SODIUM 40 MG/1
40 TABLET, DELAYED RELEASE ORAL DAILY
Status: DISCONTINUED | OUTPATIENT
Start: 2021-09-21 | End: 2021-09-23

## 2021-09-21 RX ORDER — GUAIFENESIN 600 MG/1
600 TABLET, EXTENDED RELEASE ORAL EVERY 12 HOURS SCHEDULED
Status: DISCONTINUED | OUTPATIENT
Start: 2021-09-22 | End: 2021-09-24

## 2021-09-21 RX ORDER — SODIUM CHLORIDE 0.9 % (FLUSH) 0.9 %
10 SYRINGE (ML) INJECTION EVERY 12 HOURS SCHEDULED
Status: DISCONTINUED | OUTPATIENT
Start: 2021-09-21 | End: 2021-10-08 | Stop reason: HOSPADM

## 2021-09-21 RX ADMIN — MONTELUKAST SODIUM 10 MG: 10 TABLET, COATED ORAL at 21:24

## 2021-09-21 RX ADMIN — PIPERACILLIN SODIUM AND TAZOBACTAM SODIUM 4.5 G: 4; .5 INJECTION, POWDER, LYOPHILIZED, FOR SOLUTION INTRAVENOUS at 08:27

## 2021-09-21 RX ADMIN — SODIUM CHLORIDE 1500 ML: 9 INJECTION, SOLUTION INTRAVENOUS at 15:00

## 2021-09-21 RX ADMIN — Medication 5 MG: at 21:24

## 2021-09-21 RX ADMIN — ASPIRIN 81 MG: 81 TABLET, CHEWABLE ORAL at 21:27

## 2021-09-21 RX ADMIN — GUAIFENESIN 600 MG: 600 TABLET, EXTENDED RELEASE ORAL at 23:53

## 2021-09-21 RX ADMIN — ATORVASTATIN CALCIUM 40 MG: 40 TABLET, FILM COATED ORAL at 21:27

## 2021-09-21 RX ADMIN — BACLOFEN 10 MG: 10 TABLET ORAL at 21:27

## 2021-09-21 RX ADMIN — VANCOMYCIN HYDROCHLORIDE 1000 MG: 1 INJECTION, POWDER, LYOPHILIZED, FOR SOLUTION INTRAVENOUS at 11:52

## 2021-09-21 RX ADMIN — ENOXAPARIN SODIUM 40 MG: 40 INJECTION SUBCUTANEOUS at 21:27

## 2021-09-21 RX ADMIN — SODIUM CHLORIDE, PRESERVATIVE FREE 10 ML: 5 INJECTION INTRAVENOUS at 21:28

## 2021-09-21 RX ADMIN — TRAZODONE HYDROCHLORIDE 50 MG: 50 TABLET ORAL at 21:24

## 2021-09-21 RX ADMIN — LINAGLIPTIN 5 MG: 5 TABLET, FILM COATED ORAL at 21:29

## 2021-09-21 RX ADMIN — LISINOPRIL 10 MG: 10 TABLET ORAL at 21:26

## 2021-09-21 RX ADMIN — CLOPIDOGREL 75 MG: 75 TABLET, FILM COATED ORAL at 21:27

## 2021-09-21 RX ADMIN — SODIUM CHLORIDE 125 ML/HR: 9 INJECTION, SOLUTION INTRAVENOUS at 08:29

## 2021-09-21 RX ADMIN — AMLODIPINE BESYLATE 10 MG: 10 TABLET ORAL at 21:27

## 2021-09-21 RX ADMIN — Medication 1 TABLET: at 21:24

## 2021-09-21 RX ADMIN — DEXAMETHASONE SODIUM PHOSPHATE 6 MG: 10 INJECTION INTRAMUSCULAR; INTRAVENOUS at 08:27

## 2021-09-21 RX ADMIN — CARIPRAZINE 1.5 MG: 1.5 CAPSULE, GELATIN COATED ORAL at 21:27

## 2021-09-21 RX ADMIN — Medication 1 CAPSULE: at 21:26

## 2021-09-21 RX ADMIN — CARVEDILOL 6.25 MG: 6.25 TABLET, FILM COATED ORAL at 21:27

## 2021-09-21 RX ADMIN — SODIUM CHLORIDE 1000 ML: 9 INJECTION, SOLUTION INTRAVENOUS at 02:03

## 2021-09-21 RX ADMIN — PANTOPRAZOLE SODIUM 40 MG: 40 TABLET, DELAYED RELEASE ORAL at 21:24

## 2021-09-21 RX ADMIN — LEVOTHYROXINE SODIUM 250 MCG: 125 TABLET ORAL at 21:26

## 2021-09-21 RX ADMIN — MAGNESIUM SULFATE HEPTAHYDRATE 2 G: 40 INJECTION, SOLUTION INTRAVENOUS at 09:38

## 2021-09-21 RX ADMIN — DOCUSATE SODIUM 200 MG: 100 CAPSULE ORAL at 21:26

## 2021-09-22 LAB
ABO GROUP BLD: NORMAL
ALBUMIN SERPL-MCNC: 1.94 G/DL (ref 3.5–5.2)
ALBUMIN SERPL-MCNC: 2.25 G/DL (ref 3.5–5.2)
ALBUMIN/GLOB SERPL: 0.9 G/DL
ALP SERPL-CCNC: 60 U/L (ref 39–117)
ALP SERPL-CCNC: 78 U/L (ref 39–117)
ALT SERPL W P-5'-P-CCNC: 9 U/L (ref 1–33)
ALT SERPL W P-5'-P-CCNC: 9 U/L (ref 1–33)
ANION GAP SERPL CALCULATED.3IONS-SCNC: 16.5 MMOL/L (ref 5–15)
APTT PPP: 26 SECONDS (ref 25.5–35.4)
AST SERPL-CCNC: 21 U/L (ref 1–32)
AST SERPL-CCNC: 24 U/L (ref 1–32)
BILIRUB CONJ SERPL-MCNC: <0.2 MG/DL (ref 0–0.3)
BILIRUB INDIRECT SERPL-MCNC: ABNORMAL MG/DL
BILIRUB SERPL-MCNC: <0.2 MG/DL (ref 0–1.2)
BILIRUB SERPL-MCNC: <0.2 MG/DL (ref 0–1.2)
BLD GP AB SCN SERPL QL: NEGATIVE
BUN SERPL-MCNC: 19 MG/DL (ref 8–23)
BUN/CREAT SERPL: 28.8 (ref 7–25)
BURR CELLS BLD QL SMEAR: ABNORMAL
CALCIUM SPEC-SCNC: 7.9 MG/DL (ref 8.6–10.5)
CHLORIDE SERPL-SCNC: 104 MMOL/L (ref 98–107)
CK SERPL-CCNC: 65 U/L (ref 20–180)
CO2 SERPL-SCNC: 19.5 MMOL/L (ref 22–29)
CREAT SERPL-MCNC: 0.66 MG/DL (ref 0.57–1)
CRP SERPL-MCNC: 12.03 MG/DL (ref 0–0.5)
D DIMER PPP FEU-MCNC: 9.64 MCGFEU/ML (ref 0–0.5)
D-LACTATE SERPL-SCNC: 0.6 MMOL/L (ref 0.5–2)
DEPRECATED RDW RBC AUTO: 61.1 FL (ref 37–54)
ERYTHROCYTE [DISTWIDTH] IN BLOOD BY AUTOMATED COUNT: 17.3 % (ref 12.3–15.4)
ERYTHROCYTE [SEDIMENTATION RATE] IN BLOOD: 33 MM/HR (ref 0–30)
FERRITIN SERPL-MCNC: 370.9 NG/ML (ref 13–150)
FIBRINOGEN PPP-MCNC: 545 MG/DL (ref 173–524)
FOLATE SERPL-MCNC: >20 NG/ML (ref 4.78–24.2)
GFR SERPL CREATININE-BSD FRML MDRD: 87 ML/MIN/1.73
GLOBULIN UR ELPH-MCNC: 2.6 GM/DL
GLUCOSE BLDC GLUCOMTR-MCNC: 110 MG/DL (ref 70–130)
GLUCOSE BLDC GLUCOMTR-MCNC: 125 MG/DL (ref 70–130)
GLUCOSE BLDC GLUCOMTR-MCNC: 84 MG/DL (ref 70–130)
GLUCOSE BLDC GLUCOMTR-MCNC: 91 MG/DL (ref 70–130)
GLUCOSE BLDC GLUCOMTR-MCNC: 93 MG/DL (ref 70–130)
GLUCOSE SERPL-MCNC: 80 MG/DL (ref 65–99)
HBA1C MFR BLD: 4.9 % (ref 4.8–5.6)
HCT VFR BLD AUTO: 21.5 % (ref 34–46.6)
HCT VFR BLD AUTO: 26.4 % (ref 34–46.6)
HGB BLD-MCNC: 6.5 G/DL (ref 12–15.9)
HGB BLD-MCNC: 8.1 G/DL (ref 12–15.9)
HYPOCHROMIA BLD QL: ABNORMAL
INR PPP: 1.04 (ref 0.9–1.1)
IRON 24H UR-MRATE: 10 MCG/DL (ref 37–145)
IRON SATN MFR SERPL: 5 % (ref 20–50)
LDH SERPL-CCNC: 249 U/L (ref 135–214)
LYMPHOCYTES # BLD MANUAL: 1.31 10*3/MM3 (ref 0.7–3.1)
LYMPHOCYTES NFR BLD MANUAL: 3 % (ref 19.6–45.3)
LYMPHOCYTES NFR BLD MANUAL: 4 % (ref 5–12)
MAGNESIUM SERPL-MCNC: 1.5 MG/DL (ref 1.6–2.4)
MCH RBC QN AUTO: 29.5 PG (ref 26.6–33)
MCHC RBC AUTO-ENTMCNC: 30.7 G/DL (ref 31.5–35.7)
MCV RBC AUTO: 96 FL (ref 79–97)
METAMYELOCYTES NFR BLD MANUAL: 1 % (ref 0–0)
MONOCYTES # BLD AUTO: 1.75 10*3/MM3 (ref 0.1–0.9)
NEUTROPHILS # BLD AUTO: 40.27 10*3/MM3 (ref 1.7–7)
NEUTROPHILS NFR BLD MANUAL: 69 % (ref 42.7–76)
NEUTS BAND NFR BLD MANUAL: 23 % (ref 0–5)
NT-PROBNP SERPL-MCNC: 2151 PG/ML (ref 0–1800)
PHOSPHATE SERPL-MCNC: 3.9 MG/DL (ref 2.5–4.5)
PLAT MORPH BLD: NORMAL
PLATELET # BLD AUTO: 418 10*3/MM3 (ref 140–450)
PMV BLD AUTO: 10.9 FL (ref 6–12)
POTASSIUM SERPL-SCNC: 3.9 MMOL/L (ref 3.5–5.2)
PROCALCITONIN SERPL-MCNC: 0.17 NG/ML (ref 0–0.25)
PROT SERPL-MCNC: 4.7 G/DL (ref 6–8.5)
PROT SERPL-MCNC: 4.8 G/DL (ref 6–8.5)
PROTHROMBIN TIME: 14 SECONDS (ref 12.8–14.5)
QT INTERVAL: 368 MS
QTC INTERVAL: 421 MS
RBC # BLD AUTO: 2.75 10*6/MM3 (ref 3.77–5.28)
RH BLD: POSITIVE
SCAN SLIDE: NORMAL
SODIUM SERPL-SCNC: 140 MMOL/L (ref 136–145)
T&S EXPIRATION DATE: NORMAL
TARGETS BLD QL SMEAR: ABNORMAL
TIBC SERPL-MCNC: 197 MCG/DL (ref 298–536)
TOXIC GRANULATION: ABNORMAL
TRANSFERRIN SERPL-MCNC: 132 MG/DL (ref 200–360)
TRIGL SERPL-MCNC: 100 MG/DL (ref 0–150)
TROPONIN T SERPL-MCNC: 0.01 NG/ML (ref 0–0.03)
VIT B12 BLD-MCNC: 1739 PG/ML (ref 211–946)
WBC # BLD AUTO: 43.77 10*3/MM3 (ref 3.4–10.8)

## 2021-09-22 PROCEDURE — 85025 COMPLETE CBC W/AUTO DIFF WBC: CPT | Performed by: INTERNAL MEDICINE

## 2021-09-22 PROCEDURE — 82728 ASSAY OF FERRITIN: CPT | Performed by: PHYSICIAN ASSISTANT

## 2021-09-22 PROCEDURE — 85018 HEMOGLOBIN: CPT | Performed by: PHYSICIAN ASSISTANT

## 2021-09-22 PROCEDURE — 80053 COMPREHEN METABOLIC PANEL: CPT | Performed by: INTERNAL MEDICINE

## 2021-09-22 PROCEDURE — 84466 ASSAY OF TRANSFERRIN: CPT | Performed by: PHYSICIAN ASSISTANT

## 2021-09-22 PROCEDURE — 85730 THROMBOPLASTIN TIME PARTIAL: CPT | Performed by: PHYSICIAN ASSISTANT

## 2021-09-22 PROCEDURE — 83615 LACTATE (LD) (LDH) ENZYME: CPT | Performed by: PHYSICIAN ASSISTANT

## 2021-09-22 PROCEDURE — 86900 BLOOD TYPING SEROLOGIC ABO: CPT | Performed by: PHYSICIAN ASSISTANT

## 2021-09-22 PROCEDURE — 84478 ASSAY OF TRIGLYCERIDES: CPT | Performed by: PHYSICIAN ASSISTANT

## 2021-09-22 PROCEDURE — 82607 VITAMIN B-12: CPT | Performed by: PHYSICIAN ASSISTANT

## 2021-09-22 PROCEDURE — 84145 PROCALCITONIN (PCT): CPT | Performed by: PHYSICIAN ASSISTANT

## 2021-09-22 PROCEDURE — 94799 UNLISTED PULMONARY SVC/PX: CPT

## 2021-09-22 PROCEDURE — 84484 ASSAY OF TROPONIN QUANT: CPT | Performed by: PHYSICIAN ASSISTANT

## 2021-09-22 PROCEDURE — 63710000001 PREDNISONE PER 5 MG: Performed by: INTERNAL MEDICINE

## 2021-09-22 PROCEDURE — 83605 ASSAY OF LACTIC ACID: CPT | Performed by: PHYSICIAN ASSISTANT

## 2021-09-22 PROCEDURE — 99233 SBSQ HOSP IP/OBS HIGH 50: CPT | Performed by: INTERNAL MEDICINE

## 2021-09-22 PROCEDURE — 85379 FIBRIN DEGRADATION QUANT: CPT | Performed by: PHYSICIAN ASSISTANT

## 2021-09-22 PROCEDURE — 82746 ASSAY OF FOLIC ACID SERUM: CPT | Performed by: PHYSICIAN ASSISTANT

## 2021-09-22 PROCEDURE — 82962 GLUCOSE BLOOD TEST: CPT

## 2021-09-22 PROCEDURE — 85384 FIBRINOGEN ACTIVITY: CPT | Performed by: PHYSICIAN ASSISTANT

## 2021-09-22 PROCEDURE — 25010000003 MAGNESIUM SULFATE 4 GM/100ML SOLUTION: Performed by: INTERNAL MEDICINE

## 2021-09-22 PROCEDURE — 85007 BL SMEAR W/DIFF WBC COUNT: CPT | Performed by: INTERNAL MEDICINE

## 2021-09-22 PROCEDURE — 25010000002 DEXAMETHASONE PER 1 MG: Performed by: INTERNAL MEDICINE

## 2021-09-22 PROCEDURE — 83540 ASSAY OF IRON: CPT | Performed by: PHYSICIAN ASSISTANT

## 2021-09-22 PROCEDURE — 83735 ASSAY OF MAGNESIUM: CPT | Performed by: PHYSICIAN ASSISTANT

## 2021-09-22 PROCEDURE — 82550 ASSAY OF CK (CPK): CPT | Performed by: PHYSICIAN ASSISTANT

## 2021-09-22 PROCEDURE — 86901 BLOOD TYPING SEROLOGIC RH(D): CPT | Performed by: PHYSICIAN ASSISTANT

## 2021-09-22 PROCEDURE — 85610 PROTHROMBIN TIME: CPT | Performed by: PHYSICIAN ASSISTANT

## 2021-09-22 PROCEDURE — 86850 RBC ANTIBODY SCREEN: CPT | Performed by: PHYSICIAN ASSISTANT

## 2021-09-22 PROCEDURE — 83880 ASSAY OF NATRIURETIC PEPTIDE: CPT | Performed by: PHYSICIAN ASSISTANT

## 2021-09-22 PROCEDURE — 85652 RBC SED RATE AUTOMATED: CPT | Performed by: PHYSICIAN ASSISTANT

## 2021-09-22 PROCEDURE — 86923 COMPATIBILITY TEST ELECTRIC: CPT

## 2021-09-22 PROCEDURE — 85014 HEMATOCRIT: CPT | Performed by: PHYSICIAN ASSISTANT

## 2021-09-22 PROCEDURE — 80076 HEPATIC FUNCTION PANEL: CPT | Performed by: INTERNAL MEDICINE

## 2021-09-22 PROCEDURE — 86140 C-REACTIVE PROTEIN: CPT | Performed by: PHYSICIAN ASSISTANT

## 2021-09-22 PROCEDURE — 83036 HEMOGLOBIN GLYCOSYLATED A1C: CPT | Performed by: PHYSICIAN ASSISTANT

## 2021-09-22 PROCEDURE — 84100 ASSAY OF PHOSPHORUS: CPT | Performed by: PHYSICIAN ASSISTANT

## 2021-09-22 RX ORDER — DEXAMETHASONE SODIUM PHOSPHATE 4 MG/ML
6 INJECTION, SOLUTION INTRA-ARTICULAR; INTRALESIONAL; INTRAMUSCULAR; INTRAVENOUS; SOFT TISSUE NIGHTLY
Status: DISCONTINUED | OUTPATIENT
Start: 2021-09-22 | End: 2021-09-26

## 2021-09-22 RX ORDER — POTASSIUM CHLORIDE 1.5 G/1.77G
40 POWDER, FOR SOLUTION ORAL EVERY 4 HOURS
Status: DISPENSED | OUTPATIENT
Start: 2021-09-22 | End: 2021-09-22

## 2021-09-22 RX ORDER — MAGNESIUM SULFATE HEPTAHYDRATE 40 MG/ML
4 INJECTION, SOLUTION INTRAVENOUS ONCE
Status: COMPLETED | OUTPATIENT
Start: 2021-09-22 | End: 2021-09-22

## 2021-09-22 RX ADMIN — DEXAMETHASONE SODIUM PHOSPHATE 6 MG: 4 INJECTION, SOLUTION INTRA-ARTICULAR; INTRALESIONAL; INTRAMUSCULAR; INTRAVENOUS; SOFT TISSUE at 21:59

## 2021-09-22 RX ADMIN — PANTOPRAZOLE SODIUM 40 MG: 40 TABLET, DELAYED RELEASE ORAL at 09:49

## 2021-09-22 RX ADMIN — FERROUS SULFATE TAB 325 MG (65 MG ELEMENTAL FE) 325 MG: 325 (65 FE) TAB at 09:48

## 2021-09-22 RX ADMIN — Medication 1 TABLET: at 09:49

## 2021-09-22 RX ADMIN — LEVOTHYROXINE SODIUM 250 MCG: 125 TABLET ORAL at 09:48

## 2021-09-22 RX ADMIN — CARIPRAZINE 1.5 MG: 1.5 CAPSULE, GELATIN COATED ORAL at 09:49

## 2021-09-22 RX ADMIN — DOCUSATE SODIUM 200 MG: 100 CAPSULE ORAL at 09:48

## 2021-09-22 RX ADMIN — MONTELUKAST SODIUM 10 MG: 10 TABLET, COATED ORAL at 09:49

## 2021-09-22 RX ADMIN — Medication 1 CAPSULE: at 21:59

## 2021-09-22 RX ADMIN — HYDROCODONE BITARTRATE AND ACETAMINOPHEN 1 TABLET: 5; 325 TABLET ORAL at 17:12

## 2021-09-22 RX ADMIN — MAGNESIUM SULFATE HEPTAHYDRATE 4 G: 40 INJECTION, SOLUTION INTRAVENOUS at 14:28

## 2021-09-22 RX ADMIN — Medication 5 MG: at 21:59

## 2021-09-22 RX ADMIN — CLOPIDOGREL 75 MG: 75 TABLET, FILM COATED ORAL at 09:48

## 2021-09-22 RX ADMIN — SODIUM CHLORIDE, PRESERVATIVE FREE 10 ML: 5 INJECTION INTRAVENOUS at 21:59

## 2021-09-22 RX ADMIN — POTASSIUM CHLORIDE 40 MEQ: 1.5 POWDER, FOR SOLUTION ORAL at 09:48

## 2021-09-22 RX ADMIN — SODIUM CHLORIDE 500 ML: 9 INJECTION, SOLUTION INTRAVENOUS at 22:00

## 2021-09-22 RX ADMIN — PREDNISONE 5 MG: 5 TABLET ORAL at 05:35

## 2021-09-22 RX ADMIN — SODIUM CHLORIDE, PRESERVATIVE FREE 10 ML: 5 INJECTION INTRAVENOUS at 09:50

## 2021-09-22 RX ADMIN — ASPIRIN 81 MG: 81 TABLET, CHEWABLE ORAL at 09:48

## 2021-09-22 RX ADMIN — SODIUM CHLORIDE 125 ML/HR: 9 INJECTION, SOLUTION INTRAVENOUS at 05:37

## 2021-09-22 RX ADMIN — Medication 1 TABLET: at 21:59

## 2021-09-22 RX ADMIN — LINAGLIPTIN 5 MG: 5 TABLET, FILM COATED ORAL at 09:48

## 2021-09-22 RX ADMIN — GUAIFENESIN 600 MG: 600 TABLET, EXTENDED RELEASE ORAL at 21:59

## 2021-09-22 RX ADMIN — ATORVASTATIN CALCIUM 40 MG: 40 TABLET, FILM COATED ORAL at 21:59

## 2021-09-22 RX ADMIN — REMDESIVIR 100 MG: 100 INJECTION, POWDER, LYOPHILIZED, FOR SOLUTION INTRAVENOUS at 22:00

## 2021-09-22 RX ADMIN — GUAIFENESIN 600 MG: 600 TABLET, EXTENDED RELEASE ORAL at 09:49

## 2021-09-22 RX ADMIN — CARVEDILOL 6.25 MG: 6.25 TABLET, FILM COATED ORAL at 17:12

## 2021-09-22 RX ADMIN — Medication 1 CAPSULE: at 09:48

## 2021-09-22 RX ADMIN — TRAZODONE HYDROCHLORIDE 50 MG: 50 TABLET ORAL at 21:59

## 2021-09-23 ENCOUNTER — APPOINTMENT (OUTPATIENT)
Dept: CT IMAGING | Facility: HOSPITAL | Age: 76
End: 2021-09-23

## 2021-09-23 LAB
25(OH)D3 SERPL-MCNC: 32.9 NG/ML (ref 30–100)
ANION GAP SERPL CALCULATED.3IONS-SCNC: 10.3 MMOL/L (ref 5–15)
ANISOCYTOSIS BLD QL: ABNORMAL
BUN SERPL-MCNC: 16 MG/DL (ref 8–23)
BUN/CREAT SERPL: 27.1 (ref 7–25)
CA-I SERPL ISE-MCNC: 1.22 MMOL/L (ref 1.12–1.32)
CALCIUM SPEC-SCNC: 7.7 MG/DL (ref 8.6–10.5)
CHLORIDE SERPL-SCNC: 105 MMOL/L (ref 98–107)
CK SERPL-CCNC: 65 U/L (ref 20–180)
CO2 SERPL-SCNC: 23.7 MMOL/L (ref 22–29)
CREAT SERPL-MCNC: 0.59 MG/DL (ref 0.57–1)
CRP SERPL-MCNC: 8.5 MG/DL (ref 0–0.5)
DEPRECATED RDW RBC AUTO: 62.3 FL (ref 37–54)
ERYTHROCYTE [DISTWIDTH] IN BLOOD BY AUTOMATED COUNT: 17.4 % (ref 12.3–15.4)
ERYTHROCYTE [SEDIMENTATION RATE] IN BLOOD: 10 MM/HR (ref 0–30)
FERRITIN SERPL-MCNC: 195.6 NG/ML (ref 13–150)
GFR SERPL CREATININE-BSD FRML MDRD: 99 ML/MIN/1.73
GLUCOSE BLDC GLUCOMTR-MCNC: 107 MG/DL (ref 70–130)
GLUCOSE BLDC GLUCOMTR-MCNC: 123 MG/DL (ref 70–130)
GLUCOSE BLDC GLUCOMTR-MCNC: 123 MG/DL (ref 70–130)
GLUCOSE BLDC GLUCOMTR-MCNC: 147 MG/DL (ref 70–130)
GLUCOSE SERPL-MCNC: 113 MG/DL (ref 65–99)
HCT VFR BLD AUTO: 20.6 % (ref 34–46.6)
HCT VFR BLD AUTO: 34.3 % (ref 34–46.6)
HGB BLD-MCNC: 11.2 G/DL (ref 12–15.9)
HGB BLD-MCNC: 6.4 G/DL (ref 12–15.9)
HYPOCHROMIA BLD QL: ABNORMAL
LYMPHOCYTES # BLD MANUAL: 1.56 10*3/MM3 (ref 0.7–3.1)
LYMPHOCYTES NFR BLD MANUAL: 4 % (ref 19.6–45.3)
LYMPHOCYTES NFR BLD MANUAL: 5 % (ref 5–12)
MAGNESIUM SERPL-MCNC: 2.5 MG/DL (ref 1.6–2.4)
MCH RBC QN AUTO: 29.8 PG (ref 26.6–33)
MCHC RBC AUTO-ENTMCNC: 31.1 G/DL (ref 31.5–35.7)
MCV RBC AUTO: 95.8 FL (ref 79–97)
METAMYELOCYTES NFR BLD MANUAL: 3 % (ref 0–0)
MONOCYTES # BLD AUTO: 1.95 10*3/MM3 (ref 0.1–0.9)
NEUTROPHILS # BLD AUTO: 34.28 10*3/MM3 (ref 1.7–7)
NEUTROPHILS NFR BLD MANUAL: 63 % (ref 42.7–76)
NEUTS BAND NFR BLD MANUAL: 25 % (ref 0–5)
PHOSPHATE SERPL-MCNC: 2.5 MG/DL (ref 2.5–4.5)
PLAT MORPH BLD: NORMAL
PLATELET # BLD AUTO: 320 10*3/MM3 (ref 140–450)
PMV BLD AUTO: 10.9 FL (ref 6–12)
POTASSIUM SERPL-SCNC: 3.7 MMOL/L (ref 3.5–5.2)
PTH-INTACT SERPL-MCNC: 25.2 PG/ML (ref 15–65)
RBC # BLD AUTO: 2.15 10*6/MM3 (ref 3.77–5.28)
SODIUM SERPL-SCNC: 139 MMOL/L (ref 136–145)
TOXIC GRANULATION: ABNORMAL
WBC # BLD AUTO: 38.96 10*3/MM3 (ref 3.4–10.8)

## 2021-09-23 PROCEDURE — 85007 BL SMEAR W/DIFF WBC COUNT: CPT | Performed by: INTERNAL MEDICINE

## 2021-09-23 PROCEDURE — 85025 COMPLETE CBC W/AUTO DIFF WBC: CPT | Performed by: INTERNAL MEDICINE

## 2021-09-23 PROCEDURE — 71275 CT ANGIOGRAPHY CHEST: CPT | Performed by: RADIOLOGY

## 2021-09-23 PROCEDURE — 83735 ASSAY OF MAGNESIUM: CPT | Performed by: PHYSICIAN ASSISTANT

## 2021-09-23 PROCEDURE — P9016 RBC LEUKOCYTES REDUCED: HCPCS

## 2021-09-23 PROCEDURE — 85652 RBC SED RATE AUTOMATED: CPT | Performed by: PHYSICIAN ASSISTANT

## 2021-09-23 PROCEDURE — 80048 BASIC METABOLIC PNL TOTAL CA: CPT | Performed by: INTERNAL MEDICINE

## 2021-09-23 PROCEDURE — 84100 ASSAY OF PHOSPHORUS: CPT | Performed by: PHYSICIAN ASSISTANT

## 2021-09-23 PROCEDURE — 86900 BLOOD TYPING SEROLOGIC ABO: CPT

## 2021-09-23 PROCEDURE — XW033E5 INTRODUCTION OF REMDESIVIR ANTI-INFECTIVE INTO PERIPHERAL VEIN, PERCUTANEOUS APPROACH, NEW TECHNOLOGY GROUP 5: ICD-10-PCS | Performed by: INTERNAL MEDICINE

## 2021-09-23 PROCEDURE — 25010000002 DEXAMETHASONE PER 1 MG: Performed by: INTERNAL MEDICINE

## 2021-09-23 PROCEDURE — 82330 ASSAY OF CALCIUM: CPT | Performed by: INTERNAL MEDICINE

## 2021-09-23 PROCEDURE — 86140 C-REACTIVE PROTEIN: CPT | Performed by: PHYSICIAN ASSISTANT

## 2021-09-23 PROCEDURE — 99233 SBSQ HOSP IP/OBS HIGH 50: CPT | Performed by: INTERNAL MEDICINE

## 2021-09-23 PROCEDURE — 82728 ASSAY OF FERRITIN: CPT | Performed by: PHYSICIAN ASSISTANT

## 2021-09-23 PROCEDURE — 71275 CT ANGIOGRAPHY CHEST: CPT

## 2021-09-23 PROCEDURE — 0 IOPAMIDOL PER 1 ML: Performed by: INTERNAL MEDICINE

## 2021-09-23 PROCEDURE — 82962 GLUCOSE BLOOD TEST: CPT

## 2021-09-23 PROCEDURE — 82306 VITAMIN D 25 HYDROXY: CPT | Performed by: INTERNAL MEDICINE

## 2021-09-23 PROCEDURE — 82550 ASSAY OF CK (CPK): CPT | Performed by: PHYSICIAN ASSISTANT

## 2021-09-23 PROCEDURE — 83970 ASSAY OF PARATHORMONE: CPT | Performed by: INTERNAL MEDICINE

## 2021-09-23 PROCEDURE — 94799 UNLISTED PULMONARY SVC/PX: CPT

## 2021-09-23 PROCEDURE — 85018 HEMOGLOBIN: CPT | Performed by: INTERNAL MEDICINE

## 2021-09-23 PROCEDURE — 85014 HEMATOCRIT: CPT | Performed by: INTERNAL MEDICINE

## 2021-09-23 PROCEDURE — 36430 TRANSFUSION BLD/BLD COMPNT: CPT

## 2021-09-23 RX ORDER — PANTOPRAZOLE SODIUM 40 MG/1
40 TABLET, DELAYED RELEASE ORAL
Status: DISCONTINUED | OUTPATIENT
Start: 2021-09-23 | End: 2021-09-24

## 2021-09-23 RX ORDER — AMLODIPINE BESYLATE 5 MG/1
2.5 TABLET ORAL DAILY
Status: DISCONTINUED | OUTPATIENT
Start: 2021-09-23 | End: 2021-09-23

## 2021-09-23 RX ADMIN — ATORVASTATIN CALCIUM 40 MG: 40 TABLET, FILM COATED ORAL at 21:06

## 2021-09-23 RX ADMIN — Medication 1 TABLET: at 08:26

## 2021-09-23 RX ADMIN — CARIPRAZINE 1.5 MG: 1.5 CAPSULE, GELATIN COATED ORAL at 08:26

## 2021-09-23 RX ADMIN — SODIUM CHLORIDE 50 ML/HR: 9 INJECTION, SOLUTION INTRAVENOUS at 12:00

## 2021-09-23 RX ADMIN — DEXAMETHASONE SODIUM PHOSPHATE 6 MG: 4 INJECTION, SOLUTION INTRA-ARTICULAR; INTRALESIONAL; INTRAMUSCULAR; INTRAVENOUS; SOFT TISSUE at 21:07

## 2021-09-23 RX ADMIN — Medication 5 MG: at 21:06

## 2021-09-23 RX ADMIN — PANTOPRAZOLE SODIUM 40 MG: 40 TABLET, DELAYED RELEASE ORAL at 21:06

## 2021-09-23 RX ADMIN — CARVEDILOL 6.25 MG: 6.25 TABLET, FILM COATED ORAL at 17:33

## 2021-09-23 RX ADMIN — HYDROCODONE BITARTRATE AND ACETAMINOPHEN 1 TABLET: 5; 325 TABLET ORAL at 05:39

## 2021-09-23 RX ADMIN — Medication 1 CAPSULE: at 21:06

## 2021-09-23 RX ADMIN — MONTELUKAST SODIUM 10 MG: 10 TABLET, COATED ORAL at 08:27

## 2021-09-23 RX ADMIN — LINAGLIPTIN 5 MG: 5 TABLET, FILM COATED ORAL at 08:27

## 2021-09-23 RX ADMIN — CLOPIDOGREL 75 MG: 75 TABLET, FILM COATED ORAL at 08:26

## 2021-09-23 RX ADMIN — Medication 1 CAPSULE: at 08:27

## 2021-09-23 RX ADMIN — Medication 1 TABLET: at 21:06

## 2021-09-23 RX ADMIN — SODIUM CHLORIDE, PRESERVATIVE FREE 10 ML: 5 INJECTION INTRAVENOUS at 08:28

## 2021-09-23 RX ADMIN — IOPAMIDOL 90 ML: 755 INJECTION, SOLUTION INTRAVENOUS at 11:54

## 2021-09-23 RX ADMIN — GUAIFENESIN 600 MG: 600 TABLET, EXTENDED RELEASE ORAL at 08:27

## 2021-09-23 RX ADMIN — LEVOTHYROXINE SODIUM 250 MCG: 125 TABLET ORAL at 08:27

## 2021-09-23 RX ADMIN — TRAZODONE HYDROCHLORIDE 50 MG: 50 TABLET ORAL at 21:06

## 2021-09-23 RX ADMIN — ASPIRIN 81 MG: 81 TABLET, CHEWABLE ORAL at 08:26

## 2021-09-23 RX ADMIN — REMDESIVIR 100 MG: 100 INJECTION, POWDER, LYOPHILIZED, FOR SOLUTION INTRAVENOUS at 21:05

## 2021-09-23 RX ADMIN — PANTOPRAZOLE SODIUM 40 MG: 40 TABLET, DELAYED RELEASE ORAL at 08:26

## 2021-09-23 RX ADMIN — GUAIFENESIN 600 MG: 600 TABLET, EXTENDED RELEASE ORAL at 21:05

## 2021-09-23 RX ADMIN — FERROUS SULFATE TAB 325 MG (65 MG ELEMENTAL FE) 325 MG: 325 (65 FE) TAB at 08:27

## 2021-09-24 ENCOUNTER — APPOINTMENT (OUTPATIENT)
Dept: ULTRASOUND IMAGING | Facility: HOSPITAL | Age: 76
End: 2021-09-24

## 2021-09-24 ENCOUNTER — ANESTHESIA EVENT (OUTPATIENT)
Dept: PERIOP | Facility: HOSPITAL | Age: 76
End: 2021-09-24

## 2021-09-24 PROBLEM — K52.9 COLITIS: Status: RESOLVED | Noted: 2021-04-23 | Resolved: 2021-09-24

## 2021-09-24 LAB
ALBUMIN SERPL-MCNC: 2.29 G/DL (ref 3.5–5.2)
ALP SERPL-CCNC: 71 U/L (ref 39–117)
ALT SERPL W P-5'-P-CCNC: 8 U/L (ref 1–33)
ANION GAP SERPL CALCULATED.3IONS-SCNC: 7.4 MMOL/L (ref 5–15)
APTT PPP: 37.5 SECONDS (ref 25.5–35.4)
AST SERPL-CCNC: 13 U/L (ref 1–32)
BASOPHILS # BLD AUTO: 0.05 10*3/MM3 (ref 0–0.2)
BASOPHILS NFR BLD AUTO: 0.2 % (ref 0–1.5)
BH BB BLOOD EXPIRATION DATE: NORMAL
BH BB BLOOD EXPIRATION DATE: NORMAL
BH BB BLOOD TYPE BARCODE: 7300
BH BB BLOOD TYPE BARCODE: 7300
BH BB DISPENSE STATUS: NORMAL
BH BB DISPENSE STATUS: NORMAL
BH BB PRODUCT CODE: NORMAL
BH BB PRODUCT CODE: NORMAL
BH BB UNIT NUMBER: NORMAL
BH BB UNIT NUMBER: NORMAL
BILIRUB CONJ SERPL-MCNC: <0.2 MG/DL (ref 0–0.3)
BILIRUB INDIRECT SERPL-MCNC: ABNORMAL MG/DL
BILIRUB SERPL-MCNC: <0.2 MG/DL (ref 0–1.2)
BUN SERPL-MCNC: 18 MG/DL (ref 8–23)
BUN/CREAT SERPL: 38.3 (ref 7–25)
CALCIUM SPEC-SCNC: 7.5 MG/DL (ref 8.6–10.5)
CHLORIDE SERPL-SCNC: 105 MMOL/L (ref 98–107)
CO2 SERPL-SCNC: 24.6 MMOL/L (ref 22–29)
CREAT SERPL-MCNC: 0.47 MG/DL (ref 0.57–1)
CROSSMATCH INTERPRETATION: NORMAL
CROSSMATCH INTERPRETATION: NORMAL
CRP SERPL-MCNC: 6.65 MG/DL (ref 0–0.5)
D DIMER PPP FEU-MCNC: 3.61 MCGFEU/ML (ref 0–0.5)
DEPRECATED RDW RBC AUTO: 51.7 FL (ref 37–54)
EOSINOPHIL # BLD AUTO: 0.01 10*3/MM3 (ref 0–0.4)
EOSINOPHIL NFR BLD AUTO: 0 % (ref 0.3–6.2)
ERYTHROCYTE [DISTWIDTH] IN BLOOD BY AUTOMATED COUNT: 16 % (ref 12.3–15.4)
FERRITIN SERPL-MCNC: 160.7 NG/ML (ref 13–150)
FIBRINOGEN PPP-MCNC: 488 MG/DL (ref 173–524)
GFR SERPL CREATININE-BSD FRML MDRD: 129 ML/MIN/1.73
GLUCOSE BLDC GLUCOMTR-MCNC: 106 MG/DL (ref 70–130)
GLUCOSE BLDC GLUCOMTR-MCNC: 155 MG/DL (ref 70–130)
GLUCOSE BLDC GLUCOMTR-MCNC: 181 MG/DL (ref 70–130)
GLUCOSE BLDC GLUCOMTR-MCNC: 88 MG/DL (ref 70–130)
GLUCOSE SERPL-MCNC: 161 MG/DL (ref 65–99)
HCT VFR BLD AUTO: 26.8 % (ref 34–46.6)
HCT VFR BLD AUTO: 27.4 % (ref 34–46.6)
HCT VFR BLD AUTO: 29.5 % (ref 34–46.6)
HGB BLD-MCNC: 10.1 G/DL (ref 12–15.9)
HGB BLD-MCNC: 8.2 G/DL (ref 12–15.9)
HGB BLD-MCNC: 8.9 G/DL (ref 12–15.9)
IMM GRANULOCYTES # BLD AUTO: 0.25 10*3/MM3 (ref 0–0.05)
IMM GRANULOCYTES NFR BLD AUTO: 0.9 % (ref 0–0.5)
INR PPP: 1.08 (ref 0.9–1.1)
LYMPHOCYTES # BLD AUTO: 0.29 10*3/MM3 (ref 0.7–3.1)
LYMPHOCYTES NFR BLD AUTO: 1 % (ref 19.6–45.3)
MAGNESIUM SERPL-MCNC: 1.7 MG/DL (ref 1.6–2.4)
MCH RBC QN AUTO: 30.2 PG (ref 26.6–33)
MCHC RBC AUTO-ENTMCNC: 34.2 G/DL (ref 31.5–35.7)
MCV RBC AUTO: 88.3 FL (ref 79–97)
MONOCYTES # BLD AUTO: 0.43 10*3/MM3 (ref 0.1–0.9)
MONOCYTES NFR BLD AUTO: 1.5 % (ref 5–12)
NEUTROPHILS NFR BLD AUTO: 27.98 10*3/MM3 (ref 1.7–7)
NEUTROPHILS NFR BLD AUTO: 96.4 % (ref 42.7–76)
NRBC BLD AUTO-RTO: 0 /100 WBC (ref 0–0.2)
PHOSPHATE SERPL-MCNC: 1.9 MG/DL (ref 2.5–4.5)
PLATELET # BLD AUTO: 251 10*3/MM3 (ref 140–450)
PMV BLD AUTO: 10.4 FL (ref 6–12)
POTASSIUM SERPL-SCNC: 3.9 MMOL/L (ref 3.5–5.2)
PROCALCITONIN SERPL-MCNC: 0.08 NG/ML (ref 0–0.25)
PROT SERPL-MCNC: 4.9 G/DL (ref 6–8.5)
PROTHROMBIN TIME: 14.4 SECONDS (ref 12.8–14.5)
RBC # BLD AUTO: 3.34 10*6/MM3 (ref 3.77–5.28)
SODIUM SERPL-SCNC: 137 MMOL/L (ref 136–145)
UNIT  ABO: NORMAL
UNIT  ABO: NORMAL
UNIT  RH: NORMAL
UNIT  RH: NORMAL
WBC # BLD AUTO: 29.01 10*3/MM3 (ref 3.4–10.8)

## 2021-09-24 PROCEDURE — 99222 1ST HOSP IP/OBS MODERATE 55: CPT | Performed by: SURGERY

## 2021-09-24 PROCEDURE — 80048 BASIC METABOLIC PNL TOTAL CA: CPT | Performed by: INTERNAL MEDICINE

## 2021-09-24 PROCEDURE — 25010000003 MAGNESIUM SULFATE 4 GM/100ML SOLUTION: Performed by: INTERNAL MEDICINE

## 2021-09-24 PROCEDURE — 82962 GLUCOSE BLOOD TEST: CPT

## 2021-09-24 PROCEDURE — 25010000002 DEXAMETHASONE PER 1 MG: Performed by: INTERNAL MEDICINE

## 2021-09-24 PROCEDURE — 84145 PROCALCITONIN (PCT): CPT | Performed by: PHYSICIAN ASSISTANT

## 2021-09-24 PROCEDURE — 85025 COMPLETE CBC W/AUTO DIFF WBC: CPT | Performed by: INTERNAL MEDICINE

## 2021-09-24 PROCEDURE — 83735 ASSAY OF MAGNESIUM: CPT | Performed by: PHYSICIAN ASSISTANT

## 2021-09-24 PROCEDURE — 86140 C-REACTIVE PROTEIN: CPT | Performed by: PHYSICIAN ASSISTANT

## 2021-09-24 PROCEDURE — 80076 HEPATIC FUNCTION PANEL: CPT | Performed by: INTERNAL MEDICINE

## 2021-09-24 PROCEDURE — 85730 THROMBOPLASTIN TIME PARTIAL: CPT | Performed by: PHYSICIAN ASSISTANT

## 2021-09-24 PROCEDURE — 93970 EXTREMITY STUDY: CPT

## 2021-09-24 PROCEDURE — 82728 ASSAY OF FERRITIN: CPT | Performed by: PHYSICIAN ASSISTANT

## 2021-09-24 PROCEDURE — 85384 FIBRINOGEN ACTIVITY: CPT | Performed by: PHYSICIAN ASSISTANT

## 2021-09-24 PROCEDURE — 99233 SBSQ HOSP IP/OBS HIGH 50: CPT | Performed by: INTERNAL MEDICINE

## 2021-09-24 PROCEDURE — 94799 UNLISTED PULMONARY SVC/PX: CPT

## 2021-09-24 PROCEDURE — 85379 FIBRIN DEGRADATION QUANT: CPT | Performed by: PHYSICIAN ASSISTANT

## 2021-09-24 PROCEDURE — 85014 HEMATOCRIT: CPT | Performed by: INTERNAL MEDICINE

## 2021-09-24 PROCEDURE — 63710000001 INSULIN ASPART PER 5 UNITS: Performed by: PHYSICIAN ASSISTANT

## 2021-09-24 PROCEDURE — 93970 EXTREMITY STUDY: CPT | Performed by: RADIOLOGY

## 2021-09-24 PROCEDURE — 85018 HEMOGLOBIN: CPT | Performed by: INTERNAL MEDICINE

## 2021-09-24 PROCEDURE — 85610 PROTHROMBIN TIME: CPT | Performed by: PHYSICIAN ASSISTANT

## 2021-09-24 PROCEDURE — 84100 ASSAY OF PHOSPHORUS: CPT | Performed by: PHYSICIAN ASSISTANT

## 2021-09-24 RX ORDER — PEG-3350, SODIUM SULFATE, SODIUM CHLORIDE, POTASSIUM CHLORIDE, SODIUM ASCORBATE AND ASCORBIC ACID 7.5-2.691G
1000 KIT ORAL EVERY 12 HOURS
Status: COMPLETED | OUTPATIENT
Start: 2021-09-24 | End: 2021-09-25

## 2021-09-24 RX ORDER — PANTOPRAZOLE SODIUM 40 MG/10ML
80 INJECTION, POWDER, LYOPHILIZED, FOR SOLUTION INTRAVENOUS ONCE
Status: COMPLETED | OUTPATIENT
Start: 2021-09-24 | End: 2021-09-24

## 2021-09-24 RX ORDER — MAGNESIUM SULFATE HEPTAHYDRATE 40 MG/ML
4 INJECTION, SOLUTION INTRAVENOUS ONCE
Status: COMPLETED | OUTPATIENT
Start: 2021-09-24 | End: 2021-09-24

## 2021-09-24 RX ADMIN — CLOPIDOGREL 75 MG: 75 TABLET, FILM COATED ORAL at 09:28

## 2021-09-24 RX ADMIN — REMDESIVIR 100 MG: 100 INJECTION, POWDER, LYOPHILIZED, FOR SOLUTION INTRAVENOUS at 21:10

## 2021-09-24 RX ADMIN — SODIUM CHLORIDE, PRESERVATIVE FREE 10 ML: 5 INJECTION INTRAVENOUS at 09:29

## 2021-09-24 RX ADMIN — BACLOFEN 10 MG: 10 TABLET ORAL at 21:09

## 2021-09-24 RX ADMIN — TRAZODONE HYDROCHLORIDE 50 MG: 50 TABLET ORAL at 21:09

## 2021-09-24 RX ADMIN — CARVEDILOL 6.25 MG: 6.25 TABLET, FILM COATED ORAL at 17:36

## 2021-09-24 RX ADMIN — SODIUM CHLORIDE, PRESERVATIVE FREE 10 ML: 5 INJECTION INTRAVENOUS at 21:09

## 2021-09-24 RX ADMIN — PANTOPRAZOLE SODIUM 80 MG: 40 INJECTION, POWDER, FOR SOLUTION INTRAVENOUS at 15:40

## 2021-09-24 RX ADMIN — POTASSIUM & SODIUM PHOSPHATES POWDER PACK 280-160-250 MG 2 PACKET: 280-160-250 PACK at 04:08

## 2021-09-24 RX ADMIN — PANTOPRAZOLE SODIUM 40 MG: 40 TABLET, DELAYED RELEASE ORAL at 09:29

## 2021-09-24 RX ADMIN — INSULIN ASPART 2 UNITS: 100 INJECTION, SOLUTION INTRAVENOUS; SUBCUTANEOUS at 11:44

## 2021-09-24 RX ADMIN — LINAGLIPTIN 5 MG: 5 TABLET, FILM COATED ORAL at 09:28

## 2021-09-24 RX ADMIN — Medication 1 CAPSULE: at 09:29

## 2021-09-24 RX ADMIN — INSULIN ASPART 2 UNITS: 100 INJECTION, SOLUTION INTRAVENOUS; SUBCUTANEOUS at 09:27

## 2021-09-24 RX ADMIN — Medication 1 TABLET: at 09:29

## 2021-09-24 RX ADMIN — GUAIFENESIN 600 MG: 600 TABLET, EXTENDED RELEASE ORAL at 09:29

## 2021-09-24 RX ADMIN — MAGNESIUM SULFATE HEPTAHYDRATE 4 G: 40 INJECTION, SOLUTION INTRAVENOUS at 04:07

## 2021-09-24 RX ADMIN — DOCUSATE SODIUM 200 MG: 100 CAPSULE ORAL at 09:27

## 2021-09-24 RX ADMIN — MONTELUKAST SODIUM 10 MG: 10 TABLET, COATED ORAL at 09:28

## 2021-09-24 RX ADMIN — FERROUS SULFATE TAB 325 MG (65 MG ELEMENTAL FE) 325 MG: 325 (65 FE) TAB at 09:28

## 2021-09-24 RX ADMIN — CARIPRAZINE 1.5 MG: 1.5 CAPSULE, GELATIN COATED ORAL at 09:29

## 2021-09-24 RX ADMIN — POLYETHYLENE GLYCOL 3350, SODIUM SULFATE, SODIUM CHLORIDE, POTASSIUM CHLORIDE, SODIUM ASCORBATE, AND ASCORBIC ACID 1000 ML: KIT at 21:09

## 2021-09-24 RX ADMIN — DEXAMETHASONE SODIUM PHOSPHATE 6 MG: 4 INJECTION, SOLUTION INTRA-ARTICULAR; INTRALESIONAL; INTRAMUSCULAR; INTRAVENOUS; SOFT TISSUE at 21:09

## 2021-09-24 RX ADMIN — LEVOTHYROXINE SODIUM 250 MCG: 125 TABLET ORAL at 09:28

## 2021-09-25 ENCOUNTER — ANESTHESIA (OUTPATIENT)
Dept: PERIOP | Facility: HOSPITAL | Age: 76
End: 2021-09-25

## 2021-09-25 LAB
ALBUMIN SERPL-MCNC: 2.14 G/DL (ref 3.5–5.2)
ALP SERPL-CCNC: 64 U/L (ref 39–117)
ALT SERPL W P-5'-P-CCNC: 9 U/L (ref 1–33)
ANION GAP SERPL CALCULATED.3IONS-SCNC: 8 MMOL/L (ref 5–15)
AST SERPL-CCNC: 14 U/L (ref 1–32)
BASOPHILS # BLD AUTO: 0.01 10*3/MM3 (ref 0–0.2)
BASOPHILS NFR BLD AUTO: 0.1 % (ref 0–1.5)
BILIRUB CONJ SERPL-MCNC: <0.2 MG/DL (ref 0–0.3)
BILIRUB INDIRECT SERPL-MCNC: ABNORMAL MG/DL
BILIRUB SERPL-MCNC: <0.2 MG/DL (ref 0–1.2)
BUN SERPL-MCNC: 16 MG/DL (ref 8–23)
BUN/CREAT SERPL: 40 (ref 7–25)
CALCIUM SPEC-SCNC: 7.1 MG/DL (ref 8.6–10.5)
CHLORIDE SERPL-SCNC: 104 MMOL/L (ref 98–107)
CO2 SERPL-SCNC: 24 MMOL/L (ref 22–29)
CREAT SERPL-MCNC: 0.4 MG/DL (ref 0.57–1)
CRP SERPL-MCNC: 3.99 MG/DL (ref 0–0.5)
DEPRECATED RDW RBC AUTO: 53.8 FL (ref 37–54)
EOSINOPHIL # BLD AUTO: 0 10*3/MM3 (ref 0–0.4)
EOSINOPHIL NFR BLD AUTO: 0 % (ref 0.3–6.2)
ERYTHROCYTE [DISTWIDTH] IN BLOOD BY AUTOMATED COUNT: 16.5 % (ref 12.3–15.4)
FERRITIN SERPL-MCNC: 153 NG/ML (ref 13–150)
GFR SERPL CREATININE-BSD FRML MDRD: >150 ML/MIN/1.73
GLUCOSE BLDC GLUCOMTR-MCNC: 119 MG/DL (ref 70–130)
GLUCOSE BLDC GLUCOMTR-MCNC: 145 MG/DL (ref 70–130)
GLUCOSE BLDC GLUCOMTR-MCNC: 86 MG/DL (ref 70–130)
GLUCOSE BLDC GLUCOMTR-MCNC: 97 MG/DL (ref 70–130)
GLUCOSE SERPL-MCNC: 111 MG/DL (ref 65–99)
HCT VFR BLD AUTO: 26.3 % (ref 34–46.6)
HGB BLD-MCNC: 8.6 G/DL (ref 12–15.9)
IMM GRANULOCYTES # BLD AUTO: 0.11 10*3/MM3 (ref 0–0.05)
IMM GRANULOCYTES NFR BLD AUTO: 0.8 % (ref 0–0.5)
LYMPHOCYTES # BLD AUTO: 0.75 10*3/MM3 (ref 0.7–3.1)
LYMPHOCYTES NFR BLD AUTO: 5.7 % (ref 19.6–45.3)
MAGNESIUM SERPL-MCNC: 2.1 MG/DL (ref 1.6–2.4)
MCH RBC QN AUTO: 29.4 PG (ref 26.6–33)
MCHC RBC AUTO-ENTMCNC: 32.7 G/DL (ref 31.5–35.7)
MCV RBC AUTO: 89.8 FL (ref 79–97)
MONOCYTES # BLD AUTO: 0.25 10*3/MM3 (ref 0.1–0.9)
MONOCYTES NFR BLD AUTO: 1.9 % (ref 5–12)
NEUTROPHILS NFR BLD AUTO: 12.13 10*3/MM3 (ref 1.7–7)
NEUTROPHILS NFR BLD AUTO: 91.5 % (ref 42.7–76)
NRBC BLD AUTO-RTO: 0.2 /100 WBC (ref 0–0.2)
PHOSPHATE SERPL-MCNC: 2.2 MG/DL (ref 2.5–4.5)
PLATELET # BLD AUTO: 221 10*3/MM3 (ref 140–450)
PMV BLD AUTO: 11.1 FL (ref 6–12)
POTASSIUM SERPL-SCNC: 4.2 MMOL/L (ref 3.5–5.2)
PROT SERPL-MCNC: 4.8 G/DL (ref 6–8.5)
RBC # BLD AUTO: 2.93 10*6/MM3 (ref 3.77–5.28)
SODIUM SERPL-SCNC: 136 MMOL/L (ref 136–145)
WBC # BLD AUTO: 13.25 10*3/MM3 (ref 3.4–10.8)

## 2021-09-25 PROCEDURE — 25010000002 DEXAMETHASONE PER 1 MG: Performed by: SURGERY

## 2021-09-25 PROCEDURE — 87081 CULTURE SCREEN ONLY: CPT | Performed by: SURGERY

## 2021-09-25 PROCEDURE — 82962 GLUCOSE BLOOD TEST: CPT

## 2021-09-25 PROCEDURE — 86140 C-REACTIVE PROTEIN: CPT | Performed by: PHYSICIAN ASSISTANT

## 2021-09-25 PROCEDURE — 83735 ASSAY OF MAGNESIUM: CPT | Performed by: PHYSICIAN ASSISTANT

## 2021-09-25 PROCEDURE — 94799 UNLISTED PULMONARY SVC/PX: CPT

## 2021-09-25 PROCEDURE — 99232 SBSQ HOSP IP/OBS MODERATE 35: CPT | Performed by: INTERNAL MEDICINE

## 2021-09-25 PROCEDURE — 85025 COMPLETE CBC W/AUTO DIFF WBC: CPT | Performed by: INTERNAL MEDICINE

## 2021-09-25 PROCEDURE — 0DBM8ZX EXCISION OF DESCENDING COLON, VIA NATURAL OR ARTIFICIAL OPENING ENDOSCOPIC, DIAGNOSTIC: ICD-10-PCS | Performed by: SURGERY

## 2021-09-25 PROCEDURE — 0DB68ZX EXCISION OF STOMACH, VIA NATURAL OR ARTIFICIAL OPENING ENDOSCOPIC, DIAGNOSTIC: ICD-10-PCS | Performed by: SURGERY

## 2021-09-25 PROCEDURE — 45380 COLONOSCOPY AND BIOPSY: CPT | Performed by: SURGERY

## 2021-09-25 PROCEDURE — 25010000002 PROPOFOL 10 MG/ML EMULSION: Performed by: NURSE ANESTHETIST, CERTIFIED REGISTERED

## 2021-09-25 PROCEDURE — 80076 HEPATIC FUNCTION PANEL: CPT | Performed by: INTERNAL MEDICINE

## 2021-09-25 PROCEDURE — 84100 ASSAY OF PHOSPHORUS: CPT | Performed by: PHYSICIAN ASSISTANT

## 2021-09-25 PROCEDURE — 25010000002 PHENYLEPHRINE PER 1 ML: Performed by: NURSE ANESTHETIST, CERTIFIED REGISTERED

## 2021-09-25 PROCEDURE — 82728 ASSAY OF FERRITIN: CPT | Performed by: PHYSICIAN ASSISTANT

## 2021-09-25 PROCEDURE — 80048 BASIC METABOLIC PNL TOTAL CA: CPT | Performed by: INTERNAL MEDICINE

## 2021-09-25 PROCEDURE — 43239 EGD BIOPSY SINGLE/MULTIPLE: CPT | Performed by: SURGERY

## 2021-09-25 RX ORDER — LISINOPRIL 2.5 MG/1
5 TABLET ORAL
Status: DISCONTINUED | OUTPATIENT
Start: 2021-09-25 | End: 2021-09-25

## 2021-09-25 RX ORDER — SODIUM CHLORIDE, SODIUM LACTATE, POTASSIUM CHLORIDE, CALCIUM CHLORIDE 600; 310; 30; 20 MG/100ML; MG/100ML; MG/100ML; MG/100ML
INJECTION, SOLUTION INTRAVENOUS CONTINUOUS PRN
Status: DISCONTINUED | OUTPATIENT
Start: 2021-09-25 | End: 2021-09-25 | Stop reason: SURG

## 2021-09-25 RX ORDER — ONDANSETRON 2 MG/ML
4 INJECTION INTRAMUSCULAR; INTRAVENOUS AS NEEDED
Status: DISCONTINUED | OUTPATIENT
Start: 2021-09-25 | End: 2021-09-25

## 2021-09-25 RX ORDER — ATORVASTATIN CALCIUM 20 MG/1
20 TABLET, FILM COATED ORAL NIGHTLY
Status: DISCONTINUED | OUTPATIENT
Start: 2021-09-25 | End: 2021-10-08 | Stop reason: HOSPADM

## 2021-09-25 RX ORDER — LISINOPRIL 10 MG/1
10 TABLET ORAL
Status: DISCONTINUED | OUTPATIENT
Start: 2021-09-25 | End: 2021-09-30

## 2021-09-25 RX ORDER — ASPIRIN 81 MG/1
81 TABLET ORAL DAILY
Status: DISCONTINUED | OUTPATIENT
Start: 2021-09-25 | End: 2021-10-05

## 2021-09-25 RX ORDER — PROPOFOL 10 MG/ML
VIAL (ML) INTRAVENOUS CONTINUOUS PRN
Status: DISCONTINUED | OUTPATIENT
Start: 2021-09-25 | End: 2021-09-25 | Stop reason: SURG

## 2021-09-25 RX ADMIN — ASPIRIN 81 MG: 81 TABLET ORAL at 18:14

## 2021-09-25 RX ADMIN — SODIUM CHLORIDE, PRESERVATIVE FREE 10 ML: 5 INJECTION INTRAVENOUS at 21:31

## 2021-09-25 RX ADMIN — POLYETHYLENE GLYCOL 3350, SODIUM SULFATE, SODIUM CHLORIDE, POTASSIUM CHLORIDE, SODIUM ASCORBATE, AND ASCORBIC ACID 1000 ML: KIT at 03:34

## 2021-09-25 RX ADMIN — PROPOFOL 25 MCG/KG/MIN: 10 INJECTION, EMULSION INTRAVENOUS at 08:59

## 2021-09-25 RX ADMIN — SODIUM CHLORIDE, PRESERVATIVE FREE 10 ML: 5 INJECTION INTRAVENOUS at 08:33

## 2021-09-25 RX ADMIN — HYDROCODONE BITARTRATE AND ACETAMINOPHEN 1 TABLET: 5; 325 TABLET ORAL at 22:04

## 2021-09-25 RX ADMIN — REMDESIVIR 100 MG: 100 INJECTION, POWDER, LYOPHILIZED, FOR SOLUTION INTRAVENOUS at 21:31

## 2021-09-25 RX ADMIN — TRAZODONE HYDROCHLORIDE 50 MG: 50 TABLET ORAL at 21:31

## 2021-09-25 RX ADMIN — LISINOPRIL 10 MG: 10 TABLET ORAL at 18:14

## 2021-09-25 RX ADMIN — SODIUM CHLORIDE, POTASSIUM CHLORIDE, SODIUM LACTATE AND CALCIUM CHLORIDE: 600; 310; 30; 20 INJECTION, SOLUTION INTRAVENOUS at 08:58

## 2021-09-25 RX ADMIN — BACLOFEN 10 MG: 10 TABLET ORAL at 21:31

## 2021-09-25 RX ADMIN — CARVEDILOL 6.25 MG: 6.25 TABLET, FILM COATED ORAL at 18:14

## 2021-09-25 RX ADMIN — ATORVASTATIN CALCIUM 20 MG: 40 TABLET, FILM COATED ORAL at 21:31

## 2021-09-25 RX ADMIN — PHENYLEPHRINE HYDROCHLORIDE 100 MCG: 10 INJECTION, SOLUTION INTRAMUSCULAR; INTRAVENOUS; SUBCUTANEOUS at 09:10

## 2021-09-25 RX ADMIN — DEXAMETHASONE SODIUM PHOSPHATE 6 MG: 4 INJECTION, SOLUTION INTRA-ARTICULAR; INTRALESIONAL; INTRAMUSCULAR; INTRAVENOUS; SOFT TISSUE at 22:08

## 2021-09-25 NOTE — ANESTHESIA PREPROCEDURE EVALUATION
Anesthesia Evaluation     Nursing notes reviewed   NPO Solid Status: > 8 hours  NPO Liquid Status: > 8 hours           Airway   Mallampati: I  TM distance: >3 FB  Neck ROM: full  No difficulty expected  Dental      Pulmonary    (+) pneumonia , COPD, decreased breath sounds,   Cardiovascular     Rhythm: irregular  Rate: abnormal    (+) hypertension, CAD, CHF , hyperlipidemia,       Neuro/Psych  (+) CVA,     GI/Hepatic/Renal/Endo    (+)  GERD, GI bleeding , renal disease, diabetes mellitus,     Musculoskeletal     Abdominal     Abdomen: soft.   Substance History      OB/GYN          Other   arthritis,                      Anesthesia Plan    ASA 4     general     intravenous induction       Plan discussed with CRNA.

## 2021-09-25 NOTE — ANESTHESIA POSTPROCEDURE EVALUATION
Patient: Mary Ly    Procedure Summary     Date: 09/25/21 Room / Location: [facility] OR 03 /  [facility] OR    Anesthesia Start: 0854 Anesthesia Stop: 0939    Procedures:       COLONOSCOPY (N/A )      ESOPHAGOGASTRODUODENOSCOPY (N/A Esophagus) Diagnosis:       Upper GI bleed      (Upper GI bleed [K92.2])    Surgeons: Lonnie Meneses MD Provider: Alex Duque MD    Anesthesia Type: general ASA Status: 4          Anesthesia Type: general    Vitals  No vitals data found for the desired time range.          Post Anesthesia Care and Evaluation    Patient location during evaluation: PACU  Patient participation: complete - patient participated  Level of consciousness: sleepy but conscious  Pain score: 0  Pain management: adequate  Airway patency: patent  Anesthetic complications: No anesthetic complications  PONV Status: none  Cardiovascular status: hemodynamically stable  Respiratory status: nasal cannula  Hydration status: acceptable    Comments: Patient with recent Covid.  Recovered in operating room before moving back to hospital floor.  Patient tolerated procedure well.

## 2021-09-26 LAB
ALBUMIN SERPL-MCNC: 2.1 G/DL (ref 3.5–5.2)
ALP SERPL-CCNC: 47 U/L (ref 39–117)
ALT SERPL W P-5'-P-CCNC: 9 U/L (ref 1–33)
ANION GAP SERPL CALCULATED.3IONS-SCNC: 8.1 MMOL/L (ref 5–15)
ANISOCYTOSIS BLD QL: ABNORMAL
APTT PPP: 33.2 SECONDS (ref 25.5–35.4)
AST SERPL-CCNC: 9 U/L (ref 1–32)
BACTERIA SPEC AEROBE CULT: NORMAL
BACTERIA SPEC AEROBE CULT: NORMAL
BILIRUB CONJ SERPL-MCNC: <0.2 MG/DL (ref 0–0.3)
BILIRUB INDIRECT SERPL-MCNC: ABNORMAL MG/DL
BILIRUB SERPL-MCNC: <0.2 MG/DL (ref 0–1.2)
BUN SERPL-MCNC: 12 MG/DL (ref 8–23)
BUN/CREAT SERPL: 30.8 (ref 7–25)
BURR CELLS BLD QL SMEAR: ABNORMAL
C3 FRG RBC-MCNC: ABNORMAL
CALCIUM SPEC-SCNC: 7.1 MG/DL (ref 8.6–10.5)
CHLORIDE SERPL-SCNC: 107 MMOL/L (ref 98–107)
CO2 SERPL-SCNC: 26.9 MMOL/L (ref 22–29)
CREAT SERPL-MCNC: 0.39 MG/DL (ref 0.57–1)
CRP SERPL-MCNC: 2.54 MG/DL (ref 0–0.5)
D DIMER PPP FEU-MCNC: 5.15 MCGFEU/ML (ref 0–0.5)
DEPRECATED RDW RBC AUTO: 54.7 FL (ref 37–54)
ERYTHROCYTE [DISTWIDTH] IN BLOOD BY AUTOMATED COUNT: 16.5 % (ref 12.3–15.4)
FERRITIN SERPL-MCNC: 97.73 NG/ML (ref 13–150)
FIBRINOGEN PPP-MCNC: 334 MG/DL (ref 173–524)
GFR SERPL CREATININE-BSD FRML MDRD: >150 ML/MIN/1.73
GLUCOSE BLDC GLUCOMTR-MCNC: 109 MG/DL (ref 70–130)
GLUCOSE BLDC GLUCOMTR-MCNC: 125 MG/DL (ref 70–130)
GLUCOSE BLDC GLUCOMTR-MCNC: 144 MG/DL (ref 70–130)
GLUCOSE BLDC GLUCOMTR-MCNC: 78 MG/DL (ref 70–130)
GLUCOSE SERPL-MCNC: 136 MG/DL (ref 65–99)
HCT VFR BLD AUTO: 24.3 % (ref 34–46.6)
HGB BLD-MCNC: 7.9 G/DL (ref 12–15.9)
INR PPP: 1.13 (ref 0.9–1.1)
LYMPHOCYTES # BLD MANUAL: 0.24 10*3/MM3 (ref 0.7–3.1)
LYMPHOCYTES NFR BLD MANUAL: 3 % (ref 19.6–45.3)
MAGNESIUM SERPL-MCNC: 1.9 MG/DL (ref 1.6–2.4)
MCH RBC QN AUTO: 29.5 PG (ref 26.6–33)
MCHC RBC AUTO-ENTMCNC: 32.5 G/DL (ref 31.5–35.7)
MCV RBC AUTO: 90.7 FL (ref 79–97)
METAMYELOCYTES NFR BLD MANUAL: 1 % (ref 0–0)
NEUTROPHILS # BLD AUTO: 7.73 10*3/MM3 (ref 1.7–7)
NEUTROPHILS NFR BLD MANUAL: 96 % (ref 42.7–76)
NRBC SPEC MANUAL: 1 /100 WBC (ref 0–0.2)
PHOSPHATE SERPL-MCNC: 2 MG/DL (ref 2.5–4.5)
PLAT MORPH BLD: NORMAL
PLATELET # BLD AUTO: 189 10*3/MM3 (ref 140–450)
PMV BLD AUTO: 10.6 FL (ref 6–12)
POIKILOCYTOSIS BLD QL SMEAR: ABNORMAL
POTASSIUM SERPL-SCNC: 3.6 MMOL/L (ref 3.5–5.2)
PROCALCITONIN SERPL-MCNC: 0.06 NG/ML (ref 0–0.25)
PROT SERPL-MCNC: 4.2 G/DL (ref 6–8.5)
PROTHROMBIN TIME: 15 SECONDS (ref 12.8–14.5)
RBC # BLD AUTO: 2.68 10*6/MM3 (ref 3.77–5.28)
SCAN SLIDE: NORMAL
SODIUM SERPL-SCNC: 142 MMOL/L (ref 136–145)
TOXIC GRANULATION: ABNORMAL
UREASE TISS QL: POSITIVE
WBC # BLD AUTO: 8.05 10*3/MM3 (ref 3.4–10.8)

## 2021-09-26 PROCEDURE — 85025 COMPLETE CBC W/AUTO DIFF WBC: CPT | Performed by: SURGERY

## 2021-09-26 PROCEDURE — 85384 FIBRINOGEN ACTIVITY: CPT | Performed by: SURGERY

## 2021-09-26 PROCEDURE — 99232 SBSQ HOSP IP/OBS MODERATE 35: CPT | Performed by: INTERNAL MEDICINE

## 2021-09-26 PROCEDURE — 85610 PROTHROMBIN TIME: CPT | Performed by: SURGERY

## 2021-09-26 PROCEDURE — 85007 BL SMEAR W/DIFF WBC COUNT: CPT | Performed by: SURGERY

## 2021-09-26 PROCEDURE — 84100 ASSAY OF PHOSPHORUS: CPT | Performed by: SURGERY

## 2021-09-26 PROCEDURE — 93010 ELECTROCARDIOGRAM REPORT: CPT | Performed by: INTERNAL MEDICINE

## 2021-09-26 PROCEDURE — 80076 HEPATIC FUNCTION PANEL: CPT | Performed by: SURGERY

## 2021-09-26 PROCEDURE — 25010000003 MAGNESIUM SULFATE 4 GM/100ML SOLUTION: Performed by: INTERNAL MEDICINE

## 2021-09-26 PROCEDURE — 86140 C-REACTIVE PROTEIN: CPT | Performed by: SURGERY

## 2021-09-26 PROCEDURE — 80048 BASIC METABOLIC PNL TOTAL CA: CPT | Performed by: SURGERY

## 2021-09-26 PROCEDURE — 25010000002 DEXAMETHASONE PER 1 MG: Performed by: SURGERY

## 2021-09-26 PROCEDURE — 84145 PROCALCITONIN (PCT): CPT | Performed by: SURGERY

## 2021-09-26 PROCEDURE — 82728 ASSAY OF FERRITIN: CPT | Performed by: SURGERY

## 2021-09-26 PROCEDURE — 85730 THROMBOPLASTIN TIME PARTIAL: CPT | Performed by: SURGERY

## 2021-09-26 PROCEDURE — 83735 ASSAY OF MAGNESIUM: CPT | Performed by: SURGERY

## 2021-09-26 PROCEDURE — 82962 GLUCOSE BLOOD TEST: CPT

## 2021-09-26 PROCEDURE — 94799 UNLISTED PULMONARY SVC/PX: CPT

## 2021-09-26 PROCEDURE — 85379 FIBRIN DEGRADATION QUANT: CPT | Performed by: SURGERY

## 2021-09-26 PROCEDURE — 99232 SBSQ HOSP IP/OBS MODERATE 35: CPT | Performed by: SURGERY

## 2021-09-26 RX ORDER — DEXAMETHASONE 4 MG/1
4 TABLET ORAL
Status: COMPLETED | OUTPATIENT
Start: 2021-09-27 | End: 2021-09-30

## 2021-09-26 RX ORDER — MAGNESIUM SULFATE HEPTAHYDRATE 40 MG/ML
4 INJECTION, SOLUTION INTRAVENOUS ONCE
Status: COMPLETED | OUTPATIENT
Start: 2021-09-26 | End: 2021-09-27

## 2021-09-26 RX ORDER — CARVEDILOL 3.12 MG/1
3.12 TABLET ORAL 2 TIMES DAILY WITH MEALS
Status: DISCONTINUED | OUTPATIENT
Start: 2021-09-27 | End: 2021-09-27

## 2021-09-26 RX ADMIN — BACLOFEN 10 MG: 10 TABLET ORAL at 20:59

## 2021-09-26 RX ADMIN — ATORVASTATIN CALCIUM 20 MG: 40 TABLET, FILM COATED ORAL at 20:59

## 2021-09-26 RX ADMIN — DEXAMETHASONE SODIUM PHOSPHATE 6 MG: 4 INJECTION, SOLUTION INTRA-ARTICULAR; INTRALESIONAL; INTRAMUSCULAR; INTRAVENOUS; SOFT TISSUE at 20:59

## 2021-09-26 RX ADMIN — SODIUM CHLORIDE, PRESERVATIVE FREE 10 ML: 5 INJECTION INTRAVENOUS at 08:07

## 2021-09-26 RX ADMIN — CARVEDILOL 6.25 MG: 6.25 TABLET, FILM COATED ORAL at 08:07

## 2021-09-26 RX ADMIN — LISINOPRIL 10 MG: 10 TABLET ORAL at 08:07

## 2021-09-26 RX ADMIN — MAGNESIUM SULFATE HEPTAHYDRATE 4 G: 40 INJECTION, SOLUTION INTRAVENOUS at 20:59

## 2021-09-26 RX ADMIN — TRAZODONE HYDROCHLORIDE 50 MG: 50 TABLET ORAL at 20:59

## 2021-09-26 RX ADMIN — CARVEDILOL 6.25 MG: 6.25 TABLET, FILM COATED ORAL at 17:23

## 2021-09-26 RX ADMIN — SODIUM CHLORIDE, PRESERVATIVE FREE 10 ML: 5 INJECTION INTRAVENOUS at 20:59

## 2021-09-26 RX ADMIN — ASPIRIN 81 MG: 81 TABLET ORAL at 08:07

## 2021-09-27 LAB
ABO GROUP BLD: NORMAL
ALBUMIN SERPL-MCNC: 2.21 G/DL (ref 3.5–5.2)
ALBUMIN/GLOB SERPL: 0.9 G/DL
ALP SERPL-CCNC: 61 U/L (ref 39–117)
ALT SERPL W P-5'-P-CCNC: 12 U/L (ref 1–33)
ANION GAP SERPL CALCULATED.3IONS-SCNC: 4 MMOL/L (ref 5–15)
ANION GAP SERPL CALCULATED.3IONS-SCNC: 5.5 MMOL/L (ref 5–15)
AST SERPL-CCNC: 12 U/L (ref 1–32)
BASOPHILS # BLD AUTO: 0.01 10*3/MM3 (ref 0–0.2)
BASOPHILS # BLD AUTO: 0.01 10*3/MM3 (ref 0–0.2)
BASOPHILS NFR BLD AUTO: 0.1 % (ref 0–1.5)
BASOPHILS NFR BLD AUTO: 0.1 % (ref 0–1.5)
BILIRUB SERPL-MCNC: <0.2 MG/DL (ref 0–1.2)
BLD GP AB SCN SERPL QL: NEGATIVE
BUN SERPL-MCNC: 11 MG/DL (ref 8–23)
BUN SERPL-MCNC: 12 MG/DL (ref 8–23)
BUN/CREAT SERPL: 29.7 (ref 7–25)
BUN/CREAT SERPL: 40 (ref 7–25)
CALCIUM SPEC-SCNC: 7.1 MG/DL (ref 8.6–10.5)
CALCIUM SPEC-SCNC: 7.3 MG/DL (ref 8.6–10.5)
CHLORIDE SERPL-SCNC: 105 MMOL/L (ref 98–107)
CHLORIDE SERPL-SCNC: 105 MMOL/L (ref 98–107)
CO2 SERPL-SCNC: 25.5 MMOL/L (ref 22–29)
CO2 SERPL-SCNC: 27 MMOL/L (ref 22–29)
CORTIS SERPL-MCNC: 19.14 MCG/DL
CORTIS SERPL-MCNC: 24.64 MCG/DL
CORTIS SERPL-MCNC: 4.68 MCG/DL
CREAT SERPL-MCNC: 0.3 MG/DL (ref 0.57–1)
CREAT SERPL-MCNC: 0.37 MG/DL (ref 0.57–1)
D DIMER PPP FEU-MCNC: 6.85 MCGFEU/ML (ref 0–0.5)
DEPRECATED RDW RBC AUTO: 55.2 FL (ref 37–54)
DEPRECATED RDW RBC AUTO: 58 FL (ref 37–54)
EOSINOPHIL # BLD AUTO: 0 10*3/MM3 (ref 0–0.4)
EOSINOPHIL # BLD AUTO: 0 10*3/MM3 (ref 0–0.4)
EOSINOPHIL NFR BLD AUTO: 0 % (ref 0.3–6.2)
EOSINOPHIL NFR BLD AUTO: 0 % (ref 0.3–6.2)
ERYTHROCYTE [DISTWIDTH] IN BLOOD BY AUTOMATED COUNT: 16.3 % (ref 12.3–15.4)
ERYTHROCYTE [DISTWIDTH] IN BLOOD BY AUTOMATED COUNT: 18.3 % (ref 12.3–15.4)
GFR SERPL CREATININE-BSD FRML MDRD: >150 ML/MIN/1.73
GFR SERPL CREATININE-BSD FRML MDRD: >150 ML/MIN/1.73
GLOBULIN UR ELPH-MCNC: 2.4 GM/DL
GLUCOSE BLDC GLUCOMTR-MCNC: 124 MG/DL (ref 70–130)
GLUCOSE BLDC GLUCOMTR-MCNC: 144 MG/DL (ref 70–130)
GLUCOSE BLDC GLUCOMTR-MCNC: 149 MG/DL (ref 70–130)
GLUCOSE BLDC GLUCOMTR-MCNC: 161 MG/DL (ref 70–130)
GLUCOSE SERPL-MCNC: 135 MG/DL (ref 65–99)
GLUCOSE SERPL-MCNC: 166 MG/DL (ref 65–99)
HCT VFR BLD AUTO: 19.9 % (ref 34–46.6)
HCT VFR BLD AUTO: 25.2 % (ref 34–46.6)
HCT VFR BLD AUTO: 29.3 % (ref 34–46.6)
HGB BLD-MCNC: 6.3 G/DL (ref 12–15.9)
HGB BLD-MCNC: 8 G/DL (ref 12–15.9)
HGB BLD-MCNC: 9.3 G/DL (ref 12–15.9)
IMM GRANULOCYTES # BLD AUTO: 0.08 10*3/MM3 (ref 0–0.05)
IMM GRANULOCYTES # BLD AUTO: 0.18 10*3/MM3 (ref 0–0.05)
IMM GRANULOCYTES NFR BLD AUTO: 0.9 % (ref 0–0.5)
IMM GRANULOCYTES NFR BLD AUTO: 1.6 % (ref 0–0.5)
LYMPHOCYTES # BLD AUTO: 0.48 10*3/MM3 (ref 0.7–3.1)
LYMPHOCYTES # BLD AUTO: 0.54 10*3/MM3 (ref 0.7–3.1)
LYMPHOCYTES NFR BLD AUTO: 4.8 % (ref 19.6–45.3)
LYMPHOCYTES NFR BLD AUTO: 5.1 % (ref 19.6–45.3)
MAGNESIUM SERPL-MCNC: 2.2 MG/DL (ref 1.6–2.4)
MAGNESIUM SERPL-MCNC: 2.7 MG/DL (ref 1.6–2.4)
MCH RBC QN AUTO: 27.7 PG (ref 26.6–33)
MCH RBC QN AUTO: 28.8 PG (ref 26.6–33)
MCHC RBC AUTO-ENTMCNC: 31.7 G/DL (ref 31.5–35.7)
MCHC RBC AUTO-ENTMCNC: 31.7 G/DL (ref 31.5–35.7)
MCV RBC AUTO: 87.2 FL (ref 79–97)
MCV RBC AUTO: 90.9 FL (ref 79–97)
MONOCYTES # BLD AUTO: 0.16 10*3/MM3 (ref 0.1–0.9)
MONOCYTES # BLD AUTO: 0.46 10*3/MM3 (ref 0.1–0.9)
MONOCYTES NFR BLD AUTO: 1.7 % (ref 5–12)
MONOCYTES NFR BLD AUTO: 4.1 % (ref 5–12)
NEUTROPHILS NFR BLD AUTO: 10.1 10*3/MM3 (ref 1.7–7)
NEUTROPHILS NFR BLD AUTO: 8.66 10*3/MM3 (ref 1.7–7)
NEUTROPHILS NFR BLD AUTO: 89.4 % (ref 42.7–76)
NEUTROPHILS NFR BLD AUTO: 92.2 % (ref 42.7–76)
NRBC BLD AUTO-RTO: 0 /100 WBC (ref 0–0.2)
NRBC BLD AUTO-RTO: 0 /100 WBC (ref 0–0.2)
PHOSPHATE SERPL-MCNC: 1.9 MG/DL (ref 2.5–4.5)
PHOSPHATE SERPL-MCNC: 2.8 MG/DL (ref 2.5–4.5)
PLATELET # BLD AUTO: 160 10*3/MM3 (ref 140–450)
PLATELET # BLD AUTO: 187 10*3/MM3 (ref 140–450)
PMV BLD AUTO: 10.5 FL (ref 6–12)
PMV BLD AUTO: 10.9 FL (ref 6–12)
POTASSIUM SERPL-SCNC: 3.6 MMOL/L (ref 3.5–5.2)
POTASSIUM SERPL-SCNC: 5.1 MMOL/L (ref 3.5–5.2)
PROT SERPL-MCNC: 4.6 G/DL (ref 6–8.5)
QT INTERVAL: 460 MS
QTC INTERVAL: 423 MS
RBC # BLD AUTO: 2.19 10*6/MM3 (ref 3.77–5.28)
RBC # BLD AUTO: 2.89 10*6/MM3 (ref 3.77–5.28)
RH BLD: POSITIVE
SODIUM SERPL-SCNC: 136 MMOL/L (ref 136–145)
SODIUM SERPL-SCNC: 136 MMOL/L (ref 136–145)
T&S EXPIRATION DATE: NORMAL
TROPONIN T SERPL-MCNC: <0.01 NG/ML (ref 0–0.03)
WBC # BLD AUTO: 11.29 10*3/MM3 (ref 3.4–10.8)
WBC # BLD AUTO: 9.39 10*3/MM3 (ref 3.4–10.8)

## 2021-09-27 PROCEDURE — 83735 ASSAY OF MAGNESIUM: CPT | Performed by: INTERNAL MEDICINE

## 2021-09-27 PROCEDURE — 99232 SBSQ HOSP IP/OBS MODERATE 35: CPT | Performed by: INTERNAL MEDICINE

## 2021-09-27 PROCEDURE — 63710000001 INSULIN ASPART PER 5 UNITS: Performed by: SURGERY

## 2021-09-27 PROCEDURE — 86850 RBC ANTIBODY SCREEN: CPT | Performed by: PHYSICIAN ASSISTANT

## 2021-09-27 PROCEDURE — 85379 FIBRIN DEGRADATION QUANT: CPT | Performed by: INTERNAL MEDICINE

## 2021-09-27 PROCEDURE — 84100 ASSAY OF PHOSPHORUS: CPT | Performed by: INTERNAL MEDICINE

## 2021-09-27 PROCEDURE — 99232 SBSQ HOSP IP/OBS MODERATE 35: CPT | Performed by: SPECIALIST

## 2021-09-27 PROCEDURE — 86900 BLOOD TYPING SEROLOGIC ABO: CPT | Performed by: PHYSICIAN ASSISTANT

## 2021-09-27 PROCEDURE — 82962 GLUCOSE BLOOD TEST: CPT

## 2021-09-27 PROCEDURE — 25010000002 COSYNTROPIN PER 0.25 MG: Performed by: INTERNAL MEDICINE

## 2021-09-27 PROCEDURE — 86900 BLOOD TYPING SEROLOGIC ABO: CPT

## 2021-09-27 PROCEDURE — 85018 HEMOGLOBIN: CPT | Performed by: PHYSICIAN ASSISTANT

## 2021-09-27 PROCEDURE — 36430 TRANSFUSION BLD/BLD COMPNT: CPT

## 2021-09-27 PROCEDURE — 63710000001 DEXAMETHASONE PER 0.25 MG: Performed by: INTERNAL MEDICINE

## 2021-09-27 PROCEDURE — 93005 ELECTROCARDIOGRAM TRACING: CPT | Performed by: INTERNAL MEDICINE

## 2021-09-27 PROCEDURE — 85025 COMPLETE CBC W/AUTO DIFF WBC: CPT | Performed by: SURGERY

## 2021-09-27 PROCEDURE — 84484 ASSAY OF TROPONIN QUANT: CPT | Performed by: PHYSICIAN ASSISTANT

## 2021-09-27 PROCEDURE — 85014 HEMATOCRIT: CPT | Performed by: PHYSICIAN ASSISTANT

## 2021-09-27 PROCEDURE — 86901 BLOOD TYPING SEROLOGIC RH(D): CPT | Performed by: PHYSICIAN ASSISTANT

## 2021-09-27 PROCEDURE — 82533 TOTAL CORTISOL: CPT | Performed by: INTERNAL MEDICINE

## 2021-09-27 PROCEDURE — 80053 COMPREHEN METABOLIC PANEL: CPT | Performed by: SURGERY

## 2021-09-27 PROCEDURE — P9016 RBC LEUKOCYTES REDUCED: HCPCS

## 2021-09-27 PROCEDURE — 86923 COMPATIBILITY TEST ELECTRIC: CPT

## 2021-09-27 PROCEDURE — 94799 UNLISTED PULMONARY SVC/PX: CPT

## 2021-09-27 PROCEDURE — 99231 SBSQ HOSP IP/OBS SF/LOW 25: CPT | Performed by: SURGERY

## 2021-09-27 RX ORDER — METRONIDAZOLE 250 MG/1
500 TABLET ORAL EVERY 8 HOURS SCHEDULED
Status: COMPLETED | OUTPATIENT
Start: 2021-09-27 | End: 2021-10-04

## 2021-09-27 RX ORDER — POTASSIUM CHLORIDE 1.5 G/1.77G
40 POWDER, FOR SOLUTION ORAL EVERY 4 HOURS
Status: COMPLETED | OUTPATIENT
Start: 2021-09-27 | End: 2021-09-27

## 2021-09-27 RX ORDER — BISMUTH SUBSALICYLATE 262 MG/1
524 TABLET, CHEWABLE ORAL
Status: DISCONTINUED | OUTPATIENT
Start: 2021-09-27 | End: 2021-10-08 | Stop reason: HOSPADM

## 2021-09-27 RX ORDER — COSYNTROPIN 0.25 MG/ML
0.25 INJECTION, POWDER, FOR SOLUTION INTRAMUSCULAR; INTRAVENOUS ONCE
Status: COMPLETED | OUTPATIENT
Start: 2021-09-27 | End: 2021-09-27

## 2021-09-27 RX ORDER — PANTOPRAZOLE SODIUM 40 MG/1
40 TABLET, DELAYED RELEASE ORAL
Status: DISCONTINUED | OUTPATIENT
Start: 2021-09-27 | End: 2021-10-08 | Stop reason: HOSPADM

## 2021-09-27 RX ADMIN — METRONIDAZOLE 500 MG: 250 TABLET ORAL at 21:17

## 2021-09-27 RX ADMIN — ATORVASTATIN CALCIUM 20 MG: 40 TABLET, FILM COATED ORAL at 21:17

## 2021-09-27 RX ADMIN — POTASSIUM CHLORIDE 40 MEQ: 1.5 POWDER, FOR SOLUTION ORAL at 14:23

## 2021-09-27 RX ADMIN — POTASSIUM & SODIUM PHOSPHATES POWDER PACK 280-160-250 MG 2 PACKET: 280-160-250 PACK at 05:32

## 2021-09-27 RX ADMIN — NICOTINE 1 PATCH: 7 PATCH, EXTENDED RELEASE TRANSDERMAL at 14:23

## 2021-09-27 RX ADMIN — INSULIN ASPART 2 UNITS: 100 INJECTION, SOLUTION INTRAVENOUS; SUBCUTANEOUS at 12:07

## 2021-09-27 RX ADMIN — ASPIRIN 81 MG: 81 TABLET ORAL at 08:00

## 2021-09-27 RX ADMIN — PANTOPRAZOLE SODIUM 40 MG: 40 TABLET, DELAYED RELEASE ORAL at 10:30

## 2021-09-27 RX ADMIN — POTASSIUM CHLORIDE 40 MEQ: 1.5 POWDER, FOR SOLUTION ORAL at 18:35

## 2021-09-27 RX ADMIN — SODIUM CHLORIDE, PRESERVATIVE FREE 10 ML: 5 INJECTION INTRAVENOUS at 08:52

## 2021-09-27 RX ADMIN — METRONIDAZOLE 500 MG: 250 TABLET ORAL at 14:23

## 2021-09-27 RX ADMIN — COSYNTROPIN 0.25 MG: 0.25 INJECTION, POWDER, LYOPHILIZED, FOR SOLUTION INTRAMUSCULAR; INTRAVENOUS at 16:07

## 2021-09-27 RX ADMIN — DEXAMETHASONE 4 MG: 4 TABLET ORAL at 08:00

## 2021-09-28 ENCOUNTER — APPOINTMENT (OUTPATIENT)
Dept: ULTRASOUND IMAGING | Facility: HOSPITAL | Age: 76
End: 2021-09-28

## 2021-09-28 LAB
BH BB BLOOD EXPIRATION DATE: NORMAL
BH BB BLOOD TYPE BARCODE: 7300
BH BB DISPENSE STATUS: NORMAL
BH BB PRODUCT CODE: NORMAL
BH BB UNIT NUMBER: NORMAL
CROSSMATCH INTERPRETATION: NORMAL
GLUCOSE BLDC GLUCOMTR-MCNC: 123 MG/DL (ref 70–130)
GLUCOSE BLDC GLUCOMTR-MCNC: 151 MG/DL (ref 70–130)
GLUCOSE BLDC GLUCOMTR-MCNC: 158 MG/DL (ref 70–130)
GLUCOSE BLDC GLUCOMTR-MCNC: 90 MG/DL (ref 70–130)
UNIT  ABO: NORMAL
UNIT  RH: NORMAL

## 2021-09-28 PROCEDURE — 82962 GLUCOSE BLOOD TEST: CPT

## 2021-09-28 PROCEDURE — 94760 N-INVAS EAR/PLS OXIMETRY 1: CPT

## 2021-09-28 PROCEDURE — 99232 SBSQ HOSP IP/OBS MODERATE 35: CPT | Performed by: SPECIALIST

## 2021-09-28 PROCEDURE — 99231 SBSQ HOSP IP/OBS SF/LOW 25: CPT | Performed by: SURGERY

## 2021-09-28 PROCEDURE — 94799 UNLISTED PULMONARY SVC/PX: CPT

## 2021-09-28 PROCEDURE — 99232 SBSQ HOSP IP/OBS MODERATE 35: CPT | Performed by: INTERNAL MEDICINE

## 2021-09-28 PROCEDURE — 63710000001 DEXAMETHASONE PER 0.25 MG: Performed by: INTERNAL MEDICINE

## 2021-09-28 PROCEDURE — 93970 EXTREMITY STUDY: CPT | Performed by: RADIOLOGY

## 2021-09-28 PROCEDURE — 63710000001 INSULIN ASPART PER 5 UNITS: Performed by: SURGERY

## 2021-09-28 PROCEDURE — 93970 EXTREMITY STUDY: CPT

## 2021-09-28 RX ADMIN — SODIUM CHLORIDE, PRESERVATIVE FREE 10 ML: 5 INJECTION INTRAVENOUS at 08:30

## 2021-09-28 RX ADMIN — PANTOPRAZOLE SODIUM 40 MG: 40 TABLET, DELAYED RELEASE ORAL at 05:01

## 2021-09-28 RX ADMIN — METRONIDAZOLE 500 MG: 250 TABLET ORAL at 05:01

## 2021-09-28 RX ADMIN — TRAZODONE HYDROCHLORIDE 50 MG: 50 TABLET ORAL at 21:02

## 2021-09-28 RX ADMIN — ATORVASTATIN CALCIUM 20 MG: 40 TABLET, FILM COATED ORAL at 21:02

## 2021-09-28 RX ADMIN — METRONIDAZOLE 500 MG: 250 TABLET ORAL at 21:02

## 2021-09-28 RX ADMIN — INSULIN ASPART 2 UNITS: 100 INJECTION, SOLUTION INTRAVENOUS; SUBCUTANEOUS at 17:10

## 2021-09-28 RX ADMIN — METRONIDAZOLE 500 MG: 250 TABLET ORAL at 14:55

## 2021-09-28 RX ADMIN — SODIUM CHLORIDE, PRESERVATIVE FREE 10 ML: 5 INJECTION INTRAVENOUS at 21:02

## 2021-09-28 RX ADMIN — DEXAMETHASONE 4 MG: 4 TABLET ORAL at 08:30

## 2021-09-28 RX ADMIN — BACLOFEN 10 MG: 10 TABLET ORAL at 21:02

## 2021-09-28 RX ADMIN — LISINOPRIL 10 MG: 10 TABLET ORAL at 08:30

## 2021-09-28 RX ADMIN — ASPIRIN 81 MG: 81 TABLET ORAL at 08:30

## 2021-09-29 ENCOUNTER — APPOINTMENT (OUTPATIENT)
Dept: CT IMAGING | Facility: HOSPITAL | Age: 76
End: 2021-09-29

## 2021-09-29 LAB
ANION GAP SERPL CALCULATED.3IONS-SCNC: 5.1 MMOL/L (ref 5–15)
APTT PPP: 27.2 SECONDS (ref 25.5–35.4)
APTT PPP: 36.4 SECONDS (ref 25.5–35.4)
BASOPHILS # BLD AUTO: 0.01 10*3/MM3 (ref 0–0.2)
BASOPHILS NFR BLD AUTO: 0.1 % (ref 0–1.5)
BUN SERPL-MCNC: 10 MG/DL (ref 8–23)
BUN/CREAT SERPL: 31.3 (ref 7–25)
CALCIUM SPEC-SCNC: 7.2 MG/DL (ref 8.6–10.5)
CHLORIDE SERPL-SCNC: 98 MMOL/L (ref 98–107)
CO2 SERPL-SCNC: 26.9 MMOL/L (ref 22–29)
CREAT SERPL-MCNC: 0.32 MG/DL (ref 0.57–1)
D DIMER PPP FEU-MCNC: 8.2 MCGFEU/ML (ref 0–0.5)
DEPRECATED RDW RBC AUTO: 42.2 FL (ref 37–54)
DEPRECATED RDW RBC AUTO: 56 FL (ref 37–54)
EOSINOPHIL # BLD AUTO: 0.03 10*3/MM3 (ref 0–0.4)
EOSINOPHIL NFR BLD AUTO: 0.3 % (ref 0.3–6.2)
ERYTHROCYTE [DISTWIDTH] IN BLOOD BY AUTOMATED COUNT: 13.3 % (ref 12.3–15.4)
ERYTHROCYTE [DISTWIDTH] IN BLOOD BY AUTOMATED COUNT: 17.7 % (ref 12.3–15.4)
GFR SERPL CREATININE-BSD FRML MDRD: >150 ML/MIN/1.73
GLUCOSE BLDC GLUCOMTR-MCNC: 132 MG/DL (ref 70–130)
GLUCOSE BLDC GLUCOMTR-MCNC: 150 MG/DL (ref 70–130)
GLUCOSE BLDC GLUCOMTR-MCNC: 155 MG/DL (ref 70–130)
GLUCOSE BLDC GLUCOMTR-MCNC: 212 MG/DL (ref 70–130)
GLUCOSE SERPL-MCNC: 113 MG/DL (ref 65–99)
HCT VFR BLD AUTO: 26.3 % (ref 34–46.6)
HCT VFR BLD AUTO: 40.9 % (ref 34–46.6)
HGB BLD-MCNC: 13.4 G/DL (ref 12–15.9)
HGB BLD-MCNC: 8.6 G/DL (ref 12–15.9)
IMM GRANULOCYTES # BLD AUTO: 0.31 10*3/MM3 (ref 0–0.05)
IMM GRANULOCYTES NFR BLD AUTO: 3 % (ref 0–0.5)
INR PPP: 1 (ref 0.9–1.1)
LAB AP CASE REPORT: NORMAL
LYMPHOCYTES # BLD AUTO: 1.2 10*3/MM3 (ref 0.7–3.1)
LYMPHOCYTES # BLD MANUAL: 0.85 10*3/MM3 (ref 0.7–3.1)
LYMPHOCYTES NFR BLD AUTO: 11.8 % (ref 19.6–45.3)
LYMPHOCYTES NFR BLD MANUAL: 11 % (ref 19.6–45.3)
LYMPHOCYTES NFR BLD MANUAL: 2 % (ref 5–12)
MCH RBC QN AUTO: 28.3 PG (ref 26.6–33)
MCH RBC QN AUTO: 28.5 PG (ref 26.6–33)
MCHC RBC AUTO-ENTMCNC: 32.7 G/DL (ref 31.5–35.7)
MCHC RBC AUTO-ENTMCNC: 32.8 G/DL (ref 31.5–35.7)
MCV RBC AUTO: 86.5 FL (ref 79–97)
MCV RBC AUTO: 86.8 FL (ref 79–97)
MONOCYTES # BLD AUTO: 0.15 10*3/MM3 (ref 0.1–0.9)
MONOCYTES # BLD AUTO: 0.78 10*3/MM3 (ref 0.1–0.9)
MONOCYTES NFR BLD AUTO: 7.6 % (ref 5–12)
NEUTROPHILS # BLD AUTO: 6.72 10*3/MM3 (ref 1.7–7)
NEUTROPHILS NFR BLD AUTO: 7.88 10*3/MM3 (ref 1.7–7)
NEUTROPHILS NFR BLD AUTO: 77.2 % (ref 42.7–76)
NEUTROPHILS NFR BLD MANUAL: 77 % (ref 42.7–76)
NEUTS BAND NFR BLD MANUAL: 10 % (ref 0–5)
NRBC BLD AUTO-RTO: 0 /100 WBC (ref 0–0.2)
PLAT MORPH BLD: NORMAL
PLATELET # BLD AUTO: 245 10*3/MM3 (ref 140–450)
PLATELET # BLD AUTO: 259 10*3/MM3 (ref 140–450)
PMV BLD AUTO: 10.1 FL (ref 6–12)
PMV BLD AUTO: 10.2 FL (ref 6–12)
POTASSIUM SERPL-SCNC: 4.5 MMOL/L (ref 3.5–5.2)
PROTHROMBIN TIME: 13.6 SECONDS (ref 12.8–14.5)
RBC # BLD AUTO: 3.04 10*6/MM3 (ref 3.77–5.28)
RBC # BLD AUTO: 4.71 10*6/MM3 (ref 3.77–5.28)
RBC MORPH BLD: NORMAL
SCAN SLIDE: NORMAL
SODIUM SERPL-SCNC: 130 MMOL/L (ref 136–145)
WBC # BLD AUTO: 10.21 10*3/MM3 (ref 3.4–10.8)
WBC # BLD AUTO: 7.72 10*3/MM3 (ref 3.4–10.8)

## 2021-09-29 PROCEDURE — 85007 BL SMEAR W/DIFF WBC COUNT: CPT | Performed by: INTERNAL MEDICINE

## 2021-09-29 PROCEDURE — 85379 FIBRIN DEGRADATION QUANT: CPT | Performed by: INTERNAL MEDICINE

## 2021-09-29 PROCEDURE — 85025 COMPLETE CBC W/AUTO DIFF WBC: CPT | Performed by: SURGERY

## 2021-09-29 PROCEDURE — 71275 CT ANGIOGRAPHY CHEST: CPT

## 2021-09-29 PROCEDURE — 99232 SBSQ HOSP IP/OBS MODERATE 35: CPT | Performed by: INTERNAL MEDICINE

## 2021-09-29 PROCEDURE — 63710000001 DEXAMETHASONE PER 0.25 MG: Performed by: INTERNAL MEDICINE

## 2021-09-29 PROCEDURE — 25010000002 HEPARIN (PORCINE) PER 1000 UNITS: Performed by: INTERNAL MEDICINE

## 2021-09-29 PROCEDURE — 63710000001 INSULIN ASPART PER 5 UNITS: Performed by: SURGERY

## 2021-09-29 PROCEDURE — 0 IOPAMIDOL PER 1 ML: Performed by: INTERNAL MEDICINE

## 2021-09-29 PROCEDURE — 85025 COMPLETE CBC W/AUTO DIFF WBC: CPT | Performed by: INTERNAL MEDICINE

## 2021-09-29 PROCEDURE — 71275 CT ANGIOGRAPHY CHEST: CPT | Performed by: RADIOLOGY

## 2021-09-29 PROCEDURE — 85610 PROTHROMBIN TIME: CPT | Performed by: INTERNAL MEDICINE

## 2021-09-29 PROCEDURE — 86140 C-REACTIVE PROTEIN: CPT | Performed by: INTERNAL MEDICINE

## 2021-09-29 PROCEDURE — 94799 UNLISTED PULMONARY SVC/PX: CPT

## 2021-09-29 PROCEDURE — 82962 GLUCOSE BLOOD TEST: CPT

## 2021-09-29 PROCEDURE — 85730 THROMBOPLASTIN TIME PARTIAL: CPT | Performed by: INTERNAL MEDICINE

## 2021-09-29 PROCEDURE — 80053 COMPREHEN METABOLIC PANEL: CPT | Performed by: SURGERY

## 2021-09-29 RX ORDER — HEPARIN SODIUM 10000 [USP'U]/100ML
12 INJECTION, SOLUTION INTRAVENOUS
Status: DISCONTINUED | OUTPATIENT
Start: 2021-09-29 | End: 2021-10-01

## 2021-09-29 RX ORDER — BUMETANIDE 0.25 MG/ML
0.5 INJECTION INTRAMUSCULAR; INTRAVENOUS ONCE
Status: COMPLETED | OUTPATIENT
Start: 2021-09-29 | End: 2021-09-29

## 2021-09-29 RX ORDER — HEPARIN SODIUM 5000 [USP'U]/ML
60 INJECTION, SOLUTION INTRAVENOUS; SUBCUTANEOUS AS NEEDED
Status: DISCONTINUED | OUTPATIENT
Start: 2021-09-29 | End: 2021-10-01

## 2021-09-29 RX ORDER — HEPARIN SODIUM 5000 [USP'U]/ML
30 INJECTION, SOLUTION INTRAVENOUS; SUBCUTANEOUS AS NEEDED
Status: DISCONTINUED | OUTPATIENT
Start: 2021-09-29 | End: 2021-10-01

## 2021-09-29 RX ADMIN — IOPAMIDOL 90 ML: 755 INJECTION, SOLUTION INTRAVENOUS at 09:03

## 2021-09-29 RX ADMIN — METRONIDAZOLE 500 MG: 250 TABLET ORAL at 21:53

## 2021-09-29 RX ADMIN — TRAZODONE HYDROCHLORIDE 50 MG: 50 TABLET ORAL at 21:53

## 2021-09-29 RX ADMIN — ASPIRIN 81 MG: 81 TABLET ORAL at 09:49

## 2021-09-29 RX ADMIN — SODIUM CHLORIDE, PRESERVATIVE FREE 10 ML: 5 INJECTION INTRAVENOUS at 08:25

## 2021-09-29 RX ADMIN — BUMETANIDE 0.5 MG: 0.25 INJECTION, SOLUTION INTRAMUSCULAR; INTRAVENOUS at 15:39

## 2021-09-29 RX ADMIN — BACLOFEN 10 MG: 10 TABLET ORAL at 21:53

## 2021-09-29 RX ADMIN — HEPARIN SODIUM 2500 UNITS: 5000 INJECTION INTRAVENOUS; SUBCUTANEOUS at 17:18

## 2021-09-29 RX ADMIN — INSULIN ASPART 3 UNITS: 100 INJECTION, SOLUTION INTRAVENOUS; SUBCUTANEOUS at 17:17

## 2021-09-29 RX ADMIN — ATORVASTATIN CALCIUM 20 MG: 40 TABLET, FILM COATED ORAL at 21:53

## 2021-09-29 RX ADMIN — SODIUM CHLORIDE, PRESERVATIVE FREE 10 ML: 5 INJECTION INTRAVENOUS at 21:53

## 2021-09-29 RX ADMIN — DEXAMETHASONE 4 MG: 4 TABLET ORAL at 09:49

## 2021-09-29 RX ADMIN — HEPARIN SODIUM 12 UNITS/KG/HR: 10000 INJECTION, SOLUTION INTRAVENOUS at 09:51

## 2021-09-29 RX ADMIN — INSULIN ASPART 2 UNITS: 100 INJECTION, SOLUTION INTRAVENOUS; SUBCUTANEOUS at 11:49

## 2021-09-29 RX ADMIN — PANTOPRAZOLE SODIUM 40 MG: 40 TABLET, DELAYED RELEASE ORAL at 05:45

## 2021-09-29 RX ADMIN — METRONIDAZOLE 500 MG: 250 TABLET ORAL at 05:45

## 2021-09-29 RX ADMIN — METRONIDAZOLE 500 MG: 250 TABLET ORAL at 15:39

## 2021-09-29 RX ADMIN — LISINOPRIL 10 MG: 10 TABLET ORAL at 09:49

## 2021-09-30 LAB
ALBUMIN SERPL-MCNC: 2.36 G/DL (ref 3.5–5.2)
ALBUMIN/GLOB SERPL: 1.2 G/DL
ALP SERPL-CCNC: 58 U/L (ref 39–117)
ALT SERPL W P-5'-P-CCNC: 10 U/L (ref 1–33)
ANION GAP SERPL CALCULATED.3IONS-SCNC: 5 MMOL/L (ref 5–15)
APTT PPP: 35.7 SECONDS (ref 25.5–35.4)
APTT PPP: 57.3 SECONDS (ref 25.5–35.4)
APTT PPP: 80.3 SECONDS (ref 25.5–35.4)
APTT PPP: 82.8 SECONDS (ref 25.5–35.4)
AST SERPL-CCNC: 10 U/L (ref 1–32)
BASOPHILS # BLD AUTO: 0.01 10*3/MM3 (ref 0–0.2)
BASOPHILS NFR BLD AUTO: 0.1 % (ref 0–1.5)
BILIRUB SERPL-MCNC: <0.2 MG/DL (ref 0–1.2)
BUN SERPL-MCNC: 13 MG/DL (ref 8–23)
BUN/CREAT SERPL: 33.3 (ref 7–25)
CALCIUM SPEC-SCNC: 7.4 MG/DL (ref 8.6–10.5)
CHLORIDE SERPL-SCNC: 100 MMOL/L (ref 98–107)
CO2 SERPL-SCNC: 31 MMOL/L (ref 22–29)
CREAT SERPL-MCNC: 0.39 MG/DL (ref 0.57–1)
CRP SERPL-MCNC: 0.97 MG/DL (ref 0–0.5)
DEPRECATED RDW RBC AUTO: 54.8 FL (ref 37–54)
EOSINOPHIL # BLD AUTO: 0 10*3/MM3 (ref 0–0.4)
EOSINOPHIL NFR BLD AUTO: 0 % (ref 0.3–6.2)
ERYTHROCYTE [DISTWIDTH] IN BLOOD BY AUTOMATED COUNT: 17.4 % (ref 12.3–15.4)
GFR SERPL CREATININE-BSD FRML MDRD: >150 ML/MIN/1.73
GLOBULIN UR ELPH-MCNC: 2 GM/DL
GLUCOSE BLDC GLUCOMTR-MCNC: 108 MG/DL (ref 70–130)
GLUCOSE BLDC GLUCOMTR-MCNC: 109 MG/DL (ref 70–130)
GLUCOSE BLDC GLUCOMTR-MCNC: 163 MG/DL (ref 70–130)
GLUCOSE BLDC GLUCOMTR-MCNC: 178 MG/DL (ref 70–130)
GLUCOSE SERPL-MCNC: 144 MG/DL (ref 65–99)
HCT VFR BLD AUTO: 23.5 % (ref 34–46.6)
HCT VFR BLD AUTO: 24 % (ref 34–46.6)
HGB BLD-MCNC: 7.6 G/DL (ref 12–15.9)
HGB BLD-MCNC: 7.7 G/DL (ref 12–15.9)
IMM GRANULOCYTES # BLD AUTO: 0.24 10*3/MM3 (ref 0–0.05)
IMM GRANULOCYTES NFR BLD AUTO: 2 % (ref 0–0.5)
LYMPHOCYTES # BLD AUTO: 0.75 10*3/MM3 (ref 0.7–3.1)
LYMPHOCYTES NFR BLD AUTO: 6.1 % (ref 19.6–45.3)
MCH RBC QN AUTO: 28.1 PG (ref 26.6–33)
MCHC RBC AUTO-ENTMCNC: 32.1 G/DL (ref 31.5–35.7)
MCV RBC AUTO: 87.6 FL (ref 79–97)
MONOCYTES # BLD AUTO: 0.51 10*3/MM3 (ref 0.1–0.9)
MONOCYTES NFR BLD AUTO: 4.2 % (ref 5–12)
NEUTROPHILS NFR BLD AUTO: 10.73 10*3/MM3 (ref 1.7–7)
NEUTROPHILS NFR BLD AUTO: 87.6 % (ref 42.7–76)
NRBC BLD AUTO-RTO: 0 /100 WBC (ref 0–0.2)
PLATELET # BLD AUTO: 302 10*3/MM3 (ref 140–450)
PMV BLD AUTO: 10.2 FL (ref 6–12)
POTASSIUM SERPL-SCNC: 4.5 MMOL/L (ref 3.5–5.2)
PROT SERPL-MCNC: 4.4 G/DL (ref 6–8.5)
QT INTERVAL: 392 MS
QTC INTERVAL: 423 MS
RBC # BLD AUTO: 2.74 10*6/MM3 (ref 3.77–5.28)
SODIUM SERPL-SCNC: 136 MMOL/L (ref 136–145)
WBC # BLD AUTO: 12.24 10*3/MM3 (ref 3.4–10.8)

## 2021-09-30 PROCEDURE — 85018 HEMOGLOBIN: CPT | Performed by: HOSPITALIST

## 2021-09-30 PROCEDURE — 63710000001 DEXAMETHASONE PER 0.25 MG: Performed by: INTERNAL MEDICINE

## 2021-09-30 PROCEDURE — 93010 ELECTROCARDIOGRAM REPORT: CPT | Performed by: INTERNAL MEDICINE

## 2021-09-30 PROCEDURE — 99232 SBSQ HOSP IP/OBS MODERATE 35: CPT | Performed by: INTERNAL MEDICINE

## 2021-09-30 PROCEDURE — 82962 GLUCOSE BLOOD TEST: CPT

## 2021-09-30 PROCEDURE — 94799 UNLISTED PULMONARY SVC/PX: CPT

## 2021-09-30 PROCEDURE — 63710000001 INSULIN ASPART PER 5 UNITS: Performed by: SURGERY

## 2021-09-30 PROCEDURE — 93005 ELECTROCARDIOGRAM TRACING: CPT | Performed by: NURSE PRACTITIONER

## 2021-09-30 PROCEDURE — 85730 THROMBOPLASTIN TIME PARTIAL: CPT | Performed by: INTERNAL MEDICINE

## 2021-09-30 PROCEDURE — 25010000002 HEPARIN (PORCINE) PER 1000 UNITS: Performed by: INTERNAL MEDICINE

## 2021-09-30 PROCEDURE — 85014 HEMATOCRIT: CPT | Performed by: HOSPITALIST

## 2021-09-30 RX ORDER — AMIODARONE HYDROCHLORIDE 200 MG/1
400 TABLET ORAL
Status: DISCONTINUED | OUTPATIENT
Start: 2021-09-30 | End: 2021-10-08 | Stop reason: HOSPADM

## 2021-09-30 RX ORDER — LISINOPRIL 2.5 MG/1
5 TABLET ORAL
Status: DISCONTINUED | OUTPATIENT
Start: 2021-10-01 | End: 2021-10-05

## 2021-09-30 RX ORDER — BUMETANIDE 0.25 MG/ML
0.5 INJECTION INTRAMUSCULAR; INTRAVENOUS ONCE
Status: COMPLETED | OUTPATIENT
Start: 2021-09-30 | End: 2021-09-30

## 2021-09-30 RX ADMIN — INSULIN ASPART 2 UNITS: 100 INJECTION, SOLUTION INTRAVENOUS; SUBCUTANEOUS at 17:38

## 2021-09-30 RX ADMIN — AMIODARONE HYDROCHLORIDE 400 MG: 200 TABLET ORAL at 14:31

## 2021-09-30 RX ADMIN — HEPARIN SODIUM 2500 UNITS: 5000 INJECTION INTRAVENOUS; SUBCUTANEOUS at 06:43

## 2021-09-30 RX ADMIN — ATORVASTATIN CALCIUM 20 MG: 40 TABLET, FILM COATED ORAL at 21:01

## 2021-09-30 RX ADMIN — HEPARIN SODIUM 18 UNITS/KG/HR: 10000 INJECTION, SOLUTION INTRAVENOUS at 20:58

## 2021-09-30 RX ADMIN — LISINOPRIL 10 MG: 10 TABLET ORAL at 09:00

## 2021-09-30 RX ADMIN — DEXAMETHASONE 4 MG: 4 TABLET ORAL at 09:00

## 2021-09-30 RX ADMIN — PANTOPRAZOLE SODIUM 40 MG: 40 TABLET, DELAYED RELEASE ORAL at 05:29

## 2021-09-30 RX ADMIN — BUMETANIDE 0.5 MG: 0.25 INJECTION, SOLUTION INTRAMUSCULAR; INTRAVENOUS at 20:56

## 2021-09-30 RX ADMIN — METRONIDAZOLE 500 MG: 250 TABLET ORAL at 21:01

## 2021-09-30 RX ADMIN — METRONIDAZOLE 500 MG: 250 TABLET ORAL at 05:29

## 2021-09-30 RX ADMIN — SODIUM CHLORIDE, PRESERVATIVE FREE 10 ML: 5 INJECTION INTRAVENOUS at 09:00

## 2021-09-30 RX ADMIN — ASPIRIN 81 MG: 81 TABLET ORAL at 09:00

## 2021-09-30 RX ADMIN — SODIUM CHLORIDE, PRESERVATIVE FREE 10 ML: 5 INJECTION INTRAVENOUS at 20:53

## 2021-09-30 RX ADMIN — METRONIDAZOLE 500 MG: 250 TABLET ORAL at 15:00

## 2021-10-01 LAB
ALBUMIN SERPL-MCNC: 2.31 G/DL (ref 3.5–5.2)
ALBUMIN/GLOB SERPL: 1 G/DL
ALP SERPL-CCNC: 49 U/L (ref 39–117)
ALT SERPL W P-5'-P-CCNC: 8 U/L (ref 1–33)
ANION GAP SERPL CALCULATED.3IONS-SCNC: 3.7 MMOL/L (ref 5–15)
APTT PPP: 45.1 SECONDS (ref 25.5–35.4)
AST SERPL-CCNC: 11 U/L (ref 1–32)
BASOPHILS # BLD AUTO: 0.02 10*3/MM3 (ref 0–0.2)
BASOPHILS NFR BLD AUTO: 0.1 % (ref 0–1.5)
BILIRUB SERPL-MCNC: <0.2 MG/DL (ref 0–1.2)
BUN SERPL-MCNC: 17 MG/DL (ref 8–23)
BUN/CREAT SERPL: 42.5 (ref 7–25)
CALCIUM SPEC-SCNC: 7.9 MG/DL (ref 8.6–10.5)
CHLORIDE SERPL-SCNC: 98 MMOL/L (ref 98–107)
CO2 SERPL-SCNC: 33.3 MMOL/L (ref 22–29)
CREAT SERPL-MCNC: 0.4 MG/DL (ref 0.57–1)
DEPRECATED RDW RBC AUTO: 54.9 FL (ref 37–54)
EOSINOPHIL # BLD AUTO: 0 10*3/MM3 (ref 0–0.4)
EOSINOPHIL NFR BLD AUTO: 0 % (ref 0.3–6.2)
ERYTHROCYTE [DISTWIDTH] IN BLOOD BY AUTOMATED COUNT: 17.4 % (ref 12.3–15.4)
GFR SERPL CREATININE-BSD FRML MDRD: >150 ML/MIN/1.73
GLOBULIN UR ELPH-MCNC: 2.4 GM/DL
GLUCOSE BLDC GLUCOMTR-MCNC: 111 MG/DL (ref 70–130)
GLUCOSE BLDC GLUCOMTR-MCNC: 129 MG/DL (ref 70–130)
GLUCOSE BLDC GLUCOMTR-MCNC: 146 MG/DL (ref 70–130)
GLUCOSE BLDC GLUCOMTR-MCNC: 166 MG/DL (ref 70–130)
GLUCOSE SERPL-MCNC: 100 MG/DL (ref 65–99)
HCT VFR BLD AUTO: 24.7 % (ref 34–46.6)
HGB BLD-MCNC: 7.8 G/DL (ref 12–15.9)
IMM GRANULOCYTES # BLD AUTO: 0.26 10*3/MM3 (ref 0–0.05)
IMM GRANULOCYTES NFR BLD AUTO: 1.3 % (ref 0–0.5)
LYMPHOCYTES # BLD AUTO: 1.09 10*3/MM3 (ref 0.7–3.1)
LYMPHOCYTES NFR BLD AUTO: 5.5 % (ref 19.6–45.3)
MCH RBC QN AUTO: 28.1 PG (ref 26.6–33)
MCHC RBC AUTO-ENTMCNC: 31.6 G/DL (ref 31.5–35.7)
MCV RBC AUTO: 88.8 FL (ref 79–97)
MONOCYTES # BLD AUTO: 0.91 10*3/MM3 (ref 0.1–0.9)
MONOCYTES NFR BLD AUTO: 4.6 % (ref 5–12)
NEUTROPHILS NFR BLD AUTO: 17.45 10*3/MM3 (ref 1.7–7)
NEUTROPHILS NFR BLD AUTO: 88.5 % (ref 42.7–76)
NRBC BLD AUTO-RTO: 0 /100 WBC (ref 0–0.2)
PLATELET # BLD AUTO: 225 10*3/MM3 (ref 140–450)
PMV BLD AUTO: 11.9 FL (ref 6–12)
POTASSIUM SERPL-SCNC: 4.6 MMOL/L (ref 3.5–5.2)
PROT SERPL-MCNC: 4.7 G/DL (ref 6–8.5)
RBC # BLD AUTO: 2.78 10*6/MM3 (ref 3.77–5.28)
SODIUM SERPL-SCNC: 135 MMOL/L (ref 136–145)
TSH SERPL DL<=0.05 MIU/L-ACNC: 38.14 UIU/ML (ref 0.27–4.2)
WBC # BLD AUTO: 19.73 10*3/MM3 (ref 3.4–10.8)

## 2021-10-01 PROCEDURE — 94799 UNLISTED PULMONARY SVC/PX: CPT

## 2021-10-01 PROCEDURE — 85025 COMPLETE CBC W/AUTO DIFF WBC: CPT | Performed by: SURGERY

## 2021-10-01 PROCEDURE — 25010000002 HEPARIN (PORCINE) PER 1000 UNITS: Performed by: INTERNAL MEDICINE

## 2021-10-01 PROCEDURE — 80053 COMPREHEN METABOLIC PANEL: CPT | Performed by: INTERNAL MEDICINE

## 2021-10-01 PROCEDURE — 99232 SBSQ HOSP IP/OBS MODERATE 35: CPT | Performed by: SPECIALIST

## 2021-10-01 PROCEDURE — 84443 ASSAY THYROID STIM HORMONE: CPT | Performed by: INTERNAL MEDICINE

## 2021-10-01 PROCEDURE — 82962 GLUCOSE BLOOD TEST: CPT

## 2021-10-01 PROCEDURE — 85730 THROMBOPLASTIN TIME PARTIAL: CPT | Performed by: INTERNAL MEDICINE

## 2021-10-01 PROCEDURE — 99232 SBSQ HOSP IP/OBS MODERATE 35: CPT | Performed by: INTERNAL MEDICINE

## 2021-10-01 RX ORDER — BUMETANIDE 0.25 MG/ML
0.5 INJECTION INTRAMUSCULAR; INTRAVENOUS ONCE
Status: COMPLETED | OUTPATIENT
Start: 2021-10-01 | End: 2021-10-01

## 2021-10-01 RX ADMIN — BUMETANIDE 0.5 MG: 0.25 INJECTION, SOLUTION INTRAMUSCULAR; INTRAVENOUS at 17:41

## 2021-10-01 RX ADMIN — LISINOPRIL 5 MG: 2.5 TABLET ORAL at 08:40

## 2021-10-01 RX ADMIN — METRONIDAZOLE 500 MG: 250 TABLET ORAL at 14:54

## 2021-10-01 RX ADMIN — ASPIRIN 81 MG: 81 TABLET ORAL at 08:43

## 2021-10-01 RX ADMIN — BACLOFEN 10 MG: 10 TABLET ORAL at 21:12

## 2021-10-01 RX ADMIN — ATORVASTATIN CALCIUM 20 MG: 40 TABLET, FILM COATED ORAL at 21:12

## 2021-10-01 RX ADMIN — TRAZODONE HYDROCHLORIDE 50 MG: 50 TABLET ORAL at 21:12

## 2021-10-01 RX ADMIN — SODIUM CHLORIDE, PRESERVATIVE FREE 10 ML: 5 INJECTION INTRAVENOUS at 08:49

## 2021-10-01 RX ADMIN — METRONIDAZOLE 500 MG: 250 TABLET ORAL at 21:12

## 2021-10-01 RX ADMIN — METRONIDAZOLE 500 MG: 250 TABLET ORAL at 05:26

## 2021-10-01 RX ADMIN — PANTOPRAZOLE SODIUM 40 MG: 40 TABLET, DELAYED RELEASE ORAL at 05:26

## 2021-10-01 RX ADMIN — HEPARIN SODIUM 1300 UNITS: 5000 INJECTION INTRAVENOUS; SUBCUTANEOUS at 08:39

## 2021-10-01 RX ADMIN — AMIODARONE HYDROCHLORIDE 400 MG: 200 TABLET ORAL at 08:43

## 2021-10-01 RX ADMIN — APIXABAN 5 MG: 5 TABLET, FILM COATED ORAL at 17:19

## 2021-10-01 NOTE — CASE MANAGEMENT/SOCIAL WORK
Discharge Planning Assessment  [facility]     Patient Name: Mary Ly  MRN: [id number]  Today's Date: 10/1/2021    Admit Date: 9/21/2021      Discharge Plan     Row Name 10/01/21 1657       Plan    Plan Pt was admitted from [facility] and she does not have a bed hold. SS will need advance notice of discharge due to pt having no bed hold at SNF. SNF will not accept pt back on Saturday or Sunday due to pt not having a bed hold. SS to follow and assist as needed with discharge planning.        Continued Care and Services - Admitted Since 9/21/2021     Destination Coordination complete.    Service Provider Request Status Selected Services Address Phone Fax Patient Preferred    [facility]   [facility] Skilled [facility] [address] [facility] [address] [phone] --              SEBLE Reeder

## 2021-10-01 NOTE — PROGRESS NOTES
Assisted By: Carol Ann DUNBAR    CC: Follow-up on COVID-19 as well as colitis    Interview History/HPI: Patient states she continues to feel well, she is eating well, she is on 1 L, she denies any abdominal pain but has not had a bowel movement maybe in 48 hours.  No significant cough no shortness of breath over baseline    ROS:     Vitals:    10/01/21 0750   BP:    Pulse: 78   Resp: 18   Temp:    SpO2: 93%         Intake/Output Summary (Last 24 hours) at 10/1/2021 1708  Last data filed at 10/1/2021 1700  Gross per 24 hour   Intake 989.7 ml   Output --   Net 989.7 ml       EXAM: 138/88, 18, 76, 97.9, saturation 93 to 96% on 1 L.  No distress she is not using accessory muscles to breathe speech is normal face symmetric lungs have bilateral breath sounds diminished but clear heart regular rate and rhythm abdomen soft bowel sounds are active nondistended nontender extremities without edema, she moves all extremities on command.    Tele: Sinus rhythm    LABS:     Lab Results (last 48 hours)     Procedure Component Value Units Date/Time    POC Glucose Once [[id number]]  (Abnormal) Collected: 10/01/21 1644    Specimen: Blood Updated: 10/01/21 1650     Glucose 146 mg/dL      Comment: Meter: [id number] : [id number] [employer]       POC Glucose Once [[id number]]  (Normal) Collected: 10/01/21 1102    Specimen: Blood Updated: 10/01/21 1109     Glucose 129 mg/dL      Comment: Meter: [id number] : [id number] [employer]       Comprehensive Metabolic Panel [[id number]]  (Abnormal) Collected: 10/01/21 0507    Specimen: Blood Updated: 10/01/21 0711     Glucose 100 mg/dL      BUN 17 mg/dL      Creatinine 0.40 mg/dL      Sodium 135 mmol/L      Potassium 4.6 mmol/L      Chloride 98 mmol/L      CO2 33.3 mmol/L      Calcium 7.9 mg/dL      Total Protein 4.7 g/dL      Albumin 2.31 g/dL      ALT (SGPT) 8 U/L      AST (SGOT) 11 U/L      Alkaline Phosphatase 49 U/L      Total Bilirubin <0.2 mg/dL      eGFR Non African Amer >150  mL/min/1.73      Globulin 2.4 gm/dL      A/G Ratio 1.0 g/dL      BUN/Creatinine Ratio 42.5     Anion Gap 3.7 mmol/L     Narrative:      GFR Normal >60  Chronic Kidney Disease <60  Kidney Failure <15      aPTT [[id number]]  (Abnormal) Collected: 10/01/21 0507    Specimen: Blood Updated: 10/01/21 0710     PTT 45.1 seconds     Narrative:      PTT Heparin Therapeutic Range:  59 - 95 seconds      TSH [[id number]]  (Abnormal) Collected: 10/01/21 0507    Specimen: Blood Updated: 10/01/21 0700     TSH 38.140 uIU/mL     POC Glucose Once [[id number]]  (Normal) Collected: 10/01/21 0640    Specimen: Blood Updated: 10/01/21 0646     Glucose 111 mg/dL      Comment: Meter: [id number] : [id number] mayne mary       CBC & Differential [[id number]]  (Abnormal) Collected: 10/01/21 0507    Specimen: Blood Updated: 10/01/21 0636    Narrative:      The following orders were created for panel order CBC & Differential.  Procedure                               Abnormality         Status                     ---------                               -----------         ------                     CBC Auto Differential[[id number]]        Abnormal            Final result                 Please view results for these tests on the individual orders.    CBC Auto Differential [[id number]]  (Abnormal) Collected: 10/01/21 0507    Specimen: Blood Updated: 10/01/21 0636     WBC 19.73 10*3/mm3      RBC 2.78 10*6/mm3      Hemoglobin 7.8 g/dL      Hematocrit 24.7 %      MCV 88.8 fL      MCH 28.1 pg      MCHC 31.6 g/dL      RDW 17.4 %      RDW-SD 54.9 fl      MPV 11.9 fL      Platelets 225 10*3/mm3      Neutrophil % 88.5 %      Lymphocyte % 5.5 %      Monocyte % 4.6 %      Eosinophil % 0.0 %      Basophil % 0.1 %      Immature Grans % 1.3 %      Neutrophils, Absolute 17.45 10*3/mm3      Lymphocytes, Absolute 1.09 10*3/mm3      Monocytes, Absolute 0.91 10*3/mm3      Eosinophils, Absolute 0.00 10*3/mm3      Basophils, Absolute 0.02 10*3/mm3      Immature Grans,  Absolute 0.26 10*3/mm3      nRBC 0.0 /100 WBC     aPTT [[id number]]  (Abnormal) Collected: 09/30/21 2115    Specimen: Blood Updated: 09/30/21 2205     PTT 57.3 seconds     Narrative:      PTT Heparin Therapeutic Range:  59 - 95 seconds      POC Glucose Once [[id number]]  (Abnormal) Collected: 09/30/21 2052    Specimen: Blood Updated: 09/30/21 2058     Glucose 178 mg/dL      Comment: Meter: [id number] : [id number] LO PRAMOD       POC Glucose Once [[id number]]  (Abnormal) Collected: 09/30/21 1653    Specimen: Blood Updated: 09/30/21 1659     Glucose 163 mg/dL      Comment: Meter: [id number] : [id number] SAW SILVA       aPTT [[id number]]  (Abnormal) Collected: 09/30/21 1304    Specimen: Blood Updated: 09/30/21 1423     PTT 82.8 seconds     Narrative:      PTT Heparin Therapeutic Range:  59 - 95 seconds      POC Glucose Once [[id number]]  (Normal) Collected: 09/30/21 1142    Specimen: Blood Updated: 09/30/21 1150     Glucose 109 mg/dL      Comment: Meter: [id number] : [id number] SAW SILVA       POC Glucose Once [[id number]]  (Normal) Collected: 09/30/21 0702    Specimen: Blood Updated: 09/30/21 0708     Glucose 108 mg/dL      Comment: Meter: [id number] : [id number] mayne mary       aPTT [[id number]]  (Abnormal) Collected: 09/30/21 0526    Specimen: Blood Updated: 09/30/21 0634     PTT 35.7 seconds     Narrative:      PTT Heparin Therapeutic Range:  59 - 95 seconds      Hemoglobin & Hematocrit, Blood [[id number]]  (Abnormal) Collected: 09/30/21 0526    Specimen: Blood Updated: 09/30/21 0604     Hemoglobin 7.6 g/dL      Hematocrit 23.5 %     CBC & Differential [[id number]]  (Abnormal) Collected: 09/29/21 2326    Specimen: Blood Updated: 09/30/21 0201    Narrative:      The following orders were created for panel order CBC & Differential.  Procedure                               Abnormality         Status                     ---------                               -----------         ------                      CBC Auto Differential[[id number]]        Abnormal            Final result                 Please view results for these tests on the individual orders.    CBC Auto Differential [[id number]]  (Abnormal) Collected: 09/29/21 2326    Specimen: Blood Updated: 09/30/21 0201     WBC 12.24 10*3/mm3      RBC 2.74 10*6/mm3      Hemoglobin 7.7 g/dL      Hematocrit 24.0 %      MCV 87.6 fL      MCH 28.1 pg      MCHC 32.1 g/dL      RDW 17.4 %      RDW-SD 54.8 fl      MPV 10.2 fL      Platelets 302 10*3/mm3      Neutrophil % 87.6 %      Lymphocyte % 6.1 %      Monocyte % 4.2 %      Eosinophil % 0.0 %      Basophil % 0.1 %      Immature Grans % 2.0 %      Neutrophils, Absolute 10.73 10*3/mm3      Lymphocytes, Absolute 0.75 10*3/mm3      Monocytes, Absolute 0.51 10*3/mm3      Eosinophils, Absolute 0.00 10*3/mm3      Basophils, Absolute 0.01 10*3/mm3      Immature Grans, Absolute 0.24 10*3/mm3      nRBC 0.0 /100 WBC     Comprehensive Metabolic Panel [[id number]]  (Abnormal) Collected: 09/29/21 2326    Specimen: Blood Updated: 09/30/21 0034     Glucose 144 mg/dL      BUN 13 mg/dL      Creatinine 0.39 mg/dL      Sodium 136 mmol/L      Potassium 4.5 mmol/L      Chloride 100 mmol/L      CO2 31.0 mmol/L      Calcium 7.4 mg/dL      Total Protein 4.4 g/dL      Albumin 2.36 g/dL      ALT (SGPT) 10 U/L      AST (SGOT) 10 U/L      Alkaline Phosphatase 58 U/L      Total Bilirubin <0.2 mg/dL      eGFR Non African Amer >150 mL/min/1.73      Globulin 2.0 gm/dL      A/G Ratio 1.2 g/dL      BUN/Creatinine Ratio 33.3     Anion Gap 5.0 mmol/L     Narrative:      GFR Normal >60  Chronic Kidney Disease <60  Kidney Failure <15      C-reactive Protein [[id number]]  (Abnormal) Collected: 09/29/21 2326    Specimen: Blood Updated: 09/30/21 0034     C-Reactive Protein 0.97 mg/dL     aPTT [[id number]]  (Abnormal) Collected: 09/29/21 2326    Specimen: Blood Updated: 09/30/21 0028     PTT 80.3 seconds     Narrative:      PTT Heparin Therapeutic Range:  59 - 95  seconds      POC Glucose Once [[id number]]  (Abnormal) Collected: 09/29/21 2152    Specimen: Blood Updated: 09/29/21 2158     Glucose 150 mg/dL      Comment: Meter: [id number] : [id number] Wali Santos                  Radiology:    Imaging Results (Last 72 Hours)     Procedure Component Value Units Date/Time    CT Chest Pulmonary Embolism [[id number]] Collected: 09/29/21 0904     Updated: 09/29/21 0933    Narrative:      CT CHEST PULMONARY EMBOLISM-     CLINICAL INDICATION: PE suspected, high prob; U07.1-COVID-19;  J12.82-Pneumonia due to coronavirus disease 2019; D72.829-Elevated white  blood cell count, unspecified; J96.11-Chronic respiratory failure with  hypoxia; K92.2-Gastrointestinal hemorrhage, unspecified        COMPARISON: 09/23/2021      PROCEDURE: Thin cut axial images were acquired through the pulmonary  vessels during the rapid infusion of IV contrast.     Additional 3-D reformatted images obtained via post-processing for  improved diagnostic accuracy and procedural planning.     Radiation dose reduction techniques were utilized per ALARA protocol.  Automated exposure control was initiated through either or [employer] or  [employer] software packages by  protocol.           FINDINGS: Today's study demonstrates opacification of the central  pulmonary vessels.   There are no filling defects.   There is no truncation.     No evidence of a pulmonary embolus.     Bibasilar consolidation.     There is no mediastinal lymph node enlargement     Bibasilar pleural effusions  4.25 cm abdominal aortic aneurysm       Impression:      1. No evidence of a pulmonary embolus  2. Bibasilar effusions and bibasilar consolidation  3. Descending thoracic aortic aneurysm.     This report was finalized on 9/29/2021 9:31 AM by Dr. River Zaidi MD.       US Venous Doppler Lower Extremity Bilateral (duplex) [[id number]] Collected: 09/28/21 2105     Updated: 09/29/21 0744    Narrative:      US VENOUS DOPPLER LOWER  EXTREMITY BILATERAL (DUPLEX)-     CLINICAL INDICATION: Elevated D-dimer; U07.1-COVID-19; J12.82-Pneumonia  due to coronavirus disease 2019; D72.829-Elevated white blood cell  count, unspecified; J96.11-Chronic respiratory failure with hypoxia;  K92.2-Gastrointestinal hemorrhage, unspecified        COMPARISON: 09/24/2021      TECHNIQUE: Color Doppler imaging was used with compression and  augmentation to evaluate the lower extremity deep venous system.     FINDINGS:   There is noncompressibility in the left popliteal vein concerning for  DVT.     No evidence of DVT in the right lower extremity.       Impression:      Appearance concerning for DVT in the left popliteal vein     This report was finalized on 9/29/2021 7:42 AM by Dr. River Zaidi MD.             Results for orders placed during the hospital encounter of 04/22/21    Adult Transthoracic Echo Complete W/ Cont if Necessary Per Protocol    Interpretation Summary  · Normal left ventricular cavity size noted. Left ventricular wall thickness is consistent with mild concentric hypertrophy  · Left ventricular ejection fraction appears to be 61 - 65%.  · Left ventricular diastolic function is consistent with (grade I) impaired relaxation.  · There is mild calcification of the aortic valvular cusps.  · No aortic valve regurgitation is present. Mild aortic valve stenosis is present.  · There are myxomatous changes of the mitral valve apparatus present. Mild mitral valve regurgitation is present. No significant mitral valve stenosis is present.  · . There is no evidence of pericardial effusion.      Assessment/Plan:   Colitis and questionable GI bleed, this is resolved, she has been tried on heparin for 48 hours, no bleeding and allowing for day-to-day variation hemoglobin is stable.  I am going to transition her to [facility].  Noted she had a DVT and has atrial fibrillation however I think the risk of giving 10 mg twice daily initially outweigh the benefits and I am  going to stick to 5 mg twice daily currently.  Patient symptoms have resolved.    Covid, stable, she completed remdesivir and Decadron.  Her CT repeat PE protocol did not show PE but had pleural effusions, I have been diuresing her, I did not think she had active pneumonia but more had atelectasis.  Of note her CRP is normalized but her white count has risen significantly but she does not really have bandemia.  She is completing her course of Flagyl for colitis.  Going to follow-up with chest x-ray in the a.m., pending on how she feels, lab work, possibly could discharge back to nursing facility tomorrow but will monitor closely.    Atrial fibrillation, now sinus rhythm, she has been started on amiodarone by cardiology and maintaining her sinus rhythm.    Hypertension, controlled    Grade 1 diastolic heart failure, recheck BNP in the a.m.    DVT, on Eliquis as above.      Disposition SNF possibly tomorrow    Austyn López MD

## 2021-10-01 NOTE — PLAN OF CARE
Goal Outcome Evaluation:  Plan of Care Reviewed With: patient        Progress: no change  Outcome Summary: Pt has been resting throughout shift. Heparin Drip was titrated to 20 u/kg/hr and a heparin bolus was given per protocol. PTT redraw was ordered per protocol. Will continue to monitor.

## 2021-10-01 NOTE — PROGRESS NOTES
LOS: 10 days     Name: Mary Ly  Age/Sex: 76 y.o. female  :  1945        PCP: Leta Gardner MD    Principal Problem:    Pneumonia due to COVID-19 virus  Active Problems:    Upper GI bleed      Admission Information:   Mary Ly is a 76 year old female with a past medical history significant for atrial fibrillation, COPD, hyperlipidemia and diabetes mellitus type 2. Presented to the ED with shortness of breath and was found to have COVID-19 pneumonia. Cardiology was consulted for atrial fibrillation in the setting of GI bleed    Following for: atrial fibrillation    Telemetry Monitoring:     Interval history: Hemoglobin has been stable at 7.8, she is off of all anticoagulation. She reports that she is feeling better than she did yesterday and reports some improvement in her shortness of breath. She also denies any chest pains or visible blood loss.       Subjective     ROS    Vital Signs  Vitals:    21 1858 21 1930 10/01/21 0659 10/01/21 0750   BP: 110/50  138/88    BP Location: Left arm  Left arm    Patient Position: Lying  Lying    Pulse: 87  76 78   Resp: 16  18 18   Temp: 96.5 °F (35.8 °C)  97.9 °F (36.6 °C)    TempSrc: Oral  Oral    SpO2: 96% 94% 96% 93%   Weight:       Height:         Body mass index is 14.98 kg/m².      Intake/Output Summary (Last 24 hours) at 10/1/2021 1046  Last data filed at 10/1/2021 0900  Gross per 24 hour   Intake 898.73 ml   Output --   Net 898.73 ml       Cardiovascular:      Normal rate. Regular rhythm.   Neurological:      Mental Status: Alert and oriented to person, place and time.       Limited due to COVID-19 visit was done via video.   Results Review:     Results from last 7 days   Lab Units 10/01/21  0507 21  0526 21  2326 21  0805 21  0357 21  2236 21  1101 21  0207 21  0207 21  0912 21  0912   WBC 10*3/mm3 19.73*  --  12.24* 7.72 10.21 11.29*  --   --  9.39  --  8.05   HEMOGLOBIN  g/dL 7.8* 7.6* 7.7* 13.4 8.6* 8.0* 9.3*   < > 6.3*   < > 7.9*   PLATELETS 10*3/mm3 225  --  302 245 259 187  --   --  160  --  189    < > = values in this interval not displayed.     Results from last 7 days   Lab Units 10/01/21  0507 09/29/21  2326 09/29/21  0357 09/27/21  2236 09/27/21  0207 09/26/21  1420 09/25/21  0707   SODIUM mmol/L 135* 136 130* 136 136 142 136   POTASSIUM mmol/L 4.6 4.5 4.5 5.1 3.6 3.6 4.2   CHLORIDE mmol/L 98 100 98 105 105 107 104   CO2 mmol/L 33.3* 31.0* 26.9 25.5 27.0 26.9 24.0   BUN mg/dL 17 13 10 11 12 12 16   CREATININE mg/dL 0.40* 0.39* 0.32* 0.37* 0.30* 0.39* 0.40*   CALCIUM mg/dL 7.9* 7.4* 7.2* 7.3* 7.1* 7.1* 7.1*   GLUCOSE mg/dL 100* 144* 113* 135* 166* 136* 111*   ALT (SGPT) U/L 8 10  --  12  --  9 9   AST (SGOT) U/L 11 10  --  12  --  9 14     Results from last 7 days   Lab Units 09/27/21  0207   TROPONIN T ng/mL <0.010       Results from last 7 days   Lab Units 09/29/21  0805 09/26/21  0912   INR  1.00 1.13*       I reviewed the patient's new clinical results.  I reviewed the patient's new imaging results and agree with the interpretation.  I personally viewed and interpreted the patient's EKG/Telemetry data    Medication Review:   amiodarone, 400 mg, Oral, Q24H  aspirin, 81 mg, Oral, Daily  atorvastatin, 20 mg, Oral, Nightly  baclofen, 10 mg, Oral, Nightly  insulin aspart, 0-7 Units, Subcutaneous, TID AC  lisinopril, 5 mg, Oral, Q24H  metroNIDAZOLE, 500 mg, Oral, Q8H  nicotine, 1 patch, Transdermal, Q24H  pantoprazole, 40 mg, Oral, Q AM  sodium chloride, 10 mL, Intravenous, Q12H  traZODone, 50 mg, Oral, Nightly      heparin (porcine), 12 Units/kg/hr, Last Rate: 20 Units/kg/hr (10/01/21 2818)        Assessment:  1. Proximal atrial fibrillation currently patient is in sinus rhythm  2. Acute GI GI bleed, anticoagulation is held  3. Chronic diastolic heart failure, compensated  4. Coronary artery status post PCI clinically stable with no chest  pain      Recommendations:  1. Patient is maintaining sinus rhythm continue with amiodarone  2. Anticoagulation being held for GI bleed patient should be considered for atrial occlusive device however she would need to be anticoagulated briefly prior to the procedure, so once is okay with surgery team patient can be briefly anticoagulated possibly in a month or so      I discussed the patients findings and my recommendations with patient and family    Santiago Vallejo PA-C  Electronically signed by Valeriano Alanis MD, 10/01/21, 11:22 AM EDT.  10/01/21  10:46 EDT  Electronically signed by Santiago Vallejo PA-C, 10/01/21, 10:46 AM EDT.

## 2021-10-02 ENCOUNTER — APPOINTMENT (OUTPATIENT)
Dept: GENERAL RADIOLOGY | Facility: HOSPITAL | Age: 76
End: 2021-10-02

## 2021-10-02 ENCOUNTER — APPOINTMENT (OUTPATIENT)
Dept: CT IMAGING | Facility: HOSPITAL | Age: 76
End: 2021-10-02

## 2021-10-02 LAB
ALBUMIN SERPL-MCNC: 2.25 G/DL (ref 3.5–5.2)
ALBUMIN/GLOB SERPL: 0.8 G/DL
ALP SERPL-CCNC: 47 U/L (ref 39–117)
ALT SERPL W P-5'-P-CCNC: 9 U/L (ref 1–33)
ANION GAP SERPL CALCULATED.3IONS-SCNC: 7.5 MMOL/L (ref 5–15)
AST SERPL-CCNC: 15 U/L (ref 1–32)
BASOPHILS # BLD AUTO: 0.05 10*3/MM3 (ref 0–0.2)
BASOPHILS NFR BLD AUTO: 0.2 % (ref 0–1.5)
BILIRUB SERPL-MCNC: <0.2 MG/DL (ref 0–1.2)
BUN SERPL-MCNC: 16 MG/DL (ref 8–23)
BUN/CREAT SERPL: 40 (ref 7–25)
CALCIUM SPEC-SCNC: 7.8 MG/DL (ref 8.6–10.5)
CHLORIDE SERPL-SCNC: 94 MMOL/L (ref 98–107)
CO2 SERPL-SCNC: 31.5 MMOL/L (ref 22–29)
CREAT SERPL-MCNC: 0.4 MG/DL (ref 0.57–1)
CRP SERPL-MCNC: 0.68 MG/DL (ref 0–0.5)
DEPRECATED RDW RBC AUTO: 54.4 FL (ref 37–54)
EOSINOPHIL # BLD AUTO: 0.04 10*3/MM3 (ref 0–0.4)
EOSINOPHIL NFR BLD AUTO: 0.1 % (ref 0.3–6.2)
ERYTHROCYTE [DISTWIDTH] IN BLOOD BY AUTOMATED COUNT: 17.5 % (ref 12.3–15.4)
GFR SERPL CREATININE-BSD FRML MDRD: >150 ML/MIN/1.73
GLOBULIN UR ELPH-MCNC: 2.7 GM/DL
GLUCOSE BLDC GLUCOMTR-MCNC: 117 MG/DL (ref 70–130)
GLUCOSE BLDC GLUCOMTR-MCNC: 128 MG/DL (ref 70–130)
GLUCOSE BLDC GLUCOMTR-MCNC: 133 MG/DL (ref 70–130)
GLUCOSE SERPL-MCNC: 111 MG/DL (ref 65–99)
HCT VFR BLD AUTO: 22.6 % (ref 34–46.6)
HCT VFR BLD AUTO: 24.1 % (ref 34–46.6)
HGB BLD-MCNC: 7.1 G/DL (ref 12–15.9)
HGB BLD-MCNC: 7.6 G/DL (ref 12–15.9)
IMM GRANULOCYTES # BLD AUTO: 0.28 10*3/MM3 (ref 0–0.05)
IMM GRANULOCYTES NFR BLD AUTO: 1 % (ref 0–0.5)
LYMPHOCYTES # BLD AUTO: 1.54 10*3/MM3 (ref 0.7–3.1)
LYMPHOCYTES NFR BLD AUTO: 5.7 % (ref 19.6–45.3)
MAGNESIUM SERPL-MCNC: 1.6 MG/DL (ref 1.6–2.4)
MCH RBC QN AUTO: 28.2 PG (ref 26.6–33)
MCHC RBC AUTO-ENTMCNC: 31.4 G/DL (ref 31.5–35.7)
MCV RBC AUTO: 89.7 FL (ref 79–97)
MONOCYTES # BLD AUTO: 1.8 10*3/MM3 (ref 0.1–0.9)
MONOCYTES NFR BLD AUTO: 6.6 % (ref 5–12)
NEUTROPHILS NFR BLD AUTO: 23.44 10*3/MM3 (ref 1.7–7)
NEUTROPHILS NFR BLD AUTO: 86.4 % (ref 42.7–76)
NRBC BLD AUTO-RTO: 0 /100 WBC (ref 0–0.2)
NT-PROBNP SERPL-MCNC: 890.1 PG/ML (ref 0–1800)
PLATELET # BLD AUTO: 363 10*3/MM3 (ref 140–450)
PMV BLD AUTO: 9.8 FL (ref 6–12)
POTASSIUM SERPL-SCNC: 4.4 MMOL/L (ref 3.5–5.2)
PROT SERPL-MCNC: 4.9 G/DL (ref 6–8.5)
RBC # BLD AUTO: 2.52 10*6/MM3 (ref 3.77–5.28)
SODIUM SERPL-SCNC: 133 MMOL/L (ref 136–145)
T4 FREE SERPL-MCNC: 0.24 NG/DL (ref 0.93–1.7)
WBC # BLD AUTO: 27.15 10*3/MM3 (ref 3.4–10.8)

## 2021-10-02 PROCEDURE — 83735 ASSAY OF MAGNESIUM: CPT | Performed by: INTERNAL MEDICINE

## 2021-10-02 PROCEDURE — 86140 C-REACTIVE PROTEIN: CPT | Performed by: INTERNAL MEDICINE

## 2021-10-02 PROCEDURE — 25010000003 MAGNESIUM SULFATE 4 GM/100ML SOLUTION: Performed by: INTERNAL MEDICINE

## 2021-10-02 PROCEDURE — 71250 CT THORAX DX C-: CPT

## 2021-10-02 PROCEDURE — 80053 COMPREHEN METABOLIC PANEL: CPT | Performed by: INTERNAL MEDICINE

## 2021-10-02 PROCEDURE — 85014 HEMATOCRIT: CPT | Performed by: INTERNAL MEDICINE

## 2021-10-02 PROCEDURE — 85025 COMPLETE CBC W/AUTO DIFF WBC: CPT | Performed by: SURGERY

## 2021-10-02 PROCEDURE — 74176 CT ABD & PELVIS W/O CONTRAST: CPT

## 2021-10-02 PROCEDURE — 71045 X-RAY EXAM CHEST 1 VIEW: CPT

## 2021-10-02 PROCEDURE — 85018 HEMOGLOBIN: CPT | Performed by: INTERNAL MEDICINE

## 2021-10-02 PROCEDURE — 71045 X-RAY EXAM CHEST 1 VIEW: CPT | Performed by: RADIOLOGY

## 2021-10-02 PROCEDURE — 83880 ASSAY OF NATRIURETIC PEPTIDE: CPT | Performed by: INTERNAL MEDICINE

## 2021-10-02 PROCEDURE — 99232 SBSQ HOSP IP/OBS MODERATE 35: CPT | Performed by: INTERNAL MEDICINE

## 2021-10-02 PROCEDURE — 94799 UNLISTED PULMONARY SVC/PX: CPT

## 2021-10-02 PROCEDURE — 82962 GLUCOSE BLOOD TEST: CPT

## 2021-10-02 PROCEDURE — 84439 ASSAY OF FREE THYROXINE: CPT | Performed by: INTERNAL MEDICINE

## 2021-10-02 RX ORDER — MAGNESIUM SULFATE HEPTAHYDRATE 40 MG/ML
4 INJECTION, SOLUTION INTRAVENOUS ONCE
Status: COMPLETED | OUTPATIENT
Start: 2021-10-02 | End: 2021-10-02

## 2021-10-02 RX ORDER — CEFDINIR 300 MG/1
300 CAPSULE ORAL EVERY 12 HOURS SCHEDULED
Status: DISCONTINUED | OUTPATIENT
Start: 2021-10-02 | End: 2021-10-08 | Stop reason: HOSPADM

## 2021-10-02 RX ORDER — LEVOTHYROXINE SODIUM 0.03 MG/1
25 TABLET ORAL
Status: DISCONTINUED | OUTPATIENT
Start: 2021-10-03 | End: 2021-10-08 | Stop reason: HOSPADM

## 2021-10-02 RX ADMIN — BACLOFEN 10 MG: 10 TABLET ORAL at 21:15

## 2021-10-02 RX ADMIN — CEFDINIR 300 MG: 300 CAPSULE ORAL at 21:15

## 2021-10-02 RX ADMIN — PANTOPRAZOLE SODIUM 40 MG: 40 TABLET, DELAYED RELEASE ORAL at 05:21

## 2021-10-02 RX ADMIN — APIXABAN 5 MG: 5 TABLET, FILM COATED ORAL at 21:10

## 2021-10-02 RX ADMIN — APIXABAN 5 MG: 5 TABLET, FILM COATED ORAL at 12:09

## 2021-10-02 RX ADMIN — MAGNESIUM SULFATE HEPTAHYDRATE 4 G: 40 INJECTION, SOLUTION INTRAVENOUS at 08:47

## 2021-10-02 RX ADMIN — SODIUM CHLORIDE, PRESERVATIVE FREE 10 ML: 5 INJECTION INTRAVENOUS at 08:47

## 2021-10-02 RX ADMIN — CEFDINIR 300 MG: 300 CAPSULE ORAL at 12:14

## 2021-10-02 RX ADMIN — ASPIRIN 81 MG: 81 TABLET ORAL at 12:09

## 2021-10-02 RX ADMIN — SODIUM CHLORIDE, PRESERVATIVE FREE 10 ML: 5 INJECTION INTRAVENOUS at 21:00

## 2021-10-02 RX ADMIN — METRONIDAZOLE 500 MG: 250 TABLET ORAL at 05:21

## 2021-10-02 RX ADMIN — AMIODARONE HYDROCHLORIDE 400 MG: 200 TABLET ORAL at 08:47

## 2021-10-02 RX ADMIN — LISINOPRIL 5 MG: 2.5 TABLET ORAL at 08:47

## 2021-10-02 RX ADMIN — ATORVASTATIN CALCIUM 20 MG: 40 TABLET, FILM COATED ORAL at 23:47

## 2021-10-02 RX ADMIN — METRONIDAZOLE 500 MG: 250 TABLET ORAL at 21:00

## 2021-10-02 RX ADMIN — METRONIDAZOLE 500 MG: 250 TABLET ORAL at 13:53

## 2021-10-02 NOTE — PLAN OF CARE
Goal Outcome Evaluation:  Plan of Care Reviewed With: patient        Progress: no change  Outcome Summary: Patient has been on 1L NC today, tolerating well. Patient remains on Eliquis for VTE prevention, patient voices no needs or concerns at this time

## 2021-10-02 NOTE — PROGRESS NOTES
[facility] Cardiology  INPATIENT PROGRESS NOTE    Name: Mary Ly  Age/Sex: 76 y.o. female  :  1945        PCP: Leta Gardner MD        Assessment and plan    Paroxysmal defibrillation patient currently normal sinus rhythm tolerating well with no symptomatology to report  Overall weakness in the setting of COVID-19 pneumonia at this point await for weakening complete activities daily living will be able to further follow-up  Chronic diastolic heart failure compensated today  Coronary disease status post PCI patient hemodynamically stable doing well      Thank you for allowing me to participate in this patient care  Jeovany Upton DO              Subjective no events no complaints      Vital Signs  Temp:  [96 °F (35.6 °C)-97.8 °F (36.6 °C)] 97.8 °F (36.6 °C)  Heart Rate:  [81-84] 84  Resp:  [18] 18  BP: (119-128)/(60-80) 128/60  Body mass index is 14.98 kg/m².          Patient Active Problem List   Diagnosis   • Status post laparoscopic cholecystectomy   • Severe malnutrition (HCC)   • COPD (chronic obstructive pulmonary disease) ([facility])   • Type II diabetes mellitus ([facility])   • Hypothyroidism   • Hyperlipidemia   • Diastolic CHF, chronic ([facility])   • Moderate malnutrition (HCC)   • Vomiting   • Upper GI bleed   • Pneumonia due to COVID-19 virus       Past Medical History:   Diagnosis Date   • AAA (abdominal aortic aneurysm) (CMS/HCC)     s/p repair    • Arthritis    • CAD (coronary artery disease)     s/p stenting in the past    • Chronic kidney disease (CKD), stage III (moderate) (CMS/HCC)    • COPD (chronic obstructive pulmonary disease) (CMS/HCC)    • Debility    • Diastolic CHF, chronic (CMS/HCC) 2021   • GERD (gastroesophageal reflux disease)    • History of CVA (cerebrovascular accident)    • History of ischemic colitis    • Hyperlipidemia 2021   • Hypertension    • Hypothyroidism    • Pneumonia due to COVID-19 virus 2021   • Stroke (CMS/HCC)    • Type II diabetes mellitus (CMS/HCC)         Current Facility-Administered Medications   Medication Dose Route Frequency Provider Last Rate Last Admin   • albuterol sulfate HFA (PROVENTIL HFA;VENTOLIN HFA;PROAIR HFA) inhaler 2 puff  2 puff Inhalation Q6H PRN Lonnie Meneses MD       • amiodarone (PACERONE) tablet 400 mg  400 mg Oral Q24H Tremaine Rust MD   400 mg at 10/02/21 0847   • apixaban (ELIQUIS) tablet 5 mg  5 mg Oral Q12H Austyn López MD   5 mg at 10/01/21 1719   • aspirin EC tablet 81 mg  81 mg Oral Daily Sean Riley MD   81 mg at 10/01/21 0843   • atorvastatin (LIPITOR) tablet 20 mg  20 mg Oral Nightly Sean Riley MD   20 mg at 10/01/21 2112   • baclofen (LIORESAL) tablet 10 mg  10 mg Oral Nightly Lonnie Meneses MD   10 mg at 10/01/21 2112   • bisacodyl (DULCOLAX) suppository 10 mg  10 mg Rectal Daily PRN Lonnie Meneses MD       • bismuth subsalicylate (PEPTO BISMOL) chewable tablet 524 mg  524 mg Oral Q1H PRN Lonnie Meneses MD       • cefdinir (OMNICEF) capsule 300 mg  300 mg Oral Q12H Austyn López MD       • dextrose (D50W) 25 g/ 50mL Intravenous Solution 25 g  25 g Intravenous Q15 Min PRN Lonnie Meneses MD       • dextrose (GLUTOSE) oral gel 15 g  15 g Oral Q15 Min PRN Lonnie Meneses MD       • glucagon (human recombinant) (GLUCAGEN DIAGNOSTIC) injection 1 mg  1 mg Subcutaneous Q15 Min PRN Lonnie Meneses MD       • HYDROcodone-acetaminophen (NORCO) 5-325 MG per tablet 1 tablet  1 tablet Oral Q8H PRN Lonnie Meneses MD   1 tablet at 09/25/21 2204   • insulin aspart (novoLOG) injection 0-7 Units  0-7 Units Subcutaneous TID AC Lonnie Meneses MD   2 Units at 09/30/21 1738   • ipratropium-albuterol (DUO-NEB) nebulizer solution 3 mL  3 mL Nebulization 4x Daily PRN Lonnie Meneses MD       • lactulose (CHRONULAC) 10 GM/15ML solution 10 g  10 g Oral Daily PRN Lonnie Meneses MD       • lisinopril (PRINIVIL,ZESTRIL) tablet 5 mg  5 mg Oral Q24H Austyn López MD   5 mg at 10/02/21 0847   • loperamide (IMODIUM)  capsule 2 mg  2 mg Oral Q6H PRN Lonnie Meneses MD       • Magnesium Sulfate 2 gram Bolus, followed by 8 gram infusion (total Mg dose 10 grams)- Mg less than or equal to 1mg/dL  2 g Intravenous PRN Lonnie Meneses MD        Or   • Magnesium Sulfate 2 gram / 50mL Infusion (GIVE X 3 BAGS TO EQUAL 6GM TOTAL DOSE) - Mg 1.1 - 1.5 mg/dl  2 g Intravenous PRN Lonnie Meneses MD        Or   • Magnesium Sulfate 4 gram infusion- Mg 1.6-1.9 mg/dL  4 g Intravenous PRN Lonnie Meneses MD       • Magnesium Sulfate 4 gram infusion- Mg 1.6-1.9 mg/dL  4 g Intravenous Once Austyn López MD 25 mL/hr at 10/02/21 0847 4 g at 10/02/21 0847   • metroNIDAZOLE (FLAGYL) tablet 500 mg  500 mg Oral Q8H Lonnie Meneses MD   500 mg at 10/02/21 0521   • nicotine (NICODERM CQ) 7 MG/24HR patch 1 patch  1 patch Transdermal Q24H Lonnie Meneses MD   1 patch at 09/27/21 1423   • nitroglycerin (NITROSTAT) SL tablet 0.4 mg  0.4 mg Sublingual Q5 Min PRN Lonnie Meneses MD       • ondansetron (ZOFRAN) tablet 4 mg  4 mg Oral Q6H PRN Lonnie Meneses MD       • pantoprazole (PROTONIX) EC tablet 40 mg  40 mg Oral Q AM Lonnie Meneses MD   40 mg at 10/02/21 0521   • potassium & sodium phosphates (PHOS-NAK) 280-160-250 MG packet - for Phosphorus less than 1.25 mg/dL  2 packet Oral Q6H PRN Halima Oshea PA-C        Or   • potassium & sodium phosphates (PHOS-NAK) 280-160-250 MG packet - for Phosphorus 1.25 - 2.5 mg/dL  2 packet Oral Q6H PRN Halima Oshea, PA-C       • potassium chloride (K-DUR,KLOR-CON) CR tablet 40 mEq  40 mEq Oral PRN Lonnie Meneses MD        Or   • potassium chloride (KLOR-CON) packet 40 mEq  40 mEq Oral PRN Lonnie Meneses MD        Or   • potassium chloride 10 mEq in 100 mL IVPB  10 mEq Intravenous Q1H PRN Lonnie Meneses MD       • promethazine (PHENERGAN) tablet 12.5 mg  12.5 mg Oral Q8H PRN Lonnie Meneses MD       • senna (SENOKOT) tablet 1 tablet  1 tablet Oral Daily PRN Lonnie Meneses MD       • sodium  chloride 0.9 % flush 10 mL  10 mL Intravenous PRN Lonnie Meneses MD       • sodium chloride 0.9 % flush 10 mL  10 mL Intravenous Q12H Lonnie Meneses MD   10 mL at 10/02/21 0847   • sodium chloride 0.9 % flush 10 mL  10 mL Intravenous PRN Lonnie Meneses MD       • traZODone (DESYREL) tablet 50 mg  50 mg Oral Nightly Lonnie Meneses MD   50 mg at 10/01/21 2112       Past Surgical History:   Procedure Laterality Date   • BACK SURGERY     • CARDIAC CATHETERIZATION     • CHOLECYSTECTOMY N/A 7/17/2020    Procedure: CHOLECYSTECTOMY LAPAROSCOPIC;  Surgeon: Lonnie Meneses MD;  Location: [facility] OR;  Service: General;  Laterality: N/A;   • COLONOSCOPY     • COLONOSCOPY N/A 9/25/2021    Procedure: COLONOSCOPY;  Surgeon: Lonnie Meneses MD;  Location: [facility] OR;  Service: Gastroenterology;  Laterality: N/A;   • CORONARY STENT PLACEMENT     • ENDOSCOPY     • ENDOSCOPY N/A 9/25/2021    Procedure: ESOPHAGOGASTRODUODENOSCOPY;  Surgeon: Lonnie Meneses MD;  Location: [facility] OR;  Service: Gastroenterology;  Laterality: N/A;   • TONSILLECTOMY         Social History     Socioeconomic History   • Marital status:      Spouse name: Not on file   • Number of children: Not on file   • Years of education: Not on file   • Highest education level: Not on file   Tobacco Use   • Smoking status: Current Every Day Smoker     Packs/day: 1.00     Years: 55.00     Pack years: 55.00     Types: Cigarettes   • Smokeless tobacco: Never Used   Substance and Sexual Activity   • Alcohol use: Never   • Drug use: Never   • Sexual activity: Defer     's exam has been deferred due to COVID-19 pandemic examination was made during video  Patient is awake alert oriented does not appear in any distress      Lab Results (last 24 hours)     Procedure Component Value Units Date/Time    POC Glucose Once [[id number]]  (Abnormal) Collected: 10/01/21 1644    Specimen: Blood Updated: 10/01/21 1650     Glucose 146 mg/dL      Comment: Meter: [id number]  : [id number] Nikki Stallworth       POC Glucose Once [[id number]]  (Abnormal) Collected: 10/01/21 2110    Specimen: Blood Updated: 10/01/21 2125     Glucose 166 mg/dL      Comment: Meter: [id number] : [id number] Wali Santos       CBC & Differential [[id number]]  (Abnormal) Collected: 10/02/21 0551    Specimen: Blood Updated: 10/02/21 0629    Narrative:      The following orders were created for panel order CBC & Differential.  Procedure                               Abnormality         Status                     ---------                               -----------         ------                     CBC Auto Differential[[id number]]        Abnormal            Final result               Scan Slide[[id number]]                                                                    Please view results for these tests on the individual orders.    Comprehensive Metabolic Panel [[id number]]  (Abnormal) Collected: 10/02/21 0551    Specimen: Blood Updated: 10/02/21 0637     Glucose 111 mg/dL      BUN 16 mg/dL      Creatinine 0.40 mg/dL      Sodium 133 mmol/L      Potassium 4.4 mmol/L      Comment: Slight hemolysis detected by analyzer. Results may be affected.        Chloride 94 mmol/L      CO2 31.5 mmol/L      Calcium 7.8 mg/dL      Total Protein 4.9 g/dL      Albumin 2.25 g/dL      ALT (SGPT) 9 U/L      AST (SGOT) 15 U/L      Alkaline Phosphatase 47 U/L      Total Bilirubin <0.2 mg/dL      eGFR Non African Amer >150 mL/min/1.73      Globulin 2.7 gm/dL      A/G Ratio 0.8 g/dL      BUN/Creatinine Ratio 40.0     Anion Gap 7.5 mmol/L     Narrative:      GFR Normal >60  Chronic Kidney Disease <60  Kidney Failure <15      BNP [[id number]]  (Normal) Collected: 10/02/21 0551    Specimen: Blood Updated: 10/02/21 0632     proBNP 890.1 pg/mL     Narrative:      Among patients with dyspnea, NT-proBNP is highly sensitive for the detection of acute congestive heart failure. In addition NT-proBNP of <300 pg/ml effectively rules out acute  congestive heart failure with 99% negative predictive value.    Results may be falsely decreased if patient taking Biotin.      C-reactive Protein [[id number]]  (Abnormal) Collected: 10/02/21 0551    Specimen: Blood Updated: 10/02/21 0634     C-Reactive Protein 0.68 mg/dL     Magnesium [[id number]]  (Normal) Collected: 10/02/21 0551    Specimen: Blood Updated: 10/02/21 0637     Magnesium 1.6 mg/dL     CBC Auto Differential [[id number]]  (Abnormal) Collected: 10/02/21 0551    Specimen: Blood Updated: 10/02/21 0629     WBC 27.15 10*3/mm3      RBC 2.52 10*6/mm3      Hemoglobin 7.1 g/dL      Hematocrit 22.6 %      MCV 89.7 fL      MCH 28.2 pg      MCHC 31.4 g/dL      RDW 17.5 %      RDW-SD 54.4 fl      MPV 9.8 fL      Platelets 363 10*3/mm3      Neutrophil % 86.4 %      Lymphocyte % 5.7 %      Monocyte % 6.6 %      Eosinophil % 0.1 %      Basophil % 0.2 %      Immature Grans % 1.0 %      Neutrophils, Absolute 23.44 10*3/mm3      Lymphocytes, Absolute 1.54 10*3/mm3      Monocytes, Absolute 1.80 10*3/mm3      Eosinophils, Absolute 0.04 10*3/mm3      Basophils, Absolute 0.05 10*3/mm3      Immature Grans, Absolute 0.28 10*3/mm3      nRBC 0.0 /100 WBC     POC Glucose Once [[id number]]  (Normal) Collected: 10/02/21 0653    Specimen: Blood Updated: 10/02/21 0710     Glucose 117 mg/dL      Comment: [id number] : [id number] [facility]       Hemoglobin & Hematocrit, Blood [[id number]]  (Abnormal) Collected: 10/02/21 0859    Specimen: Blood Updated: 10/02/21 0932     Hemoglobin 7.6 g/dL      Hematocrit 24.1 %     POC Glucose Once [[id number]]  (Normal) Collected: 10/02/21 1044    Specimen: Blood Updated: 10/02/21 1050     Glucose 128 mg/dL      Comment: Meter: [id number] : [id number] SUSAN NICHOLAS           This document has been electronically signed by Jeovany Upton DO on October 2, 2021 11:41 EDT         Part of this note may be an electronic transcription/translation of spoken language to printed text using the Dragon  Dictation System.

## 2021-10-02 NOTE — PHARMACY PATIENT ASSISTANCE
Pharmacy was consulted to evaluate new medication, Eliquis. According to the patients insurance the patient will have a $0 co-pay. No issues identified at this time.       Thank you,    Chacha Barakat. Alok, PharmD  10/02/21  15:34 EDT

## 2021-10-02 NOTE — PROGRESS NOTES
"Assisted By: Carol Ann DUNBAR    CC: Follow-up on Covid pneumonia as well as colitis and GI bleed    Interview History/HPI: Patient continues to states she feels well.  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    ROS:     Vitals:    10/02/21 1211   BP:    Pulse:    Resp:    Temp:    SpO2: 94%         Intake/Output Summary (Last 24 hours) at 10/2/2021 1243  Last data filed at 10/2/2021 0900  Gross per 24 hour   Intake 570.97 ml   Output --   Net 570.97 ml       EXAM: 128/60, 18, 84, 97.8, saturation 1 L 94%.  No distress, lungs bilateral breath sounds diminished but clear heart distant irregularly irregular rhythm, no murmur, abdomen is soft nondistended nontender no peritoneal signs bowel sounds are active, extremities are without edema no clubbing or cyanosis      Tele: Atrial fibrillation with a controlled rate    LABS:     Lab Results (last 48 hours)     Procedure Component Value Units Date/Time    POC Glucose Once [[id number]]  (Normal) Collected: 10/02/21 1044    Specimen: Blood Updated: 10/02/21 1050     Glucose 128 mg/dL      Comment: Meter: [id number] : [id number] SUSAN NICHOLAS       Hemoglobin & Hematocrit, Blood [[id number]]  (Abnormal) Collected: 10/02/21 0859    Specimen: Blood Updated: 10/02/21 0932     Hemoglobin 7.6 g/dL      Hematocrit 24.1 %     POC Glucose Once [[id number]]  (Normal) Collected: 10/02/21 0653    Specimen: Blood Updated: 10/02/21 0710     Glucose 117 mg/dL      Comment: Meter: [id number] : [id number] maynecelia polo       Comprehensive Metabolic Panel [[id number]]  (Abnormal) Collected: 10/02/21 0551    Specimen: Blood Updated: 10/02/21 0637     Glucose 111 mg/dL      BUN 16 mg/dL      Creatinine 0.40 mg/dL      Sodium 133 mmol/L      Potassium 4.4 mmol/L      Comment: Slight hemolysis detected by analyzer. Results may be affected.       "  Chloride 94 mmol/L      CO2 31.5 mmol/L      Calcium 7.8 mg/dL      Total Protein 4.9 g/dL      Albumin 2.25 g/dL      ALT (SGPT) 9 U/L      AST (SGOT) 15 U/L      Alkaline Phosphatase 47 U/L      Total Bilirubin <0.2 mg/dL      eGFR Non African Amer >150 mL/min/1.73      Globulin 2.7 gm/dL      A/G Ratio 0.8 g/dL      BUN/Creatinine Ratio 40.0     Anion Gap 7.5 mmol/L     Narrative:      GFR Normal >60  Chronic Kidney Disease <60  Kidney Failure <15      Magnesium [[id number]]  (Normal) Collected: 10/02/21 0551    Specimen: Blood Updated: 10/02/21 0637     Magnesium 1.6 mg/dL     C-reactive Protein [[id number]]  (Abnormal) Collected: 10/02/21 0551    Specimen: Blood Updated: 10/02/21 0634     C-Reactive Protein 0.68 mg/dL     BNP [[id number]]  (Normal) Collected: 10/02/21 0551    Specimen: Blood Updated: 10/02/21 0632     proBNP 890.1 pg/mL     Narrative:      Among patients with dyspnea, NT-proBNP is highly sensitive for the detection of acute congestive heart failure. In addition NT-proBNP of <300 pg/ml effectively rules out acute congestive heart failure with 99% negative predictive value.    Results may be falsely decreased if patient taking Biotin.      CBC & Differential [[id number]]  (Abnormal) Collected: 10/02/21 0551    Specimen: Blood Updated: 10/02/21 0629    Narrative:      The following orders were created for panel order CBC & Differential.  Procedure                               Abnormality         Status                     ---------                               -----------         ------                     CBC Auto Differential[[id number]]        Abnormal            Final result               Scan Slide[[id number]]                                                                    Please view results for these tests on the individual orders.    CBC Auto Differential [[id number]]  (Abnormal) Collected: 10/02/21 0551    Specimen: Blood Updated: 10/02/21 0629     WBC 27.15 10*3/mm3      RBC 2.52 10*6/mm3       Hemoglobin 7.1 g/dL      Hematocrit 22.6 %      MCV 89.7 fL      MCH 28.2 pg      MCHC 31.4 g/dL      RDW 17.5 %      RDW-SD 54.4 fl      MPV 9.8 fL      Platelets 363 10*3/mm3      Neutrophil % 86.4 %      Lymphocyte % 5.7 %      Monocyte % 6.6 %      Eosinophil % 0.1 %      Basophil % 0.2 %      Immature Grans % 1.0 %      Neutrophils, Absolute 23.44 10*3/mm3      Lymphocytes, Absolute 1.54 10*3/mm3      Monocytes, Absolute 1.80 10*3/mm3      Eosinophils, Absolute 0.04 10*3/mm3      Basophils, Absolute 0.05 10*3/mm3      Immature Grans, Absolute 0.28 10*3/mm3      nRBC 0.0 /100 WBC     POC Glucose Once [[id number]]  (Abnormal) Collected: 10/01/21 2110    Specimen: Blood Updated: 10/01/21 2125     Glucose 166 mg/dL      Comment: Meter: [id number] : [id number] Wali Santos       POC Glucose Once [[id number]]  (Abnormal) Collected: 10/01/21 1644    Specimen: Blood Updated: 10/01/21 1650     Glucose 146 mg/dL      Comment: Meter: [id number] : [id number] NikkiLeanWagon       POC Glucose Once [[id number]]  (Normal) Collected: 10/01/21 1102    Specimen: Blood Updated: 10/01/21 1109     Glucose 129 mg/dL      Comment: Meter: [id number] : [id number] [email or web address]       Comprehensive Metabolic Panel [[id number]]  (Abnormal) Collected: 10/01/21 0507    Specimen: Blood Updated: 10/01/21 0711     Glucose 100 mg/dL      BUN 17 mg/dL      Creatinine 0.40 mg/dL      Sodium 135 mmol/L      Potassium 4.6 mmol/L      Chloride 98 mmol/L      CO2 33.3 mmol/L      Calcium 7.9 mg/dL      Total Protein 4.7 g/dL      Albumin 2.31 g/dL      ALT (SGPT) 8 U/L      AST (SGOT) 11 U/L      Alkaline Phosphatase 49 U/L      Total Bilirubin <0.2 mg/dL      eGFR Non African Amer >150 mL/min/1.73      Globulin 2.4 gm/dL      A/G Ratio 1.0 g/dL      BUN/Creatinine Ratio 42.5     Anion Gap 3.7 mmol/L     Narrative:      GFR Normal >60  Chronic Kidney Disease <60  Kidney Failure <15      aPTT [[id number]]  (Abnormal) Collected: 10/01/21  0507    Specimen: Blood Updated: 10/01/21 0710     PTT 45.1 seconds     Narrative:      PTT Heparin Therapeutic Range:  59 - 95 seconds      TSH [[id number]]  (Abnormal) Collected: 10/01/21 0507    Specimen: Blood Updated: 10/01/21 0700     TSH 38.140 uIU/mL     POC Glucose Once [[id number]]  (Normal) Collected: 10/01/21 0640    Specimen: Blood Updated: 10/01/21 0646     Glucose 111 mg/dL      Comment: Meter: [id number] : [id number] mayne mary       CBC & Differential [[id number]]  (Abnormal) Collected: 10/01/21 0507    Specimen: Blood Updated: 10/01/21 0636    Narrative:      The following orders were created for panel order CBC & Differential.  Procedure                               Abnormality         Status                     ---------                               -----------         ------                     CBC Auto Differential[[id number]]        Abnormal            Final result                 Please view results for these tests on the individual orders.    CBC Auto Differential [[id number]]  (Abnormal) Collected: 10/01/21 0507    Specimen: Blood Updated: 10/01/21 0636     WBC 19.73 10*3/mm3      RBC 2.78 10*6/mm3      Hemoglobin 7.8 g/dL      Hematocrit 24.7 %      MCV 88.8 fL      MCH 28.1 pg      MCHC 31.6 g/dL      RDW 17.4 %      RDW-SD 54.9 fl      MPV 11.9 fL      Platelets 225 10*3/mm3      Neutrophil % 88.5 %      Lymphocyte % 5.5 %      Monocyte % 4.6 %      Eosinophil % 0.0 %      Basophil % 0.1 %      Immature Grans % 1.3 %      Neutrophils, Absolute 17.45 10*3/mm3      Lymphocytes, Absolute 1.09 10*3/mm3      Monocytes, Absolute 0.91 10*3/mm3      Eosinophils, Absolute 0.00 10*3/mm3      Basophils, Absolute 0.02 10*3/mm3      Immature Grans, Absolute 0.26 10*3/mm3      nRBC 0.0 /100 WBC     aPTT [[id number]]  (Abnormal) Collected: 09/30/21 2115    Specimen: Blood Updated: 09/30/21 2205     PTT 57.3 seconds     Narrative:      PTT Heparin Therapeutic Range:  59 - 95 seconds      POC Glucose  Once [[id number]]  (Abnormal) Collected: 09/30/21 2052    Specimen: Blood Updated: 09/30/21 2058     Glucose 178 mg/dL      Comment: Meter: [id number] : [id number] LO BENAVIDEZ       POC Glucose Once [[id number]]  (Abnormal) Collected: 09/30/21 1653    Specimen: Blood Updated: 09/30/21 1659     Glucose 163 mg/dL      Comment: Meter: [id number] : [id number] SAW SILVA                  Radiology:    Imaging Results (Last 72 Hours)     Procedure Component Value Units Date/Time    CT Chest Without Contrast Diagnostic [[id number]] Collected: 10/02/21 0843     Updated: 10/02/21 0845    Narrative:      CT Chest WO, CT Abdomen Pelvis WO    INDICATION:    Pelvic pneumonia. Elevated white blood cell count. Respiratory failure with hypoxia.    TECHNIQUE:   CT of the chest, abdomen and pelvis without IV contrast. Coronal and sagittal reconstructions were obtained.  Radiation dose reduction techniques included automated exposure control or exposure modulation based on body size. Count of known CT and cardiac  nuc med studies performed in previous 12 months: 9.      COMPARISON:    CT chest 9/29/2021 and CT abdomen/pelvis 8/20/2021    FINDINGS:  Chest: Bilateral pleural effusions right greater than left. Right effusion measures over 4 cm and left effusion 1.9 cm. Compressive atelectasis in both lung bases. Background parenchymal changes suggesting mild emphysema and fibrosis. No focal  pneumonitis. 5 mm noncalcified nodule left lung base.    Heart size within normal limits. Extensive coronary artery calcifications. Enlargement central pulmonary arteries may represent element of pulmonary hypertension. Diffuse aortic atherosclerotic changes with aneurysmal dilatation of the descending  thoracic aorta measuring up to 4 cm.    ABDOMEN: Liver and spleen unremarkable. Gallbladder surgically absent. Pancreas and adrenal glands unremarkable. Extensive bilateral renal calcifications which may primarily be vascular but renal stones  not excluded. No obstruction. Increased colonic gas  stool and fluid but no focal obstruction. Stomach and small bowel unremarkable. Aortic atherosclerotic changes with prior aortoiliac stenting.    Pelvis: Bladder moderately distended. Postoperative changes L5-S1 discectomy and posterior spinal fixation. Grade 1 spondylolisthesis. Left total hip hemiarthroplasty. Osteopenia. No discernible fracture or aggressive bone lesions.      Impression:      Continued bilateral pleural effusions right greater than left not significantly changed from 9/29/2021.    Multifocal atelectasis but no focal pneumonitis.    Diffuse aortic atherosclerotic changes with aneurysmal dilatation of the descending thoracic aorta. Prior endoluminal stent grafting of the abdominal aorta and iliacs.    Increased colonic gas, stool and fluid may reflect constipation. No focal inflammatory change or focal obstruction.    Extensive bilateral renal calcifications may represent a combination of vascular calcifications and nonobstructing stones.    Not mentioned above there are questionable findings of rectal prolapse. Correlate clinically.    Signer Name: SINDY Mireles MD   Signed: 10/2/2021 8:43 AM   Workstation Name: [facility]    [facility]    CT Abdomen Pelvis Without Contrast [[id number]] Collected: 10/02/21 0843     Updated: 10/02/21 0845    Narrative:      CT Chest WO, CT Abdomen Pelvis WO    INDICATION:    Pelvic pneumonia. Elevated white blood cell count. Respiratory failure with hypoxia.    TECHNIQUE:   CT of the chest, abdomen and pelvis without IV contrast. Coronal and sagittal reconstructions were obtained.  Radiation dose reduction techniques included automated exposure control or exposure modulation based on body size. Count of known CT and cardiac  nuc med studies performed in previous 12 months: 9.      COMPARISON:    CT chest 9/29/2021 and CT abdomen/pelvis 8/20/2021    FINDINGS:  Chest: Bilateral  pleural effusions right greater than left. Right effusion measures over 4 cm and left effusion 1.9 cm. Compressive atelectasis in both lung bases. Background parenchymal changes suggesting mild emphysema and fibrosis. No focal  pneumonitis. 5 mm noncalcified nodule left lung base.    Heart size within normal limits. Extensive coronary artery calcifications. Enlargement central pulmonary arteries may represent element of pulmonary hypertension. Diffuse aortic atherosclerotic changes with aneurysmal dilatation of the descending  thoracic aorta measuring up to 4 cm.    ABDOMEN: Liver and spleen unremarkable. Gallbladder surgically absent. Pancreas and adrenal glands unremarkable. Extensive bilateral renal calcifications which may primarily be vascular but renal stones not excluded. No obstruction. Increased colonic gas  stool and fluid but no focal obstruction. Stomach and small bowel unremarkable. Aortic atherosclerotic changes with prior aortoiliac stenting.    Pelvis: Bladder moderately distended. Postoperative changes L5-S1 discectomy and posterior spinal fixation. Grade 1 spondylolisthesis. Left total hip hemiarthroplasty. Osteopenia. No discernible fracture or aggressive bone lesions.      Impression:      Continued bilateral pleural effusions right greater than left not significantly changed from 9/29/2021.    Multifocal atelectasis but no focal pneumonitis.    Diffuse aortic atherosclerotic changes with aneurysmal dilatation of the descending thoracic aorta. Prior endoluminal stent grafting of the abdominal aorta and iliacs.    Increased colonic gas, stool and fluid may reflect constipation. No focal inflammatory change or focal obstruction.    Extensive bilateral renal calcifications may represent a combination of vascular calcifications and nonobstructing stones.    Not mentioned above there are questionable findings of rectal prolapse. Correlate clinically.    Signer Name: SINDY Mireles MD   Signed:  10/2/2021 8:43 AM   Workstation Name: [email or web address]    [facility] [place]    XR Chest AP [[id number]] Collected: 10/02/21 0825     Updated: 10/02/21 0828    Narrative:      XR CHEST AP-     CLINICAL INDICATION: Follow-up pleural effusions; U07.1-COVID-19;  J12.82-Pneumonia due to coronavirus disease 2019; D72.829-Elevated white  blood cell count, unspecified; J96.11-Chronic respiratory failure with  hypoxia; K92.2-Gastrointestinal hemorrhage, unspecified        COMPARISON: 09/21/2021      TECHNIQUE: Single frontal view of the chest.     FINDINGS:     Probable trace bilateral effusions and minimal bibasilar airspace  disease  The cardiac silhouette is normal. The pulmonary vasculature is  unremarkable.  There is no evidence of an acute osseous abnormality.   There are no suspicious-appearing parenchymal soft tissue nodules.          Impression:      Trace bilateral effusions and minimal bibasilar airspace disease     This report was finalized on 10/2/2021 8:26 AM by Dr. River Zaidi MD.             Results for orders placed during the hospital encounter of 04/22/21    Adult Transthoracic Echo Complete W/ Cont if Necessary Per Protocol    Interpretation Summary  · Normal left ventricular cavity size noted. Left ventricular wall thickness is consistent with mild concentric hypertrophy  · Left ventricular ejection fraction appears to be 61 - 65%.  · Left ventricular diastolic function is consistent with (grade I) impaired relaxation.  · There is mild calcification of the aortic valvular cusps.  · No aortic valve regurgitation is present. Mild aortic valve stenosis is present.  · There are myxomatous changes of the mitral valve apparatus present. Mild mitral valve regurgitation is present. No significant mitral valve stenosis is present.  · . There is no evidence of pericardial effusion.      Assessment/Plan:   Covid pneumonia as well as colitis, both these are treated, she was completing her Flagyl  course, CRP is normalized but white count is continue to rise of uncertain etiology.  Briefly discussed with infectious disease, I am going to begin the patient on Omnicef, continue oral Flagyl, recheck white count in the a.m., recheck CRP in the a.m.  If white count continues to rise will again discuss with ID, if improved will consider discharge to skilled nursing facility, I did recheck a CT of the abdomen pelvis and there is really nothing infectious to account for this white count.  She does have some pleural effusions but minimal I feel minimal atelectasis associated with this no acute infiltrates.    Atrial fibrillation, rate controlled, on Eliquis for stroke prevention    Left DVT, continue Eliquis, allowing day-to-day variation hemoglobin is stable from yesterday.  There has been no bleeding seen.    Grade 1 diastolic heart failure, now compensated.  BNP is returned to normal.    Hypothyroidism, noted TSH was 38 yesterday, starting low-dose Synthroid but I will confirm TSH with free T4.    Gastritis, will need to have H. pylori treated as an outpatient    Colitis, treated    Disposition [facility]    Austyn López MD

## 2021-10-03 LAB
ABO GROUP BLD: NORMAL
ALBUMIN SERPL-MCNC: 2.49 G/DL (ref 3.5–5.2)
ALBUMIN/GLOB SERPL: 0.9 G/DL
ALP SERPL-CCNC: 53 U/L (ref 39–117)
ALT SERPL W P-5'-P-CCNC: 10 U/L (ref 1–33)
ANION GAP SERPL CALCULATED.3IONS-SCNC: 1.2 MMOL/L (ref 5–15)
ANION GAP SERPL CALCULATED.3IONS-SCNC: 6.9 MMOL/L (ref 5–15)
AST SERPL-CCNC: 16 U/L (ref 1–32)
BASOPHILS # BLD AUTO: 0.06 10*3/MM3 (ref 0–0.2)
BASOPHILS # BLD AUTO: 0.08 10*3/MM3 (ref 0–0.2)
BASOPHILS NFR BLD AUTO: 0.2 % (ref 0–1.5)
BASOPHILS NFR BLD AUTO: 0.3 % (ref 0–1.5)
BILIRUB SERPL-MCNC: 0.4 MG/DL (ref 0–1.2)
BLD GP AB SCN SERPL QL: NEGATIVE
BUN SERPL-MCNC: 15 MG/DL (ref 8–23)
BUN SERPL-MCNC: 20 MG/DL (ref 8–23)
BUN/CREAT SERPL: 44.4 (ref 7–25)
BUN/CREAT SERPL: 48.4 (ref 7–25)
CALCIUM SPEC-SCNC: 7.7 MG/DL (ref 8.6–10.5)
CALCIUM SPEC-SCNC: 8.1 MG/DL (ref 8.6–10.5)
CHLORIDE SERPL-SCNC: 95 MMOL/L (ref 98–107)
CHLORIDE SERPL-SCNC: 96 MMOL/L (ref 98–107)
CO2 SERPL-SCNC: 31.1 MMOL/L (ref 22–29)
CO2 SERPL-SCNC: 37.8 MMOL/L (ref 22–29)
CREAT SERPL-MCNC: 0.31 MG/DL (ref 0.57–1)
CREAT SERPL-MCNC: 0.45 MG/DL (ref 0.57–1)
CRP SERPL-MCNC: 1.83 MG/DL (ref 0–0.5)
CRP SERPL-MCNC: 6.88 MG/DL (ref 0–0.5)
DEPRECATED RDW RBC AUTO: 51.2 FL (ref 37–54)
DEPRECATED RDW RBC AUTO: 57 FL (ref 37–54)
EOSINOPHIL # BLD AUTO: 0 10*3/MM3 (ref 0–0.4)
EOSINOPHIL # BLD AUTO: 0.01 10*3/MM3 (ref 0–0.4)
EOSINOPHIL NFR BLD AUTO: 0 % (ref 0.3–6.2)
EOSINOPHIL NFR BLD AUTO: 0 % (ref 0.3–6.2)
ERYTHROCYTE [DISTWIDTH] IN BLOOD BY AUTOMATED COUNT: 16.3 % (ref 12.3–15.4)
ERYTHROCYTE [DISTWIDTH] IN BLOOD BY AUTOMATED COUNT: 18.3 % (ref 12.3–15.4)
GFR SERPL CREATININE-BSD FRML MDRD: 135 ML/MIN/1.73
GFR SERPL CREATININE-BSD FRML MDRD: >150 ML/MIN/1.73
GLOBULIN UR ELPH-MCNC: 2.9 GM/DL
GLUCOSE BLDC GLUCOMTR-MCNC: 102 MG/DL (ref 70–130)
GLUCOSE BLDC GLUCOMTR-MCNC: 107 MG/DL (ref 70–130)
GLUCOSE BLDC GLUCOMTR-MCNC: 147 MG/DL (ref 70–130)
GLUCOSE BLDC GLUCOMTR-MCNC: 93 MG/DL (ref 70–130)
GLUCOSE BLDC GLUCOMTR-MCNC: 97 MG/DL (ref 70–130)
GLUCOSE SERPL-MCNC: 107 MG/DL (ref 65–99)
GLUCOSE SERPL-MCNC: 165 MG/DL (ref 65–99)
HCT VFR BLD AUTO: 20 % (ref 34–46.6)
HCT VFR BLD AUTO: 23.7 % (ref 34–46.6)
HGB BLD-MCNC: 6.3 G/DL (ref 12–15.9)
HGB BLD-MCNC: 7.6 G/DL (ref 12–15.9)
IMM GRANULOCYTES # BLD AUTO: 0.23 10*3/MM3 (ref 0–0.05)
IMM GRANULOCYTES # BLD AUTO: 0.32 10*3/MM3 (ref 0–0.05)
IMM GRANULOCYTES NFR BLD AUTO: 0.9 % (ref 0–0.5)
IMM GRANULOCYTES NFR BLD AUTO: 0.9 % (ref 0–0.5)
LYMPHOCYTES # BLD AUTO: 0.37 10*3/MM3 (ref 0.7–3.1)
LYMPHOCYTES # BLD AUTO: 1.15 10*3/MM3 (ref 0.7–3.1)
LYMPHOCYTES NFR BLD AUTO: 1.1 % (ref 19.6–45.3)
LYMPHOCYTES NFR BLD AUTO: 4.5 % (ref 19.6–45.3)
MAGNESIUM SERPL-MCNC: 1.9 MG/DL (ref 1.6–2.4)
MAGNESIUM SERPL-MCNC: 2.3 MG/DL (ref 1.6–2.4)
MCH RBC QN AUTO: 29 PG (ref 26.6–33)
MCH RBC QN AUTO: 29 PG (ref 26.6–33)
MCHC RBC AUTO-ENTMCNC: 31.5 G/DL (ref 31.5–35.7)
MCHC RBC AUTO-ENTMCNC: 32.1 G/DL (ref 31.5–35.7)
MCV RBC AUTO: 90.5 FL (ref 79–97)
MCV RBC AUTO: 92.2 FL (ref 79–97)
MONOCYTES # BLD AUTO: 1.46 10*3/MM3 (ref 0.1–0.9)
MONOCYTES # BLD AUTO: 1.57 10*3/MM3 (ref 0.1–0.9)
MONOCYTES NFR BLD AUTO: 4.3 % (ref 5–12)
MONOCYTES NFR BLD AUTO: 6.2 % (ref 5–12)
NEUTROPHILS NFR BLD AUTO: 22.24 10*3/MM3 (ref 1.7–7)
NEUTROPHILS NFR BLD AUTO: 31.53 10*3/MM3 (ref 1.7–7)
NEUTROPHILS NFR BLD AUTO: 88.1 % (ref 42.7–76)
NEUTROPHILS NFR BLD AUTO: 93.5 % (ref 42.7–76)
NRBC BLD AUTO-RTO: 0 /100 WBC (ref 0–0.2)
NRBC BLD AUTO-RTO: 0 /100 WBC (ref 0–0.2)
PLATELET # BLD AUTO: 300 10*3/MM3 (ref 140–450)
PLATELET # BLD AUTO: 350 10*3/MM3 (ref 140–450)
PMV BLD AUTO: 10.2 FL (ref 6–12)
PMV BLD AUTO: 10.6 FL (ref 6–12)
POTASSIUM SERPL-SCNC: 4.2 MMOL/L (ref 3.5–5.2)
POTASSIUM SERPL-SCNC: 4.4 MMOL/L (ref 3.5–5.2)
PROT SERPL-MCNC: 5.4 G/DL (ref 6–8.5)
RBC # BLD AUTO: 2.17 10*6/MM3 (ref 3.77–5.28)
RBC # BLD AUTO: 2.62 10*6/MM3 (ref 3.77–5.28)
RH BLD: POSITIVE
SODIUM SERPL-SCNC: 134 MMOL/L (ref 136–145)
SODIUM SERPL-SCNC: 134 MMOL/L (ref 136–145)
T&S EXPIRATION DATE: NORMAL
TSH SERPL DL<=0.05 MIU/L-ACNC: 44.5 UIU/ML (ref 0.27–4.2)
WBC # BLD AUTO: 25.28 10*3/MM3 (ref 3.4–10.8)
WBC # BLD AUTO: 33.74 10*3/MM3 (ref 3.4–10.8)

## 2021-10-03 PROCEDURE — 86923 COMPATIBILITY TEST ELECTRIC: CPT

## 2021-10-03 PROCEDURE — 99232 SBSQ HOSP IP/OBS MODERATE 35: CPT | Performed by: INTERNAL MEDICINE

## 2021-10-03 PROCEDURE — 85025 COMPLETE CBC W/AUTO DIFF WBC: CPT | Performed by: SURGERY

## 2021-10-03 PROCEDURE — 83735 ASSAY OF MAGNESIUM: CPT | Performed by: INTERNAL MEDICINE

## 2021-10-03 PROCEDURE — 86900 BLOOD TYPING SEROLOGIC ABO: CPT | Performed by: INTERNAL MEDICINE

## 2021-10-03 PROCEDURE — 80053 COMPREHEN METABOLIC PANEL: CPT | Performed by: INTERNAL MEDICINE

## 2021-10-03 PROCEDURE — 86850 RBC ANTIBODY SCREEN: CPT | Performed by: INTERNAL MEDICINE

## 2021-10-03 PROCEDURE — 94799 UNLISTED PULMONARY SVC/PX: CPT

## 2021-10-03 PROCEDURE — 86900 BLOOD TYPING SEROLOGIC ABO: CPT

## 2021-10-03 PROCEDURE — 86901 BLOOD TYPING SEROLOGIC RH(D): CPT | Performed by: INTERNAL MEDICINE

## 2021-10-03 PROCEDURE — 86140 C-REACTIVE PROTEIN: CPT | Performed by: INTERNAL MEDICINE

## 2021-10-03 PROCEDURE — P9016 RBC LEUKOCYTES REDUCED: HCPCS

## 2021-10-03 PROCEDURE — 63710000001 ONDANSETRON PER 8 MG: Performed by: SURGERY

## 2021-10-03 PROCEDURE — 82962 GLUCOSE BLOOD TEST: CPT

## 2021-10-03 PROCEDURE — 84443 ASSAY THYROID STIM HORMONE: CPT | Performed by: INTERNAL MEDICINE

## 2021-10-03 PROCEDURE — 36430 TRANSFUSION BLD/BLD COMPNT: CPT

## 2021-10-03 RX ORDER — MAGNESIUM SULFATE HEPTAHYDRATE 40 MG/ML
4 INJECTION, SOLUTION INTRAVENOUS ONCE
Status: COMPLETED | OUTPATIENT
Start: 2021-10-04 | End: 2021-10-04

## 2021-10-03 RX ADMIN — BACLOFEN 10 MG: 10 TABLET ORAL at 20:31

## 2021-10-03 RX ADMIN — APIXABAN 5 MG: 5 TABLET, FILM COATED ORAL at 08:36

## 2021-10-03 RX ADMIN — CEFDINIR 300 MG: 300 CAPSULE ORAL at 08:36

## 2021-10-03 RX ADMIN — PANTOPRAZOLE SODIUM 40 MG: 40 TABLET, DELAYED RELEASE ORAL at 07:42

## 2021-10-03 RX ADMIN — METRONIDAZOLE 500 MG: 250 TABLET ORAL at 14:15

## 2021-10-03 RX ADMIN — LISINOPRIL 5 MG: 2.5 TABLET ORAL at 08:36

## 2021-10-03 RX ADMIN — CEFDINIR 300 MG: 300 CAPSULE ORAL at 20:31

## 2021-10-03 RX ADMIN — SODIUM CHLORIDE, PRESERVATIVE FREE 10 ML: 5 INJECTION INTRAVENOUS at 21:01

## 2021-10-03 RX ADMIN — METRONIDAZOLE 500 MG: 250 TABLET ORAL at 21:50

## 2021-10-03 RX ADMIN — ONDANSETRON HYDROCHLORIDE 4 MG: 4 TABLET, FILM COATED ORAL at 22:00

## 2021-10-03 RX ADMIN — ASPIRIN 81 MG: 81 TABLET ORAL at 08:36

## 2021-10-03 RX ADMIN — LEVOTHYROXINE SODIUM 25 MCG: 25 TABLET ORAL at 07:41

## 2021-10-03 RX ADMIN — METRONIDAZOLE 500 MG: 250 TABLET ORAL at 07:42

## 2021-10-03 RX ADMIN — TRAZODONE HYDROCHLORIDE 50 MG: 50 TABLET ORAL at 20:31

## 2021-10-03 RX ADMIN — SODIUM CHLORIDE, PRESERVATIVE FREE 10 ML: 5 INJECTION INTRAVENOUS at 08:35

## 2021-10-03 RX ADMIN — ATORVASTATIN CALCIUM 20 MG: 40 TABLET, FILM COATED ORAL at 20:31

## 2021-10-03 RX ADMIN — AMIODARONE HYDROCHLORIDE 400 MG: 200 TABLET ORAL at 08:36

## 2021-10-03 NOTE — PLAN OF CARE
Goal Outcome Evaluation:  Plan of Care Reviewed With: patient           Outcome Summary: Patient has had no complaints today, her hgb is 6.3 she is going to receive one unit PRBC today. No bowel movemets of signs of bleeding noted.

## 2021-10-03 NOTE — PROGRESS NOTES
Assisted By: Cassandra BISWAS    CC: Follow-up on colitis    Interview History/HPI: Patient states she still feels good no pain no shortness of breath she is still on 1 L and she has minimal cough.  No abdominal pain.  Tolerating her diet.  She had a bowel movement just a little while ago, this was soft but formed, no blood was noted this was not black.    ROS:     Vitals:    10/03/21 1500   BP: 128/58   Pulse: 70   Resp: 18   Temp: 97.9 °F (36.6 °C)   SpO2: 97%         Intake/Output Summary (Last 24 hours) at 10/3/2021 1618  Last data filed at 10/3/2021 0835  Gross per 24 hour   Intake 467.85 ml   Output --   Net 467.85 ml       EXAM: Mood is good skin warm and dry lungs have bilateral breath sounds diminished bases but clear without rhonchi rales or wheezing, heart is regular rate and rhythm without murmur rub or gallop abdomen is soft    EKG:     Tele: Sinus rhythm    LABS:     Lab Results (last 48 hours)     Procedure Component Value Units Date/Time    POC Glucose Once [[id number]]  (Normal) Collected: 10/03/21 1604    Specimen: Blood Updated: 10/03/21 1610     Glucose 97 mg/dL      Comment: Meter: [id number] : [id number] Sean Burnham       CBC & Differential [[id number]]  (Abnormal) Collected: 10/03/21 0023    Specimen: Blood Updated: 10/03/21 1310    Narrative:      The following orders were created for panel order CBC & Differential.  Procedure                               Abnormality         Status                     ---------                               -----------         ------                     CBC Auto Differential[[id number]]        Abnormal            Final result               Scan Slide[[id number]]                                                                    Please view results for these tests on the individual orders.    CBC Auto Differential [[id number]]  (Abnormal) Collected: 10/03/21 1213    Specimen: Blood Updated: 10/03/21 1310     WBC 25.28 10*3/mm3      RBC 2.17 10*6/mm3       Hemoglobin 6.3 g/dL      Comment: Results verified with specimen redraw        Hematocrit 20.0 %      MCV 92.2 fL      MCH 29.0 pg      MCHC 31.5 g/dL      RDW 18.3 %      RDW-SD 57.0 fl      MPV 10.6 fL      Platelets 300 10*3/mm3      Neutrophil % 88.1 %      Lymphocyte % 4.5 %      Monocyte % 6.2 %      Eosinophil % 0.0 %      Basophil % 0.3 %      Immature Grans % 0.9 %      Neutrophils, Absolute 22.24 10*3/mm3      Lymphocytes, Absolute 1.15 10*3/mm3      Monocytes, Absolute 1.57 10*3/mm3      Eosinophils, Absolute 0.01 10*3/mm3      Basophils, Absolute 0.08 10*3/mm3      Immature Grans, Absolute 0.23 10*3/mm3      nRBC 0.0 /100 WBC     POC Glucose Once [[id number]]  (Normal) Collected: 10/03/21 1212    Specimen: Blood Updated: 10/03/21 1223     Glucose 93 mg/dL      Comment: Meter: [id number] : [id number] Sean Burnham       POC Glucose Once [[id number]]  (Normal) Collected: 10/03/21 0835    Specimen: Blood Updated: 10/03/21 0847     Glucose 102 mg/dL      Comment: Meter: [id number] : [id number] Sean Burnham       Magnesium [[id number]]  (Normal) Collected: 10/03/21 0023    Specimen: Blood Updated: 10/03/21 0104     Magnesium 2.3 mg/dL     TSH [[id number]]  (Abnormal) Collected: 10/03/21 0023    Specimen: Blood Updated: 10/03/21 0103     TSH 44.500 uIU/mL     Basic Metabolic Panel [[id number]]  (Abnormal) Collected: 10/03/21 0023    Specimen: Blood Updated: 10/03/21 0057     Glucose 107 mg/dL      BUN 15 mg/dL      Creatinine 0.31 mg/dL      Sodium 134 mmol/L      Potassium 4.2 mmol/L      Chloride 95 mmol/L      CO2 37.8 mmol/L      Calcium 7.7 mg/dL      eGFR Non African Amer >150 mL/min/1.73      BUN/Creatinine Ratio 48.4     Anion Gap 1.2 mmol/L     Narrative:      GFR Normal >60  Chronic Kidney Disease <60  Kidney Failure <15      C-reactive Protein [[id number]]  (Abnormal) Collected: 10/03/21 0023    Specimen: Blood Updated: 10/03/21 0057     C-Reactive Protein 1.83 mg/dL     POC Glucose Once  [[id number]]  (Normal) Collected: 10/03/21 0006    Specimen: Blood Updated: 10/03/21 0012     Glucose 107 mg/dL      Comment: Meter: [id number] : [id number] DALTON SCHREIBER       POC Glucose Once [[id number]]  (Abnormal) Collected: 10/02/21 1626    Specimen: Blood Updated: 10/02/21 1632     Glucose 133 mg/dL      Comment: Meter: [id number] : [id number] adalberto quinteros       T4, Free [[id number]]  (Abnormal) Collected: 10/02/21 0551    Specimen: Blood Updated: 10/02/21 1428     Free T4 0.24 ng/dL     Narrative:      Results may be falsely increased if patient taking Biotin.      POC Glucose Once [[id number]]  (Normal) Collected: 10/02/21 1044    Specimen: Blood Updated: 10/02/21 1050     Glucose 128 mg/dL      Comment: Meter: [id number] : [id number] SUSAN NICHOLAS       Hemoglobin & Hematocrit, Blood [[id number]]  (Abnormal) Collected: 10/02/21 0859    Specimen: Blood Updated: 10/02/21 0932     Hemoglobin 7.6 g/dL      Hematocrit 24.1 %     POC Glucose Once [[id number]]  (Normal) Collected: 10/02/21 0653    Specimen: Blood Updated: 10/02/21 0710     Glucose 117 mg/dL      Comment: Meter: [id number] : [id number] mayne mary       Comprehensive Metabolic Panel [[id number]]  (Abnormal) Collected: 10/02/21 0551    Specimen: Blood Updated: 10/02/21 0637     Glucose 111 mg/dL      BUN 16 mg/dL      Creatinine 0.40 mg/dL      Sodium 133 mmol/L      Potassium 4.4 mmol/L      Comment: Slight hemolysis detected by analyzer. Results may be affected.        Chloride 94 mmol/L      CO2 31.5 mmol/L      Calcium 7.8 mg/dL      Total Protein 4.9 g/dL      Albumin 2.25 g/dL      ALT (SGPT) 9 U/L      AST (SGOT) 15 U/L      Alkaline Phosphatase 47 U/L      Total Bilirubin <0.2 mg/dL      eGFR Non African Amer >150 mL/min/1.73      Globulin 2.7 gm/dL      A/G Ratio 0.8 g/dL      BUN/Creatinine Ratio 40.0     Anion Gap 7.5 mmol/L     Narrative:      GFR Normal >60  Chronic Kidney Disease <60  Kidney Failure <15      Magnesium  [[id number]]  (Normal) Collected: 10/02/21 0551    Specimen: Blood Updated: 10/02/21 0637     Magnesium 1.6 mg/dL     C-reactive Protein [[id number]]  (Abnormal) Collected: 10/02/21 0551    Specimen: Blood Updated: 10/02/21 0634     C-Reactive Protein 0.68 mg/dL     BNP [[id number]]  (Normal) Collected: 10/02/21 0551    Specimen: Blood Updated: 10/02/21 0632     proBNP 890.1 pg/mL     Narrative:      Among patients with dyspnea, NT-proBNP is highly sensitive for the detection of acute congestive heart failure. In addition NT-proBNP of <300 pg/ml effectively rules out acute congestive heart failure with 99% negative predictive value.    Results may be falsely decreased if patient taking Biotin.      CBC & Differential [[id number]]  (Abnormal) Collected: 10/02/21 0551    Specimen: Blood Updated: 10/02/21 0629    Narrative:      The following orders were created for panel order CBC & Differential.  Procedure                               Abnormality         Status                     ---------                               -----------         ------                     CBC Auto Differential[[id number]]        Abnormal            Final result               Scan Slide[[id number]]                                                                    Please view results for these tests on the individual orders.    CBC Auto Differential [[id number]]  (Abnormal) Collected: 10/02/21 0551    Specimen: Blood Updated: 10/02/21 0629     WBC 27.15 10*3/mm3      RBC 2.52 10*6/mm3      Hemoglobin 7.1 g/dL      Hematocrit 22.6 %      MCV 89.7 fL      MCH 28.2 pg      MCHC 31.4 g/dL      RDW 17.5 %      RDW-SD 54.4 fl      MPV 9.8 fL      Platelets 363 10*3/mm3      Neutrophil % 86.4 %      Lymphocyte % 5.7 %      Monocyte % 6.6 %      Eosinophil % 0.1 %      Basophil % 0.2 %      Immature Grans % 1.0 %      Neutrophils, Absolute 23.44 10*3/mm3      Lymphocytes, Absolute 1.54 10*3/mm3      Monocytes, Absolute 1.80 10*3/mm3      Eosinophils,  Absolute 0.04 10*3/mm3      Basophils, Absolute 0.05 10*3/mm3      Immature Grans, Absolute 0.28 10*3/mm3      nRBC 0.0 /100 WBC     POC Glucose Once [[id number]]  (Abnormal) Collected: 10/01/21 2110    Specimen: Blood Updated: 10/01/21 2125     Glucose 166 mg/dL      Comment: Meter: [id number] : [id number] Wali Santos                  Radiology:    Imaging Results (Last 72 Hours)     Procedure Component Value Units Date/Time    CT Chest Without Contrast Diagnostic [[id number]] Collected: 10/02/21 0843     Updated: 10/02/21 0845    Narrative:      CT Chest WO, CT Abdomen Pelvis WO    INDICATION:    Pelvic pneumonia. Elevated white blood cell count. Respiratory failure with hypoxia.    TECHNIQUE:   CT of the chest, abdomen and pelvis without IV contrast. Coronal and sagittal reconstructions were obtained.  Radiation dose reduction techniques included automated exposure control or exposure modulation based on body size. Count of known CT and cardiac  nuc med studies performed in previous 12 months: 9.      COMPARISON:    CT chest 9/29/2021 and CT abdomen/pelvis 8/20/2021    FINDINGS:  Chest: Bilateral pleural effusions right greater than left. Right effusion measures over 4 cm and left effusion 1.9 cm. Compressive atelectasis in both lung bases. Background parenchymal changes suggesting mild emphysema and fibrosis. No focal  pneumonitis. 5 mm noncalcified nodule left lung base.    Heart size within normal limits. Extensive coronary artery calcifications. Enlargement central pulmonary arteries may represent element of pulmonary hypertension. Diffuse aortic atherosclerotic changes with aneurysmal dilatation of the descending  thoracic aorta measuring up to 4 cm.    ABDOMEN: Liver and spleen unremarkable. Gallbladder surgically absent. Pancreas and adrenal glands unremarkable. Extensive bilateral renal calcifications which may primarily be vascular but renal stones not excluded. No obstruction. Increased colonic  gas  stool and fluid but no focal obstruction. Stomach and small bowel unremarkable. Aortic atherosclerotic changes with prior aortoiliac stenting.    Pelvis: Bladder moderately distended. Postoperative changes L5-S1 discectomy and posterior spinal fixation. Grade 1 spondylolisthesis. Left total hip hemiarthroplasty. Osteopenia. No discernible fracture or aggressive bone lesions.      Impression:      Continued bilateral pleural effusions right greater than left not significantly changed from 9/29/2021.    Multifocal atelectasis but no focal pneumonitis.    Diffuse aortic atherosclerotic changes with aneurysmal dilatation of the descending thoracic aorta. Prior endoluminal stent grafting of the abdominal aorta and iliacs.    Increased colonic gas, stool and fluid may reflect constipation. No focal inflammatory change or focal obstruction.    Extensive bilateral renal calcifications may represent a combination of vascular calcifications and nonobstructing stones.    Not mentioned above there are questionable findings of rectal prolapse. Correlate clinically.    Signer Name: SINDY Mireles MD   Signed: 10/2/2021 8:43 AM   Workstation Name: [email or web address]    [facility]    CT Abdomen Pelvis Without Contrast [[id number]] Collected: 10/02/21 0843     Updated: 10/02/21 0845    Narrative:      CT Chest WO, CT Abdomen Pelvis WO    INDICATION:    Pelvic pneumonia. Elevated white blood cell count. Respiratory failure with hypoxia.    TECHNIQUE:   CT of the chest, abdomen and pelvis without IV contrast. Coronal and sagittal reconstructions were obtained.  Radiation dose reduction techniques included automated exposure control or exposure modulation based on body size. Count of known CT and cardiac  nuc med studies performed in previous 12 months: 9.      COMPARISON:    CT chest 9/29/2021 and CT abdomen/pelvis 8/20/2021    FINDINGS:  Chest: Bilateral pleural effusions right greater than left. Right  effusion measures over 4 cm and left effusion 1.9 cm. Compressive atelectasis in both lung bases. Background parenchymal changes suggesting mild emphysema and fibrosis. No focal  pneumonitis. 5 mm noncalcified nodule left lung base.    Heart size within normal limits. Extensive coronary artery calcifications. Enlargement central pulmonary arteries may represent element of pulmonary hypertension. Diffuse aortic atherosclerotic changes with aneurysmal dilatation of the descending  thoracic aorta measuring up to 4 cm.    ABDOMEN: Liver and spleen unremarkable. Gallbladder surgically absent. Pancreas and adrenal glands unremarkable. Extensive bilateral renal calcifications which may primarily be vascular but renal stones not excluded. No obstruction. Increased colonic gas  stool and fluid but no focal obstruction. Stomach and small bowel unremarkable. Aortic atherosclerotic changes with prior aortoiliac stenting.    Pelvis: Bladder moderately distended. Postoperative changes L5-S1 discectomy and posterior spinal fixation. Grade 1 spondylolisthesis. Left total hip hemiarthroplasty. Osteopenia. No discernible fracture or aggressive bone lesions.      Impression:      Continued bilateral pleural effusions right greater than left not significantly changed from 9/29/2021.    Multifocal atelectasis but no focal pneumonitis.    Diffuse aortic atherosclerotic changes with aneurysmal dilatation of the descending thoracic aorta. Prior endoluminal stent grafting of the abdominal aorta and iliacs.    Increased colonic gas, stool and fluid may reflect constipation. No focal inflammatory change or focal obstruction.    Extensive bilateral renal calcifications may represent a combination of vascular calcifications and nonobstructing stones.    Not mentioned above there are questionable findings of rectal prolapse. Correlate clinically.    Signer Name: SINDY Mireles MD   Signed: 10/2/2021 8:43 AM   Workstation Name: [email or web address]     [facility]    XR Chest AP [[id number]] Collected: 10/02/21 0825     Updated: 10/02/21 0828    Narrative:      XR CHEST AP-     CLINICAL INDICATION: Follow-up pleural effusions; U07.1-COVID-19;  J12.82-Pneumonia due to coronavirus disease 2019; D72.829-Elevated white  blood cell count, unspecified; J96.11-Chronic respiratory failure with  hypoxia; K92.2-Gastrointestinal hemorrhage, unspecified        COMPARISON: 09/21/2021      TECHNIQUE: Single frontal view of the chest.     FINDINGS:     Probable trace bilateral effusions and minimal bibasilar airspace  disease  The cardiac silhouette is normal. The pulmonary vasculature is  unremarkable.  There is no evidence of an acute osseous abnormality.   There are no suspicious-appearing parenchymal soft tissue nodules.          Impression:      Trace bilateral effusions and minimal bibasilar airspace disease     This report was finalized on 10/2/2021 8:26 AM by Dr. River Zaidi MD.             Results for orders placed during the hospital encounter of 04/22/21    Adult Transthoracic Echo Complete W/ Cont if Necessary Per Protocol    Interpretation Summary  · Normal left ventricular cavity size noted. Left ventricular wall thickness is consistent with mild concentric hypertrophy  · Left ventricular ejection fraction appears to be 61 - 65%.  · Left ventricular diastolic function is consistent with (grade I) impaired relaxation.  · There is mild calcification of the aortic valvular cusps.  · No aortic valve regurgitation is present. Mild aortic valve stenosis is present.  · There are myxomatous changes of the mitral valve apparatus present. Mild mitral valve regurgitation is present. No significant mitral valve stenosis is present.  · . There is no evidence of pericardial effusion.      Assessment/Plan:   Anemia, although we see no bleeding her hemoglobin has dropped to the point she needs transfusion.  She will be given 1 unit of blood, she did have  Eliquis this morning but I am going to hold this until in the morning.  Recheck hemoglobin at that time.  If acceptable could consider restarting IV heparin at that time and monitoring for 24 more hours prior to transitioning back to Eliquis.  This is problematic considering her DVT.  Endoscopies have been done this admission.  She is on a PPI    Colitis, completing Omnicef and Flagyl course, she is had a rise in her white count but no significant left shift.  CRP is risen slightly but overall stable, recheck in a.m.  Tolerating her diet and bowel function is good.    DVT, left popliteal, as above she did have Eliquis this morning, recheck her hemoglobin in the a.m., if stable could consider IV heparin, if anemia continues to be a problem with need for recurrent transfusion then could consider filter.    Covid pneumonia, stable completed Decadron and remdesivir    Marked hypothyroidism, considering her BMI of 14 she was started on 25 mcg Synthroid, this needs to be rechecked in approximately 3 weeks.    Atrial fibrillation, now sinus, when she converted back to sinus rhythm she was started on amiodarone by cardiology to maintain sinus rhythm.  This can also worsen hypothyroidism so this will need to be followed closely.    Coronary artery disease status post PCI in the past, continue aspirin statin and ACE inhibitor.    H. pylori positive, she will need outpatient treatment.    Gastritis, PPI    Disposition SNF when stable    Austyn López MD

## 2021-10-03 NOTE — PROGRESS NOTES
[facility] Cardiology  INPATIENT PROGRESS NOTE    Name: Mary Ly  Age/Sex: 76 y.o. female  :  1945        PCP: Leta Gardner MD        Assessment and plan  Discussed with RN  Paroxysmal defibrillation patient currently normal sinus rhythm tolerating well with no symptomatology to report  Overall weakness in the setting of COVID-19 pneumonia at this point await for weakening complete activities daily living will be able to further follow-up   Chronic diastolic heart failure compensated today  Coronary disease status post PCI patient hemodynamically stable doing well  No acute cardiovascular issues noted today patient is near optimal for discharge    Primary cardiologist assume care in a.m.  Thank you for allowing me to participate in this patient care  Jeovany Upton DO              Subjective no events no complaints overall feels better is asking to go back to nursing home      Vital Signs  Temp:  [97.4 °F (36.3 °C)-98 °F (36.7 °C)] 98 °F (36.7 °C)  Heart Rate:  [64-84] 64  Resp:  [18] 18  BP: (124-130)/(58-68) 124/62  Body mass index is 14.98 kg/m².    's exam has been deferred due to COVID-19 pandemic examination was made during video  Patient is awake alert oriented does not appear in any distress        Patient Active Problem List   Diagnosis   • Status post laparoscopic cholecystectomy   • Severe malnutrition (HCC)   • COPD (chronic obstructive pulmonary disease) ([facility])   • Type II diabetes mellitus (HCC)   • Hypothyroidism   • Hyperlipidemia   • Diastolic CHF, chronic ([facility])   • Moderate malnutrition (HCC)   • Vomiting   • Upper GI bleed   • Pneumonia due to COVID-19 virus       Past Medical History:   Diagnosis Date   • AAA (abdominal aortic aneurysm) (CMS/[facility])     s/p repair    • Arthritis    • CAD (coronary artery disease)     s/p stenting in the past    • Chronic kidney disease (CKD), stage III (moderate) (CMS/[facility])    • COPD (chronic obstructive pulmonary disease) (CMS/[facility])    • Debility     • Diastolic CHF, chronic (CMS/HCC) 4/23/2021   • GERD (gastroesophageal reflux disease)    • History of CVA (cerebrovascular accident)    • History of ischemic colitis    • Hyperlipidemia 4/23/2021   • Hypertension    • Hypothyroidism    • Pneumonia due to COVID-19 virus 9/21/2021   • Stroke (CMS/[facility])    • Type II diabetes mellitus (CMS/[facility])        Current Facility-Administered Medications   Medication Dose Route Frequency Provider Last Rate Last Admin   • albuterol sulfate HFA (PROVENTIL HFA;VENTOLIN HFA;PROAIR HFA) inhaler 2 puff  2 puff Inhalation Q6H PRN Lonnie Meneses MD       • amiodarone (PACERONE) tablet 400 mg  400 mg Oral Q24H Tremaine Rust MD   400 mg at 10/03/21 0836   • aspirin EC tablet 81 mg  81 mg Oral Daily Sean Riley MD   81 mg at 10/03/21 0836   • atorvastatin (LIPITOR) tablet 20 mg  20 mg Oral Nightly Sean Riley MD   20 mg at 10/02/21 2347   • baclofen (LIORESAL) tablet 10 mg  10 mg Oral Nightly Lonnie Meneses MD   10 mg at 10/02/21 2115   • bisacodyl (DULCOLAX) suppository 10 mg  10 mg Rectal Daily PRN Lonnie Meneses MD       • bismuth subsalicylate (PEPTO BISMOL) chewable tablet 524 mg  524 mg Oral Q1H PRN Lonnie Meneses MD       • cefdinir (OMNICEF) capsule 300 mg  300 mg Oral Q12H Austyn López MD   300 mg at 10/03/21 0836   • dextrose (D50W) 25 g/ 50mL Intravenous Solution 25 g  25 g Intravenous Q15 Min PRN Lonnie Meneses MD       • dextrose (GLUTOSE) oral gel 15 g  15 g Oral Q15 Min PRN Lonnie Meneses MD       • glucagon (human recombinant) (GLUCAGEN DIAGNOSTIC) injection 1 mg  1 mg Subcutaneous Q15 Min PRN Lonnie Meneses MD       • HYDROcodone-acetaminophen (NORCO) 5-325 MG per tablet 1 tablet  1 tablet Oral Q8H PRN Lonnie Meneses MD   1 tablet at 09/25/21 2204   • insulin aspart (novoLOG) injection 0-7 Units  0-7 Units Subcutaneous TID Lonnie Willis MD   2 Units at 09/30/21 1738   • ipratropium-albuterol (DUO-NEB) nebulizer solution 3 mL  3 mL  Nebulization 4x Daily PRN Lonnie Meneses MD       • lactulose (CHRONULAC) 10 GM/15ML solution 10 g  10 g Oral Daily PRN Lonnie Meneses MD       • levothyroxine (SYNTHROID, LEVOTHROID) tablet 25 mcg  25 mcg Oral Q AM Austyn López MD   25 mcg at 10/03/21 0741   • lisinopril (PRINIVIL,ZESTRIL) tablet 5 mg  5 mg Oral Q24H Austyn López MD   5 mg at 10/03/21 0836   • loperamide (IMODIUM) capsule 2 mg  2 mg Oral Q6H PRN Lonnie Meneses MD       • Magnesium Sulfate 2 gram Bolus, followed by 8 gram infusion (total Mg dose 10 grams)- Mg less than or equal to 1mg/dL  2 g Intravenous PRN Lonnie Meneses MD        Or   • Magnesium Sulfate 2 gram / 50mL Infusion (GIVE X 3 BAGS TO EQUAL 6GM TOTAL DOSE) - Mg 1.1 - 1.5 mg/dl  2 g Intravenous PRN Lonnie Meneses MD        Or   • Magnesium Sulfate 4 gram infusion- Mg 1.6-1.9 mg/dL  4 g Intravenous PRN Lonnie Meneses MD       • metroNIDAZOLE (FLAGYL) tablet 500 mg  500 mg Oral Q8H Lonnie Meneses MD   500 mg at 10/03/21 1415   • nicotine (NICODERM CQ) 7 MG/24HR patch 1 patch  1 patch Transdermal Q24H Lonnie Meneses MD   1 patch at 09/27/21 1423   • nitroglycerin (NITROSTAT) SL tablet 0.4 mg  0.4 mg Sublingual Q5 Min PRN Lonnie Meneses MD       • ondansetron (ZOFRAN) tablet 4 mg  4 mg Oral Q6H PRN Lonnie Meneses MD       • pantoprazole (PROTONIX) EC tablet 40 mg  40 mg Oral Q AM Lonnie Meneses MD   40 mg at 10/03/21 0742   • potassium & sodium phosphates (PHOS-NAK) 280-160-250 MG packet - for Phosphorus less than 1.25 mg/dL  2 packet Oral Q6H PRN Halima Oshea PA-C        Or   • potassium & sodium phosphates (PHOS-NAK) 280-160-250 MG packet - for Phosphorus 1.25 - 2.5 mg/dL  2 packet Oral Q6H PRN Halima Oshea PA-C       • potassium chloride (K-DUR,KLOR-CON) CR tablet 40 mEq  40 mEq Oral PRN Lonnie Meneses MD        Or   • potassium chloride (KLOR-CON) packet 40 mEq  40 mEq Oral PRN Lonnie Meneses MD        Or   • potassium chloride 10 mEq in  100 mL IVPB  10 mEq Intravenous Q1H PRN Lonnie Meneses MD       • promethazine (PHENERGAN) tablet 12.5 mg  12.5 mg Oral Q8H PRN Lonnie Meneses MD       • senna (SENOKOT) tablet 1 tablet  1 tablet Oral Daily PRN Lonnie Meneses MD       • sodium chloride 0.9 % flush 10 mL  10 mL Intravenous PRN Lonnie Meneses MD       • sodium chloride 0.9 % flush 10 mL  10 mL Intravenous Q12H Lonnie Meneses MD   10 mL at 10/03/21 0835   • sodium chloride 0.9 % flush 10 mL  10 mL Intravenous PRN Lonnie Meneses MD       • traZODone (DESYREL) tablet 50 mg  50 mg Oral Nightly Lonnie Meneses MD   50 mg at 10/01/21 2112       Past Surgical History:   Procedure Laterality Date   • BACK SURGERY     • CARDIAC CATHETERIZATION     • CHOLECYSTECTOMY N/A 7/17/2020    Procedure: CHOLECYSTECTOMY LAPAROSCOPIC;  Surgeon: Lonnie Meneses MD;  Location: [facility];  Service: General;  Laterality: N/A;   • COLONOSCOPY     • COLONOSCOPY N/A 9/25/2021    Procedure: COLONOSCOPY;  Surgeon: Lonnie Meneses MD;  Location: [facility];  Service: Gastroenterology;  Laterality: N/A;   • CORONARY STENT PLACEMENT     • ENDOSCOPY     • ENDOSCOPY N/A 9/25/2021    Procedure: ESOPHAGOGASTRODUODENOSCOPY;  Surgeon: Lonnie Meneses MD;  Location: [facility];  Service: Gastroenterology;  Laterality: N/A;   • TONSILLECTOMY         Social History     Socioeconomic History   • Marital status:      Spouse name: Not on file   • Number of children: Not on file   • Years of education: Not on file   • Highest education level: Not on file   Tobacco Use   • Smoking status: Current Every Day Smoker     Packs/day: 1.00     Years: 55.00     Pack years: 55.00     Types: Cigarettes   • Smokeless tobacco: Never Used   Substance and Sexual Activity   • Alcohol use: Never   • Drug use: Never   • Sexual activity: Defer         Lab Results (last 24 hours)     Procedure Component Value Units Date/Time    POC Glucose Once [[id number]]  (Abnormal) Collected: 10/02/21 3468     Specimen: Blood Updated: 10/02/21 1632     Glucose 133 mg/dL      Comment: Meter: [id number] : [id number] adalberto quinteros       POC Glucose Once [[id number]]  (Normal) Collected: 10/03/21 0006    Specimen: Blood Updated: 10/03/21 0012     Glucose 107 mg/dL      Comment: Meter: [id number] : [id number] DALTON SCHREIBER       CBC & Differential [[id number]]  (Abnormal) Collected: 10/03/21 0023    Specimen: Blood Updated: 10/03/21 1310    Narrative:      The following orders were created for panel order CBC & Differential.  Procedure                               Abnormality         Status                     ---------                               -----------         ------                     CBC Auto Differential[[id number]]        Abnormal            Final result               Scan Slide[[id number]]                                                                    Please view results for these tests on the individual orders.    Basic Metabolic Panel [[id number]]  (Abnormal) Collected: 10/03/21 0023    Specimen: Blood Updated: 10/03/21 0057     Glucose 107 mg/dL      BUN 15 mg/dL      Creatinine 0.31 mg/dL      Sodium 134 mmol/L      Potassium 4.2 mmol/L      Chloride 95 mmol/L      CO2 37.8 mmol/L      Calcium 7.7 mg/dL      eGFR Non African Amer >150 mL/min/1.73      BUN/Creatinine Ratio 48.4     Anion Gap 1.2 mmol/L     Narrative:      GFR Normal >60  Chronic Kidney Disease <60  Kidney Failure <15      C-reactive Protein [[id number]]  (Abnormal) Collected: 10/03/21 0023    Specimen: Blood Updated: 10/03/21 0057     C-Reactive Protein 1.83 mg/dL     TSH [[id number]]  (Abnormal) Collected: 10/03/21 0023    Specimen: Blood Updated: 10/03/21 0103     TSH 44.500 uIU/mL     Magnesium [[id number]]  (Normal) Collected: 10/03/21 0023    Specimen: Blood Updated: 10/03/21 0104     Magnesium 2.3 mg/dL     POC Glucose Once [[id number]]  (Normal) Collected: 10/03/21 0835    Specimen: Blood Updated: 10/03/21 0847     Glucose 102  mg/dL      Comment: Meter: [id number] : [id number] Sean Chacha       POC Glucose Once [[id number]]  (Normal) Collected: 10/03/21 1212    Specimen: Blood Updated: 10/03/21 1223     Glucose 93 mg/dL      Comment: Meter: [id number] : [id number] Sean Llamasnifer       CBC Auto Differential [[id number]]  (Abnormal) Collected: 10/03/21 1213    Specimen: Blood Updated: 10/03/21 1310     WBC 25.28 10*3/mm3      RBC 2.17 10*6/mm3      Hemoglobin 6.3 g/dL      Comment: Results verified with specimen redraw        Hematocrit 20.0 %      MCV 92.2 fL      MCH 29.0 pg      MCHC 31.5 g/dL      RDW 18.3 %      RDW-SD 57.0 fl      MPV 10.6 fL      Platelets 300 10*3/mm3      Neutrophil % 88.1 %      Lymphocyte % 4.5 %      Monocyte % 6.2 %      Eosinophil % 0.0 %      Basophil % 0.3 %      Immature Grans % 0.9 %      Neutrophils, Absolute 22.24 10*3/mm3      Lymphocytes, Absolute 1.15 10*3/mm3      Monocytes, Absolute 1.57 10*3/mm3      Eosinophils, Absolute 0.01 10*3/mm3      Basophils, Absolute 0.08 10*3/mm3      Immature Grans, Absolute 0.23 10*3/mm3      nRBC 0.0 /100 WBC         This document has been electronically signed by Jeovany Upton DO on October 3, 2021 14:21 EDT         Part of this note may be an electronic transcription/translation of spoken language to printed text using the Dragon Dictation System.

## 2021-10-04 LAB
APTT PPP: 38.2 SECONDS (ref 25.5–35.4)
APTT PPP: 41 SECONDS (ref 25.5–35.4)
BH BB BLOOD EXPIRATION DATE: NORMAL
BH BB BLOOD TYPE BARCODE: 7300
BH BB DISPENSE STATUS: NORMAL
BH BB PRODUCT CODE: NORMAL
BH BB UNIT NUMBER: NORMAL
CROSSMATCH INTERPRETATION: NORMAL
GLUCOSE BLDC GLUCOMTR-MCNC: 129 MG/DL (ref 70–130)
GLUCOSE BLDC GLUCOMTR-MCNC: 135 MG/DL (ref 70–130)
GLUCOSE BLDC GLUCOMTR-MCNC: 135 MG/DL (ref 70–130)
GLUCOSE BLDC GLUCOMTR-MCNC: 148 MG/DL (ref 70–130)
INR PPP: 1.83 (ref 0.9–1.1)
PROTHROMBIN TIME: 21.6 SECONDS (ref 12.8–14.5)
UNIT  ABO: NORMAL
UNIT  RH: NORMAL

## 2021-10-04 PROCEDURE — 85730 THROMBOPLASTIN TIME PARTIAL: CPT | Performed by: STUDENT IN AN ORGANIZED HEALTH CARE EDUCATION/TRAINING PROGRAM

## 2021-10-04 PROCEDURE — 99233 SBSQ HOSP IP/OBS HIGH 50: CPT | Performed by: STUDENT IN AN ORGANIZED HEALTH CARE EDUCATION/TRAINING PROGRAM

## 2021-10-04 PROCEDURE — 25010000003 MAGNESIUM SULFATE 4 GM/100ML SOLUTION: Performed by: INTERNAL MEDICINE

## 2021-10-04 PROCEDURE — 94799 UNLISTED PULMONARY SVC/PX: CPT

## 2021-10-04 PROCEDURE — 99231 SBSQ HOSP IP/OBS SF/LOW 25: CPT | Performed by: INTERNAL MEDICINE

## 2021-10-04 PROCEDURE — 25010000002 HEPARIN (PORCINE) PER 1000 UNITS: Performed by: STUDENT IN AN ORGANIZED HEALTH CARE EDUCATION/TRAINING PROGRAM

## 2021-10-04 PROCEDURE — 82962 GLUCOSE BLOOD TEST: CPT

## 2021-10-04 PROCEDURE — 85610 PROTHROMBIN TIME: CPT | Performed by: STUDENT IN AN ORGANIZED HEALTH CARE EDUCATION/TRAINING PROGRAM

## 2021-10-04 RX ORDER — HEPARIN SODIUM 5000 [USP'U]/ML
30 INJECTION, SOLUTION INTRAVENOUS; SUBCUTANEOUS AS NEEDED
Status: DISCONTINUED | OUTPATIENT
Start: 2021-10-04 | End: 2021-10-05

## 2021-10-04 RX ORDER — HEPARIN SODIUM 5000 [USP'U]/ML
60 INJECTION, SOLUTION INTRAVENOUS; SUBCUTANEOUS ONCE
Status: COMPLETED | OUTPATIENT
Start: 2021-10-04 | End: 2021-10-04

## 2021-10-04 RX ORDER — HEPARIN SODIUM 10000 [USP'U]/100ML
12 INJECTION, SOLUTION INTRAVENOUS
Status: DISCONTINUED | OUTPATIENT
Start: 2021-10-04 | End: 2021-10-05

## 2021-10-04 RX ORDER — HEPARIN SODIUM 5000 [USP'U]/ML
60 INJECTION, SOLUTION INTRAVENOUS; SUBCUTANEOUS AS NEEDED
Status: DISCONTINUED | OUTPATIENT
Start: 2021-10-04 | End: 2021-10-05

## 2021-10-04 RX ADMIN — MAGNESIUM SULFATE HEPTAHYDRATE 4 G: 40 INJECTION, SOLUTION INTRAVENOUS at 02:19

## 2021-10-04 RX ADMIN — HEPARIN SODIUM 16 UNITS/KG/HR: 10000 INJECTION, SOLUTION INTRAVENOUS at 20:40

## 2021-10-04 RX ADMIN — PANTOPRAZOLE SODIUM 40 MG: 40 TABLET, DELAYED RELEASE ORAL at 05:24

## 2021-10-04 RX ADMIN — CEFDINIR 300 MG: 300 CAPSULE ORAL at 20:46

## 2021-10-04 RX ADMIN — ATORVASTATIN CALCIUM 20 MG: 40 TABLET, FILM COATED ORAL at 20:46

## 2021-10-04 RX ADMIN — LISINOPRIL 5 MG: 2.5 TABLET ORAL at 08:48

## 2021-10-04 RX ADMIN — HYDROCODONE BITARTRATE AND ACETAMINOPHEN 1 TABLET: 5; 325 TABLET ORAL at 13:46

## 2021-10-04 RX ADMIN — LEVOTHYROXINE SODIUM 25 MCG: 25 TABLET ORAL at 05:24

## 2021-10-04 RX ADMIN — SODIUM CHLORIDE, PRESERVATIVE FREE 10 ML: 5 INJECTION INTRAVENOUS at 20:46

## 2021-10-04 RX ADMIN — HEPARIN SODIUM 2500 UNITS: 5000 INJECTION INTRAVENOUS; SUBCUTANEOUS at 20:41

## 2021-10-04 RX ADMIN — HEPARIN SODIUM 2500 UNITS: 5000 INJECTION INTRAVENOUS; SUBCUTANEOUS at 13:02

## 2021-10-04 RX ADMIN — ASPIRIN 81 MG: 81 TABLET ORAL at 08:48

## 2021-10-04 RX ADMIN — CEFDINIR 300 MG: 300 CAPSULE ORAL at 08:48

## 2021-10-04 RX ADMIN — HEPARIN SODIUM 12 UNITS/KG/HR: 10000 INJECTION, SOLUTION INTRAVENOUS at 13:02

## 2021-10-04 RX ADMIN — METRONIDAZOLE 500 MG: 250 TABLET ORAL at 05:24

## 2021-10-04 RX ADMIN — SODIUM CHLORIDE, PRESERVATIVE FREE 10 ML: 5 INJECTION INTRAVENOUS at 08:48

## 2021-10-04 RX ADMIN — TRAZODONE HYDROCHLORIDE 50 MG: 50 TABLET ORAL at 20:46

## 2021-10-04 RX ADMIN — AMIODARONE HYDROCHLORIDE 400 MG: 200 TABLET ORAL at 08:48

## 2021-10-04 NOTE — CASE MANAGEMENT/SOCIAL WORK
Discharge Planning Assessment  [facility]     Patient Name: Mary Ly  MRN: [id number]  Today's Date: 10/4/2021    Admit Date: 9/21/2021      Discharge Plan     Row Name 10/04/21 1607       Plan    Plan Pt was [facility] and she does not have a bed hold. SS will need advance notice of discharge due to pt having no bed hold at SNF. SS to follow.        Continued Care and Services - Admitted Since 9/21/2021     Destination Coordination complete.    Service Provider Request Status Selected Services Address Phone Fax Patient Preferred    [facility]   Selected Skilled Nursing [address], [facility] [address] [phone] --            SEBLE Reeder

## 2021-10-04 NOTE — PLAN OF CARE
Goal Outcome Evaluation:           Progress: no change  Outcome Summary: Pt restin gin bed. No complaints of pain noted, no vomiting this shift. No other acute changes noted.

## 2021-10-04 NOTE — PLAN OF CARE
Goal Outcome Evaluation:  Plan of Care Reviewed With: patient        Progress: no change  Outcome Summary: Pt rested in bed throughout shift. Had one episode of vomiting at begining of shift. PRN meds given and vomiting resolved. No complaints noted at this time.

## 2021-10-04 NOTE — PROGRESS NOTES
LOS: 13 days     Name: Mary Ly  Age/Sex: 76 y.o. female  :  1945        PCP: Leta Gardner MD  REF: No Known Provider    Principal Problem:    Pneumonia due to COVID-19 virus  Active Problems:    Upper GI bleed      Reason for follow-up: Atrial fibrillation     Subjective       Subjective     Mary Ly is a 76 year old female with a past medical history significant for atrial fibrillation, COPD, hyperlipidemia and diabetes mellitus type 2. Presented to the ED with shortness of breath and was found to have COVID-19 pneumonia. Cardiology was consulted for atrial fibrillation in the setting of GI bleed    Interval History: Patient resting in bed. No acute distress. Hemoglobin 6.3 yesterday and she received one unit for PRBcs. Repeat hemoglobin showed 7.6. Maintaining normal sinus rhythm on PO amiodarone.     Vital Signs  Temp:  [96.4 °F (35.8 °C)-97.9 °F (36.6 °C)] 97.5 °F (36.4 °C)  Heart Rate:  [70-96] 84  Resp:  [14-18] 14  BP: ()/(52-92) 92/62     Vital Signs (last 72 hrs)       10/01 0700  -  10/02 0659 10/02 0700  -  10/03 0659 10/03 0700  -  10/04 0659 10/04 0700  -  10/04 1234   Most Recent    Temp (°F) 96 -  97.8      97.4    96.4 -  98      97.5     97.5 (36.4)    Heart Rate 78 -  84      84    64 -  96      84     84    Resp   18      18    16 -  18      14     14    /80 -  128/60      126/68    108/52 -  156/92      92/62     92/62    SpO2 (%) 93 -  95    94 -  98    91 -  99    96 -  99     99        Documented weights    21 0116 21 1930   Weight: 49 kg (108 lb) 42.1 kg (92 lb 12.8 oz)      Body mass index is 14.98 kg/m².    Intake/Output Summary (Last 24 hours) at 10/4/2021 1234  Last data filed at 10/4/2021 1211  Gross per 24 hour   Intake 690 ml   Output --   Net 690 ml     Objective    Objective     Physical Exam:     General Appearance:    in no acute distress   Head:    Normocephalic, without obvious abnormality, atraumatic       Neck:   No  adenopathy, supple, trachea midline, no thyromegaly, no   carotid bruit, no JVD   Lungs:    respirations regular, even and  unlabored    Heart:    Regular rhythm and normal rate   Chest Wall:    No abnormalities observed       Extremities:   no edema, no cyanosis, no redness       Neurologic:   Sleepy but easily aroused   Physical examination limited secondary to COVID-19  Results review     Results Review:   Results from last 7 days   Lab Units 10/03/21  2305 10/03/21  1213 10/02/21  0859 10/02/21  0551 10/01/21  0507 09/30/21  0526 09/29/21  2326 09/29/21  0805 09/29/21  0805 09/29/21  0357 09/29/21  0357   WBC 10*3/mm3 33.74* 25.28*  --  27.15* 19.73*  --  12.24*  --  7.72  --  10.21   HEMOGLOBIN g/dL 7.6* 6.3* 7.6* 7.1* 7.8* 7.6* 7.7*   < > 13.4   < > 8.6*   PLATELETS 10*3/mm3 350 300  --  363 225  --  302  --  245  --  259    < > = values in this interval not displayed.     Results from last 7 days   Lab Units 10/03/21  2305 10/03/21  0023 10/02/21  0551 10/01/21  0507 09/29/21  2326 09/29/21  0357 09/27/21  2236   SODIUM mmol/L 134* 134* 133* 135* 136 130* 136   POTASSIUM mmol/L 4.4 4.2 4.4 4.6 4.5 4.5 5.1   CHLORIDE mmol/L 96* 95* 94* 98 100 98 105   CO2 mmol/L 31.1* 37.8* 31.5* 33.3* 31.0* 26.9 25.5   BUN mg/dL 20 15 16 17 13 10 11   CREATININE mg/dL 0.45* 0.31* 0.40* 0.40* 0.39* 0.32* 0.37*   CALCIUM mg/dL 8.1* 7.7* 7.8* 7.9* 7.4* 7.2* 7.3*   GLUCOSE mg/dL 165* 107* 111* 100* 144* 113* 135*   ALT (SGPT) U/L 10  --  9 8 10  --  12   AST (SGOT) U/L 16  --  15 11 10  --  12         Lab Results   Component Value Date    INR 1.83 (H) 10/04/2021    INR 1.00 09/29/2021    INR 1.13 (H) 09/26/2021    INR 1.08 09/24/2021    INR 1.04 09/22/2021    INR 0.99 09/21/2021    INR 1.26 (H) 05/22/2021     Lab Results   Component Value Date    MG 1.9 10/03/2021    MG 2.3 10/03/2021    MG 1.6 10/02/2021     Lab Results   Component Value Date    TSH 44.500 (H) 10/03/2021    TRIG 100 09/22/2021      Imaging Results (Last 48  Hours)     ** No results found for the last 48 hours. **        Echo   Results for orders placed during the hospital encounter of 04/22/21    Adult Transthoracic Echo Complete W/ Cont if Necessary Per Protocol    Interpretation Summary  · Normal left ventricular cavity size noted. Left ventricular wall thickness is consistent with mild concentric hypertrophy  · Left ventricular ejection fraction appears to be 61 - 65%.  · Left ventricular diastolic function is consistent with (grade I) impaired relaxation.  · There is mild calcification of the aortic valvular cusps.  · No aortic valve regurgitation is present. Mild aortic valve stenosis is present.  · There are myxomatous changes of the mitral valve apparatus present. Mild mitral valve regurgitation is present. No significant mitral valve stenosis is present.  · . There is no evidence of pericardial effusion.     I reviewed the patient's new clinical results.    Telemetry: NSR 70-80 bpm      Medication Review:   amiodarone, 400 mg, Oral, Q24H  aspirin, 81 mg, Oral, Daily  atorvastatin, 20 mg, Oral, Nightly  baclofen, 10 mg, Oral, Nightly  cefdinir, 300 mg, Oral, Q12H  heparin (porcine), 60 Units/kg, Intravenous, Once  insulin aspart, 0-7 Units, Subcutaneous, TID AC  levothyroxine, 25 mcg, Oral, Q AM  lisinopril, 5 mg, Oral, Q24H  nicotine, 1 patch, Transdermal, Q24H  pantoprazole, 40 mg, Oral, Q AM  sodium chloride, 10 mL, Intravenous, Q12H  traZODone, 50 mg, Oral, Nightly        heparin (porcine), 12 Units/kg/hr        Assessment      Assessment:  Paroxysmal atrial fibrillation, patient maintaining sinus rhythm on amiodarone.  Progressive anemia of unclear etiology with suspected occult GI bleed due to colitis and gastritis.  Patient required packed RBCs transfusion.  Left popliteal vein DVT, being managed by the hospitalist service.  Patient has been on Eliquis which is being held in view of recurrent worsening of anemia.  Marked hypothyroidism prior to initiation  of amiodarone.  She is being started on Synthroid.  Her thyroid profile need to be monitored closely.  ASCVD, status post PCI, clinically asymptomatic and stable.      Plan     Recommendations:  Continue with amiodarone as ordered  Agree with holding the anticoagulation for now but hopefully restart this soon.  We will consider referral for watchman device placement later on at [facility] if patient is agreeable as patient is not a good candidate for long-term anticoagulation for stroke prevention due to recurrent anemia.    I discussed the patients findings and my recommendations with patient.      Electronically signed by QUIQUE Solitario, 10/04/21, 12:34 PM EDT.    Electronically signed by Tremaine Rust MD, 10/04/21, 4:19 PM EDT.      Please note that portions of this note were completed with a voice recognition program.

## 2021-10-04 NOTE — PROGRESS NOTES
[facility] HOSPITALIST PROGRESS NOTE     Patient Identification:  Name:  Mary Ly  Age:  76 y.o.  Sex:  female  :  1945  MRN:  [id number]  Visit Number:  [id number]  ROOM: [address] [place] [address]     Primary Care Provider:  Leta Gardner MD    Length of stay in inpatient status:  13    Subjective     Chief Compliant:    Chief Complaint   Patient presents with   • Shortness of Breath       History of Presenting Illness: Patient seen and evaluated in follow-up for recurrent anemia, colitis, left popliteal DVT as well as recent Covid pneumonia.  Patient today at time of examination denies any acute complaints.  Nursing reports that patient if is having some oral intake although it is overall reduced which patient does have a chronic history of poor oral intake with previous hospitalizations in the past for acute on chronic mesenteric ischemia.    Objective     Current Hospital Meds:amiodarone, 400 mg, Oral, Q24H  aspirin, 81 mg, Oral, Daily  atorvastatin, 20 mg, Oral, Nightly  baclofen, 10 mg, Oral, Nightly  cefdinir, 300 mg, Oral, Q12H  insulin aspart, 0-7 Units, Subcutaneous, TID AC  levothyroxine, 25 mcg, Oral, Q AM  lisinopril, 5 mg, Oral, Q24H  nicotine, 1 patch, Transdermal, Q24H  pantoprazole, 40 mg, Oral, Q AM  sodium chloride, 10 mL, Intravenous, Q12H  traZODone, 50 mg, Oral, Nightly    heparin (porcine), 12 Units/kg/hr, Last Rate: 12 Units/kg/hr (10/04/21 1302)        Current Antimicrobial Therapy:  Anti-Infectives (From admission, onward)    Ordered     Dose/Rate Route Frequency Start Stop    10/02/21 0901  cefdinir (OMNICEF) capsule 300 mg     Majo Velazco, PharmD reviewed the order on 10/04/21 2318.   Ordering Provider: Austyn López MD    300 mg Oral Every 12 Hours Scheduled 10/02/21 1030 10/09/21 0859    21 0911  metroNIDAZOLE (FLAGYL) tablet 500 mg     Majo Velazco, PharmD reviewed the order on 21 1219.   Ordering Provider: Lonnie Meneses MD    500 mg Oral Every  8 Hours Scheduled 09/27/21 1100 10/04/21 0524    09/22/21 1856  remdesivir 100 mg in 270 mL NS     Ordering Provider: Eliseo Higgins MD    100 mg  over 60 Minutes Intravenous Every 24 Hours 09/23/21 2000 09/25/21 2231 09/22/21 1856  remdesivir 200 mg in 290 mL NS     Ordering Provider: Eliseo Higgins MD    200 mg  over 60 Minutes Intravenous Every 24 Hours 09/22/21 2000 09/22/21 2301 09/21/21 0802  vancomycin 1 g/250 mL 0.9% NS (vial-mate)     Ordering Provider: Erasmo Singleton MD    20 mg/kg × 49 kg  over 1 Hours Intravenous Once 09/21/21 0830 09/21/21 1356    09/21/21 0802  piperacillin-tazobactam (ZOSYN) IVPB 4.5 g in 100 mL NS VTB     Ordering Provider: Erasmo Singleton MD    4.5 g  over 30 Minutes Intravenous Once 09/21/21 0804 09/21/21 0929        Current Diuretic Therapy:  Diuretics (From admission, onward)    Ordered     Dose/Rate Route Frequency Start Stop    10/01/21 1716  bumetanide (BUMEX) injection 0.5 mg     Ordering Provider: Austyn López MD    0.5 mg Intravenous Once 10/01/21 1800 10/01/21 1741    09/30/21 1700  bumetanide (BUMEX) injection 0.5 mg     Ordering Provider: Austyn López MD    0.5 mg Intravenous Once 09/30/21 1900 09/30/21 2056    09/29/21 1313  bumetanide (BUMEX) injection 0.5 mg     Ordering Provider: Austyn López MD    0.5 mg Intravenous Once 09/29/21 1500 09/29/21 1539        ----------------------------------------------------------------------------------------------------------------------  Vital Signs:  Temp:  [96.8 °F (36 °C)-97.5 °F (36.4 °C)] 96.8 °F (36 °C)  Heart Rate:  [73-96] 77  Resp:  [14-20] 20  BP: ()/(48-92) 102/48  SpO2:  [91 %-99 %] 95 %  on  Flow (L/min):  [1] 1;   Device (Oxygen Therapy): nasal cannula  Body mass index is 14.98 kg/m².    Wt Readings from Last 3 Encounters:   09/21/21 42.1 kg (92 lb 12.8 oz)   08/20/21 50.8 kg (112 lb)   06/01/21 49.1 kg (108 lb 4.8 oz)     Intake & Output (last 3 days)       10/02  0701 - 10/03 0700 10/03 0701 - 10/04 0700 10/04 0701 - 10/05 0700    P.O. 720 360 510    I.V. (mL/kg) 107.9 (2.6)      Blood  300     Total Intake(mL/kg) 827.9 (19.7) 660 (15.7) 510 (12.1)    Net +827.9 +660 +510           Urine Unmeasured Occurrence 6 x 4 x 2 x    Stool Unmeasured Occurrence  1 x         Diet Soft Texture; Ground  ----------------------------------------------------------------------------------------------------------------------  Physical exam:  Constitutional: Elderly, frail, chronically ill-appearing elderly female laying in bed, NAD   HENT:  Head:  Normocephalic and atraumatic.  Mouth:  Moist mucous membranes.    Eyes:  Conjunctivae and EOM are normal. No scleral icterus.    Neck:  Neck supple.  No JVD present.    Cardiovascular:  Normal rate, regular rhythm and normal heart sounds with no murmur.  Pulmonary/Chest:  No respiratory distress, no wheezes, no crackles, with diminished breath sounds at the base.  Abdominal:  Soft.  Bowel sounds are normal.  No distension and no tenderness.   Musculoskeletal:  No edema, no tenderness, and no deformity.    Neurological:  Alert and oriented to person, place, and time.  No cranial nerve deficit.   Skin:  Skin is warm and dry. No rash or lesion noted. No pallor.   Peripheral vascular:  Pulses in all 4 extremities with no clubbing, no cyanosis, no edema.  Psychiatric: Appropriate mood and affect, pleasant.   ----------------------------------------------------------------------------------------------------------------------  Tele:    ----------------------------------------------------------------------------------------------------------------------  Results from last 7 days   Lab Units 10/04/21  1042 10/03/21  2305 10/03/21  1213 10/03/21  0023 10/02/21  0859 10/02/21  0551 10/02/21  0551 09/29/21  2326 09/29/21  0805   CRP mg/dL  --  6.88*  --  1.83*  --   --  0.68*   < >  --    WBC 10*3/mm3  --  33.74* 25.28*  --   --   --  27.15*   < > 7.72    HEMOGLOBIN g/dL  --  7.6* 6.3*  --  7.6*   < > 7.1*   < > 13.4   HEMATOCRIT %  --  23.7* 20.0*  --  24.1*   < > 22.6*   < > 40.9   MCV fL  --  90.5 92.2  --   --   --  89.7   < > 86.8   MCHC g/dL  --  32.1 31.5  --   --   --  31.4*   < > 32.8   PLATELETS 10*3/mm3  --  350 300  --   --   --  363   < > 245   INR  1.83*  --   --   --   --   --   --   --  1.00    < > = values in this interval not displayed.         Results from last 7 days   Lab Units 10/03/21  2305 10/03/21  0023 10/02/21  0551 10/01/21  0507 10/01/21  0507   SODIUM mmol/L 134* 134* 133*   < > 135*   POTASSIUM mmol/L 4.4 4.2 4.4   < > 4.6   MAGNESIUM mg/dL 1.9 2.3 1.6  --   --    CHLORIDE mmol/L 96* 95* 94*   < > 98   CO2 mmol/L 31.1* 37.8* 31.5*   < > 33.3*   BUN mg/dL 20 15 16   < > 17   CREATININE mg/dL 0.45* 0.31* 0.40*   < > 0.40*   EGFR IF NONAFRICN AM mL/min/1.73 135 >150 >150   < > >150   CALCIUM mg/dL 8.1* 7.7* 7.8*   < > 7.9*   GLUCOSE mg/dL 165* 107* 111*   < > 100*   ALBUMIN g/dL 2.49*  --  2.25*  --  2.31*   BILIRUBIN mg/dL 0.4  --  <0.2  --  <0.2   ALK PHOS U/L 53  --  47  --  49   AST (SGOT) U/L 16  --  15  --  11   ALT (SGPT) U/L 10  --  9  --  8    < > = values in this interval not displayed.   Estimated Creatinine Clearance: 39.8 mL/min (A) (by C-G formula based on SCr of 0.45 mg/dL (L)).  No results found for: AMMONIA      Results from last 7 days   Lab Units 10/02/21  0551   PROBNP pg/mL 890.1         Glucose   Date/Time Value Ref Range Status   10/04/2021 1636 148 (H) 70 - 130 mg/dL Final     Comment:     Meter: [id number] : [id number] bautista helms   10/04/2021 1104 135 (H) 70 - 130 mg/dL Final     Comment:     Meter: [id number] : [id number] bautista helms   10/04/2021 0645 135 (H) 70 - 130 mg/dL Final     Comment:     Meter: [id number] : [id number] Ethan Osuna   10/03/2021 2030 147 (H) 70 - 130 mg/dL Final     Comment:     Meter: [id number] : [id number] HARDEEP CARR   10/03/2021 1604 97 70 - 130 mg/dL Final      Comment:     Meter: [id number] : [id number] Sean Burnham   10/03/2021 1212 93 70 - 130 mg/dL Final     Comment:     Meter: [id number] : [id number] Sean Burnham   10/03/2021 0835 102 70 - 130 mg/dL Final     Comment:     Meter: [id number] : [id number] Sean Burnham   10/03/2021 0006 107 70 - 130 mg/dL Final     Comment:     Meter: [id number] : [id number] DALTON ABDOUL     Lab Results   Component Value Date    TSH 44.500 (H) 10/03/2021    FREET4 0.24 (L) 10/02/2021     No results found for: PREGTESTUR, PREGSERUM, HCG, HCGQUANT  Pain Management Panel     Pain Management Panel Latest Ref Rng & Units 9/21/2021 8/13/2020    AMPHETAMINES SCREEN, URINE Negative Negative Negative    BARBITURATES SCREEN Negative Negative Negative    BENZODIAZEPINE SCREEN, URINE Negative Negative Negative    BUPRENORPHINEUR Negative Negative Negative    COCAINE SCREEN, URINE Negative Negative Negative    METHADONE SCREEN, URINE Negative Negative Negative    METHAMPHETAMINEUR Negative Negative -        Brief Urine Lab Results  (Last result in the past 365 days)      Color   Clarity   Blood   Leuk Est   Nitrite   Protein   CREAT   Urine HCG        09/21/21 0404 Yellow Cloudy Moderate (2+) Small (1+) Negative 30 mg/dL (1+)             No results found for: BLOODCX  No results found for: URINECX  No results found for: WOUNDCX  No results found for: STOOLCX  No results found for: RESPCX  No results found for: AFBCX  Results from last 7 days   Lab Units 10/03/21  2305 10/03/21  0023 10/02/21  0551 09/29/21  2326   CRP mg/dL 6.88* 1.83* 0.68* 0.97*       I have personally looked at the labs and they are summarized above.  ----------------------------------------------------------------------------------------------------------------------  Detailed radiology reports for the last 24 hours:    Imaging Results (Last 24 Hours)     ** No results found for the last 24 hours. **        Assessment & Plan      Acute on  chronic anemia    -Patient with declining hemoglobin over the weekend when initiated on Eliquis and required transfusion of 1 unit.  Globin remained stable, will attempt to reinitiate patient on heparin and then transition to oral anticoagulant again as patient has had no clinical evidence of GI bleeding.    -If patient is unable to tolerate anticoagulation for her DVT however the only other option will be IVC filter or watchman device    -Patient has undergone endoscopies with no source of bleeding found during this hospitalization.    -Continue PPI    Colitis    -Finishing course of Omnicef and Flagyl.  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    -If white count trending further upward tomorrow or patient starts complaining of abdominal pain will consider actually giving fluids.    Left popliteal DVT    -Attending heparin GTT as above, if unable to tolerate will need to discuss IVC filter placement.    Covid pneumonia    -Completed course of dexamethasone and Remdesivir    Hypothyroidism    -Continue low-dose Synthroid, new diagnosis and initiated at this hospitalization    Atrial fibrillation    -Currently rate control and maintaining sinus rhythm on amiodarone.    CAD s/p PCI    -Continue aspirin, statin, ACE    H. pylori positive  Gastritis    -Continue PPI, recommend initiation of therapy outpatient.      VTE Prophylaxis:   Mechanical Order History:      Ordered        09/24/21 1223  Place Sequential Compression Device  Once         09/24/21 1223  Maintain Sequential Compression Device  Continuous                 Pharmalogical Order History:      Ordered     Dose Route Frequency Stop    10/04/21 0942  heparin (porcine) 5000 UNIT/ML injection 2,500  Units      60 Units/kg IV Once 10/04/21 1302    10/04/21 0942  heparin 23422 units/250 mL (100 units/mL) in 0.45 % NaCl infusion  5.05 mL/hr      12 Units/kg/hr IV Titrated --    10/04/21 0942  heparin (porcine) 5000 UNIT/ML injection 1,300 Units      30 Units/kg IV As Needed --    10/04/21 0942  heparin (porcine) 5000 UNIT/ML injection 2,500 Units      60 Units/kg IV As Needed --    10/01/21 1616  apixaban (ELIQUIS) tablet 5 mg  Status:  Discontinued      5 mg PO Every 12 Hours Scheduled 10/03/21 1322    09/29/21 0748  heparin 28972 units/250 mL (100 units/mL) in 0.45 % NaCl infusion  5.05 mL/hr,   Status:  Discontinued      12 Units/kg/hr IV Titrated 10/01/21 1616    09/29/21 0748  heparin (porcine) 5000 UNIT/ML injection 2,500 Units  Status:  Discontinued      60 Units/kg IV As Needed 10/01/21 1616    09/29/21 0748  heparin (porcine) 5000 UNIT/ML injection 1,300 Units  Status:  Discontinued      30 Units/kg IV As Needed 10/01/21 1616    09/22/21 1922  enoxaparin (LOVENOX) syringe 40 mg  Status:  Discontinued      40 mg SC 2 Times Daily 09/22/21 2103 09/22/21 1908  Pharmacy to Dose enoxaparin (LOVENOX)  Status:  Discontinued      -- XX Continuous PRN 09/24/21 1223    09/21/21 1943  enoxaparin (LOVENOX) syringe 40 mg  Status:  Discontinued      40 mg SC Every 24 Hours 09/22/21 1908    09/21/21 1937  Pharmacy to Dose enoxaparin (LOVENOX)  Status:  Discontinued     Question:  Indication of use  Answer:  Prophylaxis    -- XX Continuous PRN 09/21/21 1943                Disposition pending clinical course, but likely return to SNF when stable    Yariel Shaw DO  [facility]  10/04/21  19:50 EDT

## 2021-10-05 ENCOUNTER — APPOINTMENT (OUTPATIENT)
Dept: ULTRASOUND IMAGING | Facility: HOSPITAL | Age: 76
End: 2021-10-05

## 2021-10-05 LAB
ANION GAP SERPL CALCULATED.3IONS-SCNC: 5.3 MMOL/L (ref 5–15)
ANISOCYTOSIS BLD QL: ABNORMAL
BUN SERPL-MCNC: 20 MG/DL (ref 8–23)
BUN/CREAT SERPL: 47.6 (ref 7–25)
CALCIUM SPEC-SCNC: 7.7 MG/DL (ref 8.6–10.5)
CHLORIDE SERPL-SCNC: 96 MMOL/L (ref 98–107)
CO2 SERPL-SCNC: 32.7 MMOL/L (ref 22–29)
CREAT SERPL-MCNC: 0.42 MG/DL (ref 0.57–1)
DEPRECATED RDW RBC AUTO: 55.4 FL (ref 37–54)
ERYTHROCYTE [DISTWIDTH] IN BLOOD BY AUTOMATED COUNT: 17.2 % (ref 12.3–15.4)
GFR SERPL CREATININE-BSD FRML MDRD: 147 ML/MIN/1.73
GLUCOSE BLDC GLUCOMTR-MCNC: 124 MG/DL (ref 70–130)
GLUCOSE BLDC GLUCOMTR-MCNC: 125 MG/DL (ref 70–130)
GLUCOSE BLDC GLUCOMTR-MCNC: 132 MG/DL (ref 70–130)
GLUCOSE BLDC GLUCOMTR-MCNC: 99 MG/DL (ref 70–130)
GLUCOSE SERPL-MCNC: 135 MG/DL (ref 65–99)
HCT VFR BLD AUTO: 18.1 % (ref 34–46.6)
HCT VFR BLD AUTO: 27.8 % (ref 34–46.6)
HGB BLD-MCNC: 5.8 G/DL (ref 12–15.9)
HGB BLD-MCNC: 9.3 G/DL (ref 12–15.9)
HYPOCHROMIA BLD QL: ABNORMAL
LYMPHOCYTES # BLD MANUAL: 1.82 10*3/MM3 (ref 0.7–3.1)
LYMPHOCYTES NFR BLD MANUAL: 4 % (ref 19.6–45.3)
LYMPHOCYTES NFR BLD MANUAL: 5 % (ref 5–12)
MAGNESIUM SERPL-MCNC: 2.2 MG/DL (ref 1.6–2.4)
MCH RBC QN AUTO: 29.6 PG (ref 26.6–33)
MCHC RBC AUTO-ENTMCNC: 32 G/DL (ref 31.5–35.7)
MCV RBC AUTO: 92.3 FL (ref 79–97)
MONOCYTES # BLD AUTO: 2.28 10*3/MM3 (ref 0.1–0.9)
NEUTROPHILS # BLD AUTO: 41.41 10*3/MM3 (ref 1.7–7)
NEUTROPHILS NFR BLD MANUAL: 80 % (ref 42.7–76)
NEUTS BAND NFR BLD MANUAL: 11 % (ref 0–5)
PLAT MORPH BLD: NORMAL
PLATELET # BLD AUTO: 301 10*3/MM3 (ref 140–450)
PMV BLD AUTO: 10.5 FL (ref 6–12)
POTASSIUM SERPL-SCNC: 4.4 MMOL/L (ref 3.5–5.2)
RBC # BLD AUTO: 1.96 10*6/MM3 (ref 3.77–5.28)
SODIUM SERPL-SCNC: 134 MMOL/L (ref 136–145)
WBC # BLD AUTO: 45.51 10*3/MM3 (ref 3.4–10.8)

## 2021-10-05 PROCEDURE — 83735 ASSAY OF MAGNESIUM: CPT | Performed by: INTERNAL MEDICINE

## 2021-10-05 PROCEDURE — 99231 SBSQ HOSP IP/OBS SF/LOW 25: CPT | Performed by: INTERNAL MEDICINE

## 2021-10-05 PROCEDURE — 85014 HEMATOCRIT: CPT | Performed by: STUDENT IN AN ORGANIZED HEALTH CARE EDUCATION/TRAINING PROGRAM

## 2021-10-05 PROCEDURE — P9016 RBC LEUKOCYTES REDUCED: HCPCS

## 2021-10-05 PROCEDURE — 36430 TRANSFUSION BLD/BLD COMPNT: CPT

## 2021-10-05 PROCEDURE — 99233 SBSQ HOSP IP/OBS HIGH 50: CPT | Performed by: STUDENT IN AN ORGANIZED HEALTH CARE EDUCATION/TRAINING PROGRAM

## 2021-10-05 PROCEDURE — 82962 GLUCOSE BLOOD TEST: CPT

## 2021-10-05 PROCEDURE — 85025 COMPLETE CBC W/AUTO DIFF WBC: CPT | Performed by: PHYSICIAN ASSISTANT

## 2021-10-05 PROCEDURE — 94799 UNLISTED PULMONARY SVC/PX: CPT

## 2021-10-05 PROCEDURE — 85007 BL SMEAR W/DIFF WBC COUNT: CPT | Performed by: PHYSICIAN ASSISTANT

## 2021-10-05 PROCEDURE — 63710000001 ONDANSETRON PER 8 MG: Performed by: SURGERY

## 2021-10-05 PROCEDURE — 86900 BLOOD TYPING SEROLOGIC ABO: CPT

## 2021-10-05 PROCEDURE — 80048 BASIC METABOLIC PNL TOTAL CA: CPT | Performed by: STUDENT IN AN ORGANIZED HEALTH CARE EDUCATION/TRAINING PROGRAM

## 2021-10-05 PROCEDURE — 85018 HEMOGLOBIN: CPT | Performed by: STUDENT IN AN ORGANIZED HEALTH CARE EDUCATION/TRAINING PROGRAM

## 2021-10-05 RX ADMIN — BACLOFEN 10 MG: 10 TABLET ORAL at 21:02

## 2021-10-05 RX ADMIN — TRAZODONE HYDROCHLORIDE 50 MG: 50 TABLET ORAL at 21:02

## 2021-10-05 RX ADMIN — LEVOTHYROXINE SODIUM 25 MCG: 25 TABLET ORAL at 06:02

## 2021-10-05 RX ADMIN — ONDANSETRON HYDROCHLORIDE 4 MG: 4 TABLET, FILM COATED ORAL at 05:24

## 2021-10-05 RX ADMIN — CEFDINIR 300 MG: 300 CAPSULE ORAL at 07:53

## 2021-10-05 RX ADMIN — CEFDINIR 300 MG: 300 CAPSULE ORAL at 21:02

## 2021-10-05 RX ADMIN — AMIODARONE HYDROCHLORIDE 400 MG: 200 TABLET ORAL at 07:53

## 2021-10-05 RX ADMIN — ATORVASTATIN CALCIUM 20 MG: 40 TABLET, FILM COATED ORAL at 21:02

## 2021-10-05 NOTE — PLAN OF CARE
Goal Outcome Evaluation:           Progress: no change  Outcome Summary: Pt resting in bed at this time. Had one dark colored BM with small amount of blood visable. PA notified and orders were placed. No complaints at this time.

## 2021-10-05 NOTE — PROGRESS NOTES
Antimicrobial Length of Therapy:    Day 4 of 7 cefdinir    Thank you.  aMjo Velazco, Pharm.D.  10/5/2021  14:50 EDT

## 2021-10-05 NOTE — PLAN OF CARE
Goal Outcome Evaluation:           Progress: no change  Outcome Summary: Pt resting in bed. No complaints of pain noted. Pt received 2 units PRBC today, tolerated well. No other acute changes noted.

## 2021-10-05 NOTE — PROGRESS NOTES
LOS: 14 days     Name: Mary yL  Age/Sex: 76 y.o. female  :  1945        PCP: Leta Gardner MD  REF: No Known Provider    Principal Problem:    Pneumonia due to COVID-19 virus  Active Problems:    Upper GI bleed      Reason for follow-up: Atrial fibrillation     Subjective       Subjective     Mary Ly is a 76 year old female with a past medical history significant for atrial fibrillation, COPD, hyperlipidemia and diabetes mellitus type 2. Presented to the ED with shortness of breath and was found to have COVID-19 pneumonia. Cardiology was consulted for atrial fibrillation in the setting of GI bleed    Interval History: Patient was started back on IV heparin yesterday but noted to have melanotic stools overnight. Hemoglobin 5.8 and she is receiving 2 units of PRBCs. Remains in normal sinus rhythm on PO amiodarone.     Vital Signs  Temp:  [96.5 °F (35.8 °C)-97.9 °F (36.6 °C)] 96.5 °F (35.8 °C)  Heart Rate:  [73-90] 74  Resp:  [14-21] 14  BP: ()/(48-72) 150/72     Vital Signs (last 72 hrs)       10/02 0700  -  10/03 0659 10/03 0700  -  10/04 0659 10/04 07  -  10/05 0659 10/05 07  -  10/05 0948   Most Recent    Temp (°F)   97.4    96.4 -  98    96.8 -  97.9      96.5     96.5 (35.8)    Heart Rate   84    64 -  96    73 -  90      74     74    Resp   18    16 -  18    14 -  21      14     14    BP   126/68    108/52 -  156/92    92/62 -  124/72      150/72     150/72    SpO2 (%) 94 -  98    91 -  99    94 -  99      99     99        Documented weights    21 0116 21 1930   Weight: 49 kg (108 lb) 42.1 kg (92 lb 12.8 oz)      Body mass index is 14.98 kg/m².    Intake/Output Summary (Last 24 hours) at 10/5/2021 0948  Last data filed at 10/5/2021 0725  Gross per 24 hour   Intake 1431.97 ml   Output --   Net 1431.97 ml     Objective    Objective       Physical Exam:     General Appearance:    Sleepy but arousable to voice, cooperative, in no acute distress   Head:     Normocephalic, without obvious abnormality, atraumatic   Eyes:            Conjunctivae and sclerae normal, no   icterus, no pallor, corneas clear.       Lungs:     respirations regular, even and   unlabored    Heart:    Regular rhythm and normal rate   Chest Wall:    No abnormalities observed       Extremities:   Moves all extremities well, no edema, no cyanosis, no             redness               Neurologic:   Sleepy but arousable to voice    Physical examination limited secondary to COVID-19  Results review       Results Review:   Results from last 7 days   Lab Units 10/05/21  0258 10/03/21  2305 10/03/21  1213 10/02/21  0859 10/02/21  0551 10/01/21  0507 09/30/21  0526 09/29/21  2326 09/29/21  2326 09/29/21  0805 09/29/21  0805   WBC 10*3/mm3 45.51* 33.74* 25.28*  --  27.15* 19.73*  --   --  12.24*  --  7.72   HEMOGLOBIN g/dL 5.8* 7.6* 6.3* 7.6* 7.1* 7.8* 7.6*   < > 7.7*   < > 13.4   PLATELETS 10*3/mm3 301 350 300  --  363 225  --   --  302  --  245    < > = values in this interval not displayed.     Results from last 7 days   Lab Units 10/05/21  0258 10/03/21  2305 10/03/21  0023 10/02/21  0551 10/01/21  0507 09/29/21  2326 09/29/21  0357   SODIUM mmol/L 134* 134* 134* 133* 135* 136 130*   POTASSIUM mmol/L 4.4 4.4 4.2 4.4 4.6 4.5 4.5   CHLORIDE mmol/L 96* 96* 95* 94* 98 100 98   CO2 mmol/L 32.7* 31.1* 37.8* 31.5* 33.3* 31.0* 26.9   BUN mg/dL 20 20 15 16 17 13 10   CREATININE mg/dL 0.42* 0.45* 0.31* 0.40* 0.40* 0.39* 0.32*   CALCIUM mg/dL 7.7* 8.1* 7.7* 7.8* 7.9* 7.4* 7.2*   GLUCOSE mg/dL 135* 165* 107* 111* 100* 144* 113*   ALT (SGPT) U/L  --  10  --  9 8 10  --    AST (SGOT) U/L  --  16  --  15 11 10  --          Lab Results   Component Value Date    INR 1.83 (H) 10/04/2021    INR 1.00 09/29/2021    INR 1.13 (H) 09/26/2021    INR 1.08 09/24/2021    INR 1.04 09/22/2021    INR 0.99 09/21/2021    INR 1.26 (H) 05/22/2021     Lab Results   Component Value Date    MG 2.2 10/05/2021    MG 1.9 10/03/2021    MG 2.3  10/03/2021     Lab Results   Component Value Date    TSH 44.500 (H) 10/03/2021    TRIG 100 09/22/2021      Imaging Results (Last 48 Hours)     ** No results found for the last 48 hours. **        Echo   Results for orders placed during the hospital encounter of 04/22/21    Adult Transthoracic Echo Complete W/ Cont if Necessary Per Protocol    Interpretation Summary  · Normal left ventricular cavity size noted. Left ventricular wall thickness is consistent with mild concentric hypertrophy  · Left ventricular ejection fraction appears to be 61 - 65%.  · Left ventricular diastolic function is consistent with (grade I) impaired relaxation.  · There is mild calcification of the aortic valvular cusps.  · No aortic valve regurgitation is present. Mild aortic valve stenosis is present.  · There are myxomatous changes of the mitral valve apparatus present. Mild mitral valve regurgitation is present. No significant mitral valve stenosis is present.  · . There is no evidence of pericardial effusion.     I reviewed the patient's new clinical results.    Telemetry: NSR 70's      Medication Review:   amiodarone, 400 mg, Oral, Q24H  atorvastatin, 20 mg, Oral, Nightly  baclofen, 10 mg, Oral, Nightly  cefdinir, 300 mg, Oral, Q12H  insulin aspart, 0-7 Units, Subcutaneous, TID AC  levothyroxine, 25 mcg, Oral, Q AM  nicotine, 1 patch, Transdermal, Q24H  pantoprazole, 40 mg, Oral, Q AM  sodium chloride, 10 mL, Intravenous, Q12H  traZODone, 50 mg, Oral, Nightly             Assessment      Assessment:  Paroxysmal atrial fibrillation, maintaining sinus rhythm with amiodarone.  Recurrent GI bleed requiring discontinuation of anticoagulation and packed RBC transfusion.  Recent DVT.    Plan     Recommendations:  Continue with amiodarone.  Not able to anticoagulate her due to recurrent GI bleed with severe anemia.  She is obviously at risk for stroke without anticoagulation but at this time the potential risks of anticoagulation outweigh the  potential benefits.      Electronically signed by QUIQUE Solitario, 10/05/21, 9:48 AM EDT.    Electronically signed by Tremaine Rust MD, 10/05/21, 5:02 PM EDT.    Please note that portions of this note were completed with a voice recognition program.

## 2021-10-05 NOTE — PROGRESS NOTES
[facility] HOSPITALIST PROGRESS NOTE     Patient Identification:  Name:  Mary Ly  Age:  76 y.o.  Sex:  female  :  1945  MRN:  [id number]  Visit Number:  [id number]  ROOM: [address] [place] [address]     Primary Care Provider:  Leta Gardnre MD    Length of stay in inpatient status:  14    Subjective     Chief Compliant:    Chief Complaint   Patient presents with   • Shortness of Breath       History of Presenting Illness: Patient seen and evaluated in follow-up for recurrent anemia, colitis, left popliteal DVT as well as recent Covid pneumonia.  Patient today at time of examination denies any acute complaints. Hemoglobin improved after transfusion.      Objective     Current Hospital Meds:amiodarone, 400 mg, Oral, Q24H  atorvastatin, 20 mg, Oral, Nightly  baclofen, 10 mg, Oral, Nightly  cefdinir, 300 mg, Oral, Q12H  insulin aspart, 0-7 Units, Subcutaneous, TID AC  levothyroxine, 25 mcg, Oral, Q AM  nicotine, 1 patch, Transdermal, Q24H  pantoprazole, 40 mg, Oral, Q AM  sodium chloride, 10 mL, Intravenous, Q12H  traZODone, 50 mg, Oral, Nightly         Current Antimicrobial Therapy:  Anti-Infectives (From admission, onward)    Ordered     Dose/Rate Route Frequency Start Stop    10/02/21 0901  cefdinir (OMNICEF) capsule 300 mg     Majo Velazco, PharmD reviewed the order on 10/04/21 1658.   Ordering Provider: Austyn López MD    300 mg Oral Every 12 Hours Scheduled 10/02/21 1030 10/09/21 0859    21 0911  metroNIDAZOLE (FLAGYL) tablet 500 mg     Majo Velazco, PharmD reviewed the order on 21 1219.   Ordering Provider: Lonnie Meneses MD    500 mg Oral Every 8 Hours Scheduled 21 1100 10/04/21 0524    21 1856  remdesivir 100 mg in 270 mL NS     Ordering Provider: Eliseo Higgins MD    100 mg  over 60 Minutes Intravenous Every 24 Hours 21 2000 21 2231    21 1856  remdesivir 200 mg in 290 mL NS     Ordering Provider: Eliseo Higgins MD    200 mg  over 60  Minutes Intravenous Every 24 Hours 09/22/21 2000 09/22/21 2301 09/21/21 0802  vancomycin 1 g/250 mL 0.9% NS (vial-mate)     Ordering Provider: Erasmo Singleton MD    20 mg/kg × 49 kg  over 1 Hours Intravenous Once 09/21/21 0830 09/21/21 1356    09/21/21 0802  piperacillin-tazobactam (ZOSYN) IVPB 4.5 g in 100 mL NS VTB     Ordering Provider: Erasmo Singleton MD    4.5 g  over 30 Minutes Intravenous Once 09/21/21 0804 09/21/21 0929        Current Diuretic Therapy:  Diuretics (From admission, onward)    Ordered     Dose/Rate Route Frequency Start Stop    10/01/21 1716  bumetanide (BUMEX) injection 0.5 mg     Ordering Provider: Austyn López MD    0.5 mg Intravenous Once 10/01/21 1800 10/01/21 1741    09/30/21 1700  bumetanide (BUMEX) injection 0.5 mg     Ordering Provider: Austyn López MD    0.5 mg Intravenous Once 09/30/21 1900 09/30/21 2056    09/29/21 1313  bumetanide (BUMEX) injection 0.5 mg     Ordering Provider: Austyn López MD    0.5 mg Intravenous Once 09/29/21 1500 09/29/21 1539        ----------------------------------------------------------------------------------------------------------------------  Vital Signs:  Temp:  [96.5 °F (35.8 °C)-98.5 °F (36.9 °C)] 97.6 °F (36.4 °C)  Heart Rate:  [73-90] 73  Resp:  [14-21] 20  BP: (102-169)/(48-75) 169/75  SpO2:  [94 %-99 %] 98 %  on  Flow (L/min):  [1] 1;   Device (Oxygen Therapy): nasal cannula  Body mass index is 14.98 kg/m².    Wt Readings from Last 3 Encounters:   09/21/21 42.1 kg (92 lb 12.8 oz)   08/20/21 50.8 kg (112 lb)   06/01/21 49.1 kg (108 lb 4.8 oz)     Intake & Output (last 3 days)       10/03 0701 - 10/04 0700 10/04 0701 - 10/05 0700 10/05 0701 - 10/06 0700    P.O. 360 570 200    I.V. (mL/kg)  592 (14.1)     Blood 300  600    Total Intake(mL/kg) 660 (15.7) 1162 (27.6) 800 (19)    Net +660 +1162 +800           Urine Unmeasured Occurrence 4 x 4 x 2 x    Stool Unmeasured Occurrence 1 x 3 x 1 x        Diet Soft Texture;  Ground  ----------------------------------------------------------------------------------------------------------------------  Physical exam:  Constitutional: Elderly, frail, chronically ill-appearing elderly female laying in bed, NAD   HENT:  Head:  Normocephalic and atraumatic.  Mouth:  Moist mucous membranes.    Eyes:  Conjunctivae and EOM are normal. No scleral icterus.    Neck:  Neck supple.  No JVD present.    Cardiovascular:  Normal rate, regular rhythm and normal heart sounds with no murmur.  Pulmonary/Chest:  No respiratory distress, no wheezes, no crackles, with diminished breath sounds at the base.  Abdominal:  Soft.  Bowel sounds are normal.  No distension and no tenderness.   Musculoskeletal:  No edema, no tenderness, and no deformity.    Neurological:  Alert and oriented to person, place, and time.  No cranial nerve deficit.   Skin:  Skin is warm and dry. No rash or lesion noted. No pallor.   Peripheral vascular:  Pulses in all 4 extremities with no clubbing, no cyanosis, no edema.  Psychiatric: Appropriate mood and affect, pleasant.   ----------------------------------------------------------------------------------------------------------------------  Tele:    ----------------------------------------------------------------------------------------------------------------------  Results from last 7 days   Lab Units 10/05/21  1627 10/05/21  0258 10/04/21  1042 10/03/21  2305 10/03/21  1213 10/03/21  1213 10/03/21  0023 10/02/21  0859 10/02/21  0551 10/02/21  0551 09/29/21  2326 09/29/21  0805   CRP mg/dL  --   --   --  6.88*  --   --  1.83*  --   --  0.68*   < >  --    WBC 10*3/mm3  --  45.51*  --  33.74*  --  25.28*  --   --    < > 27.15*   < > 7.72   HEMOGLOBIN g/dL 9.3* 5.8*  --  7.6*   < > 6.3*  --    < >   < > 7.1*   < > 13.4   HEMATOCRIT % 27.8* 18.1*  --  23.7*   < > 20.0*  --    < >   < > 22.6*   < > 40.9   MCV fL  --  92.3  --  90.5  --  92.2  --   --    < > 89.7   < > 86.8   MCHC g/dL  --   32.0  --  32.1  --  31.5  --   --    < > 31.4*   < > 32.8   PLATELETS 10*3/mm3  --  301  --  350  --  300  --   --    < > 363   < > 245   INR   --   --  1.83*  --   --   --   --   --   --   --   --  1.00    < > = values in this interval not displayed.         Results from last 7 days   Lab Units 10/05/21  0258 10/03/21  2305 10/03/21  0023 10/02/21  0551 10/02/21  0551 10/01/21  0507 10/01/21  0507   SODIUM mmol/L 134* 134* 134*   < > 133*   < > 135*   POTASSIUM mmol/L 4.4 4.4 4.2   < > 4.4   < > 4.6   MAGNESIUM mg/dL 2.2 1.9 2.3   < > 1.6  --   --    CHLORIDE mmol/L 96* 96* 95*   < > 94*   < > 98   CO2 mmol/L 32.7* 31.1* 37.8*   < > 31.5*   < > 33.3*   BUN mg/dL 20 20 15   < > 16   < > 17   CREATININE mg/dL 0.42* 0.45* 0.31*   < > 0.40*   < > 0.40*   EGFR IF NONAFRICN AM mL/min/1.73 147 135 >150   < > >150   < > >150   CALCIUM mg/dL 7.7* 8.1* 7.7*   < > 7.8*   < > 7.9*   GLUCOSE mg/dL 135* 165* 107*   < > 111*   < > 100*   ALBUMIN g/dL  --  2.49*  --   --  2.25*  --  2.31*   BILIRUBIN mg/dL  --  0.4  --   --  <0.2  --  <0.2   ALK PHOS U/L  --  53  --   --  47  --  49   AST (SGOT) U/L  --  16  --   --  15  --  11   ALT (SGPT) U/L  --  10  --   --  9  --  8    < > = values in this interval not displayed.   Estimated Creatinine Clearance: 39.8 mL/min (A) (by C-G formula based on SCr of 0.42 mg/dL (L)).  No results found for: AMMONIA      Results from last 7 days   Lab Units 10/02/21  0551   PROBNP pg/mL 890.1         Glucose   Date/Time Value Ref Range Status   10/05/2021 1723 124 70 - 130 mg/dL Final     Comment:     Meter: [id number] : [id number] Ethan Osuna   10/05/2021 1057 132 (H) 70 - 130 mg/dL Final     Comment:     Meter: [id number] : [id number] bautista helms   10/05/2021 0702 125 70 - 130 mg/dL Final     Comment:     Meter: [id number] : [id number] Ethan Osuna   10/04/2021 2045 129 70 - 130 mg/dL Final     Comment:     Meter: [id number] : [id number] LO BENAVIDEZ   10/04/2021 1636 148 (H)  70 - 130 mg/dL Final     Comment:     Meter: [id number] : [id number] bautista helms   10/04/2021 1104 135 (H) 70 - 130 mg/dL Final     Comment:     Meter: [id number] : [id number] bautista helms   10/04/2021 0645 135 (H) 70 - 130 mg/dL Final     Comment:     Meter: [id number] : [id number] Ethan Osuna   10/03/2021 2030 147 (H) 70 - 130 mg/dL Final     Comment:     Meter: [id number] : [id number] HARDEEP CARR     Lab Results   Component Value Date    TSH 44.500 (H) 10/03/2021    FREET4 0.24 (L) 10/02/2021     No results found for: PREGTESTUR, PREGSERUM, HCG, HCGQUANT  Pain Management Panel     Pain Management Panel Latest Ref Rng & Units 9/21/2021 8/13/2020    AMPHETAMINES SCREEN, URINE Negative Negative Negative    BARBITURATES SCREEN Negative Negative Negative    BENZODIAZEPINE SCREEN, URINE Negative Negative Negative    BUPRENORPHINEUR Negative Negative Negative    COCAINE SCREEN, URINE Negative Negative Negative    METHADONE SCREEN, URINE Negative Negative Negative    METHAMPHETAMINEUR Negative Negative -        Brief Urine Lab Results  (Last result in the past 365 days)      Color   Clarity   Blood   Leuk Est   Nitrite   Protein   CREAT   Urine HCG        09/21/21 0404 Yellow Cloudy Moderate (2+) Small (1+) Negative 30 mg/dL (1+)             No results found for: BLOODCX  No results found for: URINECX  No results found for: WOUNDCX  No results found for: STOOLCX  No results found for: RESPCX  No results found for: AFBCX  Results from last 7 days   Lab Units 10/03/21  2305 10/03/21  0023 10/02/21  0551 09/29/21  2326   CRP mg/dL 6.88* 1.83* 0.68* 0.97*       I have personally looked at the labs and they are summarized above.  ----------------------------------------------------------------------------------------------------------------------  Detailed radiology reports for the last 24 hours:    Imaging Results (Last 24 Hours)     ** No results found for the last 24 hours. **        Assessment &  Plan      Acute on chronic anemia    -Patient with declining hemoglobin over the weekend when initiated on Eliquis and required transfusion of 1 unit.  Attempted to place back on heparin but patient with recurrent melena today and heparin stopped and aspirin held as well    -Due to inability to tolerate anticoagulation, possible DVT patient may need IVC     -Patient has undergone endoscopies with no source of bleeding found during this hospitalization.    -Continue PPI    Colitis  Leukocytosis  Chronic Mesenteric Ischemia    -Finishing course of Omnicef and Flagyl.  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    -Leukocytosis likely related to anemia and reduced intravascular volume status given her past recurrent leukocytosis when having acute on chronic mesenteric ischemia in the past    -Repeat CBC in am now that she has had transfusion and hgb much improved.    Left popliteal DVT    - heparin GTT held secondary to melena this AM, consideration for IVC filter as above    -Probable DVT noted on recent duplex, given complicated situation and questionable actual DVT will repeat Duplex before proceeding with IVC filter     Covid pneumonia    -Completed course of dexamethasone and Remdesivir    Hypothyroidism    -Continue low-dose Synthroid, new diagnosis and initiated at this hospitalization    Atrial fibrillation    -Currently rate control and maintaining sinus rhythm on amiodarone.    CAD s/p PCI    -Continue aspirin, statin, ACE    H. pylori positive  Gastritis    -Continue PPI, recommend initiation of therapy outpatient.      VTE Prophylaxis:   Mechanical Order History:      Ordered        09/24/21 1223  Place Sequential Compression Device  Once          09/24/21 1223  Maintain Sequential Compression Device  Continuous                 Pharmalogical Order History:      Ordered     Dose Route Frequency Stop    10/04/21 0942  heparin (porcine) 5000 UNIT/ML injection 2,500 Units      60 Units/kg IV Once 10/04/21 1302    10/04/21 0942  heparin 08573 units/250 mL (100 units/mL) in 0.45 % NaCl infusion  5.05 mL/hr      12 Units/kg/hr IV Titrated --    10/04/21 0942  heparin (porcine) 5000 UNIT/ML injection 1,300 Units      30 Units/kg IV As Needed --    10/04/21 0942  heparin (porcine) 5000 UNIT/ML injection 2,500 Units      60 Units/kg IV As Needed --    10/01/21 1616  apixaban (ELIQUIS) tablet 5 mg  Status:  Discontinued      5 mg PO Every 12 Hours Scheduled 10/03/21 1322    09/29/21 0748  heparin 18229 units/250 mL (100 units/mL) in 0.45 % NaCl infusion  5.05 mL/hr,   Status:  Discontinued      12 Units/kg/hr IV Titrated 10/01/21 1616    09/29/21 0748  heparin (porcine) 5000 UNIT/ML injection 2,500 Units  Status:  Discontinued      60 Units/kg IV As Needed 10/01/21 1616    09/29/21 0748  heparin (porcine) 5000 UNIT/ML injection 1,300 Units  Status:  Discontinued      30 Units/kg IV As Needed 10/01/21 1616    09/22/21 1922  enoxaparin (LOVENOX) syringe 40 mg  Status:  Discontinued      40 mg SC 2 Times Daily 09/22/21 2103 09/22/21 1908  Pharmacy to Dose enoxaparin (LOVENOX)  Status:  Discontinued      -- XX Continuous PRN 09/24/21 1223    09/21/21 1943  enoxaparin (LOVENOX) syringe 40 mg  Status:  Discontinued      40 mg SC Every 24 Hours 09/22/21 1908 09/21/21 1937  Pharmacy to Dose enoxaparin (LOVENOX)  Status:  Discontinued     Question:  Indication of use  Answer:  Prophylaxis    -- XX Continuous PRN 09/21/21 1943                Disposition pending clinical course, but likely return to [facility] when stable    Yariel Shaw DO  [facility]  10/05/21  19:19 EDT

## 2021-10-05 NOTE — PROGRESS NOTES
Notified by RN, Rhea, regarding patient's stool appearing melanotic and containing a small amount of dark red blood. Patient underwent an EGD on 09/25/21 showing some gastritis with healing ulcers in the antrum, 1 ulcer near the GE junction, and the esophagus had a small healing ulcer in the distal third. Since patient's hemoglobin had remained stable heparin gtt was reinitiated this a.m. for treatment of her LLE DVT. I have discussed with my attending, Dr. Higgins, and since patient is having recurrence of dark appearing stools we have stopped the heparin gtt. We are unable to utilize SCD's in setting of patient's LLE DVT. I have ordered a STAT CBC and we will trend H & H every 6 hours.     Update: Notified by RN that the patient's hemoglobin was 5.8, down from 7.6. I have ordered 2 U PRBC's to be transfused. Obtain repeat H & H after transfusion is complete.

## 2021-10-06 ENCOUNTER — APPOINTMENT (OUTPATIENT)
Dept: ULTRASOUND IMAGING | Facility: HOSPITAL | Age: 76
End: 2021-10-06

## 2021-10-06 LAB
ANION GAP SERPL CALCULATED.3IONS-SCNC: 4.9 MMOL/L (ref 5–15)
ANISOCYTOSIS BLD QL: ABNORMAL
BH BB BLOOD EXPIRATION DATE: NORMAL
BH BB BLOOD EXPIRATION DATE: NORMAL
BH BB BLOOD TYPE BARCODE: 7300
BH BB BLOOD TYPE BARCODE: 7300
BH BB DISPENSE STATUS: NORMAL
BH BB DISPENSE STATUS: NORMAL
BH BB PRODUCT CODE: NORMAL
BH BB PRODUCT CODE: NORMAL
BH BB UNIT NUMBER: NORMAL
BH BB UNIT NUMBER: NORMAL
BUN SERPL-MCNC: 15 MG/DL (ref 8–23)
BUN/CREAT SERPL: 45.5 (ref 7–25)
CALCIUM SPEC-SCNC: 7.6 MG/DL (ref 8.6–10.5)
CHLORIDE SERPL-SCNC: 96 MMOL/L (ref 98–107)
CO2 SERPL-SCNC: 32.1 MMOL/L (ref 22–29)
CREAT SERPL-MCNC: 0.33 MG/DL (ref 0.57–1)
CROSSMATCH INTERPRETATION: NORMAL
CROSSMATCH INTERPRETATION: NORMAL
DEPRECATED RDW RBC AUTO: 50.8 FL (ref 37–54)
ERYTHROCYTE [DISTWIDTH] IN BLOOD BY AUTOMATED COUNT: 15.6 % (ref 12.3–15.4)
GFR SERPL CREATININE-BSD FRML MDRD: >150 ML/MIN/1.73
GLUCOSE BLDC GLUCOMTR-MCNC: 120 MG/DL (ref 70–130)
GLUCOSE BLDC GLUCOMTR-MCNC: 129 MG/DL (ref 70–130)
GLUCOSE BLDC GLUCOMTR-MCNC: 79 MG/DL (ref 70–130)
GLUCOSE BLDC GLUCOMTR-MCNC: 92 MG/DL (ref 70–130)
GLUCOSE SERPL-MCNC: 70 MG/DL (ref 65–99)
HCT VFR BLD AUTO: 24 % (ref 34–46.6)
HGB BLD-MCNC: 8 G/DL (ref 12–15.9)
HYPOCHROMIA BLD QL: ABNORMAL
LYMPHOCYTES # BLD MANUAL: 0.8 10*3/MM3 (ref 0.7–3.1)
LYMPHOCYTES NFR BLD MANUAL: 3 % (ref 19.6–45.3)
LYMPHOCYTES NFR BLD MANUAL: 6 % (ref 5–12)
MCH RBC QN AUTO: 30.7 PG (ref 26.6–33)
MCHC RBC AUTO-ENTMCNC: 33.3 G/DL (ref 31.5–35.7)
MCV RBC AUTO: 92 FL (ref 79–97)
METAMYELOCYTES NFR BLD MANUAL: 1 % (ref 0–0)
MONOCYTES # BLD AUTO: 1.59 10*3/MM3 (ref 0.1–0.9)
NEUTROPHILS # BLD AUTO: 23.89 10*3/MM3 (ref 1.7–7)
NEUTROPHILS NFR BLD MANUAL: 80 % (ref 42.7–76)
NEUTS BAND NFR BLD MANUAL: 10 % (ref 0–5)
OVALOCYTES BLD QL SMEAR: ABNORMAL
PLAT MORPH BLD: NORMAL
PLATELET # BLD AUTO: 259 10*3/MM3 (ref 140–450)
PMV BLD AUTO: 11 FL (ref 6–12)
POLYCHROMASIA BLD QL SMEAR: ABNORMAL
POTASSIUM SERPL-SCNC: 3.8 MMOL/L (ref 3.5–5.2)
RBC # BLD AUTO: 2.61 10*6/MM3 (ref 3.77–5.28)
SODIUM SERPL-SCNC: 133 MMOL/L (ref 136–145)
UNIT  ABO: NORMAL
UNIT  ABO: NORMAL
UNIT  RH: NORMAL
UNIT  RH: NORMAL
WBC # BLD AUTO: 26.54 10*3/MM3 (ref 3.4–10.8)

## 2021-10-06 PROCEDURE — 94799 UNLISTED PULMONARY SVC/PX: CPT

## 2021-10-06 PROCEDURE — 80048 BASIC METABOLIC PNL TOTAL CA: CPT | Performed by: STUDENT IN AN ORGANIZED HEALTH CARE EDUCATION/TRAINING PROGRAM

## 2021-10-06 PROCEDURE — 85025 COMPLETE CBC W/AUTO DIFF WBC: CPT | Performed by: SURGERY

## 2021-10-06 PROCEDURE — 99232 SBSQ HOSP IP/OBS MODERATE 35: CPT | Performed by: SPECIALIST

## 2021-10-06 PROCEDURE — 85007 BL SMEAR W/DIFF WBC COUNT: CPT | Performed by: SURGERY

## 2021-10-06 PROCEDURE — 93970 EXTREMITY STUDY: CPT | Performed by: RADIOLOGY

## 2021-10-06 PROCEDURE — 99232 SBSQ HOSP IP/OBS MODERATE 35: CPT | Performed by: STUDENT IN AN ORGANIZED HEALTH CARE EDUCATION/TRAINING PROGRAM

## 2021-10-06 PROCEDURE — 82962 GLUCOSE BLOOD TEST: CPT

## 2021-10-06 PROCEDURE — 93970 EXTREMITY STUDY: CPT

## 2021-10-06 RX ADMIN — PANTOPRAZOLE SODIUM 40 MG: 40 TABLET, DELAYED RELEASE ORAL at 05:03

## 2021-10-06 RX ADMIN — ATORVASTATIN CALCIUM 20 MG: 40 TABLET, FILM COATED ORAL at 20:58

## 2021-10-06 RX ADMIN — LEVOTHYROXINE SODIUM 25 MCG: 25 TABLET ORAL at 05:03

## 2021-10-06 RX ADMIN — CEFDINIR 300 MG: 300 CAPSULE ORAL at 09:03

## 2021-10-06 RX ADMIN — SODIUM CHLORIDE, PRESERVATIVE FREE 10 ML: 5 INJECTION INTRAVENOUS at 20:59

## 2021-10-06 RX ADMIN — BACLOFEN 10 MG: 10 TABLET ORAL at 20:58

## 2021-10-06 RX ADMIN — CEFDINIR 300 MG: 300 CAPSULE ORAL at 20:57

## 2021-10-06 RX ADMIN — TRAZODONE HYDROCHLORIDE 50 MG: 50 TABLET ORAL at 20:58

## 2021-10-06 RX ADMIN — AMIODARONE HYDROCHLORIDE 400 MG: 200 TABLET ORAL at 09:03

## 2021-10-06 RX ADMIN — SODIUM CHLORIDE, PRESERVATIVE FREE 10 ML: 5 INJECTION INTRAVENOUS at 09:03

## 2021-10-06 NOTE — CASE MANAGEMENT/SOCIAL WORK
Discharge Planning Assessment  [facility]     Patient Name: Mary Ly  MRN: [id number]  Today's Date: 10/6/2021    Admit Date: 9/21/2021    Discharge Plan     Row Name 10/06/21 1602       Plan    Plan Pt was admitted from [facility] and she does not have a bed hold. SS discussed pt with Dr. Shaw who states pt is not medically stable at this time for discharge. Dr. Shaw anticipates pt may be ready for discharge in approximately two days. SS to follow.        Continued Care and Services - Admitted Since 9/21/2021     Destination Coordination complete.    Service Provider Request Status Selected Services Address Phone Fax Patient Preferred    [facility]   Selected Skilled Nursing [address], [facility] [address] [phone] --              SEBLE Reeder

## 2021-10-06 NOTE — PROGRESS NOTES
LOS: 15 days     Name: Mary Ly  Age/Sex: 76 y.o. female  :  1945        PCP: Leta Gardner MD  REF: No Known Provider    Principal Problem:    Pneumonia due to COVID-19 virus  Active Problems:    Upper GI bleed      Reason for follow-up: Atrial fibrillation     Subjective       Subjective     Mary Ly is a 76 year old female with a past medical history significant for atrial fibrillation, COPD, hyperlipidemia and diabetes mellitus type 2. Presented to the ED with shortness of breath and was found to have COVID-19 pneumonia. Cardiology was consulted for atrial fibrillation in the setting of GI bleed     Interval History: Patient reports she is doing well. Hemoglobin stable Remains in normal sinus rhythm. No complaints.     Vital Signs  Temp:  [97.2 °F (36.2 °C)-98.5 °F (36.9 °C)] 98.5 °F (36.9 °C)  Heart Rate:  [63-79] 64  Resp:  [14-20] 18  BP: (104-169)/(49-75) 166/52     Vital Signs (last 72 hrs)       10/03 0700  -  10/04 0659 10/04 0700  -  10/05 0659 10/05 0700  -  10/06 0659 10/06 0700  -  10/06 1125   Most Recent    Temp (°F) 96.4 -  98    96.8 -  97.9    96.5 -  98.5      98.5     98.5 (36.9)    Heart Rate 64 -  96    73 -  90    63 -  79      64     64    Resp 16 -  18    14 -  21    14 -  20      18     18    /52 -  156/92    92/62 -  124/72    104/65 -  169/75    147/49 -  166/52     166/52    SpO2 (%) 91 -  99    94 -  99    95 -  99      99     99        Documented weights    21 0116 21 1930   Weight: 49 kg (108 lb) 42.1 kg (92 lb 12.8 oz)      Body mass index is 14.98 kg/m².    Intake/Output Summary (Last 24 hours) at 10/6/2021 1125  Last data filed at 10/6/2021 0800  Gross per 24 hour   Intake 800 ml   Output --   Net 800 ml     Objective    Objective       Physical Exam:     General Appearance:    Alert, cooperative, in no acute distress   Head:    Normocephalic, without obvious abnormality, atraumatic   Eyes:            Conjunctivae and sclerae normal, no    icterus, no pallor, corneas clear.       Lungs:    respirations regular, even and  unlabored    Heart:    Regular rhythm and normal rate   Chest Wall:    No abnormalities observed       Extremities:    no edema, no cyanosis, no  redness               Neurologic:   Alert and oriented    Physical examination limited secondary to COVID-19  Results review       Results Review:   Results from last 7 days   Lab Units 10/06/21  0518 10/05/21  1627 10/05/21  0258 10/03/21  2305 10/03/21  1213 10/02/21  0859 10/02/21  0551 10/01/21  0507 10/01/21  0507 09/30/21  0526 09/29/21  2326   WBC 10*3/mm3 26.54*  --  45.51* 33.74* 25.28*  --  27.15*  --  19.73*  --  12.24*   HEMOGLOBIN g/dL 8.0* 9.3* 5.8* 7.6* 6.3* 7.6* 7.1*   < > 7.8*   < > 7.7*   PLATELETS 10*3/mm3 259  --  301 350 300  --  363  --  225  --  302    < > = values in this interval not displayed.     Results from last 7 days   Lab Units 10/06/21  0518 10/05/21  0258 10/03/21  2305 10/03/21  0023 10/02/21  0551 10/01/21  0507 09/29/21  2326   SODIUM mmol/L 133* 134* 134* 134* 133* 135* 136   POTASSIUM mmol/L 3.8 4.4 4.4 4.2 4.4 4.6 4.5   CHLORIDE mmol/L 96* 96* 96* 95* 94* 98 100   CO2 mmol/L 32.1* 32.7* 31.1* 37.8* 31.5* 33.3* 31.0*   BUN mg/dL 15 20 20 15 16 17 13   CREATININE mg/dL 0.33* 0.42* 0.45* 0.31* 0.40* 0.40* 0.39*   CALCIUM mg/dL 7.6* 7.7* 8.1* 7.7* 7.8* 7.9* 7.4*   GLUCOSE mg/dL 70 135* 165* 107* 111* 100* 144*   ALT (SGPT) U/L  --   --  10  --  9 8 10   AST (SGOT) U/L  --   --  16  --  15 11 10         Lab Results   Component Value Date    INR 1.83 (H) 10/04/2021    INR 1.00 09/29/2021    INR 1.13 (H) 09/26/2021    INR 1.08 09/24/2021    INR 1.04 09/22/2021    INR 0.99 09/21/2021    INR 1.26 (H) 05/22/2021     Lab Results   Component Value Date    MG 2.2 10/05/2021    MG 1.9 10/03/2021    MG 2.3 10/03/2021     Lab Results   Component Value Date    TSH 44.500 (H) 10/03/2021    TRIG 100 09/22/2021      Imaging Results (Last 48 Hours)     ** No results  found for the last 48 hours. **        Echo   Results for orders placed during the hospital encounter of 04/22/21    Adult Transthoracic Echo Complete W/ Cont if Necessary Per Protocol    Interpretation Summary  · Normal left ventricular cavity size noted. Left ventricular wall thickness is consistent with mild concentric hypertrophy  · Left ventricular ejection fraction appears to be 61 - 65%.  · Left ventricular diastolic function is consistent with (grade I) impaired relaxation.  · There is mild calcification of the aortic valvular cusps.  · No aortic valve regurgitation is present. Mild aortic valve stenosis is present.  · There are myxomatous changes of the mitral valve apparatus present. Mild mitral valve regurgitation is present. No significant mitral valve stenosis is present.  · . There is no evidence of pericardial effusion.     I reviewed the patient's new clinical results    Telemetry: NSR 60-70 bpm      Medication Review:   amiodarone, 400 mg, Oral, Q24H  atorvastatin, 20 mg, Oral, Nightly  baclofen, 10 mg, Oral, Nightly  cefdinir, 300 mg, Oral, Q12H  insulin aspart, 0-7 Units, Subcutaneous, TID AC  levothyroxine, 25 mcg, Oral, Q AM  nicotine, 1 patch, Transdermal, Q24H  pantoprazole, 40 mg, Oral, Q AM  sodium chloride, 10 mL, Intravenous, Q12H  traZODone, 50 mg, Oral, Nightly             Assessment      Assessment:  Paroxysmal atrial fibrillation, maintaining normal sinus rhythm  Recent GI bleed requiring discontinuation of anticoagulation and blood transfusion  Recent deep vein thrombosis        Plan     Recommendations:  Maintaining sinus rhythm continue amiodarone  Again unable to start anticoagulation because of severe anemia and GI bleed  Regarding her deep vein thrombosis patient may require IVC filter in the antrum    I discussed the patients findings and my recommendations with patient and family      Electronically signed by QUIQUE Solitario, 10/06/21, 11:25 AM EDT.  Electronically signed  by Valeriano Alanis MD, 10/06/21, 11:48 AM EDT.    Please note that portions of this note were completed with a voice recognition program.

## 2021-10-06 NOTE — PLAN OF CARE
Problem: Adult Inpatient Plan of Care  Goal: Plan of Care Review  Outcome: Ongoing, Progressing  Flowsheets  Taken 10/6/2021 0259 by Anabela Davies RN  Progress: no change  Outcome Summary: Patient appears to be resting well at this time. No new complaints.  Taken 10/4/2021 0111 by Jun Childers RN  Plan of Care Reviewed With: patient  Goal: Patient-Specific Goal (Individualized)  Outcome: Ongoing, Progressing  Goal: Absence of Hospital-Acquired Illness or Injury  Outcome: Ongoing, Progressing  Intervention: Identify and Manage Fall Risk  Recent Flowsheet Documentation  Taken 10/6/2021 0100 by Anabela Davies RN  Safety Promotion/Fall Prevention: safety round/check completed  Taken 10/5/2021 2300 by Anabela Davies RN  Safety Promotion/Fall Prevention: safety round/check completed  Taken 10/5/2021 2100 by Anabela Davies RN  Safety Promotion/Fall Prevention: safety round/check completed  Taken 10/5/2021 1955 by Anabela Davies RN  Safety Promotion/Fall Prevention: safety round/check completed  Taken 10/5/2021 1900 by Anabela Davies RN  Safety Promotion/Fall Prevention: safety round/check completed  Intervention: Prevent Skin Injury  Recent Flowsheet Documentation  Taken 10/5/2021 1955 by Anabela Davies RN  Skin Protection: adhesive use limited  Goal: Optimal Comfort and Wellbeing  Outcome: Ongoing, Progressing  Intervention: Provide Person-Centered Care  Recent Flowsheet Documentation  Taken 10/5/2021 1955 by Anabela Davies RN  Trust Relationship/Rapport:   care explained   choices provided   emotional support provided   empathic listening provided   questions encouraged   questions answered   reassurance provided   thoughts/feelings acknowledged  Goal: Readiness for Transition of Care  Outcome: Ongoing, Progressing   Goal Outcome Evaluation:           Progress: no change  Outcome Summary: Patient appears to be resting well at this time. No new  complaints.

## 2021-10-06 NOTE — PROGRESS NOTES
[facility] HOSPITALIST PROGRESS NOTE     Patient Identification:  Name:  Mary Ly  Age:  76 y.o.  Sex:  female  :  1945  MRN:  [id number]  Visit Number:  [id number]  ROOM: [address] [place] [address]     Primary Care Provider:  Leta Gardner MD    Length of stay in inpatient status:  15    Subjective     Chief Compliant:    Chief Complaint   Patient presents with   • Shortness of Breath       History of Presenting Illness: Patient seen and evaluated in follow-up for recurrent anemia, colitis, left popliteal DVT as well as recent Covid pneumonia.  Patient today at time of examination denies any acute complaints.  We discussed her repeat duplex of the lower extremity showing no evidence DT DVT and therefore no need for IVC filter or anticoagulation from a thrombosis standpoint.  We did briefly discussed the fact that with her atrial fibrillation and lack of tolerability to anticoagulation though she still would benefit from eventual closure device placement such as watchman.    Objective     Current Hospital Meds:amiodarone, 400 mg, Oral, Q24H  atorvastatin, 20 mg, Oral, Nightly  baclofen, 10 mg, Oral, Nightly  cefdinir, 300 mg, Oral, Q12H  insulin aspart, 0-7 Units, Subcutaneous, TID AC  levothyroxine, 25 mcg, Oral, Q AM  nicotine, 1 patch, Transdermal, Q24H  pantoprazole, 40 mg, Oral, Q AM  sodium chloride, 10 mL, Intravenous, Q12H  traZODone, 50 mg, Oral, Nightly         Current Antimicrobial Therapy:  Anti-Infectives (From admission, onward)    Ordered     Dose/Rate Route Frequency Start Stop    10/02/21 0901  cefdinir (OMNICEF) capsule 300 mg     Majo Velazco, PharmD reviewed the order on 10/04/21 7482.   Ordering Provider: Austyn López MD    300 mg Oral Every 12 Hours Scheduled 10/02/21 1030 10/09/21 0859    21 0911  metroNIDAZOLE (FLAGYL) tablet 500 mg     Majo Velazco, PharmD reviewed the order on 21 1219.   Ordering Provider: Lonnie Meneses MD    500 mg Oral Every 8 Hours  Scheduled 09/27/21 1100 10/04/21 0524    09/22/21 1856  remdesivir 100 mg in 270 mL NS     Ordering Provider: Eliseo Higgins MD    100 mg  over 60 Minutes Intravenous Every 24 Hours 09/23/21 2000 09/25/21 2231 09/22/21 1856  remdesivir 200 mg in 290 mL NS     Ordering Provider: Eliseo Higgins MD    200 mg  over 60 Minutes Intravenous Every 24 Hours 09/22/21 2000 09/22/21 2301 09/21/21 0802  vancomycin 1 g/250 mL 0.9% NS (vial-mate)     Ordering Provider: Erasmo Singleton MD    20 mg/kg × 49 kg  over 1 Hours Intravenous Once 09/21/21 0830 09/21/21 1356    09/21/21 0802  piperacillin-tazobactam (ZOSYN) IVPB 4.5 g in 100 mL NS VTB     Ordering Provider: Erasmo Singleton MD    4.5 g  over 30 Minutes Intravenous Once 09/21/21 0804 09/21/21 0929        Current Diuretic Therapy:  Diuretics (From admission, onward)    Ordered     Dose/Rate Route Frequency Start Stop    10/01/21 1716  bumetanide (BUMEX) injection 0.5 mg     Ordering Provider: Austyn López MD    0.5 mg Intravenous Once 10/01/21 1800 10/01/21 1741    09/30/21 1700  bumetanide (BUMEX) injection 0.5 mg     Ordering Provider: Austyn López MD    0.5 mg Intravenous Once 09/30/21 1900 09/30/21 2056    09/29/21 1313  bumetanide (BUMEX) injection 0.5 mg     Ordering Provider: Austyn López MD    0.5 mg Intravenous Once 09/29/21 1500 09/29/21 1539        ----------------------------------------------------------------------------------------------------------------------  Vital Signs:  Temp:  [97.2 °F (36.2 °C)-98.5 °F (36.9 °C)] 98.5 °F (36.9 °C)  Heart Rate:  [63-74] 66  Resp:  [18-20] 18  BP: (122-169)/(49-75) 144/57  SpO2:  [92 %-99 %] 92 %  on  Flow (L/min):  [1] 1;   Device (Oxygen Therapy): nasal cannula  Body mass index is 14.98 kg/m².    Wt Readings from Last 3 Encounters:   09/21/21 42.1 kg (92 lb 12.8 oz)   08/20/21 50.8 kg (112 lb)   06/01/21 49.1 kg (108 lb 4.8 oz)     Intake & Output (last 3 days)       10/03 0701  - 10/04 0700 10/04 0701 - 10/05 0700 10/05 0701 - 10/06 0700 10/06 0701 - 10/07 0700    P.O. 360 570 260 600    I.V. (mL/kg)  592 (14.1)      Blood 300  600     Total Intake(mL/kg) 660 (15.7) 1162 (27.6) 860 (20.4) 600 (14.3)    Net +660 +1162 +860 +600            Urine Unmeasured Occurrence 4 x 4 x 4 x 2 x    Stool Unmeasured Occurrence 1 x 3 x 4 x 1 x        Diet Soft Texture; Ground  ----------------------------------------------------------------------------------------------------------------------  Physical exam:  Constitutional: Elderly, frail, chronically ill-appearing elderly female laying in bed, NAD   HENT:  Head:  Normocephalic and atraumatic.  Mouth:  Moist mucous membranes.    Eyes:  Conjunctivae and EOM are normal. No scleral icterus.    Neck:  Neck supple.  No JVD present.    Cardiovascular:  Normal rate, regular rhythm and normal heart sounds with no murmur.  Pulmonary/Chest:  No respiratory distress, no wheezes, no crackles, with diminished breath sounds at the base.  Abdominal:  Soft.  Bowel sounds are normal.  No distension and no tenderness.   Musculoskeletal:  No edema, no tenderness, and no deformity.    Neurological:  Alert and oriented to person, place, and time.  No cranial nerve deficit.   Skin:  Skin is warm and dry. No rash or lesion noted. No pallor.   Peripheral vascular:  Pulses in all 4 extremities with no clubbing, no cyanosis, no edema.  Psychiatric: Appropriate mood and affect, pleasant.   ----------------------------------------------------------------------------------------------------------------------  Tele:    ----------------------------------------------------------------------------------------------------------------------  Results from last 7 days   Lab Units 10/06/21  0518 10/05/21  1627 10/05/21  0258 10/04/21  1042 10/03/21  2305 10/03/21  2305 10/03/21  1213 10/03/21  0023 10/02/21  0859 10/02/21  0551   CRP mg/dL  --   --   --   --   --  6.88*  --  1.83*  --  0.68*    WBC 10*3/mm3 26.54*  --  45.51*  --   --  33.74*   < >  --   --  27.15*   HEMOGLOBIN g/dL 8.0* 9.3* 5.8*  --    < > 7.6*   < >  --    < > 7.1*   HEMATOCRIT % 24.0* 27.8* 18.1*  --    < > 23.7*   < >  --    < > 22.6*   MCV fL 92.0  --  92.3  --   --  90.5   < >  --   --  89.7   MCHC g/dL 33.3  --  32.0  --   --  32.1   < >  --   --  31.4*   PLATELETS 10*3/mm3 259  --  301  --   --  350   < >  --   --  363   INR   --   --   --  1.83*  --   --   --   --   --   --     < > = values in this interval not displayed.         Results from last 7 days   Lab Units 10/06/21  0518 10/05/21  0258 10/03/21  2305 10/03/21  0023 10/03/21  0023 10/02/21  0551 10/02/21  0551 10/01/21  0507 10/01/21  0507   SODIUM mmol/L 133* 134* 134*   < > 134*   < > 133*   < > 135*   POTASSIUM mmol/L 3.8 4.4 4.4   < > 4.2   < > 4.4   < > 4.6   MAGNESIUM mg/dL  --  2.2 1.9  --  2.3   < > 1.6  --   --    CHLORIDE mmol/L 96* 96* 96*   < > 95*   < > 94*   < > 98   CO2 mmol/L 32.1* 32.7* 31.1*   < > 37.8*   < > 31.5*   < > 33.3*   BUN mg/dL 15 20 20   < > 15   < > 16   < > 17   CREATININE mg/dL 0.33* 0.42* 0.45*   < > 0.31*   < > 0.40*   < > 0.40*   EGFR IF NONAFRICN AM mL/min/1.73 >150 147 135   < > >150   < > >150   < > >150   CALCIUM mg/dL 7.6* 7.7* 8.1*   < > 7.7*   < > 7.8*   < > 7.9*   GLUCOSE mg/dL 70 135* 165*   < > 107*   < > 111*   < > 100*   ALBUMIN g/dL  --   --  2.49*  --   --   --  2.25*  --  2.31*   BILIRUBIN mg/dL  --   --  0.4  --   --   --  <0.2  --  <0.2   ALK PHOS U/L  --   --  53  --   --   --  47  --  49   AST (SGOT) U/L  --   --  16  --   --   --  15  --  11   ALT (SGPT) U/L  --   --  10  --   --   --  9  --  8    < > = values in this interval not displayed.   Estimated Creatinine Clearance: 39.8 mL/min (A) (by C-G formula based on SCr of 0.33 mg/dL (L)).  No results found for: AMMONIA      Results from last 7 days   Lab Units 10/02/21  0551   PROBNP pg/mL 890.1         Glucose   Date/Time Value Ref Range Status   10/06/2021 1625  120 70 - 130 mg/dL Final     Comment:     Meter: [id number] : [id number] PRISCA BOO   10/06/2021 1030 92 70 - 130 mg/dL Final     Comment:     Meter: [id number] : [id number] PRISCA BOO   10/06/2021 0710 79 70 - 130 mg/dL Final     Comment:     Meter: [id number] : [id number] Raegan Alas   10/05/2021 2104 99 70 - 130 mg/dL Final     Comment:     Meter: [id number] : [id number] shi morris   10/05/2021 1723 124 70 - 130 mg/dL Final     Comment:     Meter: [id number] : [id number] Ethan Osuna   10/05/2021 1057 132 (H) 70 - 130 mg/dL Final     Comment:     Meter: [id number] : [id number] bautista analia   10/05/2021 0702 125 70 - 130 mg/dL Final     Comment:     Meter: [id number] : [id number] Ethan Osuna   10/04/2021 2045 129 70 - 130 mg/dL Final     Comment:     Meter: [id number] : [id number] LO BENAVIDEZ     Lab Results   Component Value Date    TSH 44.500 (H) 10/03/2021    FREET4 0.24 (L) 10/02/2021     No results found for: PREGTESTUR, PREGSERUM, HCG, HCGQUANT  Pain Management Panel     Pain Management Panel Latest Ref Rng & Units 9/21/2021 8/13/2020    AMPHETAMINES SCREEN, URINE Negative Negative Negative    BARBITURATES SCREEN Negative Negative Negative    BENZODIAZEPINE SCREEN, URINE Negative Negative Negative    BUPRENORPHINEUR Negative Negative Negative    COCAINE SCREEN, URINE Negative Negative Negative    METHADONE SCREEN, URINE Negative Negative Negative    METHAMPHETAMINEUR Negative Negative -        Brief Urine Lab Results  (Last result in the past 365 days)      Color   Clarity   Blood   Leuk Est   Nitrite   Protein   CREAT   Urine HCG        09/21/21 0404 Yellow Cloudy Moderate (2+) Small (1+) Negative 30 mg/dL (1+)             No results found for: BLOODCX  No results found for: URINECX  No results found for: WOUNDCX  No results found for: STOOLCX  No results found for: RESPCX  No results found for: AFBCX  Results from last 7 days   Lab Units 10/03/21  5740  10/03/21  0023 10/02/21  0551 09/29/21  2326   CRP mg/dL 6.88* 1.83* 0.68* 0.97*       I have personally looked at the labs and they are summarized above.  ----------------------------------------------------------------------------------------------------------------------  Detailed radiology reports for the last 24 hours:    Imaging Results (Last 24 Hours)     Procedure Component Value Units Date/Time    US Venous Doppler Lower Extremity Bilateral (duplex) [[id number]] Collected: 10/06/21 1454     Updated: 10/06/21 1457    Narrative:      US VENOUS DOPPLER LOWER EXTREMITY BILATERAL (DUPLEX)-     CLINICAL INDICATION: follow up previous possible dvt of lower extremity,  unable to be able to anticoagulate; U07.1-COVID-19; J12.82-Pneumonia due  to coronavirus disease 2019; D72.829-Elevated white blood cell count,  unspecified; J96.11-Chronic respiratory failure with hypoxia;  K92.2-Gastrointestinal hemorrhage, unspecified        COMPARISON: 09/28/2021      TECHNIQUE: Color Doppler imaging was used with compression and  augmentation to evaluate the lower extremity deep venous system.     FINDINGS:   There is patent spontaneous flow from the common femoral vein through  the posterior tibial veins.  There was no internal clot or area of noncompressibility.  Normal augmentation was elicited where applicable.       Impression:      No DVT in the lower extremities on today's exam.      This report was finalized on 10/6/2021 2:55 PM by Dr. River Zaidi MD.           Assessment & Plan      Acute on chronic anemia    -Patient with declining hemoglobin over the weekend when initiated on Eliquis and required transfusion of 1 unit.  Attempted to place back on heparin but patient with recurrent melena today and heparin stopped and aspirin held as well    -Due to inability to tolerate anticoagulation, patient may ultimately be a candidate for watchman device placement due to her atrial fibrillation.  Repeat duplex showed no  evidence of DVT and therefore after discussion with general surgery and then when I discussed with the patient was in agreement with not having IVC filter placed.  Complicating things though is that if patient were to have a watchman device placed she would require a short course of anticoagulation which would be high risk in her situation.  I discussed with cardiology who is planning discussed with cardiothoracic surgery.    -Patient has undergone endoscopies with no source of bleeding found during this hospitalization.    -Continue PPI    Colitis  Leukocytosis  Chronic Mesenteric Ischemia    -Finishing course of Omnicef and Flagyl.  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    -Leukocytosis likely related to anemia and reduced intravascular volume status given her past recurrent leukocytosis when having acute on chronic mesenteric ischemia in the past    -Repeat CBC in am now that she has had transfusion and hgb much improved.    Left popliteal DVT, ruled out    -Duplex of lower extremity on 9/24 with no evidence of DVT, and duplex on 9/28 with findings concerning for possible DVT with repeat obtained today being negative for DVT.    -Given these findings and after discussion with surgery and consideration of patient's A. fib with likely need for watchman device at some point due to inability to tolerate anticoagulation will not proceed with IVC filter.    Covid pneumonia    -Completed course of dexamethasone and Remdesivir    Hypothyroidism    -Continue low-dose Synthroid, new diagnosis and initiated at this hospitalization    Atrial fibrillation    -Currently rate control and maintaining sinus rhythm on amiodarone.    CAD s/p PCI    -Continue aspirin,  statin, ACE    H. pylori positive  Gastritis    -Continue PPI, recommend initiation of therapy outpatient.      VTE Prophylaxis:   Mechanical Order History:      Ordered        09/24/21 1223  Place Sequential Compression Device  Once         09/24/21 1223  Maintain Sequential Compression Device  Continuous                 Pharmalogical Order History:      Ordered     Dose Route Frequency Stop    10/04/21 0942  heparin (porcine) 5000 UNIT/ML injection 2,500 Units      60 Units/kg IV Once 10/04/21 1302    10/04/21 0942  heparin 46753 units/250 mL (100 units/mL) in 0.45 % NaCl infusion  5.05 mL/hr      12 Units/kg/hr IV Titrated --    10/04/21 0942  heparin (porcine) 5000 UNIT/ML injection 1,300 Units      30 Units/kg IV As Needed --    10/04/21 0942  heparin (porcine) 5000 UNIT/ML injection 2,500 Units      60 Units/kg IV As Needed --    10/01/21 1616  apixaban (ELIQUIS) tablet 5 mg  Status:  Discontinued      5 mg PO Every 12 Hours Scheduled 10/03/21 1322    09/29/21 0748  heparin 50998 units/250 mL (100 units/mL) in 0.45 % NaCl infusion  5.05 mL/hr,   Status:  Discontinued      12 Units/kg/hr IV Titrated 10/01/21 1616    09/29/21 0748  heparin (porcine) 5000 UNIT/ML injection 2,500 Units  Status:  Discontinued      60 Units/kg IV As Needed 10/01/21 1616    09/29/21 0748  heparin (porcine) 5000 UNIT/ML injection 1,300 Units  Status:  Discontinued      30 Units/kg IV As Needed 10/01/21 1616    09/22/21 1922  enoxaparin (LOVENOX) syringe 40 mg  Status:  Discontinued      40 mg SC 2 Times Daily 09/22/21 2103 09/22/21 1908  Pharmacy to Dose enoxaparin (LOVENOX)  Status:  Discontinued      -- XX Continuous PRN 09/24/21 1223    09/21/21 1943  enoxaparin (LOVENOX) syringe 40 mg  Status:  Discontinued      40 mg SC Every 24 Hours 09/22/21 1908 09/21/21 1937  Pharmacy to Dose enoxaparin (LOVENOX)  Status:  Discontinued     Question:  Indication of use  Answer:  Prophylaxis    -- XX Continuous PRN 09/21/21 1943                 Disposition pending clinical course, but likely return to SNF in the next 24 to 48 hours pending bed availability.    Yraiel Shaw DO  [facility]  10/06/21  18:49 EDT

## 2021-10-07 LAB
BASOPHILS # BLD AUTO: 0.04 10*3/MM3 (ref 0–0.2)
BASOPHILS NFR BLD AUTO: 0.2 % (ref 0–1.5)
DEPRECATED RDW RBC AUTO: 52.9 FL (ref 37–54)
EOSINOPHIL # BLD AUTO: 0.08 10*3/MM3 (ref 0–0.4)
EOSINOPHIL NFR BLD AUTO: 0.5 % (ref 0.3–6.2)
ERYTHROCYTE [DISTWIDTH] IN BLOOD BY AUTOMATED COUNT: 15.6 % (ref 12.3–15.4)
GLUCOSE BLDC GLUCOMTR-MCNC: 102 MG/DL (ref 70–130)
GLUCOSE BLDC GLUCOMTR-MCNC: 121 MG/DL (ref 70–130)
GLUCOSE BLDC GLUCOMTR-MCNC: 96 MG/DL (ref 70–130)
GLUCOSE BLDC GLUCOMTR-MCNC: 97 MG/DL (ref 70–130)
HCT VFR BLD AUTO: 24.3 % (ref 34–46.6)
HGB BLD-MCNC: 7.6 G/DL (ref 12–15.9)
IMM GRANULOCYTES # BLD AUTO: 0.11 10*3/MM3 (ref 0–0.05)
IMM GRANULOCYTES NFR BLD AUTO: 0.7 % (ref 0–0.5)
LYMPHOCYTES # BLD AUTO: 0.7 10*3/MM3 (ref 0.7–3.1)
LYMPHOCYTES NFR BLD AUTO: 4.1 % (ref 19.6–45.3)
MCH RBC QN AUTO: 29.3 PG (ref 26.6–33)
MCHC RBC AUTO-ENTMCNC: 31.3 G/DL (ref 31.5–35.7)
MCV RBC AUTO: 93.8 FL (ref 79–97)
MONOCYTES # BLD AUTO: 1.5 10*3/MM3 (ref 0.1–0.9)
MONOCYTES NFR BLD AUTO: 8.9 % (ref 5–12)
NEUTROPHILS NFR BLD AUTO: 14.48 10*3/MM3 (ref 1.7–7)
NEUTROPHILS NFR BLD AUTO: 85.6 % (ref 42.7–76)
NRBC BLD AUTO-RTO: 0 /100 WBC (ref 0–0.2)
PLATELET # BLD AUTO: 289 10*3/MM3 (ref 140–450)
PMV BLD AUTO: 11.1 FL (ref 6–12)
RBC # BLD AUTO: 2.59 10*6/MM3 (ref 3.77–5.28)
WBC # BLD AUTO: 16.91 10*3/MM3 (ref 3.4–10.8)

## 2021-10-07 PROCEDURE — 99232 SBSQ HOSP IP/OBS MODERATE 35: CPT | Performed by: STUDENT IN AN ORGANIZED HEALTH CARE EDUCATION/TRAINING PROGRAM

## 2021-10-07 PROCEDURE — 94799 UNLISTED PULMONARY SVC/PX: CPT

## 2021-10-07 PROCEDURE — 82962 GLUCOSE BLOOD TEST: CPT

## 2021-10-07 PROCEDURE — 85025 COMPLETE CBC W/AUTO DIFF WBC: CPT | Performed by: SURGERY

## 2021-10-07 RX ORDER — CLOPIDOGREL BISULFATE 75 MG/1
75 TABLET ORAL DAILY
Status: DISCONTINUED | OUTPATIENT
Start: 2021-10-08 | End: 2021-10-08 | Stop reason: HOSPADM

## 2021-10-07 RX ADMIN — TRAZODONE HYDROCHLORIDE 50 MG: 50 TABLET ORAL at 20:19

## 2021-10-07 RX ADMIN — BACLOFEN 10 MG: 10 TABLET ORAL at 20:19

## 2021-10-07 RX ADMIN — SODIUM CHLORIDE, PRESERVATIVE FREE 10 ML: 5 INJECTION INTRAVENOUS at 09:04

## 2021-10-07 RX ADMIN — PANTOPRAZOLE SODIUM 40 MG: 40 TABLET, DELAYED RELEASE ORAL at 05:30

## 2021-10-07 RX ADMIN — AMIODARONE HYDROCHLORIDE 400 MG: 200 TABLET ORAL at 09:03

## 2021-10-07 RX ADMIN — CEFDINIR 300 MG: 300 CAPSULE ORAL at 17:09

## 2021-10-07 RX ADMIN — LEVOTHYROXINE SODIUM 25 MCG: 25 TABLET ORAL at 05:30

## 2021-10-07 RX ADMIN — CEFDINIR 300 MG: 300 CAPSULE ORAL at 20:19

## 2021-10-07 RX ADMIN — SODIUM CHLORIDE, PRESERVATIVE FREE 10 ML: 5 INJECTION INTRAVENOUS at 20:21

## 2021-10-07 RX ADMIN — ATORVASTATIN CALCIUM 20 MG: 40 TABLET, FILM COATED ORAL at 20:19

## 2021-10-07 NOTE — DISCHARGE PLACEMENT REQUEST
"Mary Ly (76 y.o. Female)     Date of Birth Social Security Number Address Home Phone MRN    1945  [facility]  [facility] [place] [facility] [id number] [phone] [id number]    Tenriism Marital Status          Baptism        Admission Date Admission Type Admitting Provider Attending Provider Department, Room/Bed    9/21/21 Emergency Eliseo Higgins MD Cagle, William, DO [address], 3348/2S    Discharge Date Discharge Disposition Discharge Destination                       Attending Provider: Yariel Shaw DO    Allergies: Haldol [Haloperidol]    Isolation: Enh Drop/Con   Infection: COVID (confirmed) (09/16/21)   Code Status: No CPR    Ht: 167.6 cm (66\")   Wt: 42.1 kg (92 lb 12.8 oz)    Admission Cmt: None   Principal Problem: Pneumonia due to COVID-19 virus [U07.1,J12.82]                 Active Insurance as of 9/21/2021     Primary Coverage     Payor Plan Insurance Group Employer/Plan Group    ANTHEM MEDICARE REPLACEMENT ANTHEM MEDICARE ADVANTAGE [id number]     Payor Plan Address Payor Plan Phone Number Payor Plan Fax Number Effective Dates    [address] [phone]  9/1/2019 - None Entered    [facility] [id number]       Subscriber Name Subscriber Birth Date Member ID       MARY LY 1945 [id number]           Secondary Coverage     Payor Plan Insurance Group Employer/Plan Group    [place]      Payor Plan Address Payor Plan Phone Number Payor Plan Fax Number Effective Dates    [address] [phone]  12/1/2019 - None Entered    [facility] [id number]       Subscriber Name Subscriber Birth Date Member ID       MARY LY 1945 [id number]                 Emergency Contacts      (Rel.) Home Phone Work Phone Mobile Phone    OCTAVIA SCHULERNDA (Daughter) [phone] -- --    WILFRIDOMARE (Grandchild) [phone] -- --    HELENEALEXX (Son) [phone] -- --    MARYCHUY SOLORIO (Friend) -- -- [phone] "            Emergency Contact Information     Name Relation Home Work Mobile    ZORAIDA SCHULER Daughter [phone]      MARE ANNA Grandchild [phone]      ALEXX LY Son [phone]      MRAYCHUY SOLORIO Friend   [phone]          Insurance Information                ANTHEM MEDICARE REPLACEMENT/ANTHEM MEDICARE ADVANTAGE Phone: [phone]    Subscriber: Mary Ly Subscriber#: [id number]    Group#: [id number] Precert#:         [place] Phone: [phone]    Subscriber: Mary Ly Subscriber#: [id number]    Group#:  Precert#:              History & Physical      Gladys Rueda PA-C at 21     Attestation signed by Alba Royal DO at 21 0510    I have reviewed this documentation and agree.                        [facility] Medicine Services  History & Physical    Patient Identification:  Name:  Mary Ly  Age:  76 y.o.  Sex:  female  :  1945  MRN:  [id number]   Visit Number:  [id number]  Primary Care Physician:  Leta Gardner MD    Subjective     2021   Chief complaint: shortness of breath     History of presenting illness:      Mary Ly is a 76 y.o. female with past medical history significant for COPD, chronic diastolic heart failure, hyperlipidemia, hypothyroidism, and type II diabetes mellitus.     Patient presents to [facility] Emergency Department from [facility] with complaints of shortness of breath.  Per triage report, patient was diagnosed with Covid on 2021 and stated that she has been increasingly short of breath with low oxygen saturations.  Per nursing home documentation patient's oxygen saturation was 88%.  It is unknown documented if this was on oxygen or not.  Patient states that she does wear 3.5 liters of oxygen at night only.  Patient's blood pressure was also 88/50.  Upon arrival to the emergency department, patient was found to be 86% on room air.   She was placed on 3 L/min nasal cannula with saturations remaining 90 to 95%.  Her blood pressure has also been intermittently low in the emergency department, but has improved after IV fluid boluses.    Patient denies shortness of breath to me.  She states that she has had a nonproductive cough for the past 3 to 4 days.  She also admits to a runny nose.  She denies any chills, fever, myalgias, chest pain, abdominal pain, nausea, vomiting, diarrhea, or urinary symptoms.  She states that she has received the 2 dose vaccine series.  Per documentation that we have on file, she received the Pfizer vaccine.  Patient is alert and oriented to self, year, and date of birth.  She does think that she is still at the nursing home.    Upon arrival to the ED, vital signs were temperature 98.7, heart rate 94, respirations 22, /52, SpO2 86% on room air. ABG shows pH 7.414, pCO2 49.3, pO2 79.4, HCO3 31.5, oxygen saturation 95.6, on room air. CMP shows glucose 103, BUN/creatinine ratio 31.7, total protein 5.5, albumin 2.43, otherwise within normal limits. CBC shows WBC 33.96, RBC 2.66, hemoglobin 7.8, hematocrit 24, RDW 16.7, otherwise within normal limits. CK 27. Troponin T 0.013. proBNP 1,572. TSH and free T4 within normal limits. C-RP 14.8, lactate 0.5. Magnesium 1.4. Procalcitonin 0.21. Urinalysis shows 1+ ketones, 2+ blood, 1+ leukocytes, 1+ protein, 6-12 RBC, 3-5 WBC, trace bacteria, 3-6 squamous epithelium. Pending blood cultures x 2. UDS negative.     Known Emergency Department medications received prior to my evaluation included decadron 6 mg, magnesium 2 g, IV Zosyn, IV Vancomycin, 2.5 L fluid bolus. Room location at the time of my evaluation was 341 A.     ---------------------------------------------------------------------------------------------------------------------   Review of Systems   Constitutional: Negative for activity change, fatigue and fever.   HENT: Negative for congestion, postnasal drip, rhinorrhea  and sore throat.    Eyes: Negative for discharge and redness.   Respiratory: Positive for cough (nonproductive) and shortness of breath. Negative for apnea.    Cardiovascular: Negative for chest pain and palpitations.   Gastrointestinal: Negative for abdominal distention, abdominal pain, diarrhea, nausea and vomiting.   Endocrine: Negative for polydipsia, polyphagia and polyuria.   Genitourinary: Negative for difficulty urinating, dysuria, frequency and urgency.   Musculoskeletal: Positive for gait problem (uses wheelchair). Negative for arthralgias and myalgias.   Skin: Negative for color change and wound.   Neurological: Negative for dizziness, syncope, weakness and headaches.   Psychiatric/Behavioral: Negative for agitation, behavioral problems and confusion.        ---------------------------------------------------------------------------------------------------------------------   Past Medical History:   Diagnosis Date   • AAA (abdominal aortic aneurysm) (CMS/[facility])     s/p repair    • Arthritis    • CAD (coronary artery disease)     s/p stenting in the past    • Chronic kidney disease (CKD), stage III (moderate) (CMS/[facility])    • COPD (chronic obstructive pulmonary disease) (CMS/[facility])    • Debility    • Diastolic CHF, chronic (CMS/[facility]) 4/23/2021   • GERD (gastroesophageal reflux disease)    • History of CVA (cerebrovascular accident)    • History of ischemic colitis    • Hyperlipidemia 4/23/2021   • Hypertension    • Hypothyroidism    • Stroke (CMS/[facility])    • Type II diabetes mellitus (CMS/[facility])      Past Surgical History:   Procedure Laterality Date   • BACK SURGERY     • CARDIAC CATHETERIZATION     • CHOLECYSTECTOMY N/A 7/17/2020    Procedure: CHOLECYSTECTOMY LAPAROSCOPIC;  Surgeon: Lonnie Meneses MD;  [facility];  Service: General;  Laterality: N/A;   • COLONOSCOPY     • CORONARY STENT PLACEMENT     • ENDOSCOPY     • TONSILLECTOMY       No family history on file.  Social History     Socioeconomic  History   • Marital status:      Spouse name: Not on file   • Number of children: Not on file   • Years of education: Not on file   • Highest education level: Not on file   Tobacco Use   • Smoking status: Current Every Day Smoker     Packs/day: 1.00     Years: 55.00     Pack years: 55.00     Types: Cigarettes   • Smokeless tobacco: Never Used   Substance and Sexual Activity   • Alcohol use: Never   • Drug use: Never   • Sexual activity: Defer     ---------------------------------------------------------------------------------------------------------------------   Allergies:  Haldol [haloperidol]  ---------------------------------------------------------------------------------------------------------------------   Home medications:    Medications below are reported home medications pulling from within the system; at this time, these medications have not been reconciled unless otherwise specified and are in the verification process for further verifcation as current home medications.  (Not in a hospital admission)      Hospital Scheduled Meds:     sodium chloride, 125 mL/hr, Last Rate: 125 mL/hr (09/21/21 0829)        Current listed hospital scheduled medications may not yet reflect those currently placed in orders that are signed and held awaiting patient's arrival to floor.   ---------------------------------------------------------------------------------------------------------------------     Objective     Vital Signs:  Temp:  [98.7 °F (37.1 °C)] 98.7 °F (37.1 °C)  Heart Rate:  [66-97] 90  Resp:  [22] 22  BP: ()/(37-73) 124/64      09/21/21  0116   Weight: 49 kg (108 lb)     Body mass index is 17.97 kg/m².  ---------------------------------------------------------------------------------------------------------------------       Physical Exam  Constitutional:       Appearance: She is underweight.      Interventions: Nasal cannula in place.      Comments: Patient resting in bed upon my exam and  appears in no acute distress. Currently on 3 L/min nasal cannula saturating 95-98%.     HENT:      Head: Normocephalic and atraumatic.      Right Ear: External ear normal.      Left Ear: External ear normal.      Nose: Nose normal.      Mouth/Throat:      Mouth: Mucous membranes are moist.      Pharynx: Oropharynx is clear.   Eyes:      Extraocular Movements: Extraocular movements intact.      Conjunctiva/sclera: Conjunctivae normal.      Pupils: Pupils are equal, round, and reactive to light.   Cardiovascular:      Rate and Rhythm: Normal rate. Rhythm irregular.      Pulses: Normal pulses.           Dorsalis pedis pulses are 2+ on the right side and 2+ on the left side.        Posterior tibial pulses are 2+ on the right side and 2+ on the left side.      Heart sounds: Normal heart sounds. No murmur heard.   No friction rub. No gallop.       Comments: Telemetry shows atrial fibrillation with HR 70-80's.  Pulmonary:      Effort: Pulmonary effort is normal. No respiratory distress.      Breath sounds: Normal breath sounds. No wheezing, rhonchi or rales.   Abdominal:      General: Abdomen is flat. Bowel sounds are normal. There is no distension.      Palpations: Abdomen is soft.      Tenderness: There is no abdominal tenderness. There is no guarding.   Musculoskeletal:         General: Normal range of motion.      Cervical back: Normal range of motion and neck supple.      Right lower leg: No edema.      Left lower leg: No edema.   Skin:     General: Skin is warm and dry.      Findings: No erythema or rash.   Neurological:      General: No focal deficit present.      Mental Status: She is alert. Mental status is at baseline.      Motor: No weakness.      Comments: Patient alert and oriented to self, year, and date of birth; disoriented to place as she thinks she is still in the nursing home.    Psychiatric:         Mood and Affect: Mood normal.         Behavior: Behavior normal.         Thought Content: Thought content  normal.         ---------------------------------------------------------------------------------------------------------------------  EKG:    Pending cardiologist interpretation. Per my review, EKG shows atrial fibrillation with HR 79 with t-wave inversions present in lateral leads which are present on previous EKG's.     Telemetry:    Telemetry shows atrial fibrillation with HR 70-80's.     I have personally looked at both the EKG and the telemetry strips.  ---------------------------------------------------------------------------------------------------------------------   Results from last 7 days   Lab Units 09/21/21  1531 09/21/21  0824 09/21/21  0239   CRP mg/dL  --   --  14.80*   LACTATE mmol/L  --  0.5 0.5   WBC 10*3/mm3 46.07*  --  33.96*   HEMOGLOBIN g/dL 8.5*  --  7.8*   HEMATOCRIT % 26.7*  --  24.0*   MCV fL 92.7  --  90.2   MCHC g/dL 31.8  --  32.5   PLATELETS 10*3/mm3 412  --  395   INR   --   --  0.99     Results from last 7 days   Lab Units 09/21/21  0124   PH, ARTERIAL pH units 7.414   PO2 ART mm Hg 79.4*   PCO2, ARTERIAL mm Hg 49.3*   HCO3 ART mmol/L 31.5*     Results from last 7 days   Lab Units 09/21/21 1531 09/21/21  0239   SODIUM mmol/L 134* 136   POTASSIUM mmol/L 3.3* 3.5   MAGNESIUM mg/dL  --  1.4*   CHLORIDE mmol/L 98 98   CO2 mmol/L 24.9 25.7   BUN mg/dL 17 19   CREATININE mg/dL 0.70 0.60   EGFR IF NONAFRICN AM mL/min/1.73 81 97   CALCIUM mg/dL 8.4* 8.6   GLUCOSE mg/dL 124* 103*   ALBUMIN g/dL 2.55* 2.43*   BILIRUBIN mg/dL <0.2 <0.2   ALK PHOS U/L 82 76   AST (SGOT) U/L 20 19   ALT (SGPT) U/L 10 10   Estimated Creatinine Clearance: 46.3 mL/min (by C-G formula based on SCr of 0.7 mg/dL).  No results found for: AMMONIA  Results from last 7 days   Lab Units 09/21/21  0459 09/21/21  0239   CK TOTAL U/L  --  27   TROPONIN T ng/mL 0.013 0.013     Results from last 7 days   Lab Units 09/21/21  0239   PROBNP pg/mL 1,572.0     Lab Results   Component Value Date    HGBA1C 7.20 (H) 04/22/2021      Lab Results   Component Value Date    TSH 1.570 09/21/2021    FREET4 1.50 09/21/2021     No results found for: PREGTESTUR, PREGSERUM, HCG, HCGQUANT  Pain Management Panel     Pain Management Panel Latest Ref Rng & Units 9/21/2021 8/13/2020    AMPHETAMINES SCREEN, URINE Negative Negative Negative    BARBITURATES SCREEN Negative Negative Negative    BENZODIAZEPINE SCREEN, URINE Negative Negative Negative    BUPRENORPHINEUR Negative Negative Negative    COCAINE SCREEN, URINE Negative Negative Negative    METHADONE SCREEN, URINE Negative Negative Negative    METHAMPHETAMINEUR Negative Negative -            ---------------------------------------------------------------------------------------------------------------------  Imaging Results (Last 7 Days)     Procedure Component Value Units Date/Time    XR Chest 1 View [[id number]] Collected: 09/21/21 0234     Updated: 09/21/21 0236    Narrative:      CR Chest 1 Vw    INDICATION:   Mental status changes.     COMPARISON:    8/20/2021     FINDINGS:  Portable AP view(s) of the chest.  The cardiac and mediastinal contours are normal. There is atherosclerotic disease. There is mild infiltrate or atelectasis behind the heart at the left base. The lungs are otherwise clear. No pneumothorax is seen.  Stimulator wires are present.      Impression:      Infiltrate or atelectasis at the left base.    Signer Name: Octavio Doyle MD   Signed: 9/21/2021 2:34 AM   Workstation Name: [id number]    [facility]            Last echocardiogram:  Results for orders placed during the hospital encounter of 04/22/21    Adult Transthoracic Echo Complete W/ Cont if Necessary Per Protocol    Interpretation Summary  · Normal left ventricular cavity size noted. Left ventricular wall thickness is consistent with mild concentric hypertrophy  · Left ventricular ejection fraction appears to be 61 - 65%.  · Left ventricular diastolic function is consistent with (grade I) impaired  relaxation.  · There is mild calcification of the aortic valvular cusps.  · No aortic valve regurgitation is present. Mild aortic valve stenosis is present.  · There are myxomatous changes of the mitral valve apparatus present. Mild mitral valve regurgitation is present. No significant mitral valve stenosis is present.  · . There is no evidence of pericardial effusion.    I have personally reviewed the above radiology images and read the final radiology report on 09/21/21  ---------------------------------------------------------------------------------------------------------------------  Assessment / Plan     Active Hospital Problems    Diagnosis  POA   • Pneumonia due to COVID-19 virus [U07.1, J12.82]  Yes       ASSESSMENT/PLAN:  · COVID-19 pneumonia of left lower lobe, POA  · Chronic hypoxic respiratory failure on 3 L NC  · Sepsis 2/2 above, POA (HR >90, C-RP 14.8, WBC 46.07)  - Chest XR shows infiltrate or atelectasis at left lower lobe.  - ABG shows pH 7.414, pCO2 49.3, pO2 79.4, HCO3 31.5, oxygen saturation 95.6 on room air.   - Currently on home oxygen requirements of 3 L/min with oxygen sat's 95-98%.  - Pending blood cultures x 2.   - Received Vancomycin and Zosyn in ED; Procalcitonin 0.21; will monitor off antibiotics for now and await recommendations from ID.   - Daily decadron ordered  - Currently does not meet criteria for Remdesivir as patient is on home oxygen requirements; will consider if patient's oxygen requirements need to be escalated.   - PRN albuterol inhaler available.   - Placed in enhanced droplet/contact isolation.  - Continue to titrate oxygen to maintain SpO2 90-95%.    - Strongly encourage incentive spirometer.   - Monitor vitals per hospital protocol.  - Obtain d-dimer.   - Trend COVID-19 progression labs.   - Repeat a.m. CBC and C-RP.    · Abnormal urinalysis   - Urinalysis shows 1+ leukocytes, 3-5 WBC, trace bacteria, 3-6 squamous epithelium; possibly contaminated specimen; currently  asymptomatic.   - Will monitor off antibiotics for now and continue to monitor.     · Hypokalemia (3.3)  · Hypomagnesemia (1.4)  - Replace per protocol.  - Continuous cardiac monitoring ordered.     · Hypoalbuminemia   · Pseudohypocalcemia 2/2 hypoalbuminemia   - Daily multivitamin ordered.  - Repeat a.m. CMP.    · Hyperglycemia in setting of type II diabetes mellitus   - Glucose 124.  - Obtain hemoglobin A1c.   - Accuchecks qAC and qHS.   - Home Januvia continued.   - SSI ordered as patient may need additional coverage while receiving decadron; titrate as needed.   - Hypoglycemic protocol in place if needed.     · Chronic normocytic anemia   - Hemoglobin 8.5/hematocrit 26.7; appears near baseline.  - During last admission 5/22/21-6/1/21 evaluated by general surgery for concerns of GI bleed with episodes of melena intermittently and on Eliquis; no surgical intervention recommended at that time but needed follow-up with vascular surgery for likely SMA stenting versus bypass.   - Evaluated by cardiology during 05/2021 admission who agreed to hold anticoagulation with Eliquis due to GI bleeding.   - Obtain iron, vitamin B12, and folate.   - Repeat a.m. CBC.   - Will transfuse if hemoglobin <7.     · Mild renal insufficiency on chronic kidney disease stage IIIa  - Creatinine 0.7; baseline around 0.4  - Received 2.5 L fluid bolus in ED; continuous fluids ordered.   - Will monitor response to fluids with repeat a.m. CMP.   - Avoid nephrotoxic agents as possible.     · Paroxysmal atrial fibrillation, not currently anticoagulated 2/2 GI bleeding  - EKG shows rate-controlled atrial fibrillation with HR 79.  - Continuous cardiac monitoring ordered.     · CAD s/p stenting   - Denies current chest pain.   - Troponin T 0.013, same on repeat.   - EKG without acute ischemic changes.   - Continuous cardiac monitoring ordered.   - Continue aspirin, statin, and plavix.     · Essential hypertension with intermittent hypotension in  ED  · Hyperlipidemia   - BP's soft on arrival to ED, but improved after fluid bolus to 124/64.  - Monitor per hospital protocol.  - Home antihypertensives continued with appropriate holding parameters.    · Chronic diastolic heart failure   - Appears euvolemic on exam.   - proBNP within normal limits.   - Monitor with strict I/O's and daily weights.     · COPD, without acute exacerbation   - No wheezing appreciated on exam.   - PRN albuterol inhaler available.    · History of stroke   - No focal deficits appreciated.   - Continue daily aspirin.     · Severe bilateral PAD of lower extremities   - Continue daily aspirin.   - Keep heels elevated off bed.    · Abdominal aortic aneurysm s/p EVAR  · Descending throacic aneurysm  · History of ischemic colitis with occluded celiac artery, severe SMA stenosis with reconstitution and CHRISTOPHER overlaid by EVAR  - Monitor.    · Chronic moderate malnutrition, BMI 17.97 kg/m2  - Nutrition consult ordered for malnutrition severity assessment.   - Daily multivitamin ordered.     · Hypothyroidism   - TSH and free T4 within normal limits.   - Continue home synthroid.     · Tobacco abuse   - Nicotine patch ordered.  ----------  -DVT prophylaxis: SQ Lovenox  -Activity: Up with assistance; fall precautions  -Expected length of stay:   INPATIENT status due to the need for care which can only be reasonably provided in an hospital setting such as aggressive/expedited ancillary services and/or consultation services, the necessity for IV medications, close physician monitoring and/or the possible need for procedures.  In such, I feel patient's risk for adverse outcomes and need for care warrant INPATIENT evaluation and predict the patient's care encounter to likely last beyond 2 midnights.    High risk secondary to sepsis and acute hypoxic respiratory failure secondary to COVID-19 pneumonia      Disposition: Likely to nursing home once stable.     CODE STATUS: DNR/DNI per nursing home  documentation    Gladys Rueda PA-C  09/21/21  19:12 EDT    ---------------------------------------------------------------------------------------------------------------------           Electronically signed by Alba Royal DO at 09/22/21 0543       Vital Signs (last day)     Date/Time   Temp   Temp src   Pulse   Resp   BP   Patient Position   SpO2    10/07/21 1356   98 (36.7)   Oral   74   20   128/70   Lying   96    10/07/21 1028   97.9 (36.6)   Oral   61   22   140/70   Lying   98    10/07/21 0716   --   --   --   --   --   --   98    10/07/21 0700   97.4 (36.3)   Oral   96   22   130/77   Lying   98    10/07/21 0241   98.2 (36.8)   Oral   68   18   140/66   Lying   95    10/07/21 0110   --   --   --   --   --   --   96    10/06/21 1900   98.8 (37.1)   Oral   70   18   142/62   Lying   94    10/06/21 1439   98.5 (36.9)   Oral   66   18   144/57   Lying   92    10/06/21 1050   --   --   --   --   166/52   Lying   --    10/06/21 0815   --   --   --   --   --   --   94    10/06/21 0740   98.5 (36.9)   Oral   64   18   147/49   Lying   99    10/06/21 0300   97.2 (36.2)   Axillary   74   18   122/74   Lying   95              Intake & Output (last day)       10/06 0701 - 10/07 0700 10/07 0701 - 10/08 0700    P.O. 600 720    Blood      Total Intake(mL/kg) 600 (14.3) 720 (17.1)    Net +600 +720          Urine Unmeasured Occurrence 5 x 1 x    Stool Unmeasured Occurrence 3 x 0 x          Current Facility-Administered Medications   Medication Dose Route Frequency Provider Last Rate Last Admin   • amiodarone (PACERONE) tablet 400 mg  400 mg Oral Q24H Tremaine Rust MD   400 mg at 10/07/21 0903   • atorvastatin (LIPITOR) tablet 20 mg  20 mg Oral Nightly Sean Riley MD   20 mg at 10/06/21 2058   • baclofen (LIORESAL) tablet 10 mg  10 mg Oral Nightly Lonnie Meneses MD   10 mg at 10/06/21 2058   • bisacodyl (DULCOLAX) suppository 10 mg  10 mg Rectal Daily PRN Lonnie Meneses MD       • bismuth  subsalicylate (PEPTO BISMOL) chewable tablet 524 mg  524 mg Oral Q1H PRN Lonnie Meneses MD       • cefdinir (OMNICEF) capsule 300 mg  300 mg Oral Q12H Austyn López MD   300 mg at 10/06/21 2057   • dextrose (D50W) 25 g/ 50mL Intravenous Solution 25 g  25 g Intravenous Q15 Min PRN Lonnie Meneses MD       • dextrose (GLUTOSE) oral gel 15 g  15 g Oral Q15 Min PRN Lonnie Meneses MD       • glucagon (human recombinant) (GLUCAGEN DIAGNOSTIC) injection 1 mg  1 mg Subcutaneous Q15 Min PRN Lonnie Meneses MD       • HYDROcodone-acetaminophen (NORCO) 5-325 MG per tablet 1 tablet  1 tablet Oral Q8H PRN Lonnie Meneses MD   1 tablet at 10/04/21 1346   • insulin aspart (novoLOG) injection 0-7 Units  0-7 Units Subcutaneous TID AC Lonnie Meneses MD   2 Units at 09/30/21 1738   • lactulose (CHRONULAC) 10 GM/15ML solution 10 g  10 g Oral Daily PRN Lonnie Meneses MD       • levothyroxine (SYNTHROID, LEVOTHROID) tablet 25 mcg  25 mcg Oral Q AM Austyn López MD   25 mcg at 10/07/21 0530   • loperamide (IMODIUM) capsule 2 mg  2 mg Oral Q6H PRN Lonnie Meneses MD       • Magnesium Sulfate 2 gram Bolus, followed by 8 gram infusion (total Mg dose 10 grams)- Mg less than or equal to 1mg/dL  2 g Intravenous PRN Lonnie Meneses MD        Or   • Magnesium Sulfate 2 gram / 50mL Infusion (GIVE X 3 BAGS TO EQUAL 6GM TOTAL DOSE) - Mg 1.1 - 1.5 mg/dl  2 g Intravenous PRN Lonnie Meneses MD        Or   • Magnesium Sulfate 4 gram infusion- Mg 1.6-1.9 mg/dL  4 g Intravenous PRN Lonnie Meneses MD       • nicotine (NICODERM CQ) 7 MG/24HR patch 1 patch  1 patch Transdermal Q24H Lonnie Meneses MD   1 patch at 09/27/21 1423   • nitroglycerin (NITROSTAT) SL tablet 0.4 mg  0.4 mg Sublingual Q5 Min PRN Lonnie Meneses MD       • ondansetron (ZOFRAN) tablet 4 mg  4 mg Oral Q6H PRN Lonnie Meneses MD   4 mg at 10/05/21 0524   • pantoprazole (PROTONIX) EC tablet 40 mg  40 mg Oral Q AM Lonnie Meneses MD   40 mg at 10/07/21 0530   •  potassium & sodium phosphates (PHOS-NAK) 280-160-250 MG packet - for Phosphorus less than 1.25 mg/dL  2 packet Oral Q6H PRN Halima Oshea PA-C        Or   • potassium & sodium phosphates (PHOS-NAK) 280-160-250 MG packet - for Phosphorus 1.25 - 2.5 mg/dL  2 packet Oral Q6H PRN Halima Oshea PA-C       • potassium chloride (K-DUR,KLOR-CON) CR tablet 40 mEq  40 mEq Oral PRN Lonnie Meneses MD        Or   • potassium chloride (KLOR-CON) packet 40 mEq  40 mEq Oral PRN Lonnie Meneses MD        Or   • potassium chloride 10 mEq in 100 mL IVPB  10 mEq Intravenous Q1H PRN Lonnie Meneses MD       • promethazine (PHENERGAN) tablet 12.5 mg  12.5 mg Oral Q8H PRN Lonnie Meneses MD       • senna (SENOKOT) tablet 1 tablet  1 tablet Oral Daily PRN Lonnie Meneses MD       • sodium chloride 0.9 % flush 10 mL  10 mL Intravenous PRN Lonnie Meneses MD       • sodium chloride 0.9 % flush 10 mL  10 mL Intravenous Q12H Lonnie Meneses MD   10 mL at 10/07/21 0904   • sodium chloride 0.9 % flush 10 mL  10 mL Intravenous PRN Lonnie Meneses MD       • traZODone (DESYREL) tablet 50 mg  50 mg Oral Nightly Lonnie Meneses MD   50 mg at 10/06/21 2058        Operative/Procedure Notes (most recent note)      Lonnie Meneses MD at 09/25/21 0900          COLONOSCOPY, ESOPHAGOGASTRODUODENOSCOPY  Procedure Note    Mary Ly  9/25/2021    Pre-op Diagnosis:   Upper GI bleed [K92.2]    Post-op Diagnosis:  Gastritis with small ulcers not bleeding, colitis with small ulcers not bleeding       Procedure(s):  COLONOSCOPY  ESOPHAGOGASTRODUODENOSCOPY    Surgeon(s):  Lonnie Meneses MD    Anesthesia: General    Staff:   Circulator: Avani Lee RN  Endo Technician: Althea Nunn  Other: Libertad Anglin RN    Estimated Blood Loss: none    Specimens:                Order Name Source Comment Collection Info Order Time   UREASE FOR H PYLORI Stomach  Collected By: Lonnie Meneses MD 9/25/2021  9:03 AM      Release to patient   Immediate        TISSUE PATHOLOGY EXAM Stomach  Collected By: Lonnie Meneses MD 2021  9:03 AM     Release to patient   Immediate              Drains: * No LDAs found *    Procedure: The scope advanced easily in to the duodenum. It was unremarkable. There was some old blood in the stomach but no active bleeding she has gastritis and some healing ulcers in the antrum and one up near the GE junction. The esophagus had a small healing area in the distal third. A biopsy for urease was done in the antrum.   The scope advanced slowly to the cecum because she has a torturous colon and had a lot of watery old melanotic stool. The ileocecal valve was unremarkable. There was mild colitis throughout the colon but mostly in the descending and sigmoid colon. Biopsies were done in the proximal descending colon. She also had inflammation in the sigmoid. She has large internal and external hemorrhoids.       Findings: gastritis, colitis, hemorrhoids.    Complications: none   Grafts / Implants N/A    Lonnie Meneses MD     Date: 2021  Time: 09:33 EDT    Electronically signed by Lonnie Meneses MD at 21 0937          Physician Progress Notes (most recent note)      Yariel Shaw DO at 10/06/21 1849              [facility]     Patient Identification:  Name:  Mary Ly  Age:  76 y.o.  Sex:  female  :  1945  MRN:  [id number]  Visit Number:  [id number]  ROOM: [address] [place] [address]     Primary Care Provider:  Leta Gardner MD    Length of stay in inpatient status:  15    Subjective     Chief Compliant:    Chief Complaint   Patient presents with   • Shortness of Breath       History of Presenting Illness: Patient seen and evaluated in follow-up for recurrent anemia, colitis, left popliteal DVT as well as recent Covid pneumonia.  Patient today at time of examination denies any acute complaints.  We discussed her repeat duplex of the lower extremity showing no  evidence DT DVT and therefore no need for IVC filter or anticoagulation from a thrombosis standpoint.  We did briefly discussed the fact that with her atrial fibrillation and lack of tolerability to anticoagulation though she still would benefit from eventual closure device placement such as watchman.    Objective     Current Hospital Meds:amiodarone, 400 mg, Oral, Q24H  atorvastatin, 20 mg, Oral, Nightly  baclofen, 10 mg, Oral, Nightly  cefdinir, 300 mg, Oral, Q12H  insulin aspart, 0-7 Units, Subcutaneous, TID AC  levothyroxine, 25 mcg, Oral, Q AM  nicotine, 1 patch, Transdermal, Q24H  pantoprazole, 40 mg, Oral, Q AM  sodium chloride, 10 mL, Intravenous, Q12H  traZODone, 50 mg, Oral, Nightly         Current Antimicrobial Therapy:  Anti-Infectives (From admission, onward)    Ordered     Dose/Rate Route Frequency Start Stop    10/02/21 0901  cefdinir (OMNICEF) capsule 300 mg     Majo Velazco, PharmD reviewed the order on 10/04/21 1658.   Ordering Provider: Austyn López MD    300 mg Oral Every 12 Hours Scheduled 10/02/21 1030 10/09/21 0859    09/27/21 0911  metroNIDAZOLE (FLAGYL) tablet 500 mg     Majo Velazco, PharmD reviewed the order on 09/27/21 1219.   Ordering Provider: Lonnie Meneses MD    500 mg Oral Every 8 Hours Scheduled 09/27/21 1100 10/04/21 0524    09/22/21 1856  remdesivir 100 mg in 270 mL NS     Ordering Provider: Eliseo Higgins MD    100 mg  over 60 Minutes Intravenous Every 24 Hours 09/23/21 2000 09/25/21 2231    09/22/21 1856  remdesivir 200 mg in 290 mL NS     Ordering Provider: Eliseo Higgins MD    200 mg  over 60 Minutes Intravenous Every 24 Hours 09/22/21 2000 09/22/21 2301    09/21/21 0802  vancomycin 1 g/250 mL 0.9% NS (vial-mate)     Ordering Provider: Erasmo Singleton MD    20 mg/kg × 49 kg  over 1 Hours Intravenous Once 09/21/21 0830 09/21/21 1356    09/21/21 0802  piperacillin-tazobactam (ZOSYN) IVPB 4.5 g in 100 mL NS VTB     Ordering Provider: Mani  Erasmo Solorzano MD    4.5 g  over 30 Minutes Intravenous Once 09/21/21 0804 09/21/21 0929        Current Diuretic Therapy:  Diuretics (From admission, onward)    Ordered     Dose/Rate Route Frequency Start Stop    10/01/21 1716  bumetanide (BUMEX) injection 0.5 mg     Ordering Provider: Austyn López MD    0.5 mg Intravenous Once 10/01/21 1800 10/01/21 1741    09/30/21 1700  bumetanide (BUMEX) injection 0.5 mg     Ordering Provider: Austyn López MD    0.5 mg Intravenous Once 09/30/21 1900 09/30/21 2056 09/29/21 1313  bumetanide (BUMEX) injection 0.5 mg     Ordering Provider: Austyn López MD    0.5 mg Intravenous Once 09/29/21 1500 09/29/21 1539        ----------------------------------------------------------------------------------------------------------------------  Vital Signs:  Temp:  [97.2 °F (36.2 °C)-98.5 °F (36.9 °C)] 98.5 °F (36.9 °C)  Heart Rate:  [63-74] 66  Resp:  [18-20] 18  BP: (122-169)/(49-75) 144/57  SpO2:  [92 %-99 %] 92 %  on  Flow (L/min):  [1] 1;   Device (Oxygen Therapy): nasal cannula  Body mass index is 14.98 kg/m².    Wt Readings from Last 3 Encounters:   09/21/21 42.1 kg (92 lb 12.8 oz)   08/20/21 50.8 kg (112 lb)   06/01/21 49.1 kg (108 lb 4.8 oz)     Intake & Output (last 3 days)       10/03 0701 - 10/04 0700 10/04 0701 - 10/05 0700 10/05 0701 - 10/06 0700 10/06 0701 - 10/07 0700    P.O. 360 570 260 600    I.V. (mL/kg)  592 (14.1)      Blood 300  600     Total Intake(mL/kg) 660 (15.7) 1162 (27.6) 860 (20.4) 600 (14.3)    Net +660 +1162 +860 +600            Urine Unmeasured Occurrence 4 x 4 x 4 x 2 x    Stool Unmeasured Occurrence 1 x 3 x 4 x 1 x        Diet Soft Texture; Ground  ----------------------------------------------------------------------------------------------------------------------  Physical exam:  Constitutional: Elderly, frail, chronically ill-appearing elderly female laying in bed, NAD   HENT:  Head:  Normocephalic and atraumatic.  Mouth:  Moist mucous  membranes.    Eyes:  Conjunctivae and EOM are normal. No scleral icterus.    Neck:  Neck supple.  No JVD present.    Cardiovascular:  Normal rate, regular rhythm and normal heart sounds with no murmur.  Pulmonary/Chest:  No respiratory distress, no wheezes, no crackles, with diminished breath sounds at the base.  Abdominal:  Soft.  Bowel sounds are normal.  No distension and no tenderness.   Musculoskeletal:  No edema, no tenderness, and no deformity.    Neurological:  Alert and oriented to person, place, and time.  No cranial nerve deficit.   Skin:  Skin is warm and dry. No rash or lesion noted. No pallor.   Peripheral vascular:  Pulses in all 4 extremities with no clubbing, no cyanosis, no edema.  Psychiatric: Appropriate mood and affect, pleasant.   ----------------------------------------------------------------------------------------------------------------------  Tele:    ----------------------------------------------------------------------------------------------------------------------  Results from last 7 days   Lab Units 10/06/21  0518 10/05/21  1627 10/05/21  0258 10/04/21  1042 10/03/21  2305 10/03/21  2305 10/03/21  1213 10/03/21  0023 10/02/21  0859 10/02/21  0551   CRP mg/dL  --   --   --   --   --  6.88*  --  1.83*  --  0.68*   WBC 10*3/mm3 26.54*  --  45.51*  --   --  33.74*   < >  --   --  27.15*   HEMOGLOBIN g/dL 8.0* 9.3* 5.8*  --    < > 7.6*   < >  --    < > 7.1*   HEMATOCRIT % 24.0* 27.8* 18.1*  --    < > 23.7*   < >  --    < > 22.6*   MCV fL 92.0  --  92.3  --   --  90.5   < >  --   --  89.7   MCHC g/dL 33.3  --  32.0  --   --  32.1   < >  --   --  31.4*   PLATELETS 10*3/mm3 259  --  301  --   --  350   < >  --   --  363   INR   --   --   --  1.83*  --   --   --   --   --   --     < > = values in this interval not displayed.         Results from last 7 days   Lab Units 10/06/21  0518 10/05/21  0258 10/03/21  2305 10/03/21  0023 10/03/21  0023 10/02/21  0551 10/02/21  0551 10/01/21  0507  10/01/21  0507   SODIUM mmol/L 133* 134* 134*   < > 134*   < > 133*   < > 135*   POTASSIUM mmol/L 3.8 4.4 4.4   < > 4.2   < > 4.4   < > 4.6   MAGNESIUM mg/dL  --  2.2 1.9  --  2.3   < > 1.6  --   --    CHLORIDE mmol/L 96* 96* 96*   < > 95*   < > 94*   < > 98   CO2 mmol/L 32.1* 32.7* 31.1*   < > 37.8*   < > 31.5*   < > 33.3*   BUN mg/dL 15 20 20   < > 15   < > 16   < > 17   CREATININE mg/dL 0.33* 0.42* 0.45*   < > 0.31*   < > 0.40*   < > 0.40*   EGFR IF NONAFRICN AM mL/min/1.73 >150 147 135   < > >150   < > >150   < > >150   CALCIUM mg/dL 7.6* 7.7* 8.1*   < > 7.7*   < > 7.8*   < > 7.9*   GLUCOSE mg/dL 70 135* 165*   < > 107*   < > 111*   < > 100*   ALBUMIN g/dL  --   --  2.49*  --   --   --  2.25*  --  2.31*   BILIRUBIN mg/dL  --   --  0.4  --   --   --  <0.2  --  <0.2   ALK PHOS U/L  --   --  53  --   --   --  47  --  49   AST (SGOT) U/L  --   --  16  --   --   --  15  --  11   ALT (SGPT) U/L  --   --  10  --   --   --  9  --  8    < > = values in this interval not displayed.   Estimated Creatinine Clearance: 39.8 mL/min (A) (by C-G formula based on SCr of 0.33 mg/dL (L)).  No results found for: AMMONIA      Results from last 7 days   Lab Units 10/02/21  0551   PROBNP pg/mL 890.1         Glucose   Date/Time Value Ref Range Status   10/06/2021 1620 120 70 - 130 mg/dL Final     Comment:     Meter: [id number] : [id number] PRISCA BOO   10/06/2021 1030 92 70 - 130 mg/dL Final     Comment:     Meter: [id number] : [id number] PRISCA BOO   10/06/2021 0710 79 70 - 130 mg/dL Final     Comment:     Meter: [id number] : [id number] Raegan Alas   10/05/2021 2104 99 70 - 130 mg/dL Final     Comment:     Meter: [id number] : [id number] shi morris   10/05/2021 1723 124 70 - 130 mg/dL Final     Comment:     Meter: [id number] : [id number] Ethan Osuna   10/05/2021 1057 132 (H) 70 - 130 mg/dL Final     Comment:     Meter: [id number] : [id number] bautista helms   10/05/2021 0702 125 70 - 130 mg/dL  Final     Comment:     Meter: [id number] : [id number] Ethan Osuna   10/04/2021 2045 129 70 - 130 mg/dL Final     Comment:     Meter: [id number] : [id number] LO BENAVIDEZ     Lab Results   Component Value Date    TSH 44.500 (H) 10/03/2021    FREET4 0.24 (L) 10/02/2021     No results found for: PREGTESTUR, PREGSERUM, HCG, HCGQUANT  Pain Management Panel     Pain Management Panel Latest Ref Rng & Units 9/21/2021 8/13/2020    AMPHETAMINES SCREEN, URINE Negative Negative Negative    BARBITURATES SCREEN Negative Negative Negative    BENZODIAZEPINE SCREEN, URINE Negative Negative Negative    BUPRENORPHINEUR Negative Negative Negative    COCAINE SCREEN, URINE Negative Negative Negative    METHADONE SCREEN, URINE Negative Negative Negative    METHAMPHETAMINEUR Negative Negative -        Brief Urine Lab Results  (Last result in the past 365 days)      Color   Clarity   Blood   Leuk Est   Nitrite   Protein   CREAT   Urine HCG        09/21/21 0404 Yellow Cloudy Moderate (2+) Small (1+) Negative 30 mg/dL (1+)             No results found for: BLOODCX  No results found for: URINECX  No results found for: WOUNDCX  No results found for: STOOLCX  No results found for: RESPCX  No results found for: AFBCX  Results from last 7 days   Lab Units 10/03/21  2305 10/03/21  0023 10/02/21  0551 09/29/21  2326   CRP mg/dL 6.88* 1.83* 0.68* 0.97*       I have personally looked at the labs and they are summarized above.  ----------------------------------------------------------------------------------------------------------------------  Detailed radiology reports for the last 24 hours:    Imaging Results (Last 24 Hours)     Procedure Component Value Units Date/Time    US Venous Doppler Lower Extremity Bilateral (duplex) [[id number]] Collected: 10/06/21 1898     Updated: 10/06/21 1457    Narrative:      US VENOUS DOPPLER LOWER EXTREMITY BILATERAL (DUPLEX)-     CLINICAL INDICATION: follow up previous possible dvt of lower  extremity,  unable to be able to anticoagulate; U07.1-COVID-19; J12.82-Pneumonia due  to coronavirus disease 2019; D72.829-Elevated white blood cell count,  unspecified; J96.11-Chronic respiratory failure with hypoxia;  K92.2-Gastrointestinal hemorrhage, unspecified        COMPARISON: 09/28/2021      TECHNIQUE: Color Doppler imaging was used with compression and  augmentation to evaluate the lower extremity deep venous system.     FINDINGS:   There is patent spontaneous flow from the common femoral vein through  the posterior tibial veins.  There was no internal clot or area of noncompressibility.  Normal augmentation was elicited where applicable.       Impression:      No DVT in the lower extremities on today's exam.      This report was finalized on 10/6/2021 2:55 PM by Dr. River Zaidi MD.           Assessment & Plan      Acute on chronic anemia    -Patient with declining hemoglobin over the weekend when initiated on Eliquis and required transfusion of 1 unit.  Attempted to place back on heparin but patient with recurrent melena today and heparin stopped and aspirin held as well    -Due to inability to tolerate anticoagulation, patient may ultimately be a candidate for watchman device placement due to her atrial fibrillation.  Repeat duplex showed no evidence of DVT and therefore after discussion with general surgery and then when I discussed with the patient was in agreement with not having IVC filter placed.  Complicating things though is that if patient were to have a watchman device placed she would require a short course of anticoagulation which would be high risk in her situation.  I discussed with cardiology who is planning discussed with cardiothoracic surgery.    -Patient has undergone endoscopies with no source of bleeding found during this hospitalization.    -Continue PPI    Colitis  Leukocytosis  Chronic Mesenteric Ischemia    -Finishing course of Omnicef and Flagyl.  Patient with increasing white  count, poor oral intake which is concerning his prior hospitalizations have been a consequence of reduced oral intake and her volume status as she does have some chronic occlusion of the mesentery vasculature with reconstitution later that is sensitive to her volume status.    -Leukocytosis likely related to anemia and reduced intravascular volume status given her past recurrent leukocytosis when having acute on chronic mesenteric ischemia in the past    -Repeat CBC in am now that she has had transfusion and hgb much improved.    Left popliteal DVT, ruled out    -Duplex of lower extremity on 9/24 with no evidence of DVT, and duplex on 9/28 with findings concerning for possible DVT with repeat obtained today being negative for DVT.    -Given these findings and after discussion with surgery and consideration of patient's A. fib with likely need for watchman device at some point due to inability to tolerate anticoagulation will not proceed with IVC filter.    Covid pneumonia    -Completed course of dexamethasone and Remdesivir    Hypothyroidism    -Continue low-dose Synthroid, new diagnosis and initiated at this hospitalization    Atrial fibrillation    -Currently rate control and maintaining sinus rhythm on amiodarone.    CAD s/p PCI    -Continue aspirin, statin, ACE    H. pylori positive  Gastritis    -Continue PPI, recommend initiation of therapy outpatient.      VTE Prophylaxis:   Mechanical Order History:      Ordered        09/24/21 1223  Place Sequential Compression Device  Once         09/24/21 1223  Maintain Sequential Compression Device  Continuous                 Pharmalogical Order History:      Ordered     Dose Route Frequency Stop    10/04/21 0942  heparin (porcine) 5000 UNIT/ML injection 2,500 Units      60 Units/kg IV Once 10/04/21 1302    10/04/21 0942  heparin 88811 units/250 mL (100 units/mL) in 0.45 % NaCl infusion  5.05 mL/hr      12 Units/kg/hr IV Titrated --    10/04/21 0942  heparin (porcine)  5000 UNIT/ML injection 1,300 Units      30 Units/kg IV As Needed --    10/04/21 0942  heparin (porcine) 5000 UNIT/ML injection 2,500 Units      60 Units/kg IV As Needed --    10/01/21 161  apixaban (ELIQUIS) tablet 5 mg  Status:  Discontinued      5 mg PO Every 12 Hours Scheduled 10/03/21 1322    21 0748  heparin 93487 units/250 mL (100 units/mL) in 0.45 % NaCl infusion  5.05 mL/hr,   Status:  Discontinued      12 Units/kg/hr IV Titrated 10/01/21 1616    21 0748  heparin (porcine) 5000 UNIT/ML injection 2,500 Units  Status:  Discontinued      60 Units/kg IV As Needed 10/01/21 16121 0748  heparin (porcine) 5000 UNIT/ML injection 1,300 Units  Status:  Discontinued      30 Units/kg IV As Needed 10/01/21 1616    21 192  enoxaparin (LOVENOX) syringe 40 mg  Status:  Discontinued      40 mg SC 2 Times Daily 21 190  Pharmacy to Dose enoxaparin (LOVENOX)  Status:  Discontinued      -- XX Continuous PRN 21 1223    21 1943  enoxaparin (LOVENOX) syringe 40 mg  Status:  Discontinued      40 mg SC Every 24 Hours 21 19021 193  Pharmacy to Dose enoxaparin (LOVENOX)  Status:  Discontinued     Question:  Indication of use  Answer:  Prophylaxis    -- XX Continuous PRN 21                Disposition pending clinical course, but likely return to SNF in the next 24 to 48 hours pending bed availability.    Yariel Shaw DO  [facility]  10/06/21  18:49 EDT      Electronically signed by Yariel Shaw DO at 10/06/21 1853          Consult Notes (most recent note)      Sean Riley MD at 21 1442              Cardiology  CONSULT NOTE    Consults    Patient Identification:  Name:  Mary Ly  Age:  76 y.o.  Sex:  female  :  1945  MRN:  [id number]  Visit Number:  [id number]  Primary care provider:  Leta Gardner MD    Subjective     This is a telephone visit.  I could not perform audiovisual visit as  iPad was not functional    Reason for the consult:  Recommendation regarding anticoagulation in a patient with paroxysmal atrial fibrillation and GI bleed    Chief complaints:  Admitted for COVID-19 pneumonia      History of presenting illness:    76-year-old woman has been admitted here with history of COVID-19 pneumonia with hypoxic respiratory failure on 9/21/2021.    She has history of atrial fibrillation.  Duration-not known.CHADS2 vascular score is at least 6 due to the presence of age more than 75, female gender, hypertension, DM and vascular disease.  She developed upper GI bleed passing dark-colored stools and yesterday she passed a large sized blood clot per rectum according to the RN.  Initially, her hemoglobin was 6.8.  She received 2 units of PRBC.  Latest hemoglobin was 11.2.  She had EGD and colonoscopy done today.  It showed gastritis with small gastric ulcers, colitis and hemorrhoids.    She denied history of palpitations or chest pain.  She has history of dyspnea on exertion due to COPD.  No history of leg edema.    Her cardiovascular history is remarkable for arteriosclerotic CAD status post stent done remotely, details not known, status post endovascular repair of AAA, history of aneurysm of descending aorta  Hypertensive heart disease and diastolic CHF..    Her risk factors are DM, smoking, hypertension and hyperlipidemia.    --------------------------------------------------------------------------------------------------------------------  Review of Systems:     Review of all the organ systems were done.  Essentially as above.      ---------------------------------------------------------------------------------------------------------------------   Past History:  No family history on file.  Past Medical History:   Diagnosis Date   • AAA (abdominal aortic aneurysm) (CMS/HCC)     s/p repair    • Arthritis    • CAD (coronary artery disease)     s/p stenting in the past    • Chronic kidney  disease (CKD), stage III (moderate) (CMS/[facility])    • COPD (chronic obstructive pulmonary disease) (CMS/HCC)    • Debility    • Diastolic CHF, chronic (CMS/HCC) 4/23/2021   • GERD (gastroesophageal reflux disease)    • History of CVA (cerebrovascular accident)    • History of ischemic colitis    • Hyperlipidemia 4/23/2021   • Hypertension    • Hypothyroidism    • Pneumonia due to COVID-19 virus 9/21/2021   • Stroke (CMS/[facility])    • Type II diabetes mellitus (CMS/[facility])      Past Surgical History:   Procedure Laterality Date   • BACK SURGERY     • CARDIAC CATHETERIZATION     • CHOLECYSTECTOMY N/A 7/17/2020    Procedure: CHOLECYSTECTOMY LAPAROSCOPIC;  Surgeon: Lonnie Meneses MD;  Location[facility];  Service: General;  Laterality: N/A;   • COLONOSCOPY     • CORONARY STENT PLACEMENT     • ENDOSCOPY     • TONSILLECTOMY       Social History     Socioeconomic History   • Marital status:      Spouse name: Not on file   • Number of children: Not on file   • Years of education: Not on file   • Highest education level: Not on file   Tobacco Use   • Smoking status: Current Every Day Smoker     Packs/day: 1.00     Years: 55.00     Pack years: 55.00     Types: Cigarettes   • Smokeless tobacco: Never Used   Substance and Sexual Activity   • Alcohol use: Never   • Drug use: Never   • Sexual activity: Defer     ---------------------------------------------------------------------------------------------------------------------   Allergies:  Haldol [haloperidol]  ---------------------------------------------------------------------------------------------------------------------     Hospital Meds:  baclofen, 10 mg, Oral, Nightly  carvedilol, 6.25 mg, Oral, BID With Meals  dexamethasone, 6 mg, Intravenous, Nightly  insulin aspart, 0-7 Units, Subcutaneous, TID AC  nicotine, 1 patch, Transdermal, Q24H  remdesivir, 100 mg, Intravenous, Q24H  sodium chloride, 10 mL, Intravenous, Q12H  traZODone, 50 mg, Oral, Nightly          ---------------------------------------------------------------------------------------------------------------------     Objective     Vital Signs:  Temp:  [94.1 °F (34.5 °C)-97.8 °F (36.6 °C)] 95 °F (35 °C)  Heart Rate:  [67-98] 98  Resp:  [13-19] 18  BP: ()/(48-85) 151/67      09/21/21  0116 09/21/21  1930   Weight: 49 kg (108 lb) 42.1 kg (92 lb 12.8 oz)     Body mass index is 14.98 kg/m².  ---------------------------------------------------------------------------------------------------------------------   Physical exam:              Physical examination was not done as it was a telephone visit.                         Echocardiogram-done on 4/2021            Interpretation Summary    · Normal left ventricular cavity size noted. Left ventricular wall thickness is consistent with mild concentric hypertrophy  · Left ventricular ejection fraction appears to be 61 - 65%.  · Left ventricular diastolic function is consistent with (grade I) impaired relaxation.  · There is mild calcification of the aortic valvular cusps.  · No aortic valve regurgitation is present. Mild aortic valve stenosis is present.  · There are myxomatous changes of the mitral valve apparatus present. Mild mitral valve regurgitation is present. No significant mitral valve stenosis is present.  · . There is no evidence of pericardial effusion.                                Telemetry:  Atrial fibrillation with controlled ventricular rate    ---------------------------------------------------------------------------------------------------------------------   ECG:      Atrial fibrillation    ---------------------------------------------------------------------------------------------------------------------   Results from last 7 days   Lab Units 09/25/21  0707 09/24/21  1730 09/24/21  1254 09/24/21  0142 09/24/21  0142 09/23/21  1038 09/23/21  0156 09/23/21  0155 09/22/21  2115 09/22/21  1001 09/22/21  1001 09/21/21  1531 09/21/21  0824  09/21/21  0239   0000   CRP mg/dL 3.99*  --   --   --  6.65*  --   --  8.50*  --   --  12.03*  --   --  14.80*   < >   LACTATE mmol/L  --   --   --   --   --   --   --   --   --   --  0.6  --  0.5 0.5  --    WBC 10*3/mm3 13.25*  --   --   --  29.01*  --  38.96*  --   --    < > 43.77*   < >  --  33.96*  --    HEMOGLOBIN g/dL 8.6* 8.2* 8.9*   < > 10.1*   < > 6.4*  --    < >   < > 8.1*   < >  --  7.8*  --    HEMATOCRIT % 26.3* 26.8* 27.4*   < > 29.5*   < > 20.6*  --    < >   < > 26.4*   < >  --  24.0*  --    MCV fL 89.8  --   --   --  88.3  --  95.8  --   --    < > 96.0   < >  --  90.2  --    MCHC g/dL 32.7  --   --   --  34.2  --  31.1*  --   --    < > 30.7*   < >  --  32.5  --    PLATELETS 10*3/mm3 221  --   --   --  251  --  320  --   --    < > 418   < >  --  395  --    INR   --   --   --   --  1.08  --   --   --   --   --  1.04  --   --  0.99  --     < > = values in this interval not displayed.     Results from last 7 days   Lab Units 09/21/21  0124   PH, ARTERIAL pH units 7.414   PO2 ART mm Hg 79.4*   PCO2, ARTERIAL mm Hg 49.3*   HCO3 ART mmol/L 31.5*     Results from last 7 days   Lab Units 09/25/21  0707 09/24/21  0142 09/23/21  0155 09/22/21  2115 09/22/21  1001   SODIUM mmol/L 136 137 139  --    < >   POTASSIUM mmol/L 4.2 3.9 3.7  --    < >   MAGNESIUM mg/dL 2.1 1.7 2.5*  --    < >   CHLORIDE mmol/L 104 105 105  --    < >   CO2 mmol/L 24.0 24.6 23.7  --    < >   BUN mg/dL 16 18 16  --    < >   CREATININE mg/dL 0.40* 0.47* 0.59  --    < >   EGFR IF NONAFRICN AM mL/min/1.73 >150 129 99  --    < >   CALCIUM mg/dL 7.1* 7.5* 7.7*  --    < >   GLUCOSE mg/dL 111* 161* 113*  --    < >   ALBUMIN g/dL 2.14* 2.29*  --  1.94*  --    BILIRUBIN mg/dL <0.2 <0.2  --  <0.2  --    ALK PHOS U/L 64 71  --  60  --    AST (SGOT) U/L 14 13  --  24  --    ALT (SGPT) U/L 9 8  --  9  --     < > = values in this interval not displayed.   Estimated Creatinine Clearance: 39.8 mL/min (A) (by C-G formula based on SCr of 0.4 mg/dL (L)).  No  results found for: AMMONIA  Results from last 7 days   Lab Units 09/23/21  0155 09/22/21  1001 09/21/21  0459 09/21/21  0239   CK TOTAL U/L 65 65  --  27   TROPONIN T ng/mL  --  0.010 0.013 0.013     Results from last 7 days   Lab Units 09/22/21  1001 09/21/21  0239   PROBNP pg/mL 2,151.0* 1,572.0     Lab Results   Component Value Date    HGBA1C 4.90 09/22/2021     Lab Results   Component Value Date    TSH 1.570 09/21/2021    FREET4 1.50 09/21/2021     No results found for: PREGTESTUR, PREGSERUM, HCG, HCGQUANT  Pain Management Panel     Pain Management Panel Latest Ref Rng & Units 9/21/2021 8/13/2020    AMPHETAMINES SCREEN, URINE Negative Negative Negative    BARBITURATES SCREEN Negative Negative Negative    BENZODIAZEPINE SCREEN, URINE Negative Negative Negative    BUPRENORPHINEUR Negative Negative Negative    COCAINE SCREEN, URINE Negative Negative Negative    METHADONE SCREEN, URINE Negative Negative Negative    METHAMPHETAMINEUR Negative Negative -        Blood Culture   Date Value Ref Range Status   09/21/2021 No growth at 4 days  Preliminary   09/21/2021 No growth at 4 days  Preliminary     No results found for: URINECX  No results found for: WOUNDCX  No results found for: STOOLCX  Results from last 7 days   Lab Units 09/22/21  1001   TRIGLYCERIDES mg/dL 100     ---------------------------------------------------------------------------------------------------------------------   Radiology:    Results for orders placed during the hospital encounter of 07/19/20    CT Chest With Contrast    Narrative  CT CHEST WITH CONTRAST, PE PROTOCOL, 7/20/2020    HISTORY:  74-year-old female postop recent cholecystectomy on 7/17/2020. Shortness of air and decreased oxygen saturation is noted at nursing care facility tonight. Some nausea. History of COPD.    TECHNIQUE:  CT examination of the chest with IV contrast. CTA MIP multiplanar pulmonary artery images were reformatted with 3-D postprocessing. Radiation dose reduction  techniques included automated exposure control. Radiation audit for CT and nuclear cardiology  exams in the last 12 months: 1.    COMPARISON:  *  CT chest, 3/22/2020    FINDINGS:  No pulmonary embolism is demonstrated. No aortic aneurysm or dissection. Patent aortic arch vessels. Mild cardiomegaly, but no pericardial effusion.    Mild diffuse centrilobular emphysema, greatest at the lung apices. Central bronchial wall thickening may reflect acute or chronic bronchitis, and there is associated diffuse multifocal pulmonary air trapping. No visible pulmonary infiltrate or pleural  effusion.    Three pulmonary nodules measuring between five and 7 mm have not changed significantly since the previous study obtained 4 months ago. These remain indeterminant for malignancy, and continued to CT imaging follow-up is recommended as previously detailed.  No new lung nodule. No suspicious thoracic adenopathy.    Limited upper abdominal images show postoperative changes of recent cholecystectomy. Moderate fluid distended stomach. Distal esophageal wall thickening and a reflect active esophagitis. There is no hiatal hernia or esophageal dilatation.    Impression  1.  No evidence of pulmonary embolism or other acute vascular abnormality within the chest.  2.  Mild emphysema. Central bronchial wall thickening and multifocal pulmonary air trapping likely reflecting acute or chronic bronchitis. No acute pulmonary infiltrate.  3.  Three indeterminate pulmonary nodules as previously detailed, unchanged since recent study 4 months ago. Continued CT imaging follow-up as previously recommended.  4.  Distal esophageal wall thickening may reflect esophagitis.  5.  Moderately fluid distended stomach. Postop changes recent cholecystectomy.    Signer Name: Nicolas Pavon MD  Signed: 7/20/2020 3:05 AM  Workstation Name: [email or web address]  [facility]      No results found for this or any previous visit.      Results for  orders placed during the hospital encounter of 09/21/21    XR Chest 1 View    Narrative  CR Chest 1 Vw    INDICATION:  Mental status changes.    COMPARISON:  8/20/2021    FINDINGS:  Portable AP view(s) of the chest.  The cardiac and mediastinal contours are normal. There is atherosclerotic disease. There is mild infiltrate or atelectasis behind the heart at the left base. The lungs are otherwise clear. No pneumothorax is seen.  Stimulator wires are present.    Impression  Infiltrate or atelectasis at the left base.    Signer Name: Octavio Doyle MD  Signed: 9/21/2021 2:34 AM  Workstation Name: [facility]  [facility]      Results for orders placed during the hospital encounter of 09/21/21    CT Chest Pulmonary Embolism    Narrative  EXAM:  CT Angiography Chest With Intravenous Contrast    EXAM DATE:  9/23/2021 11:39 AM    CLINICAL HISTORY:  PE suspected, high prob; U07.1-COVID-19; J12.82-Pneumonia due to  coronavirus disease 2019; D72.829-Elevated white blood cell count,  unspecified; J96.11-Chronic respiratory failure with hypoxia    TECHNIQUE:  Axial computed tomographic angiography images of the chest with  intravenous contrast.  This CT exam was performed using one or more of  the following dose reduction techniques:  automated exposure control,  adjustment of the mA and/or kV according to patient size, and/or use of  iterative reconstruction technique.  MIP reconstructed images were created and reviewed.    COMPARISON:  08/20/2021    FINDINGS:  Pulmonary arteries:  Unremarkable.  No pulmonary embolism.  Aorta:  Again noted is extensive atherosclerotic disease of the aorta.  No thoracic aortic aneurysm.  Lungs:  Bibasilar atelectasis.  No mass.  Pleural space:  Tiny bilateral pleural effusions.  No pneumothorax.  Heart:  Cardiomegaly.  No significant pericardial effusion.  No  evidence of RV dysfunction.  Bones/joints:  No acute fracture.  No dislocation.  Soft tissues:   Unremarkable.  Lymph nodes:  Unremarkable.  No enlarged lymph nodes.    Impression  1.  Again noted is extensive atherosclerotic disease of the aorta.  2.  Tiny bilateral pleural effusions.  3.  Bibasilar atelectasis.  4.  Cardiomegaly.    This report was finalized on 9/23/2021 12:15 PM by Dr. Albert Varma MD.      I have personally reviewed the radiology images and read the final radiology report.        Assessment      1.  Persistent atrial fibrillation with controlled VR.  2.  CHADS2 vascular score-6  3.  Arteriosclerotic CAD status post stent.  Remote.  Details not known.  4.  S/p AAA repair  5.  Chronic diastolic heart failure.  Compensated  6.  Multiple cardiac risk factors-DM, hypertension, hyperlipidemia and smoking  7.  Upper GI bleed due to small gastric ulcers with acute blood loss anemia requiring blood transfusion.  8.  COVID-19 pneumonia  9.  COPD-O2 dependent    Recommendations     1.  Because of history of active GI bleed associated with acute blood loss anemia requiring blood transfusion, anticoagulation is not recommended at this time based on risk and benefit analysis.  I discussed this with the patient including the risk of stroke if not anticoagulated.  I will reevaluate in 6 to 8 weeks in my office and address the issue of anticoagulation based on history and trend of hemoglobin.  Please arrange for a follow-up visit in 6 to 8 weeks.    2.  Her ventricular rate is controlled well on carvedilol.    3.  Because of history of CAD and vascular disease, started enteric-coated aspirin 81 mg daily with a PPI, atorvastatin and lisinopril.    Thank you for the opportunity to participate in the care of your patient. Please do not hesitate to call with any questions or concerns.     Sean Riley MD, [facility],  09/25/21  14:42 EDT     Electronically signed by Sean Riley MD at 09/25/21 1500       Physical Therapy Notes (most recent note)    No notes exist for this encounter.

## 2021-10-07 NOTE — PLAN OF CARE
Goal Outcome Evaluation:         Pt is resting in bed. 1lnc. She has ate breakfast. She has no complaints this morning.

## 2021-10-07 NOTE — PLAN OF CARE
Goal Outcome Evaluation:  Plan of Care Reviewed With: patient        Progress: no change  Outcome Summary: pt resting in bed. does not appear to be in distress. no complaints at this time.

## 2021-10-07 NOTE — CASE MANAGEMENT/SOCIAL WORK
Discharge Planning Assessment  [facility]     Patient Name: Mary Ly  MRN: [id number]  Today's Date: 10/7/2021    Admit Date: 9/21/2021    Discharge Needs Assessment    No documentation.       Discharge Plan     Row Name 10/07/21 1551       Plan    Plan SS noted possible return to [facility] in 24-48 hours. SS faxed facility an update to fax [phone]. SS spoke with [facility] per [facility] and notfiied him of possible discharge soon. Per Nathan he will submit to Pt's insurance for pre-authorization. SS to follow.          Amelia Joya

## 2021-10-08 VITALS
HEIGHT: 66 IN | BODY MASS INDEX: 14.91 KG/M2 | TEMPERATURE: 98.2 F | DIASTOLIC BLOOD PRESSURE: 58 MMHG | HEART RATE: 80 BPM | RESPIRATION RATE: 16 BRPM | WEIGHT: 92.8 LBS | OXYGEN SATURATION: 96 % | SYSTOLIC BLOOD PRESSURE: 110 MMHG

## 2021-10-08 PROBLEM — D89.832 CYTOKINE RELEASE SYNDROME, GRADE 2: Status: ACTIVE | Noted: 2021-10-08

## 2021-10-08 LAB
ANION GAP SERPL CALCULATED.3IONS-SCNC: 8.1 MMOL/L (ref 5–15)
BASOPHILS # BLD AUTO: 0.05 10*3/MM3 (ref 0–0.2)
BASOPHILS NFR BLD AUTO: 0.4 % (ref 0–1.5)
BUN SERPL-MCNC: 12 MG/DL (ref 8–23)
BUN/CREAT SERPL: 44.4 (ref 7–25)
CALCIUM SPEC-SCNC: 7.5 MG/DL (ref 8.6–10.5)
CHLORIDE SERPL-SCNC: 96 MMOL/L (ref 98–107)
CO2 SERPL-SCNC: 29.9 MMOL/L (ref 22–29)
CREAT SERPL-MCNC: 0.27 MG/DL (ref 0.57–1)
DEPRECATED RDW RBC AUTO: 51 FL (ref 37–54)
EOSINOPHIL # BLD AUTO: 0.02 10*3/MM3 (ref 0–0.4)
EOSINOPHIL NFR BLD AUTO: 0.1 % (ref 0.3–6.2)
ERYTHROCYTE [DISTWIDTH] IN BLOOD BY AUTOMATED COUNT: 15.2 % (ref 12.3–15.4)
GFR SERPL CREATININE-BSD FRML MDRD: >150 ML/MIN/1.73
GLUCOSE BLDC GLUCOMTR-MCNC: 121 MG/DL (ref 70–130)
GLUCOSE BLDC GLUCOMTR-MCNC: 129 MG/DL (ref 70–130)
GLUCOSE SERPL-MCNC: 112 MG/DL (ref 65–99)
HCT VFR BLD AUTO: 25.4 % (ref 34–46.6)
HGB BLD-MCNC: 8.2 G/DL (ref 12–15.9)
IMM GRANULOCYTES # BLD AUTO: 0.07 10*3/MM3 (ref 0–0.05)
IMM GRANULOCYTES NFR BLD AUTO: 0.5 % (ref 0–0.5)
LYMPHOCYTES # BLD AUTO: 0.75 10*3/MM3 (ref 0.7–3.1)
LYMPHOCYTES NFR BLD AUTO: 5.6 % (ref 19.6–45.3)
MCH RBC QN AUTO: 29.9 PG (ref 26.6–33)
MCHC RBC AUTO-ENTMCNC: 32.3 G/DL (ref 31.5–35.7)
MCV RBC AUTO: 92.7 FL (ref 79–97)
MONOCYTES # BLD AUTO: 1.28 10*3/MM3 (ref 0.1–0.9)
MONOCYTES NFR BLD AUTO: 9.5 % (ref 5–12)
NEUTROPHILS NFR BLD AUTO: 11.32 10*3/MM3 (ref 1.7–7)
NEUTROPHILS NFR BLD AUTO: 83.9 % (ref 42.7–76)
NRBC BLD AUTO-RTO: 0 /100 WBC (ref 0–0.2)
PLATELET # BLD AUTO: 299 10*3/MM3 (ref 140–450)
PMV BLD AUTO: 9.9 FL (ref 6–12)
POTASSIUM SERPL-SCNC: 3.6 MMOL/L (ref 3.5–5.2)
POTASSIUM SERPL-SCNC: 4.9 MMOL/L (ref 3.5–5.2)
RBC # BLD AUTO: 2.74 10*6/MM3 (ref 3.77–5.28)
SODIUM SERPL-SCNC: 134 MMOL/L (ref 136–145)
WBC # BLD AUTO: 13.49 10*3/MM3 (ref 3.4–10.8)

## 2021-10-08 PROCEDURE — 82962 GLUCOSE BLOOD TEST: CPT

## 2021-10-08 PROCEDURE — 97163 PT EVAL HIGH COMPLEX 45 MIN: CPT

## 2021-10-08 PROCEDURE — 85025 COMPLETE CBC W/AUTO DIFF WBC: CPT | Performed by: SURGERY

## 2021-10-08 PROCEDURE — 80048 BASIC METABOLIC PNL TOTAL CA: CPT | Performed by: STUDENT IN AN ORGANIZED HEALTH CARE EDUCATION/TRAINING PROGRAM

## 2021-10-08 PROCEDURE — 99239 HOSP IP/OBS DSCHRG MGMT >30: CPT | Performed by: STUDENT IN AN ORGANIZED HEALTH CARE EDUCATION/TRAINING PROGRAM

## 2021-10-08 PROCEDURE — 84132 ASSAY OF SERUM POTASSIUM: CPT | Performed by: STUDENT IN AN ORGANIZED HEALTH CARE EDUCATION/TRAINING PROGRAM

## 2021-10-08 PROCEDURE — 97167 OT EVAL HIGH COMPLEX 60 MIN: CPT

## 2021-10-08 PROCEDURE — 99231 SBSQ HOSP IP/OBS SF/LOW 25: CPT | Performed by: INTERNAL MEDICINE

## 2021-10-08 RX ORDER — POTASSIUM CHLORIDE 20 MEQ/1
40 TABLET, EXTENDED RELEASE ORAL EVERY 4 HOURS
Status: COMPLETED | OUTPATIENT
Start: 2021-10-08 | End: 2021-10-08

## 2021-10-08 RX ORDER — FERROUS SULFATE 325(65) MG
325 TABLET ORAL
Status: ON HOLD
Start: 2021-10-19 | End: 2022-01-16

## 2021-10-08 RX ORDER — PANTOPRAZOLE SODIUM 40 MG/1
40 TABLET, DELAYED RELEASE ORAL DAILY
Qty: 30 TABLET | Refills: 0 | Status: SHIPPED | OUTPATIENT
Start: 2021-10-08 | End: 2021-11-07

## 2021-10-08 RX ORDER — GARLIC EXTRACT 500 MG
1 CAPSULE ORAL 2 TIMES DAILY
Start: 2021-10-19

## 2021-10-08 RX ORDER — BISMUTH SUBCITRATE POTASSIUM, METRONIDAZOLE, TETRACYCLINE HYDROCHLORIDE 140; 125; 125 MG/1; MG/1; MG/1
3 CAPSULE ORAL
Qty: 120 CAPSULE | Refills: 0 | Status: SHIPPED | OUTPATIENT
Start: 2021-10-08 | End: 2021-10-18

## 2021-10-08 RX ORDER — AMIODARONE HYDROCHLORIDE 400 MG/1
400 TABLET ORAL
Qty: 30 TABLET | Refills: 0 | Status: SHIPPED | OUTPATIENT
Start: 2021-10-09 | End: 2021-11-08

## 2021-10-08 RX ORDER — PANTOPRAZOLE SODIUM 40 MG/1
40 TABLET, DELAYED RELEASE ORAL 2 TIMES DAILY
Qty: 20 TABLET | Refills: 0 | Status: SHIPPED | OUTPATIENT
Start: 2021-10-08 | End: 2021-10-18

## 2021-10-08 RX ADMIN — SODIUM CHLORIDE, PRESERVATIVE FREE 10 ML: 5 INJECTION INTRAVENOUS at 08:55

## 2021-10-08 RX ADMIN — CLOPIDOGREL 75 MG: 75 TABLET, FILM COATED ORAL at 08:54

## 2021-10-08 RX ADMIN — LEVOTHYROXINE SODIUM 25 MCG: 25 TABLET ORAL at 07:24

## 2021-10-08 RX ADMIN — PANTOPRAZOLE SODIUM 40 MG: 40 TABLET, DELAYED RELEASE ORAL at 07:24

## 2021-10-08 RX ADMIN — POTASSIUM CHLORIDE 40 MEQ: 20 TABLET, EXTENDED RELEASE ORAL at 08:54

## 2021-10-08 RX ADMIN — POTASSIUM CHLORIDE 40 MEQ: 20 TABLET, EXTENDED RELEASE ORAL at 07:23

## 2021-10-08 RX ADMIN — AMIODARONE HYDROCHLORIDE 400 MG: 200 TABLET ORAL at 08:54

## 2021-10-08 RX ADMIN — CEFDINIR 300 MG: 300 CAPSULE ORAL at 08:54

## 2021-10-08 NOTE — PROGRESS NOTES
LOS: 17 days     Name: Mary Ly  Age/Sex: 76 y.o. female  :  1945        PCP: Leta Gardner MD  REF: No Known Provider    Principal Problem:    Pneumonia due to COVID-19 virus  Active Problems:    Upper GI bleed      Reason for follow-up: Atrial fibrillation     Subjective       Subjective     Mary Ly is a 76 year old female with a past medical history significant for atrial fibrillation, COPD, hyperlipidemia and diabetes mellitus type 2. Presented to the ED with shortness of breath and was found to have COVID-19 pneumonia. Cardiology was consulted for atrial fibrillation in the setting of GI bleed     Interval History: Patient reports she is feeling well today.  Out of COVID-19 isolation.  Denies any further melanotic stools.  She did convert to atrial flutter this a.m. with a controlled rate.    Vital Signs  Temp:  [97.9 °F (36.6 °C)-98.3 °F (36.8 °C)] 98.1 °F (36.7 °C)  Heart Rate:  [61-84] 84  Resp:  [16-22] 18  BP: ()/(54-77) 94/54     Vital Signs (last 72 hrs)       10/05 0700  -  10/06 0659 10/06 0700  -  10/07 0659 10/07 07  -  10/08 0659 10/08 07  -  10/08 0913   Most Recent    Temp (°F) 96.5 -  98.5    98.2 -  98.8    97.4 -  98.3       98.1 (36.7)    Heart Rate 63 -  79    64 -  70    61 -  96       84    Resp 14 -  20      18    16 -  22       18    /65 -  169/75    140/66 -  166/52    94/54 -  170/75       94/54    SpO2 (%) 95 -  99    92 -  99    93 -  98       93        Documented weights    21 0116 21 1930   Weight: 49 kg (108 lb) 42.1 kg (92 lb 12.8 oz)      Body mass index is 14.98 kg/m².    Intake/Output Summary (Last 24 hours) at 10/8/2021 0913  Last data filed at 10/8/2021 0300  Gross per 24 hour   Intake 660 ml   Output --   Net 660 ml     Objective    Objective     I have seen and examined Ms. Perez today.  Physical Exam:     General Appearance:    Alert, cooperative, in no acute distress   Head:    Normocephalic, without obvious  abnormality, atraumatic   Eyes:            Conjunctivae and sclerae normal, no   icterus, no pallor, corneas clear.   Neck:   No adenopathy, supple, trachea midline, no thyromegaly, no   carotid bruit, no JVD   Lungs:     Clear to auscultation,respirations regular, even and                  unlabored    Heart:    irregular rhythm and normal rate, normal S1 and S2, no            murmur, no gallop, no rub, no click   Chest Wall:    No abnormalities observed   Abdomen:     Normal bowel sounds, no masses, no organomegaly, soft        non-tender, non-distended, no guarding, no rebound                tenderness   Extremities:   Moves all extremities well, no edema, no cyanosis, no             redness   Pulses:   Pulses palpable and equal bilaterally   Skin:   No bleeding, bruising or rash       Neurologic:   Alert and oriented      Results review       Results Review:   Results from last 7 days   Lab Units 10/08/21  0411 10/07/21  0349 10/06/21  0518 10/05/21  1627 10/05/21  0258 10/03/21  2305 10/03/21  1213 10/02/21  0859 10/02/21  0551   WBC 10*3/mm3 13.49* 16.91* 26.54*  --  45.51* 33.74* 25.28*  --  27.15*   HEMOGLOBIN g/dL 8.2* 7.6* 8.0* 9.3* 5.8* 7.6* 6.3*   < > 7.1*   PLATELETS 10*3/mm3 299 289 259  --  301 350 300  --  363    < > = values in this interval not displayed.     Results from last 7 days   Lab Units 10/08/21  0411 10/06/21  0518 10/05/21  0258 10/03/21  2305 10/03/21  0023 10/02/21  0551   SODIUM mmol/L 134* 133* 134* 134* 134* 133*   POTASSIUM mmol/L 3.6 3.8 4.4 4.4 4.2 4.4   CHLORIDE mmol/L 96* 96* 96* 96* 95* 94*   CO2 mmol/L 29.9* 32.1* 32.7* 31.1* 37.8* 31.5*   BUN mg/dL 12 15 20 20 15 16   CREATININE mg/dL 0.27* 0.33* 0.42* 0.45* 0.31* 0.40*   CALCIUM mg/dL 7.5* 7.6* 7.7* 8.1* 7.7* 7.8*   GLUCOSE mg/dL 112* 70 135* 165* 107* 111*   ALT (SGPT) U/L  --   --   --  10  --  9   AST (SGOT) U/L  --   --   --  16  --  15         Lab Results   Component Value Date    INR 1.83 (H) 10/04/2021    INR 1.00  09/29/2021    INR 1.13 (H) 09/26/2021    INR 1.08 09/24/2021    INR 1.04 09/22/2021    INR 0.99 09/21/2021    INR 1.26 (H) 05/22/2021     Lab Results   Component Value Date    MG 2.2 10/05/2021    MG 1.9 10/03/2021    MG 2.3 10/03/2021     Lab Results   Component Value Date    TSH 44.500 (H) 10/03/2021    TRIG 100 09/22/2021      Imaging Results (Last 48 Hours)     Procedure Component Value Units Date/Time    US Venous Doppler Lower Extremity Bilateral (duplex) [[id number]] Collected: 10/06/21 1454     Updated: 10/06/21 1457    Narrative:      US VENOUS DOPPLER LOWER EXTREMITY BILATERAL (DUPLEX)-     CLINICAL INDICATION: follow up previous possible dvt of lower extremity,  unable to be able to anticoagulate; U07.1-COVID-19; J12.82-Pneumonia due  to coronavirus disease 2019; D72.829-Elevated white blood cell count,  unspecified; J96.11-Chronic respiratory failure with hypoxia;  K92.2-Gastrointestinal hemorrhage, unspecified        COMPARISON: 09/28/2021      TECHNIQUE: Color Doppler imaging was used with compression and  augmentation to evaluate the lower extremity deep venous system.     FINDINGS:   There is patent spontaneous flow from the common femoral vein through  the posterior tibial veins.  There was no internal clot or area of noncompressibility.  Normal augmentation was elicited where applicable.       Impression:      No DVT in the lower extremities on today's exam.      This report was finalized on 10/6/2021 2:55 PM by Dr. River Zaidi MD.           Echo   Results for orders placed during the hospital encounter of 04/22/21    Adult Transthoracic Echo Complete W/ Cont if Necessary Per Protocol    Interpretation Summary  · Normal left ventricular cavity size noted. Left ventricular wall thickness is consistent with mild concentric hypertrophy  · Left ventricular ejection fraction appears to be 61 - 65%.  · Left ventricular diastolic function is consistent with (grade I) impaired relaxation.  · There is mild  calcification of the aortic valvular cusps.  · No aortic valve regurgitation is present. Mild aortic valve stenosis is present.  · There are myxomatous changes of the mitral valve apparatus present. Mild mitral valve regurgitation is present. No significant mitral valve stenosis is present.  · . There is no evidence of pericardial effusion.     I reviewed the patient's new clinical results.    Telemetry: Atrial flutter 70 to 80 bpm     Medication Review:   amiodarone, 400 mg, Oral, Q24H  atorvastatin, 20 mg, Oral, Nightly  baclofen, 10 mg, Oral, Nightly  cefdinir, 300 mg, Oral, Q12H  clopidogrel, 75 mg, Oral, Daily  insulin aspart, 0-7 Units, Subcutaneous, TID AC  levothyroxine, 25 mcg, Oral, Q AM  nicotine, 1 patch, Transdermal, Q24H  pantoprazole, 40 mg, Oral, Q AM  sodium chloride, 10 mL, Intravenous, Q12H  traZODone, 50 mg, Oral, Nightly             Assessment      Assessment:  Paroxysmal atrial flutter/fibrillation, patient is back in atrial flutter with controlled ventricular rate  Recurrent GI bleeding requiring discontinuation of anticoagulation and packed RBC transfusion.  H&H has been relatively stable.  Superior mesenteric artery atherosclerosis, being managed with Plavix per hospital service  Recent DVT, apparently resolved.    Plan     Recommendations:  Continue with amiodarone for now.  Not able to anticoagulate for stroke prevention due to recurrent GI bleed with severe anemia requiring blood transfusion  I have discussed with her about the option of watchman device placement later on at [facility].  This will be arranged later on after discharge if patient does not have any recurrent significant GI bleed in the near future..    I discussed the patients findings and my recommendations with patient.      Electronically signed by QUIQUE Solitario, 10/08/21, 9:13 AM EDT.    Electronically signed by Tremaine Rust MD, 10/08/21, 5:30 PM EDT.      Please note that portions of this note  were completed with a voice recognition program.

## 2021-10-08 NOTE — CASE MANAGEMENT/SOCIAL WORK
Discharge Planning Assessment  [facility]     Patient Name: Mary Ly  MRN: [id number]  Today's Date: 10/8/2021    Admit Date: 9/21/2021      Discharge Plan     Row Name 10/08/21 1022       Plan    Plan SS [facility] who states pt has a bed available and can return today. SS notified Dr. Shaw. SS received call from [facility] per Jordana stating pt will need PT/OT evaluations before returning. SS notified Dr. Shaw. SS to follow.    13:35 pm: SS notified OT, Arturo Roper that pt needed to be seen as soon as possible. PT/OT evaulations completed. SS faxed PT/OT evaluations to [facility] and notified Azam. [facility] states pt can return today. SS notified Dr. Shaw. SS to follow.        Continued Care and Services - Admitted Since 9/21/2021     Destination Coordination complete.    Service Provider Request Status Selected Services Address Phone Fax Patient Preferred    [facility]   Selected Skilled Nursing [address], [facility] [id number] [phone] --              SEBLE Reeder

## 2021-10-08 NOTE — PROGRESS NOTES
[facility] HOSPITALIST PROGRESS NOTE     Patient Identification:  Name:  Mary Ly  Age:  76 y.o.  Sex:  female  :  1945  MRN:  [id number]  Visit Number:  [id number]  ROOM: [address] [place] [address]     Primary Care Provider:  Leta Gardner MD    Length of stay in inpatient status:  16    Subjective     Chief Compliant:    Chief Complaint   Patient presents with   • Shortness of Breath       History of Presenting Illness: Patient seen and evaluated in follow-up for recurrent anemia, colitis, left popliteal DVT as well as recent Covid pneumonia.  Patient today at time of examination denies any acute complaints. Patient feeling much better today sitting up in bed eating at time of examination.    Objective     Current Hospital Meds:amiodarone, 400 mg, Oral, Q24H  atorvastatin, 20 mg, Oral, Nightly  baclofen, 10 mg, Oral, Nightly  cefdinir, 300 mg, Oral, Q12H  insulin aspart, 0-7 Units, Subcutaneous, TID AC  levothyroxine, 25 mcg, Oral, Q AM  nicotine, 1 patch, Transdermal, Q24H  pantoprazole, 40 mg, Oral, Q AM  sodium chloride, 10 mL, Intravenous, Q12H  traZODone, 50 mg, Oral, Nightly         Current Antimicrobial Therapy:  Anti-Infectives (From admission, onward)    Ordered     Dose/Rate Route Frequency Start Stop    10/02/21 0901  cefdinir (OMNICEF) capsule 300 mg     Majo Velazco, PharmD reviewed the order on 10/04/21 1658.   Ordering Provider: Austyn López MD    300 mg Oral Every 12 Hours Scheduled 10/02/21 1030 10/09/21 0859    21 0911  metroNIDAZOLE (FLAGYL) tablet 500 mg     Majo Velazco, PharmD reviewed the order on 21 1219.   Ordering Provider: Lonnie Meneses MD    500 mg Oral Every 8 Hours Scheduled 21 1100 10/04/21 0524    21 1856  remdesivir 100 mg in 270 mL NS     Ordering Provider: Eliseo Higgins MD    100 mg  over 60 Minutes Intravenous Every 24 Hours 21 2000 21 2231    21 1856  remdesivir 200 mg in 290 mL NS     Ordering Provider:  Eliseo Higgins MD    200 mg  over 60 Minutes Intravenous Every 24 Hours 09/22/21 2000 09/22/21 2301 09/21/21 0802  vancomycin 1 g/250 mL 0.9% NS (vial-mate)     Ordering Provider: Erasmo Singleton MD    20 mg/kg × 49 kg  over 1 Hours Intravenous Once 09/21/21 0830 09/21/21 1356    09/21/21 0802  piperacillin-tazobactam (ZOSYN) IVPB 4.5 g in 100 mL NS VTB     Ordering Provider: Erasmo Singleton MD    4.5 g  over 30 Minutes Intravenous Once 09/21/21 0804 09/21/21 0929        Current Diuretic Therapy:  Diuretics (From admission, onward)    Ordered     Dose/Rate Route Frequency Start Stop    10/01/21 1716  bumetanide (BUMEX) injection 0.5 mg     Ordering Provider: Austyn López MD    0.5 mg Intravenous Once 10/01/21 1800 10/01/21 1741    09/30/21 1700  bumetanide (BUMEX) injection 0.5 mg     Ordering Provider: Austyn López MD    0.5 mg Intravenous Once 09/30/21 1900 09/30/21 2056    09/29/21 1313  bumetanide (BUMEX) injection 0.5 mg     Ordering Provider: Austyn López MD    0.5 mg Intravenous Once 09/29/21 1500 09/29/21 1539        ----------------------------------------------------------------------------------------------------------------------  Vital Signs:  Temp:  [97.4 °F (36.3 °C)-98.3 °F (36.8 °C)] 98.3 °F (36.8 °C)  Heart Rate:  [61-96] 73  Resp:  [18-22] 18  BP: (128-154)/(65-77) 154/65  SpO2:  [95 %-98 %] 96 %  on  Flow (L/min):  [1] 1;   Device (Oxygen Therapy): nasal cannula  Body mass index is 14.98 kg/m².    Wt Readings from Last 3 Encounters:   09/21/21 42.1 kg (92 lb 12.8 oz)   08/20/21 50.8 kg (112 lb)   06/01/21 49.1 kg (108 lb 4.8 oz)     Intake & Output (last 3 days)       10/05 0701 - 10/06 0700 10/06 0701 - 10/07 0700 10/07 0701 - 10/08 0700    P.O. 260 600 720    I.V. (mL/kg)       Blood 600      Total Intake(mL/kg) 860 (20.4) 600 (14.3) 720 (17.1)    Net +860 +600 +720           Urine Unmeasured Occurrence 4 x 5 x 1 x    Stool Unmeasured Occurrence 4 x 3 x 0 x         Diet Soft Texture; Ground  ----------------------------------------------------------------------------------------------------------------------  Physical exam:  Constitutional: Elderly, frail, chronically ill-appearing elderly female laying in bed, NAD   HENT:  Head:  Normocephalic and atraumatic.  Mouth:  Moist mucous membranes.    Eyes:  Conjunctivae and EOM are normal. No scleral icterus.    Neck:  Neck supple.  No JVD present.    Cardiovascular:  Normal rate, regular rhythm and normal heart sounds with no murmur.  Pulmonary/Chest:  No respiratory distress, no wheezes, no crackles, with diminished breath sounds at the base.  Abdominal:  Soft.  Bowel sounds are normal.  No distension and no tenderness.   Musculoskeletal:  No edema, no tenderness, and no deformity.    Neurological:  Alert and oriented to person, place, and time.  No cranial nerve deficit.   Skin:  Skin is warm and dry. No rash or lesion noted. No pallor.   Peripheral vascular:  Pulses in all 4 extremities with no clubbing, no cyanosis, no edema.  Psychiatric: Appropriate mood and affect, pleasant.   ----------------------------------------------------------------------------------------------------------------------  Tele:    ----------------------------------------------------------------------------------------------------------------------  Results from last 7 days   Lab Units 10/07/21  0349 10/06/21  0518 10/05/21  1627 10/05/21  0258 10/05/21  0258 10/04/21  1042 10/03/21  2305 10/03/21  2305 10/03/21  1213 10/03/21  0023 10/02/21  0859 10/02/21  0551   CRP mg/dL  --   --   --   --   --   --   --  6.88*  --  1.83*  --  0.68*   WBC 10*3/mm3 16.91* 26.54*  --   --  45.51*  --    < > 33.74*   < >  --   --  27.15*   HEMOGLOBIN g/dL 7.6* 8.0* 9.3*   < > 5.8*  --    < > 7.6*   < >  --    < > 7.1*   HEMATOCRIT % 24.3* 24.0* 27.8*   < > 18.1*  --    < > 23.7*   < >  --    < > 22.6*   MCV fL 93.8 92.0  --   --  92.3  --    < > 90.5   < >  --    --  89.7   MCHC g/dL 31.3* 33.3  --   --  32.0  --    < > 32.1   < >  --   --  31.4*   PLATELETS 10*3/mm3 289 259  --   --  301  --    < > 350   < >  --   --  363   INR   --   --   --   --   --  1.83*  --   --   --   --   --   --     < > = values in this interval not displayed.         Results from last 7 days   Lab Units 10/06/21  0518 10/05/21  0258 10/03/21  2305 10/03/21  0023 10/03/21  0023 10/02/21  0551 10/02/21  0551 10/01/21  0507 10/01/21  0507   SODIUM mmol/L 133* 134* 134*   < > 134*   < > 133*   < > 135*   POTASSIUM mmol/L 3.8 4.4 4.4   < > 4.2   < > 4.4   < > 4.6   MAGNESIUM mg/dL  --  2.2 1.9  --  2.3   < > 1.6  --   --    CHLORIDE mmol/L 96* 96* 96*   < > 95*   < > 94*   < > 98   CO2 mmol/L 32.1* 32.7* 31.1*   < > 37.8*   < > 31.5*   < > 33.3*   BUN mg/dL 15 20 20   < > 15   < > 16   < > 17   CREATININE mg/dL 0.33* 0.42* 0.45*   < > 0.31*   < > 0.40*   < > 0.40*   EGFR IF NONAFRICN AM mL/min/1.73 >150 147 135   < > >150   < > >150   < > >150   CALCIUM mg/dL 7.6* 7.7* 8.1*   < > 7.7*   < > 7.8*   < > 7.9*   GLUCOSE mg/dL 70 135* 165*   < > 107*   < > 111*   < > 100*   ALBUMIN g/dL  --   --  2.49*  --   --   --  2.25*  --  2.31*   BILIRUBIN mg/dL  --   --  0.4  --   --   --  <0.2  --  <0.2   ALK PHOS U/L  --   --  53  --   --   --  47  --  49   AST (SGOT) U/L  --   --  16  --   --   --  15  --  11   ALT (SGPT) U/L  --   --  10  --   --   --  9  --  8    < > = values in this interval not displayed.   Estimated Creatinine Clearance: 39.8 mL/min (A) (by C-G formula based on SCr of 0.33 mg/dL (L)).  No results found for: AMMONIA      Results from last 7 days   Lab Units 10/02/21  0551   PROBNP pg/mL 890.1         Glucose   Date/Time Value Ref Range Status   10/07/2021 1625 102 70 - 130 mg/dL Final     Comment:     Meter: [id number] : [id number] Raegan Alas   10/07/2021 1039 96 70 - 130 mg/dL Final     Comment:     Meter: [id number] : [id number] Raegan Alas   10/07/2021 0658 97 70 - 130 mg/dL  Final     Comment:     Meter: [id number] : [id number] Ethan Osuna   10/06/2021 2101 129 70 - 130 mg/dL Final     Comment:     Meter: [id number] : [id number] MARCOS SWANSON   10/06/2021 1620 120 70 - 130 mg/dL Final     Comment:     Meter: [id number] : [id number] PRISCA BOO   10/06/2021 1030 92 70 - 130 mg/dL Final     Comment:     Meter: [id number] : [id number] PRISCA BOO   10/06/2021 0710 79 70 - 130 mg/dL Final     Comment:     Meter: [id number] : [id number] Raegan Alas   10/05/2021 2104 99 70 - 130 mg/dL Final     Comment:     Meter: [id number] : [id number] shi morris     Lab Results   Component Value Date    TSH 44.500 (H) 10/03/2021    FREET4 0.24 (L) 10/02/2021     No results found for: PREGTESTUR, PREGSERUM, HCG, HCGQUANT  Pain Management Panel     Pain Management Panel Latest Ref Rng & Units 9/21/2021 8/13/2020    AMPHETAMINES SCREEN, URINE Negative Negative Negative    BARBITURATES SCREEN Negative Negative Negative    BENZODIAZEPINE SCREEN, URINE Negative Negative Negative    BUPRENORPHINEUR Negative Negative Negative    COCAINE SCREEN, URINE Negative Negative Negative    METHADONE SCREEN, URINE Negative Negative Negative    METHAMPHETAMINEUR Negative Negative -        Brief Urine Lab Results  (Last result in the past 365 days)      Color   Clarity   Blood   Leuk Est   Nitrite   Protein   CREAT   Urine HCG        09/21/21 0404 Yellow Cloudy Moderate (2+) Small (1+) Negative 30 mg/dL (1+)             No results found for: BLOODCX  No results found for: URINECX  No results found for: WOUNDCX  No results found for: STOOLCX  No results found for: RESPCX  No results found for: AFBCX  Results from last 7 days   Lab Units 10/03/21  2305 10/03/21  0023 10/02/21  0551   CRP mg/dL 6.88* 1.83* 0.68*       I have personally looked at the labs and they are summarized  above.  ----------------------------------------------------------------------------------------------------------------------  Detailed radiology reports for the last 24 hours:    Imaging Results (Last 24 Hours)     ** No results found for the last 24 hours. **        Assessment & Plan      Acute on chronic anemia    -Patient with declining hemoglobin over the weekend when initiated on Eliquis and required transfusion of 1 unit.  Attempted to place back on heparin but patient with recurrent melena today and heparin stopped and aspirin held as well    -Due to inability to tolerate anticoagulation, patient may ultimately be a candidate for watchman device placement due to her atrial fibrillation.  Repeat duplex showed no evidence of DVT and therefore after discussion with general surgery and then when I discussed with the patient was in agreement with not having IVC filter placed.      -Due to inability to tolerate anticoagulation to discuss with cardiology and would be a good candidate potentially for watchman device although this is complicated by placement of watchman device does require 3 months of anticoagulation.  Regardless would benefit from CT surgery referral.    -Patient has undergone endoscopies with no source of bleeding found during this hospitalization.    -Continue PPI    Colitis  Leukocytosis  Chronic Mesenteric Ischemia    -Finishing course of Omnicef and Flagyl.  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    -Leukocytosis likely related to anemia and reduced intravascular volume status given her past recurrent leukocytosis when having acute on chronic mesenteric ischemia in the past    -Continue to trend hemoglobin    -Restart Plavix  tomorrow a.m.    Left popliteal DVT, ruled out    -Duplex of lower extremity on 9/24 with no evidence of DVT, and duplex on 9/28 with findings concerning for possible DVT with repeat obtained today being negative for DVT.    -Given these findings and after discussion with surgery and consideration of patient's A. fib with likely need for watchman device at some point due to inability to tolerate anticoagulation will not proceed with IVC filter.    Covid pneumonia    -Completed course of dexamethasone and Remdesivir    Hypothyroidism    -Continue low-dose Synthroid, new diagnosis and initiated at this hospitalization    Atrial fibrillation    -Currently rate control and maintaining sinus rhythm on amiodarone.    CAD s/p PCI    -We will restart Plavix in the a.m.    H. pylori positive  Gastritis    -Continue PPI, recommend initiation of therapy outpatient.      VTE Prophylaxis:   Mechanical Order History:      Ordered        09/24/21 1223  Place Sequential Compression Device  Once         09/24/21 1223  Maintain Sequential Compression Device  Continuous                 Pharmalogical Order History:      Ordered     Dose Route Frequency Stop    10/04/21 0942  heparin (porcine) 5000 UNIT/ML injection 2,500 Units      60 Units/kg IV Once 10/04/21 1302    10/04/21 0942  heparin 50745 units/250 mL (100 units/mL) in 0.45 % NaCl infusion  5.05 mL/hr      12 Units/kg/hr IV Titrated --    10/04/21 0942  heparin (porcine) 5000 UNIT/ML injection 1,300 Units      30 Units/kg IV As Needed --    10/04/21 0942  heparin (porcine) 5000 UNIT/ML injection 2,500 Units      60 Units/kg IV As Needed --    10/01/21 1616  apixaban (ELIQUIS) tablet 5 mg  Status:  Discontinued      5 mg PO Every 12 Hours Scheduled 10/03/21 1322    09/29/21 0748  heparin 96327 units/250 mL (100 units/mL) in 0.45 % NaCl infusion  5.05 mL/hr,   Status:  Discontinued      12 Units/kg/hr IV Titrated 10/01/21 1616    09/29/21 0748  heparin (porcine) 5000 UNIT/ML  injection 2,500 Units  Status:  Discontinued      60 Units/kg IV As Needed 10/01/21 1616 09/29/21 0748  heparin (porcine) 5000 UNIT/ML injection 1,300 Units  Status:  Discontinued      30 Units/kg IV As Needed 10/01/21 1616    09/22/21 1922  enoxaparin (LOVENOX) syringe 40 mg  Status:  Discontinued      40 mg SC 2 Times Daily 09/22/21 2103    09/22/21 1908  Pharmacy to Dose enoxaparin (LOVENOX)  Status:  Discontinued      -- XX Continuous PRN 09/24/21 1223    09/21/21 1943  enoxaparin (LOVENOX) syringe 40 mg  Status:  Discontinued      40 mg SC Every 24 Hours 09/22/21 1908 09/21/21 1937  Pharmacy to Dose enoxaparin (LOVENOX)  Status:  Discontinued     Question:  Indication of use  Answer:  Prophylaxis    -- XX Continuous PRN 09/21/21 1943                Disposition pending clinical course, but likely return to SNF in the next 24 to 48 hours pending bed availability.    Yariel Shaw DO  [facility] Hospitalist  10/07/21  20:21 EDT

## 2021-10-08 NOTE — DISCHARGE SUMMARY
[facility] HOSPITALISTS DISCHARGE SUMMARY    Patient Identification:  Name:  Mary Ly  Age:  76 y.o.  Sex:  female  :  1945  MRN:  [id number]  Visit Number:  [id number]    Date of Admission: 2021  Date of Discharge:  10/8/2021     PCP: Leta Gardner MD    DISCHARGE DIAGNOSIS    Acute on chronic anemia  Peptic ulcer disease  Chronic gastritis  Colitis  Leukocytosis  Chronic Mesenteric Ischemia  Covid pneumonia  Hypothyroidism  Atrial fibrillation  CAD s/p PCI  H. pylori positive  Gastritis  CONSULTS     General surgery  Cardiology  PROCEDURES PERFORMED      HOSPITAL COURSE  Patient is a 76 y.o. female presented to [facility] complaining of shortness of breath.  Please see the admitting history and physical for further details.      Patient admitted to the hospitalist service for further evaluation and treatment of acute hypoxemic respiratory failure secondary to COVID-19.  Patient did ultimately end up requiring 4 L nasal cannula before being able to titrated back down.  Patient was planned to return back to skilled nursing facility after improvement in her oxygenation and recovery from Covid however patient began having melena.  She required 2 units of PRBCs and underwent endoscopy that showed healing ulcers and gastritis.  She was started on twice daily Protonix.  Patient was also treated with antibiotics for colitis seen on imaging.  Patient has a history of paroxysmal atrial fibrillation and was noted to be in A. fib but not anticoagulated due to GI bleeding that she had had.  She was however found to have an elevated D-dimer that initially came down but then began rising again.  An initial duplex of the lower extremity at admission was negative for DVT however repeat 4 days later returned with findings concerning for DVT.  She was initiated on heparin and then transition to Eliquis after tolerating for several days however shortly after transitioning to Eliquis  she began having declining hemoglobin and required repeat blood transfusion.  Patient had improvement in her blood counts and remained stable and anticoagulation was attempted once more as there was no clinical evidence of bleeding at the time of first drop in hemoglobin however on second attempt with heparin gtt. patient ultimately developed melena for a second time and this was stopped.  As patient was unable to tolerate anticoagulation and there was concern for DVT discussion was had with general surgery regarding IVC filter placement.  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  In discussing this with surgery as well as the fact the patient has a history of A. fib and discussion with cardiology as patient would be an excellent candidate for a watchman device at some point however IVC would complicate this decision was made to repeat duplex of the lower extremity as there had been findings concerning for DVT but no clear evidence of DVT noted.  Repeat duplex ultimately returned showing no evidence of DVT in the lower extremity with good compressibility.  As such after discussion with surgery and cardiology plans for IVC filter were stopped as well as any plans for anticoagulation at this point in time.  Patient however was initiated and continued on amiodarone per cardiology.  However patient was resumed on Plavix given her history of GI bleeding but aspirin was withheld and dual antiplatelet therapy is to be avoided for now.  Cardiology will follow up with the patient after discharge and arrange for referral to cardiac thoracic surgery for evaluation of watchman device placement.  Patient's hemoglobin remained stable after the above set events and she was in stable medical condition eating well and requesting to be able return to skilled nursing facility at time of  her acceptance back to facility on 10/8/2021.  Patient also noted to be positive for H. pylori during the hospitalization and treatment not initially started due to ongoing acute events with bleeding however at time of discharge patient was stable and patient was provided prescriptions for quadruple therapy for treatment of H. pylori for the next 10 days.  Patient was discharged back to SNF on 10/8/2021 in stable medical condition and will follow up with her PCP in 1 week and will follow up with cardiology in 3 to 4 weeks.        VITAL SIGNS:  Temp:  [98.1 °F (36.7 °C)-98.3 °F (36.8 °C)] 98.2 °F (36.8 °C)  Heart Rate:  [67-84] 80  Resp:  [16-20] 16  BP: ()/(54-77) 110/58  SpO2:  [93 %-96 %] 96 %  on  Flow (L/min):  [1] 1;   Device (Oxygen Therapy): nasal cannula    Body mass index is 14.98 kg/m².  Wt Readings from Last 3 Encounters:   09/21/21 42.1 kg (92 lb 12.8 oz)   08/20/21 50.8 kg (112 lb)   06/01/21 49.1 kg (108 lb 4.8 oz)       PHYSICAL EXAM:  Constitutional: Elderly, frail, chronically ill-appearing elderly female laying in bed, NAD   HENT:  Head:  Normocephalic and atraumatic.  Mouth:  Moist mucous membranes.    Eyes:  Conjunctivae and EOM are normal. No scleral icterus.    Neck:  Neck supple.  No JVD present.    Cardiovascular:  Normal rate, regular rhythm and normal heart sounds with no murmur.  Pulmonary/Chest:  No respiratory distress, no wheezes, no crackles, with diminished breath sounds at the base.  Abdominal:  Soft.  Bowel sounds are normal.  No distension and no tenderness.   Musculoskeletal:  No edema, no tenderness, and no deformity.    Neurological:  Alert and oriented to person, place, and time.  No cranial nerve deficit.   Skin:  Skin is warm and dry. No rash or lesion noted. No pallor.   Peripheral vascular:  Pulses in all 4 extremities with no clubbing, no cyanosis, no edema.  Psychiatric: Appropriate mood and affect, pleasant.     DISCHARGE DISPOSITION   Stable    DISCHARGE  MEDICATIONS:     Discharge Medications      New Medications      Instructions Start Date   amiodarone 400 MG tablet  Commonly known as: PACERONE   400 mg, Oral, Every 24 Hours Scheduled   Start Date: October 9, 2021     Pylera 140-125-125 MG per capsule  Generic drug: bismuth-metronidazole-tetracycline   3 capsules, Oral, 4 Times Daily Before Meals & Nightly         Changes to Medications      Instructions Start Date   pantoprazole 40 MG EC tablet  Commonly known as: PROTONIX  What changed: when to take this   40 mg, Oral, 2 times daily      pantoprazole 40 MG EC tablet  Commonly known as: PROTONIX  What changed: You were already taking a medication with the same name, and this prescription was added. Make sure you understand how and when to take each.   40 mg, Oral, Daily, Start taking after completing 10 day course of twice a day         Continue These Medications      Instructions Start Date   atorvastatin 40 MG tablet  Commonly known as: LIPITOR   40 mg, Oral, Nightly      baclofen 10 MG tablet  Commonly known as: LIORESAL   10 mg, Oral, Nightly      bisacodyl 10 MG suppository  Commonly known as: DULCOLAX   10 mg, Rectal, Daily PRN      Cariprazine HCl 1.5 MG capsule capsule  Commonly known as: VRAYLAR   1.5 mg, Oral, Daily      carvedilol 6.25 MG tablet  Commonly known as: COREG   6.25 mg, Oral, 2 Times Daily With Meals      clopidogrel 75 MG tablet  Commonly known as: PLAVIX   75 mg, Oral, Daily      docusate sodium 250 MG capsule  Commonly known as: COLACE   250 mg, Oral, Daily      HYDROcodone-acetaminophen 5-325 MG per tablet  Commonly known as: NORCO   1 tablet, Oral, Every 8 Hours PRN      ipratropium-albuterol 0.5-2.5 mg/3 ml nebulizer  Commonly known as: DUO-NEB   3 mL, Nebulization, 4 Times Daily PRN      lactulose 10 GM/15ML solution  Commonly known as: CHRONULAC   10 g, Oral, Daily PRN      levothyroxine 125 MCG tablet  Commonly known as: SYNTHROID, LEVOTHROID   250 mcg, Oral, Daily      loperamide  2 MG capsule  Commonly known as: IMODIUM   2 mg, Oral, Every 6 Hours PRN      melatonin 5 MG tablet tablet   5 mg, Oral, Nightly      montelukast 10 MG tablet  Commonly known as: SINGULAIR   10 mg, Oral, Daily      multivitamin tablet tablet  Generic drug: multivitamin   1 tablet, Oral, Nightly      ondansetron 4 MG tablet  Commonly known as: ZOFRAN   4 mg, Oral, Every 6 Hours PRN      promethazine 12.5 MG tablet  Commonly known as: PHENERGAN   12.5 mg, Oral, Every 8 Hours PRN      Senna 8.6 MG tablet  Generic drug: senna   1 tablet, Oral, Daily PRN      SITagliptin 100 MG tablet  Commonly known as: JANUVIA   100 mg, Oral, Daily      Stiolto Respimat 2.5-2.5 MCG/ACT aerosol solution inhaler  Generic drug: tiotropium bromide-olodaterol   2 puffs, Inhalation, Daily      traZODone 50 MG tablet  Commonly known as: DESYREL   50 mg, Oral, Nightly         Stop These Medications    ACIDOPHILUS/PECTIN PO     amLODIPine 10 MG tablet  Commonly known as: NORVASC     aspirin 81 MG chewable tablet     ferrous sulfate 325 (65 FE) MG tablet     lisinopril 10 MG tablet  Commonly known as: PRINIVIL,ZESTRIL     predniSONE 5 MG tablet  Commonly known as: DELTASONE              Additional Instructions for the Follow-ups that You Need to Schedule     Discharge Follow-up with PCP   As directed       Currently Documented PCP:    Leta Gardner MD    PCP Phone Number:    [phone]     Follow Up Details: 1 week post hospital follow up         Discharge Follow-up with Specialty: Cardiology; 3 Weeks   As directed      Specialty: Cardiology    Follow Up: 3 Weeks    Follow Up Details: 3-4 weeks with Dr. Rust office            Contact information for follow-up providers     Leta Gardner MD .    Specialty: Geriatric Medicine  Why: 1 week post hospital follow up  Contact information:  [address]  [id number]  [facility] [address] [phone]                   Contact information for after-discharge care     Destination     [facility] .    Service: Skilled Nursing  Contact information:  [address] [place] [address]  [phone]                              TEST  RESULTS PENDING AT DISCHARGE  Pending Labs     Order Current Status    Potassium In process           CODE STATUS  Code Status and Medical Interventions:   Ordered at: 09/21/21 1746     Limited Support to NOT Include:    Artificial Nutrition    Intubation    Cardioversion/Defibrillation     Code Status:    No CPR     Medical Interventions (Level of Support Prior to Arrest):    Limited       Yariel Shaw DO  [facility]  10/08/21  14:08 EDT    Please note that this discharge summary required more than 30 minutes to complete.

## 2021-10-08 NOTE — PLAN OF CARE
Goal Outcome Evaluation:  Plan of Care Reviewed With: patient        Progress: no change  Outcome Summary: pt resting in bed. no complaints at this time.

## 2021-10-08 NOTE — DISCHARGE PLACEMENT REQUEST
"Mary Ly (76 y.o. Female)     Date of Birth Social Security Number Address Home Phone MRN    1945  [facility]  [facility] [address] [facility] [address] [phone] [id number]    [employer] Mar[employer] Status          Yarsani        Admission Date Admission Type Admitting Provider Attending Provider Department, Room/Bed    9/21/21 Emergency Eliseo Higgins MD Cagle, William, DO [address], [address]1S    Discharge Date Discharge Disposition Discharge Destination         Skilled Nursing Facility (DC - External)              Attending Provider: Yariel Shaw DO    Allergies: Haldol [Haloperidol]    Isolation: None   Infection: COVID (History) (10/08/21)   Code Status: No CPR    Ht: 167.6 cm (66\")   Wt: 42.1 kg (92 lb 12.8 oz)    Admission Cmt: None   Principal Problem: Pneumonia due to COVID-19 virus [U07.1,J12.82]                 Active Insurance as of 9/21/2021     Primary Coverage     Payor Plan Insurance Group Employer/Plan Group    ANTHEM MEDICARE REPLACEMENT ANTHEM MEDICARE ADVANTAGE [id number]     Payor Plan Address Payor Plan Phone Number Payor Plan Fax Number Effective Dates    [address] [phone]  9/1/2019 - None Entered    [facility] [address]       Subscriber Name Subscriber Birth Date Member ID       MARY LY 1945 [id number]           Secondary Coverage     Payor Plan Insurance Group Employer/Plan Group    [place]      Payor Plan Address Payor Plan Phone Number Payor Plan Fax Number Effective Dates    [address] [phone]  12/1/2019 - None Entered    [facility] [id number]       Subscriber Name Subscriber Birth Date Member ID       MARY LY 1945 [id number]                 Emergency Contacts      (Rel.) Home Phone Work Phone Mobile Phone    ZORAIDA SCHULER (Daughter) [phone] -- --    WILFRIDOMARE (Grandchild) [phone] -- --    HELENE ALEXX (Son) [phone] -- --    OSMIN" MARYCHUY (Friend) -- -- [phone]               Discharge Summary      Yariel Shaw DO at 10/08/21 1407              [facility] HOSPITALISTS DISCHARGE SUMMARY    Patient Identification:  Name:  Mary Ly  Age:  76 y.o.  Sex:  female  :  1945  MRN:  [id number]  Visit Number:  [id number]    Date of Admission: 2021  Date of Discharge:  10/8/2021     PCP: Leta Gardner MD    DISCHARGE DIAGNOSIS    Acute on chronic anemia  Peptic ulcer disease  Chronic gastritis  Colitis  Leukocytosis  Chronic Mesenteric Ischemia  Covid pneumonia  Hypothyroidism  Atrial fibrillation  CAD s/p PCI  H. pylori positive  Gastritis  CONSULTS     General surgery  Cardiology  PROCEDURES PERFORMED      HOSPITAL COURSE  Patient is a 76 y.o. female presented to [facility] complaining of shortness of breath.  Please see the admitting history and physical for further details.      Patient admitted to the hospitalist service for further evaluation and treatment of acute hypoxemic respiratory failure secondary to COVID-19.  Patient did ultimately end up requiring 4 L nasal cannula before being able to titrated back down.  Patient was planned to return back to skilled nursing facility after improvement in her oxygenation and recovery from Covid however patient began having melena.  She required 2 units of PRBCs and underwent endoscopy that showed healing ulcers and gastritis.  She was started on twice daily Protonix.  Patient was also treated with antibiotics for colitis seen on imaging.  Patient has a history of paroxysmal atrial fibrillation and was noted to be in A. fib but not anticoagulated due to GI bleeding that she had had.  She was however found to have an elevated D-dimer that initially came down but then began rising again.  An initial duplex of the lower extremity at admission was negative for DVT however repeat 4 days later returned with findings concerning for DVT.  She was initiated on  heparin and then transition to Eliquis after tolerating for several days however shortly after transitioning to Eliquis she began having declining hemoglobin and required repeat blood transfusion.  Patient had improvement in her blood counts and remained stable and anticoagulation was attempted once more as there was no clinical evidence of bleeding at the time of first drop in hemoglobin however on second attempt with heparin gtt. patient ultimately developed melena for a second time and this was stopped.  As patient was unable to tolerate anticoagulation and there was concern for DVT discussion was had with general surgery regarding IVC filter placement.  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  In discussing this with surgery as well as the fact the patient has a history of A. fib and discussion with cardiology as patient would be an excellent candidate for a watchman device at some point however IVC would complicate this decision was made to repeat duplex of the lower extremity as there had been findings concerning for DVT but no clear evidence of DVT noted.  Repeat duplex ultimately returned showing no evidence of DVT in the lower extremity with good compressibility.  As such after discussion with surgery and cardiology plans for IVC filter were stopped as well as any plans for anticoagulation at this point in time.  Patient however was initiated and continued on amiodarone per cardiology.  However patient was resumed on Plavix given her history of GI bleeding but aspirin was withheld and dual antiplatelet therapy is to be avoided for now.  Cardiology will follow up with the patient after discharge and arrange for referral to cardiac thoracic surgery for evaluation of watchman device placement.  Patient's hemoglobin remained stable after the above set events and  she was in stable medical condition eating well and requesting to be able return to skilled nursing facility at time of her acceptance back to facility on 10/8/2021.  Patient also noted to be positive for H. pylori during the hospitalization and treatment not initially started due to ongoing acute events with bleeding however at time of discharge patient was stable and patient was provided prescriptions for quadruple therapy for treatment of H. pylori for the next 10 days.  Patient was discharged back to SNF on 10/8/2021 in stable medical condition and will follow up with her PCP in 1 week and will follow up with cardiology in 3 to 4 weeks.        VITAL SIGNS:  Temp:  [98.1 °F (36.7 °C)-98.3 °F (36.8 °C)] 98.2 °F (36.8 °C)  Heart Rate:  [67-84] 80  Resp:  [16-20] 16  BP: ()/(54-77) 110/58  SpO2:  [93 %-96 %] 96 %  on  Flow (L/min):  [1] 1;   Device (Oxygen Therapy): nasal cannula    Body mass index is 14.98 kg/m².  Wt Readings from Last 3 Encounters:   09/21/21 42.1 kg (92 lb 12.8 oz)   08/20/21 50.8 kg (112 lb)   06/01/21 49.1 kg (108 lb 4.8 oz)       PHYSICAL EXAM:  Constitutional: Elderly, frail, chronically ill-appearing elderly female laying in bed, NAD   HENT:  Head:  Normocephalic and atraumatic.  Mouth:  Moist mucous membranes.    Eyes:  Conjunctivae and EOM are normal. No scleral icterus.    Neck:  Neck supple.  No JVD present.    Cardiovascular:  Normal rate, regular rhythm and normal heart sounds with no murmur.  Pulmonary/Chest:  No respiratory distress, no wheezes, no crackles, with diminished breath sounds at the base.  Abdominal:  Soft.  Bowel sounds are normal.  No distension and no tenderness.   Musculoskeletal:  No edema, no tenderness, and no deformity.    Neurological:  Alert and oriented to person, place, and time.  No cranial nerve deficit.   Skin:  Skin is warm and dry. No rash or lesion noted. No pallor.   Peripheral vascular:  Pulses in all 4 extremities with no clubbing, no cyanosis,  no edema.  Psychiatric: Appropriate mood and affect, pleasant.     DISCHARGE DISPOSITION   Stable    DISCHARGE MEDICATIONS:     Discharge Medications      New Medications      Instructions Start Date   amiodarone 400 MG tablet  Commonly known as: PACERONE   400 mg, Oral, Every 24 Hours Scheduled   Start Date: October 9, 2021     Pylera 140-125-125 MG per capsule  Generic drug: bismuth-metronidazole-tetracycline   3 capsules, Oral, 4 Times Daily Before Meals & Nightly         Changes to Medications      Instructions Start Date   pantoprazole 40 MG EC tablet  Commonly known as: PROTONIX  What changed: when to take this   40 mg, Oral, 2 times daily      pantoprazole 40 MG EC tablet  Commonly known as: PROTONIX  What changed: You were already taking a medication with the same name, and this prescription was added. Make sure you understand how and when to take each.   40 mg, Oral, Daily, Start taking after completing 10 day course of twice a day         Continue These Medications      Instructions Start Date   atorvastatin 40 MG tablet  Commonly known as: LIPITOR   40 mg, Oral, Nightly      baclofen 10 MG tablet  Commonly known as: LIORESAL   10 mg, Oral, Nightly      bisacodyl 10 MG suppository  Commonly known as: DULCOLAX   10 mg, Rectal, Daily PRN      Cariprazine HCl 1.5 MG capsule capsule  Commonly known as: VRAYLAR   1.5 mg, Oral, Daily      carvedilol 6.25 MG tablet  Commonly known as: COREG   6.25 mg, Oral, 2 Times Daily With Meals      clopidogrel 75 MG tablet  Commonly known as: PLAVIX   75 mg, Oral, Daily      docusate sodium 250 MG capsule  Commonly known as: COLACE   250 mg, Oral, Daily      HYDROcodone-acetaminophen 5-325 MG per tablet  Commonly known as: NORCO   1 tablet, Oral, Every 8 Hours PRN      ipratropium-albuterol 0.5-2.5 mg/3 ml nebulizer  Commonly known as: DUO-NEB   3 mL, Nebulization, 4 Times Daily PRN      lactulose 10 GM/15ML solution  Commonly known as: CHRONULAC   10 g, Oral, Daily PRN       levothyroxine 125 MCG tablet  Commonly known as: SYNTHROID, LEVOTHROID   250 mcg, Oral, Daily      loperamide 2 MG capsule  Commonly known as: IMODIUM   2 mg, Oral, Every 6 Hours PRN      melatonin 5 MG tablet tablet   5 mg, Oral, Nightly      montelukast 10 MG tablet  Commonly known as: SINGULAIR   10 mg, Oral, Daily      multivitamin tablet tablet  Generic drug: multivitamin   1 tablet, Oral, Nightly      ondansetron 4 MG tablet  Commonly known as: ZOFRAN   4 mg, Oral, Every 6 Hours PRN      promethazine 12.5 MG tablet  Commonly known as: PHENERGAN   12.5 mg, Oral, Every 8 Hours PRN      Senna 8.6 MG tablet  Generic drug: senna   1 tablet, Oral, Daily PRN      SITagliptin 100 MG tablet  Commonly known as: JANUVIA   100 mg, Oral, Daily      Stiolto Respimat 2.5-2.5 MCG/ACT aerosol solution inhaler  Generic drug: tiotropium bromide-olodaterol   2 puffs, Inhalation, Daily      traZODone 50 MG tablet  Commonly known as: DESYREL   50 mg, Oral, Nightly         Stop These Medications    ACIDOPHILUS/PECTIN PO     amLODIPine 10 MG tablet  Commonly known as: NORVASC     aspirin 81 MG chewable tablet     ferrous sulfate 325 (65 FE) MG tablet     lisinopril 10 MG tablet  Commonly known as: PRINIVIL,ZESTRIL     predniSONE 5 MG tablet  Commonly known as: DELTASONE              Additional Instructions for the Follow-ups that You Need to Schedule     Discharge Follow-up with PCP   As directed       Currently Documented PCP:    Leta Gardner MD    PCP Phone Number:    [phone]     Follow Up Details: 1 week post hospital follow up         Discharge Follow-up with Specialty: Cardiology; 3 Weeks   As directed      Specialty: Cardiology    Follow Up: 3 Weeks    Follow Up Details: 3-4 weeks with Dr. Rust office            Contact information for follow-up providers     Leta Gardner MD .    Specialty: Geriatric Medicine  Why: 1 week post hospital follow up  Contact information:  [address]  [id number]  [place]  [address]  [phone]                   Contact information for after-discharge care     Destination     [facility] .    Service: Skilled Nursing  Contact information:  [address]  [facility] [address]  [phone]                              TEST  RESULTS PENDING AT DISCHARGE  Pending Labs     Order Current Status    Potassium In process           CODE STATUS  Code Status and Medical Interventions:   Ordered at: 09/21/21 1746     Limited Support to NOT Include:    Artificial Nutrition    Intubation    Cardioversion/Defibrillation     Code Status:    No CPR     Medical Interventions (Level of Support Prior to Arrest):    Limited       Alaina Shaw DO  [facility]  10/08/21  14:08 EDT    Please note that this discharge summary required more than 30 minutes to complete.      Electronically signed by Alaina Shaw DO at 10/08/21 1419       Discharge Order (From admission, onward)     Start     Ordered    10/08/21 1350  Discharge patient  Once     Expected Discharge Date: 10/08/21    Expected Discharge Time: Midday    Discharge Disposition: Skilled Nursing Facility (DC - External)    Physician of Record for Attribution - Please select from Treatment Team: ALAINA SHAW [[id number]]    Review needed by CMO to determine Physician of Record: No       Question Answer Comment   Physician of Record for Attribution - Please select from Treatment Team ALAINA SHAW    Review needed by CMO to determine Physician of Record No        10/08/21 3409

## 2021-10-08 NOTE — CASE MANAGEMENT/SOCIAL WORK
Discharge Planning Assessment  [facility]     Patient Name: Mary Ly  MRN: [id number]  Today's Date: 10/8/2021    Admit Date: 9/21/2021    Discharge Needs Assessment    No documentation.       Discharge Plan     Row Name 10/08/21 1544       Plan    Final Discharge Disposition Code  03 - skilled nursing facility (SNF)    Final Note Pt is being discharged back to [facility]. SS notfied Pt's daughter, April Lopez [phone] of discharge. Dtr requested to speak with nurse, nurse notified. SS faxed discharge summary, AVS summary to facility fax [phone]. SS provided nurse with report number [facility] [phone]. SS to arrange EMS to transport.    1607pm: SS spoke with [facility] per Tenisha Santoyo and scheduled transport.     Row Name 10/08/21 1333        Amelia Joya

## 2021-10-08 NOTE — THERAPY EVALUATION
Acute Care - Physical Therapy Initial Evaluation   Nik     Patient Name: Mary Ly  : 1945  MRN: [id number]  Today's Date: 10/8/2021   Onset of Illness/Injury or Date of Surgery: 21  Visit Dx:     ICD-10-CM ICD-9-CM   1. Pneumonia due to COVID-19 virus  U07.1 480.8    J12.82 079.89   2. Leukocytosis, unspecified type  D72.829 288.60   3. Chronic respiratory failure with hypoxia (CMS/HCC)  J96.11 518.83     799.02   4. Upper GI bleed  K92.2 578.9     Patient Active Problem List   Diagnosis   • Status post laparoscopic cholecystectomy   • Severe malnutrition (HCC)   • COPD (chronic obstructive pulmonary disease) (HCC)   • Type II diabetes mellitus (HCC)   • Hypothyroidism   • Hyperlipidemia   • Diastolic CHF, chronic ([facility])   • Moderate malnutrition (HCC)   • Vomiting   • Upper GI bleed   • Pneumonia due to COVID-19 virus     Past Medical History:   Diagnosis Date   • AAA (abdominal aortic aneurysm) (CMS/[facility])     s/p repair    • Arthritis    • CAD (coronary artery disease)     s/p stenting in the past    • Chronic kidney disease (CKD), stage III (moderate) (CMS/[facility])    • COPD (chronic obstructive pulmonary disease) (CMS/[facility])    • Debility    • Diastolic CHF, chronic (CMS/HCC) 2021   • GERD (gastroesophageal reflux disease)    • History of CVA (cerebrovascular accident)    • History of ischemic colitis    • Hyperlipidemia 2021   • Hypertension    • Hypothyroidism    • Pneumonia due to COVID-19 virus 2021   • Stroke (CMS/[facility])    • Type II diabetes mellitus (CMS/[facility])      Past Surgical History:   Procedure Laterality Date   • BACK SURGERY     • CARDIAC CATHETERIZATION     • CHOLECYSTECTOMY N/A 2020    Procedure: CHOLECYSTECTOMY LAPAROSCOPIC;  Surgeon: Lonnie Meneses MD;  Location: [facility] OR;  Service: General;  Laterality: N/A;   • COLONOSCOPY     • COLONOSCOPY N/A 2021    Procedure: COLONOSCOPY;  Surgeon: Lonnie Meneses MD;  Location: [facility] OR;  Service:  Gastroenterology;  Laterality: N/A;   • CORONARY STENT PLACEMENT     • ENDOSCOPY     • ENDOSCOPY N/A 9/25/2021    Procedure: ESOPHAGOGASTRODUODENOSCOPY;  Surgeon: Lonnie Meneses MD;  Location: [facility] OR;  Service: Gastroenterology;  Laterality: N/A;   • TONSILLECTOMY          PT Assessment (last 12 hours)      PT Evaluation and Treatment     Row Name 10/08/21 1100          Physical Therapy Time and Intention    Subjective Information  no complaints  -BC     Document Type  evaluation  -BC     Mode of Treatment  physical therapy  -BC     Patient Effort  good  -BC     Row Name 10/08/21 1100          General Information    Patient Profile Reviewed  yes  -BC     Onset of Illness/Injury or Date of Surgery  09/21/21  -BC     Referring Physician  Dr Shaw  -BC     Patient Observations  alert;cooperative;agree to therapy  -BC     Equipment Currently Used at Home  none  -BC     Risks Reviewed  patient:;LOB;dizziness;nausea/vomiting;increased discomfort;change in vital signs;increased drainage;lines disloged  -BC     Benefits Reviewed  patient:;improve function;increase independence;increase strength;increase balance;decrease pain;decrease risk of DVT;improve skin integrity;increase knowledge  -BC     Row Name 10/08/21 1100          Previous Level of Function/Home Environm    BADLs, Premorbid Functional Level  needs assistive device for safe performance  -BC     Row Name 10/08/21 1100          Living Environment    Current Living Arrangements  extended care facility  -BC     Lives With  facility resident  -BC     Row Name 10/08/21 1100          Cognition    Affect/Mental Status (Cognitive)  WFL  -BC     Orientation Status (Cognition)  oriented to;person  -BC     Follows Commands (Cognition)  follows one-step commands  -BC     Cognitive Function (Cognitive)  safety deficit  -BC     Row Name 10/08/21 1100          Range of Motion (ROM)    Range of Motion  ROM is WFL  -BC     Row Name 10/08/21 1100          Range of Motion  Comprehensive    General Range of Motion  bilateral lower extremity ROM WFL  -BC     Row Name 10/08/21 1100          Strength (Manual Muscle Testing)    Strength (Manual Muscle Testing)  bilateral lower extremities 3/5  -BC     Row Name 10/08/21 1100          Bed Mobility    Bed Mobility  bed mobility (all) activities;supine-sit;sit-supine;rolling left;rolling right;sidelying-sit-sidelying  -BC     Rolling Left [place] (Bed Mobility)  moderate assist (50% patient effort)  -BC     Rolling Right [place] (Bed Mobility)  moderate assist (50% patient effort)  -BC     Supine-Sit [place] (Bed Mobility)  moderate assist (50% patient effort)  -BC     Sidelying-Sit-Sidelying [place] (Bed Mobility)  moderate assist (50% patient effort)  -BC     Assistive Device (Bed Mobility)  bed rails  -BC     Row Name 10/08/21 1100          Transfers    Comment (Transfers)  unable to transer  -BC     Row Name 10/08/21 1100          Gait/Stairs (Locomotion)    Comment (Gait/Stairs)  unable to walk  -BC     Row Name 10/08/21 1100          Coping    Observed Emotional State  calm;cooperative  -BC     Verbalized Emotional State  acceptance  -BC     Trust Relationship/Rapport  care explained  -BC     Family/Support Persons  patient  -BC     Involvement in Care  not present at bedside  -BC     Diversional Activities  television  -BC     Row Name 10/08/21 1100          Physical Therapy Goals    Bed Mobility Goal Selection (PT)  bed mobility, PT goal 1  -BC     Transfer Goal Selection (PT)  --  -BC     Row Name 10/08/21 1100          Bed Mobility Goal 1 (PT)    Activity/Assistive Device (Bed Mobility Goal 1, PT)  bed mobility activities, all  -BC     [place] Level/Cues Needed (Bed Mobility Goal 1, PT)  minimum assist (75% or more patient effort)  -BC     Time Frame (Bed Mobility Goal 1, PT)  by discharge  -BC     Row Name 10/08/21 1100          Positioning and Restraints    Pre-Treatment Position  in bed  -BC     Post  Treatment Position  bed  -BC     In Bed  notified nsg;supine;call light within reach;encouraged to call for assist;side rails up x3  -BC       User Key  (r) = Recorded By, (t) = Taken By, (c) = Cosigned By    Initials Name Provider Type    Lorie Mello, PT Physical Therapist          PT Recommendation and Plan             Time Calculation:   PT Charges     Row Name 10/08/21 1136             Time Calculation    PT Received On  10/08/21  -BC         Time Calculation- PT    Total Timed Code Minutes- PT  60 minute(s)  -BC        User Key  (r) = Recorded By, (t) = Taken By, (c) = Cosigned By    Initials Name Provider Type    Lorie Mello, PT Physical Therapist        Therapy Charges for Today     Code Description Service Date Service Provider Modifiers Qty    23741431086 HC PT EVAL HIGH COMPLEXITY 4 10/8/2021 Lorie Baxter, PT GP 1               Lorie Baxter, PT  10/8/2021

## 2021-10-08 NOTE — DISCHARGE PLACEMENT REQUEST
"Mary Ly (76 y.o. Female)     Date of Birth Social Security Number Address Home Phone MRN    1945  [facility]  [facility] [place] [facility] [address] [phone] [id number]    Spiritism Marital Status          Sabianism        Admission Date Admission Type Admitting Provider Attending Provider Department, Room/Bed    9/21/21 Emergency Eliseo Higgins MD Cagle, William, DO [address], 3342/1S    Discharge Date Discharge Disposition Discharge Destination                       Attending Provider: Yariel Shaw DO    Allergies: Haldol [Haloperidol]    Isolation: None   Infection: COVID (History) (10/08/21)   Code Status: No CPR    Ht: 167.6 cm (66\")   Wt: 42.1 kg (92 lb 12.8 oz)    Admission Cmt: None   Principal Problem: Pneumonia due to COVID-19 virus [U07.1,J12.82]                 Active Insurance as of 9/21/2021     Primary Coverage     Payor Plan Insurance Group Employer/Plan Group    ANTHEM MEDICARE REPLACEMENT ANTHEM MEDICARE ADVANTAGE [id number]     Payor Plan Address Payor Plan Phone Number Payor Plan Fax Number Effective Dates    [address] [phone]  9/1/2019 - None Entered    [facility] [id number]       Subscriber Name Subscriber Birth Date Member ID       MARY LY 1945 [id number]           Secondary Coverage     Payor Plan Insurance Group Employer/Plan Group    [place]      Payor Plan Address Payor Plan Phone Number Payor Plan Fax Number Effective Dates    [address] [phone]  12/1/2019 - None Entered    [facility] [id number]       Subscriber Name Subscriber Birth Date Member ID       MARY LY 1945 [id number]                 Emergency Contacts      (Rel.) Home Phone Work Phone Mobile Phone    OCTAVIA SCHULERNDA (Daughter) [phone] -- --    WILFRIDOMARE (Grandchild) [phone] -- --    HELENEALEXX (Son) [phone] -- --    MARYCHUY SOLORIO (Friend) -- -- [phone]    "            Physical Therapy Notes (most recent note)      Lorie Baxter, PT at 10/08/21 1137  Version 1 of 1         Acute Care - Physical Therapy Initial Evaluation  [facility] [place]     Patient Name: Mary Ly  : 1945  MRN: [id number]  Today's Date: 10/8/2021   Onset of Illness/Injury or Date of Surgery: 21  Visit Dx:     ICD-10-CM ICD-9-CM   1. Pneumonia due to COVID-19 virus  U07.1 480.8    J12.82 079.89   2. Leukocytosis, unspecified type  D72.829 288.60   3. Chronic respiratory failure with hypoxia (CMS/HCC)  J96.11 518.83     799.02   4. Upper GI bleed  K92.2 578.9     Patient Active Problem List   Diagnosis   • Status post laparoscopic cholecystectomy   • Severe malnutrition (HCC)   • COPD (chronic obstructive pulmonary disease) (HCC)   • Type II diabetes mellitus (HCC)   • Hypothyroidism   • Hyperlipidemia   • Diastolic CHF, chronic (HCC)   • Moderate malnutrition (HCC)   • Vomiting   • Upper GI bleed   • Pneumonia due to COVID-19 virus     Past Medical History:   Diagnosis Date   • AAA (abdominal aortic aneurysm) (CMS/HCC)     s/p repair    • Arthritis    • CAD (coronary artery disease)     s/p stenting in the past    • Chronic kidney disease (CKD), stage III (moderate) (CMS/HCC)    • COPD (chronic obstructive pulmonary disease) (CMS/HCC)    • Debility    • Diastolic CHF, chronic (CMS/HCC) 2021   • GERD (gastroesophageal reflux disease)    • History of CVA (cerebrovascular accident)    • History of ischemic colitis    • Hyperlipidemia 2021   • Hypertension    • Hypothyroidism    • Pneumonia due to COVID-19 virus 2021   • Stroke (CMS/HCC)    • Type II diabetes mellitus (CMS/HCC)      Past Surgical History:   Procedure Laterality Date   • BACK SURGERY     • CARDIAC CATHETERIZATION     • CHOLECYSTECTOMY N/A 2020    Procedure: CHOLECYSTECTOMY LAPAROSCOPIC;  Surgeon: Lonnie Meneses MD;  Location: [facility];  Service: General;  Laterality: N/A;   • COLONOSCOPY     •  COLONOSCOPY N/A 9/25/2021    Procedure: COLONOSCOPY;  Surgeon: Lonnie Meneses MD;  Location:  [facility] OR;  Service: Gastroenterology;  Laterality: N/A;   • CORONARY STENT PLACEMENT     • ENDOSCOPY     • ENDOSCOPY N/A 9/25/2021    Procedure: ESOPHAGOGASTRODUODENOSCOPY;  Surgeon: Lonnie Meneses MD;  Location:  [facility] OR;  Service: Gastroenterology;  Laterality: N/A;   • TONSILLECTOMY          PT Assessment (last 12 hours)      PT Evaluation and Treatment     Row Name 10/08/21 1100          Physical Therapy Time and Intention    Subjective Information  no complaints  -BC     Document Type  evaluation  -BC     Mode of Treatment  physical therapy  -BC     Patient Effort  good  -BC     Row Name 10/08/21 1100          General Information    Patient Profile Reviewed  yes  -BC     Onset of Illness/Injury or Date of Surgery  09/21/21  -BC     Referring Physician  Dr Shaw  -BC     Patient Observations  alert;cooperative;agree to therapy  -BC     Equipment Currently Used at Home  none  -BC     Risks Reviewed  patient:;LOB;dizziness;nausea/vomiting;increased discomfort;change in vital signs;increased drainage;lines disloged  -BC     Benefits Reviewed  patient:;improve function;increase independence;increase strength;increase balance;decrease pain;decrease risk of DVT;improve skin integrity;increase knowledge  -BC     Row Name 10/08/21 1100          Previous Level of Function/Home Environm    BADLs, Premorbid Functional Level  needs assistive device for safe performance  -BC     Row Name 10/08/21 1100          Living Environment    Current Living Arrangements  extended care facility  -BC     Lives With  facility resident  -BC     Row Name 10/08/21 1100          Cognition    Affect/Mental Status (Cognitive)  WFL  -BC     Orientation Status (Cognition)  oriented to;person  -BC     Follows Commands (Cognition)  follows one-step commands  -BC     Cognitive Function (Cognitive)  safety deficit  -BC     Row Name 10/08/21 1100           Range of Motion (ROM)    Range of Motion  ROM is WFL  -BC     Row Name 10/08/21 1100          Range of Motion Comprehensive    General Range of Motion  bilateral lower extremity ROM WFL  -BC     Row Name 10/08/21 1100          Strength (Manual Muscle Testing)    Strength (Manual Muscle Testing)  bilateral lower extremities 3/5  -BC     Row Name 10/08/21 1100          Bed Mobility    Bed Mobility  bed mobility (all) activities;supine-sit;sit-supine;rolling left;rolling right;sidelying-sit-sidelying  -BC     Rolling Left [place] (Bed Mobility)  moderate assist (50% patient effort)  -BC     Rolling Right [place] (Bed Mobility)  moderate assist (50% patient effort)  -BC     Supine-Sit [place] (Bed Mobility)  moderate assist (50% patient effort)  -BC     Sidelying-Sit-Sidelying [place] (Bed Mobility)  moderate assist (50% patient effort)  -BC     Assistive Device (Bed Mobility)  bed rails  -BC     Row Name 10/08/21 1100          Transfers    Comment (Transfers)  unable to transer  -BC     Row Name 10/08/21 1100          Gait/Stairs (Locomotion)    Comment (Gait/Stairs)  unable to walk  -BC     Row Name 10/08/21 1100          Coping    Observed Emotional State  calm;cooperative  -BC     Verbalized Emotional State  acceptance  -BC     Trust Relationship/Rapport  care explained  -BC     Family/Support Persons  patient  -BC     Involvement in Care  not present at bedside  -BC     Diversional Activities  television  -BC     Row Name 10/08/21 1100          Physical Therapy Goals    Bed Mobility Goal Selection (PT)  bed mobility, PT goal 1  -BC     Transfer Goal Selection (PT)  --  -BC     Row Name 10/08/21 1100          Bed Mobility Goal 1 (PT)    Activity/Assistive Device (Bed Mobility Goal 1, PT)  bed mobility activities, all  -BC     [place] Level/Cues Needed (Bed Mobility Goal 1, PT)  minimum assist (75% or more patient effort)  -BC     Time Frame (Bed Mobility Goal 1, PT)  by discharge  -BC      Row Name 10/08/21 1100          Positioning and Restraints    Pre-Treatment Position  in bed  -BC     Post Treatment Position  bed  -BC     In Bed  notified nsg;supine;call light within reach;encouraged to call for assist;side rails up x3  -BC       User Key  (r) = Recorded By, (t) = Taken By, (c) = Cosigned By    Initials Name Provider Type    BC Lorie Baxter, PT Physical Therapist          PT Recommendation and Plan             Time Calculation:   PT Charges     Row Name 10/08/21 1136             Time Calculation    PT Received On  10/08/21  -BC         Time Calculation- PT    Total Timed Code Minutes- PT  60 minute(s)  -BC        User Key  (r) = Recorded By, (t) = Taken By, (c) = Cosigned By    Initials Name Provider Type    Lorie Mello PT Physical Therapist        Therapy Charges for Today     Code Description Service Date Service Provider Modifiers Qty    63669097932 HC PT EVAL HIGH COMPLEXITY 4 10/8/2021 Lorie Baxter PT GP 1               Lorie Baxter PT  10/8/2021      Electronically signed by Lorie Baxter PT at 10/08/21 1137          Occupational Therapy Notes (most recent note)      Arturo Roper, OT at 10/08/21 1224          Acute Care - Occupational Therapy Initial Evaluation   Nik     Patient Name: Mary Ly  : 1945  MRN: [id number]  Today's Date: 10/8/2021  Onset of Illness/Injury or Date of Surgery: 21     Referring Physician: Dr Shaw    Admit Date: 2021       ICD-10-CM ICD-9-CM   1. Pneumonia due to COVID-19 virus  U07.1 480.8    J12.82 079.89   2. Leukocytosis, unspecified type  D72.829 288.60   3. Chronic respiratory failure with hypoxia (CMS/HCC)  J96.11 518.83     799.02   4. Upper GI bleed  K92.2 578.9     Patient Active Problem List   Diagnosis   • Status post laparoscopic cholecystectomy   • Severe malnutrition (HCC)   • COPD (chronic obstructive pulmonary disease) (HCC)   • Type II diabetes mellitus (HCC)   • Hypothyroidism   •  Hyperlipidemia   • Diastolic CHF, chronic (HCC)   • Moderate malnutrition (HCC)   • Vomiting   • Upper GI bleed   • Pneumonia due to COVID-19 virus     Past Medical History:   Diagnosis Date   • AAA (abdominal aortic aneurysm) (CMS/[facility])     s/p repair    • Arthritis    • CAD (coronary artery disease)     s/p stenting in the past    • Chronic kidney disease (CKD), stage III (moderate) (CMS/[facility])    • COPD (chronic obstructive pulmonary disease) (CMS/[facility])    • Debility    • Diastolic CHF, chronic (CMS/HCC) 4/23/2021   • GERD (gastroesophageal reflux disease)    • History of CVA (cerebrovascular accident)    • History of ischemic colitis    • Hyperlipidemia 4/23/2021   • Hypertension    • Hypothyroidism    • Pneumonia due to COVID-19 virus 9/21/2021   • Stroke (CMS/[facility])    • Type II diabetes mellitus (CMS/[facility])      Past Surgical History:   Procedure Laterality Date   • BACK SURGERY     • CARDIAC CATHETERIZATION     • CHOLECYSTECTOMY N/A 7/17/2020    Procedure: CHOLECYSTECTOMY LAPAROSCOPIC;  Surgeon: Lonnie Meneses MD;  Location: [facility];  Service: General;  Laterality: N/A;   • COLONOSCOPY     • COLONOSCOPY N/A 9/25/2021    Procedure: COLONOSCOPY;  Surgeon: Lonnie Meneses MD;  Location: [facility];  Service: Gastroenterology;  Laterality: N/A;   • CORONARY STENT PLACEMENT     • ENDOSCOPY     • ENDOSCOPY N/A 9/25/2021    Procedure: ESOPHAGOGASTRODUODENOSCOPY;  Surgeon: Lonnie Meneses MD;  Location: [facility];  Service: Gastroenterology;  Laterality: N/A;   • TONSILLECTOMY              OT ASSESSMENT FLOWSHEET (last 12 hours)      OT Evaluation and Treatment     Row Name 10/08/21 1211                   OT Time and Intention    Document Type  evaluation  -KR        Mode of Treatment  occupational therapy  -KR        Patient Effort  adequate  -KR           General Information    General Observations of Patient  alert/cooperative  -KR           Living Environment    Current Living Arrangements  residential facility   -KR        Lives With  facility resident  -KR           Cognition    Affect/Mental Status (Cognitive)  WFL  -KR        Orientation Status (Cognition)  oriented to;person;situation  -KR        Follows Commands (Cognition)  WFL  -KR           Range of Motion Comprehensive    Comment, General Range of Motion  BUE 3-/5 shoulders, 4/5 remaining  -KR           Strength Comprehensive (MMT)    Comment, General Manual Muscle Testing (MMT) Assessment  BUE 3-/5  -KR           Bed Mobility    Bed Mobility  bed mobility (all) activities  -KR        All Activities, [place] (Bed Mobility)  moderate assist (50% patient effort)  -KR           Activities of Daily Living    BADL Assessment/Intervention  bathing;upper body dressing;lower body dressing;grooming;feeding;toileting  -KR           Bathing Assessment/Intervention    [place] Level (Bathing)  bathing skills;moderate assist (50% patient effort)  -KR           Upper Body Dressing Assessment/Training    [place] Level (Upper Body Dressing)  upper body dressing skills;moderate assist (50% patient effort)  -KR           Lower Body Dressing Assessment/Training    [place] Level (Lower Body Dressing)  lower body dressing skills;maximum assist (25% patient effort)  -KR           Grooming Assessment/Training    [place] Level (Grooming)  grooming skills;minimum assist (75% patient effort)  -KR           Self-Feeding Assessment/Training    [place] Level (Feeding)  feeding skills;set up  -KR           Toileting Assessment/Training    [place] Level (Toileting)  toileting skills;maximum assist (25% patient effort)  -KR           Plan of Care Review    Plan of Care Reviewed With  patient  -KR           OT Goals    Bed Mobility Goal Selection (OT)  bed mobility, OT goal 1  -KR        Strength Goal Selection (OT)  strength, OT goal 1  -KR           Bed Mobility Goal 1 (OT)    Activity/Assistive Device (Bed Mobility Goal 1, OT)  bed mobility activities, all  -KR         [place] Level/Cues Needed (Bed Mobility Goal 1, OT)  contact guard assist  -KR        Time Frame (Bed Mobility Goal 1, OT)  by discharge  -KR           Strength Goal 1 (OT)    Strength Goal 1 (OT)  BUE increase x 1 to enhance self care skills  -KR        Time Frame (Strength Goal 1, OT)  by discharge  -KR           Positioning and Restraints    Pre-Treatment Position  in bed  -KR        Post Treatment Position  bed  -KR        In Bed  call light within reach  -KR           Therapy Assessment/Plan (OT)    Rehab Potential (OT)  good, to achieve stated therapy goals  -KR        Planned Therapy Interventions (OT)  activity tolerance training;BADL retraining;ROM/therapeutic exercise;strengthening exercise  -KR           Therapy Plan Review/Discharge Plan (OT)    Anticipated Discharge Disposition (OT)  extended care facility;skilled nursing facility  -KR          User Key  (r) = Recorded By, (t) = Taken By, (c) = Cosigned By    Initials Name Effective Dates    Arturo Underwood OT 06/16/21 -                  OT Recommendation and Plan  Planned Therapy Interventions (OT): activity tolerance training, BADL retraining, ROM/therapeutic exercise, strengthening exercise  Plan of Care Review  Plan of Care Reviewed With: patient  Plan of Care Reviewed With: patient        Time Calculation:     Therapy Charges for Today     Code Description Service Date Service Provider Modifiers Qty    27223921339  OT EVAL HIGH COMPLEXITY 4 10/8/2021 Arturo Roper OT GO 1               Arturo Roper OT  10/8/2021    Electronically signed by Arturo Roper OT at 10/08/21 7213

## 2021-10-08 NOTE — THERAPY EVALUATION
Acute Care - Occupational Therapy Initial Evaluation   Nik     Patient Name: Mary Ly  : 1945  MRN: [id number]  Today's Date: 10/8/2021  Onset of Illness/Injury or Date of Surgery: 21     Referring Physician: Dr Shaw    Admit Date: 2021       ICD-10-CM ICD-9-CM   1. Pneumonia due to COVID-19 virus  U07.1 480.8    J12.82 079.89   2. Leukocytosis, unspecified type  D72.829 288.60   3. Chronic respiratory failure with hypoxia (CMS/HCC)  J96.11 518.83     799.02   4. Upper GI bleed  K92.2 578.9     Patient Active Problem List   Diagnosis   • Status post laparoscopic cholecystectomy   • Severe malnutrition (HCC)   • COPD (chronic obstructive pulmonary disease) (HCC)   • Type II diabetes mellitus (HCC)   • Hypothyroidism   • Hyperlipidemia   • Diastolic CHF, chronic (HCC)   • Moderate malnutrition (HCC)   • Vomiting   • Upper GI bleed   • Pneumonia due to COVID-19 virus     Past Medical History:   Diagnosis Date   • AAA (abdominal aortic aneurysm) (CMS/HCC)     s/p repair    • Arthritis    • CAD (coronary artery disease)     s/p stenting in the past    • Chronic kidney disease (CKD), stage III (moderate) (CMS/HCC)    • COPD (chronic obstructive pulmonary disease) (CMS/HCC)    • Debility    • Diastolic CHF, chronic (CMS/HCC) 2021   • GERD (gastroesophageal reflux disease)    • History of CVA (cerebrovascular accident)    • History of ischemic colitis    • Hyperlipidemia 2021   • Hypertension    • Hypothyroidism    • Pneumonia due to COVID-19 virus 2021   • Stroke (CMS/HCC)    • Type II diabetes mellitus (CMS/HCC)      Past Surgical History:   Procedure Laterality Date   • BACK SURGERY     • CARDIAC CATHETERIZATION     • CHOLECYSTECTOMY N/A 2020    Procedure: CHOLECYSTECTOMY LAPAROSCOPIC;  Surgeon: Lonnie Meneses MD;  Location: [facility];  Service: General;  Laterality: N/A;   • COLONOSCOPY     • COLONOSCOPY N/A 2021    Procedure: COLONOSCOPY;  Surgeon: Lonnie Meneses  MD SINDY;  Location:  [facility] OR;  Service: Gastroenterology;  Laterality: N/A;   • CORONARY STENT PLACEMENT     • ENDOSCOPY     • ENDOSCOPY N/A 9/25/2021    Procedure: ESOPHAGOGASTRODUODENOSCOPY;  Surgeon: Lonnie Meneses MD;  Location: [facility] OR;  Service: Gastroenterology;  Laterality: N/A;   • TONSILLECTOMY              OT ASSESSMENT FLOWSHEET (last 12 hours)      OT Evaluation and Treatment     Row Name 10/08/21 1211                   OT Time and Intention    Document Type  evaluation  -KR        Mode of Treatment  occupational therapy  -KR        Patient Effort  adequate  -KR           General Information    General Observations of Patient  alert/cooperative  -KR           Living Environment    Current Living Arrangements  residential facility  -KR        Lives With  facility resident  -KR           Cognition    Affect/Mental Status (Cognitive)  WFL  -KR        Orientation Status (Cognition)  oriented to;person;situation  -KR        Follows Commands (Cognition)  WFL  -KR           Range of Motion Comprehensive    Comment, General Range of Motion  BUE 3-/5 shoulders, 4/5 remaining  -KR           Strength Comprehensive (MMT)    Comment, General Manual Muscle Testing (MMT) Assessment  BUE 3-/5  -KR           Bed Mobility    Bed Mobility  bed mobility (all) activities  -KR        All Activities, [place] (Bed Mobility)  moderate assist (50% patient effort)  -KR           Activities of Daily Living    BADL Assessment/Intervention  bathing;upper body dressing;lower body dressing;grooming;feeding;toileting  -KR           Bathing Assessment/Intervention    [place] Level (Bathing)  bathing skills;moderate assist (50% patient effort)  -KR           Upper Body Dressing Assessment/Training    [place] Level (Upper Body Dressing)  upper body dressing skills;moderate assist (50% patient effort)  -KR           Lower Body Dressing Assessment/Training    [place] Level (Lower Body Dressing)  lower body dressing  skills;maximum assist (25% patient effort)  -KR           Grooming Assessment/Training    [place] Level (Grooming)  grooming skills;minimum assist (75% patient effort)  -KR           Self-Feeding Assessment/Training    [place] Level (Feeding)  feeding skills;set up  -KR           Toileting Assessment/Training    [place] Level (Toileting)  toileting skills;maximum assist (25% patient effort)  -KR           Plan of Care Review    Plan of Care Reviewed With  patient  -KR           OT Goals    Bed Mobility Goal Selection (OT)  bed mobility, OT goal 1  -KR        Strength Goal Selection (OT)  strength, OT goal 1  -KR           Bed Mobility Goal 1 (OT)    Activity/Assistive Device (Bed Mobility Goal 1, OT)  bed mobility activities, all  -KR        [place] Level/Cues Needed (Bed Mobility Goal 1, OT)  contact guard assist  -KR        Time Frame (Bed Mobility Goal 1, OT)  by discharge  -KR           Strength Goal 1 (OT)    Strength Goal 1 (OT)  BUE increase x 1 to enhance self care skills  -KR        Time Frame (Strength Goal 1, OT)  by discharge  -KR           Positioning and Restraints    Pre-Treatment Position  in bed  -KR        Post Treatment Position  bed  -KR        In Bed  call light within reach  -KR           Therapy Assessment/Plan (OT)    Rehab Potential (OT)  good, to achieve stated therapy goals  -KR        Planned Therapy Interventions (OT)  activity tolerance training;BADL retraining;ROM/therapeutic exercise;strengthening exercise  -KR           Therapy Plan Review/Discharge Plan (OT)    Anticipated Discharge Disposition (OT)  extended care facility;skilled nursing facility  -KR          User Key  (r) = Recorded By, (t) = Taken By, (c) = Cosigned By    Initials Name Effective Dates    KR Arturo Roper, OT 06/16/21 -                  OT Recommendation and Plan  Planned Therapy Interventions (OT): activity tolerance training, BADL retraining, ROM/therapeutic exercise, strengthening  exercise  Plan of Care Review  Plan of Care Reviewed With: patient  Plan of Care Reviewed With: patient        Time Calculation:     Therapy Charges for Today     Code Description Service Date Service Provider Modifiers Qty    [id number] HC OT EVAL HIGH COMPLEXITY 4 10/8/2021 Arturo Roper, OT GO 1               Arturo Roper OT  10/8/2021

## 2021-10-09 NOTE — PAYOR COMM NOTE
"[facility]  [facility]AN  PHONE  [phone]  FAX  [phone]  NPI:  [id number]    PATIENT D/C 10/8/2021    Mary Ly (76 y.o. Female)             Date of Birth Social Security Number Address Home Phone MRN    1945  [facility]  [address]  [facility] [address] [phone] [id number]    Yarsani Marital Status             Amish        Admission Date Admission Type Admitting Provider Attending Provider Department, Room/Bed    9/21/21 Emergency Eliseo Higgins MD  [facility] 3 [place], 3342/1S    Discharge Date Discharge Disposition Discharge Destination          10/8/2021 Skilled Nursing Facility (DC - External)              Attending Provider: (none)   Allergies: Haldol [Haloperidol]    Isolation: None   Infection: COVID (History) (10/08/21)   Code Status: Prior   Advance Care Planning Activity    Ht: 167.6 cm (66\")   Wt: 42.1 kg (92 lb 12.8 oz)    Admission Cmt: None   Principal Problem: Pneumonia due to COVID-19 virus [U07.1,J12.82]                 Active Insurance as of 9/21/2021     Primary Coverage     Payor Plan Insurance Group Employer/Plan Group    ANTHEM MEDICARE REPLACEMENT ANTHEM MEDICARE ADVANTAGE [id number]     Payor Plan Address Payor Plan Phone Number Payor Plan Fax Number Effective Dates    [address] [phone]  9/1/2019 - None Entered    [facility] [address]       Subscriber Name Subscriber Birth Date Member ID       MARY LY 1945 [id number]           Secondary Coverage     Payor Plan Insurance Group Employer/Plan Group    [place]      Payor Plan Address Payor Plan Phone Number Payor Plan Fax Number Effective Dates    [address] [phone]  12/1/2019 - None Entered    [facility] [id number]       Subscriber Name Subscriber Birth Date Member ID       MARY LY 1945 [id number]                 Emergency Contacts      (Rel.) Home Phone Work Phone Mobile Phone    " ZORAIDA SCHULER (Daughter) [phone] -- --    MARE ANNA (Grandchild) [phone] -- --    ALEXX NARAYAN (Son) [phone] -- --    MARYCHUY SOLORIO (Friend) -- -- [phone]

## 2021-11-01 ENCOUNTER — OFFICE VISIT (OUTPATIENT)
Dept: CARDIOLOGY | Facility: CLINIC | Age: 76
End: 2021-11-01

## 2021-11-01 VITALS
HEART RATE: 54 BPM | SYSTOLIC BLOOD PRESSURE: 100 MMHG | OXYGEN SATURATION: 97 % | TEMPERATURE: 97.7 F | DIASTOLIC BLOOD PRESSURE: 58 MMHG

## 2021-11-01 DIAGNOSIS — I48.0 PAROXYSMAL ATRIAL FIBRILLATION (HCC): Primary | ICD-10-CM

## 2021-11-01 DIAGNOSIS — E43 SEVERE MALNUTRITION (HCC): ICD-10-CM

## 2021-11-01 DIAGNOSIS — K92.2 UPPER GI BLEED: ICD-10-CM

## 2021-11-01 DIAGNOSIS — J44.9 CHRONIC OBSTRUCTIVE PULMONARY DISEASE, UNSPECIFIED COPD TYPE (HCC): Chronic | ICD-10-CM

## 2021-11-01 PROCEDURE — 99213 OFFICE O/P EST LOW 20 MIN: CPT | Performed by: PHYSICIAN ASSISTANT

## 2021-11-01 NOTE — PROGRESS NOTES
Leta Gardner MD  Mary Ly  1945 11/01/2021    Patient Active Problem List   Diagnosis   • Status post laparoscopic cholecystectomy   • Severe malnutrition (HCC)   • COPD (chronic obstructive pulmonary disease) (HCC)   • Type II diabetes mellitus (HCC)   • Hypothyroidism   • Hyperlipidemia   • Diastolic CHF, chronic (HCC)   • Moderate malnutrition (HCC)   • Vomiting   • Upper GI bleed   • Pneumonia due to COVID-19 virus   • Cytokine release syndrome, grade 2   • Paroxysmal atrial fibrillation (HCC)       Dear Leta Gardner MD:    Subjective     History of Present Illness:    Chief Complaint   Patient presents with   • Follow-up   • Shortness of Breath   • Fatigue       Mary Ly is a pleasant 76 y.o. female with a past medical history significant for multiple significant comorbidities.  She was recently hospitalized for acute hypoxic respiratory failure secondary to COVID-19.  Thankfully she did not require ventilatory support but did require high flow oxygen throughout her stay.  She was also found to have significant anemia that did require multiple units of packed red blood cells throughout her stay.  She does have known ischemic bowel disease and was actually scheduled to have angiogram during her hospitalization however due to her hospitalization was unable to have this done.  Throughout her stay she was initially found to have ultrasound findings concerning for DVT Eliquis was attempted however unfortunately she was unable to tolerate this as she did develop worsening anemia as well as melena and this was held.  Thankfully, after repeat ultrasound was done no DVT was seen.  Of note she was on Eliquis prior to admission for known atrial fibrillation with a high chads vas score of at least 6.  She comes in today for hospital follow-up.    Mary has been in rehabilitation since her hospitalization which she report has been stable thus far although she is still dealing with ongoing anemia.   She is aware of her acute conditions and has already been informed on issues with anticoagulation and had watchman's device recommended to her which she was agreeable to.  In regards to her A. fib she denies significant palpitations or dizziness although she is essentially bedbound or wheelchair-bound throughout the day.  She denies significant chest pains.  She does also have history of CAD with remote stenting, endovascular repair of abdominal aortic aneurysm, essential hypertension, diabetes mellitus, and HFpEF.    Allergies   Allergen Reactions   • Haldol [Haloperidol] Hallucinations   :      Current Outpatient Medications:   •  atorvastatin (LIPITOR) 40 MG tablet, Take 40 mg by mouth Every Night., Disp: , Rfl:   •  baclofen (LIORESAL) 10 MG tablet, Take 10 mg by mouth Every Night., Disp: , Rfl:   •  bisacodyl (DULCOLAX) 10 MG suppository, Insert 10 mg into the rectum Daily As Needed for Constipation., Disp: , Rfl:   •  Carboxymethylcellulose Sodium (ARTIFICIAL TEARS OP), Apply  to eye(s) as directed by provider., Disp: , Rfl:   •  Cariprazine HCl (VRAYLAR) 1.5 MG capsule capsule, Take 1.5 mg by mouth Daily., Disp: , Rfl:   •  carvedilol (COREG) 6.25 MG tablet, Take 6.25 mg by mouth 2 (Two) Times a Day With Meals., Disp: , Rfl:   •  clopidogrel (PLAVIX) 75 MG tablet, Take 75 mg by mouth Daily., Disp: , Rfl:   •  docusate sodium (COLACE) 250 MG capsule, Take 250 mg by mouth Daily., Disp: , Rfl:   •  ipratropium-albuterol (DUO-NEB) 0.5-2.5 mg/3 ml nebulizer, Take 3 mL by nebulization 4 (Four) Times a Day As Needed for Wheezing or Shortness of Air., Disp: , Rfl:   •  lactulose (CHRONULAC) 10 GM/15ML solution, Take 10 g by mouth Daily As Needed., Disp: , Rfl:   •  levothyroxine (SYNTHROID, LEVOTHROID) 125 MCG tablet, Take 250 mcg by mouth Daily., Disp: , Rfl:   •  loperamide (IMODIUM) 2 MG capsule, Take 2 mg by mouth Every 6 (Six) Hours As Needed for Diarrhea., Disp: , Rfl:   •  melatonin 5 MG tablet tablet, Take 5  mg by mouth Every Night., Disp: , Rfl:   •  montelukast (SINGULAIR) 10 MG tablet, Take 10 mg by mouth Daily., Disp: , Rfl:   •  Multiple Vitamin (MULTIVITAMIN) tablet tablet, Take 1 tablet by mouth Every Night., Disp: , Rfl:   •  ondansetron (ZOFRAN) 4 MG tablet, Take 4 mg by mouth Every 6 (Six) Hours As Needed for Nausea or Vomiting., Disp: , Rfl:   •  promethazine (PHENERGAN) 12.5 MG tablet, Take 12.5 mg by mouth Every 8 (Eight) Hours As Needed for Nausea or Vomiting., Disp: , Rfl:   •  senna (Senna) 8.6 MG tablet, Take 1 tablet by mouth Daily As Needed for Constipation., Disp: , Rfl:   •  SITagliptin (JANUVIA) 100 MG tablet, Take 100 mg by mouth Daily., Disp: , Rfl:   •  STIOLTO RESPIMAT 2.5-2.5 MCG/ACT aerosol solution inhaler, Inhale 2 puffs Daily., Disp: , Rfl:   •  traZODone (DESYREL) 50 MG tablet, Take 50 mg by mouth Every Night., Disp: , Rfl:   •  ferrous sulfate 325 (65 FE) MG tablet, Take 1 tablet by mouth Daily With Breakfast. HOLD FOR 10 DAYS (UNTIL FINISHED WITH H. PYLORI TREATMENT), Disp: , Rfl:   •  HYDROcodone-acetaminophen (NORCO) 5-325 MG per tablet, Take 1 tablet by mouth Every 8 (Eight) Hours As Needed for Mild Pain  or Moderate Pain ., Disp: 9 tablet, Rfl: 0  •  Lactobacillus Acid-Pectin (Acidophilus/Pectin) capsule, Take 1 capsule by mouth 2 (two) times a day. HOLD FOR 10 DAYS (UNTIL FINISHED WITH H. PYLORI TREATMENT), Disp: , Rfl:     The following portions of the patient's history were reviewed and updated as appropriate: allergies, current medications, past family history, past medical history, past social history, past surgical history and problem list.    Social History     Tobacco Use   • Smoking status: Current Every Day Smoker     Packs/day: 1.00     Years: 55.00     Pack years: 55.00     Types: Cigarettes   • Smokeless tobacco: Never Used   Substance Use Topics   • Alcohol use: Never   • Drug use: Never         Objective   Vitals:    11/01/21 1525   BP: 100/58   Pulse: 54   Temp: 97.7  °F (36.5 °C)   SpO2: 97%     There is no height or weight on file to calculate BMI.    Constitutional:       General: Not in acute distress.     Appearance: Healthy appearance. Well-developed and not in distress. Cachectic and frail. Chronically ill-appearing. Not diaphoretic.      Comments: Wheelchair-bound   Eyes:      Conjunctiva/sclera: Conjunctivae normal.      Pupils: Pupils are equal, round, and reactive to light.   HENT:      Head: Normocephalic and atraumatic.   Neck:      Vascular: No carotid bruit or JVD.   Pulmonary:      Effort: Pulmonary effort is normal. No respiratory distress.      Breath sounds: Normal breath sounds.   Cardiovascular:      Normal rate. Regular rhythm.   Edema:     Peripheral edema absent.   Skin:     General: Skin is cool.   Neurological:      Mental Status: Alert, oriented to person, place, and time and oriented to person, place and time.         Lab Results   Component Value Date     11/04/2021    K 4.7 11/04/2021    CL 96 (L) 11/04/2021    CO2 34.8 (H) 11/04/2021    BUN 17 11/04/2021    CREATININE 0.57 11/04/2021    GLUCOSE 132 (H) 11/04/2021    CALCIUM 8.3 (L) 11/04/2021    AST 17 11/04/2021    ALT 12 11/04/2021    ALKPHOS 73 11/04/2021     Lab Results   Component Value Date    CKTOTAL 65 09/23/2021     Lab Results   Component Value Date    WBC 13.76 (H) 11/04/2021    HGB 4.4 (C) 11/04/2021    HCT 15.9 (C) 11/04/2021     (H) 11/04/2021     Lab Results   Component Value Date    INR 1.83 (H) 10/04/2021    INR 1.00 09/29/2021    INR 1.13 (H) 09/26/2021     Lab Results   Component Value Date    MG 2.2 10/05/2021     Lab Results   Component Value Date    TSH 20.350 (H) 11/04/2021    TRIG 100 09/22/2021      No results found for: BNP    During this visit the following were done:  Labs Reviewed []    Labs Ordered []    Radiology Reports Reviewed []    Radiology Ordered []    PCP Records Reviewed []    Referring Provider Records Reviewed []    ER Records Reviewed []     Hospital Records Reviewed []    History Obtained From Family []    Radiology Images Reviewed []    Other Reviewed []    Records Requested []       Procedures    Assessment/Plan    Diagnosis Plan   1. Paroxysmal atrial fibrillation (HCC)  Ambulatory Referral to Cardiac Electrophysiology   2. Severe malnutrition (HCC)     3. Upper GI bleed     4. Chronic obstructive pulmonary disease, unspecified COPD type (HCC)              Recommendations:  1. Paroxysmal atrial fibrillation  1. Maintaining rhythm control with assistance of amiodarone currently unable to be anticoagulated due to to recurrent GI bleed and has failed Eliquis x2 during her recent hospitalization.  She is aware of watchman's device procedure I did reexplain this to her she is agreeable for referral for this procedure so I did send in a referral.  2. Ischemic bowel disease  1. Patient is following with vascular surgery as well as gastroenterology in [place] she previously had angiogram scheduled she reports she does have follow-up appointment already scheduled to discuss rescheduling her procedure.  3. Coronary artery disease  1. She does deny any overt chest pains today we will continue carvedilol and Lipitor.  She currently is tolerating Plavix and having hemoglobin drawn frequently at her rehabilitation center. This may need to be held if hemoglobin drops. She expressed understanding.     overall prognosis is guarded given multiple comorbidities as well as severe malnutrition likely 2/2 ischemic bowel.        No follow-ups on file.    As always, I appreciate very much the opportunity to participate in the cardiovascular care of your patients.      With Best Regards,    Santiago Vallejo PA-C

## 2021-11-04 ENCOUNTER — HOSPITAL ENCOUNTER (EMERGENCY)
Facility: HOSPITAL | Age: 76
Discharge: ANOTHER HOSPITAL | End: 2021-11-04
Attending: EMERGENCY MEDICINE | Admitting: EMERGENCY MEDICINE

## 2021-11-04 ENCOUNTER — APPOINTMENT (OUTPATIENT)
Dept: GENERAL RADIOLOGY | Facility: HOSPITAL | Age: 76
End: 2021-11-04

## 2021-11-04 DIAGNOSIS — R77.8 ELEVATED TROPONIN: ICD-10-CM

## 2021-11-04 DIAGNOSIS — D64.9 ANEMIA, UNSPECIFIED TYPE: Primary | ICD-10-CM

## 2021-11-04 LAB
ABO GROUP BLD: NORMAL
ALBUMIN SERPL-MCNC: 2.62 G/DL (ref 3.5–5.2)
ALBUMIN/GLOB SERPL: 0.7 G/DL
ALP SERPL-CCNC: 73 U/L (ref 39–117)
ALT SERPL W P-5'-P-CCNC: 12 U/L (ref 1–33)
ANION GAP SERPL CALCULATED.3IONS-SCNC: 6.2 MMOL/L (ref 5–15)
AST SERPL-CCNC: 17 U/L (ref 1–32)
BACTERIA UR QL AUTO: ABNORMAL /HPF
BASOPHILS # BLD AUTO: 0.04 10*3/MM3 (ref 0–0.2)
BASOPHILS NFR BLD AUTO: 0.3 % (ref 0–1.5)
BILIRUB SERPL-MCNC: <0.2 MG/DL (ref 0–1.2)
BILIRUB UR QL STRIP: NEGATIVE
BLD GP AB SCN SERPL QL: NEGATIVE
BUN SERPL-MCNC: 17 MG/DL (ref 8–23)
BUN/CREAT SERPL: 29.8 (ref 7–25)
CALCIUM SPEC-SCNC: 8.3 MG/DL (ref 8.6–10.5)
CHLORIDE SERPL-SCNC: 96 MMOL/L (ref 98–107)
CLARITY UR: ABNORMAL
CO2 SERPL-SCNC: 34.8 MMOL/L (ref 22–29)
COLOR UR: YELLOW
CREAT SERPL-MCNC: 0.57 MG/DL (ref 0.57–1)
DEPRECATED RDW RBC AUTO: 62.5 FL (ref 37–54)
EOSINOPHIL # BLD AUTO: 0.02 10*3/MM3 (ref 0–0.4)
EOSINOPHIL NFR BLD AUTO: 0.1 % (ref 0.3–6.2)
ERYTHROCYTE [DISTWIDTH] IN BLOOD BY AUTOMATED COUNT: 17.9 % (ref 12.3–15.4)
ERYTHROCYTE [SEDIMENTATION RATE] IN BLOOD: 23 MM/HR (ref 0–30)
FERRITIN SERPL-MCNC: 28.45 NG/ML (ref 13–150)
FLUAV RNA RESP QL NAA+PROBE: NOT DETECTED
FLUBV RNA RESP QL NAA+PROBE: NOT DETECTED
GFR SERPL CREATININE-BSD FRML MDRD: 103 ML/MIN/1.73
GLOBULIN UR ELPH-MCNC: 3.6 GM/DL
GLUCOSE SERPL-MCNC: 132 MG/DL (ref 65–99)
GLUCOSE UR STRIP-MCNC: NEGATIVE MG/DL
HCT VFR BLD AUTO: 15.9 % (ref 34–46.6)
HGB BLD-MCNC: 4.4 G/DL (ref 12–15.9)
HGB UR QL STRIP.AUTO: NEGATIVE
HOLD SPECIMEN: NORMAL
HOLD SPECIMEN: NORMAL
HYALINE CASTS UR QL AUTO: ABNORMAL /LPF
IMM GRANULOCYTES # BLD AUTO: 0.09 10*3/MM3 (ref 0–0.05)
IMM GRANULOCYTES NFR BLD AUTO: 0.7 % (ref 0–0.5)
IRON 24H UR-MRATE: 17 MCG/DL (ref 37–145)
IRON SATN MFR SERPL: 6 % (ref 20–50)
KETONES UR QL STRIP: NEGATIVE
LDH SERPL-CCNC: 217 U/L (ref 135–214)
LEUKOCYTE ESTERASE UR QL STRIP.AUTO: ABNORMAL
LYMPHOCYTES # BLD AUTO: 1.01 10*3/MM3 (ref 0.7–3.1)
LYMPHOCYTES NFR BLD AUTO: 7.3 % (ref 19.6–45.3)
MCH RBC QN AUTO: 26.8 PG (ref 26.6–33)
MCHC RBC AUTO-ENTMCNC: 27.7 G/DL (ref 31.5–35.7)
MCV RBC AUTO: 97 FL (ref 79–97)
MONOCYTES # BLD AUTO: 1.43 10*3/MM3 (ref 0.1–0.9)
MONOCYTES NFR BLD AUTO: 10.4 % (ref 5–12)
NEUTROPHILS NFR BLD AUTO: 11.17 10*3/MM3 (ref 1.7–7)
NEUTROPHILS NFR BLD AUTO: 81.2 % (ref 42.7–76)
NITRITE UR QL STRIP: NEGATIVE
NRBC BLD AUTO-RTO: 0.3 /100 WBC (ref 0–0.2)
PH UR STRIP.AUTO: 5.5 [PH] (ref 5–8)
PLATELET # BLD AUTO: 526 10*3/MM3 (ref 140–450)
PMV BLD AUTO: 9.8 FL (ref 6–12)
POTASSIUM SERPL-SCNC: 4.7 MMOL/L (ref 3.5–5.2)
PROT SERPL-MCNC: 6.2 G/DL (ref 6–8.5)
PROT UR QL STRIP: NEGATIVE
RBC # BLD AUTO: 1.64 10*6/MM3 (ref 3.77–5.28)
RBC # UR: ABNORMAL /HPF
REF LAB TEST METHOD: ABNORMAL
RETICS # AUTO: 0.1 10*6/MM3 (ref 0.02–0.13)
RETICS/RBC NFR AUTO: 5.92 % (ref 0.7–1.9)
RH BLD: POSITIVE
SARS-COV-2 RNA RESP QL NAA+PROBE: NOT DETECTED
SODIUM SERPL-SCNC: 137 MMOL/L (ref 136–145)
SP GR UR STRIP: 1.02 (ref 1–1.03)
SQUAMOUS #/AREA URNS HPF: ABNORMAL /HPF
T&S EXPIRATION DATE: NORMAL
TIBC SERPL-MCNC: 295 MCG/DL (ref 298–536)
TRANSFERRIN SERPL-MCNC: 198 MG/DL (ref 200–360)
TROPONIN T SERPL-MCNC: 0.15 NG/ML (ref 0–0.03)
TROPONIN T SERPL-MCNC: 0.16 NG/ML (ref 0–0.03)
TSH SERPL DL<=0.05 MIU/L-ACNC: 20.35 UIU/ML (ref 0.27–4.2)
UROBILINOGEN UR QL STRIP: ABNORMAL
WBC # BLD AUTO: 13.76 10*3/MM3 (ref 3.4–10.8)
WBC UR QL AUTO: ABNORMAL /HPF
WHOLE BLOOD HOLD SPECIMEN: NORMAL
WHOLE BLOOD HOLD SPECIMEN: NORMAL
YEAST URNS QL MICRO: ABNORMAL /HPF

## 2021-11-04 PROCEDURE — 86923 COMPATIBILITY TEST ELECTRIC: CPT

## 2021-11-04 PROCEDURE — 86900 BLOOD TYPING SEROLOGIC ABO: CPT

## 2021-11-04 PROCEDURE — 85025 COMPLETE CBC W/AUTO DIFF WBC: CPT | Performed by: PHYSICIAN ASSISTANT

## 2021-11-04 PROCEDURE — 82607 VITAMIN B-12: CPT | Performed by: PHYSICIAN ASSISTANT

## 2021-11-04 PROCEDURE — 85045 AUTOMATED RETICULOCYTE COUNT: CPT | Performed by: PHYSICIAN ASSISTANT

## 2021-11-04 PROCEDURE — 81001 URINALYSIS AUTO W/SCOPE: CPT | Performed by: PHYSICIAN ASSISTANT

## 2021-11-04 PROCEDURE — 86900 BLOOD TYPING SEROLOGIC ABO: CPT | Performed by: PHYSICIAN ASSISTANT

## 2021-11-04 PROCEDURE — 71045 X-RAY EXAM CHEST 1 VIEW: CPT

## 2021-11-04 PROCEDURE — 86901 BLOOD TYPING SEROLOGIC RH(D): CPT | Performed by: PHYSICIAN ASSISTANT

## 2021-11-04 PROCEDURE — 93005 ELECTROCARDIOGRAM TRACING: CPT | Performed by: PHYSICIAN ASSISTANT

## 2021-11-04 PROCEDURE — 87636 SARSCOV2 & INF A&B AMP PRB: CPT | Performed by: PHYSICIAN ASSISTANT

## 2021-11-04 PROCEDURE — 36415 COLL VENOUS BLD VENIPUNCTURE: CPT

## 2021-11-04 PROCEDURE — P9016 RBC LEUKOCYTES REDUCED: HCPCS

## 2021-11-04 PROCEDURE — 85060 BLOOD SMEAR INTERPRETATION: CPT | Performed by: PHYSICIAN ASSISTANT

## 2021-11-04 PROCEDURE — 86850 RBC ANTIBODY SCREEN: CPT | Performed by: PHYSICIAN ASSISTANT

## 2021-11-04 PROCEDURE — 84484 ASSAY OF TROPONIN QUANT: CPT | Performed by: PHYSICIAN ASSISTANT

## 2021-11-04 PROCEDURE — 96365 THER/PROPH/DIAG IV INF INIT: CPT

## 2021-11-04 PROCEDURE — 84466 ASSAY OF TRANSFERRIN: CPT | Performed by: PHYSICIAN ASSISTANT

## 2021-11-04 PROCEDURE — 82728 ASSAY OF FERRITIN: CPT | Performed by: PHYSICIAN ASSISTANT

## 2021-11-04 PROCEDURE — 80053 COMPREHEN METABOLIC PANEL: CPT | Performed by: PHYSICIAN ASSISTANT

## 2021-11-04 PROCEDURE — 93010 ELECTROCARDIOGRAM REPORT: CPT | Performed by: INTERNAL MEDICINE

## 2021-11-04 PROCEDURE — 83540 ASSAY OF IRON: CPT | Performed by: PHYSICIAN ASSISTANT

## 2021-11-04 PROCEDURE — 85652 RBC SED RATE AUTOMATED: CPT | Performed by: INTERNAL MEDICINE

## 2021-11-04 PROCEDURE — 96376 TX/PRO/DX INJ SAME DRUG ADON: CPT

## 2021-11-04 PROCEDURE — 99284 EMERGENCY DEPT VISIT MOD MDM: CPT

## 2021-11-04 PROCEDURE — 82746 ASSAY OF FOLIC ACID SERUM: CPT | Performed by: PHYSICIAN ASSISTANT

## 2021-11-04 PROCEDURE — 84443 ASSAY THYROID STIM HORMONE: CPT | Performed by: PHYSICIAN ASSISTANT

## 2021-11-04 PROCEDURE — 36430 TRANSFUSION BLD/BLD COMPNT: CPT

## 2021-11-04 PROCEDURE — 83615 LACTATE (LD) (LDH) ENZYME: CPT | Performed by: INTERNAL MEDICINE

## 2021-11-04 RX ORDER — PANTOPRAZOLE SODIUM 40 MG/10ML
80 INJECTION, POWDER, LYOPHILIZED, FOR SOLUTION INTRAVENOUS ONCE
Status: COMPLETED | OUTPATIENT
Start: 2021-11-04 | End: 2021-11-04

## 2021-11-04 RX ADMIN — PANTOPRAZOLE SODIUM 80 MG: 40 INJECTION, POWDER, FOR SOLUTION INTRAVENOUS at 19:52

## 2021-11-04 RX ADMIN — SODIUM CHLORIDE 8 MG/HR: 9 INJECTION, SOLUTION INTRAVENOUS at 19:59

## 2021-11-04 NOTE — ED NOTES
Diane, RN and ED Tam celis, at bedside attempting to get IV and blood.      Clary Nunn RN  11/04/21 0127

## 2021-11-04 NOTE — ED NOTES
Called [facility] spoke with Marielle for transfer. ROBER GRIMES is on the line with Dr. Eleuterio Royal at this time.      Symes, Heather  11/04/21 1958

## 2021-11-04 NOTE — ED PROVIDER NOTES
Subjective   76-year-old white female presents secondary to abnormal lab.  Patient was recently in hospital for profound anemia.  She underwent colonoscopy and EGD.  No active bleeding was noted at that time.  Patient had labs while in the rehab center and her hemoglobin was noted to be low again.  Patient denies any bright red blood per rectum.  She denies any dark tarry stools.  She denies any chest pain pressure tightness squeezing.  She denies any increase shortness of breath.  She voices no other complaints this time.          Review of Systems   Constitutional: Negative.  Negative for fever.   HENT: Negative.    Respiratory: Negative.    Cardiovascular: Negative.  Negative for chest pain.   Gastrointestinal: Negative.  Negative for abdominal pain, anal bleeding, nausea and vomiting.   Endocrine: Negative.    Genitourinary: Negative.  Negative for dysuria.   Skin: Negative.    Neurological: Negative.    Psychiatric/Behavioral: Negative.    All other systems reviewed and are negative.      Past Medical History:   Diagnosis Date   • AAA (abdominal aortic aneurysm) (CMS/[facility])     s/p repair    • Arthritis    • CAD (coronary artery disease)     s/p stenting in the past    • Chronic kidney disease (CKD), stage III (moderate) (CMS/HCC)    • COPD (chronic obstructive pulmonary disease) (CMS/HCC)    • Debility    • Diastolic CHF, chronic (CMS/HCC) 4/23/2021   • GERD (gastroesophageal reflux disease)    • History of CVA (cerebrovascular accident)    • History of ischemic colitis    • Hyperlipidemia 4/23/2021   • Hypertension    • Hypothyroidism    • Pneumonia due to COVID-19 virus 9/21/2021   • Stroke (CMS/HCC)    • Type II diabetes mellitus (CMS/[facility])        Allergies   Allergen Reactions   • Haldol [Haloperidol] Hallucinations       Past Surgical History:   Procedure Laterality Date   • BACK SURGERY     • CARDIAC CATHETERIZATION     • CHOLECYSTECTOMY N/A 7/17/2020    Procedure: CHOLECYSTECTOMY LAPAROSCOPIC;  Surgeon:  Lonnie Meneses MD;  Location: [facility] OR;  Service: General;  Laterality: N/A;   • COLONOSCOPY     • COLONOSCOPY N/A 9/25/2021    Procedure: COLONOSCOPY;  Surgeon: Lonnie Meneses MD;  Location: [facility] OR;  Service: Gastroenterology;  Laterality: N/A;   • CORONARY STENT PLACEMENT     • ENDOSCOPY     • ENDOSCOPY N/A 9/25/2021    Procedure: ESOPHAGOGASTRODUODENOSCOPY;  Surgeon: Lonnie Meneses MD;  Location: [facility] OR;  Service: Gastroenterology;  Laterality: N/A;   • TONSILLECTOMY         No family history on file.    Social History     Socioeconomic History   • Marital status:    Tobacco Use   • Smoking status: Current Every Day Smoker     Packs/day: 1.00     Years: 55.00     Pack years: 55.00     Types: Cigarettes   • Smokeless tobacco: Never Used   Substance and Sexual Activity   • Alcohol use: Never   • Drug use: Never   • Sexual activity: Defer           Objective   Physical Exam  Vitals and nursing note reviewed.   Constitutional:       General: She is not in acute distress.     Appearance: She is well-developed. She is not diaphoretic.      Comments: Patient appears pale.   HENT:      Head: Normocephalic and atraumatic.      Right Ear: External ear normal.      Left Ear: External ear normal.      Nose: Nose normal.   Eyes:      Conjunctiva/sclera: Conjunctivae normal.      Pupils: Pupils are equal, round, and reactive to light.   Neck:      Vascular: No JVD.      Trachea: No tracheal deviation.   Cardiovascular:      Rate and Rhythm: Normal rate and regular rhythm.      Heart sounds: Normal heart sounds. No murmur heard.      Pulmonary:      Effort: Pulmonary effort is normal. No respiratory distress.      Breath sounds: Normal breath sounds. No wheezing.   Abdominal:      General: Bowel sounds are normal.      Palpations: Abdomen is soft.      Tenderness: There is no abdominal tenderness.   Musculoskeletal:         General: No deformity. Normal range of motion.      Cervical back: Normal range of  motion and neck supple.   Skin:     General: Skin is warm and dry.      Coloration: Skin is not pale.      Findings: No erythema or rash.   Neurological:      Mental Status: She is alert and oriented to person, place, and time.      Cranial Nerves: No cranial nerve deficit.   Psychiatric:         Behavior: Behavior normal.         Thought Content: Thought content normal.         Procedures           ED Course  ED Course as of 11/05/21 0021   u Nov 04, 2021 1920 ECG 12 Lead  Sinus rhythm with sinus arrhythmia rate of 78.  QTC of 535.  Nonspecific ST abnormality V5 V6 possible ischemia no STEMI criteria per Dr. De La Torre [JI]   1923 Paged the hospitalist [JI]   1945 Discussed with Dr. Royal who felt that the patient should be transferred to  where her vascular surgeon is along with the fact that patient has had endoscopy here which did not yield any active sign of bleeding. [JI]   1959 Discussed with Dr. Eleuterio Royal at the [facility] who states that they are on divert and do not have the capacity to take patient. [JI]   2032 Discussed with Dr. Jeffers at [facility] who is working on getting a bed. [JI]   2238 [facility] en route to transport to [facility] in [place]. Bed assignment given. [MB]      ED Course User Index  [JI] Abhi Garzon, PA  [MB] Adriana Garcia, QUIQUE                                           MDM  Number of Diagnoses or Management Options  Anemia, unspecified type: new and requires workup  Elevated troponin: new and requires workup     Amount and/or Complexity of Data Reviewed  Clinical lab tests: reviewed and ordered  Tests in the radiology section of CPT®: ordered and reviewed  Tests in the medicine section of CPT®: reviewed and ordered  Decide to obtain previous medical records or to obtain history from someone other than the patient: yes  Discuss the patient with other providers: yes  Independent visualization of images, tracings, or specimens: yes    Risk  of Complications, Morbidity, and/or Mortality  Presenting problems: high  Diagnostic procedures: high  Management options: high    Critical Care  Total time providing critical care: > 105 minutes    Patient Progress  Patient progress: other (comment) (CRITICAL)      Final diagnoses:   Anemia, unspecified type   Elevated troponin       ED Disposition  ED Disposition     ED Disposition Condition Comment    Transfer to Another Facility             No follow-up provider specified.       Medication List      No changes were made to your prescriptions during this visit.          Abhi Garzon PA  11/04/21 2114       Erasmo Singleton MD  11/05/21 0021

## 2021-11-05 VITALS
OXYGEN SATURATION: 97 % | HEART RATE: 71 BPM | RESPIRATION RATE: 18 BRPM | DIASTOLIC BLOOD PRESSURE: 66 MMHG | SYSTOLIC BLOOD PRESSURE: 153 MMHG | TEMPERATURE: 98.4 F | WEIGHT: 100 LBS | BODY MASS INDEX: 16.07 KG/M2 | HEIGHT: 66 IN

## 2021-11-05 LAB
BH BB BLOOD EXPIRATION DATE: NORMAL
BH BB BLOOD EXPIRATION DATE: NORMAL
BH BB BLOOD TYPE BARCODE: 7300
BH BB BLOOD TYPE BARCODE: 7300
BH BB DISPENSE STATUS: NORMAL
BH BB DISPENSE STATUS: NORMAL
BH BB PRODUCT CODE: NORMAL
BH BB PRODUCT CODE: NORMAL
BH BB UNIT NUMBER: NORMAL
BH BB UNIT NUMBER: NORMAL
CROSSMATCH INTERPRETATION: NORMAL
CROSSMATCH INTERPRETATION: NORMAL
FOLATE SERPL-MCNC: >20 NG/ML (ref 4.78–24.2)
QT INTERVAL: 470 MS
QTC INTERVAL: 535 MS
UNIT  ABO: NORMAL
UNIT  ABO: NORMAL
UNIT  RH: NORMAL
UNIT  RH: NORMAL
VIT B12 BLD-MCNC: 765 PG/ML (ref 211–946)

## 2021-11-05 NOTE — ED NOTES
Called [facility] spoke with shreyas. He will check with OI and call me back.      Symes, Heather  11/04/21 7696

## 2021-11-05 NOTE — ED NOTES
Patient cleaned and changed into a brief. Patient repositioned up in bed.      Clary Nunn RN  11/04/21 2028

## 2021-11-05 NOTE — ED NOTES
Called [facility] states they only have 1 truck for the county.,     Symes, Heather  11/04/21 0311

## 2021-11-05 NOTE — ED NOTES
Spoke with Apirl, Daughter at this time and obtained consent to transfer patient to external facility by [facility] and updated family on the current plan of care.      Vivek Figueroa, RN  11/04/21 5486

## 2021-11-05 NOTE — ED NOTES
Called [facility] spoke with Kallie for transfer. She will call back with Dr. Jeffers.      Symes, Heather  11/04/21 2004

## 2021-11-05 NOTE — ED NOTES
Due to patient having blood hanging and not being able to get ground transport. [facility] was contacted for transport.      Symes, Heather  11/04/21 1702

## 2021-11-05 NOTE — ED NOTES
Called [facility] spoke with Lyla. They accepted. ETA 19 minutes.      Symes, Heather  11/04/21 0986

## 2021-11-08 LAB
CYTOLOGIST CVX/VAG CYTO: NORMAL
PATH INTERP BLD-IMP: NORMAL

## 2021-11-09 PROBLEM — I48.0 PAROXYSMAL ATRIAL FIBRILLATION (HCC): Status: ACTIVE | Noted: 2021-11-09

## 2021-11-19 ENCOUNTER — HOSPITAL ENCOUNTER (EMERGENCY)
Facility: HOSPITAL | Age: 76
Discharge: ANOTHER HOSPITAL | End: 2021-11-21
Attending: STUDENT IN AN ORGANIZED HEALTH CARE EDUCATION/TRAINING PROGRAM | Admitting: EMERGENCY MEDICINE

## 2021-11-19 ENCOUNTER — LAB REQUISITION (OUTPATIENT)
Dept: LAB | Facility: HOSPITAL | Age: 76
End: 2021-11-19

## 2021-11-19 DIAGNOSIS — K92.2 GASTROINTESTINAL HEMORRHAGE, UNSPECIFIED GASTROINTESTINAL HEMORRHAGE TYPE: Primary | ICD-10-CM

## 2021-11-19 DIAGNOSIS — D64.9 ANEMIA, UNSPECIFIED TYPE: ICD-10-CM

## 2021-11-19 DIAGNOSIS — E78.5 HYPERLIPIDEMIA, UNSPECIFIED: ICD-10-CM

## 2021-11-19 DIAGNOSIS — E11.8 TYPE 2 DIABETES MELLITUS WITH UNSPECIFIED COMPLICATIONS (HCC): ICD-10-CM

## 2021-11-19 LAB
ABO GROUP BLD: NORMAL
ALBUMIN SERPL-MCNC: 2.73 G/DL (ref 3.5–5.2)
ALBUMIN/GLOB SERPL: 1 G/DL
ALP SERPL-CCNC: 49 U/L (ref 39–117)
ALT SERPL W P-5'-P-CCNC: 7 U/L (ref 1–33)
ANION GAP SERPL CALCULATED.3IONS-SCNC: 6.3 MMOL/L (ref 5–15)
AST SERPL-CCNC: 8 U/L (ref 1–32)
BASOPHILS # BLD AUTO: 0.04 10*3/MM3 (ref 0–0.2)
BASOPHILS # BLD AUTO: 0.06 10*3/MM3 (ref 0–0.2)
BASOPHILS NFR BLD AUTO: 0.3 % (ref 0–1.5)
BASOPHILS NFR BLD AUTO: 0.3 % (ref 0–1.5)
BILIRUB SERPL-MCNC: <0.2 MG/DL (ref 0–1.2)
BLD GP AB SCN SERPL QL: NEGATIVE
BUN SERPL-MCNC: 19 MG/DL (ref 8–23)
BUN/CREAT SERPL: 50 (ref 7–25)
CALCIUM SPEC-SCNC: 8.2 MG/DL (ref 8.6–10.5)
CHLORIDE SERPL-SCNC: 94 MMOL/L (ref 98–107)
CHOLEST SERPL-MCNC: 150 MG/DL (ref 0–200)
CO2 SERPL-SCNC: 37.7 MMOL/L (ref 22–29)
CREAT SERPL-MCNC: 0.38 MG/DL (ref 0.57–1)
DEPRECATED RDW RBC AUTO: 63.3 FL (ref 37–54)
DEPRECATED RDW RBC AUTO: 63.7 FL (ref 37–54)
EOSINOPHIL # BLD AUTO: 0.19 10*3/MM3 (ref 0–0.4)
EOSINOPHIL # BLD AUTO: 0.21 10*3/MM3 (ref 0–0.4)
EOSINOPHIL NFR BLD AUTO: 1.1 % (ref 0.3–6.2)
EOSINOPHIL NFR BLD AUTO: 1.4 % (ref 0.3–6.2)
ERYTHROCYTE [DISTWIDTH] IN BLOOD BY AUTOMATED COUNT: 18.4 % (ref 12.3–15.4)
ERYTHROCYTE [DISTWIDTH] IN BLOOD BY AUTOMATED COUNT: 18.7 % (ref 12.3–15.4)
FERRITIN SERPL-MCNC: 45.74 NG/ML (ref 13–150)
GFR SERPL CREATININE-BSD FRML MDRD: >150 ML/MIN/1.73
GLOBULIN UR ELPH-MCNC: 2.7 GM/DL
GLUCOSE SERPL-MCNC: 113 MG/DL (ref 65–99)
HBA1C MFR BLD: 5.5 % (ref 4.8–5.6)
HCT VFR BLD AUTO: 15.4 % (ref 34–46.6)
HCT VFR BLD AUTO: 18.6 % (ref 34–46.6)
HDLC SERPL-MCNC: 49 MG/DL (ref 40–60)
HGB BLD-MCNC: 4.6 G/DL (ref 12–15.9)
HGB BLD-MCNC: 5.7 G/DL (ref 12–15.9)
IMM GRANULOCYTES # BLD AUTO: 0.08 10*3/MM3 (ref 0–0.05)
IMM GRANULOCYTES # BLD AUTO: 0.09 10*3/MM3 (ref 0–0.05)
IMM GRANULOCYTES NFR BLD AUTO: 0.5 % (ref 0–0.5)
IMM GRANULOCYTES NFR BLD AUTO: 0.5 % (ref 0–0.5)
INR PPP: 1.08 (ref 0.9–1.1)
IRON 24H UR-MRATE: 24 MCG/DL (ref 37–145)
IRON SATN MFR SERPL: 8 % (ref 20–50)
LDLC SERPL CALC-MCNC: 77 MG/DL (ref 0–100)
LDLC/HDLC SERPL: 1.5 {RATIO}
LYMPHOCYTES # BLD AUTO: 1.05 10*3/MM3 (ref 0.7–3.1)
LYMPHOCYTES # BLD AUTO: 1.13 10*3/MM3 (ref 0.7–3.1)
LYMPHOCYTES NFR BLD AUTO: 6 % (ref 19.6–45.3)
LYMPHOCYTES NFR BLD AUTO: 7.6 % (ref 19.6–45.3)
MCH RBC QN AUTO: 28.4 PG (ref 26.6–33)
MCH RBC QN AUTO: 28.9 PG (ref 26.6–33)
MCHC RBC AUTO-ENTMCNC: 29.9 G/DL (ref 31.5–35.7)
MCHC RBC AUTO-ENTMCNC: 30.6 G/DL (ref 31.5–35.7)
MCV RBC AUTO: 94.4 FL (ref 79–97)
MCV RBC AUTO: 95.1 FL (ref 79–97)
MONOCYTES # BLD AUTO: 1.13 10*3/MM3 (ref 0.1–0.9)
MONOCYTES # BLD AUTO: 1.44 10*3/MM3 (ref 0.1–0.9)
MONOCYTES NFR BLD AUTO: 7.6 % (ref 5–12)
MONOCYTES NFR BLD AUTO: 8.2 % (ref 5–12)
NEUTROPHILS NFR BLD AUTO: 12.34 10*3/MM3 (ref 1.7–7)
NEUTROPHILS NFR BLD AUTO: 14.8 10*3/MM3 (ref 1.7–7)
NEUTROPHILS NFR BLD AUTO: 82.6 % (ref 42.7–76)
NEUTROPHILS NFR BLD AUTO: 83.9 % (ref 42.7–76)
NRBC BLD AUTO-RTO: 0 /100 WBC (ref 0–0.2)
NRBC BLD AUTO-RTO: 0 /100 WBC (ref 0–0.2)
PLATELET # BLD AUTO: 279 10*3/MM3 (ref 140–450)
PLATELET # BLD AUTO: 303 10*3/MM3 (ref 140–450)
PMV BLD AUTO: 10.6 FL (ref 6–12)
PMV BLD AUTO: 11.9 FL (ref 6–12)
POTASSIUM SERPL-SCNC: 3.8 MMOL/L (ref 3.5–5.2)
PROT SERPL-MCNC: 5.4 G/DL (ref 6–8.5)
PROTHROMBIN TIME: 14.5 SECONDS (ref 12.8–14.5)
RBC # BLD AUTO: 1.62 10*6/MM3 (ref 3.77–5.28)
RBC # BLD AUTO: 1.97 10*6/MM3 (ref 3.77–5.28)
RH BLD: POSITIVE
SODIUM SERPL-SCNC: 138 MMOL/L (ref 136–145)
T&S EXPIRATION DATE: NORMAL
TIBC SERPL-MCNC: 311 MCG/DL (ref 298–536)
TRANSFERRIN SERPL-MCNC: 209 MG/DL (ref 200–360)
TRIGL SERPL-MCNC: 138 MG/DL (ref 0–150)
TSH SERPL DL<=0.05 MIU/L-ACNC: 79.24 UIU/ML (ref 0.27–4.2)
VLDLC SERPL-MCNC: 24 MG/DL (ref 5–40)
WBC NRBC COR # BLD: 14.93 10*3/MM3 (ref 3.4–10.8)
WBC NRBC COR # BLD: 17.63 10*3/MM3 (ref 3.4–10.8)

## 2021-11-19 PROCEDURE — 85025 COMPLETE CBC W/AUTO DIFF WBC: CPT | Performed by: INTERNAL MEDICINE

## 2021-11-19 PROCEDURE — 85025 COMPLETE CBC W/AUTO DIFF WBC: CPT | Performed by: STUDENT IN AN ORGANIZED HEALTH CARE EDUCATION/TRAINING PROGRAM

## 2021-11-19 PROCEDURE — 84466 ASSAY OF TRANSFERRIN: CPT | Performed by: INTERNAL MEDICINE

## 2021-11-19 PROCEDURE — 83880 ASSAY OF NATRIURETIC PEPTIDE: CPT | Performed by: EMERGENCY MEDICINE

## 2021-11-19 PROCEDURE — 99285 EMERGENCY DEPT VISIT HI MDM: CPT

## 2021-11-19 PROCEDURE — 86923 COMPATIBILITY TEST ELECTRIC: CPT

## 2021-11-19 PROCEDURE — 86140 C-REACTIVE PROTEIN: CPT | Performed by: EMERGENCY MEDICINE

## 2021-11-19 PROCEDURE — P9016 RBC LEUKOCYTES REDUCED: HCPCS

## 2021-11-19 PROCEDURE — 85610 PROTHROMBIN TIME: CPT | Performed by: STUDENT IN AN ORGANIZED HEALTH CARE EDUCATION/TRAINING PROGRAM

## 2021-11-19 PROCEDURE — 86900 BLOOD TYPING SEROLOGIC ABO: CPT

## 2021-11-19 PROCEDURE — 86901 BLOOD TYPING SEROLOGIC RH(D): CPT | Performed by: STUDENT IN AN ORGANIZED HEALTH CARE EDUCATION/TRAINING PROGRAM

## 2021-11-19 PROCEDURE — 80061 LIPID PANEL: CPT | Performed by: INTERNAL MEDICINE

## 2021-11-19 PROCEDURE — 83540 ASSAY OF IRON: CPT | Performed by: INTERNAL MEDICINE

## 2021-11-19 PROCEDURE — 36430 TRANSFUSION BLD/BLD COMPNT: CPT

## 2021-11-19 PROCEDURE — 83036 HEMOGLOBIN GLYCOSYLATED A1C: CPT | Performed by: INTERNAL MEDICINE

## 2021-11-19 PROCEDURE — 84443 ASSAY THYROID STIM HORMONE: CPT | Performed by: INTERNAL MEDICINE

## 2021-11-19 PROCEDURE — 86900 BLOOD TYPING SEROLOGIC ABO: CPT | Performed by: STUDENT IN AN ORGANIZED HEALTH CARE EDUCATION/TRAINING PROGRAM

## 2021-11-19 PROCEDURE — 82728 ASSAY OF FERRITIN: CPT | Performed by: INTERNAL MEDICINE

## 2021-11-19 PROCEDURE — 80053 COMPREHEN METABOLIC PANEL: CPT | Performed by: STUDENT IN AN ORGANIZED HEALTH CARE EDUCATION/TRAINING PROGRAM

## 2021-11-19 PROCEDURE — 36415 COLL VENOUS BLD VENIPUNCTURE: CPT

## 2021-11-19 PROCEDURE — 86850 RBC ANTIBODY SCREEN: CPT | Performed by: STUDENT IN AN ORGANIZED HEALTH CARE EDUCATION/TRAINING PROGRAM

## 2021-11-20 ENCOUNTER — APPOINTMENT (OUTPATIENT)
Dept: CT IMAGING | Facility: HOSPITAL | Age: 76
End: 2021-11-20

## 2021-11-20 ENCOUNTER — APPOINTMENT (OUTPATIENT)
Dept: GENERAL RADIOLOGY | Facility: HOSPITAL | Age: 76
End: 2021-11-20

## 2021-11-20 LAB
ALBUMIN SERPL-MCNC: 2.6 G/DL (ref 3.5–5.2)
ALBUMIN/GLOB SERPL: 0.9 G/DL
ALP SERPL-CCNC: 47 U/L (ref 39–117)
ALT SERPL W P-5'-P-CCNC: 8 U/L (ref 1–33)
ANION GAP SERPL CALCULATED.3IONS-SCNC: 2.5 MMOL/L (ref 5–15)
AST SERPL-CCNC: 10 U/L (ref 1–32)
BASOPHILS # BLD AUTO: 0.05 10*3/MM3 (ref 0–0.2)
BASOPHILS NFR BLD AUTO: 0.4 % (ref 0–1.5)
BH BB BLOOD EXPIRATION DATE: NORMAL
BH BB BLOOD EXPIRATION DATE: NORMAL
BH BB BLOOD TYPE BARCODE: 7300
BH BB BLOOD TYPE BARCODE: 7300
BH BB DISPENSE STATUS: NORMAL
BH BB DISPENSE STATUS: NORMAL
BH BB PRODUCT CODE: NORMAL
BH BB PRODUCT CODE: NORMAL
BH BB UNIT NUMBER: NORMAL
BH BB UNIT NUMBER: NORMAL
BILIRUB SERPL-MCNC: <0.2 MG/DL (ref 0–1.2)
BUN SERPL-MCNC: 21 MG/DL (ref 8–23)
BUN/CREAT SERPL: 38.9 (ref 7–25)
CALCIUM SPEC-SCNC: 8.1 MG/DL (ref 8.6–10.5)
CHLORIDE SERPL-SCNC: 90 MMOL/L (ref 98–107)
CO2 SERPL-SCNC: 36.5 MMOL/L (ref 22–29)
CREAT SERPL-MCNC: 0.54 MG/DL (ref 0.57–1)
CROSSMATCH INTERPRETATION: NORMAL
CROSSMATCH INTERPRETATION: NORMAL
CRP SERPL-MCNC: 4.56 MG/DL (ref 0–0.5)
DEPRECATED RDW RBC AUTO: 56.8 FL (ref 37–54)
EOSINOPHIL # BLD AUTO: 0.21 10*3/MM3 (ref 0–0.4)
EOSINOPHIL NFR BLD AUTO: 1.8 % (ref 0.3–6.2)
ERYTHROCYTE [DISTWIDTH] IN BLOOD BY AUTOMATED COUNT: 17.5 % (ref 12.3–15.4)
FLUAV RNA RESP QL NAA+PROBE: NOT DETECTED
FLUBV RNA RESP QL NAA+PROBE: NOT DETECTED
GFR SERPL CREATININE-BSD FRML MDRD: 110 ML/MIN/1.73
GLOBULIN UR ELPH-MCNC: 2.9 GM/DL
GLUCOSE BLDC GLUCOMTR-MCNC: 92 MG/DL (ref 70–130)
GLUCOSE SERPL-MCNC: 106 MG/DL (ref 65–99)
HCT VFR BLD AUTO: 22.1 % (ref 34–46.6)
HCT VFR BLD AUTO: 24.6 % (ref 34–46.6)
HCT VFR BLD AUTO: 26.6 % (ref 34–46.6)
HGB BLD-MCNC: 6.9 G/DL (ref 12–15.9)
HGB BLD-MCNC: 7.6 G/DL (ref 12–15.9)
HGB BLD-MCNC: 8.2 G/DL (ref 12–15.9)
IMM GRANULOCYTES # BLD AUTO: 0.06 10*3/MM3 (ref 0–0.05)
IMM GRANULOCYTES NFR BLD AUTO: 0.5 % (ref 0–0.5)
LYMPHOCYTES # BLD AUTO: 1.07 10*3/MM3 (ref 0.7–3.1)
LYMPHOCYTES NFR BLD AUTO: 9 % (ref 19.6–45.3)
MCH RBC QN AUTO: 28.8 PG (ref 26.6–33)
MCHC RBC AUTO-ENTMCNC: 31.2 G/DL (ref 31.5–35.7)
MCV RBC AUTO: 92.1 FL (ref 79–97)
MONOCYTES # BLD AUTO: 1.04 10*3/MM3 (ref 0.1–0.9)
MONOCYTES NFR BLD AUTO: 8.7 % (ref 5–12)
NEUTROPHILS NFR BLD AUTO: 79.6 % (ref 42.7–76)
NEUTROPHILS NFR BLD AUTO: 9.48 10*3/MM3 (ref 1.7–7)
NRBC BLD AUTO-RTO: 0 /100 WBC (ref 0–0.2)
NT-PROBNP SERPL-MCNC: 798.3 PG/ML (ref 0–1800)
PLATELET # BLD AUTO: 281 10*3/MM3 (ref 140–450)
PMV BLD AUTO: 10.8 FL (ref 6–12)
POTASSIUM SERPL-SCNC: 3.8 MMOL/L (ref 3.5–5.2)
PROT SERPL-MCNC: 5.5 G/DL (ref 6–8.5)
RBC # BLD AUTO: 2.4 10*6/MM3 (ref 3.77–5.28)
SARS-COV-2 RNA RESP QL NAA+PROBE: NOT DETECTED
SODIUM SERPL-SCNC: 129 MMOL/L (ref 136–145)
UNIT  ABO: NORMAL
UNIT  ABO: NORMAL
UNIT  RH: NORMAL
UNIT  RH: NORMAL
WBC NRBC COR # BLD: 11.91 10*3/MM3 (ref 3.4–10.8)

## 2021-11-20 PROCEDURE — 94640 AIRWAY INHALATION TREATMENT: CPT

## 2021-11-20 PROCEDURE — 85025 COMPLETE CBC W/AUTO DIFF WBC: CPT | Performed by: STUDENT IN AN ORGANIZED HEALTH CARE EDUCATION/TRAINING PROGRAM

## 2021-11-20 PROCEDURE — P9016 RBC LEUKOCYTES REDUCED: HCPCS

## 2021-11-20 PROCEDURE — 82962 GLUCOSE BLOOD TEST: CPT

## 2021-11-20 PROCEDURE — 25010000002 IOPAMIDOL 61 % SOLUTION: Performed by: STUDENT IN AN ORGANIZED HEALTH CARE EDUCATION/TRAINING PROGRAM

## 2021-11-20 PROCEDURE — 74177 CT ABD & PELVIS W/CONTRAST: CPT

## 2021-11-20 PROCEDURE — 86900 BLOOD TYPING SEROLOGIC ABO: CPT

## 2021-11-20 PROCEDURE — 85014 HEMATOCRIT: CPT | Performed by: EMERGENCY MEDICINE

## 2021-11-20 PROCEDURE — 96376 TX/PRO/DX INJ SAME DRUG ADON: CPT

## 2021-11-20 PROCEDURE — 85018 HEMOGLOBIN: CPT | Performed by: EMERGENCY MEDICINE

## 2021-11-20 PROCEDURE — 36430 TRANSFUSION BLD/BLD COMPNT: CPT

## 2021-11-20 PROCEDURE — 96366 THER/PROPH/DIAG IV INF ADDON: CPT

## 2021-11-20 PROCEDURE — 71045 X-RAY EXAM CHEST 1 VIEW: CPT

## 2021-11-20 PROCEDURE — 80053 COMPREHEN METABOLIC PANEL: CPT | Performed by: STUDENT IN AN ORGANIZED HEALTH CARE EDUCATION/TRAINING PROGRAM

## 2021-11-20 PROCEDURE — 96365 THER/PROPH/DIAG IV INF INIT: CPT

## 2021-11-20 PROCEDURE — 87636 SARSCOV2 & INF A&B AMP PRB: CPT | Performed by: STUDENT IN AN ORGANIZED HEALTH CARE EDUCATION/TRAINING PROGRAM

## 2021-11-20 RX ORDER — IPRATROPIUM BROMIDE AND ALBUTEROL SULFATE 2.5; .5 MG/3ML; MG/3ML
3 SOLUTION RESPIRATORY (INHALATION) 4 TIMES DAILY PRN
Status: DISCONTINUED | OUTPATIENT
Start: 2021-11-20 | End: 2021-11-21 | Stop reason: HOSPADM

## 2021-11-20 RX ORDER — PANTOPRAZOLE SODIUM 40 MG/10ML
80 INJECTION, POWDER, LYOPHILIZED, FOR SOLUTION INTRAVENOUS ONCE
Status: COMPLETED | OUTPATIENT
Start: 2021-11-20 | End: 2021-11-20

## 2021-11-20 RX ORDER — LEVOTHYROXINE SODIUM 0.12 MG/1
250 TABLET ORAL DAILY
Status: DISCONTINUED | OUTPATIENT
Start: 2021-11-20 | End: 2021-11-21 | Stop reason: HOSPADM

## 2021-11-20 RX ORDER — POTASSIUM CHLORIDE 20 MEQ/1
20 TABLET, EXTENDED RELEASE ORAL 2 TIMES DAILY
Status: ON HOLD | COMMUNITY
End: 2022-01-16

## 2021-11-20 RX ORDER — MONTELUKAST SODIUM 10 MG/1
10 TABLET ORAL DAILY
Status: DISCONTINUED | OUTPATIENT
Start: 2021-11-20 | End: 2021-11-21 | Stop reason: HOSPADM

## 2021-11-20 RX ORDER — DIPHENOXYLATE HYDROCHLORIDE AND ATROPINE SULFATE 2.5; .025 MG/1; MG/1
1 TABLET ORAL NIGHTLY
Status: DISCONTINUED | OUTPATIENT
Start: 2021-11-20 | End: 2021-11-21 | Stop reason: HOSPADM

## 2021-11-20 RX ORDER — CARBOXYMETHYLCELLULOSE SODIUM 5 MG/ML
1 SOLUTION/ DROPS OPHTHALMIC 2 TIMES DAILY
Status: DISCONTINUED | OUTPATIENT
Start: 2021-11-20 | End: 2021-11-21 | Stop reason: HOSPADM

## 2021-11-20 RX ORDER — LISINOPRIL 10 MG/1
5 TABLET ORAL DAILY
Status: DISCONTINUED | OUTPATIENT
Start: 2021-11-20 | End: 2021-11-21 | Stop reason: HOSPADM

## 2021-11-20 RX ORDER — AMIODARONE HYDROCHLORIDE 200 MG/1
200 TABLET ORAL DAILY
COMMUNITY

## 2021-11-20 RX ORDER — AMIODARONE HYDROCHLORIDE 200 MG/1
200 TABLET ORAL DAILY
Status: DISCONTINUED | OUTPATIENT
Start: 2021-11-20 | End: 2021-11-21 | Stop reason: HOSPADM

## 2021-11-20 RX ORDER — POTASSIUM CHLORIDE 20 MEQ/1
20 TABLET, EXTENDED RELEASE ORAL 2 TIMES DAILY
Status: DISCONTINUED | OUTPATIENT
Start: 2021-11-20 | End: 2021-11-21 | Stop reason: HOSPADM

## 2021-11-20 RX ORDER — LISINOPRIL 5 MG/1
5 TABLET ORAL DAILY
COMMUNITY
End: 2022-01-20 | Stop reason: HOSPADM

## 2021-11-20 RX ORDER — BUMETANIDE 1 MG/1
1 TABLET ORAL 2 TIMES DAILY
Status: DISCONTINUED | OUTPATIENT
Start: 2021-11-20 | End: 2021-11-21 | Stop reason: HOSPADM

## 2021-11-20 RX ORDER — LEVOTHYROXINE SODIUM 0.12 MG/1
250 TABLET ORAL DAILY
Status: ON HOLD | COMMUNITY
End: 2022-01-16

## 2021-11-20 RX ORDER — CARVEDILOL 3.12 MG/1
3.12 TABLET ORAL 2 TIMES DAILY WITH MEALS
COMMUNITY

## 2021-11-20 RX ORDER — CHOLECALCIFEROL (VITAMIN D3) 125 MCG
5 CAPSULE ORAL NIGHTLY
Status: DISCONTINUED | OUTPATIENT
Start: 2021-11-20 | End: 2021-11-21 | Stop reason: HOSPADM

## 2021-11-20 RX ORDER — BUMETANIDE 1 MG/1
1 TABLET ORAL 2 TIMES DAILY
Status: ON HOLD | COMMUNITY
End: 2022-01-16

## 2021-11-20 RX ORDER — PANTOPRAZOLE SODIUM 40 MG/1
40 TABLET, DELAYED RELEASE ORAL 2 TIMES DAILY
COMMUNITY

## 2021-11-20 RX ORDER — ARFORMOTEROL TARTRATE 15 UG/2ML
15 SOLUTION RESPIRATORY (INHALATION)
Status: DISCONTINUED | OUTPATIENT
Start: 2021-11-20 | End: 2021-11-21 | Stop reason: HOSPADM

## 2021-11-20 RX ORDER — CARVEDILOL 3.12 MG/1
3.12 TABLET ORAL 2 TIMES DAILY WITH MEALS
Status: DISCONTINUED | OUTPATIENT
Start: 2021-11-20 | End: 2021-11-21 | Stop reason: HOSPADM

## 2021-11-20 RX ORDER — POLYETHYLENE GLYCOL 3350 17 G/17G
17 POWDER, FOR SOLUTION ORAL 2 TIMES DAILY
COMMUNITY

## 2021-11-20 RX ADMIN — ARFORMOTEROL TARTRATE 15 MCG: 15 SOLUTION RESPIRATORY (INHALATION) at 19:50

## 2021-11-20 RX ADMIN — SODIUM CHLORIDE 8 MG/HR: 9 INJECTION, SOLUTION INTRAVENOUS at 09:33

## 2021-11-20 RX ADMIN — LEVOTHYROXINE SODIUM 250 MCG: 125 TABLET ORAL at 18:44

## 2021-11-20 RX ADMIN — SODIUM CHLORIDE 8 MG/HR: 9 INJECTION, SOLUTION INTRAVENOUS at 20:03

## 2021-11-20 RX ADMIN — CARIPRAZINE 1.5 MG: 1.5 CAPSULE, GELATIN COATED ORAL at 18:45

## 2021-11-20 RX ADMIN — Medication 1 TABLET: at 21:06

## 2021-11-20 RX ADMIN — SODIUM CHLORIDE 8 MG/HR: 9 INJECTION, SOLUTION INTRAVENOUS at 15:25

## 2021-11-20 RX ADMIN — BUMETANIDE 1 MG: 1 TABLET ORAL at 18:45

## 2021-11-20 RX ADMIN — SODIUM CHLORIDE 8 MG/HR: 9 INJECTION, SOLUTION INTRAVENOUS at 05:31

## 2021-11-20 RX ADMIN — POTASSIUM CHLORIDE 20 MEQ: 20 TABLET, EXTENDED RELEASE ORAL at 18:43

## 2021-11-20 RX ADMIN — AMIODARONE HYDROCHLORIDE 200 MG: 200 TABLET ORAL at 18:45

## 2021-11-20 RX ADMIN — LISINOPRIL 5 MG: 10 TABLET ORAL at 18:44

## 2021-11-20 RX ADMIN — PANTOPRAZOLE SODIUM 80 MG: 40 INJECTION, POWDER, FOR SOLUTION INTRAVENOUS at 05:10

## 2021-11-20 RX ADMIN — Medication 5 MG: at 21:06

## 2021-11-20 RX ADMIN — CARVEDILOL 3.12 MG: 3.12 TABLET, FILM COATED ORAL at 18:46

## 2021-11-20 RX ADMIN — MONTELUKAST SODIUM 10 MG: 10 TABLET, COATED ORAL at 18:45

## 2021-11-20 RX ADMIN — IPRATROPIUM BROMIDE 0.5 MG: 0.5 SOLUTION RESPIRATORY (INHALATION) at 19:50

## 2021-11-20 RX ADMIN — IOPAMIDOL 70 ML: 612 INJECTION, SOLUTION INTRAVENOUS at 01:36

## 2021-11-20 NOTE — ED NOTES
Spoke with [facility], Stacey states that they are still waiting for a bed, update provided about Pt condition.      Cornelia Kennedy RN  11/20/21 6195

## 2021-11-20 NOTE — ED NOTES
Spoke with pt daughter April and updated about Pt condition and POC per Pt okay.      Cornelia Kennedy, RN  11/20/21 2983

## 2021-11-20 NOTE — ED NOTES
Pt transferred from ER stretcher to hospital bed. Monitoring devices re-applied. O2 remains in place at 3L NC. Pt verbalized understanding of awaiting transfer to [facility]. Pt denies any acute needs or concerns. Pt positioned for comfort. Safety measures remain WDL.      Cornelia Kennedy RN  11/20/21 0940

## 2021-11-20 NOTE — ED NOTES
Spoke with [facility], still awaiting discharges for a bed assignment, states that [facility] will be notified when a bed is available.      Cornelia Kennedy RN  11/20/21 1012

## 2021-11-20 NOTE — ED PROVIDER NOTES
Subjective   76-year-old female presented from a local skilled nursing facility due to concerns for increasing fatigue, and generalized weakness.  Patient noted she does not feel well overall.  Patient notes she felt similar to her previous presentation.  Patient's chart listed she was seen in this ER 2 weeks prior to this presentation due to concerns for significant anemia.  Patient was eventually transferred due to concerns for further vascular work-up needed after negative endoscopy and colonoscopy were completed.  Patient is obviously pale.  Patient denied chest pain or shortness of breath.  Denied hematochezia or hematemesis denied any black or tarry stools.  Denied headache or vision changes.  Denied focal neurological deficits.  Vitals stable          Review of Systems   Constitutional: Positive for fatigue.   Neurological: Positive for weakness.   All other systems reviewed and are negative.      Past Medical History:   Diagnosis Date   • AAA (abdominal aortic aneurysm) (HCC)     s/p repair    • Arthritis    • CAD (coronary artery disease)     s/p stenting in the past    • Chronic kidney disease (CKD), stage III (moderate) (HCC)    • COPD (chronic obstructive pulmonary disease) (HCC)    • Debility    • Diastolic CHF, chronic (HCC) 4/23/2021   • GERD (gastroesophageal reflux disease)    • History of CVA (cerebrovascular accident)    • History of ischemic colitis    • Hyperlipidemia 4/23/2021   • Hypertension    • Hypothyroidism    • Pneumonia due to COVID-19 virus 9/21/2021   • Stroke (HCC)    • Type II diabetes mellitus (HCC)        Allergies   Allergen Reactions   • Haldol [Haloperidol] Hallucinations       Past Surgical History:   Procedure Laterality Date   • BACK SURGERY     • CARDIAC CATHETERIZATION     • CHOLECYSTECTOMY N/A 7/17/2020    Procedure: CHOLECYSTECTOMY LAPAROSCOPIC;  Surgeon: Lonnie Meneses MD;  Location: [facility];  Service: General;  Laterality: N/A;   • COLONOSCOPY     • COLONOSCOPY N/A  9/25/2021    Procedure: COLONOSCOPY;  Surgeon: Lonnie Meneses MD;  Location:  [facility] OR;  Service: Gastroenterology;  Laterality: N/A;   • CORONARY STENT PLACEMENT     • ENDOSCOPY     • ENDOSCOPY N/A 9/25/2021    Procedure: ESOPHAGOGASTRODUODENOSCOPY;  Surgeon: Lonnie Meneses MD;  Location:  [facility] OR;  Service: Gastroenterology;  Laterality: N/A;   • TONSILLECTOMY         Family History   Problem Relation Age of Onset   • Diabetes Mother    • Heart attack Father    • No Known Problems Sister    • Heart attack Brother    • No Known Problems Brother    • No Known Problems Sister    • No Known Problems Sister        Social History     Socioeconomic History   • Marital status:    Tobacco Use   • Smoking status: Current Every Day Smoker     Packs/day: 1.00     Years: 55.00     Pack years: 55.00     Types: Cigarettes   • Smokeless tobacco: Never Used   Vaping Use   • Vaping Use: Never used   Substance and Sexual Activity   • Alcohol use: Never   • Drug use: Never   • Sexual activity: Defer           Objective   Physical Exam  Constitutional:       General: She is not in acute distress.     Appearance: Normal appearance. She is not ill-appearing.   HENT:      Head: Normocephalic and atraumatic.      Right Ear: External ear normal.      Left Ear: External ear normal.      Nose: Nose normal.      Mouth/Throat:      Mouth: Mucous membranes are moist.   Eyes:      Extraocular Movements: Extraocular movements intact.      Pupils: Pupils are equal, round, and reactive to light.   Cardiovascular:      Rate and Rhythm: Normal rate and regular rhythm.      Heart sounds: No murmur heard.      Pulmonary:      Effort: Pulmonary effort is normal. No respiratory distress.      Breath sounds: Normal breath sounds. No wheezing.   Abdominal:      General: Bowel sounds are normal.      Palpations: Abdomen is soft.      Tenderness: There is no abdominal tenderness.   Genitourinary:     Comments: + Dark stool, not mickie  melena  Facility does not have hemoccult tests  Musculoskeletal:         General: No deformity or signs of injury. Normal range of motion.      Cervical back: Normal range of motion and neck supple.   Skin:     General: Skin is warm and dry.      Coloration: Skin is pale.      Findings: No erythema.   Neurological:      General: No focal deficit present.      Mental Status: She is alert and oriented to person, place, and time. Mental status is at baseline.      Cranial Nerves: No cranial nerve deficit.   Psychiatric:         Mood and Affect: Mood normal.         Behavior: Behavior normal.         Thought Content: Thought content normal.         Procedures           ED Course  ED Course as of 12/13/21 1109   Sat Nov 20, 2021   0106 CBC notes significant anemia with a hemoglobin of 4.6.  CCP unremarkable.  Coagulation studies unremarkable.  Patient was typed and screened.  Patient will receive 2 units of packed red blood cells.  CT abdomen pelvis results pending.  Chest x-ray pending.  I am obtaining discharge records from [facility] to determine patient's further work-up.  Vitals remained stable while receiving packed red blood cell transfusion. [SF]   0149 Care of this patient was transferred to the next attending physician at shift change.  Complete discussion of presentation, labs, imaging, care, and expected course occurred during transition of providers.  Vitals stable at transfer of care.   [SF]   0149 Electronically signed by Armen George DO, 11/20/21, 1:49 AM EST.   [SF]   0319 Patient received in signout from Dr. George.  She has acute on chronic anemia, hemoglobin on 10/12 was 9.4 and today is 4.6.  She appears to have ongoing GI blood losses.  She will need additional GI evaluation given her complicated history and need for anticoagulation for Afib.  We have no GI on-call.  She was most recently admitted to [facility] in [place].  She has been accepted for transfer back by Dr. Hwang. [DH]    0340 No vomiting or hematemesis.  Abdominal exam and CT imaging benign.  BP has responded well to transfusion.  We will resuscitate to a goal hemoglobin over 7 and continue PPI gtt. [DH]   150 I will recheck H and H [TOYIN]   Sun Nov 21, 2021   0635 Patient endorsed to Dr. De La Torre at shift change.  Patient is seem to be a GI bleed she.  She has had stable hemoglobin overnight.  Blood pressure has been stable she has not been hypotensive heart rate remains in the mid 50s sinus rhythm.  She has been resting comfortably.  No further melena.  No definite hematuria.  Will check urinalysis to be sure.  We will continue to hold the patient's Eliquis. [JM]      ED Course User Index  [DH] Lonnie Luke MD  [JM] Adan Toro DO  [TOYIN] Ascencion Rizzo MD  [SF] Armen George DO                                           MDM  Number of Diagnoses or Management Options     Amount and/or Complexity of Data Reviewed  Clinical lab tests: reviewed and ordered  Tests in the radiology section of CPT®: ordered and reviewed  Decide to obtain previous medical records or to obtain history from someone other than the patient: yes  Review and summarize past medical records: yes  Discuss the patient with other providers: yes  Independent visualization of images, tracings, or specimens: yes    Critical Care  Total time providing critical care: 30-74 minutes (32)      Final diagnoses:   Gastrointestinal hemorrhage, unspecified gastrointestinal hemorrhage type   Anemia, unspecified type       ED Disposition  ED Disposition     ED Disposition Condition Comment    Transfer to Another Facility             No follow-up provider specified.       Medication List      ASK your doctor about these medications    carvedilol 3.125 MG tablet  Commonly known as: COREG  Ask about: Which instructions should I use?     levothyroxine 125 MCG tablet  Commonly known as: SYNTHROID, LEVOTHROID  Ask about: Which instructions should I use?              Lonnie Luke MD  11/20/21 2140       Adan Toro DO  12/13/21 1900

## 2021-11-20 NOTE — ED NOTES
Report received from MANJINDER Tyler. Pt resting on stretcher A&OX4, RR even and unlabored, skin WPD. O2 remains in place at 3L NC. Pt vitals WNL noted to be sinus jere on the monitor with HR 58. Pt positioned to left side. Pt brief soiled with urine, small light brown bowel movement, provider made aware, Pt cleaned, clean brief applied, total linen change provided. Pt denies any acute needs or concerns. Pt aware of POC and plan for transfer. Pt aware of NPO status per provider, WESLEY. Safety measures remain WDL.      Cornelia Kennedy, RN  11/20/21 2707

## 2021-11-21 VITALS
SYSTOLIC BLOOD PRESSURE: 142 MMHG | WEIGHT: 100 LBS | BODY MASS INDEX: 18.4 KG/M2 | OXYGEN SATURATION: 100 % | RESPIRATION RATE: 16 BRPM | HEIGHT: 62 IN | DIASTOLIC BLOOD PRESSURE: 66 MMHG | HEART RATE: 59 BPM | TEMPERATURE: 97.6 F

## 2021-11-21 LAB
ALBUMIN SERPL-MCNC: 2.61 G/DL (ref 3.5–5.2)
ALBUMIN/GLOB SERPL: 0.9 G/DL
ALP SERPL-CCNC: 47 U/L (ref 39–117)
ALT SERPL W P-5'-P-CCNC: 10 U/L (ref 1–33)
ANION GAP SERPL CALCULATED.3IONS-SCNC: 5.6 MMOL/L (ref 5–15)
AST SERPL-CCNC: 11 U/L (ref 1–32)
BASOPHILS # BLD AUTO: 0.05 10*3/MM3 (ref 0–0.2)
BASOPHILS # BLD AUTO: 0.06 10*3/MM3 (ref 0–0.2)
BASOPHILS NFR BLD AUTO: 0.4 % (ref 0–1.5)
BASOPHILS NFR BLD AUTO: 0.5 % (ref 0–1.5)
BILIRUB SERPL-MCNC: <0.2 MG/DL (ref 0–1.2)
BUN SERPL-MCNC: 20 MG/DL (ref 8–23)
BUN/CREAT SERPL: 40.8 (ref 7–25)
CALCIUM SPEC-SCNC: 8 MG/DL (ref 8.6–10.5)
CHLORIDE SERPL-SCNC: 94 MMOL/L (ref 98–107)
CO2 SERPL-SCNC: 35.4 MMOL/L (ref 22–29)
CREAT SERPL-MCNC: 0.49 MG/DL (ref 0.57–1)
DEPRECATED RDW RBC AUTO: 57.2 FL (ref 37–54)
DEPRECATED RDW RBC AUTO: 57.6 FL (ref 37–54)
EOSINOPHIL # BLD AUTO: 0.17 10*3/MM3 (ref 0–0.4)
EOSINOPHIL # BLD AUTO: 0.22 10*3/MM3 (ref 0–0.4)
EOSINOPHIL NFR BLD AUTO: 1.4 % (ref 0.3–6.2)
EOSINOPHIL NFR BLD AUTO: 1.8 % (ref 0.3–6.2)
ERYTHROCYTE [DISTWIDTH] IN BLOOD BY AUTOMATED COUNT: 17.3 % (ref 12.3–15.4)
ERYTHROCYTE [DISTWIDTH] IN BLOOD BY AUTOMATED COUNT: 17.5 % (ref 12.3–15.4)
GFR SERPL CREATININE-BSD FRML MDRD: 123 ML/MIN/1.73
GLOBULIN UR ELPH-MCNC: 2.9 GM/DL
GLUCOSE SERPL-MCNC: 103 MG/DL (ref 65–99)
HCT VFR BLD AUTO: 22.8 % (ref 34–46.6)
HCT VFR BLD AUTO: 23.7 % (ref 34–46.6)
HGB BLD-MCNC: 7 G/DL (ref 12–15.9)
HGB BLD-MCNC: 7.3 G/DL (ref 12–15.9)
IMM GRANULOCYTES # BLD AUTO: 0.05 10*3/MM3 (ref 0–0.05)
IMM GRANULOCYTES # BLD AUTO: 0.05 10*3/MM3 (ref 0–0.05)
IMM GRANULOCYTES NFR BLD AUTO: 0.4 % (ref 0–0.5)
IMM GRANULOCYTES NFR BLD AUTO: 0.4 % (ref 0–0.5)
LYMPHOCYTES # BLD AUTO: 1.02 10*3/MM3 (ref 0.7–3.1)
LYMPHOCYTES # BLD AUTO: 1.03 10*3/MM3 (ref 0.7–3.1)
LYMPHOCYTES NFR BLD AUTO: 8.5 % (ref 19.6–45.3)
LYMPHOCYTES NFR BLD AUTO: 8.5 % (ref 19.6–45.3)
MCH RBC QN AUTO: 28.5 PG (ref 26.6–33)
MCH RBC QN AUTO: 28.5 PG (ref 26.6–33)
MCHC RBC AUTO-ENTMCNC: 30.7 G/DL (ref 31.5–35.7)
MCHC RBC AUTO-ENTMCNC: 30.8 G/DL (ref 31.5–35.7)
MCV RBC AUTO: 92.6 FL (ref 79–97)
MCV RBC AUTO: 92.7 FL (ref 79–97)
MONOCYTES # BLD AUTO: 1.03 10*3/MM3 (ref 0.1–0.9)
MONOCYTES # BLD AUTO: 1.14 10*3/MM3 (ref 0.1–0.9)
MONOCYTES NFR BLD AUTO: 8.6 % (ref 5–12)
MONOCYTES NFR BLD AUTO: 9.4 % (ref 5–12)
NEUTROPHILS NFR BLD AUTO: 79.8 % (ref 42.7–76)
NEUTROPHILS NFR BLD AUTO: 80.3 % (ref 42.7–76)
NEUTROPHILS NFR BLD AUTO: 9.62 10*3/MM3 (ref 1.7–7)
NEUTROPHILS NFR BLD AUTO: 9.67 10*3/MM3 (ref 1.7–7)
NRBC BLD AUTO-RTO: 0 /100 WBC (ref 0–0.2)
NRBC BLD AUTO-RTO: 0 /100 WBC (ref 0–0.2)
PLATELET # BLD AUTO: 295 10*3/MM3 (ref 140–450)
PLATELET # BLD AUTO: 305 10*3/MM3 (ref 140–450)
PMV BLD AUTO: 10.7 FL (ref 6–12)
PMV BLD AUTO: 10.8 FL (ref 6–12)
POTASSIUM SERPL-SCNC: 3.9 MMOL/L (ref 3.5–5.2)
PROT SERPL-MCNC: 5.5 G/DL (ref 6–8.5)
RBC # BLD AUTO: 2.46 10*6/MM3 (ref 3.77–5.28)
RBC # BLD AUTO: 2.56 10*6/MM3 (ref 3.77–5.28)
SODIUM SERPL-SCNC: 135 MMOL/L (ref 136–145)
WBC NRBC COR # BLD: 12.04 10*3/MM3 (ref 3.4–10.8)
WBC NRBC COR # BLD: 12.07 10*3/MM3 (ref 3.4–10.8)

## 2021-11-21 PROCEDURE — 80053 COMPREHEN METABOLIC PANEL: CPT | Performed by: STUDENT IN AN ORGANIZED HEALTH CARE EDUCATION/TRAINING PROGRAM

## 2021-11-21 PROCEDURE — 85025 COMPLETE CBC W/AUTO DIFF WBC: CPT | Performed by: STUDENT IN AN ORGANIZED HEALTH CARE EDUCATION/TRAINING PROGRAM

## 2021-11-21 PROCEDURE — 96366 THER/PROPH/DIAG IV INF ADDON: CPT

## 2021-11-21 RX ADMIN — MONTELUKAST SODIUM 10 MG: 10 TABLET, COATED ORAL at 09:33

## 2021-11-21 RX ADMIN — POTASSIUM CHLORIDE 20 MEQ: 20 TABLET, EXTENDED RELEASE ORAL at 09:34

## 2021-11-21 RX ADMIN — SODIUM CHLORIDE 8 MG/HR: 9 INJECTION, SOLUTION INTRAVENOUS at 00:40

## 2021-11-21 RX ADMIN — LISINOPRIL 5 MG: 10 TABLET ORAL at 09:32

## 2021-11-21 RX ADMIN — BUMETANIDE 1 MG: 1 TABLET ORAL at 09:32

## 2021-11-21 RX ADMIN — SODIUM CHLORIDE 8 MG/HR: 9 INJECTION, SOLUTION INTRAVENOUS at 11:03

## 2021-11-21 RX ADMIN — SODIUM CHLORIDE 8 MG/HR: 9 INJECTION, SOLUTION INTRAVENOUS at 05:48

## 2021-11-21 RX ADMIN — LEVOTHYROXINE SODIUM 250 MCG: 125 TABLET ORAL at 09:35

## 2021-11-21 RX ADMIN — CARIPRAZINE 1.5 MG: 1.5 CAPSULE, GELATIN COATED ORAL at 09:29

## 2021-11-21 NOTE — ED NOTES
Changed and turned pt at this time. She stated that she was comfortable and didn't need anything further.     Danica Garcia, PCT  11/21/21 0500

## 2021-11-21 NOTE — ED NOTES
[facility] transporting pt to [facility] in [place] at this time with Rachel FREDERICK RN as well. Pt A&OX4, VSS, NADN. IV patent and infusing protonix at 8mg/hr. Belongings sent with pt in belongings bag.      Cornelia Burton, RN  11/21/21 0774

## 2021-11-21 NOTE — ED NOTES
Turned patient on her right side, she tolerated it well. Placed her in a position of comfort.      Laurel Miller  11/21/21 0982

## 2021-11-21 NOTE — ED NOTES
Called pt's daughter, April Lopez, at this time and updated her that pt had a bed assignment at [facility]. All questions addressed.      Cornelia Burton RN  11/21/21 0336

## 2021-11-21 NOTE — ED NOTES
[facility] here to transport pt to [facility].     Cornelia Burton, RN  11/21/21 111

## 2021-11-21 NOTE — ED NOTES
Pt has been turned, changed, and has had her bed sheets changed at this time. Pt states that she is comfortable.     Danica Garcia, PCT  11/21/21 0212

## 2021-11-21 NOTE — ED NOTES
Subjective:     Patient ID: Jon Allen is a 26 y.o. male.    Chief Complaint: Right shoulder pain and instability - new patient to provider, referred by Dr. Bonilla.     History of Present Illness  Jon Allen presents to clinic today for evaluation of right shoulder instability with initial injury back on 05/24/2021 when he fell and caught himself. He then tried lifting a bar up to shoulder press in the gym, and his shoulder dislocated on 05/28/2021. This required subsequent reduction under sedation at [facility] in [place]. He rates his current level of pain as a 5 out of 10 and describes it as aching in nature. He is left-hand dominant. He denies any prior history of shoulder dislocation.    The patient states by the Wednesday after the reduction, he was not having much pain. He wore a sling for 1 week, then stopped wearing it for 2 days. He followed up with Dr. Bonilla 2 weeks after the dislocation with pain in the shoulder. He was recommended to resume the sling.    Since the dislocation, he has had some improvement in his pain. He notes soreness at the end of the day, and certain movements cause pain. The patient continues to wear the sling intermittently and starts having pain after 1 day without the sling. He localizes pain to the lateral and posterior aspects of the shoulder and extending to the triceps with tightness in the triceps and trapezius. He denies any shooting pain down the arm. He pain is exacerbated by forward flexion and external rotation, and rolling onto the shoulder during sleep. He localizes tingling to the lateral arm, otherwise he has no numbness or other tingling. The patient is taking meloxicam, which provides some relief. He has not done any physical therapy for the shoulder.     The patient primarily participates in [employer], [employer], working out, and running. With regard to work, he does a significant amount of lifting, pushing, and pulling. He has a wedding  [facility] stated they only have one paramedic today, and that if we can have one of our nurses go with them, they can take the patient BLS     Ailin Martin, PCT  11/21/21 1034     "upcoming.     Social History     Occupational History   • Not on file   Tobacco Use   • Smoking status: Never Smoker   • Smokeless tobacco: Former User     Types: Chew   Vaping Use   • Vaping Use: Never used   Substance and Sexual Activity   • Alcohol use: Yes   • Drug use: No   • Sexual activity: Never      No past medical history on file.  No past surgical history on file.    Family History   Problem Relation Age of Onset   • No Known Problems Mother    • No Known Problems Father          Review of Systems        Objective:  Vitals:    07/08/21 1435   BP: 108/90   Pulse: 72   Weight: 81.6 kg (180 lb)   Height: 188 cm (74.02\")         07/08/21  1435   Weight: 81.6 kg (180 lb)     Body mass index is 23.1 kg/m².        Ortho Exam       Physical Exam    Vital signs reviewed.   General: No acute distress, alert and oriented  Eyes: conjunctiva clear; pupils equally round and reactive  ENT: external ears and nose atraumatic; oropharynx clear  CV: no peripheral edema  Resp: normal respiratory effort  Skin: no rashes or wounds; normal turgor  Psych: mood and affect appropriate; recent and remote memory intact       Right shoulder-  Tenderness  located anterior  FF-   Active- 130 degrees compared to 180 degrees on the left.  Passive- 165 degrees  Strength- 4/5  ER-      Active- 35 degrees  Passive- 50 degrees  Strength- 4/5  IR        L1   Strength- 4+/5 on belly press test.  Bear hug sign- Negative    Apprehension- Positive  Relocation- Positive    Anterior laxity- 1+ laxity  Posterior laxity- Negative    Jerk test- Mildly Positive  Sulcus sign- Positive at neutral, Negative at 30 degrees.    Deltoid Firing- 4+/5 strength on the right, compared to 5/5 on the left.  Overall good deltoid tone and contour.     Brisk cap refill to all digits, 2+ radial pulse right wrist     Tingling and slight decreased sensation at the proximal lateral arm on the right.   Positive sensation to light touch palmar, dorsal aspects of small and " index fingers and anatomic snuffbox right hand.    Imaging:    Review of outside MRI arthrogram of the right shoulder, including review of images as well as radiology report, indicates small anterior inferior labral tear with shallow bone contusion and capacious joint capsule particularly anteriorly, with no evidence of significant bone marrow edema, no evidence of rotator cuff pathology.    Assessment:        1. Dislocation of right shoulder joint, initial encounter    2. Tear of right glenoid labrum, initial encounter           Plan:          1. Discussed treatment options at length with patient at today's visit. We will treat conservatively for now with oral steroid to decrease inflammation.  2. Referral to physical therapy is provided to improve strength, 1 to 2 session per week for 4 weeks. After he completes physical therapy, we will better assess the extent of injury to labrum. Recommended to visit [facility] for physical therapy with OLEG Matthews, GIOVANI.  3. Tingling to the lateral arm should resolve with time.  4. We discussed exercise modification to avoid shoulder extension and the importance of rotator cuff strengthening.  5. We can revisit the discussion of surgical intervention if his shoulder dislocates again, if his shoulder function does not improve, or if it interferes with his ability to perform his exercise and sport activities.   6. Follow up in 4 to 6 weeks to re-evaluate range of motion and strength of right shoulder.      Jon Allen was in agreement with plan and had all questions answered.     Orders:  Orders Placed This Encounter   Procedures   • XR Shoulder 2+ View Right   • Ambulatory Referral to Physical Therapy Evaluate and treat, Ortho       Medications:  New Medications Ordered This Visit   Medications   • predniSONE (DELTASONE) 10 MG tablet     Si mg po daily x 3 days, then 40 mg po daily x 3 days, then 20 mg po daily x 3 days, then 10 mg po daily x 3 days      Dispense:  39 tablet     Refill:  0       Followup:  Return in about 6 weeks (around 8/19/2021).    Diagnoses and all orders for this visit:    1. Dislocation of right shoulder joint, initial encounter (Primary)  -     XR Shoulder 2+ View Right  -     Ambulatory Referral to Physical Therapy Evaluate and treat, Ortho    2. Tear of right glenoid labrum, initial encounter  -     XR Shoulder 2+ View Right  -     Ambulatory Referral to Physical Therapy Evaluate and treat, Ortho    Other orders  -     predniSONE (DELTASONE) 10 MG tablet; 60 mg po daily x 3 days, then 40 mg po daily x 3 days, then 20 mg po daily x 3 days, then 10 mg po daily x 3 days  Dispense: 39 tablet; Refill: 0          Dictated utilizing Dragon dictation     Scribed for Julio César Lawson MD by Evelyn Christensen.  07/08/21   17:37 EDT    I have personally performed the services described in this document as scribed by the above individual, and it is both accurate and complete.  Julio César Lawson MD  7/8/2021  20:34 EDT

## 2021-11-21 NOTE — ED NOTES
Called [facility] for pt. Transfer- left a voicemail to give us a call back      Ailin Martin, PCT  11/21/21 1029

## 2021-11-21 NOTE — ED NOTES
Contacted [facility] about pt. Transfer with one of our nurses, we do have a nurse that can go and they accepted pt. Transfer BLS to [facility] in [place]      Ailin Martin, PCT  11/21/21 1038

## 2021-11-21 NOTE — ED NOTES
Contacted [facility] direct transfer line-they are calling us back with an update- sending it up to the [facility]     Ailin Martin, PCT  11/21/21 0194

## 2021-11-21 NOTE — ED NOTES
Report called to Octavia DUNBAR at [facility] at this time.     Cornelia Burton RN  11/21/21 5040

## 2021-11-21 NOTE — ED NOTES
Paged [facility] ([facility]) and stated pt. Transportation is here to transfer her to [facility] in [place]      Ailin Martin, PCT  11/21/21 1119

## 2021-11-21 NOTE — ED NOTES
Spoke to [facility] at this time, informed that they do not have a bed assignment at this time and will call again at 1500.      Cornelia Burton RN  11/21/21 6666

## 2021-11-25 ENCOUNTER — LAB REQUISITION (OUTPATIENT)
Dept: LAB | Facility: HOSPITAL | Age: 76
End: 2021-11-25

## 2021-11-25 DIAGNOSIS — D50.0 IRON DEFICIENCY ANEMIA SECONDARY TO BLOOD LOSS (CHRONIC): ICD-10-CM

## 2021-11-25 LAB
HCT VFR BLD AUTO: 29.2 % (ref 34–46.6)
HGB BLD-MCNC: 9 G/DL (ref 12–15.9)

## 2021-11-25 PROCEDURE — 85018 HEMOGLOBIN: CPT | Performed by: INTERNAL MEDICINE

## 2021-11-25 PROCEDURE — 85014 HEMATOCRIT: CPT | Performed by: INTERNAL MEDICINE

## 2021-12-03 ENCOUNTER — HOSPITAL ENCOUNTER (OUTPATIENT)
Dept: GENERAL RADIOLOGY | Facility: HOSPITAL | Age: 76
Discharge: HOME | End: 2021-12-03
Admitting: PHYSICIAN ASSISTANT

## 2021-12-03 ENCOUNTER — OFFICE VISIT (OUTPATIENT)
Dept: CARDIOLOGY | Facility: CLINIC | Age: 76
End: 2021-12-03

## 2021-12-03 VITALS
HEART RATE: 72 BPM | BODY MASS INDEX: 16.14 KG/M2 | SYSTOLIC BLOOD PRESSURE: 118 MMHG | OXYGEN SATURATION: 88 % | DIASTOLIC BLOOD PRESSURE: 60 MMHG | HEIGHT: 66 IN

## 2021-12-03 DIAGNOSIS — Z86.73 HISTORY OF CEREBROVASCULAR ACCIDENT: ICD-10-CM

## 2021-12-03 DIAGNOSIS — I48.0 PAROXYSMAL ATRIAL FIBRILLATION (HCC): Primary | ICD-10-CM

## 2021-12-03 DIAGNOSIS — R06.02 SHORTNESS OF BREATH: ICD-10-CM

## 2021-12-03 DIAGNOSIS — K92.2 UPPER GI BLEED: ICD-10-CM

## 2021-12-03 PROCEDURE — 71046 X-RAY EXAM CHEST 2 VIEWS: CPT | Performed by: RADIOLOGY

## 2021-12-03 PROCEDURE — 99204 OFFICE O/P NEW MOD 45 MIN: CPT | Performed by: STUDENT IN AN ORGANIZED HEALTH CARE EDUCATION/TRAINING PROGRAM

## 2021-12-03 PROCEDURE — 71046 X-RAY EXAM CHEST 2 VIEWS: CPT

## 2021-12-03 NOTE — PROGRESS NOTES
"[facility]iology    Subjective:     Encounter Date:12/03/2021       Patient ID: Mary Ly is a 76 y.o. female.  Referring provider: Santiago Vallejo PA-C     Reason for consultation:   Chief Complaint   Patient presents with   • Atrial Fibrillation      PROBLEM LIST:  1. Paroxsymal atrial fibrillation  a. CHADSVASC = 6, HAS-BLED = 3  b. Echo 4/26/21: EF 61-66%, grade I diastolic dysfunction, mild calcification of the aortic valvular cusps. Mild AI. 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 Mild AI.   c. Maintaining NSR on Amiodarone, 11/2021.   2. Diastolic heart failure  3. Coronary artery disease  a. S/p remote coronary stent  4. Tobacco abuse  5. Abdominal aortic aneurysm, s/p endovascular repair  6. Cytokine release syndrome, grade II  7. History of Upper GI bleed  a. Developed worsening anemia, melena on Eliquis for suspected DVT.  8. Moderate malnutrition  9. Hypothyroidism  10. Hyperlipidemia  11. Type II diabetes  12. Ischemic bowel disease  13. CVA  14. COPD  a. 3L NC  15. Surgical Hx:  1. CCY      HPI:      Mary Ly is a 76 y.o. female who is seen in consultation today at the request of Santiago Vallejo PA-C for history of PAF and upper GIB. She had worsening anemia and melena on Eliquis for suspected DVT. Eliquis was subsequently discontinued. Repeat imaging did not show DVT however, patient had PAF and has therefore been referred to us for possible Watchman device.  She reports history of GIB due to duodenal ulcers which were \"treated\". She has not had recurrent melena, hematochezia or hematemesis. She has a CHADSVASC of 6 and a HAS-BLED of 3.  She denies any symptomatic Afib on Amiodarone. She denies chest pain, soa, PND, orthopnea, edema, lightheadedness, near syncope or syncope.     Medications and Allergies:    Allergies   Allergen Reactions   • Haldol [Haloperidol] Hallucinations         Current Outpatient Medications:   •  amiodarone (PACERONE) 200 " MG tablet, Take 200 mg by mouth Daily., Disp: , Rfl:   •  bumetanide (BUMEX) 1 MG tablet, Take 1 mg by mouth 2 (Two) Times a Day., Disp: , Rfl:   •  Carboxymethylcellulose Sodium (ARTIFICIAL TEARS OP), Administer 1 application to both eyes 2 (Two) Times a Day., Disp: , Rfl:   •  Cariprazine HCl (VRAYLAR) 1.5 MG capsule capsule, Take 1.5 mg by mouth Daily., Disp: , Rfl:   •  carvedilol (COREG) 3.125 MG tablet, Take 3.125 mg by mouth 2 (Two) Times a Day With Meals., Disp: , Rfl:   •  docusate sodium (COLACE) 250 MG capsule, Take 250 mg by mouth Daily., Disp: , Rfl:   •  ferrous sulfate 325 (65 FE) MG tablet, Take 1 tablet by mouth Daily With Breakfast. HOLD FOR 10 DAYS (UNTIL FINISHED WITH H. PYLORI TREATMENT), Disp: , Rfl:   •  ipratropium-albuterol (DUO-NEB) 0.5-2.5 mg/3 ml nebulizer, Take 3 mL by nebulization 4 (Four) Times a Day As Needed for Wheezing or Shortness of Air., Disp: , Rfl:   •  Lactobacillus Acid-Pectin (Acidophilus/Pectin) capsule, Take 1 capsule by mouth 2 (two) times a day. HOLD FOR 10 DAYS (UNTIL FINISHED WITH H. PYLORI TREATMENT), Disp: , Rfl:   •  lactulose (CHRONULAC) 10 GM/15ML solution, Take 10 g by mouth Daily As Needed., Disp: , Rfl:   •  levothyroxine (SYNTHROID, LEVOTHROID) 125 MCG tablet, Take 250 mcg by mouth Daily., Disp: , Rfl:   •  lisinopril (PRINIVIL,ZESTRIL) 5 MG tablet, Take 5 mg by mouth Daily., Disp: , Rfl:   •  melatonin 5 MG tablet tablet, Take 5 mg by mouth Every Night., Disp: , Rfl:   •  montelukast (SINGULAIR) 10 MG tablet, Take 10 mg by mouth Daily., Disp: , Rfl:   •  Multiple Vitamin (MULTIVITAMIN) tablet tablet, Take 1 tablet by mouth Every Night., Disp: , Rfl:   •  O2 (OXYGEN), Inhale 3 L/min Daily As Needed., Disp: , Rfl:   •  pantoprazole (PROTONIX) 40 MG EC tablet, Take 40 mg by mouth 2 (Two) Times a Day., Disp: , Rfl:   •  polyethylene glycol (MiraLax) 17 GM/SCOOP powder, Take 17 g by mouth 2 (Two) Times a Day., Disp: , Rfl:   •  potassium chloride (K-DUR,KLOR-CON)  20 MEQ CR tablet, Take 20 mEq by mouth 2 (Two) Times a Day., Disp: , Rfl:   •  promethazine (PHENERGAN) 12.5 MG tablet, Take 12.5 mg by mouth Every 8 (Eight) Hours As Needed for Nausea or Vomiting., Disp: , Rfl:   •  STIOLTO RESPIMAT 2.5-2.5 MCG/ACT aerosol solution inhaler, Inhale 2 puffs Daily., Disp: , Rfl:     Social History:  Social History     Tobacco Use   • Smoking status: Current Every Day Smoker     Packs/day: 1.00     Years: 55.00     Pack years: 55.00     Types: Cigarettes   • Smokeless tobacco: Never Used   Substance Use Topics   • Alcohol use: Never        Family History:  family history includes Diabetes in her mother; Heart attack in her brother and father; No Known Problems in her brother, sister, sister, and sister.     Review of Systems: Pertinent positives in HPI    The following portions of the patient's history were reviewed and updated as appropriate: allergies, current medications and problem list.       Objective:     Vitals:    12/03/21 1121   BP: 118/60   Pulse: 72   SpO2: (!) 88%     GENERAL: This is a well-developed, well-nourished, female who is in no acute distress.   SKIN: Pink and warm without rash or abnormality noted.   HEENT: Head is normocephalic and atraumatic. Pupils are equal and reactive to light bilaterally. Mucous membranes are pink and moist.   NECK: Supple without lymphadenopathy or thyromegaly. There is no jugular venous distention at 30°.  LUNGS: Clear to auscultation bilaterally without wheezing, rhonchi, or rales noted.   CARDIOVASCULAR: The heart has a regular rate with a normal S1 and S2. There is no murmur, gallop, rub, or click appreciated. The PMI is nondisplaced. Carotid upstrokes are 2+ and symmetrical without bruits.   ABDOMEN: Soft and nondistended with positive bowel sounds x4. The patient denies tenderness of palpitation.   MUSCULOSKELETAL: There are no obvious bony abnormalities. Normal range of tenderness to palpation.   NEUROLOGICAL: Nonfocal. Alert and  oriented x3.   PERIPHERAL VASCULAR: Femoral pulses are 2+ and symmetrical without bruits. Posterior tibial and dorsalis pedis pulses are 2+ and symmetrical. There is no peripheral edema.   PSYCH: Normal mood and affect.       Procedures    Advance Care Planning   ACP discussion was held with the patient during this visit. Patient does not have an advance directive, information provided.      ASSESSMENT       ICD-10-CM ICD-9-CM   1. Paroxysmal atrial fibrillation (HCC)  I48.0 427.31   2. Upper GI bleed  K92.2 578.9   3. History of cerebrovascular accident  Z86.73 V12.54            PLAN     1. I have educated her on the risk of diseases from using tobacco products such as cancer, COPD and heart disease. I advised her to quit and she is not willing to quit. I spent 3  minutes counseling the patient.    2. Continue Amiodarone for PAF.   3. Dr. Nazario discussed the risks/benefits of Watchman with patient for a history of PAF with a CHADSVASC of 6 and a HAS-BLED of 3 with prior GIB.   4. Continue all other medications.   5. Follow-up in Return for after Watchman., sooner as needed.      Scribed for Srinivasan Donovan MD by TRISTAN Shah, QUIQUE. 12/3/2021  11:55 EST

## 2021-12-17 ENCOUNTER — TELEPHONE (OUTPATIENT)
Dept: CARDIOLOGY | Facility: CLINIC | Age: 76
End: 2021-12-17

## 2021-12-17 NOTE — TELEPHONE ENCOUNTER
Spoke with pt today.  She states she has decided against the Watchman device due to inability to travel.      Shayy Perla RN    Left message x 2 for pt to call me.    Shayy Perla RN  ----- Message from Dave Reddy DO sent at 12/9/2021 12:26 PM EST -----  This patient is already pretty borderline.  We need to do this the correct way.    If she is not going to be able to do the trips required she will not get a procedure.  ----- Message -----  From: Shayy Perla RN  Sent: 12/9/2021   9:32 AM EST  To: Dave Flores DO, #    1/11/22 [place]     Spoke with nursing home.  Nurse stated pt will refuse 2 trips to [facility].  Added that patient has a history of non compliance with appointments.  Reviewed follow up testing expectations for Watchman.  Nurse, Fadumo, states she thinks patient will do that but not PAT.  She said patient is bed bound and only pivots to wheelchair to go smoke and come back to bed.

## 2021-12-24 ENCOUNTER — APPOINTMENT (OUTPATIENT)
Dept: GENERAL RADIOLOGY | Facility: HOSPITAL | Age: 76
End: 2021-12-24

## 2021-12-24 ENCOUNTER — APPOINTMENT (OUTPATIENT)
Dept: CT IMAGING | Facility: HOSPITAL | Age: 76
End: 2021-12-24

## 2021-12-24 ENCOUNTER — HOSPITAL ENCOUNTER (EMERGENCY)
Facility: HOSPITAL | Age: 76
Discharge: HOME | End: 2021-12-24
Attending: STUDENT IN AN ORGANIZED HEALTH CARE EDUCATION/TRAINING PROGRAM | Admitting: STUDENT IN AN ORGANIZED HEALTH CARE EDUCATION/TRAINING PROGRAM

## 2021-12-24 VITALS
SYSTOLIC BLOOD PRESSURE: 115 MMHG | HEART RATE: 56 BPM | BODY MASS INDEX: 16.07 KG/M2 | RESPIRATION RATE: 18 BRPM | OXYGEN SATURATION: 97 % | HEIGHT: 66 IN | WEIGHT: 100 LBS | DIASTOLIC BLOOD PRESSURE: 49 MMHG | TEMPERATURE: 97.9 F

## 2021-12-24 DIAGNOSIS — N30.00 ACUTE CYSTITIS WITHOUT HEMATURIA: ICD-10-CM

## 2021-12-24 DIAGNOSIS — K56.41 FECAL IMPACTION (HCC): ICD-10-CM

## 2021-12-24 DIAGNOSIS — J44.1 COPD WITH ACUTE EXACERBATION (HCC): Primary | ICD-10-CM

## 2021-12-24 LAB
A-A DO2: 19.2 MMHG (ref 0–300)
ALBUMIN SERPL-MCNC: 3 G/DL (ref 3.5–5.2)
ALBUMIN/GLOB SERPL: 0.9 G/DL
ALP SERPL-CCNC: 79 U/L (ref 39–117)
ALT SERPL W P-5'-P-CCNC: 9 U/L (ref 1–33)
ANION GAP SERPL CALCULATED.3IONS-SCNC: 9.7 MMOL/L (ref 5–15)
ARTERIAL PATENCY WRIST A: POSITIVE
AST SERPL-CCNC: 23 U/L (ref 1–32)
ATMOSPHERIC PRESS: 724 MMHG
BACTERIA UR QL AUTO: ABNORMAL /HPF
BASE EXCESS BLDA CALC-SCNC: 15.6 MMOL/L (ref 0–2)
BASOPHILS # BLD AUTO: 0.04 10*3/MM3 (ref 0–0.2)
BASOPHILS NFR BLD AUTO: 0.6 % (ref 0–1.5)
BDY SITE: ABNORMAL
BILIRUB SERPL-MCNC: <0.2 MG/DL (ref 0–1.2)
BILIRUB UR QL STRIP: NEGATIVE
BODY TEMPERATURE: 0 C
BUN SERPL-MCNC: 21 MG/DL (ref 8–23)
BUN/CREAT SERPL: 37.5 (ref 7–25)
CALCIUM SPEC-SCNC: 9 MG/DL (ref 8.6–10.5)
CHLORIDE SERPL-SCNC: 91 MMOL/L (ref 98–107)
CLARITY UR: CLEAR
CO2 BLDA-SCNC: 44.2 MMOL/L (ref 22–33)
CO2 SERPL-SCNC: 35.3 MMOL/L (ref 22–29)
COHGB MFR BLD: 1.9 % (ref 0–5)
COLOR UR: YELLOW
CREAT SERPL-MCNC: 0.56 MG/DL (ref 0.57–1)
CRP SERPL-MCNC: 0.76 MG/DL (ref 0–0.5)
D DIMER PPP FEU-MCNC: 3.22 MCGFEU/ML (ref 0–0.5)
DEPRECATED RDW RBC AUTO: 69 FL (ref 37–54)
EOSINOPHIL # BLD AUTO: 0.14 10*3/MM3 (ref 0–0.4)
EOSINOPHIL NFR BLD AUTO: 2 % (ref 0.3–6.2)
ERYTHROCYTE [DISTWIDTH] IN BLOOD BY AUTOMATED COUNT: 18.1 % (ref 12.3–15.4)
FLUAV RNA RESP QL NAA+PROBE: NOT DETECTED
FLUBV RNA RESP QL NAA+PROBE: NOT DETECTED
GFR SERPL CREATININE-BSD FRML MDRD: 105 ML/MIN/1.73
GLOBULIN UR ELPH-MCNC: 3.2 GM/DL
GLUCOSE SERPL-MCNC: 164 MG/DL (ref 65–99)
GLUCOSE UR STRIP-MCNC: NEGATIVE MG/DL
HCO3 BLDA-SCNC: 42.1 MMOL/L (ref 20–26)
HCT VFR BLD AUTO: 25.9 % (ref 34–46.6)
HCT VFR BLD CALC: 22.6 % (ref 38–51)
HGB BLD-MCNC: 7.5 G/DL (ref 12–15.9)
HGB BLDA-MCNC: 7.4 G/DL (ref 13.5–17.5)
HGB UR QL STRIP.AUTO: NEGATIVE
HOLD SPECIMEN: NORMAL
HOLD SPECIMEN: NORMAL
HYALINE CASTS UR QL AUTO: ABNORMAL /LPF
IMM GRANULOCYTES # BLD AUTO: 0.03 10*3/MM3 (ref 0–0.05)
IMM GRANULOCYTES NFR BLD AUTO: 0.4 % (ref 0–0.5)
INHALED O2 CONCENTRATION: 21 %
KETONES UR QL STRIP: NEGATIVE
LEUKOCYTE ESTERASE UR QL STRIP.AUTO: ABNORMAL
LYMPHOCYTES # BLD AUTO: 0.7 10*3/MM3 (ref 0.7–3.1)
LYMPHOCYTES NFR BLD AUTO: 10.2 % (ref 19.6–45.3)
Lab: ABNORMAL
Lab: ABNORMAL
MCH RBC QN AUTO: 30.5 PG (ref 26.6–33)
MCHC RBC AUTO-ENTMCNC: 29 G/DL (ref 31.5–35.7)
MCV RBC AUTO: 105.3 FL (ref 79–97)
METHGB BLD QL: 0.5 % (ref 0–3)
MODALITY: ABNORMAL
MONOCYTES # BLD AUTO: 0.67 10*3/MM3 (ref 0.1–0.9)
MONOCYTES NFR BLD AUTO: 9.8 % (ref 5–12)
NEUTROPHILS NFR BLD AUTO: 5.25 10*3/MM3 (ref 1.7–7)
NEUTROPHILS NFR BLD AUTO: 77 % (ref 42.7–76)
NITRITE UR QL STRIP: NEGATIVE
NOTE: ABNORMAL
NOTIFIED BY: ABNORMAL
NOTIFIED WHO: ABNORMAL
NRBC BLD AUTO-RTO: 0 /100 WBC (ref 0–0.2)
NT-PROBNP SERPL-MCNC: 298.4 PG/ML (ref 0–1800)
OXYHGB MFR BLDV: 79.9 % (ref 94–99)
PCO2 BLDA: 67.1 MM HG (ref 35–45)
PCO2 TEMP ADJ BLD: ABNORMAL MM[HG]
PH BLDA: 7.41 PH UNITS (ref 7.35–7.45)
PH UR STRIP.AUTO: 6 [PH] (ref 5–8)
PH, TEMP CORRECTED: ABNORMAL
PLATELET # BLD AUTO: 347 10*3/MM3 (ref 140–450)
PMV BLD AUTO: 9.7 FL (ref 6–12)
PO2 BLDA: 47.3 MM HG (ref 83–108)
PO2 TEMP ADJ BLD: ABNORMAL MM[HG]
POTASSIUM SERPL-SCNC: 4.6 MMOL/L (ref 3.5–5.2)
PROT SERPL-MCNC: 6.2 G/DL (ref 6–8.5)
PROT UR QL STRIP: NEGATIVE
QT INTERVAL: 436 MS
QTC INTERVAL: 470 MS
RBC # BLD AUTO: 2.46 10*6/MM3 (ref 3.77–5.28)
RBC # UR STRIP: ABNORMAL /HPF
REF LAB TEST METHOD: ABNORMAL
SAO2 % BLDCOA: 81.9 % (ref 94–99)
SARS-COV-2 RNA RESP QL NAA+PROBE: NOT DETECTED
SODIUM SERPL-SCNC: 136 MMOL/L (ref 136–145)
SP GR UR STRIP: 1.01 (ref 1–1.03)
SQUAMOUS #/AREA URNS HPF: ABNORMAL /HPF
TROPONIN T SERPL-MCNC: 0.03 NG/ML (ref 0–0.03)
TROPONIN T SERPL-MCNC: 0.03 NG/ML (ref 0–0.03)
UROBILINOGEN UR QL STRIP: ABNORMAL
VENTILATOR MODE: ABNORMAL
WBC # UR STRIP: ABNORMAL /HPF
WBC NRBC COR # BLD: 6.83 10*3/MM3 (ref 3.4–10.8)
WHOLE BLOOD HOLD SPECIMEN: NORMAL
WHOLE BLOOD HOLD SPECIMEN: NORMAL

## 2021-12-24 PROCEDURE — 36415 COLL VENOUS BLD VENIPUNCTURE: CPT

## 2021-12-24 PROCEDURE — 96365 THER/PROPH/DIAG IV INF INIT: CPT

## 2021-12-24 PROCEDURE — 93005 ELECTROCARDIOGRAM TRACING: CPT | Performed by: STUDENT IN AN ORGANIZED HEALTH CARE EDUCATION/TRAINING PROGRAM

## 2021-12-24 PROCEDURE — 96375 TX/PRO/DX INJ NEW DRUG ADDON: CPT

## 2021-12-24 PROCEDURE — 87636 SARSCOV2 & INF A&B AMP PRB: CPT | Performed by: PHYSICIAN ASSISTANT

## 2021-12-24 PROCEDURE — 83880 ASSAY OF NATRIURETIC PEPTIDE: CPT | Performed by: PHYSICIAN ASSISTANT

## 2021-12-24 PROCEDURE — 71275 CT ANGIOGRAPHY CHEST: CPT | Performed by: RADIOLOGY

## 2021-12-24 PROCEDURE — 36600 WITHDRAWAL OF ARTERIAL BLOOD: CPT

## 2021-12-24 PROCEDURE — 82805 BLOOD GASES W/O2 SATURATION: CPT

## 2021-12-24 PROCEDURE — 71275 CT ANGIOGRAPHY CHEST: CPT

## 2021-12-24 PROCEDURE — P9612 CATHETERIZE FOR URINE SPEC: HCPCS

## 2021-12-24 PROCEDURE — 25010000002 METHYLPREDNISOLONE PER 125 MG: Performed by: PHYSICIAN ASSISTANT

## 2021-12-24 PROCEDURE — 25010000002 CEFTRIAXONE PER 250 MG: Performed by: PHYSICIAN ASSISTANT

## 2021-12-24 PROCEDURE — 99284 EMERGENCY DEPT VISIT MOD MDM: CPT

## 2021-12-24 PROCEDURE — 85025 COMPLETE CBC W/AUTO DIFF WBC: CPT | Performed by: PHYSICIAN ASSISTANT

## 2021-12-24 PROCEDURE — 0 IOVERSOL 68 % SOLUTION: Performed by: STUDENT IN AN ORGANIZED HEALTH CARE EDUCATION/TRAINING PROGRAM

## 2021-12-24 PROCEDURE — 93005 ELECTROCARDIOGRAM TRACING: CPT | Performed by: PHYSICIAN ASSISTANT

## 2021-12-24 PROCEDURE — 81001 URINALYSIS AUTO W/SCOPE: CPT | Performed by: PHYSICIAN ASSISTANT

## 2021-12-24 PROCEDURE — 25010000002 ONDANSETRON PER 1 MG: Performed by: PHYSICIAN ASSISTANT

## 2021-12-24 PROCEDURE — 71045 X-RAY EXAM CHEST 1 VIEW: CPT | Performed by: RADIOLOGY

## 2021-12-24 PROCEDURE — 82375 ASSAY CARBOXYHB QUANT: CPT

## 2021-12-24 PROCEDURE — 86140 C-REACTIVE PROTEIN: CPT | Performed by: PHYSICIAN ASSISTANT

## 2021-12-24 PROCEDURE — 84484 ASSAY OF TROPONIN QUANT: CPT | Performed by: PHYSICIAN ASSISTANT

## 2021-12-24 PROCEDURE — 80053 COMPREHEN METABOLIC PANEL: CPT | Performed by: PHYSICIAN ASSISTANT

## 2021-12-24 PROCEDURE — 71045 X-RAY EXAM CHEST 1 VIEW: CPT

## 2021-12-24 PROCEDURE — 83050 HGB METHEMOGLOBIN QUAN: CPT

## 2021-12-24 PROCEDURE — 85379 FIBRIN DEGRADATION QUANT: CPT | Performed by: PHYSICIAN ASSISTANT

## 2021-12-24 PROCEDURE — 74177 CT ABD & PELVIS W/CONTRAST: CPT

## 2021-12-24 RX ORDER — SODIUM CHLORIDE 0.9 % (FLUSH) 0.9 %
10 SYRINGE (ML) INJECTION AS NEEDED
Status: DISCONTINUED | OUTPATIENT
Start: 2021-12-24 | End: 2021-12-24 | Stop reason: HOSPADM

## 2021-12-24 RX ORDER — ONDANSETRON 2 MG/ML
4 INJECTION INTRAMUSCULAR; INTRAVENOUS ONCE
Status: COMPLETED | OUTPATIENT
Start: 2021-12-24 | End: 2021-12-24

## 2021-12-24 RX ORDER — ASPIRIN 81 MG/1
324 TABLET, CHEWABLE ORAL ONCE
Status: COMPLETED | OUTPATIENT
Start: 2021-12-24 | End: 2021-12-24

## 2021-12-24 RX ORDER — DOXYCYCLINE 100 MG/1
100 CAPSULE ORAL 2 TIMES DAILY
Qty: 20 CAPSULE | Refills: 0 | Status: SHIPPED | OUTPATIENT
Start: 2021-12-24 | End: 2022-01-03

## 2021-12-24 RX ORDER — METHYLPREDNISOLONE SODIUM SUCCINATE 125 MG/2ML
125 INJECTION, POWDER, LYOPHILIZED, FOR SOLUTION INTRAMUSCULAR; INTRAVENOUS ONCE
Status: COMPLETED | OUTPATIENT
Start: 2021-12-24 | End: 2021-12-24

## 2021-12-24 RX ADMIN — ASPIRIN 324 MG: 81 TABLET, CHEWABLE ORAL at 13:12

## 2021-12-24 RX ADMIN — CEFTRIAXONE 1 G: 1 INJECTION, POWDER, FOR SOLUTION INTRAMUSCULAR; INTRAVENOUS at 17:18

## 2021-12-24 RX ADMIN — IOVERSOL 60 ML: 678 INJECTION INTRA-ARTERIAL; INTRAVENOUS at 15:00

## 2021-12-24 RX ADMIN — SODIUM CHLORIDE 1000 ML: 9 INJECTION, SOLUTION INTRAVENOUS at 14:48

## 2021-12-24 RX ADMIN — ONDANSETRON 4 MG: 2 INJECTION INTRAMUSCULAR; INTRAVENOUS at 15:16

## 2021-12-24 RX ADMIN — METHYLPREDNISOLONE SODIUM SUCCINATE 125 MG: 125 INJECTION, POWDER, FOR SOLUTION INTRAMUSCULAR; INTRAVENOUS at 17:18

## 2021-12-24 NOTE — ED PROVIDER NOTES
Subjective   Patient is a 76-year-old female with COPD, GERD, hypertension, hyperlipidemia, coronary artery disease, CHF, hypothyroidism, diabetes, and chronic kidney disease that presents to the ED from the nursing home via EMS for substernal chest pressure and pain that started earlier this morning.  She stated that it lasted for about 2 hours and was just a heaviness sensation in the substernal region.  She states it resolved spontaneously without any medications.  She is currently having no complaints of chest pain.  She states she has been slightly constipated recently and not had a bowel movement in several days.  She denies chest pain, shortness of breath, fever, chills, nausea, vomiting, headache, dizziness, abdominal pain, or dysuria.      History provided by:  Patient   used: No        Review of Systems   Constitutional: Negative.  Negative for chills, diaphoresis and fever.   HENT: Negative.  Negative for congestion, ear discharge, ear pain, facial swelling, nosebleeds, postnasal drip, rhinorrhea, sinus pressure, sinus pain, sneezing, sore throat, tinnitus and trouble swallowing.    Eyes: Negative.  Negative for photophobia, pain, discharge, redness and itching.   Respiratory: Negative.  Negative for cough, shortness of breath and wheezing.    Cardiovascular: Positive for chest pain. Negative for palpitations and leg swelling.   Gastrointestinal: Negative.  Negative for abdominal pain, constipation, diarrhea, nausea and vomiting.   Endocrine: Negative.    Genitourinary: Negative.  Negative for difficulty urinating, dysuria, flank pain and frequency.   Musculoskeletal: Negative.  Negative for arthralgias, back pain, gait problem, joint swelling, myalgias, neck pain and neck stiffness.   Skin: Negative.    Neurological: Negative.  Negative for dizziness, tremors, seizures, syncope, facial asymmetry, speech difficulty, weakness, light-headedness, numbness and headaches.    Psychiatric/Behavioral: Negative.  Negative for confusion.   All other systems reviewed and are negative.      Past Medical History:   Diagnosis Date   • AAA (abdominal aortic aneurysm) ([facility])     s/p repair    • Arthritis    • CAD (coronary artery disease)     s/p stenting in the past    • Chronic kidney disease (CKD), stage III (moderate) ([facility])    • COPD (chronic obstructive pulmonary disease) ([facility])    • Debility    • Diastolic CHF, chronic ([facility]) 4/23/2021   • GERD (gastroesophageal reflux disease)    • History of CVA (cerebrovascular accident)    • History of ischemic colitis    • Hyperlipidemia 4/23/2021   • Hypertension    • Hypothyroidism    • Pneumonia due to COVID-19 virus 9/21/2021   • Stroke ([facility])    • Type II diabetes mellitus ([facility])        Allergies   Allergen Reactions   • Haldol [Haloperidol] Hallucinations       Past Surgical History:   Procedure Laterality Date   • BACK SURGERY     • CARDIAC CATHETERIZATION     • CHOLECYSTECTOMY N/A 7/17/2020    Procedure: CHOLECYSTECTOMY LAPAROSCOPIC;  Surgeon: Lonnie Meneses MD;  Location: [facility];  Service: General;  Laterality: N/A;   • COLONOSCOPY     • COLONOSCOPY N/A 9/25/2021    Procedure: COLONOSCOPY;  Surgeon: Lonnie Meneses MD;  Location: [facility];  Service: Gastroenterology;  Laterality: N/A;   • CORONARY STENT PLACEMENT     • ENDOSCOPY     • ENDOSCOPY N/A 9/25/2021    Procedure: ESOPHAGOGASTRODUODENOSCOPY;  Surgeon: Lonnie Meneses MD;  Location: [facility];  Service: Gastroenterology;  Laterality: N/A;   • TONSILLECTOMY         Family History   Problem Relation Age of Onset   • Diabetes Mother    • Heart attack Father    • No Known Problems Sister    • Heart attack Brother    • No Known Problems Brother    • No Known Problems Sister    • No Known Problems Sister        Social History     Socioeconomic History   • Marital status:    Tobacco Use   • Smoking status: Current Every Day Smoker     Packs/day: 1.00     Years: 55.00     Pack years:  55.00     Types: Cigarettes   • Smokeless tobacco: Never Used   Vaping Use   • Vaping Use: Never used   Substance and Sexual Activity   • Alcohol use: Never   • Drug use: Never   • Sexual activity: Defer           Objective   Physical Exam  Vitals and nursing note reviewed.   Constitutional:       General: She is not in acute distress.     Appearance: She is well-developed. She is not diaphoretic.   HENT:      Head: Normocephalic and atraumatic.      Right Ear: External ear normal.      Left Ear: External ear normal.      Nose: Nose normal.      Mouth/Throat:      Mouth: Mucous membranes are moist.      Pharynx: Oropharynx is clear.   Eyes:      Extraocular Movements: Extraocular movements intact.      Conjunctiva/sclera: Conjunctivae normal.      Pupils: Pupils are equal, round, and reactive to light.   Neck:      Vascular: No JVD.      Trachea: No tracheal deviation.   Cardiovascular:      Rate and Rhythm: Normal rate and regular rhythm.      Pulses: Normal pulses.      Heart sounds: Murmur heard.       Pulmonary:      Effort: Pulmonary effort is normal. No respiratory distress.      Breath sounds: Normal breath sounds. No wheezing.   Abdominal:      General: Bowel sounds are normal. There is no distension.      Palpations: Abdomen is soft.      Tenderness: There is no abdominal tenderness. There is no guarding or rebound.   Musculoskeletal:         General: No deformity. Normal range of motion.      Cervical back: Normal range of motion and neck supple.      Right lower leg: No edema.      Left lower leg: No edema.   Skin:     General: Skin is warm and dry.      Coloration: Skin is not pale.      Findings: No erythema or rash.   Neurological:      General: No focal deficit present.      Mental Status: She is alert and oriented to person, place, and time.      Cranial Nerves: No cranial nerve deficit.   Psychiatric:         Mood and Affect: Mood normal.         Behavior: Behavior normal.         Thought Content:  Azithromycin Counseling:  I discussed with the patient the risks of azithromycin including but not limited to GI upset, allergic reaction, drug rash, diarrhea, and yeast infections. Thought content normal.         Procedures           ED Course  ED Course as of 12/24/21 1741   Fri Dec 24, 2021   1311 EKG noted sinus rhythm.  70 bpm.  QRS 98.  QTc 470.  Prolonged QT noted.  No acute ST elevation []   1331 XR Chest 1 View  IMPRESSION:     1. No acute pulmonary pathology identified     This report was finalized on 12/24/2021 1:20 PM by Dr. River Zaidi MD. []   1407 Creatine  0.56 []   1504 CT Chest Pulmonary Embolism  IMPRESSION:  1. No evidence of a pulmonary embolus  2. Parenchymal nodule in the right middle lobe measuring approximately 1  cm. Follow-up suggested.     This report was finalized on 12/24/2021 2:51 PM by Dr. River Zaidi MD. []   3808 Discussed case with Dr. George.  Discussed with patient.  Advised to increase her oxygen to 2 L full-time given her acute COPD exacerbation.  We will also discharge her back to the nursing home with antibiotics for her urinary tract infection.  Advised if symptoms return or worsen return to ED.  Advised follow-up with PCP. []   1725 CT Abdomen Pelvis With Contrast  IMPRESSION:     1. Interval development of marked distention of the stomach with air fluid debris levels. Please correlate for clinical evidence of gastric outlet obstruction or gastroparesis.  2. Markedly redundant colon with a large colonic stool burden to the level the rectum. Please correlate for clinical evidence of impaction. This is also worse on comparison to prior.  3. Nothing to suggest small bowel obstruction free air or drainable fluid collection.  4. Postcholecystectomy with likely postoperative biliary ductal dilatation.  5. Chronic lung disease with left greater the right base atelectasis. []      ED Course User Index  [] Ruchi Lynch PA-C  [SF] Armen George DO MDM    Final diagnoses:   COPD with acute exacerbation (HCC)   Acute cystitis without hematuria       ED Disposition  ED Disposition     ED  Dapsone Counseling: I discussed with the patient the risks of dapsone including but not limited to hemolytic anemia, agranulocytosis, rashes, methemoglobinemia, kidney failure, peripheral neuropathy, headaches, GI upset, and liver toxicity.  Patients who start dapsone require monitoring including baseline LFTs and weekly CBCs for the first month, then every month thereafter.  The patient verbalized understanding of the proper use and possible adverse effects of dapsone.  All of the patient's questions and concerns were addressed. Disposition Condition Comment    Discharge Stable           Leta Gardner MD  [address]  [id number]  [facility] [address] [phone]    Schedule an appointment as soon as possible for a visit            Medication List      New Prescriptions    doxycycline 100 MG capsule  Commonly known as: MONODOX  Take 1 capsule by mouth 2 (Two) Times a Day for 10 days.           Where to Get Your Medications      You can get these medications from any pharmacy    Bring a paper prescription for each of these medications  · doxycycline 100 MG capsule          Ruchi Lynch PA-C  12/24/21 2602     Benzoyl Peroxide Counseling: Patient counseled that medicine may cause skin irritation and bleach clothing.  In the event of skin irritation, the patient was advised to reduce the amount of the drug applied or use it less frequently.   The patient verbalized understanding of the proper use and possible adverse effects of benzoyl peroxide.  All of the patient's questions and concerns were addressed. Tetracycline Counseling: Patient counseled regarding possible photosensitivity and increased risk for sunburn.  Patient instructed to avoid sunlight, if possible.  When exposed to sunlight, patients should wear protective clothing, sunglasses, and sunscreen.  The patient was instructed to call the office immediately if the following severe adverse effects occur:  hearing changes, easy bruising/bleeding, severe headache, or vision changes.  The patient verbalized understanding of the proper use and possible adverse effects of tetracycline.  All of the patient's questions and concerns were addressed. Patient understands to avoid pregnancy while on therapy due to potential birth defects. Doxycycline Counseling:  Patient counseled regarding possible photosensitivity and increased risk for sunburn.  Patient instructed to avoid sunlight, if possible.  When exposed to sunlight, patients should wear protective clothing, sunglasses, and sunscreen.  The patient was instructed to call the office immediately if the following severe adverse effects occur:  hearing changes, easy bruising/bleeding, severe headache, or vision changes.  The patient verbalized understanding of the proper use and possible adverse effects of doxycycline.  All of the patient's questions and concerns were addressed. Isotretinoin Counseling: Patient should get monthly blood tests, not donate blood, not drive at night if vision affected, not share medication, and not undergo elective surgery for 6 months after tx completed. Side effects reviewed, pt to contact office should one occur. High Dose Vitamin A Pregnancy And Lactation Text: High dose vitamin A therapy is contraindicated during pregnancy and breast feeding. Topical Clindamycin Pregnancy And Lactation Text: This medication is Pregnancy Category B and is considered safe during pregnancy. It is unknown if it is excreted in breast milk. Dapsone Pregnancy And Lactation Text: This medication is Pregnancy Category C and is not considered safe during pregnancy or breast feeding. Azithromycin Pregnancy And Lactation Text: This medication is considered safe during pregnancy and is also secreted in breast milk. Doxycycline Pregnancy And Lactation Text: This medication is Pregnancy Category D and not consider safe during pregnancy. It is also excreted in breast milk but is considered safe for shorter treatment courses. Topical Retinoid Pregnancy And Lactation Text: This medication is Pregnancy Category C. It is unknown if this medication is excreted in breast milk. Tazorac Pregnancy And Lactation Text: This medication is not safe during pregnancy. It is unknown if this medication is excreted in breast milk. Bactrim Counseling:  I discussed with the patient the risks of sulfa antibiotics including but not limited to GI upset, allergic reaction, drug rash, diarrhea, dizziness, photosensitivity, and yeast infections.  Rarely, more serious reactions can occur including but not limited to aplastic anemia, agranulocytosis, methemoglobinemia, blood dyscrasias, liver or kidney failure, lung infiltrates or desquamative/blistering drug rashes. Benzoyl Peroxide Pregnancy And Lactation Text: This medication is Pregnancy Category C. It is unknown if benzoyl peroxide is excreted in breast milk. Spironolactone Counseling: Patient advised regarding risks of diarrhea, abdominal pain, hyperkalemia, birth defects (for female patients), liver toxicity and renal toxicity. The patient may need blood work to monitor liver and kidney function and potassium levels while on therapy. The patient verbalized understanding of the proper use and possible adverse effects of spironolactone.  All of the patient's questions and concerns were addressed. Erythromycin Pregnancy And Lactation Text: This medication is Pregnancy Category B and is considered safe during pregnancy. It is also excreted in breast milk. Topical Clindamycin Counseling: Patient counseled that this medication may cause skin irritation or allergic reactions.  In the event of skin irritation, the patient was advised to reduce the amount of the drug applied or use it less frequently.   The patient verbalized understanding of the proper use and possible adverse effects of clindamycin.  All of the patient's questions and concerns were addressed. Use Enhanced Medication Counseling?: No Birth Control Pills Counseling: Birth Control Pill Counseling: I discussed with the patient the potential side effects of OCPs including but not limited to increased risk of stroke, heart attack, thrombophlebitis, deep venous thrombosis, hepatic adenomas, breast changes, GI upset, headaches, and depression.  The patient verbalized understanding of the proper use and possible adverse effects of OCPs. All of the patient's questions and concerns were addressed. Erythromycin Counseling:  I discussed with the patient the risks of erythromycin including but not limited to GI upset, allergic reaction, drug rash, diarrhea, increase in liver enzymes, and yeast infections. Isotretinoin Pregnancy And Lactation Text: This medication is Pregnancy Category X and is considered extremely dangerous during pregnancy. It is unknown if it is excreted in breast milk. Tetracycline Pregnancy And Lactation Text: This medication is Pregnancy Category D and not consider safe during pregnancy. It is also excreted in breast milk. Spironolactone Pregnancy And Lactation Text: This medication can cause feminization of the male fetus and should be avoided during pregnancy. The active metabolite is also found in breast milk. High Dose Vitamin A Counseling: Side effects reviewed, pt to contact office should one occur. Tazorac Counseling:  Patient advised that medication is irritating and drying.  Patient may need to apply sparingly and wash off after an hour before eventually leaving it on overnight.  The patient verbalized understanding of the proper use and possible adverse effects of tazorac.  All of the patient's questions and concerns were addressed. Topical Sulfur Applications Counseling: Topical Sulfur Counseling: Patient counseled that this medication may cause skin irritation or allergic reactions.  In the event of skin irritation, the patient was advised to reduce the amount of the drug applied or use it less frequently.   The patient verbalized understanding of the proper use and possible adverse effects of topical sulfur application.  All of the patient's questions and concerns were addressed. Bactrim Pregnancy And Lactation Text: This medication is Pregnancy Category D and is known to cause fetal risk.  It is also excreted in breast milk. Topical Sulfur Applications Pregnancy And Lactation Text: This medication is Pregnancy Category C and has an unknown safety profile during pregnancy. It is unknown if this topical medication is excreted in breast milk. Detail Level: Zone Topical Retinoid counseling:  Patient advised to apply a pea-sized amount only at bedtime and wait 30 minutes after washing their face before applying.  If too drying, patient may add a non-comedogenic moisturizer. The patient verbalized understanding of the proper use and possible adverse effects of retinoids.  All of the patient's questions and concerns were addressed. Minocycline Counseling: Patient advised regarding possible photosensitivity and discoloration of the teeth, skin, lips, tongue and gums.  Patient instructed to avoid sunlight, if possible.  When exposed to sunlight, patients should wear protective clothing, sunglasses, and sunscreen.  The patient was instructed to call the office immediately if the following severe adverse effects occur:  hearing changes, easy bruising/bleeding, severe headache, or vision changes.  The patient verbalized understanding of the proper use and possible adverse effects of minocycline.  All of the patient's questions and concerns were addressed. Birth Control Pills Pregnancy And Lactation Text: This medication should be avoided if pregnant and for the first 30 days post-partum. Detail Level: Simple Sarecycline Counseling: Patient advised regarding possible photosensitivity and discoloration of the teeth, skin, lips, tongue and gums.  Patient instructed to avoid sunlight, if possible.  When exposed to sunlight, patients should wear protective clothing, sunglasses, and sunscreen.  The patient was instructed to call the office immediately if the following severe adverse effects occur:  hearing changes, easy bruising/bleeding, severe headache, or vision changes.  The patient verbalized understanding of the proper use and possible adverse effects of sarecycline.  All of the patient's questions and concerns were addressed.

## 2021-12-24 NOTE — ED NOTES
Called [facility] for transport, accepted and stated they would have them up as soon as they can.     Юлия Coronado  12/24/21 1540

## 2021-12-25 NOTE — ED NOTES
Called [facility] for transport awaiting call back.      Юлия Coronado  12/24/21 1947

## 2021-12-25 NOTE — ED NOTES
Called [facility] back about transport and said they can no longer take pt until after 11pm.     Юлия Coronado  12/24/21 1944

## 2022-01-16 ENCOUNTER — APPOINTMENT (OUTPATIENT)
Dept: CT IMAGING | Facility: HOSPITAL | Age: 77
End: 2022-01-16

## 2022-01-16 ENCOUNTER — APPOINTMENT (OUTPATIENT)
Dept: CARDIOLOGY | Facility: HOSPITAL | Age: 77
End: 2022-01-16

## 2022-01-16 ENCOUNTER — APPOINTMENT (OUTPATIENT)
Dept: GENERAL RADIOLOGY | Facility: HOSPITAL | Age: 77
End: 2022-01-16

## 2022-01-16 ENCOUNTER — HOSPITAL ENCOUNTER (INPATIENT)
Facility: HOSPITAL | Age: 77
LOS: 4 days | Discharge: NURSING, REHAB OR LONG-TERM CARE | End: 2022-01-20
Attending: STUDENT IN AN ORGANIZED HEALTH CARE EDUCATION/TRAINING PROGRAM | Admitting: INTERNAL MEDICINE

## 2022-01-16 DIAGNOSIS — R77.8 ELEVATED TROPONIN: ICD-10-CM

## 2022-01-16 DIAGNOSIS — D64.9 ANEMIA, UNSPECIFIED TYPE: Primary | ICD-10-CM

## 2022-01-16 DIAGNOSIS — A41.9 SEPSIS, DUE TO UNSPECIFIED ORGANISM, UNSPECIFIED WHETHER ACUTE ORGAN DYSFUNCTION PRESENT: ICD-10-CM

## 2022-01-16 DIAGNOSIS — J18.9 PNEUMONIA OF BOTH LUNGS DUE TO INFECTIOUS ORGANISM, UNSPECIFIED PART OF LUNG: ICD-10-CM

## 2022-01-16 LAB
A-A DO2: 78.9 MMHG (ref 0–300)
ABO GROUP BLD: NORMAL
ALBUMIN SERPL-MCNC: 2.79 G/DL (ref 3.5–5.2)
ALBUMIN/GLOB SERPL: 0.8 G/DL
ALP SERPL-CCNC: 88 U/L (ref 39–117)
ALT SERPL W P-5'-P-CCNC: 38 U/L (ref 1–33)
ANION GAP SERPL CALCULATED.3IONS-SCNC: 12.8 MMOL/L (ref 5–15)
APTT PPP: 35.8 SECONDS (ref 25.5–35.4)
ARTERIAL PATENCY WRIST A: ABNORMAL
AST SERPL-CCNC: 43 U/L (ref 1–32)
ATMOSPHERIC PRESS: 718 MMHG
BASE EXCESS BLDA CALC-SCNC: 10.7 MMOL/L (ref 0–2)
BASOPHILS # BLD AUTO: 0.08 10*3/MM3 (ref 0–0.2)
BASOPHILS NFR BLD AUTO: 0.2 % (ref 0–1.5)
BDY SITE: ABNORMAL
BH CV ECHO MEAS - ACS: 1.2 CM
BH CV ECHO MEAS - AO MAX PG: 14.4 MMHG
BH CV ECHO MEAS - AO MEAN PG: 6.5 MMHG
BH CV ECHO MEAS - AO ROOT AREA (BSA CORRECTED): 2.2
BH CV ECHO MEAS - AO ROOT AREA: 8 CM^2
BH CV ECHO MEAS - AO ROOT DIAM: 3.2 CM
BH CV ECHO MEAS - AO V2 MAX: 189.5 CM/SEC
BH CV ECHO MEAS - AO V2 MEAN: 119.5 CM/SEC
BH CV ECHO MEAS - AO V2 VTI: 39 CM
BH CV ECHO MEAS - BSA(HAYCOCK): 1.4 M^2
BH CV ECHO MEAS - BSA: 1.5 M^2
BH CV ECHO MEAS - BZI_BMI: 16.6 KILOGRAMS/M^2
BH CV ECHO MEAS - BZI_METRIC_HEIGHT: 165.1 CM
BH CV ECHO MEAS - BZI_METRIC_WEIGHT: 45.4 KG
BH CV ECHO MEAS - EDV(CUBED): 74.1 ML
BH CV ECHO MEAS - EDV(MOD-SP4): 52.6 ML
BH CV ECHO MEAS - EDV(TEICH): 78.6 ML
BH CV ECHO MEAS - EF(CUBED): 59.8 %
BH CV ECHO MEAS - EF(MOD-SP4): 58.7 %
BH CV ECHO MEAS - EF(TEICH): 51.7 %
BH CV ECHO MEAS - ESV(CUBED): 29.8 ML
BH CV ECHO MEAS - ESV(MOD-SP4): 21.7 ML
BH CV ECHO MEAS - ESV(TEICH): 37.9 ML
BH CV ECHO MEAS - FS: 26.2 %
BH CV ECHO MEAS - IVS/LVPW: 0.56
BH CV ECHO MEAS - IVSD: 1 CM
BH CV ECHO MEAS - LA DIMENSION: 2.8 CM
BH CV ECHO MEAS - LA/AO: 0.88
BH CV ECHO MEAS - LV DIASTOLIC VOL/BSA (35-75): 35.7 ML/M^2
BH CV ECHO MEAS - LV MASS(C)D: 224.3 GRAMS
BH CV ECHO MEAS - LV MASS(C)DI: 152.3 GRAMS/M^2
BH CV ECHO MEAS - LV SYSTOLIC VOL/BSA (12-30): 14.7 ML/M^2
BH CV ECHO MEAS - LVIDD: 4.2 CM
BH CV ECHO MEAS - LVIDS: 3.1 CM
BH CV ECHO MEAS - LVLD AP4: 7 CM
BH CV ECHO MEAS - LVLS AP4: 5.6 CM
BH CV ECHO MEAS - LVOT AREA (M): 2.8 CM^2
BH CV ECHO MEAS - LVOT AREA: 2.8 CM^2
BH CV ECHO MEAS - LVOT DIAM: 1.9 CM
BH CV ECHO MEAS - LVPWD: 1.8 CM
BH CV ECHO MEAS - MV A MAX VEL: 108 CM/SEC
BH CV ECHO MEAS - MV E MAX VEL: 104 CM/SEC
BH CV ECHO MEAS - MV E/A: 0.96
BH CV ECHO MEAS - PA ACC TIME: 0.12 SEC
BH CV ECHO MEAS - PA PR(ACCEL): 26.8 MMHG
BH CV ECHO MEAS - SI(AO): 212.9 ML/M^2
BH CV ECHO MEAS - SI(CUBED): 30.1 ML/M^2
BH CV ECHO MEAS - SI(MOD-SP4): 21 ML/M^2
BH CV ECHO MEAS - SI(TEICH): 27.6 ML/M^2
BH CV ECHO MEAS - SV(AO): 313.7 ML
BH CV ECHO MEAS - SV(CUBED): 44.3 ML
BH CV ECHO MEAS - SV(MOD-SP4): 30.9 ML
BH CV ECHO MEAS - SV(TEICH): 40.7 ML
BILIRUB SERPL-MCNC: <0.2 MG/DL (ref 0–1.2)
BLD GP AB SCN SERPL QL: NEGATIVE
BODY TEMPERATURE: 0 C
BUN SERPL-MCNC: 21 MG/DL (ref 8–23)
BUN/CREAT SERPL: 32.3 (ref 7–25)
CALCIUM SPEC-SCNC: 8.6 MG/DL (ref 8.6–10.5)
CHLORIDE SERPL-SCNC: 93 MMOL/L (ref 98–107)
CHOLEST SERPL-MCNC: 99 MG/DL (ref 0–200)
CO2 BLDA-SCNC: 38.8 MMOL/L (ref 22–33)
CO2 SERPL-SCNC: 30.2 MMOL/L (ref 22–29)
COHGB MFR BLD: 1.6 % (ref 0–5)
CREAT SERPL-MCNC: 0.65 MG/DL (ref 0.57–1)
CRP SERPL-MCNC: 16.33 MG/DL (ref 0–0.5)
D-LACTATE SERPL-SCNC: 1 MMOL/L (ref 0.5–2)
DEPRECATED RDW RBC AUTO: 63.7 FL (ref 37–54)
EOSINOPHIL # BLD AUTO: 0.02 10*3/MM3 (ref 0–0.4)
EOSINOPHIL NFR BLD AUTO: 0.1 % (ref 0.3–6.2)
ERYTHROCYTE [DISTWIDTH] IN BLOOD BY AUTOMATED COUNT: 17.4 % (ref 12.3–15.4)
ERYTHROCYTE [SEDIMENTATION RATE] IN BLOOD: 52 MM/HR (ref 0–30)
FLUAV RNA RESP QL NAA+PROBE: NOT DETECTED
FLUBV RNA RESP QL NAA+PROBE: NOT DETECTED
GAS FLOW AIRWAY: 3 LPM
GFR SERPL CREATININE-BSD FRML MDRD: 89 ML/MIN/1.73
GLOBULIN UR ELPH-MCNC: 3.6 GM/DL
GLUCOSE BLDC GLUCOMTR-MCNC: 101 MG/DL (ref 70–130)
GLUCOSE BLDC GLUCOMTR-MCNC: 89 MG/DL (ref 70–130)
GLUCOSE SERPL-MCNC: 94 MG/DL (ref 65–99)
HBA1C MFR BLD: 5.1 % (ref 4.8–5.6)
HCO3 BLDA-SCNC: 36.9 MMOL/L (ref 20–26)
HCT VFR BLD AUTO: 19.2 % (ref 34–46.6)
HCT VFR BLD AUTO: 26.8 % (ref 34–46.6)
HCT VFR BLD CALC: 21.5 % (ref 38–51)
HDLC SERPL-MCNC: 37 MG/DL (ref 40–60)
HGB BLD-MCNC: 5.8 G/DL (ref 12–15.9)
HGB BLD-MCNC: 8.6 G/DL (ref 12–15.9)
HGB BLDA-MCNC: 7 G/DL (ref 13.5–17.5)
HOLD SPECIMEN: NORMAL
HOLD SPECIMEN: NORMAL
IMM GRANULOCYTES # BLD AUTO: 0.26 10*3/MM3 (ref 0–0.05)
IMM GRANULOCYTES NFR BLD AUTO: 0.7 % (ref 0–0.5)
INHALED O2 CONCENTRATION: 32 %
INR PPP: 1.28 (ref 0.9–1.1)
IRON 24H UR-MRATE: 135 MCG/DL (ref 37–145)
IRON SATN MFR SERPL: 45 % (ref 20–50)
LDLC SERPL CALC-MCNC: 38 MG/DL (ref 0–100)
LDLC/HDLC SERPL: 0.92 {RATIO}
LYMPHOCYTES # BLD AUTO: 0.82 10*3/MM3 (ref 0.7–3.1)
LYMPHOCYTES NFR BLD AUTO: 2.3 % (ref 19.6–45.3)
Lab: ABNORMAL
MAXIMAL PREDICTED HEART RATE: 144 BPM
MCH RBC QN AUTO: 30.4 PG (ref 26.6–33)
MCHC RBC AUTO-ENTMCNC: 30.2 G/DL (ref 31.5–35.7)
MCV RBC AUTO: 100.5 FL (ref 79–97)
METHGB BLD QL: 0.1 % (ref 0–3)
MODALITY: ABNORMAL
MONOCYTES # BLD AUTO: 2.47 10*3/MM3 (ref 0.1–0.9)
MONOCYTES NFR BLD AUTO: 7 % (ref 5–12)
NEUTROPHILS NFR BLD AUTO: 31.66 10*3/MM3 (ref 1.7–7)
NEUTROPHILS NFR BLD AUTO: 89.7 % (ref 42.7–76)
NOTE: ABNORMAL
NRBC BLD AUTO-RTO: 0 /100 WBC (ref 0–0.2)
NT-PROBNP SERPL-MCNC: 1158 PG/ML (ref 0–1800)
OXYHGB MFR BLDV: 91.7 % (ref 94–99)
PCO2 BLDA: 62 MM HG (ref 35–45)
PCO2 TEMP ADJ BLD: ABNORMAL MM[HG]
PH BLDA: 7.38 PH UNITS (ref 7.35–7.45)
PH, TEMP CORRECTED: ABNORMAL
PLATELET # BLD AUTO: 391 10*3/MM3 (ref 140–450)
PMV BLD AUTO: 9.7 FL (ref 6–12)
PO2 BLDA: 66.8 MM HG (ref 83–108)
PO2 TEMP ADJ BLD: ABNORMAL MM[HG]
POTASSIUM SERPL-SCNC: 4.2 MMOL/L (ref 3.5–5.2)
PROCALCITONIN SERPL-MCNC: 0.22 NG/ML (ref 0–0.25)
PROT SERPL-MCNC: 6.4 G/DL (ref 6–8.5)
PROTHROMBIN TIME: 16.4 SECONDS (ref 12.8–14.5)
QT INTERVAL: 458 MS
QTC INTERVAL: 490 MS
RBC # BLD AUTO: 1.91 10*6/MM3 (ref 3.77–5.28)
RETICS # AUTO: 0.12 10*6/MM3 (ref 0.02–0.13)
RETICS/RBC NFR AUTO: 4.19 % (ref 0.7–1.9)
RH BLD: POSITIVE
SAO2 % BLDCOA: 93.3 % (ref 94–99)
SARS-COV-2 RNA RESP QL NAA+PROBE: NOT DETECTED
SODIUM SERPL-SCNC: 136 MMOL/L (ref 136–145)
STRESS TARGET HR: 122 BPM
T&S EXPIRATION DATE: NORMAL
T4 FREE SERPL-MCNC: 0.61 NG/DL (ref 0.93–1.7)
TIBC SERPL-MCNC: 302 MCG/DL (ref 298–536)
TRANSFERRIN SERPL-MCNC: 203 MG/DL (ref 200–360)
TRIGL SERPL-MCNC: 139 MG/DL (ref 0–150)
TROPONIN T SERPL-MCNC: 0.06 NG/ML (ref 0–0.03)
TROPONIN T SERPL-MCNC: 0.06 NG/ML (ref 0–0.03)
TSH SERPL DL<=0.05 MIU/L-ACNC: 55.02 UIU/ML (ref 0.27–4.2)
VENTILATOR MODE: ABNORMAL
VLDLC SERPL-MCNC: 24 MG/DL (ref 5–40)
WBC NRBC COR # BLD: 35.31 10*3/MM3 (ref 3.4–10.8)
WHOLE BLOOD HOLD SPECIMEN: NORMAL

## 2022-01-16 PROCEDURE — 85652 RBC SED RATE AUTOMATED: CPT | Performed by: STUDENT IN AN ORGANIZED HEALTH CARE EDUCATION/TRAINING PROGRAM

## 2022-01-16 PROCEDURE — 82805 BLOOD GASES W/O2 SATURATION: CPT

## 2022-01-16 PROCEDURE — 25010000002 PIPERACILLIN SOD-TAZOBACTAM PER 1 G: Performed by: STUDENT IN AN ORGANIZED HEALTH CARE EDUCATION/TRAINING PROGRAM

## 2022-01-16 PROCEDURE — 94799 UNLISTED PULMONARY SVC/PX: CPT

## 2022-01-16 PROCEDURE — 93306 TTE W/DOPPLER COMPLETE: CPT

## 2022-01-16 PROCEDURE — 74177 CT ABD & PELVIS W/CONTRAST: CPT

## 2022-01-16 PROCEDURE — 80061 LIPID PANEL: CPT | Performed by: STUDENT IN AN ORGANIZED HEALTH CARE EDUCATION/TRAINING PROGRAM

## 2022-01-16 PROCEDURE — 36600 WITHDRAWAL OF ARTERIAL BLOOD: CPT

## 2022-01-16 PROCEDURE — 85018 HEMOGLOBIN: CPT | Performed by: STUDENT IN AN ORGANIZED HEALTH CARE EDUCATION/TRAINING PROGRAM

## 2022-01-16 PROCEDURE — 80053 COMPREHEN METABOLIC PANEL: CPT | Performed by: STUDENT IN AN ORGANIZED HEALTH CARE EDUCATION/TRAINING PROGRAM

## 2022-01-16 PROCEDURE — 85025 COMPLETE CBC W/AUTO DIFF WBC: CPT | Performed by: STUDENT IN AN ORGANIZED HEALTH CARE EDUCATION/TRAINING PROGRAM

## 2022-01-16 PROCEDURE — 82962 GLUCOSE BLOOD TEST: CPT

## 2022-01-16 PROCEDURE — 86900 BLOOD TYPING SEROLOGIC ABO: CPT | Performed by: STUDENT IN AN ORGANIZED HEALTH CARE EDUCATION/TRAINING PROGRAM

## 2022-01-16 PROCEDURE — 87636 SARSCOV2 & INF A&B AMP PRB: CPT | Performed by: STUDENT IN AN ORGANIZED HEALTH CARE EDUCATION/TRAINING PROGRAM

## 2022-01-16 PROCEDURE — 85060 BLOOD SMEAR INTERPRETATION: CPT | Performed by: STUDENT IN AN ORGANIZED HEALTH CARE EDUCATION/TRAINING PROGRAM

## 2022-01-16 PROCEDURE — 84439 ASSAY OF FREE THYROXINE: CPT | Performed by: STUDENT IN AN ORGANIZED HEALTH CARE EDUCATION/TRAINING PROGRAM

## 2022-01-16 PROCEDURE — 83605 ASSAY OF LACTIC ACID: CPT | Performed by: STUDENT IN AN ORGANIZED HEALTH CARE EDUCATION/TRAINING PROGRAM

## 2022-01-16 PROCEDURE — 85730 THROMBOPLASTIN TIME PARTIAL: CPT | Performed by: STUDENT IN AN ORGANIZED HEALTH CARE EDUCATION/TRAINING PROGRAM

## 2022-01-16 PROCEDURE — 71045 X-RAY EXAM CHEST 1 VIEW: CPT

## 2022-01-16 PROCEDURE — 85014 HEMATOCRIT: CPT | Performed by: STUDENT IN AN ORGANIZED HEALTH CARE EDUCATION/TRAINING PROGRAM

## 2022-01-16 PROCEDURE — 71250 CT THORAX DX C-: CPT

## 2022-01-16 PROCEDURE — 86901 BLOOD TYPING SEROLOGIC RH(D): CPT | Performed by: STUDENT IN AN ORGANIZED HEALTH CARE EDUCATION/TRAINING PROGRAM

## 2022-01-16 PROCEDURE — 84443 ASSAY THYROID STIM HORMONE: CPT | Performed by: STUDENT IN AN ORGANIZED HEALTH CARE EDUCATION/TRAINING PROGRAM

## 2022-01-16 PROCEDURE — 86923 COMPATIBILITY TEST ELECTRIC: CPT

## 2022-01-16 PROCEDURE — 84145 PROCALCITONIN (PCT): CPT | Performed by: STUDENT IN AN ORGANIZED HEALTH CARE EDUCATION/TRAINING PROGRAM

## 2022-01-16 PROCEDURE — 99291 CRITICAL CARE FIRST HOUR: CPT | Performed by: STUDENT IN AN ORGANIZED HEALTH CARE EDUCATION/TRAINING PROGRAM

## 2022-01-16 PROCEDURE — 84484 ASSAY OF TROPONIN QUANT: CPT | Performed by: STUDENT IN AN ORGANIZED HEALTH CARE EDUCATION/TRAINING PROGRAM

## 2022-01-16 PROCEDURE — 85610 PROTHROMBIN TIME: CPT | Performed by: STUDENT IN AN ORGANIZED HEALTH CARE EDUCATION/TRAINING PROGRAM

## 2022-01-16 PROCEDURE — 83540 ASSAY OF IRON: CPT | Performed by: STUDENT IN AN ORGANIZED HEALTH CARE EDUCATION/TRAINING PROGRAM

## 2022-01-16 PROCEDURE — 86850 RBC ANTIBODY SCREEN: CPT | Performed by: STUDENT IN AN ORGANIZED HEALTH CARE EDUCATION/TRAINING PROGRAM

## 2022-01-16 PROCEDURE — 86900 BLOOD TYPING SEROLOGIC ABO: CPT

## 2022-01-16 PROCEDURE — 84466 ASSAY OF TRANSFERRIN: CPT | Performed by: STUDENT IN AN ORGANIZED HEALTH CARE EDUCATION/TRAINING PROGRAM

## 2022-01-16 PROCEDURE — 83050 HGB METHEMOGLOBIN QUAN: CPT

## 2022-01-16 PROCEDURE — 82375 ASSAY CARBOXYHB QUANT: CPT

## 2022-01-16 PROCEDURE — 83880 ASSAY OF NATRIURETIC PEPTIDE: CPT | Performed by: STUDENT IN AN ORGANIZED HEALTH CARE EDUCATION/TRAINING PROGRAM

## 2022-01-16 PROCEDURE — 83036 HEMOGLOBIN GLYCOSYLATED A1C: CPT | Performed by: STUDENT IN AN ORGANIZED HEALTH CARE EDUCATION/TRAINING PROGRAM

## 2022-01-16 PROCEDURE — P9016 RBC LEUKOCYTES REDUCED: HCPCS

## 2022-01-16 PROCEDURE — 93005 ELECTROCARDIOGRAM TRACING: CPT | Performed by: STUDENT IN AN ORGANIZED HEALTH CARE EDUCATION/TRAINING PROGRAM

## 2022-01-16 PROCEDURE — 99285 EMERGENCY DEPT VISIT HI MDM: CPT

## 2022-01-16 PROCEDURE — 86140 C-REACTIVE PROTEIN: CPT | Performed by: STUDENT IN AN ORGANIZED HEALTH CARE EDUCATION/TRAINING PROGRAM

## 2022-01-16 PROCEDURE — 82746 ASSAY OF FOLIC ACID SERUM: CPT | Performed by: STUDENT IN AN ORGANIZED HEALTH CARE EDUCATION/TRAINING PROGRAM

## 2022-01-16 PROCEDURE — 36430 TRANSFUSION BLD/BLD COMPNT: CPT

## 2022-01-16 PROCEDURE — 36415 COLL VENOUS BLD VENIPUNCTURE: CPT

## 2022-01-16 PROCEDURE — 63710000001 DRONABINOL PER 2.5 MG: Performed by: STUDENT IN AN ORGANIZED HEALTH CARE EDUCATION/TRAINING PROGRAM

## 2022-01-16 PROCEDURE — 25010000002 METHYLPREDNISOLONE PER 40 MG: Performed by: STUDENT IN AN ORGANIZED HEALTH CARE EDUCATION/TRAINING PROGRAM

## 2022-01-16 PROCEDURE — 85045 AUTOMATED RETICULOCYTE COUNT: CPT | Performed by: STUDENT IN AN ORGANIZED HEALTH CARE EDUCATION/TRAINING PROGRAM

## 2022-01-16 PROCEDURE — 87040 BLOOD CULTURE FOR BACTERIA: CPT | Performed by: STUDENT IN AN ORGANIZED HEALTH CARE EDUCATION/TRAINING PROGRAM

## 2022-01-16 PROCEDURE — 25010000002 IOPAMIDOL 61 % SOLUTION: Performed by: STUDENT IN AN ORGANIZED HEALTH CARE EDUCATION/TRAINING PROGRAM

## 2022-01-16 RX ORDER — SODIUM CHLORIDE 0.9 % (FLUSH) 0.9 %
10 SYRINGE (ML) INJECTION EVERY 12 HOURS SCHEDULED
Status: DISCONTINUED | OUTPATIENT
Start: 2022-01-16 | End: 2022-01-20 | Stop reason: HOSPADM

## 2022-01-16 RX ORDER — CARVEDILOL 3.12 MG/1
3.12 TABLET ORAL 2 TIMES DAILY WITH MEALS
Status: CANCELLED | OUTPATIENT
Start: 2022-01-16

## 2022-01-16 RX ORDER — ATORVASTATIN CALCIUM 40 MG/1
40 TABLET, FILM COATED ORAL NIGHTLY
Status: CANCELLED | OUTPATIENT
Start: 2022-01-16

## 2022-01-16 RX ORDER — MAGNESIUM SULFATE HEPTAHYDRATE 40 MG/ML
4 INJECTION, SOLUTION INTRAVENOUS AS NEEDED
Status: DISCONTINUED | OUTPATIENT
Start: 2022-01-16 | End: 2022-01-20 | Stop reason: HOSPADM

## 2022-01-16 RX ORDER — ASPIRIN 81 MG/1
81 TABLET ORAL DAILY
Status: DISCONTINUED | OUTPATIENT
Start: 2022-01-16 | End: 2022-01-20 | Stop reason: HOSPADM

## 2022-01-16 RX ORDER — IPRATROPIUM BROMIDE AND ALBUTEROL SULFATE 2.5; .5 MG/3ML; MG/3ML
3 SOLUTION RESPIRATORY (INHALATION) 4 TIMES DAILY PRN
Status: DISCONTINUED | OUTPATIENT
Start: 2022-01-16 | End: 2022-01-20 | Stop reason: HOSPADM

## 2022-01-16 RX ORDER — METHYLPREDNISOLONE SODIUM SUCCINATE 40 MG/ML
40 INJECTION, POWDER, LYOPHILIZED, FOR SOLUTION INTRAMUSCULAR; INTRAVENOUS DAILY
Status: DISCONTINUED | OUTPATIENT
Start: 2022-01-16 | End: 2022-01-20 | Stop reason: HOSPADM

## 2022-01-16 RX ORDER — DOCUSATE SODIUM 100 MG/1
200 CAPSULE, LIQUID FILLED ORAL DAILY
Status: DISCONTINUED | OUTPATIENT
Start: 2022-01-16 | End: 2022-01-20 | Stop reason: HOSPADM

## 2022-01-16 RX ORDER — LISINOPRIL 2.5 MG/1
5 TABLET ORAL DAILY
Status: CANCELLED | OUTPATIENT
Start: 2022-01-16

## 2022-01-16 RX ORDER — POTASSIUM CHLORIDE 750 MG/1
40 CAPSULE, EXTENDED RELEASE ORAL AS NEEDED
Status: DISCONTINUED | OUTPATIENT
Start: 2022-01-16 | End: 2022-01-20 | Stop reason: HOSPADM

## 2022-01-16 RX ORDER — POTASSIUM CHLORIDE 1.5 G/1.77G
40 POWDER, FOR SOLUTION ORAL AS NEEDED
Status: DISCONTINUED | OUTPATIENT
Start: 2022-01-16 | End: 2022-01-20 | Stop reason: HOSPADM

## 2022-01-16 RX ORDER — IPRATROPIUM BROMIDE AND ALBUTEROL SULFATE 2.5; .5 MG/3ML; MG/3ML
3 SOLUTION RESPIRATORY (INHALATION)
Status: DISCONTINUED | OUTPATIENT
Start: 2022-01-16 | End: 2022-01-20 | Stop reason: HOSPADM

## 2022-01-16 RX ORDER — LEVOTHYROXINE SODIUM 0.15 MG/1
150 TABLET ORAL ONCE
Status: COMPLETED | OUTPATIENT
Start: 2022-01-16 | End: 2022-01-16

## 2022-01-16 RX ORDER — AMIODARONE HYDROCHLORIDE 200 MG/1
200 TABLET ORAL DAILY
Status: CANCELLED | OUTPATIENT
Start: 2022-01-16

## 2022-01-16 RX ORDER — DRONABINOL 2.5 MG/1
2.5 CAPSULE ORAL
Status: DISCONTINUED | OUTPATIENT
Start: 2022-01-16 | End: 2022-01-20 | Stop reason: HOSPADM

## 2022-01-16 RX ORDER — NITROGLYCERIN 0.4 MG/1
0.4 TABLET SUBLINGUAL
Status: DISCONTINUED | OUTPATIENT
Start: 2022-01-16 | End: 2022-01-20 | Stop reason: HOSPADM

## 2022-01-16 RX ORDER — ATORVASTATIN CALCIUM 40 MG/1
40 TABLET, FILM COATED ORAL NIGHTLY
COMMUNITY

## 2022-01-16 RX ORDER — GARLIC EXTRACT 500 MG
1 CAPSULE ORAL 2 TIMES DAILY
Status: DISCONTINUED | OUTPATIENT
Start: 2022-01-16 | End: 2022-01-20 | Stop reason: HOSPADM

## 2022-01-16 RX ORDER — ACETAMINOPHEN 325 MG/1
650 TABLET ORAL EVERY 4 HOURS PRN
Status: DISCONTINUED | OUTPATIENT
Start: 2022-01-16 | End: 2022-01-20 | Stop reason: HOSPADM

## 2022-01-16 RX ORDER — TRAZODONE HYDROCHLORIDE 50 MG/1
50 TABLET ORAL NIGHTLY
Status: DISCONTINUED | OUTPATIENT
Start: 2022-01-16 | End: 2022-01-20 | Stop reason: HOSPADM

## 2022-01-16 RX ORDER — SODIUM CHLORIDE 0.9 % (FLUSH) 0.9 %
10 SYRINGE (ML) INJECTION AS NEEDED
Status: DISCONTINUED | OUTPATIENT
Start: 2022-01-16 | End: 2022-01-20 | Stop reason: HOSPADM

## 2022-01-16 RX ORDER — CARVEDILOL 3.12 MG/1
3.12 TABLET ORAL 2 TIMES DAILY WITH MEALS
Status: DISCONTINUED | OUTPATIENT
Start: 2022-01-16 | End: 2022-01-20 | Stop reason: HOSPADM

## 2022-01-16 RX ORDER — LEVOTHYROXINE SODIUM 0.15 MG/1
150 TABLET ORAL DAILY
Status: CANCELLED | OUTPATIENT
Start: 2022-01-16

## 2022-01-16 RX ORDER — ATORVASTATIN CALCIUM 40 MG/1
40 TABLET, FILM COATED ORAL NIGHTLY
Status: DISCONTINUED | OUTPATIENT
Start: 2022-01-16 | End: 2022-01-20 | Stop reason: HOSPADM

## 2022-01-16 RX ORDER — POLYETHYLENE GLYCOL 3350 17 G/17G
1 POWDER, FOR SOLUTION ORAL 2 TIMES DAILY
Status: DISCONTINUED | OUTPATIENT
Start: 2022-01-16 | End: 2022-01-17 | Stop reason: ALTCHOICE

## 2022-01-16 RX ORDER — TRAZODONE HYDROCHLORIDE 50 MG/1
50 TABLET ORAL NIGHTLY
COMMUNITY

## 2022-01-16 RX ORDER — LACTULOSE 10 G/15ML
10 SOLUTION ORAL DAILY
Status: DISCONTINUED | OUTPATIENT
Start: 2022-01-16 | End: 2022-01-20 | Stop reason: HOSPADM

## 2022-01-16 RX ORDER — LEVOTHYROXINE SODIUM 0.12 MG/1
125 TABLET ORAL DAILY
Status: DISCONTINUED | OUTPATIENT
Start: 2022-01-16 | End: 2022-01-16

## 2022-01-16 RX ORDER — DOXYCYCLINE 100 MG/1
100 CAPSULE ORAL EVERY 12 HOURS SCHEDULED
Status: DISCONTINUED | OUTPATIENT
Start: 2022-01-16 | End: 2022-01-17

## 2022-01-16 RX ORDER — ONDANSETRON 4 MG/1
4 TABLET, FILM COATED ORAL EVERY 6 HOURS PRN
Status: DISCONTINUED | OUTPATIENT
Start: 2022-01-16 | End: 2022-01-20 | Stop reason: HOSPADM

## 2022-01-16 RX ORDER — PROMETHAZINE HYDROCHLORIDE 12.5 MG/1
12.5 TABLET ORAL EVERY 8 HOURS PRN
Status: CANCELLED | OUTPATIENT
Start: 2022-01-16

## 2022-01-16 RX ORDER — HYDROCODONE BITARTRATE AND ACETAMINOPHEN 5; 325 MG/1; MG/1
1 TABLET ORAL EVERY 6 HOURS PRN
COMMUNITY

## 2022-01-16 RX ORDER — LEVOTHYROXINE SODIUM 0.15 MG/1
150 TABLET ORAL DAILY
COMMUNITY
End: 2022-01-20 | Stop reason: HOSPADM

## 2022-01-16 RX ORDER — CHOLECALCIFEROL (VITAMIN D3) 125 MCG
5 CAPSULE ORAL NIGHTLY
Status: DISCONTINUED | OUTPATIENT
Start: 2022-01-16 | End: 2022-01-20 | Stop reason: HOSPADM

## 2022-01-16 RX ORDER — MAGNESIUM SULFATE HEPTAHYDRATE 40 MG/ML
2 INJECTION, SOLUTION INTRAVENOUS AS NEEDED
Status: DISCONTINUED | OUTPATIENT
Start: 2022-01-16 | End: 2022-01-20 | Stop reason: HOSPADM

## 2022-01-16 RX ORDER — ONDANSETRON 4 MG/1
4 TABLET, FILM COATED ORAL EVERY 6 HOURS PRN
COMMUNITY

## 2022-01-16 RX ORDER — GUAIFENESIN 600 MG/1
1200 TABLET, EXTENDED RELEASE ORAL EVERY 12 HOURS SCHEDULED
Status: DISCONTINUED | OUTPATIENT
Start: 2022-01-16 | End: 2022-01-20 | Stop reason: HOSPADM

## 2022-01-16 RX ORDER — PANTOPRAZOLE SODIUM 40 MG/1
40 TABLET, DELAYED RELEASE ORAL 2 TIMES DAILY
Status: CANCELLED | OUTPATIENT
Start: 2022-01-16

## 2022-01-16 RX ORDER — ONDANSETRON 2 MG/ML
4 INJECTION INTRAMUSCULAR; INTRAVENOUS EVERY 6 HOURS PRN
Status: DISCONTINUED | OUTPATIENT
Start: 2022-01-16 | End: 2022-01-20 | Stop reason: HOSPADM

## 2022-01-16 RX ORDER — MONTELUKAST SODIUM 10 MG/1
10 TABLET ORAL EVERY EVENING
Status: DISCONTINUED | OUTPATIENT
Start: 2022-01-16 | End: 2022-01-20 | Stop reason: HOSPADM

## 2022-01-16 RX ORDER — NICOTINE POLACRILEX 4 MG
15 LOZENGE BUCCAL
Status: DISCONTINUED | OUTPATIENT
Start: 2022-01-16 | End: 2022-01-20 | Stop reason: HOSPADM

## 2022-01-16 RX ORDER — POTASSIUM CHLORIDE 7.45 MG/ML
10 INJECTION INTRAVENOUS
Status: DISCONTINUED | OUTPATIENT
Start: 2022-01-16 | End: 2022-01-20 | Stop reason: HOSPADM

## 2022-01-16 RX ORDER — HYDROCODONE BITARTRATE AND ACETAMINOPHEN 5; 325 MG/1; MG/1
1 TABLET ORAL EVERY 6 HOURS PRN
Status: DISCONTINUED | OUTPATIENT
Start: 2022-01-16 | End: 2022-01-20 | Stop reason: HOSPADM

## 2022-01-16 RX ORDER — FERROUS SULFATE 325(65) MG
325 TABLET ORAL
COMMUNITY

## 2022-01-16 RX ORDER — DRONABINOL 2.5 MG/1
2.5 CAPSULE ORAL
COMMUNITY

## 2022-01-16 RX ORDER — DIPHENOXYLATE HYDROCHLORIDE AND ATROPINE SULFATE 2.5; .025 MG/1; MG/1
1 TABLET ORAL NIGHTLY
Status: DISCONTINUED | OUTPATIENT
Start: 2022-01-16 | End: 2022-01-20 | Stop reason: HOSPADM

## 2022-01-16 RX ORDER — LEVOTHYROXINE SODIUM 0.15 MG/1
150 TABLET ORAL DAILY
Status: DISCONTINUED | OUTPATIENT
Start: 2022-01-17 | End: 2022-01-19

## 2022-01-16 RX ORDER — FERROUS SULFATE 325(65) MG
325 TABLET ORAL
Status: DISCONTINUED | OUTPATIENT
Start: 2022-01-17 | End: 2022-01-20 | Stop reason: HOSPADM

## 2022-01-16 RX ORDER — DEXTROSE MONOHYDRATE 25 G/50ML
25 INJECTION, SOLUTION INTRAVENOUS
Status: DISCONTINUED | OUTPATIENT
Start: 2022-01-16 | End: 2022-01-20 | Stop reason: HOSPADM

## 2022-01-16 RX ADMIN — GUAIFENESIN 1200 MG: 600 TABLET, EXTENDED RELEASE ORAL at 20:30

## 2022-01-16 RX ADMIN — SODIUM CHLORIDE 8 MG/HR: 9 INJECTION, SOLUTION INTRAVENOUS at 17:08

## 2022-01-16 RX ADMIN — MONTELUKAST SODIUM 10 MG: 10 TABLET, COATED ORAL at 20:30

## 2022-01-16 RX ADMIN — SODIUM CHLORIDE, PRESERVATIVE FREE 10 ML: 5 INJECTION INTRAVENOUS at 20:31

## 2022-01-16 RX ADMIN — ATORVASTATIN CALCIUM 40 MG: 40 TABLET, FILM COATED ORAL at 20:29

## 2022-01-16 RX ADMIN — LEVOTHYROXINE SODIUM 150 MCG: 150 TABLET ORAL at 20:30

## 2022-01-16 RX ADMIN — PIPERACILLIN SODIUM AND TAZOBACTAM SODIUM 4.5 G: 4; .5 INJECTION, POWDER, LYOPHILIZED, FOR SOLUTION INTRAVENOUS at 16:20

## 2022-01-16 RX ADMIN — DOCUSATE SODIUM 200 MG: 100 CAPSULE, LIQUID FILLED ORAL at 20:30

## 2022-01-16 RX ADMIN — LACTULOSE 10 G: 10 SOLUTION ORAL at 20:29

## 2022-01-16 RX ADMIN — VANCOMYCIN HYDROCHLORIDE 1000 MG: 1 INJECTION, POWDER, LYOPHILIZED, FOR SOLUTION INTRAVENOUS at 17:06

## 2022-01-16 RX ADMIN — Medication 5 MG: at 20:30

## 2022-01-16 RX ADMIN — POLYVINYL ALCOHOL, POVIDONE 1 DROP: 14; 6 SOLUTION/ DROPS OPHTHALMIC at 20:29

## 2022-01-16 RX ADMIN — IPRATROPIUM BROMIDE AND ALBUTEROL SULFATE 3 ML: .5; 3 SOLUTION RESPIRATORY (INHALATION) at 14:29

## 2022-01-16 RX ADMIN — ASPIRIN 81 MG: 81 TABLET, COATED ORAL at 17:07

## 2022-01-16 RX ADMIN — IPRATROPIUM BROMIDE AND ALBUTEROL SULFATE 3 ML: .5; 3 SOLUTION RESPIRATORY (INHALATION) at 18:41

## 2022-01-16 RX ADMIN — IOPAMIDOL 55 ML: 612 INJECTION, SOLUTION INTRAVENOUS at 08:26

## 2022-01-16 RX ADMIN — Medication 1 CAPSULE: at 20:29

## 2022-01-16 RX ADMIN — Medication 1 TABLET: at 20:29

## 2022-01-16 RX ADMIN — TRAZODONE HYDROCHLORIDE 50 MG: 50 TABLET ORAL at 20:29

## 2022-01-16 RX ADMIN — PIPERACILLIN SODIUM AND TAZOBACTAM SODIUM 3.38 G: 3; .375 INJECTION, POWDER, LYOPHILIZED, FOR SOLUTION INTRAVENOUS at 07:09

## 2022-01-16 RX ADMIN — DOXYCYCLINE 100 MG: 100 CAPSULE ORAL at 20:29

## 2022-01-16 RX ADMIN — PIPERACILLIN SODIUM AND TAZOBACTAM SODIUM 4.5 G: 4; .5 INJECTION, POWDER, LYOPHILIZED, FOR SOLUTION INTRAVENOUS at 20:30

## 2022-01-16 RX ADMIN — METHYLPREDNISOLONE SODIUM SUCCINATE 40 MG: 40 INJECTION, POWDER, FOR SOLUTION INTRAMUSCULAR; INTRAVENOUS at 17:08

## 2022-01-16 RX ADMIN — CARVEDILOL 3.12 MG: 3.12 TABLET, FILM COATED ORAL at 17:07

## 2022-01-16 RX ADMIN — DRONABINOL 2.5 MG: 2.5 CAPSULE ORAL at 20:30

## 2022-01-16 NOTE — H&P
[facility] HOSPITALIST HISTORY AND PHYSICAL    Patient Identification:  Name:  Mary Ly  Age:  76 y.o.  Sex:  female  :  1945  MRN:  [id number]   Admit Date: 2022   Visit Number:  [id number]  Room number:  P203/S2  Primary Care Physician:  Leta Gardner MD     Subjective     Chief complaint:    Chief Complaint   Patient presents with   • Shortness of Breath       History of presenting illness:   Patient is a 76-year-old female with history significant for CAD status post PCI, COPD on 3 L at baseline and previous ABL due to gastric ulcer/AVMs who presented to the ER from nursing facility after being found minimally responsive and hypoxic.  At the time of my exam, she is lethargic but alert and oriented x3 and answers all questions appropriately.  EMS noted when they arrived to the facility, she was awake and alert and oriented x3.  SPO2 was 96% on baseline 3 L nasal cannula.  Patient denies chest pain, worsening shortness of breath, palpitations or lower extremity edema.  She denies hematemesis, melena or hematochezia.  Denies any fever/chills or other symptomatology.    In the ER, SPO2 94 to 98% on baseline 3 L.  Labs notable for hemoglobin of 5.8, WBC 35K with left shift, CRP 16, PT/INR 16/1.2, troponin 0.057.  2 units of PRBCs were ordered, currently receiving first unit at time of exam.    CT chest noted descending thoracic aortic aneurysm, 4 cm, 8 mm noncalcified pulmonary nodule in the right lower lobe and bibasilar atelectasis/infiltrates.  CT abdomen/pelvis noted nonspecific changes that could possibly be seen with enteritis.    Records reviewed from [facility] noted:  Hospitalization - for GI bleed.  Initial hemoglobin 4.4 received 2 units PRBC.  EGD at that time noted abnormal atrophic mucosa throughout the stomach, few nonbleeding gastric AVMs status post ablation.  Required was restarted on Eliquis and discharged back to facility.    Hospitalization  11/21-11/24.  Initial hemoglobin 4.6, also received 2 units.  EGD noted multiple ulcers in the fundus, multiple ulcers in the body from 4 to 8 mm with 1 visible vessel at its edge requiring epi injection and clipping, superficial gastritis and erythema and edema in the antrum with biopsies taken (results not available).  Eliquis held indefinitely at time of discharge and was referred to  for consideration for watchman.     ---------------------------------------------------------------------------------------------------------------------   Review of Systems   Constitutional: Positive for fatigue. Negative for chills and fever.   HENT: Negative for sneezing and sore throat.    Respiratory: Negative for cough, shortness of breath and wheezing.    Cardiovascular: Negative for chest pain, palpitations and leg swelling.   Gastrointestinal: Negative for abdominal pain, diarrhea, nausea and vomiting.   Genitourinary: Negative for dysuria, hematuria and urgency.   Musculoskeletal: Negative for arthralgias.   Neurological: Negative for syncope and light-headedness.   Hematological: Negative for adenopathy.   Psychiatric/Behavioral: Negative for agitation and confusion.     ---------------------------------------------------------------------------------------------------------------------   Past Medical History:   Diagnosis Date   • AAA (abdominal aortic aneurysm) ([facility])     s/p repair    • Arthritis    • CAD (coronary artery disease)     s/p stenting in the past    • Chronic kidney disease (CKD), stage III (moderate) ([facility])    • COPD (chronic obstructive pulmonary disease) (HCC)    • Debility    • Diastolic CHF, chronic (HCC) 4/23/2021   • GERD (gastroesophageal reflux disease)    • History of CVA (cerebrovascular accident)    • History of ischemic colitis    • Hyperlipidemia 4/23/2021   • Hypertension    • Hypothyroidism    • Pneumonia due to COVID-19 virus 9/21/2021   • Stroke (HCC)    • Type II diabetes mellitus  (HCC)      Past Surgical History:   Procedure Laterality Date   • BACK SURGERY     • CARDIAC CATHETERIZATION     • CHOLECYSTECTOMY N/A 7/17/2020    Procedure: CHOLECYSTECTOMY LAPAROSCOPIC;  Surgeon: Lonnie Meneses MD;  Location: [facility] OR;  Service: General;  Laterality: N/A;   • COLONOSCOPY     • COLONOSCOPY N/A 9/25/2021    Procedure: COLONOSCOPY;  Surgeon: Lonnie Meneses MD;  Location: [facility] OR;  Service: Gastroenterology;  Laterality: N/A;   • CORONARY STENT PLACEMENT     • ENDOSCOPY     • ENDOSCOPY N/A 9/25/2021    Procedure: ESOPHAGOGASTRODUODENOSCOPY;  Surgeon: Lonnie Meneses MD;  Location: [facility] OR;  Service: Gastroenterology;  Laterality: N/A;   • TONSILLECTOMY       Family History   Problem Relation Age of Onset   • Diabetes Mother    • Heart attack Father    • No Known Problems Sister    • Heart attack Brother    • No Known Problems Brother    • No Known Problems Sister    • No Known Problems Sister      Social History     Socioeconomic History   • Marital status:    Tobacco Use   • Smoking status: Current Every Day Smoker     Packs/day: 1.00     Years: 55.00     Pack years: 55.00     Types: Cigarettes   • Smokeless tobacco: Never Used   Vaping Use   • Vaping Use: Never used   Substance and Sexual Activity   • Alcohol use: Never   • Drug use: Never   • Sexual activity: Defer     ---------------------------------------------------------------------------------------------------------------------   Allergies:  Haldol [haloperidol]  ---------------------------------------------------------------------------------------------------------------------   Medications below are reported home medications pulling from within the system; at this time, these medications have not been reconciled unless otherwise specified and are in the verification process for further verifcation as current home medications.    Prior to Admission Medications     Prescriptions Last Dose Informant Patient Reported?  Taking?    amiodarone (PACERONE) 200 MG tablet  Nursing Home Yes No    Take 200 mg by mouth Daily.    bumetanide (BUMEX) 1 MG tablet  Nursing Home Yes No    Take 1 mg by mouth 2 (Two) Times a Day.    Carboxymethylcellulose Sodium (ARTIFICIAL TEARS OP)  Nursing Home Yes No    Administer 1 application to both eyes 2 (Two) Times a Day.    Cariprazine HCl (VRAYLAR) 1.5 MG capsule capsule  Nursing Home Yes No    Take 1.5 mg by mouth Daily.    carvedilol (COREG) 3.125 MG tablet  Nursing Home Yes No    Take 3.125 mg by mouth 2 (Two) Times a Day With Meals.    docusate sodium (COLACE) 250 MG capsule  Nursing Home Yes No    Take 250 mg by mouth Daily.    ferrous sulfate 325 (65 FE) MG tablet   No No    Take 1 tablet by mouth Daily With Breakfast. HOLD FOR 10 DAYS (UNTIL FINISHED WITH H. PYLORI TREATMENT)    ipratropium-albuterol (DUO-NEB) 0.5-2.5 mg/3 ml nebulizer  Nursing Home Yes No    Take 3 mL by nebulization 4 (Four) Times a Day As Needed for Wheezing or Shortness of Air.    Lactobacillus Acid-Pectin (Acidophilus/Pectin) capsule   No No    Take 1 capsule by mouth 2 (two) times a day. HOLD FOR 10 DAYS (UNTIL FINISHED WITH H. PYLORI TREATMENT)    lactulose (CHRONULAC) 10 GM/15ML solution  Nursing Home Yes No    Take 10 g by mouth Daily As Needed.    levothyroxine (SYNTHROID, LEVOTHROID) 125 MCG tablet  Nursing Home Yes No    Take 250 mcg by mouth Daily.    lisinopril (PRINIVIL,ZESTRIL) 5 MG tablet  Nursing Home Yes No    Take 5 mg by mouth Daily.    melatonin 5 MG tablet tablet  Nursing Home Yes No    Take 5 mg by mouth Every Night.    montelukast (SINGULAIR) 10 MG tablet  Nursing Home Yes No    Take 10 mg by mouth Daily.    Multiple Vitamin (MULTIVITAMIN) tablet tablet  Nursing Home Yes No    Take 1 tablet by mouth Every Night.    O2 (OXYGEN)   Yes No    Inhale 3 L/min Daily As Needed.    pantoprazole (PROTONIX) 40 MG EC tablet  Nursing Home Yes No    Take 40 mg by mouth 2 (Two) Times a Day.    polyethylene glycol  (MiraLax) 17 GM/SCOOP powder  Nursing Home Yes No    Take 17 g by mouth 2 (Two) Times a Day.    potassium chloride (K-DUR,KLOR-CON) 20 MEQ CR tablet  Nursing Home Yes No    Take 20 mEq by mouth 2 (Two) Times a Day.    promethazine (PHENERGAN) 12.5 MG tablet  Nursing Home Yes No    Take 12.5 mg by mouth Every 8 (Eight) Hours As Needed for Nausea or Vomiting.    STIOLTO RESPIMAT 2.5-2.5 MCG/ACT aerosol solution inhaler  Nursing Home Yes No    Inhale 2 puffs Daily.        Objective     Vital Signs:  Temp:  [97.1 °F (36.2 °C)-99.9 °F (37.7 °C)] 98.2 °F (36.8 °C)  Heart Rate:  [62-75] 66  Resp:  [15-20] 16  BP: (111-153)/(54-87) 111/54    Mean Arterial Pressure (Non-Invasive) for the past 24 hrs (Last 3 readings):   Noninvasive MAP (mmHg)   01/16/22 1103 86   01/16/22 0959 97   01/16/22 0925 98     SpO2:  [93 %-100 %] 98 %  on  Flow (L/min):  [2-3] 2;   Device (Oxygen Therapy): room air  Body mass index is 16.64 kg/m².    Wt Readings from Last 3 Encounters:   01/16/22 45.4 kg (100 lb)   12/24/21 45.4 kg (100 lb)   11/19/21 45.4 kg (100 lb)      ---------------------------------------------------------------------------------------------------------------------   Physical Exam:  Constitutional: Elderly, cachectic female, appears chronically ill, lethargic, NAD  HENT:  Head: Normocephalic and atraumatic.  Mouth:  Moist mucous membranes.    Eyes:  Conjunctivae and EOM are normal.  No scleral icterus.  Neck:  Neck supple.  No JVD present.    Cardiovascular:  Normal rate, regular rhythm and normal heart sounds with no murmur.  Pulmonary/Chest:  No respiratory distress, no wheezes, no crackles, with normal breath sounds and good air movement.  Abdominal:  Soft.  Bowel sounds are normal.  No distension and no tenderness.   Musculoskeletal:  No tenderness, and no deformity.  No red or swollen joints anywhere.    Neurological:  Alert and oriented to person, place, and time.  No cranial nerve deficit.  No tongue deviation.  No  facial droop.  No slurred speech.   Skin:  Skin is warm and dry.  No rash noted.  No pallor.   Peripheral vascular:  No edema and pulses on all 4 extremities.  ---------------------------------------------------------------------------------------------------------------------  EKG: Normal sinus rhythm, rate 69, QTc 490, mild T wave inversions in lead I and aVL which are less pronounced when compared to prior  ECG 12 Lead             Telemetry: reviewed    I have personally looked at both the EKG and the telemetry strips.    Last echocardiogram:  Results for orders placed during the hospital encounter of 04/22/21    Adult Transthoracic Echo Complete W/ Cont if Necessary Per Protocol    Interpretation Summary  · Normal left ventricular cavity size noted. Left ventricular wall thickness is consistent with mild concentric hypertrophy  · Left ventricular ejection fraction appears to be 61 - 65%.  · Left ventricular diastolic function is consistent with (grade I) impaired relaxation.  · There is mild calcification of the aortic valvular cusps.  · No aortic valve regurgitation is present. Mild aortic valve stenosis is present.  · There are myxomatous changes of the mitral valve apparatus present. Mild mitral valve regurgitation is present. No significant mitral valve stenosis is present.  · . There is no evidence of pericardial effusion.    --------------------------------------------------------------------------------------------------------------------  Labs:  Results from last 7 days   Lab Units 01/16/22  0525 01/16/22  0449   PROCALCITONIN ng/mL 0.22  --    LACTATE mmol/L 1.0  --    CRP mg/dL 16.33*  --    WBC 10*3/mm3  --  35.31*   HEMOGLOBIN g/dL  --  5.8*   HEMATOCRIT %  --  19.2*   MCV fL  --  100.5*   MCHC g/dL  --  30.2*   PLATELETS 10*3/mm3  --  391   INR  1.28*  --      Results from last 7 days   Lab Units 01/16/22  1127   PH, ARTERIAL pH units 7.383   PO2 ART mm Hg 66.8*   PCO2, ARTERIAL mm Hg 62.0*   HCO3  ART mmol/L 36.9*     Results from last 7 days   Lab Units 01/16/22  0449   SODIUM mmol/L 136   POTASSIUM mmol/L 4.2   CHLORIDE mmol/L 93*   CO2 mmol/L 30.2*   BUN mg/dL 21   CREATININE mg/dL 0.65   EGFR IF NONAFRICN AM mL/min/1.73 89   CALCIUM mg/dL 8.6   GLUCOSE mg/dL 94   ALBUMIN g/dL 2.79*   BILIRUBIN mg/dL <0.2   ALK PHOS U/L 88   AST (SGOT) U/L 43*   ALT (SGPT) U/L 38*   Estimated Creatinine Clearance: 42.9 mL/min (by C-G formula based on SCr of 0.65 mg/dL).    No results found for: AMMONIA  Results from last 7 days   Lab Units 01/16/22  1050 01/16/22  0525   TROPONIN T ng/mL 0.062* 0.057*   PROBNP pg/mL  --  1,158.0         No results found for: HGBA1C, POCGLU  Lab Results   Component Value Date    TSH 79.240 (H) 11/19/2021    FREET4 0.24 (L) 10/02/2021     No results found for: PREGTESTUR, PREGSERUM, HCG, HCGQUANT  Pain Management Panel     Pain Management Panel Latest Ref Rng & Units 9/21/2021 8/13/2020    AMPHETAMINES SCREEN, URINE Negative Negative Negative    BARBITURATES SCREEN Negative Negative Negative    BENZODIAZEPINE SCREEN, URINE Negative Negative Negative    BUPRENORPHINEUR Negative Negative Negative    COCAINE SCREEN, URINE Negative Negative Negative    METHADONE SCREEN, URINE Negative Negative Negative    METHAMPHETAMINEUR Negative Negative -        Brief Urine Lab Results  (Last result in the past 365 days)      Color   Clarity   Blood   Leuk Est   Nitrite   Protein   CREAT   Urine HCG        12/24/21 1530 Yellow   Clear   Negative   Moderate (2+)   Negative   Negative               No results found for: BLOODCX  No results found for: URINECX  No results found for: WOUNDCX  No results found for: STOOLCX    I have personally looked at the labs and they are summarized above.  ----------------------------------------------------------------------------------------------------------------------  Detailed radiology reports for the last 24 hours:    Imaging Results (Last 24 Hours)     Procedure  Component Value Units Date/Time    CT Abdomen Pelvis With Contrast [[id number]] Collected: 01/16/22 0843     Updated: 01/16/22 0845    Narrative:      CT Abdomen Pelvis W    INDICATION:   Acute onset of abdominal pain    TECHNIQUE:   CT of the abdomen and pelvis with contrast. Coronal and sagittal reconstructions were obtained.  Radiation dose reduction techniques included automated exposure control or exposure modulation based on body size. Radiation audit for number of CT and  nuclear cardiology exams performed in the last year: 14.      COMPARISON:   December 24, 2021    FINDINGS:    Abdomen: The liver shows no focal hepatic lesions. The gallbladder is surgically absent. The kidneys show normal enhancement. No evidence of hydronephrosis. The spleen appears unremarkable. There is prominent pancreatic atrophy. No adrenal abnormalities.  No threshold retroperitoneal adenopathy. Prominent atherosclerotic changes of the abdominal aorta with evidence of an aortic stent graft.    Pelvis: The bowel pattern is nonobstructive. There is enhancement of the bowel wall of both small bowel and colon. This finding is nonspecific but can be seen in enteritis. Prominent amount of colon gas in the distal colon. No evidence of free air. No  free fluid.    Imaging of the pelvis is obscured by beam hardening artifact from the patient's left hip arthroplasty. Bony structures are diffusely demineralized. Postsurgical changes noted in the lower lumbar spine.      Impression:        1.  There is enhancement of the bowel wall of both colon and small bowel. This is nonspecific but can be seen in enteritis.    2.  No evidence of free air or free fluid. Comparison with the study of 12/24/2021 shows similar findings.          Signer Name: Seb Hart MD   Signed: 1/16/2022 8:43 AM   Workstation Name: [email or web address]    [facility]    CT Chest Without Contrast Diagnostic [[id number]] Collected: 01/16/22 0558     Updated:  01/16/22 0600    Narrative:      CT Chest WO    INDICATION:   S pain and shortness of breath.    TECHNIQUE:   CT of the chest without IV contrast. Coronal and sagittal reformatted images. Radiation dose reduction techniques included automated exposure control or exposure modulation based on body size. Count of known CT and cardiac nuc med studies performed in  previous 12 months: 14.     COMPARISON:   CTA chest 12/24/2021    FINDINGS:   4 cm aneurysm of the descending thoracic aorta with extensive atherosclerotic calcification throughout the thoracic aorta. Mild cardiomegaly. No pericardial effusion. Coronary artery calcifications.    No pleural effusion. No pneumothorax. Bibasilar atelectasis/infiltrate. 8 mm noncalcified pulmonary nodule in the lateral right lower lobe. Emphysema.    Visualized upper abdomen is unremarkable. 3 cm suprarenal abdominal aortic aneurysm. Partially imaged aortobiiliac stent graft.    No acute osseous abnormality.      Impression:        1. 4 cm aneurysm of the descending thoracic aorta.  2. Mild cardiomegaly.  3. Bibasilar atelectasis/infiltrate.  4. 8mm noncalcified pulmonary nodule in the right lower lobe. Management recommendation: Consider short interval follow-up CT in 3 months, or PET/CT imaging, for pulmonary nodules greater than 8 mm in high risk patients based on current published  guidelines (Fleischner Society, 2017).  5. Emphysema.  6. 3 cm suprarenal abdominal aortic aneurysm and partially imaged aortobiiliac stent graft.    Signer Name: Guillermo Hart MD   Signed: 1/16/2022 5:58 AM   Workstation [facility]: [email or web address]    [facility]    XR Chest 1 View [[id number]] Collected: 01/16/22 0541     Updated: 01/16/22 0543    Narrative:      CR Chest 1 Vw    INDICATION:   Shortness of air.     COMPARISON:    Chest 12/24/2021    FINDINGS:  Portable AP view(s) of the chest.  Borderline heart size. Mediastinal contours are normal. The lungs are clear. No  pneumothorax or pleural effusion.      Impression:      No acute cardiopulmonary findings.    Signer Name: Guillermo Hart MD   Signed: 1/16/2022 5:41 AM   Workstation Name: [facility]    [facility]        Final impressions for the last 30 days of radiology reports:    CT Chest Without Contrast Diagnostic    Result Date: 1/16/2022  1. 4 cm aneurysm of the descending thoracic aorta. 2. Mild cardiomegaly. 3. Bibasilar atelectasis/infiltrate. 4. 8mm noncalcified pulmonary nodule in the right lower lobe. Management recommendation: Consider short interval follow-up CT in 3 months, or PET/CT imaging, for pulmonary nodules greater than 8 mm in high risk patients based on current published guidelines (Fleischner Society, 2017). 5. Emphysema. 6. 3 cm suprarenal abdominal aortic aneurysm and partially imaged aortobiiliac stent graft. Signer Name: Guillermo Hart MD  Signed: 1/16/2022 5:58 AM  Workstation Name: [facility]  [facility]    CT Abdomen Pelvis With Contrast    Result Date: 1/16/2022  1.  There is enhancement of the bowel wall of both colon and small bowel. This is nonspecific but can be seen in enteritis. 2.  No evidence of free air or free fluid. Comparison with the study of 12/24/2021 shows similar findings. Signer Name: Seb Hart MD  Signed: 1/16/2022 8:43 AM  Workstation Name: [email or web address]  [facility]    CT Abdomen Pelvis With Contrast    Result Date: 12/24/2021  1. Interval development of marked distention of the stomach with air fluid debris levels. Please correlate for clinical evidence of gastric outlet obstruction or gastroparesis. 2. Markedly redundant colon with a large colonic stool burden to the level the rectum. Please correlate for clinical evidence of impaction. This is also worse on comparison to prior. 3. Nothing to suggest small bowel obstruction free air or drainable fluid collection. 4. Postcholecystectomy with  likely postoperative biliary ductal dilatation. 5. Chronic lung disease with left greater the right base atelectasis. Signer Name: Pushpa Reina MD  Signed: 12/24/2021 4:15 PM  Workstation Name: [email or web address]  [facility]    XR Chest 1 View    Result Date: 1/16/2022  No acute cardiopulmonary findings. Signer Name: Guillermo Hart MD  Signed: 1/16/2022 5:41 AM  Workstation Name: [email or web address]  [facility]    XR Chest 1 View    Result Date: 12/24/2021   1. No acute pulmonary pathology identified  This report was finalized on 12/24/2021 1:20 PM by Dr. River Zaidi MD.      CT Chest Pulmonary Embolism    Result Date: 12/24/2021  1. No evidence of a pulmonary embolus 2. Parenchymal nodule in the right middle lobe measuring approximately 1 cm. Follow-up suggested.  This report was finalized on 12/24/2021 2:51 PM by Dr. River Zaidi MD.      I have personally looked at the radiology images and read the final radiology report.    Assessment & Plan      Patient is a 76-year-old female with history significant for CAD status post PCI, COPD on 3 L at baseline and previous ABL due to gastric ulcer/AVMs who presented to the ER from nursing facility after being found minimally responsive and hypoxic.    #Acute on chronic blood loss anemia d/t UGIB  #Recent PUD w/ active vessel s/p clip  #History of gastric ulcers and AVMs  --initial hemoglobin 5.8 on admission.  No reported melena, hematemesis or hematochezia.  Records requested from [facility] noted notable hospitalizations for anemia requiring multiple units PRBCs and repeat EGDs.   --EGD 11/4-11/12: abnormal atrophic mucosa throughout the stomach, few nonbleeding gastric AVMs status post ablation.  Required was restarted on Eliquis and discharged back to facility.  -- EGD 11/21-11/24 noted multiple ulcers in the fundus, multiple ulcers in the body from 4 to 8 mm with 1 visible vessel at its edge requiring epi injection and  clipping, superficial gastritis and erythema and edema in the antrum with biopsies taken (results not available). Eliquis held indefinitely at time of discharge and was referred to  for consideration for watchman-d/c'd on bid ppi.   --Type and screen, currently receiving 2 units PRBC, repeat H&H status post second unit  --trend H/H, transfusion goal>8 in the setting of known CAD  --start protonix gtt  --General surgery consulted d/t ABL w/ hx of gastric AVMs and active vessel bleeding    #Sepsis due to community-acquired pneumonia with MDRO factors  #Chronic respiratory acidosis with metabolic alkalosis  --Patient presented after reportedly being found minimally responsive at nursing facility and hypoxia on baseline 3 L nasal cannula.  EMS noted SPO2 96% on baseline when they arrive.  She has been stable on baseline supplemental oxygen since admission.  --ABG 7.38/62/66, approximately at baseline PCO2 and compensated as evident by serum bicarbonate of 30  --Chest CT with bibasilar atelectasis/infiltrates  --Blood cultures x2 ordered, sputum culture, MRSA nasal swab  --will ask speech to see regarding possible aspiration  --Unclear given her presentation if she has a true bacterial pneumonia as she is not the best historian despite being alert and oriented x3.  She has significant leukocytosis with left shift therefore will empirically treat with antibiotics  --add solumedrol daily, duonebs, mucolytic's  --Continue vancomycin, Zosyn, de-escalate pending culture data    #Metabolic encephalopathy, mulitfactorial  -- Differential is broad, possibly related to underlying pulmonary infection as noted above.  Also has a history of hypothyroidism with most recent TSH in November greater than 79, will repeat thyroid studies, rule out myxedema    #Paroxysmal atrial fibrillation  #Sinus bradycarida  --chart review noted issues with sinus bradycardia, had previously been on po amio and coreg which had been d/c'd at  most recent d/c for [facility]  --continue to hold anticoagulation indefinitely, given recurrent bleeding (stopped at last d/c in Nov 24), has been recommended to follow-up outpatient with cardiology for consideration for watchman device on discharge in November from [facility]    #Elevated troponin  #CAD s/p PCI  #Mild aortic stenosis  --No complaints of chest pain or shortness of breath.  EKG nonischemic.  Initial troponin 0.057, repeat 0.062.  Most recent PCI greater than 10 years ago.  Does not take daily aspirin or Plavix.  --Most likely demand related ischemia due to severe anemia as noted above.  --Echo in November 2021 at [facility]: EF 55%, mild diastolic dysfunction, mild AS  --Echocardiogram ordered evaluate systolic and diastolic function  --resume home beta-blocker, add aspirin/statin    #COPD, not in acute exacerbation  #Chronic respiratory failure, on 3 L baseline  --hemodynamically stable and on baseline 3 L nasal cannula, as needed DuoNebs    #History of refractory hypertension status post Rheos device, placed in 2008  #Type 2 diabetes mellitus, check A1c, SSI, add basal and adjust as needed  #Hypothyroidism, most recent TSH >79, repeat TSH/free T4  #History of AAA repair  #History of ischemic stroke, residual lower extremity weakness, mostly bedbound PTA  #History of ischemic colitis  #History of rectal prolapse  #Bipolar disorder  #Tobacco use disorder  #Severe protein calorie malnutrition, BMI 16, nutrition consulted    CC 36  [facility] CCU  Checklist:  Antibiotics: Vancomycin, Zosyn  Steroids: Solu-Medrol  DVT ppx: SCDs due to ABL  GI ppx: Protonix gtt.  Diet: Clear  Code: No CPR, limited  Risk/dispo: Patient is a high risk due to acute metabolic encephalopathy, sepsis due to CAP with MDRO risk factors and elevated troponins.  Anticipate greater than 2 midnight stay.  Disposition expected back to nursing facility when stable.    Lester Estrada,   [facility]  01/16/22  13:42  EST

## 2022-01-16 NOTE — PROGRESS NOTES
Pharmacokinetics Service Note:    Ms. Ly has been evaluated for vancomycin dosing to treat pneumonia.  Based on current demographics and estimated ClCr of 43 mL/min, will dose at 1000 mg Q24H to target an AUC of 400-600 mg/L.hr. Will monitor levels as appropriate to determine if any adjustments are needed. Pharmacy will continue to follow.    Thank you,  Kayleen Gomez, PharmD  01/16/22  14:29 EST

## 2022-01-16 NOTE — ED PROVIDER NOTES
Subjective   76-year-old female with a past medical history of AAA, COPD, hypertension, hyperlipidemia, and CHF presented to the ER from a local nursing home after being found minimally responsive and decreased O2 saturations noted by the nursing home staff.  When EMS arrived to the scene, patient was awake, alert, oriented x3.  O2 saturations were 96% on patient's home oxygen by nasal cannula.  On arrival to the ER, patient denied any significant chest pain or shortness of breath.  Denied headache or vision changes.  Denied abdominal pain.  Denied changes in bowel or urinary habits.  Patient noted increasing and serious fatigue.  No obvious fever or chills.  No focal deficits.  Vitals stable          Review of Systems   Constitutional: Positive for fatigue.   Musculoskeletal: Positive for myalgias.   All other systems reviewed and are negative.      Past Medical History:   Diagnosis Date   • AAA (abdominal aortic aneurysm) (HCC)     s/p repair    • Arthritis    • CAD (coronary artery disease)     s/p stenting in the past    • Chronic kidney disease (CKD), stage III (moderate) ([facility])    • COPD (chronic obstructive pulmonary disease) ([facility])    • Debility    • Diastolic CHF, chronic ([facility]) 4/23/2021   • GERD (gastroesophageal reflux disease)    • History of CVA (cerebrovascular accident)    • History of ischemic colitis    • Hyperlipidemia 4/23/2021   • Hypertension    • Hypothyroidism    • Pneumonia due to COVID-19 virus 9/21/2021   • Stroke ([facility])    • Type II diabetes mellitus ([facility])        Allergies   Allergen Reactions   • Haldol [Haloperidol] Hallucinations       Past Surgical History:   Procedure Laterality Date   • BACK SURGERY     • CARDIAC CATHETERIZATION     • CHOLECYSTECTOMY N/A 7/17/2020    Procedure: CHOLECYSTECTOMY LAPAROSCOPIC;  Surgeon: Lonnie Meneses MD;  Location: [facility];  Service: General;  Laterality: N/A;   • COLONOSCOPY     • COLONOSCOPY N/A 9/25/2021    Procedure: COLONOSCOPY;  Surgeon: Gideon  Lonnie CALLAHAN MD;  Location: [facility] OR;  Service: Gastroenterology;  Laterality: N/A;   • CORONARY STENT PLACEMENT     • ENDOSCOPY     • ENDOSCOPY N/A 9/25/2021    Procedure: ESOPHAGOGASTRODUODENOSCOPY;  Surgeon: Lonnie Meneses MD;  Location: [facility] OR;  Service: Gastroenterology;  Laterality: N/A;   • TONSILLECTOMY         Family History   Problem Relation Age of Onset   • Diabetes Mother    • Heart attack Father    • No Known Problems Sister    • Heart attack Brother    • No Known Problems Brother    • No Known Problems Sister    • No Known Problems Sister        Social History     Socioeconomic History   • Marital status:    Tobacco Use   • Smoking status: Current Every Day Smoker     Packs/day: 1.00     Years: 55.00     Pack years: 55.00     Types: Cigarettes   • Smokeless tobacco: Never Used   Vaping Use   • Vaping Use: Never used   Substance and Sexual Activity   • Alcohol use: Never   • Drug use: Never   • Sexual activity: Defer           Objective   Physical Exam  Constitutional:       General: She is not in acute distress.     Appearance: Normal appearance. She is not ill-appearing.   HENT:      Head: Normocephalic and atraumatic.      Right Ear: External ear normal.      Left Ear: External ear normal.      Nose: Nose normal.      Mouth/Throat:      Mouth: Mucous membranes are moist.   Eyes:      Extraocular Movements: Extraocular movements intact.      Pupils: Pupils are equal, round, and reactive to light.   Cardiovascular:      Rate and Rhythm: Normal rate and regular rhythm.      Heart sounds: No murmur heard.      Pulmonary:      Effort: Pulmonary effort is normal. No respiratory distress.      Breath sounds: Examination of the right-middle field reveals decreased breath sounds. Examination of the left-middle field reveals decreased breath sounds. Examination of the right-lower field reveals decreased breath sounds. Examination of the left-lower field reveals decreased breath sounds. Decreased  breath sounds present. No wheezing.   Abdominal:      General: Bowel sounds are normal.      Palpations: Abdomen is soft.      Tenderness: There is no abdominal tenderness.   Musculoskeletal:         General: No deformity or signs of injury. Normal range of motion.      Cervical back: Normal range of motion and neck supple.   Skin:     General: Skin is warm and dry.      Findings: No erythema.   Neurological:      General: No focal deficit present.      Mental Status: She is alert and oriented to person, place, and time. Mental status is at baseline.      Cranial Nerves: No cranial nerve deficit.   Psychiatric:         Mood and Affect: Mood normal.         Behavior: Behavior normal.         Thought Content: Thought content normal.         Procedures           ED Course  ED Course as of 01/16/22 0945   Sun Jan 16, 2022   0408 EKG noted sinus rhythm.  69 bpm.  .  QRS 76.  QTc 490.  No acute ST elevation. [SF]   0626 CBC noted significant leukocytosis.  It also noted significant anemia.  CCP unremarkable.  Coagulation studies unremarkable.  COVID testing pending.  Type and screen collected.  Lactic acid within normal limits.  Patient will be transfused with 2 units of packed red blood cells and was started on broad-spectrum antibiotics.  Chest x-ray unremarkable.  CT chest without contrast noted concerns for bilateral pneumonia versus atelectasis.  Sepsis work-up pending.  I discussed admission with the hospitalist team and they requested records from this patient's recent transfer to another facility/[facility].  They also requested CT the abdomen pelvis.  Admission plan pending. [SF]   0627 Care of this patient was transferred to the next attending physician at shift change.  Complete discussion of presentation, labs, imaging, care, and expected course occurred during transition of providers.  Vitals stable at transfer of care. [SF]   0640 Care endorsed to Dr. Hill [SF]   0710 WBC(!!): 35.31 [KP]   0710  Hemoglobin(!!): 5.8 [KP]   0710 Creatinine: 0.65 [KP]   0710 CO2(!): 30.2 [KP]   0723 COVID19: Not Detected [KP]   0723 Lactate: 1.0 [KP]   0723 Creatinine: 0.65 [KP]   0727 Patient reassessed, no complaints at this time. Remains hemodynamically stable saturating 97% on 3 L nasal cannula. Given COPD and elevated bicarb, down titrated oxygen to 2 L NC. Admission pending CT abdomen pelvis, outside records. [KP]   0735 Troponin T(!!): 0.057 [KP]   0821 Physician ultrasound guidance vascular access. IV placed in left arm.  Area cleansed with chlorhexidine soap. Vein visualized by ultrasound with easy compressibility and lack of pulsation. Placed under ultrasound guidance with intraluminal visualization.  Easy blood return, no resistance on forward flush.  Placement successful on 2nd attempt.  Sterile dressing applied. Patient tolerated procedure well.    On first attempt, pt moved, IV went through back wall resulting in extravasation after withdrawal through back wall. Pressure applied for 4 minutes with hemostasis achieved.   [KP]   0930 C-Reactive Protein(!): 16.33 [KP]   0930 proBNP: 1,158.0 [KP]   0930 CT Abdomen Pelvis With Contrast  IMPRESSION:     1.  There is enhancement of the bowel wall of both colon and small bowel. This is nonspecific but can be seen in enteritis.     2.  No evidence of free air or free fluid. Comparison with the study of 12/24/2021 shows similar findings. [KP]   0945 Admit to hospitalist Dr. Estrada. [KP]      ED Course User Index  [KP] Arturo Hill MD  [SF] Armen George DO                                                 MDM  Number of Diagnoses or Management Options  Anemia, unspecified type: new and requires workup  Elevated troponin: new and requires workup  Pneumonia of both lungs due to infectious organism, unspecified part of lung: new and requires workup  Sepsis, due to unspecified organism, unspecified whether acute organ dysfunction present (HCC): new and requires workup      Amount and/or Complexity of Data Reviewed  Clinical lab tests: reviewed  Tests in the radiology section of CPT®: reviewed  Tests in the medicine section of CPT®: reviewed  Decide to obtain previous medical records or to obtain history from someone other than the patient: yes        Final diagnoses:   Anemia, unspecified type   Pneumonia of both lungs due to infectious organism, unspecified part of lung   Sepsis, due to unspecified organism, unspecified whether acute organ dysfunction present (HCC)   Elevated troponin       ED Disposition  ED Disposition     ED Disposition Condition Comment    Decision to Admit            No follow-up provider specified.       Medication List      No changes were made to your prescriptions during this visit.          Arturo Hill MD  01/16/22 0981

## 2022-01-16 NOTE — ED NOTES
Failed IV attempts X3 by MANJINDER Grady and failed IV attempts X2 by MANJINDER Fleming. Provider aware.      Reta Brar RN  01/16/22 0678

## 2022-01-16 NOTE — PLAN OF CARE
Goal Outcome Evaluation:  Plan of Care Reviewed With: patient        Progress: no change  Outcome Summary: Pt arrived to floor from ED with one unit of blood infusing, shortly after finishing pt received the second unit of blood. Pt now on protonix gtt, as well as abx coverage. Pt denies any pain, nausea, or shortness of air. Pt remains on 2L which she was on in the ED. Pt remains in SR with occasional PVCs, no further issues noted, no source of bleeding noted.

## 2022-01-16 NOTE — ED NOTES
Multiple failed IV attempted, will have another RN attempt to obtain IV.     Reta Brar, RN  01/16/22 0672

## 2022-01-17 LAB
ANION GAP SERPL CALCULATED.3IONS-SCNC: 14.6 MMOL/L (ref 5–15)
ANISOCYTOSIS BLD QL: ABNORMAL
BH BB BLOOD EXPIRATION DATE: NORMAL
BH BB BLOOD EXPIRATION DATE: NORMAL
BH BB BLOOD TYPE BARCODE: 7300
BH BB BLOOD TYPE BARCODE: 7300
BH BB DISPENSE STATUS: NORMAL
BH BB DISPENSE STATUS: NORMAL
BH BB PRODUCT CODE: NORMAL
BH BB PRODUCT CODE: NORMAL
BH BB UNIT NUMBER: NORMAL
BH BB UNIT NUMBER: NORMAL
BUN SERPL-MCNC: 24 MG/DL (ref 8–23)
BUN/CREAT SERPL: 31.6 (ref 7–25)
CALCIUM SPEC-SCNC: 8.3 MG/DL (ref 8.6–10.5)
CHLORIDE SERPL-SCNC: 91 MMOL/L (ref 98–107)
CO2 SERPL-SCNC: 28.4 MMOL/L (ref 22–29)
CREAT SERPL-MCNC: 0.76 MG/DL (ref 0.57–1)
CROSSMATCH INTERPRETATION: NORMAL
CROSSMATCH INTERPRETATION: NORMAL
DEPRECATED RDW RBC AUTO: 66.4 FL (ref 37–54)
EOSINOPHIL # BLD MANUAL: 0.35 10*3/MM3 (ref 0–0.4)
EOSINOPHIL NFR BLD MANUAL: 1 % (ref 0.3–6.2)
ERYTHROCYTE [DISTWIDTH] IN BLOOD BY AUTOMATED COUNT: 19.2 % (ref 12.3–15.4)
FOLATE SERPL-MCNC: >20 NG/ML (ref 4.78–24.2)
GFR SERPL CREATININE-BSD FRML MDRD: 74 ML/MIN/1.73
GLUCOSE BLDC GLUCOMTR-MCNC: 114 MG/DL (ref 70–130)
GLUCOSE BLDC GLUCOMTR-MCNC: 116 MG/DL (ref 70–130)
GLUCOSE BLDC GLUCOMTR-MCNC: 181 MG/DL (ref 70–130)
GLUCOSE BLDC GLUCOMTR-MCNC: 216 MG/DL (ref 70–130)
GLUCOSE SERPL-MCNC: 113 MG/DL (ref 65–99)
HCT VFR BLD AUTO: 27.3 % (ref 34–46.6)
HGB BLD-MCNC: 8.7 G/DL (ref 12–15.9)
HYPOCHROMIA BLD QL: ABNORMAL
LYMPHOCYTES # BLD MANUAL: 1.41 10*3/MM3 (ref 0.7–3.1)
LYMPHOCYTES NFR BLD MANUAL: 4 % (ref 5–12)
MACROCYTES BLD QL SMEAR: ABNORMAL
MCH RBC QN AUTO: 30.5 PG (ref 26.6–33)
MCHC RBC AUTO-ENTMCNC: 31.9 G/DL (ref 31.5–35.7)
MCV RBC AUTO: 95.8 FL (ref 79–97)
MONOCYTES # BLD: 1.41 10*3/MM3 (ref 0.1–0.9)
MRSA DNA SPEC QL NAA+PROBE: NEGATIVE
NEUTROPHILS # BLD AUTO: 32.17 10*3/MM3 (ref 1.7–7)
NEUTROPHILS NFR BLD MANUAL: 74 % (ref 42.7–76)
NEUTS BAND NFR BLD MANUAL: 17 % (ref 0–5)
PLAT MORPH BLD: NORMAL
PLATELET # BLD AUTO: 313 10*3/MM3 (ref 140–450)
PMV BLD AUTO: 10 FL (ref 6–12)
POLYCHROMASIA BLD QL SMEAR: ABNORMAL
POTASSIUM SERPL-SCNC: 4 MMOL/L (ref 3.5–5.2)
RBC # BLD AUTO: 2.85 10*6/MM3 (ref 3.77–5.28)
S AUREUS DNA SPEC QL NAA+PROBE: NEGATIVE
SODIUM SERPL-SCNC: 134 MMOL/L (ref 136–145)
UNIT  ABO: NORMAL
UNIT  ABO: NORMAL
UNIT  RH: NORMAL
UNIT  RH: NORMAL
VARIANT LYMPHS NFR BLD MANUAL: 4 % (ref 19.6–45.3)
VIT B12 BLD-MCNC: 992 PG/ML (ref 211–946)
WBC NRBC COR # BLD: 35.35 10*3/MM3 (ref 3.4–10.8)

## 2022-01-17 PROCEDURE — 85025 COMPLETE CBC W/AUTO DIFF WBC: CPT | Performed by: STUDENT IN AN ORGANIZED HEALTH CARE EDUCATION/TRAINING PROGRAM

## 2022-01-17 PROCEDURE — 87641 MR-STAPH DNA AMP PROBE: CPT | Performed by: STUDENT IN AN ORGANIZED HEALTH CARE EDUCATION/TRAINING PROGRAM

## 2022-01-17 PROCEDURE — 80048 BASIC METABOLIC PNL TOTAL CA: CPT | Performed by: STUDENT IN AN ORGANIZED HEALTH CARE EDUCATION/TRAINING PROGRAM

## 2022-01-17 PROCEDURE — 25010000002 ONDANSETRON PER 1 MG: Performed by: STUDENT IN AN ORGANIZED HEALTH CARE EDUCATION/TRAINING PROGRAM

## 2022-01-17 PROCEDURE — 99222 1ST HOSP IP/OBS MODERATE 55: CPT | Performed by: SURGERY

## 2022-01-17 PROCEDURE — 82962 GLUCOSE BLOOD TEST: CPT

## 2022-01-17 PROCEDURE — 63710000001 ONDANSETRON PER 8 MG: Performed by: STUDENT IN AN ORGANIZED HEALTH CARE EDUCATION/TRAINING PROGRAM

## 2022-01-17 PROCEDURE — 99233 SBSQ HOSP IP/OBS HIGH 50: CPT | Performed by: INTERNAL MEDICINE

## 2022-01-17 PROCEDURE — 94640 AIRWAY INHALATION TREATMENT: CPT

## 2022-01-17 PROCEDURE — 25010000002 VANCOMYCIN PER 500 MG: Performed by: STUDENT IN AN ORGANIZED HEALTH CARE EDUCATION/TRAINING PROGRAM

## 2022-01-17 PROCEDURE — 87640 STAPH A DNA AMP PROBE: CPT | Performed by: STUDENT IN AN ORGANIZED HEALTH CARE EDUCATION/TRAINING PROGRAM

## 2022-01-17 PROCEDURE — 25010000002 PIPERACILLIN SOD-TAZOBACTAM PER 1 G: Performed by: STUDENT IN AN ORGANIZED HEALTH CARE EDUCATION/TRAINING PROGRAM

## 2022-01-17 PROCEDURE — 82607 VITAMIN B-12: CPT | Performed by: STUDENT IN AN ORGANIZED HEALTH CARE EDUCATION/TRAINING PROGRAM

## 2022-01-17 PROCEDURE — 63710000001 DRONABINOL PER 2.5 MG: Performed by: STUDENT IN AN ORGANIZED HEALTH CARE EDUCATION/TRAINING PROGRAM

## 2022-01-17 PROCEDURE — 25010000002 METHYLPREDNISOLONE PER 40 MG: Performed by: STUDENT IN AN ORGANIZED HEALTH CARE EDUCATION/TRAINING PROGRAM

## 2022-01-17 PROCEDURE — 94799 UNLISTED PULMONARY SVC/PX: CPT

## 2022-01-17 PROCEDURE — 85007 BL SMEAR W/DIFF WBC COUNT: CPT | Performed by: STUDENT IN AN ORGANIZED HEALTH CARE EDUCATION/TRAINING PROGRAM

## 2022-01-17 RX ORDER — SODIUM CHLORIDE 0.9 % (FLUSH) 0.9 %
10 SYRINGE (ML) INJECTION EVERY 12 HOURS SCHEDULED
Status: DISCONTINUED | OUTPATIENT
Start: 2022-01-17 | End: 2022-01-20 | Stop reason: HOSPADM

## 2022-01-17 RX ORDER — SODIUM CHLORIDE 0.9 % (FLUSH) 0.9 %
10 SYRINGE (ML) INJECTION AS NEEDED
Status: DISCONTINUED | OUTPATIENT
Start: 2022-01-17 | End: 2022-01-20 | Stop reason: HOSPADM

## 2022-01-17 RX ORDER — POLYETHYLENE GLYCOL 3350 17 G/17G
17 POWDER, FOR SOLUTION ORAL 2 TIMES DAILY
Status: DISCONTINUED | OUTPATIENT
Start: 2022-01-17 | End: 2022-01-20 | Stop reason: HOSPADM

## 2022-01-17 RX ORDER — PROCHLORPERAZINE EDISYLATE 5 MG/ML
2.5 INJECTION INTRAMUSCULAR; INTRAVENOUS EVERY 6 HOURS PRN
Status: DISCONTINUED | OUTPATIENT
Start: 2022-01-17 | End: 2022-01-20 | Stop reason: HOSPADM

## 2022-01-17 RX ADMIN — PIPERACILLIN SODIUM AND TAZOBACTAM SODIUM 4.5 G: 4; .5 INJECTION, POWDER, LYOPHILIZED, FOR SOLUTION INTRAVENOUS at 19:57

## 2022-01-17 RX ADMIN — IPRATROPIUM BROMIDE AND ALBUTEROL SULFATE 3 ML: .5; 3 SOLUTION RESPIRATORY (INHALATION) at 06:18

## 2022-01-17 RX ADMIN — ATORVASTATIN CALCIUM 40 MG: 40 TABLET, FILM COATED ORAL at 20:23

## 2022-01-17 RX ADMIN — GUAIFENESIN 1200 MG: 600 TABLET, EXTENDED RELEASE ORAL at 20:23

## 2022-01-17 RX ADMIN — SODIUM CHLORIDE, PRESERVATIVE FREE 10 ML: 5 INJECTION INTRAVENOUS at 20:25

## 2022-01-17 RX ADMIN — HYDROCODONE BITARTRATE AND ACETAMINOPHEN 1 TABLET: 5; 325 TABLET ORAL at 09:43

## 2022-01-17 RX ADMIN — Medication 1 TABLET: at 20:23

## 2022-01-17 RX ADMIN — DOCUSATE SODIUM 200 MG: 100 CAPSULE, LIQUID FILLED ORAL at 09:29

## 2022-01-17 RX ADMIN — FERROUS SULFATE TAB 325 MG (65 MG ELEMENTAL FE) 325 MG: 325 (65 FE) TAB at 09:28

## 2022-01-17 RX ADMIN — ONDANSETRON 4 MG: 2 INJECTION INTRAMUSCULAR; INTRAVENOUS at 12:10

## 2022-01-17 RX ADMIN — LACTULOSE 10 G: 10 SOLUTION ORAL at 09:29

## 2022-01-17 RX ADMIN — IPRATROPIUM BROMIDE AND ALBUTEROL SULFATE 3 ML: .5; 3 SOLUTION RESPIRATORY (INHALATION) at 18:37

## 2022-01-17 RX ADMIN — SODIUM CHLORIDE 8 MG/HR: 9 INJECTION, SOLUTION INTRAVENOUS at 06:17

## 2022-01-17 RX ADMIN — POLYETHYLENE GLYCOL (3350) 17 G: 17 POWDER, FOR SOLUTION ORAL at 23:35

## 2022-01-17 RX ADMIN — POLYVINYL ALCOHOL, POVIDONE 1 DROP: 14; 6 SOLUTION/ DROPS OPHTHALMIC at 09:29

## 2022-01-17 RX ADMIN — DRONABINOL 2.5 MG: 2.5 CAPSULE ORAL at 17:10

## 2022-01-17 RX ADMIN — LEVOTHYROXINE SODIUM 150 MCG: 150 TABLET ORAL at 09:28

## 2022-01-17 RX ADMIN — PIPERACILLIN SODIUM AND TAZOBACTAM SODIUM 4.5 G: 4; .5 INJECTION, POWDER, LYOPHILIZED, FOR SOLUTION INTRAVENOUS at 09:27

## 2022-01-17 RX ADMIN — DRONABINOL 2.5 MG: 2.5 CAPSULE ORAL at 09:28

## 2022-01-17 RX ADMIN — PIPERACILLIN SODIUM AND TAZOBACTAM SODIUM 4.5 G: 4; .5 INJECTION, POWDER, LYOPHILIZED, FOR SOLUTION INTRAVENOUS at 03:29

## 2022-01-17 RX ADMIN — METHYLPREDNISOLONE SODIUM SUCCINATE 40 MG: 40 INJECTION, POWDER, FOR SOLUTION INTRAMUSCULAR; INTRAVENOUS at 09:29

## 2022-01-17 RX ADMIN — Medication 1 CAPSULE: at 09:28

## 2022-01-17 RX ADMIN — VANCOMYCIN HYDROCHLORIDE 500 MG: 1 INJECTION, POWDER, LYOPHILIZED, FOR SOLUTION INTRAVENOUS at 03:29

## 2022-01-17 RX ADMIN — ACETAMINOPHEN 650 MG: 325 TABLET ORAL at 13:20

## 2022-01-17 RX ADMIN — PIPERACILLIN SODIUM AND TAZOBACTAM SODIUM 4.5 G: 4; .5 INJECTION, POWDER, LYOPHILIZED, FOR SOLUTION INTRAVENOUS at 17:10

## 2022-01-17 RX ADMIN — SODIUM CHLORIDE 8 MG/HR: 9 INJECTION, SOLUTION INTRAVENOUS at 23:35

## 2022-01-17 RX ADMIN — ONDANSETRON HYDROCHLORIDE 4 MG: 4 TABLET, FILM COATED ORAL at 19:55

## 2022-01-17 RX ADMIN — HYDROCODONE BITARTRATE AND ACETAMINOPHEN 1 TABLET: 5; 325 TABLET ORAL at 17:10

## 2022-01-17 RX ADMIN — IPRATROPIUM BROMIDE AND ALBUTEROL SULFATE 3 ML: .5; 3 SOLUTION RESPIRATORY (INHALATION) at 00:24

## 2022-01-17 RX ADMIN — ASPIRIN 81 MG: 81 TABLET, COATED ORAL at 09:29

## 2022-01-17 RX ADMIN — CARVEDILOL 3.12 MG: 3.12 TABLET, FILM COATED ORAL at 17:11

## 2022-01-17 RX ADMIN — IPRATROPIUM BROMIDE AND ALBUTEROL SULFATE 3 ML: .5; 3 SOLUTION RESPIRATORY (INHALATION) at 13:19

## 2022-01-17 RX ADMIN — Medication 1 CAPSULE: at 20:23

## 2022-01-17 RX ADMIN — Medication 5 MG: at 20:23

## 2022-01-17 RX ADMIN — POLYVINYL ALCOHOL, POVIDONE 1 DROP: 14; 6 SOLUTION/ DROPS OPHTHALMIC at 20:23

## 2022-01-17 RX ADMIN — SODIUM CHLORIDE 8 MG/HR: 9 INJECTION, SOLUTION INTRAVENOUS at 12:11

## 2022-01-17 RX ADMIN — SODIUM CHLORIDE, PRESERVATIVE FREE 10 ML: 5 INJECTION INTRAVENOUS at 20:24

## 2022-01-17 RX ADMIN — TRAZODONE HYDROCHLORIDE 50 MG: 50 TABLET ORAL at 20:23

## 2022-01-17 RX ADMIN — SODIUM CHLORIDE 8 MG/HR: 9 INJECTION, SOLUTION INTRAVENOUS at 17:11

## 2022-01-17 NOTE — PAYOR COMM NOTE
"CONTACT:  Carrie Mora RN    Utilization Management Dept.   [facility]  [address] [place] [address]    Phone:[phone]  Fax: [phone]    REQUEST FOR INPATIENT AUTHORIZATION  REF # [id number]  ICD 10: D64.9, J18.9, N39.0  ATTENDING MD:    Pierre Delgado  NPI:[id number]  FACILITY NPI: [id number]  ADM DATE: 1/16/22      Mary Ly (76 y.o. Female)             Date of Birth Social Security Number Address Home Phone MRN    1945  [facility]  [address] [place] [id number] [phone] [id number]    Denominational Marital Status             Shinto        Admission Date Admission Type Admitting Provider Attending Provider Department, Room/Bed    1/16/22 Emergency Lester Estrada DO Parks, Andrew, MD [facility] PROGRESS CARE, P203/S2    Discharge Date Discharge Disposition Discharge Destination                         Attending Provider: Pierre Delgado MD    Allergies: Haldol [Haloperidol]    Isolation: None   Infection: None   Code Status: No CPR   Advance Care Planning Activity    Ht: 165.1 cm (65\")   Wt: 50 kg (110 lb 4.8 oz)    Admission Cmt: None   Principal Problem: None                Active Insurance as of 1/16/2022     Primary Coverage     Payor Plan Insurance Group Employer/Plan Group    ANTHEM MEDICARE REPLACEMENT ANTHEM MEDICARE ADVANTAGE [id number]     Payor Plan Address Payor Plan Phone Number Payor Plan Fax Number Effective Dates    [address] [phone]  9/1/2019 - None Entered    [facility] [id number]       Subscriber Name Subscriber Birth Date Member ID       MARY LY 1945 [id number]           Secondary Coverage     Payor Plan Insurance Group Employer/Plan Group    [place]      Payor Plan Address Payor Plan Phone Number Payor Plan Fax Number Effective Dates    [address] [phone]  12/1/2019 - None Entered    [facility] [id number]       Subscriber Name Subscriber Birth Date " Member ID       MARY LY 1945 [id number]                 Emergency Contacts      (Rel.) Home Phone Work Phone Mobile Phone    ZORAIDA SCHULER (Daughter) [phone] -- --    SANDOR LY (Grandchild) [phone] -- --    MARE ANNA (Grandchild) [phone] -- --    ALEXX LY (Son) [phone] -- --    OSMINMARYCHUY (Friend) -- -- [phone]               History & Physical      Lester Estrada DO at 22 1342              [facility]    Patient Identification:  Name:  Mary Ly  Age:  76 y.o.  Sex:  female  :  1945  MRN:  [id number]   Admit Date: 2022   Visit Number:  [id number]  Room number:  P203/S2  Primary Care Physician:  Leta Gardner MD     Subjective     Chief complaint:    Chief Complaint   Patient presents with   • Shortness of Breath       History of presenting illness:   Patient is a 76-year-old female with history significant for CAD status post PCI, COPD on 3 L at baseline and previous ABL due to gastric ulcer/AVMs who presented to the ER from nursing facility after being found minimally responsive and hypoxic.  At the time of my exam, she is lethargic but alert and oriented x3 and answers all questions appropriately.  EMS noted when they arrived to the facility, she was awake and alert and oriented x3.  SPO2 was 96% on baseline 3 L nasal cannula.  Patient denies chest pain, worsening shortness of breath, palpitations or lower extremity edema.  She denies hematemesis, melena or hematochezia.  Denies any fever/chills or other symptomatology.    In the ER, SPO2 94 to 98% on baseline 3 L.  Labs notable for hemoglobin of 5.8, WBC 35K with left shift, CRP 16, PT/INR 16/1.2, troponin 0.057.  2 units of PRBCs were ordered, currently receiving first unit at time of exam.    CT chest noted descending thoracic aortic aneurysm, 4 cm, 8 mm noncalcified pulmonary nodule in the right lower lobe and  bibasilar atelectasis/infiltrates.  CT abdomen/pelvis noted nonspecific changes that could possibly be seen with enteritis.    Records reviewed from [facility] noted:  Hospitalization 11/4-11/12 for GI bleed.  Initial hemoglobin 4.4 received 2 units PRBC.  EGD at that time noted abnormal atrophic mucosa throughout the stomach, few nonbleeding gastric AVMs status post ablation.  Required was restarted on Eliquis and discharged back to facility.    Hospitalization 11/21-11/24.  Initial hemoglobin 4.6, also received 2 units.  EGD noted multiple ulcers in the fundus, multiple ulcers in the body from 4 to 8 mm with 1 visible vessel at its edge requiring epi injection and clipping, superficial gastritis and erythema and edema in the antrum with biopsies taken (results not available).  Eliquis held indefinitely at time of discharge and was referred to  for consideration for watchman.     ---------------------------------------------------------------------------------------------------------------------   Review of Systems   Constitutional: Positive for fatigue. Negative for chills and fever.   HENT: Negative for sneezing and sore throat.    Respiratory: Negative for cough, shortness of breath and wheezing.    Cardiovascular: Negative for chest pain, palpitations and leg swelling.   Gastrointestinal: Negative for abdominal pain, diarrhea, nausea and vomiting.   Genitourinary: Negative for dysuria, hematuria and urgency.   Musculoskeletal: Negative for arthralgias.   Neurological: Negative for syncope and light-headedness.   Hematological: Negative for adenopathy.   Psychiatric/Behavioral: Negative for agitation and confusion.     ---------------------------------------------------------------------------------------------------------------------   Past Medical History:   Diagnosis Date   • AAA (abdominal aortic aneurysm) (HCC)     s/p repair    • Arthritis    • CAD (coronary artery disease)     s/p  stenting in the past    • Chronic kidney disease (CKD), stage III (moderate) ([facility])    • COPD (chronic obstructive pulmonary disease) ([facility])    • Debility    • Diastolic CHF, chronic ([facility]) 4/23/2021   • GERD (gastroesophageal reflux disease)    • History of CVA (cerebrovascular accident)    • History of ischemic colitis    • Hyperlipidemia 4/23/2021   • Hypertension    • Hypothyroidism    • Pneumonia due to COVID-19 virus 9/21/2021   • Stroke ([facility])    • Type II diabetes mellitus ([facility])      Past Surgical History:   Procedure Laterality Date   • BACK SURGERY     • CARDIAC CATHETERIZATION     • CHOLECYSTECTOMY N/A 7/17/2020    Procedure: CHOLECYSTECTOMY LAPAROSCOPIC;  Surgeon: Lonnie Meneses MD;  Location: [facility];  Service: General;  Laterality: N/A;   • COLONOSCOPY     • COLONOSCOPY N/A 9/25/2021    Procedure: COLONOSCOPY;  Surgeon: Lonnie Meneses MD;  Location: [facility];  Service: Gastroenterology;  Laterality: N/A;   • CORONARY STENT PLACEMENT     • ENDOSCOPY     • ENDOSCOPY N/A 9/25/2021    Procedure: ESOPHAGOGASTRODUODENOSCOPY;  Surgeon: Lonnie Meneses MD;  Location: [facility];  Service: Gastroenterology;  Laterality: N/A;   • TONSILLECTOMY       Family History   Problem Relation Age of Onset   • Diabetes Mother    • Heart attack Father    • No Known Problems Sister    • Heart attack Brother    • No Known Problems Brother    • No Known Problems Sister    • No Known Problems Sister      Social History     Socioeconomic History   • Marital status:    Tobacco Use   • Smoking status: Current Every Day Smoker     Packs/day: 1.00     Years: 55.00     Pack years: 55.00     Types: Cigarettes   • Smokeless tobacco: Never Used   Vaping Use   • Vaping Use: Never used   Substance and Sexual Activity   • Alcohol use: Never   • Drug use: Never   • Sexual activity: Defer     ---------------------------------------------------------------------------------------------------------------------   Allergies:  Haldol  [haloperidol]  ---------------------------------------------------------------------------------------------------------------------   Medications below are reported home medications pulling from within the system; at this time, these medications have not been reconciled unless otherwise specified and are in the verification process for further verifcation as current home medications.    Prior to Admission Medications     Prescriptions Last Dose Informant Patient Reported? Taking?    amiodarone (PACERONE) 200 MG tablet  Nursing Home Yes No    Take 200 mg by mouth Daily.    bumetanide (BUMEX) 1 MG tablet  Nursing Home Yes No    Take 1 mg by mouth 2 (Two) Times a Day.    Carboxymethylcellulose Sodium (ARTIFICIAL TEARS OP)  Nursing Home Yes No    Administer 1 application to both eyes 2 (Two) Times a Day.    Cariprazine HCl (VRAYLAR) 1.5 MG capsule capsule  Nursing Home Yes No    Take 1.5 mg by mouth Daily.    carvedilol (COREG) 3.125 MG tablet  Nursing Home Yes No    Take 3.125 mg by mouth 2 (Two) Times a Day With Meals.    docusate sodium (COLACE) 250 MG capsule  Nursing Home Yes No    Take 250 mg by mouth Daily.    ferrous sulfate 325 (65 FE) MG tablet   No No    Take 1 tablet by mouth Daily With Breakfast. HOLD FOR 10 DAYS (UNTIL FINISHED WITH H. PYLORI TREATMENT)    ipratropium-albuterol (DUO-NEB) 0.5-2.5 mg/3 ml nebulizer  Nursing Home Yes No    Take 3 mL by nebulization 4 (Four) Times a Day As Needed for Wheezing or Shortness of Air.    Lactobacillus Acid-Pectin (Acidophilus/Pectin) capsule   No No    Take 1 capsule by mouth 2 (two) times a day. HOLD FOR 10 DAYS (UNTIL FINISHED WITH H. PYLORI TREATMENT)    lactulose (CHRONULAC) 10 GM/15ML solution  Nursing Home Yes No    Take 10 g by mouth Daily As Needed.    levothyroxine (SYNTHROID, LEVOTHROID) 125 MCG tablet  Nursing Home Yes No    Take 250 mcg by mouth Daily.    lisinopril (PRINIVIL,ZESTRIL) 5 MG tablet  Nursing Home Yes No    Take 5 mg by mouth Daily.     melatonin 5 MG tablet tablet  Nursing Home Yes No    Take 5 mg by mouth Every Night.    montelukast (SINGULAIR) 10 MG tablet  Nursing Home Yes No    Take 10 mg by mouth Daily.    Multiple Vitamin (MULTIVITAMIN) tablet tablet  Nursing Home Yes No    Take 1 tablet by mouth Every Night.    O2 (OXYGEN)   Yes No    Inhale 3 L/min Daily As Needed.    pantoprazole (PROTONIX) 40 MG EC tablet  Nursing Home Yes No    Take 40 mg by mouth 2 (Two) Times a Day.    polyethylene glycol (MiraLax) 17 GM/SCOOP powder  Nursing Home Yes No    Take 17 g by mouth 2 (Two) Times a Day.    potassium chloride (K-DUR,KLOR-CON) 20 MEQ CR tablet  Nursing Home Yes No    Take 20 mEq by mouth 2 (Two) Times a Day.    promethazine (PHENERGAN) 12.5 MG tablet  Nursing Home Yes No    Take 12.5 mg by mouth Every 8 (Eight) Hours As Needed for Nausea or Vomiting.    STIOLTO RESPIMAT 2.5-2.5 MCG/ACT aerosol solution inhaler  Nursing Home Yes No    Inhale 2 puffs Daily.        Objective     Vital Signs:  Temp:  [97.1 °F (36.2 °C)-99.9 °F (37.7 °C)] 98.2 °F (36.8 °C)  Heart Rate:  [62-75] 66  Resp:  [15-20] 16  BP: (111-153)/(54-87) 111/54    Mean Arterial Pressure (Non-Invasive) for the past 24 hrs (Last 3 readings):   Noninvasive MAP (mmHg)   01/16/22 1103 86   01/16/22 0959 97   01/16/22 0925 98     SpO2:  [93 %-100 %] 98 %  on  Flow (L/min):  [2-3] 2;   Device (Oxygen Therapy): room air  Body mass index is 16.64 kg/m².    Wt Readings from Last 3 Encounters:   01/16/22 45.4 kg (100 lb)   12/24/21 45.4 kg (100 lb)   11/19/21 45.4 kg (100 lb)      ---------------------------------------------------------------------------------------------------------------------   Physical Exam:  Constitutional: Elderly, cachectic female, appears chronically ill, lethargic, NAD  HENT:  Head: Normocephalic and atraumatic.  Mouth:  Moist mucous membranes.    Eyes:  Conjunctivae and EOM are normal.  No scleral icterus.  Neck:  Neck supple.  No JVD present.    Cardiovascular:   Normal rate, regular rhythm and normal heart sounds with no murmur.  Pulmonary/Chest:  No respiratory distress, no wheezes, no crackles, with normal breath sounds and good air movement.  Abdominal:  Soft.  Bowel sounds are normal.  No distension and no tenderness.   Musculoskeletal:  No tenderness, and no deformity.  No red or swollen joints anywhere.    Neurological:  Alert and oriented to person, place, and time.  No cranial nerve deficit.  No tongue deviation.  No facial droop.  No slurred speech.   Skin:  Skin is warm and dry.  No rash noted.  No pallor.   Peripheral vascular:  No edema and pulses on all 4 extremities.  ---------------------------------------------------------------------------------------------------------------------  EKG: Normal sinus rhythm, rate 69, QTc 490, mild T wave inversions in lead I and aVL which are less pronounced when compared to prior  ECG 12 Lead             Telemetry: reviewed    I have personally looked at both the EKG and the telemetry strips.    Last echocardiogram:  Results for orders placed during the hospital encounter of 04/22/21    Adult Transthoracic Echo Complete W/ Cont if Necessary Per Protocol    Interpretation Summary  · Normal left ventricular cavity size noted. Left ventricular wall thickness is consistent with mild concentric hypertrophy  · Left ventricular ejection fraction appears to be 61 - 65%.  · Left ventricular diastolic function is consistent with (grade I) impaired relaxation.  · There is mild calcification of the aortic valvular cusps.  · No aortic valve regurgitation is present. Mild aortic valve stenosis is present.  · There are myxomatous changes of the mitral valve apparatus present. Mild mitral valve regurgitation is present. No significant mitral valve stenosis is present.  · . There is no evidence of pericardial  effusion.    --------------------------------------------------------------------------------------------------------------------  Labs:  Results from last 7 days   Lab Units 01/16/22  0525 01/16/22  0449   PROCALCITONIN ng/mL 0.22  --    LACTATE mmol/L 1.0  --    CRP mg/dL 16.33*  --    WBC 10*3/mm3  --  35.31*   HEMOGLOBIN g/dL  --  5.8*   HEMATOCRIT %  --  19.2*   MCV fL  --  100.5*   MCHC g/dL  --  30.2*   PLATELETS 10*3/mm3  --  391   INR  1.28*  --      Results from last 7 days   Lab Units 01/16/22  1127   PH, ARTERIAL pH units 7.383   PO2 ART mm Hg 66.8*   PCO2, ARTERIAL mm Hg 62.0*   HCO3 ART mmol/L 36.9*     Results from last 7 days   Lab Units 01/16/22  0449   SODIUM mmol/L 136   POTASSIUM mmol/L 4.2   CHLORIDE mmol/L 93*   CO2 mmol/L 30.2*   BUN mg/dL 21   CREATININE mg/dL 0.65   EGFR IF NONAFRICN AM mL/min/1.73 89   CALCIUM mg/dL 8.6   GLUCOSE mg/dL 94   ALBUMIN g/dL 2.79*   BILIRUBIN mg/dL <0.2   ALK PHOS U/L 88   AST (SGOT) U/L 43*   ALT (SGPT) U/L 38*   Estimated Creatinine Clearance: 42.9 mL/min (by C-G formula based on SCr of 0.65 mg/dL).    No results found for: AMMONIA  Results from last 7 days   Lab Units 01/16/22  1050 01/16/22  0525   TROPONIN T ng/mL 0.062* 0.057*   PROBNP pg/mL  --  1,158.0         No results found for: HGBA1C, POCGLU  Lab Results   Component Value Date    TSH 79.240 (H) 11/19/2021    FREET4 0.24 (L) 10/02/2021     No results found for: PREGTESTUR, PREGSERUM, HCG, HCGQUANT  Pain Management Panel     Pain Management Panel Latest Ref Rng & Units 9/21/2021 8/13/2020    AMPHETAMINES SCREEN, URINE Negative Negative Negative    BARBITURATES SCREEN Negative Negative Negative    BENZODIAZEPINE SCREEN, URINE Negative Negative Negative    BUPRENORPHINEUR Negative Negative Negative    COCAINE SCREEN, URINE Negative Negative Negative    METHADONE SCREEN, URINE Negative Negative Negative    METHAMPHETAMINEUR Negative Negative -        Brief Urine Lab Results  (Last result in the past 365  days)      Color   Clarity   Blood   Leuk Est   Nitrite   Protein   CREAT   Urine HCG        12/24/21 1530 Yellow   Clear   Negative   Moderate (2+)   Negative   Negative               No results found for: BLOODCX  No results found for: URINECX  No results found for: WOUNDCX  No results found for: STOOLCX    I have personally looked at the labs and they are summarized above.  ----------------------------------------------------------------------------------------------------------------------  Detailed radiology reports for the last 24 hours:    Imaging Results (Last 24 Hours)     Procedure Component Value Units Date/Time    CT Abdomen Pelvis With Contrast [[id number]] Collected: 01/16/22 0843     Updated: 01/16/22 0845    Narrative:      CT Abdomen Pelvis W    INDICATION:   Acute onset of abdominal pain    TECHNIQUE:   CT of the abdomen and pelvis with contrast. Coronal and sagittal reconstructions were obtained.  Radiation dose reduction techniques included automated exposure control or exposure modulation based on body size. Radiation audit for number of CT and  nuclear cardiology exams performed in the last year: 14.      COMPARISON:   December 24, 2021    FINDINGS:    Abdomen: The liver shows no focal hepatic lesions. The gallbladder is surgically absent. The kidneys show normal enhancement. No evidence of hydronephrosis. The spleen appears unremarkable. There is prominent pancreatic atrophy. No adrenal abnormalities.  No threshold retroperitoneal adenopathy. Prominent atherosclerotic changes of the abdominal aorta with evidence of an aortic stent graft.    Pelvis: The bowel pattern is nonobstructive. There is enhancement of the bowel wall of both small bowel and colon. This finding is nonspecific but can be seen in enteritis. Prominent amount of colon gas in the distal colon. No evidence of free air. No  free fluid.    Imaging of the pelvis is obscured by beam hardening artifact from the patient's left hip  arthroplasty. Bony structures are diffusely demineralized. Postsurgical changes noted in the lower lumbar spine.      Impression:        1.  There is enhancement of the bowel wall of both colon and small bowel. This is nonspecific but can be seen in enteritis.    2.  No evidence of free air or free fluid. Comparison with the study of 12/24/2021 shows similar findings.          Signer Name: Seb Hart MD   Signed: 1/16/2022 8:43 AM   Workstation Name: [email or web address]    [facility]    CT Chest Without Contrast Diagnostic [[id number]] Collected: 01/16/22 0558     Updated: 01/16/22 0600    Narrative:      CT Chest WO    INDICATION:   S pain and shortness of breath.    TECHNIQUE:   CT of the chest without IV contrast. Coronal and sagittal reformatted images. Radiation dose reduction techniques included automated exposure control or exposure modulation based on body size. Count of known CT and cardiac nuc med studies performed in  previous 12 months: 14.     COMPARISON:   CTA chest 12/24/2021    FINDINGS:   4 cm aneurysm of the descending thoracic aorta with extensive atherosclerotic calcification throughout the thoracic aorta. Mild cardiomegaly. No pericardial effusion. Coronary artery calcifications.    No pleural effusion. No pneumothorax. Bibasilar atelectasis/infiltrate. 8 mm noncalcified pulmonary nodule in the lateral right lower lobe. Emphysema.    Visualized upper abdomen is unremarkable. 3 cm suprarenal abdominal aortic aneurysm. Partially imaged aortobiiliac stent graft.    No acute osseous abnormality.      Impression:        1. 4 cm aneurysm of the descending thoracic aorta.  2. Mild cardiomegaly.  3. Bibasilar atelectasis/infiltrate.  4. 8mm noncalcified pulmonary nodule in the right lower lobe. Management recommendation: Consider short interval follow-up CT in 3 months, or PET/CT imaging, for pulmonary nodules greater than 8 mm in high risk patients based on current  published  guidelines (Fleischner Society, 2017).  5. Emphysema.  6. 3 cm suprarenal abdominal aortic aneurysm and partially imaged aortobiiliac stent graft.    Signer Name: Guillermo Hart MD   Signed: 1/16/2022 5:58 AM   Workstation Name: [email or web address]    [facility]    XR Chest 1 View [[id number]] Collected: 01/16/22 0541     Updated: 01/16/22 0543    Narrative:      CR Chest 1 Vw    INDICATION:   Shortness of air.     COMPARISON:    Chest 12/24/2021    FINDINGS:  Portable AP view(s) of the chest.  Borderline heart size. Mediastinal contours are normal. The lungs are clear. No pneumothorax or pleural effusion.      Impression:      No acute cardiopulmonary findings.    Signer Name: Guillermo Hart MD   Signed: 1/16/2022 5:41 AM   Workstation Name: [email or web address]    Radiology [facility]        Final impressions for the last 30 days of radiology reports:    CT Chest Without Contrast Diagnostic    Result Date: 1/16/2022  1. 4 cm aneurysm of the descending thoracic aorta. 2. Mild cardiomegaly. 3. Bibasilar atelectasis/infiltrate. 4. 8mm noncalcified pulmonary nodule in the right lower lobe. Management recommendation: Consider short interval follow-up CT in 3 months, or PET/CT imaging, for pulmonary nodules greater than 8 mm in high risk patients based on current published guidelines (Fleischner Society, 2017). 5. Emphysema. 6. 3 cm suprarenal abdominal aortic aneurysm and partially imaged aortobiiliac stent graft. Signer Name: Guillermo Hart MD  Signed: 1/16/2022 5:58 AM  Workstation Name: [email or web address]  Radiology [facility]    CT Abdomen Pelvis With Contrast    Result Date: 1/16/2022  1.  There is enhancement of the bowel wall of both colon and small bowel. This is nonspecific but can be seen in enteritis. 2.  No evidence of free air or free fluid. Comparison with the study of 12/24/2021 shows similar findings. Signer Name: Seb Hart MD  Signed: 1/16/2022 8:43 AM   Workstation Name: [email or web address]  [facility]    CT Abdomen Pelvis With Contrast    Result Date: 12/24/2021  1. Interval development of marked distention of the stomach with air fluid debris levels. Please correlate for clinical evidence of gastric outlet obstruction or gastroparesis. 2. Markedly redundant colon with a large colonic stool burden to the level the rectum. Please correlate for clinical evidence of impaction. This is also worse on comparison to prior. 3. Nothing to suggest small bowel obstruction free air or drainable fluid collection. 4. Postcholecystectomy with likely postoperative biliary ductal dilatation. 5. Chronic lung disease with left greater the right base atelectasis. Signer Name: Pushpa Reina MD  Signed: 12/24/2021 4:15 PM  Workstation Name: [facility]  [facility]    XR Chest 1 View    Result Date: 1/16/2022  No acute cardiopulmonary findings. Signer Name: Guillermo Hart MD  Signed: 1/16/2022 5:41 AM  Workstation Name: [email or web address]  Radiology [facility]    XR Chest 1 View    Result Date: 12/24/2021   1. No acute pulmonary pathology identified  This report was finalized on 12/24/2021 1:20 PM by Dr. River Zaidi MD.      CT Chest Pulmonary Embolism    Result Date: 12/24/2021  1. No evidence of a pulmonary embolus 2. Parenchymal nodule in the right middle lobe measuring approximately 1 cm. Follow-up suggested.  This report was finalized on 12/24/2021 2:51 PM by Dr. River Zaidi MD.      I have personally looked at the radiology images and read the final radiology report.    Assessment & Plan      Patient is a 76-year-old female with history significant for CAD status post PCI, COPD on 3 L at baseline and previous ABL due to gastric ulcer/AVMs who presented to the ER from nursing facility after being found minimally responsive and hypoxic.    #Acute on chronic blood loss anemia d/t UGIB  #Recent PUD w/ active vessel s/p  clip  #History of gastric ulcers and AVMs  --initial hemoglobin 5.8 on admission.  No reported melena, hematemesis or hematochezia.  Records requested from [facility] noted notable hospitalizations for anemia requiring multiple units PRBCs and repeat EGDs.   --EGD 11/4-11/12: abnormal atrophic mucosa throughout the stomach, few nonbleeding gastric AVMs status post ablation.  Required was restarted on Eliquis and discharged back to facility.  -- EGD 11/21-11/24 noted multiple ulcers in the fundus, multiple ulcers in the body from 4 to 8 mm with 1 visible vessel at its edge requiring epi injection and clipping, superficial gastritis and erythema and edema in the antrum with biopsies taken (results not available). Eliquis held indefinitely at time of discharge and was referred to  for consideration for watchman-d/c'd on bid ppi.   --Type and screen, currently receiving 2 units PRBC, repeat H&H status post second unit  --trend H/H, transfusion goal>8 in the setting of known CAD  --start protonix gtt  --General surgery consulted d/t ABL w/ hx of gastric AVMs and active vessel bleeding    #Sepsis due to community-acquired pneumonia with MDRO factors  #Chronic respiratory acidosis with metabolic alkalosis  --Patient presented after reportedly being found minimally responsive at nursing facility and hypoxia on baseline 3 L nasal cannula.  EMS noted SPO2 96% on baseline when they arrive.  She has been stable on baseline supplemental oxygen since admission.  --ABG 7.38/62/66, approximately at baseline PCO2 and compensated as evident by serum bicarbonate of 30  --Chest CT with bibasilar atelectasis/infiltrates  --Blood cultures x2 ordered, sputum culture, MRSA nasal swab  --will ask speech to see regarding possible aspiration  --Unclear given her presentation if she has a true bacterial pneumonia as she is not the best historian despite being alert and oriented x3.  She has significant leukocytosis with left  shift therefore will empirically treat with antibiotics  --add solumedrol daily, duonebs, mucolytic's  --Continue vancomycin, Zosyn, de-escalate pending culture data    #Metabolic encephalopathy, mulitfactorial  -- Differential is broad, possibly related to underlying pulmonary infection as noted above.  Also has a history of hypothyroidism with most recent TSH in November greater than 79, will repeat thyroid studies, rule out myxedema    #Paroxysmal atrial fibrillation  #Sinus bradycarida  --chart review noted issues with sinus bradycardia, had previously been on po amio and coreg which had been d/c'd at most recent d/c for [facility]  --continue to hold anticoagulation indefinitely, given recurrent bleeding (stopped at last d/c in Nov 24), has been recommended to follow-up outpatient with cardiology for consideration for watchman device on discharge in November from [facility]    #Elevated troponin  #CAD s/p PCI  #Mild aortic stenosis  --No complaints of chest pain or shortness of breath.  EKG nonischemic.  Initial troponin 0.057, repeat 0.062.  Most recent PCI greater than 10 years ago.  Does not take daily aspirin or Plavix.  --Most likely demand related ischemia due to severe anemia as noted above.  --Echo in November 2021 at [facility]: EF 55%, mild diastolic dysfunction, mild AS  --Echocardiogram ordered evaluate systolic and diastolic function  --resume home beta-blocker, add aspirin/statin    #COPD, not in acute exacerbation  #Chronic respiratory failure, on 3 L baseline  --hemodynamically stable and on baseline 3 L nasal cannula, as needed DuoNebs    #History of refractory hypertension status post Rheos device, placed in 2008  #Type 2 diabetes mellitus, check A1c, SSI, add basal and adjust as needed  #Hypothyroidism, most recent TSH >79, repeat TSH/free T4  #History of AAA repair  #History of ischemic stroke, residual lower extremity weakness, mostly bedbound PTA  #History of ischemic colitis  #History of rectal  prolapse  #Bipolar disorder  #Tobacco use disorder  #Severe protein calorie malnutrition, BMI 16, nutrition consulted    CC 36  [facility] CCU CCU  Checklist:  Antibiotics: Vancomycin, Zosyn  Steroids: Solu-Medrol  DVT ppx: SCDs due to ABL  GI ppx: Protonix gtt.  Diet: Clear  Code: No CPR, limited  Risk/dispo: Patient is a high risk due to acute metabolic encephalopathy, sepsis due to CAP with MDRO risk factors and elevated troponins.  Anticipate greater than 2 midnight stay.  Disposition expected back to nursing facility when stable.    Lester Estrada DO  [facility]  01/16/22  13:42 EST      Electronically signed by Lester Estrada DO at 01/16/22 1457          Emergency Department Notes      Reta Brar, RN at 01/16/22 0600        Multiple failed IV attempted, will have another RN attempt to obtain IV.     Reta Brar, RN  01/16/22 0629      Electronically signed by Reta Brar RN at 01/16/22 0629     Holland Lara PCT at 01/16/22 0609        Dr. Genny cotton at this time.     Holland Lara PCT  01/16/22 0609      Electronically signed by Holland Lara PCT at 01/16/22 0609     Arturo Hill MD at 01/16/22 0611          Subjective   76-year-old female with a past medical history of AAA, COPD, hypertension, hyperlipidemia, and CHF presented to the ER from a local nursing home after being found minimally responsive and decreased O2 saturations noted by the nursing home staff.  When EMS arrived to the scene, patient was awake, alert, oriented x3.  O2 saturations were 96% on patient's home oxygen by nasal cannula.  On arrival to the ER, patient denied any significant chest pain or shortness of breath.  Denied headache or vision changes.  Denied abdominal pain.  Denied changes in bowel or urinary habits.  Patient noted increasing and serious fatigue.  No obvious fever or chills.  No focal deficits.  Vitals stable          Review of Systems   Constitutional:  Positive for fatigue.   Musculoskeletal: Positive for myalgias.   All other systems reviewed and are negative.      Past Medical History:   Diagnosis Date   • AAA (abdominal aortic aneurysm) ([facility])     s/p repair    • Arthritis    • CAD (coronary artery disease)     s/p stenting in the past    • Chronic kidney disease (CKD), stage III (moderate) ([facility])    • COPD (chronic obstructive pulmonary disease) ([facility])    • Debility    • Diastolic CHF, chronic ([facility]) 4/23/2021   • GERD (gastroesophageal reflux disease)    • History of CVA (cerebrovascular accident)    • History of ischemic colitis    • Hyperlipidemia 4/23/2021   • Hypertension    • Hypothyroidism    • Pneumonia due to COVID-19 virus 9/21/2021   • Stroke ([facility])    • Type II diabetes mellitus ([facility])        Allergies   Allergen Reactions   • Haldol [Haloperidol] Hallucinations       Past Surgical History:   Procedure Laterality Date   • BACK SURGERY     • CARDIAC CATHETERIZATION     • CHOLECYSTECTOMY N/A 7/17/2020    Procedure: CHOLECYSTECTOMY LAPAROSCOPIC;  Surgeon: Lonnie Meneses MD;  Location: [facility];  Service: General;  Laterality: N/A;   • COLONOSCOPY     • COLONOSCOPY N/A 9/25/2021    Procedure: COLONOSCOPY;  Surgeon: Lonnie Meneses MD;  Location: [facility];  Service: Gastroenterology;  Laterality: N/A;   • CORONARY STENT PLACEMENT     • ENDOSCOPY     • ENDOSCOPY N/A 9/25/2021    Procedure: ESOPHAGOGASTRODUODENOSCOPY;  Surgeon: Lonnie Meneses MD;  Location: [facility];  Service: Gastroenterology;  Laterality: N/A;   • TONSILLECTOMY         Family History   Problem Relation Age of Onset   • Diabetes Mother    • Heart attack Father    • No Known Problems Sister    • Heart attack Brother    • No Known Problems Brother    • No Known Problems Sister    • No Known Problems Sister        Social History     Socioeconomic History   • Marital status:    Tobacco Use   • Smoking status: Current Every Day Smoker     Packs/day: 1.00     Years: 55.00     Pack  years: 55.00     Types: Cigarettes   • Smokeless tobacco: Never Used   Vaping Use   • Vaping Use: Never used   Substance and Sexual Activity   • Alcohol use: Never   • Drug use: Never   • Sexual activity: Defer           Objective   Physical Exam  Constitutional:       General: She is not in acute distress.     Appearance: Normal appearance. She is not ill-appearing.   HENT:      Head: Normocephalic and atraumatic.      Right Ear: External ear normal.      Left Ear: External ear normal.      Nose: Nose normal.      Mouth/Throat:      Mouth: Mucous membranes are moist.   Eyes:      Extraocular Movements: Extraocular movements intact.      Pupils: Pupils are equal, round, and reactive to light.   Cardiovascular:      Rate and Rhythm: Normal rate and regular rhythm.      Heart sounds: No murmur heard.      Pulmonary:      Effort: Pulmonary effort is normal. No respiratory distress.      Breath sounds: Examination of the right-middle field reveals decreased breath sounds. Examination of the left-middle field reveals decreased breath sounds. Examination of the right-lower field reveals decreased breath sounds. Examination of the left-lower field reveals decreased breath sounds. Decreased breath sounds present. No wheezing.   Abdominal:      General: Bowel sounds are normal.      Palpations: Abdomen is soft.      Tenderness: There is no abdominal tenderness.   Musculoskeletal:         General: No deformity or signs of injury. Normal range of motion.      Cervical back: Normal range of motion and neck supple.   Skin:     General: Skin is warm and dry.      Findings: No erythema.   Neurological:      General: No focal deficit present.      Mental Status: She is alert and oriented to person, place, and time. Mental status is at baseline.      Cranial Nerves: No cranial nerve deficit.   Psychiatric:         Mood and Affect: Mood normal.         Behavior: Behavior normal.         Thought Content: Thought content normal.          Procedures          ED Course  ED Course as of 01/16/22 0945   Sun Jan 16, 2022   0408 EKG noted sinus rhythm.  69 bpm.  .  QRS 76.  QTc 490.  No acute ST elevation. [SF]   0626 CBC noted significant leukocytosis.  It also noted significant anemia.  CCP unremarkable.  Coagulation studies unremarkable.  COVID testing pending.  Type and screen collected.  Lactic acid within normal limits.  Patient will be transfused with 2 units of packed red blood cells and was started on broad-spectrum antibiotics.  Chest x-ray unremarkable.  CT chest without contrast noted concerns for bilateral pneumonia versus atelectasis.  Sepsis work-up pending.  I discussed admission with the hospitalist team and they requested records from this patient's recent transfer to another facility/[facility].  They also requested CT the abdomen pelvis.  Admission plan pending. [SF]   0627 Care of this patient was transferred to the next attending physician at shift change.  Complete discussion of presentation, labs, imaging, care, and expected course occurred during transition of providers.  Vitals stable at transfer of care. [SF]   0640 Care endorsed to Dr. Hill [SF]   0710 WBC(!!): 35.31 [KP]   0710 Hemoglobin(!!): 5.8 [KP]   0710 Creatinine: 0.65 [KP]   0710 CO2(!): 30.2 [KP]   0723 COVID19: Not Detected [KP]   0723 Lactate: 1.0 [KP]   0723 Creatinine: 0.65 [KP]   0727 Patient reassessed, no complaints at this time. Remains hemodynamically stable saturating 97% on 3 L nasal cannula. Given COPD and elevated bicarb, down titrated oxygen to 2 L NC. Admission pending CT abdomen pelvis, outside records. [KP]   0735 Troponin T(!!): 0.057 [KP]   0821 Physician ultrasound guidance vascular access. IV placed in left arm.  Area cleansed with chlorhexidine soap. Vein visualized by ultrasound with easy compressibility and lack of pulsation. Placed under ultrasound guidance with intraluminal visualization.  Easy blood return, no resistance  on forward flush.  Placement successful on 2nd attempt.  Sterile dressing applied. Patient tolerated procedure well.    On first attempt, pt moved, IV went through back wall resulting in extravasation after withdrawal through back wall. Pressure applied for 4 minutes with hemostasis achieved.   [KP]   0930 C-Reactive Protein(!): 16.33 [KP]   0930 proBNP: 1,158.0 [KP]   0930 CT Abdomen Pelvis With Contrast  IMPRESSION:     1.  There is enhancement of the bowel wall of both colon and small bowel. This is nonspecific but can be seen in enteritis.     2.  No evidence of free air or free fluid. Comparison with the study of 12/24/2021 shows similar findings. [KP]   0945 Admit to hospitalist Dr. Estrada. [KP]      ED Course User Index  [KP] Arturo Hill MD  [SF] Armen George, DO                                                 MDM  Number of Diagnoses or Management Options  Anemia, unspecified type: new and requires workup  Elevated troponin: new and requires workup  Pneumonia of both lungs due to infectious organism, unspecified part of lung: new and requires workup  Sepsis, due to unspecified organism, unspecified whether acute organ dysfunction present (HCC): new and requires workup     Amount and/or Complexity of Data Reviewed  Clinical lab tests: reviewed  Tests in the radiology section of CPT®: reviewed  Tests in the medicine section of CPT®: reviewed  Decide to obtain previous medical records or to obtain history from someone other than the patient: yes        Final diagnoses:   Anemia, unspecified type   Pneumonia of both lungs due to infectious organism, unspecified part of lung   Sepsis, due to unspecified organism, unspecified whether acute organ dysfunction present (HCC)   Elevated troponin       ED Disposition  ED Disposition     ED Disposition Condition Comment    Decision to Admit            No follow-up provider specified.       Medication List      No changes were made to your prescriptions during  this visit.          Arturo Hill MD  01/16/22 0945      Electronically signed by Arturo Hill MD at 01/16/22 0945     Reta Brar, RN at 01/16/22 0635        Failed IV attempts X3 by MANJINDER Grady and failed IV attempts X2 by MANJINDER Fleming. Provider aware.      Reta Brar, RN  01/16/22 0639      Electronically signed by Reta Brar, RN at 01/16/22 0639       Vital Signs (last day)     Date/Time Temp Temp src Pulse Resp BP Patient Position SpO2    01/17/22 0927 -- -- -- -- 105/53 -- --    01/17/22 0803 -- -- 80 28 136/83 -- --    01/17/22 0703 -- -- 66 14 105/57 -- --    01/17/22 0618 -- -- 68 20 -- -- 94    01/17/22 0602 97.8 (36.6) Axillary 64 -- 110/55 -- --    01/17/22 0502 -- -- 65 -- 99/53 -- --    01/17/22 0402 -- -- 70 -- 109/65 -- --    01/17/22 0302 -- -- 65 -- 108/58 -- --    01/17/22 0202 -- -- 62 -- 103/54 -- --    01/17/22 0102 -- -- 63 -- 108/55 -- --    01/17/22 0034 -- -- 60 18 -- -- 94    01/17/22 0024 -- -- 62 18 -- -- 94    01/16/22 2202 -- -- 61 -- 110/59 -- 92    01/16/22 2100 -- -- 64 -- 137/67 -- 93    01/16/22 2028 97.7 (36.5) Axillary 65 -- -- -- 93    01/16/22 2002 -- -- 64 -- 101/61 -- 93    01/16/22 1902 -- -- 68 -- 121/60 -- 96    01/16/22 1851 -- -- 61 18 -- -- 100    01/16/22 1841 -- -- 70 15 -- -- 96    01/16/22 1803 -- -- 60 14 141/59 -- 100    01/16/22 1703 -- -- 61 20 121/53 -- 97    01/16/22 1603 -- -- 64 -- 113/56 -- 98    01/16/22 1600 98.3 (36.8) Oral -- 14 -- -- --    01/16/22 1525 98.1 (36.7) Oral 65 16 124/61 -- 97    01/16/22 1503 -- -- 69 28 133/61 -- 92    01/16/22 1429 -- -- 68 14 -- -- 99    01/16/22 1403 -- -- 64 13 134/57 -- 100    01/16/22 1303 -- -- 64 14 120/60 -- 99    01/16/22 1250 98.2 (36.8) Oral 66 16 111/54 -- 98    01/16/22 1235 98.2 (36.8) Oral 62 15 119/61 -- 100    01/16/22 1203 -- -- 66 -- 134/57 -- 99    01/16/22 1200 97.8 (36.6) Axillary -- -- -- -- --    01/16/22 1157 97.8 (36.6) Oral 64 16 130/59 -- 95    01/16/22 1103 --  -- 66 -- 125/58 -- 94    01/16/22 10:09:28 98.1 (36.7) -- 68 16 147/60 -- 100    01/16/22 0959 -- -- 67 -- 143/64 -- --    01/16/22 0955 -- -- 68 -- -- -- 100    01/16/22 0953 -- -- 67 -- -- -- 100    01/16/22 0945 -- -- 68 -- -- -- 100    01/16/22 0938 -- -- 68 -- -- -- 100    01/16/22 0935 -- -- 68 -- -- -- 100    01/16/22 0932 -- -- 67 -- -- -- 100    01/16/22 0925 -- -- 69 -- 130/59 -- 100    01/16/22 09:21:39 98.1 (36.7) -- 70 16 130/59 -- 100    01/16/22 0910 -- -- 68 -- 130/65 -- --    01/16/22 0907 99.9 (37.7) -- 69 16 130/65 -- 100    01/16/22 0855 -- -- 68 -- 131/59 -- --    01/16/22 08:52:53 97.1 (36.2) Oral 68 18 131/59 -- 100    01/16/22 0710 -- -- 72 -- -- -- 100 01/16/22 0640 -- -- 72 -- -- -- 100 01/16/22 0625 -- -- 75 -- -- -- 100    01/16/22 0517 -- -- -- -- 114/61 -- 93    01/16/22 0514 -- -- -- -- -- -- 97 01/16/22 0510 -- -- -- -- -- -- 97 01/16/22 0506 -- -- -- -- -- -- 98    01/16/22 0503 -- -- -- -- 131/70 -- 97    01/16/22 0456 -- -- -- -- -- -- 98    01/16/22 0453 -- -- -- -- -- -- 99    01/16/22 0447 -- -- -- -- 153/74 -- --    01/16/22 0443 -- -- -- -- -- -- 96    01/16/22 0432 -- -- -- -- 140/87 -- --    01/16/22 0425 -- -- -- -- -- -- 96    01/16/22 0418 -- -- -- -- 141/64 -- 95    01/16/22 0414 -- -- -- -- -- -- 97    01/16/22 0410 -- -- -- -- -- -- 96    01/16/22 0407 -- -- -- -- -- -- 97    01/16/22 0403 -- -- -- -- 123/69 -- --    01/16/22 0400 -- -- -- -- -- -- 97    01/16/22 0353 -- -- -- -- -- -- 97    01/16/22 0347 -- -- -- -- -- -- 98    01/16/22 0346 97.9 (36.6) Oral 72 20 131/64 Sitting 98    01/16/22 0345 -- -- -- -- -- -- 98            Facility-Administered Medications as of 1/17/2022   Medication Dose Route Frequency Provider Last Rate Last Admin   • acetaminophen (TYLENOL) tablet 650 mg  650 mg Oral Q4H PRN Lester Estrada DO       • Acidophilus/Pectin capsule 1 capsule  1 capsule Oral BID Lester Estrada DO   1 capsule at 01/17/22 0928   •  aspirin EC tablet 81 mg  81 mg Oral Daily Lester Estrada DO   81 mg at 01/17/22 0929   • atorvastatin (LIPITOR) tablet 40 mg  40 mg Oral Nightly Lester Estrada DO   40 mg at 01/16/22 2029   • carvedilol (COREG) tablet 3.125 mg  3.125 mg Oral BID With Meals Lester Estrada DO   3.125 mg at 01/16/22 1707   • dextrose (D50W) (25 g/50 mL) IV injection 25 g  25 g Intravenous Q15 Min PRN Lester Estrada DO       • dextrose (GLUTOSE) oral gel 15 g  15 g Oral Q15 Min PRN Lester Estrada DO       • docusate sodium (COLACE) capsule 200 mg  200 mg Oral Daily Lester Estrada DO   200 mg at 01/17/22 0929   • doxycycline (MONODOX) capsule 100 mg  100 mg Oral Q12H Lester Estrada DO   100 mg at 01/16/22 2029   • dronabinol (MARINOL) capsule 2.5 mg  2.5 mg Oral BID  Lester Estrada DO   2.5 mg at 01/17/22 0928   • ferrous sulfate tablet 325 mg  325 mg Oral Daily With Breakfast Lester Estrada DO   325 mg at 01/17/22 0928   • glucagon (human recombinant) (GLUCAGEN DIAGNOSTIC) injection 1 mg  1 mg Subcutaneous Q15 Min PRN Lester Estrada DO       • guaiFENesin (MUCINEX) 12 hr tablet 1,200 mg  1,200 mg Oral Q12H Lester Estrada DO   1,200 mg at 01/16/22 2030   • HYDROcodone-acetaminophen (NORCO) 5-325 MG per tablet 1 tablet  1 tablet Oral Q6H PRN Lester Estrada DO   1 tablet at 01/17/22 0943   • insulin aspart (novoLOG) injection 0-7 Units  0-7 Units Subcutaneous TID  Lester Estrada DO       • [COMPLETED] iopamidol (ISOVUE-300) 61 % injection 100 mL  100 mL Intravenous [facility]   55 mL at 01/16/22 0826   • ipratropium-albuterol (DUO-NEB) nebulizer solution 3 mL  3 mL Nebulization 4x Daily - RT Lester Estrada DO   3 mL at 01/17/22 0618   • ipratropium-albuterol (DUO-NEB) nebulizer solution 3 mL  3 mL Nebulization 4x Daily PRN Lester Estrada,        •  lactulose (CHRONULAC) 10 GM/15ML solution 10 g  10 g Oral Daily Lester Estrada DO   10 g at 01/17/22 0929   • levothyroxine (SYNTHROID, LEVOTHROID) tablet 150 mcg  150 mcg Oral Daily Lester Estrada DO   150 mcg at 01/17/22 0928   • [COMPLETED] levothyroxine (SYNTHROID, LEVOTHROID) tablet 150 mcg  150 mcg Oral Once Manny Marsh PharmD   150 mcg at 01/16/22 2030   • Magnesium Sulfate 2 gram Bolus, followed by 8 gram infusion (total Mg dose 10 grams)- Mg less than or equal to 1mg/dL  2 g Intravenous PRN Lester Estrada DO        Or   • Magnesium Sulfate 2 gram / 50mL Infusion (GIVE X 3 BAGS TO EQUAL 6GM TOTAL DOSE) - Mg 1.1 - 1.5 mg/dl  2 g Intravenous PRN Lester Estrada DO        Or   • Magnesium Sulfate 4 gram infusion- Mg 1.6-1.9 mg/dL  4 g Intravenous PRN Lester Estrada DO       • melatonin tablet 5 mg  5 mg Oral Nightly Lester Estrada DO   5 mg at 01/16/22 2030   • methylPREDNISolone sodium succinate (SOLU-Medrol) injection 40 mg  40 mg Intravenous Daily Lester Estrada DO   40 mg at 01/17/22 0929   • montelukast (SINGULAIR) tablet 10 mg  10 mg Oral Q PM Lester Esrtada DO   10 mg at 01/16/22 2030   • multivitamin (THERAGRAN) tablet 1 tablet  1 tablet Oral Nightly eLster Estrada DO   1 tablet at 01/16/22 2029   • nitroglycerin (NITROSTAT) SL tablet 0.4 mg  0.4 mg Sublingual Q5 Min PRN Lester Estrada DO       • ondansetron (ZOFRAN) injection 4 mg  4 mg Intravenous Q6H PRN Lester Estrada DO       • ondansetron (ZOFRAN) tablet 4 mg  4 mg Oral Q6H PRN Lester Estrada DO       • pantoprazole (PROTONIX) 40 mg in 100 mL NS (VTB)  8 mg/hr Intravenous Continuous Lester Estrada DO 20 mL/hr at 01/17/22 0617 8 mg/hr at 01/17/22 0617   • [COMPLETED] piperacillin-tazobactam (ZOSYN) IVPB 3.375 g in 100 mL NS VTB  3.375 g Intravenous Once Armen George DO   Stopped at 01/16/22 0840   •  piperacillin-tazobactam (ZOSYN) IVPB 4.5 g in 100 mL NS VTB  4.5 g Intravenous Q6H Lester Estrada DO   4.5 g at 01/17/22 0927   • polyethylene glycol (MIRALAX) powder 1 bottle  1 bottle Oral BID Lester Estrada DO       • Polyvinyl Alcohol-Povidone PF (HYPOTEARS) 1.4-0.6 % ophthalmic solution 1 drop  1 drop Both Eyes BID Lester Estrada DO   1 drop at 01/17/22 0929   • potassium chloride (MICRO-K) CR capsule 40 mEq  40 mEq Oral PRN Lester Estrada, DO        Or   • potassium chloride (KLOR-CON) packet 40 mEq  40 mEq Oral PRN Lester Estrada, DO        Or   • potassium chloride 10 mEq in 100 mL IVPB  10 mEq Intravenous Q1H PRN Lester Estrada, DO       • potassium phosphate 45 mmol in sodium chloride 0.9 % 500 mL infusion  45 mmol Intravenous PRN Lester Estrada, DO        Or   • potassium phosphate 30 mmol in sodium chloride 0.9 % 250 mL infusion  30 mmol Intravenous PRN Lester Estrada, DO        Or   • potassium phosphate 15 mmol in sodium chloride 0.9 % 100 mL infusion  15 mmol Intravenous PRN Lester Estrada DO        Or   • sodium phosphates 45 mmol in sodium chloride 0.9 % 500 mL IVPB  45 mmol Intravenous PRN Lester Estrada, DO        Or   • sodium phosphates 30 mmol in sodium chloride 0.9 % 250 mL IVPB  30 mmol Intravenous PRN Lester Estrada, DO        Or   • sodium phosphates 15 mmol in sodium chloride 0.9 % 250 mL IVPB  15 mmol Intravenous PRN Lester Estrada DO       • sodium chloride 0.9 % flush 10 mL  10 mL Intravenous Q12H Lester Estrada DO   10 mL at 01/16/22 2031   • sodium chloride 0.9 % flush 10 mL  10 mL Intravenous PRN Lester Estrada DO       • sodium chloride 0.9 % flush 10 mL  10 mL Intravenous Q12H Lester Estrada DO       • sodium chloride 0.9 % flush 10 mL  10 mL Intravenous PRN Lester Estrada DO       • sodium chloride 0.9 % flush 10  mL  10 mL Intravenous Q12H Lester Estrada DO       • sodium chloride 0.9 % flush 10 mL  10 mL Intravenous PRN Lester Estrada DO       • traZODone (DESYREL) tablet 50 mg  50 mg Oral Nightly Lester Estrada DO   50 mg at 01/16/22 2029   • vancomycin (VANCOCIN) 500 mg in sodium chloride 0.9 % 100 mL IVPB  500 mg Intravenous Q12H Lester Estrada DO   500 mg at 01/17/22 0329   • [COMPLETED] vancomycin 1 g/250 mL 0.9% NS (vial-mate)  1,000 mg Intravenous Once Lester Estrada DO   1,000 mg at 01/16/22 1706       Lab Results (last 24 hours)     Procedure Component Value Units Date/Time    MRSA Screen, PCR (Inpatient) - Swab, Nares [[id number]]  (Normal) Collected: 01/17/22 0642    Specimen: Swab from Nares Updated: 01/17/22 0915     MRSA PCR Negative     Staph aureus by PCR Negative    POC Glucose Once [[id number]]  (Normal) Collected: 01/17/22 0620    Specimen: Blood Updated: 01/17/22 0635     Glucose 114 mg/dL      Comment: Meter: [id number] : [id number] Sai Arreguin       Blood Culture - Blood, Arm, Left [[id number]]  (Normal) Collected: 01/16/22 0525    Specimen: Blood from Arm, Left Updated: 01/17/22 0545     Blood Culture No growth at 24 hours    Blood Culture - Blood, Arm, Right [[id number]]  (Normal) Collected: 01/16/22 0530    Specimen: Blood from Arm, Right Updated: 01/17/22 0545     Blood Culture No growth at 24 hours    CBC & Differential [[id number]]  (Abnormal) Collected: 01/17/22 0137    Specimen: Blood Updated: 01/17/22 0346    Narrative:      The following orders were created for panel order CBC & Differential.  Procedure                               Abnormality         Status                     ---------                               -----------         ------                     CBC Auto Differential[[id number]]        Abnormal            Final result               Scan Slide[[id number]]                                                                     Please view results for these tests on the individual orders.    Manual Differential [[id number]]  (Abnormal) Collected: 01/17/22 0137    Specimen: Blood Updated: 01/17/22 0346     Neutrophil % 74.0 %      Lymphocyte % 4.0 %      Monocyte % 4.0 %      Eosinophil % 1.0 %      Bands %  17.0 %      Neutrophils Absolute 32.17 10*3/mm3      Lymphocytes Absolute 1.41 10*3/mm3      Monocytes Absolute 1.41 10*3/mm3      Eosinophils Absolute 0.35 10*3/mm3      Anisocytosis Mod/2+     Hypochromia Slight/1+     Macrocytes Slight/1+     Polychromasia Slight/1+     Platelet Morphology Normal    CBC Auto Differential [[id number]]  (Abnormal) Collected: 01/17/22 0137    Specimen: Blood Updated: 01/17/22 0346     WBC 35.35 10*3/mm3      RBC 2.85 10*6/mm3      Hemoglobin 8.7 g/dL      Hematocrit 27.3 %      MCV 95.8 fL      MCH 30.5 pg      MCHC 31.9 g/dL      RDW 19.2 %      RDW-SD 66.4 fl      MPV 10.0 fL      Platelets 313 10*3/mm3     Basic Metabolic Panel [[id number]]  (Abnormal) Collected: 01/17/22 0137    Specimen: Blood Updated: 01/17/22 0219     Glucose 113 mg/dL      BUN 24 mg/dL      Creatinine 0.76 mg/dL      Sodium 134 mmol/L      Potassium 4.0 mmol/L      Chloride 91 mmol/L      CO2 28.4 mmol/L      Calcium 8.3 mg/dL      eGFR Non African Amer 74 mL/min/1.73      BUN/Creatinine Ratio 31.6     Anion Gap 14.6 mmol/L     Narrative:      GFR Normal >60  Chronic Kidney Disease <60  Kidney Failure <15      Vitamin B12 [[id number]] Collected: 01/17/22 0137    Specimen: Blood Updated: 01/17/22 0153    Hemoglobin A1c [[id number]]  (Normal) Collected: 01/16/22 2001    Specimen: Blood Updated: 01/16/22 2326     Hemoglobin A1C 5.10 %     Narrative:      Hemoglobin A1C Ranges:    Increased Risk for Diabetes  5.7% to 6.4%  Diabetes                     >= 6.5%  Diabetic Goal                < 7.0%    T4, Free [[id number]]  (Abnormal) Collected: 01/16/22 2001    Specimen: Blood Updated: 01/16/22 2123     Free T4 0.61 ng/dL     Narrative:       Results may be falsely increased if patient taking Biotin.      Iron Profile [[id number]]  (Normal) Collected: 01/16/22 2001    Specimen: Blood Updated: 01/16/22 2119     Iron 135 mcg/dL      Iron Saturation 45 %      Transferrin 203 mg/dL      TIBC 302 mcg/dL     Sedimentation Rate [[id number]]  (Abnormal) Collected: 01/16/22 2001    Specimen: Blood Updated: 01/16/22 2117     Sed Rate 52 mm/hr     Hemoglobin & Hematocrit, Blood [[id number]]  (Abnormal) Collected: 01/16/22 2001    Specimen: Blood Updated: 01/16/22 2111     Hemoglobin 8.6 g/dL      Comment: Post Transfusion Specimen         Hematocrit 26.8 %     POC Glucose Once [[id number]]  (Normal) Collected: 01/16/22 2037    Specimen: Blood Updated: 01/16/22 2053     Glucose 101 mg/dL      Comment: Meter: [id number] : [id number] Sai Arreguin       Reticulocytes [[id number]]  (Abnormal) Collected: 01/16/22 2001    Specimen: Blood Updated: 01/16/22 2052     Reticulocyte % 4.19 %      Reticulocyte Absolute 0.1182 10*6/mm3     Peripheral Blood Smear [[id number]] Collected: 01/16/22 2001    Specimen: Blood Updated: 01/16/22 2048    Folate [[id number]] Collected: 01/16/22 2001    Specimen: Blood Updated: 01/16/22 2048    POC Glucose Once [[id number]]  (Normal) Collected: 01/16/22 1919    Specimen: Blood Updated: 01/16/22 1931     Glucose 89 mg/dL      Comment: Meter: [id number] : [id number] DONITA HAY       TSH [[id number]]  (Abnormal) Collected: 01/16/22 1050    Specimen: Blood Updated: 01/16/22 1433     TSH 55.020 uIU/mL     Lipid Panel [[id number]]  (Abnormal) Collected: 01/16/22 1050    Specimen: Blood Updated: 01/16/22 1422     Total Cholesterol 99 mg/dL      Triglycerides 139 mg/dL      HDL Cholesterol 37 mg/dL      LDL Cholesterol  38 mg/dL      VLDL Cholesterol 24 mg/dL      LDL/HDL Ratio 0.92    Narrative:      Cholesterol Reference Ranges  (U.S. Department of Health and Human Services ATP III Classifications)    Desirable          <200  "mg/dL  Borderline High    200-239 mg/dL  High Risk          >240 mg/dL      Triglyceride Reference Ranges  (U.S. Department of Health and Human Services ATP III Classifications)    Normal           <150 mg/dL  Borderline High  150-199 mg/dL  High             200-499 mg/dL  Very High        >500 mg/dL    HDL Reference Ranges  (U.S. Department of Health and Human Services ATP III Classifcations)    Low     <40 mg/dl (major risk factor for CHD)  High    >60 mg/dl ('negative' risk factor for CHD)        LDL Reference Ranges  (U.S. Department of Health and Human Services ATP III Classifcations)    Optimal          <100 mg/dL  Near Optimal     100-129 mg/dL  Borderline High  130-159 mg/dL  High             160-189 mg/dL  Very High        >189 mg/dL    Procalcitonin [[id number]]  (Normal) Collected: 01/16/22 0525    Specimen: Blood from Arm, Left Updated: 01/16/22 1225     Procalcitonin 0.22 ng/mL     Narrative:      As a Marker for Sepsis (Non-Neonates):     1. <0.5 ng/mL represents a low risk of severe sepsis and/or septic shock.  2. >2 ng/mL represents a high risk of severe sepsis and/or septic shock.    As a Marker for Lower Respiratory Tract Infections that require antibiotic therapy:  PCT on Admission     Antibiotic Therapy             6-12 Hrs later  >0.5                          Strongly Recommended            >0.25 - <0.5             Recommended  0.1 - 0.25                  Discouraged                       Remeasure/reassess PCT  <0.1                         Strongly Discouraged         Remeasure/reassess PCT      As 28 day mortality risk marker: \"Change in Procalcitonin Result\" (>80% or <=80%) if Day 0 (or Day 1) and Day 4 values are available. Refer to http://www.[employer]-pct-calculator.com/    Change in PCT <=80 %   A decrease of PCT levels below or equal to 80% defines a positive change in PCT test result representing a higher risk for 28-day all-cause mortality of patients diagnosed with severe sepsis or septic " shock.    Change in PCT >80 %   A decrease of PCT levels of more than 80% defines a negative change in PCT result representing a lower risk for 28-day all-cause mortality of patients diagnosed with severe sepsis or septic shock.                Troponin [[id number]]  (Abnormal) Collected: 01/16/22 1050    Specimen: Blood Updated: 01/16/22 1131     Troponin T 0.062 ng/mL     Narrative:      Troponin T Reference Range:  <= 0.03 ng/mL-   Negative for AMI  >0.03 ng/mL-     Abnormal for myocardial necrosis.  Clinicians would have to utilize clinical acumen, EKG, Troponin and serial changes to determine if it is an Acute Myocardial Infarction or myocardial injury due to an underlying chronic condition.       Results may be falsely decreased if patient taking Biotin.      Blood Gas, Arterial With Co-Ox [[id number]]  (Abnormal) Collected: 01/16/22 1127    Specimen: Arterial Blood Updated: 01/16/22 1130     Site Left Brachial     Chetan's Test N/A     pH, Arterial 7.383 pH units      pCO2, Arterial 62.0 mm Hg      Comment: 83 Value above reference range        pO2, Arterial 66.8 mm Hg      Comment: 84 Value below reference range        HCO3, Arterial 36.9 mmol/L      Comment: 83 Value above reference range        Base Excess, Arterial 10.7 mmol/L      O2 Saturation, Arterial 93.3 %      Comment: 84 Value below reference range        Hemoglobin, Blood Gas 7.0 g/dL      Comment: 84 Value below reference range        Hematocrit, Blood Gas 21.5 %      Comment: 84 Value below reference range        Oxyhemoglobin 91.7 %      Comment: 84 Value below reference range        Methemoglobin 0.10 %      Carboxyhemoglobin 1.6 %      A-a Gradiant 78.9 mmHg      CO2 Content 38.8 mmol/L      Temperature 0.0 C      Barometric Pressure for Blood Gas 718 mmHg      Modality Nasal Cannula     FIO2 32 %      Flow Rate 3.0 lpm      Ventilator Mode NA     Note --     Collected by [id number]     Comment: Meter: [id number]     :  [id number]         pH, Temp Corrected --     pCO2, Temperature Corrected --     pO2, Temperature Corrected --        Imaging Results (Last 24 Hours)     ** No results found for the last 24 hours. **        ECG/EMG Results (last 24 hours)     Procedure Component Value Units Date/Time    Adult Transthoracic Echo Complete W/ Cont if Necessary Per Protocol [[id number]] Collected: 01/16/22 1049     Updated: 01/16/22 1647     BSA 1.5 m^2      IVSd 1.0 cm      LVIDd 4.2 cm      LVIDs 3.1 cm      LVPWd 1.8 cm      IVS/LVPW 0.56     FS 26.2 %      EDV(Teich) 78.6 ml      ESV(Teich) 37.9 ml      EF(Teich) 51.7 %      EDV(cubed) 74.1 ml      ESV(cubed) 29.8 ml      EF(cubed) 59.8 %      LV mass(C)d 224.3 grams      LV mass(C)dI 152.3 grams/m^2      SV(Teich) 40.7 ml      SI(Teich) 27.6 ml/m^2      SV(cubed) 44.3 ml      SI(cubed) 30.1 ml/m^2      Ao root diam 3.2 cm      Ao root area 8.0 cm^2      ACS 1.2 cm      LA dimension 2.8 cm      LA/Ao 0.88     LVOT diam 1.9 cm      LVOT area 2.8 cm^2      LVOT area(traced) 2.8 cm^2      LVLd ap4 7.0 cm      EDV(MOD-sp4) 52.6 ml      LVLs ap4 5.6 cm      ESV(MOD-sp4) 21.7 ml      EF(MOD-sp4) 58.7 %      SV(MOD-sp4) 30.9 ml      SI(MOD-sp4) 21.0 ml/m^2      Ao root area (BSA corrected) 2.2     LV Mckoy Vol (BSA corrected) 35.7 ml/m^2      LV Sys Vol (BSA corrected) 14.7 ml/m^2      MV E max radha 104.0 cm/sec      MV A max radha 108.0 cm/sec      MV E/A 0.96     Ao pk radha 189.5 cm/sec      Ao max PG 14.4 mmHg      Ao V2 mean 119.5 cm/sec      Ao mean PG 6.5 mmHg      Ao V2 VTI 39.0 cm      SV(Ao) 313.7 ml      SI(Ao) 212.9 ml/m^2      PA acc time 0.12 sec      PA pr(Accel) 26.8 mmHg      BH CV ECHO NATHALIE - BZI_BMI 16.6 kilograms/m^2       CV ECHO NATHALIE - BSA(HAYCOCK) 1.4 m^2       CV ECHO NATHALIE - BZI_METRIC_WEIGHT 45.4 kg       CV ECHO NATHALIE - BZI_METRIC_HEIGHT 165.1 cm      Target HR (85%) 122 bpm      Max. Pred. HR (100%) 144 bpm     Narrative:      · Left ventricular wall thickness is consistent with mild  concentric   hypertrophy.  · Left ventricular ejection fraction appears to be 66 - 70%. Left   ventricular systolic function is normal.  · Left ventricular diastolic function is consistent with (grade I)   impaired relaxation.  · The cardiac chamber dimensions are normal.  · No significant valvular abnormality is noted.  · There is no evidence of pericardial effusion.       ECG 12 Lead [[id number]] Collected: 22     Updated: 22     QT Interval 458 ms      QTC Interval 490 ms     Narrative:      Test Reason : SOA  Blood Pressure :   */*   mmHG  Vent. Rate :  69 BPM     Atrial Rate :  69 BPM     P-R Int : 178 ms          QRS Dur :  96 ms      QT Int : 458 ms       P-R-T Axes :  65  11 106 degrees     QTc Int : 490 ms    Normal sinus rhythm  ST & T wave abnormality, consider lateral ischemia  Abnormal ECG  Confirmed by Sean Riley () on 2022 8:49:33 PM    Referred By: JUANCARLOS           Confirmed By: Sean Riley                 Consult Notes (most recent note)      Jj Scales MD at 22 0755          [facility]   Consult Note    Patient Name: Mary Ly  : 1945  MRN: [id number]  Primary Care Physician:  Leta Gardner MD  Referring Physician: No Known Provider  Date of admission: 2022    Consults  Subjective   Subjective     Reason for Consult/ Chief Complaint: Anemia    History of Present Illness  Mary Ly is a 76 y.o. female with known multiple AVMs presenting with anemia requiring transfusion.  She has melanic stools.  No bright red blood per rectum.  No hemodynamic instability.  She is on aspirin Plavix due to significant peripheral vascular disease.  No abdominal pain.  She is thin and frail in appearance.  She has been seen multiple times for similar presentation.    Review of Systems   Constitutional: Negative for activity change, appetite change, chills and fever.   HENT: Negative for sore throat and trouble swallowing.    Eyes:  Negative for visual disturbance.   Respiratory: Negative for cough and shortness of breath.    Cardiovascular: Negative for chest pain and palpitations.   Gastrointestinal: Positive for blood in stool. Negative for abdominal distention, abdominal pain, constipation, diarrhea, nausea and vomiting.   Endocrine: Negative for cold intolerance and heat intolerance.   Genitourinary: Negative for dysuria.   Musculoskeletal: Negative for joint swelling.   Skin: Negative for color change, rash and wound.   Allergic/Immunologic: Negative for immunocompromised state.   Neurological: Negative for dizziness, seizures, weakness and headaches.   Hematological: Negative for adenopathy. Does not bruise/bleed easily.   Psychiatric/Behavioral: Negative for agitation and confusion.        Personal History     Past Medical History:   Diagnosis Date   • AAA (abdominal aortic aneurysm) ([facility])     s/p repair    • Arthritis    • CAD (coronary artery disease)     s/p stenting in the past    • Chronic kidney disease (CKD), stage III (moderate) ([facility])    • COPD (chronic obstructive pulmonary disease) ([facility])    • Debility    • Diastolic CHF, chronic ([facility]) 4/23/2021   • GERD (gastroesophageal reflux disease)    • History of CVA (cerebrovascular accident)    • History of ischemic colitis    • Hyperlipidemia 4/23/2021   • Hypertension    • Hypothyroidism    • Pneumonia due to COVID-19 virus 9/21/2021   • Stroke ([facility])    • Type II diabetes mellitus ([facility])        Past Surgical History:   Procedure Laterality Date   • BACK SURGERY     • CARDIAC CATHETERIZATION     • CHOLECYSTECTOMY N/A 7/17/2020    Procedure: CHOLECYSTECTOMY LAPAROSCOPIC;  Surgeon: Lonnie Meneses MD;  Location: [facility];  Service: General;  Laterality: N/A;   • COLONOSCOPY     • COLONOSCOPY N/A 9/25/2021    Procedure: COLONOSCOPY;  Surgeon: Lonnie Meneses MD;  Location: [facility];  Service: Gastroenterology;  Laterality: N/A;   • CORONARY STENT PLACEMENT     • ENDOSCOPY     •  ENDOSCOPY N/A 9/25/2021    Procedure: ESOPHAGOGASTRODUODENOSCOPY;  Surgeon: Lonnie Meneses MD;  Location: [facility];  Service: Gastroenterology;  Laterality: N/A;   • TONSILLECTOMY         Family History: family history includes Diabetes in her mother; Heart attack in her brother and father; No Known Problems in her brother, sister, sister, and sister. Otherwise pertinent FHx was reviewed and not pertinent to current issue.    Social History:  reports that she has been smoking cigarettes. She has a 55.00 pack-year smoking history. She has never used smokeless tobacco. She reports that she does not drink alcohol and does not use drugs.    Home Medications:   Acidophilus/Pectin, Carboxymethylcellulose Sodium, Cariprazine HCl, HYDROcodone-acetaminophen, SITagliptin, amiodarone, apixaban, atorvastatin, carvedilol, docusate sodium, dronabinol, ferrous sulfate, ipratropium-albuterol, lactulose, levothyroxine, lisinopril, melatonin, montelukast, multivitamin, ondansetron, pantoprazole, polyethylene glycol, promethazine, and traZODone    Allergies:  Allergies   Allergen Reactions   • Haldol [Haloperidol] Hallucinations       Objective    Objective     Vitals:  Temp:  [97.1 °F (36.2 °C)-99.9 °F (37.7 °C)] 97.8 °F (36.6 °C)  Heart Rate:  [60-70] 66  Resp:  [13-28] 14  BP: ()/(53-67) 105/57  Flow (L/min):  [2-3] 2    Physical Exam  Constitutional:       Appearance: She is well-developed.   HENT:      Head: Normocephalic and atraumatic.   Eyes:      Conjunctiva/sclera: Conjunctivae normal.      Pupils: Pupils are equal, round, and reactive to light.   Neck:      Thyroid: No thyromegaly.      Vascular: No JVD.      Trachea: No tracheal deviation.   Cardiovascular:      Rate and Rhythm: Normal rate and regular rhythm.      Heart sounds: No murmur heard.  No friction rub. No gallop.    Pulmonary:      Effort: Pulmonary effort is normal.      Breath sounds: Normal breath sounds.   Abdominal:      General: There is no  distension.      Palpations: Abdomen is soft. There is no hepatomegaly or splenomegaly.      Tenderness: There is no abdominal tenderness.      Hernia: No hernia is present.   Musculoskeletal:         General: No deformity. Normal range of motion.      Cervical back: Neck supple.   Skin:     General: Skin is warm and dry.   Neurological:      Mental Status: She is alert and oriented to person, place, and time.         Result Review    Result Review:  I have personally reviewed the results from the time of this admission to 1/17/2022 07:55 EST and agree with these findings:  [x]  Laboratory  []  Microbiology  [x]  Radiology  []  EKG/Telemetry   []  Cardiology/Vascular   []  Pathology  []  Old records  []  Other:      Assessment/Plan   Assessment / Plan     Brief Patient Summary:  Mary Ly is a 76 y.o. female who presents with recurrent anemia likely secondary to GI losses from multiple AVMs.  She has no hemodynamic instability and has responded to transfusion initially.    Active Hospital Problems:  Active Hospital Problems    Diagnosis    • Anemia      Plan:   Unfortunately the patient requires antiplatelet therapy with aspirin and Plavix which likely contributes to her persistent AVM intermittent bleeding episodes.  She is a very poor candidate for surgical intervention unless unstable or active bleeding suspected.  I believe she was simply continued to present as a transfusion dependent patient with multiple AVMs.  No surgical intervention at this time but surgery will continue to follow to make sure that her hemoglobin remained stable after this initial transfusion.    Jj Scales MD    Electronically signed by Jj Scales MD at 01/17/22 1574

## 2022-01-17 NOTE — SIGNIFICANT NOTE
01/17/22 1326   OTHER   Discipline speech language pathologist   Rehab Time/Intention   Session Not Performed other (see comments)  (Pt currently on clear liquid po diet for bowel prep per RN.  SLP to f/u when clear to accept po trials of puree and solid consistencies.)   Recommendations   SLP - Next Appointment 01/18/22

## 2022-01-17 NOTE — CONSULTS
[facility]   Consult Note    Patient Name: Mary Ly  : 1945  MRN: [id number]  Primary Care Physician:  Leta Gardner MD  Referring Physician: No Known Provider  Date of admission: 2022    Consults  Subjective   Subjective     Reason for Consult/ Chief Complaint: Anemia    History of Present Illness  Mary Ly is a 76 y.o. female with known multiple AVMs presenting with anemia requiring transfusion.  She has melanic stools.  No bright red blood per rectum.  No hemodynamic instability.  She is on aspirin Plavix due to significant peripheral vascular disease.  No abdominal pain.  She is thin and frail in appearance.  She has been seen multiple times for similar presentation.    Review of Systems   Constitutional: Negative for activity change, appetite change, chills and fever.   HENT: Negative for sore throat and trouble swallowing.    Eyes: Negative for visual disturbance.   Respiratory: Negative for cough and shortness of breath.    Cardiovascular: Negative for chest pain and palpitations.   Gastrointestinal: Positive for blood in stool. Negative for abdominal distention, abdominal pain, constipation, diarrhea, nausea and vomiting.   Endocrine: Negative for cold intolerance and heat intolerance.   Genitourinary: Negative for dysuria.   Musculoskeletal: Negative for joint swelling.   Skin: Negative for color change, rash and wound.   Allergic/Immunologic: Negative for immunocompromised state.   Neurological: Negative for dizziness, seizures, weakness and headaches.   Hematological: Negative for adenopathy. Does not bruise/bleed easily.   Psychiatric/Behavioral: Negative for agitation and confusion.        Personal History     Past Medical History:   Diagnosis Date   • AAA (abdominal aortic aneurysm) ([facility])     s/p repair    • Arthritis    • CAD (coronary artery disease)     s/p stenting in the past    • Chronic kidney disease (CKD), stage III (moderate) ([facility])    • COPD (chronic obstructive  pulmonary disease) ([facility])    • Debility    • Diastolic CHF, chronic (HCC) 4/23/2021   • GERD (gastroesophageal reflux disease)    • History of CVA (cerebrovascular accident)    • History of ischemic colitis    • Hyperlipidemia 4/23/2021   • Hypertension    • Hypothyroidism    • Pneumonia due to COVID-19 virus 9/21/2021   • Stroke ([facility])    • Type II diabetes mellitus ([facility])        Past Surgical History:   Procedure Laterality Date   • BACK SURGERY     • CARDIAC CATHETERIZATION     • CHOLECYSTECTOMY N/A 7/17/2020    Procedure: CHOLECYSTECTOMY LAPAROSCOPIC;  Surgeon: Lonnie Meneses MD;  Location: [facility] OR;  Service: General;  Laterality: N/A;   • COLONOSCOPY     • COLONOSCOPY N/A 9/25/2021    Procedure: COLONOSCOPY;  Surgeon: Lonnie Meneses MD;  Location: [facility] OR;  Service: Gastroenterology;  Laterality: N/A;   • CORONARY STENT PLACEMENT     • ENDOSCOPY     • ENDOSCOPY N/A 9/25/2021    Procedure: ESOPHAGOGASTRODUODENOSCOPY;  Surgeon: Lonnie Meneses MD;  Location: [facility] OR;  Service: Gastroenterology;  Laterality: N/A;   • TONSILLECTOMY         Family History: family history includes Diabetes in her mother; Heart attack in her brother and father; No Known Problems in her brother, sister, sister, and sister. Otherwise pertinent FHx was reviewed and not pertinent to current issue.    Social History:  reports that she has been smoking cigarettes. She has a 55.00 pack-year smoking history. She has never used smokeless tobacco. She reports that she does not drink alcohol and does not use drugs.    Home Medications:   Acidophilus/Pectin, Carboxymethylcellulose Sodium, Cariprazine HCl, HYDROcodone-acetaminophen, SITagliptin, amiodarone, apixaban, atorvastatin, carvedilol, docusate sodium, dronabinol, ferrous sulfate, ipratropium-albuterol, lactulose, levothyroxine, lisinopril, melatonin, montelukast, multivitamin, ondansetron, pantoprazole, polyethylene glycol, promethazine, and traZODone    Allergies:  Allergies    Allergen Reactions   • Haldol [Haloperidol] Hallucinations       Objective    Objective     Vitals:  Temp:  [97.1 °F (36.2 °C)-99.9 °F (37.7 °C)] 97.8 °F (36.6 °C)  Heart Rate:  [60-70] 66  Resp:  [13-28] 14  BP: ()/(53-67) 105/57  Flow (L/min):  [2-3] 2    Physical Exam  Constitutional:       Appearance: She is well-developed.   HENT:      Head: Normocephalic and atraumatic.   Eyes:      Conjunctiva/sclera: Conjunctivae normal.      Pupils: Pupils are equal, round, and reactive to light.   Neck:      Thyroid: No thyromegaly.      Vascular: No JVD.      Trachea: No tracheal deviation.   Cardiovascular:      Rate and Rhythm: Normal rate and regular rhythm.      Heart sounds: No murmur heard.  No friction rub. No gallop.    Pulmonary:      Effort: Pulmonary effort is normal.      Breath sounds: Normal breath sounds.   Abdominal:      General: There is no distension.      Palpations: Abdomen is soft. There is no hepatomegaly or splenomegaly.      Tenderness: There is no abdominal tenderness.      Hernia: No hernia is present.   Musculoskeletal:         General: No deformity. Normal range of motion.      Cervical back: Neck supple.   Skin:     General: Skin is warm and dry.   Neurological:      Mental Status: She is alert and oriented to person, place, and time.         Result Review    Result Review:  I have personally reviewed the results from the time of this admission to 1/17/2022 07:55 EST and agree with these findings:  [x]  Laboratory  []  Microbiology  [x]  Radiology  []  EKG/Telemetry   []  Cardiology/Vascular   []  Pathology  []  Old records  []  Other:      Assessment/Plan   Assessment / Plan     Brief Patient Summary:  Mary Ly is a 76 y.o. female who presents with recurrent anemia likely secondary to GI losses from multiple AVMs.  She has no hemodynamic instability and has responded to transfusion initially.    Active Hospital Problems:  Active Hospital Problems    Diagnosis    • Anemia       Plan:   Unfortunately the patient requires antiplatelet therapy with aspirin and Plavix which likely contributes to her persistent AVM intermittent bleeding episodes.  She is a very poor candidate for surgical intervention unless unstable or active bleeding suspected.  I believe she was simply continued to present as a transfusion dependent patient with multiple AVMs.  No surgical intervention at this time but surgery will continue to follow to make sure that her hemoglobin remained stable after this initial transfusion.    Jj Scales MD

## 2022-01-17 NOTE — PROGRESS NOTES
"    [facility] HOSPITALIST PROGRESS NOTE     Patient Identification:  Name:  Mary Ly  Age:  76 y.o.  Sex:  female  :  1945  MRN:  [id number]  Visit Number:  [id number]  ROOM: [place] [address]     Primary Care Provider:  Leta Gardner MD    Length of stay in inpatient status:  1    Subjective     Chief Compliant:    Chief Complaint   Patient presents with   • Shortness of Breath     History of Presenting Illness:    Patient remains ill but stable, no acute events overnight, no new complaints, endorses persisting fatigue and weakness, reports she does \"not feel well\" today, has not noticed any more dark stools or bleeding but feels like she is still bleeding reportedly, on Zosyn and de-escalate Abx as MRSA negative and no PNA on imaging, enteritis noted on CT, has chronic hx of ischemic disease but per history improves on IV Abx, surgery following and deferring procedure for now, continued on ASA 81, holding home Eliquis, Hgb stable overnight, on IV PPI gtt for now, have consulted palliative care given recurrent nature of chronic intermittent bleeding requiring transfusions.  Patient denies any fevers or chills.   Objective     Current Hospital Meds:Acidophilus/Pectin, 1 capsule, Oral, BID  aspirin, 81 mg, Oral, Daily  atorvastatin, 40 mg, Oral, Nightly  carvedilol, 3.125 mg, Oral, BID With Meals  docusate sodium, 200 mg, Oral, Daily  dronabinol, 2.5 mg, Oral, BID AC  ferrous sulfate, 325 mg, Oral, Daily With Breakfast  guaiFENesin, 1,200 mg, Oral, Q12H  insulin aspart, 0-7 Units, Subcutaneous, TID AC  ipratropium-albuterol, 3 mL, Nebulization, 4x Daily - RT  lactulose, 10 g, Oral, Daily  levothyroxine, 150 mcg, Oral, Daily  melatonin, 5 mg, Oral, Nightly  methylPREDNISolone sodium succinate, 40 mg, Intravenous, Daily  montelukast, 10 mg, Oral, Q PM  multivitamin, 1 tablet, Oral, Nightly  piperacillin-tazobactam, 4.5 g, Intravenous, Q6H  polyethylene glycol, 1 bottle, Oral, BID  Polyvinyl " Alcohol-Povidone PF, 1 drop, Both Eyes, BID  sodium chloride, 10 mL, Intravenous, Q12H  sodium chloride, 10 mL, Intravenous, Q12H  sodium chloride, 10 mL, Intravenous, Q12H  traZODone, 50 mg, Oral, Nightly    pantoprazole, 8 mg/hr, Last Rate: 8 mg/hr (01/17/22 0617)        Current Antimicrobial Therapy:  Anti-Infectives (From admission, onward)    Ordered     Dose/Rate Route Frequency Start Stop    01/16/22 1442  vancomycin 1 g/250 mL 0.9% NS (vial-mate)        Ordering Provider: Lester Estrada DO    1,000 mg  over 60 Minutes Intravenous Once 01/16/22 1600 01/16/22 1806    01/16/22 1352  piperacillin-tazobactam (ZOSYN) IVPB 4.5 g in 100 mL NS VTB        Ordering Provider: Lester Estrada DO    4.5 g  over 4 Hours Intravenous Every 6 Hours 01/16/22 1445 01/23/22 1444    01/16/22 0522  piperacillin-tazobactam (ZOSYN) IVPB 3.375 g in 100 mL NS VTB        Ordering Provider: Armen George DO    3.375 g  over 30 Minutes Intravenous Once 01/16/22 0524 01/16/22 0840        Current Diuretic Therapy:  Diuretics (From admission, onward)    None        ----------------------------------------------------------------------------------------------------------------------  Vital Signs:  Temp:  [97.7 °F (36.5 °C)-98.3 °F (36.8 °C)] 97.8 °F (36.6 °C)  Heart Rate:  [60-80] 80  Resp:  [13-28] 28  BP: ()/(53-83) 105/53  SpO2:  [92 %-100 %] 94 %  on  Flow (L/min):  [2-3] 2;   Device (Oxygen Therapy): nasal cannula  Body mass index is 18.35 kg/m².    Wt Readings from Last 3 Encounters:   01/17/22 50 kg (110 lb 4.8 oz)   12/24/21 45.4 kg (100 lb)   11/19/21 45.4 kg (100 lb)     Intake & Output (last 3 days)       01/14 0701  01/15 0700 01/15 0701 01/16 0700 01/16 0701 01/17 0700 01/17 0701 01/18 0700    I.V. (mL/kg)   400 (8)     Blood   600     IV Piggyback   100     Total Intake(mL/kg)   1100 (22)     Net   +1100             Urine Unmeasured Occurrence   1 x 1 x        Diet Clear  Liquid  ----------------------------------------------------------------------------------------------------------------------  Physical exam:  Constitutional:  Chronically ill appearing, No acute distress, more lethargic than I have seen in the past  HENT:  Head:  Normocephalic and atraumatic.  Mouth:  Moist mucous membranes.    Eyes:  Conjunctivae and EOM are normal. No scleral icterus.    Neck:  Neck supple.  No JVD present.    Cardiovascular:  Normal rate, regular rhythm and normal heart sounds with no murmur.  Pulmonary/Chest:  Chronic respiratory distress, no wheezes, no crackles, on NC  Abdominal:  Soft. No distension and no tenderness.   Musculoskeletal:  No tenderness, and no deformity.  No red or swollen joints anywhere.    Neurological:  Alert and oriented to person, place, and time.  No cranial nerve deficit.    Skin:  Skin is warm and dry. No rash noted. No pallor.   Peripheral vascular: no clubbing, no cyanosis, no edema.  ----------------------------------------------------------------------------------------------------------------------  Results from last 7 days   Lab Units 01/17/22  0137 01/16/22 2001 01/16/22  0525 01/16/22  0449   CRP mg/dL  --   --  16.33*  --    LACTATE mmol/L  --   --  1.0  --    WBC 10*3/mm3 35.35*  --   --  35.31*   HEMOGLOBIN g/dL 8.7* 8.6*  --  5.8*   HEMATOCRIT % 27.3* 26.8*  --  19.2*   MCV fL 95.8  --   --  100.5*   MCHC g/dL 31.9  --   --  30.2*   PLATELETS 10*3/mm3 313  --   --  391   INR   --   --  1.28*  --      Results from last 7 days   Lab Units 01/16/22  1127   PH, ARTERIAL pH units 7.383   PO2 ART mm Hg 66.8*   PCO2, ARTERIAL mm Hg 62.0*   HCO3 ART mmol/L 36.9*     Results from last 7 days   Lab Units 01/17/22  0137 01/16/22  0449   SODIUM mmol/L 134* 136   POTASSIUM mmol/L 4.0 4.2   CHLORIDE mmol/L 91* 93*   CO2 mmol/L 28.4 30.2*   BUN mg/dL 24* 21   CREATININE mg/dL 0.76 0.65   EGFR IF NONAFRICN AM mL/min/1.73 74 89   CALCIUM mg/dL 8.3* 8.6   GLUCOSE mg/dL  113* 94   ALBUMIN g/dL  --  2.79*   BILIRUBIN mg/dL  --  <0.2   ALK PHOS U/L  --  88   AST (SGOT) U/L  --  43*   ALT (SGPT) U/L  --  38*   Estimated Creatinine Clearance: 47.2 mL/min (by C-G formula based on SCr of 0.76 mg/dL).  No results found for: AMMONIA  Results from last 7 days   Lab Units 01/16/22  1050 01/16/22  0525   TROPONIN T ng/mL 0.062* 0.057*     Results from last 7 days   Lab Units 01/16/22  0525   PROBNP pg/mL 1,158.0     Results from last 7 days   Lab Units 01/16/22  1050   CHOLESTEROL mg/dL 99   TRIGLYCERIDES mg/dL 139   HDL CHOL mg/dL 37*   LDL CHOL mg/dL 38     Hemoglobin A1C   Date/Time Value Ref Range Status   01/16/2022 2001 5.10 4.80 - 5.60 % Final     Glucose   Date/Time Value Ref Range Status   01/17/2022 0620 114 70 - 130 mg/dL Final     Comment:     Meter: [id number] : [id number] [employer]   01/16/2022 2037 101 70 - 130 mg/dL Final     Comment:     Meter: [id number] : [id number] [employer] [facility]   01/16/2022 1919 89 70 - 130 mg/dL Final     Comment:     Meter: [id number] : [id number] DONITA HAY     Lab Results   Component Value Date    TSH 55.020 (H) 01/16/2022    FREET4 0.61 (L) 01/16/2022     No results found for: PREGTESTUR, PREGSERUM, HCG, HCGQUANT  Pain Management Panel     Pain Management Panel Latest Ref Rng & Units 9/21/2021 8/13/2020    AMPHETAMINES SCREEN, URINE Negative Negative Negative    BARBITURATES SCREEN Negative Negative Negative    BENZODIAZEPINE SCREEN, URINE Negative Negative Negative    BUPRENORPHINEUR Negative Negative Negative    COCAINE SCREEN, URINE Negative Negative Negative    METHADONE SCREEN, URINE Negative Negative Negative    METHAMPHETAMINEUR Negative Negative -        Brief Urine Lab Results  (Last result in the past 365 days)      Color   Clarity   Blood   Leuk Est   Nitrite   Protein   CREAT   Urine HCG        12/24/21 1530 Yellow   Clear   Negative   Moderate (2+)   Negative   Negative               Blood Culture   Date Value Ref  Range Status   01/16/2022 No growth at 24 hours  Preliminary   01/16/2022 No growth at 24 hours  Preliminary     No results found for: URINECX  No results found for: WOUNDCX  No results found for: STOOLCX  No results found for: RESPCX  No results found for: AFBCX  Results from last 7 days   Lab Units 01/16/22 2001 01/16/22  0525   PROCALCITONIN ng/mL  --  0.22   LACTATE mmol/L  --  1.0   SED RATE mm/hr 52*  --    CRP mg/dL  --  16.33*     I have personally looked at the labs and they are summarized above.  ----------------------------------------------------------------------------------------------------------------------  Detailed radiology reports for the last 24 hours:  Imaging Results (Last 24 Hours)     ** No results found for the last 24 hours. **        Assessment & Plan    76F PMH significant for stroke, paroxysmal atrial fibrillation, diabetes mellitus, chronic anticoagulation with Eliquis, hypertension and AAA status post repair who presented w/     #C/f UGIB w/ anemia requiring transfusions and Hx AVM's w/ chronic intermittent bleeding  #Sepsis, Acute Metabolic Encephalopathy 2/2 Suspected infectious enteritis w/ Hx recurrent colitis  #Hx Ischemic Colitis w/ occluded Celiac Artery, Severe SMA stenosis w/ reconstitution, CHRISTOPHER covered by prior EVAR  - Patient presented w/ multiple recent admissions for recurrent colitis, improves w/ IV Abx, this admission similar presentation w/ AMS, Hgb 5.8 on admission, s/p 2U PRBCs, elevated WBC count   - Admission labs showed WBC count 35K, Hgb 5.8, lactate 1.0, covid/flu negative, Bcx's NGTD.  - CTA Abd 8/2020 showed poor visualization of origin celiac or superior mesenteric arteries, cannot exclude ischemia of colon due to diffuse colonic wall thickening.  - CT Abd/Pelvis this admission showed possible enteritis  - Surgery consulted; Following, notes poor surgical candidacy   - Palliative Care consulted; Recs pending  - Continue Zosyn, d/c Cassius and Doxy  - Continue  IV PPI gtt, trend Hgb and discuss w/ surgery if further downtrending  - Monitor in PCU, monitor on telemetry, plans to return to NH when stable     #HTN/HLD/CAD s/p PCI  #Chronic HFpEF  #Severe b/l PAD b/l LE's  #Descending Thoracic AA, AAA s/ EVAR  - Echo 1/2022 showed LVEF 66-70%, mild concentric LVH, diastolic dysfunction.  - CTA Abd previously showed multifocal plaque through superficial femoral arteries b/l, borderline hemodynamically significant stenosis but no occlusive segments, Celiac and SMA as per above, descending thoracic aortic aneurysm  - CT Abd/Pelvis 1/16/22 showed 4cm descending thoracic aortic aneurysm, mild cardiomegaly, 3cm supra-renal abd aortic aneurysm and partially imaged aortobiliac stent graft  - Continue home Statin, home ASA 81  - Continue home BB, holding home ACEI for lower BP, resume as indicated  - D/c'd home Amiodarone due to below noted emphysema  - Hold Eliquis as per bleeding noted above  - Continue to monitor on tele, strict I/O's, daily weights, trend HR and BP    #Pulm Nodule  - CT showed 8mm pulm nodule RLL, Radiology rec'd short interval f/u w/ CT in 3 months or PET/CT     #NIDDM Type II, controlled, unknown complications  - Hgb A1c 4/2020 = 5.1% though not accurate in setting of chronic bleeding  - No home oral agents, continue FSBG and SSI, add long acting as indicated.     #CKDIII  - Patient presented w/ Cr 0.65, b/l around 0.5-0.8; Today 0.76  - Trend Cr and UOP, avoid nephrotoxins, NSAIDs, dehydration and contrast as able.      #Hypothyroid  - Continue home Levothyroxine      #Chronic Hypoxic Respiratory Failure 2/2 COPD w/o Exacerbation in setting of continued Tobacco Dependence  - CT showing Emphysema  - Continue home 02 requirement, duonebs PRN, Rec'd cessation, NRT PRN     #GERD  - On IV PPI as per above     #Hx CVA  - Continue home Statin, Eliquis held as per above     #Chronic Mod Malnutrition  - BMI 18, Has been evaluated and followed by Nutrition in past     F:  Oral  E: Monitor & Replace PRN  N: CLD  Ppx: SCDs  Code: DNR/DNI     Dispo: Pending workup and medical stability     *This patient is considered high risk due to sepsis, enteritis, chronic bowel ischemia, acute anemia requiring transfusions.     VTE Prophylaxis:   Mechanical Order History:      Ordered        01/16/22 1036  Place Sequential Compression Device  Once            01/16/22 1036  Maintain Sequential Compression Device  Continuous                    Pharmalogical Order History:     None        Pierre Delgado MD  [facility] Hospitalist  01/17/22  11:21 EST

## 2022-01-17 NOTE — CASE MANAGEMENT/SOCIAL WORK
Discharge Planning Assessment  [facility]     Patient Name: Mary Ly  MRN: [id number]  Today's Date: 1/17/2022    Admit Date: 1/16/2022     Discharge Needs Assessment     Row Name 01/17/22 1030       Living Environment    Lives With facility resident    Current Living Arrangements extended care facility    Able to Return to Prior Arrangements yes       Resource/Environmental Concerns    Resource/Environmental Concerns none       Transition Planning    Patient/Family Anticipates Transition to long-term care facility    Patient/Family Anticipated Services at Transition skilled nursing    Transportation Anticipated health plan transportation       Discharge Needs Assessment    Current Outpatient/Agency/Support Group skilled nursing facility    Equipment Currently Used at Home walker, rolling; pulse ox    Concerns to be Addressed no discharge needs identified    Anticipated Changes Related to Illness none    Equipment Needed After Discharge none    Outpatient/Agency/Support Group Needs skilled nursing facility    Discharge Facility/Level of Care Needs nursing facility, skilled               Discharge Plan     Row Name 01/17/22 1031       Plan    Plan Pt is a resident at [facility] and has a 30 day bed hold upon admission, per [facility] to be contacted at discharge. Pt will need EMS arranged for transportation. SS to follow and assist.    Patient/Family in Agreement with Plan yes               Demographic Summary     Row Name 01/17/22 1029       General Information    Referral Source nursing    Reason for Consult --  resident of [facility]              SEBLE Dubose

## 2022-01-17 NOTE — PLAN OF CARE
Problem: Adult Inpatient Plan of Care  Goal: Plan of Care Review  Outcome: Ongoing, Progressing  Flowsheets (Taken 1/16/2022 1803 by Vivien Clifford, RN)  Progress: no change  Plan of Care Reviewed With: patient  Outcome Summary: Pt arrived to floor from ED with one unit of blood infusing, shortly after finishing pt received the second unit of blood. Pt now on protonix gtt, as well as abx coverage. Pt denies any pain, nausea, or shortness of air. Pt remains on 2L which she was on in the ED. Pt remains in SR with occasional PVCs, no further issues noted, no source of bleeding noted.  Goal: Patient-Specific Goal (Individualized)  Outcome: Ongoing, Progressing  Goal: Absence of Hospital-Acquired Illness or Injury  Outcome: Ongoing, Progressing  Intervention: Identify and Manage Fall Risk  Flowsheets  Taken 1/17/2022 0200 by Nayeli Meyers PCT  Safety Promotion/Fall Prevention:   activity supervised   assistive device/personal items within reach   clutter free environment maintained   safety round/check completed  Taken 1/16/2022 2300 by Kallie Davis RN  Safety Promotion/Fall Prevention:   activity supervised   safety round/check completed  Taken 1/16/2022 2100 by Kallie Davis RN  Safety Promotion/Fall Prevention: activity supervised  Taken 1/16/2022 1900 by Kallie Davis RN  Safety Promotion/Fall Prevention: activity supervised  Intervention: Prevent Skin Injury  Flowsheets (Taken 1/17/2022 0200 by Nayeli Meyers PCT)  Body Position: side-lying, right  Intervention: Prevent and Manage VTE (venous thromboembolism) Risk  Flowsheets (Taken 1/16/2022 1803 by Vivien Clifford, RN)  VTE Prevention/Management: sequential compression devices on  Intervention: Prevent Infection  Flowsheets (Taken 1/17/2022 0200 by Nayeli Meyers PCT)  Infection Prevention: rest/sleep promoted  Goal: Optimal Comfort and Wellbeing  Outcome: Ongoing, Progressing  Intervention: Provide Person-Centered  Care  Flowsheets (Taken 1/16/2022 1045 by Vivien Clifford, RN)  Trust Relationship/Rapport:   care explained   thoughts/feelings acknowledged  Goal: Readiness for Transition of Care  Outcome: Ongoing, Progressing  Intervention: Mutually Develop Transition Plan  Flowsheets  Taken 1/16/2022 1058 by Vivien Clifford, RN  Transportation Anticipated: health plan transportation  Patient/Family Anticipated Services at Transition: skilled nursing  Patient/Family Anticipates Transition to: inpatient rehabilitation facility  Taken 1/16/2022 1038 by Vivien Clifford, RN  Equipment Currently Used at Home: (snf)   walker, rolling   pulse ox     Problem: Skin Injury Risk Increased  Goal: Skin Health and Integrity  Outcome: Ongoing, Progressing  Intervention: Optimize Skin Protection  Flowsheets  Taken 1/17/2022 0200 by Nayeli Meyers PCT  Head of Bed (HOB): HOB elevated  Taken 1/16/2022 2100 by Kallie Davis RN  Pressure Reduction Techniques: frequent weight shift encouraged  Pressure Reduction Devices: pressure-redistributing mattress utilized  Intervention: Promote and Optimize Oral Intake  Flowsheets (Taken 1/16/2022 1803 by Vivien Clifford, RN)  Oral Nutrition Promotion:   physical activity promoted   safe use of adaptive equipment encouraged     Problem: Pain Chronic (Persistent) (Comorbidity Management)  Goal: Acceptable Pain Control and Functional Ability  Outcome: Ongoing, Progressing  Intervention: Manage Persistent Pain  Flowsheets (Taken 1/17/2022 0410)  Medication Review/Management: medications reviewed  Intervention: Optimize Psychosocial Wellbeing  Flowsheets (Taken 1/16/2022 2100)  Supportive Measures:   active listening utilized   self-care encouraged   self-responsibility promoted   verbalization of feelings encouraged  Diversional Activities: television  Family/Support System Care: support provided     Problem: Fall Injury Risk  Goal: Absence of Fall and Fall-Related Injury  Outcome: Ongoing,  Progressing  Intervention: Identify and Manage Contributors to Fall Injury Risk  Flowsheets (Taken 1/17/2022 0410)  Medication Review/Management: medications reviewed  Intervention: Promote Injury-Free Environment  Flowsheets  Taken 1/17/2022 0200 by Nayeli Meyers PCT  Safety Promotion/Fall Prevention:   activity supervised   assistive device/personal items within reach   clutter free environment maintained   safety round/check completed  Taken 1/16/2022 2300 by Kallie Davis, RN  Safety Promotion/Fall Prevention:   activity supervised   safety round/check completed  Taken 1/16/2022 2100 by Kallie Davis, RN  Safety Promotion/Fall Prevention: activity supervised  Taken 1/16/2022 1900 by Kallie Davis, RN  Safety Promotion/Fall Prevention: activity supervised   Goal Outcome Evaluation:

## 2022-01-17 NOTE — CONSULTS
Palliative Care Initial Consult     Attending Physician: Pierre Delgado MD  Referring Provider: Pierre Delgado MD    Palliative care reason for consult: GOC, ACP  Code Status:   Code Status and Medical Interventions:   Ordered at: 01/16/22 1352     Medical Intervention Limits:    NO intubation (DNI)    NO cardioversion     Code Status (Patient has no pulse and is not breathing):    No CPR (Do Not Attempt to Resuscitate)     Medical Interventions (Patient has pulse or is breathing):    Limited Support      Advanced Directives: Advance Directive Status: Patient has advance directive, copy in chart   Healthcare surrogate: Adult children Enoch Ly and April Lopez daughter  Goals of Care: Pt states that she does not want to be resuscitated nor does she want to be put on a ventilator, she is considering comfort measures as she is tired of being in the hospital and just wants to go to the nursing home where she and her son are residents together.    HPI:  Mary Ly is a 76 y.o. female admitted on 1/16/2022 with shortness of breath and hypoxia. She has a medical history of CAD status post PCI, COPD on 3 L at baseline and previous ABL due to gastric ulcer/AVMs who presented to the ER from nursing facility after being found minimally responsive and hypoxic. Pt is a resident of [facility]. Upon admission her Hbg was found to be 5.8, WBC 35K with left shift, CRP 16, PT/INR 16/1.2, troponin 0.057. Pt received 2 units of PRBC's.  Patient has had multiple recent admissions at [facility] as well as [facility] with low Hbg with multiple ulcers noted as well as multiple other comorbidity/conditions.      ROS: Negative except as above in HPI.     Past Medical History:   Diagnosis Date   • AAA (abdominal aortic aneurysm) ([facility])     s/p repair    • Arthritis    • CAD (coronary artery disease)     s/p stenting in the past    • Chronic kidney disease (CKD), stage III (moderate) ([facility])    • COPD (chronic obstructive pulmonary  disease) ([facility])    • Debility    • Diastolic CHF, chronic ([facility]) 4/23/2021   • GERD (gastroesophageal reflux disease)    • History of CVA (cerebrovascular accident)    • History of ischemic colitis    • Hyperlipidemia 4/23/2021   • Hypertension    • Hypothyroidism    • Pneumonia due to COVID-19 virus 9/21/2021   • Stroke ([facility])    • Type II diabetes mellitus ([facility])      Past Surgical History:   Procedure Laterality Date   • BACK SURGERY     • CARDIAC CATHETERIZATION     • CHOLECYSTECTOMY N/A 7/17/2020    Procedure: CHOLECYSTECTOMY LAPAROSCOPIC;  Surgeon: Lonnie Meneses MD;  Location: [facility];  Service: General;  Laterality: N/A;   • COLONOSCOPY     • COLONOSCOPY N/A 9/25/2021    Procedure: COLONOSCOPY;  Surgeon: Lonnie Meneses MD;  Location: [facility];  Service: Gastroenterology;  Laterality: N/A;   • CORONARY STENT PLACEMENT     • ENDOSCOPY     • ENDOSCOPY N/A 9/25/2021    Procedure: ESOPHAGOGASTRODUODENOSCOPY;  Surgeon: Lonnie Meneses MD;  Location: [facility];  Service: Gastroenterology;  Laterality: N/A;   • TONSILLECTOMY       Social History     Socioeconomic History   • Marital status:    Tobacco Use   • Smoking status: Current Every Day Smoker     Packs/day: 1.00     Years: 55.00     Pack years: 55.00     Types: Cigarettes   • Smokeless tobacco: Never Used   Vaping Use   • Vaping Use: Never used   Substance and Sexual Activity   • Alcohol use: Never   • Drug use: Never   • Sexual activity: Defer     Family History   Problem Relation Age of Onset   • Diabetes Mother    • Heart attack Father    • No Known Problems Sister    • Heart attack Brother    • No Known Problems Brother    • No Known Problems Sister    • No Known Problems Sister        Allergies   Allergen Reactions   • Haldol [Haloperidol] Hallucinations       Current Facility-Administered Medications   Medication Dose Route Frequency Provider Last Rate Last Admin   • acetaminophen (TYLENOL) tablet 650 mg  650 mg Oral Q4H PRN Lester Estrada  DO Xu   650 mg at 01/17/22 1320   • Acidophilus/Pectin capsule 1 capsule  1 capsule Oral BID Lester Estrada DO   1 capsule at 01/17/22 0928   • aspirin EC tablet 81 mg  81 mg Oral Daily Lester Estrada DO   81 mg at 01/17/22 0929   • atorvastatin (LIPITOR) tablet 40 mg  40 mg Oral Nightly Lester Estrada DO   40 mg at 01/16/22 2029   • carvedilol (COREG) tablet 3.125 mg  3.125 mg Oral BID With Meals Lester Estrada DO   3.125 mg at 01/16/22 1707   • dextrose (D50W) (25 g/50 mL) IV injection 25 g  25 g Intravenous Q15 Min PRN Lester Estrada DO       • dextrose (GLUTOSE) oral gel 15 g  15 g Oral Q15 Min PRN Lester Estrada DO       • docusate sodium (COLACE) capsule 200 mg  200 mg Oral Daily Lester Estrada DO   200 mg at 01/17/22 0929   • dronabinol (MARINOL) capsule 2.5 mg  2.5 mg Oral BID AC Lester Estrada DO   2.5 mg at 01/17/22 0928   • ferrous sulfate tablet 325 mg  325 mg Oral Daily With Breakfast Lester Estrada DO   325 mg at 01/17/22 0928   • glucagon (human recombinant) (GLUCAGEN DIAGNOSTIC) injection 1 mg  1 mg Subcutaneous Q15 Min PRN Lester Estrada DO       • guaiFENesin (MUCINEX) 12 hr tablet 1,200 mg  1,200 mg Oral Q12H Lester Estrada DO   1,200 mg at 01/16/22 2030   • HYDROcodone-acetaminophen (NORCO) 5-325 MG per tablet 1 tablet  1 tablet Oral Q6H PRN Lester Estrada DO   1 tablet at 01/17/22 0943   • insulin aspart (novoLOG) injection 0-7 Units  0-7 Units Subcutaneous TID AC Lester Estrada DO       • ipratropium-albuterol (DUO-NEB) nebulizer solution 3 mL  3 mL Nebulization 4x Daily - RT Lester Estrada DO   3 mL at 01/17/22 1319   • ipratropium-albuterol (DUO-NEB) nebulizer solution 3 mL  3 mL Nebulization 4x Daily PRN Lester Estrada DO       • lactulose (CHRONULAC) 10 GM/15ML solution 10 g  10 g Oral Daily Lester Estrada, DO    10 g at 01/17/22 0929   • levothyroxine (SYNTHROID, LEVOTHROID) tablet 150 mcg  150 mcg Oral Daily Lester Estrada DO   150 mcg at 01/17/22 0928   • Magnesium Sulfate 2 gram Bolus, followed by 8 gram infusion (total Mg dose 10 grams)- Mg less than or equal to 1mg/dL  2 g Intravenous PRN Lester Estrada DO        Or   • Magnesium Sulfate 2 gram / 50mL Infusion (GIVE X 3 BAGS TO EQUAL 6GM TOTAL DOSE) - Mg 1.1 - 1.5 mg/dl  2 g Intravenous PRN Lester Estrada DO        Or   • Magnesium Sulfate 4 gram infusion- Mg 1.6-1.9 mg/dL  4 g Intravenous PRN Lester Estrada DO       • melatonin tablet 5 mg  5 mg Oral Nightly Lester Estrada DO   5 mg at 01/16/22 2030   • methylPREDNISolone sodium succinate (SOLU-Medrol) injection 40 mg  40 mg Intravenous Daily Lester Estrada DO   40 mg at 01/17/22 0929   • montelukast (SINGULAIR) tablet 10 mg  10 mg Oral Q PM Lester Estrada DO   10 mg at 01/16/22 2030   • multivitamin (THERAGRAN) tablet 1 tablet  1 tablet Oral Nightly Lester Estrada DO   1 tablet at 01/16/22 2029   • nitroglycerin (NITROSTAT) SL tablet 0.4 mg  0.4 mg Sublingual Q5 Min PRN Lester Esrtada DO       • ondansetron (ZOFRAN) injection 4 mg  4 mg Intravenous Q6H PRN Lester Estrada DO   4 mg at 01/17/22 1210   • ondansetron (ZOFRAN) tablet 4 mg  4 mg Oral Q6H PRN Lester Estrada DO       • pantoprazole (PROTONIX) 40 mg in 100 mL NS (VTB)  8 mg/hr Intravenous Continuous Lester Estrada DO 20 mL/hr at 01/17/22 1211 8 mg/hr at 01/17/22 1211   • piperacillin-tazobactam (ZOSYN) IVPB 4.5 g in 100 mL NS VTB  4.5 g Intravenous Q6H Lester Estrada DO   4.5 g at 01/17/22 0927   • polyethylene glycol (MIRALAX) powder 1 bottle  1 bottle Oral BID Lester Estrada, DO       • Polyvinyl Alcohol-Povidone PF (HYPOTEARS) 1.4-0.6 % ophthalmic solution 1 drop  1 drop Both Eyes BID Lester Estrada  Xu DO   1 drop at 01/17/22 0929   • potassium chloride (MICRO-K) CR capsule 40 mEq  40 mEq Oral PRN Lester Estrada, DO        Or   • potassium chloride (KLOR-CON) packet 40 mEq  40 mEq Oral PRN Lester Estrada, DO        Or   • potassium chloride 10 mEq in 100 mL IVPB  10 mEq Intravenous Q1H PRN Lester Estrada, DO       • potassium phosphate 45 mmol in sodium chloride 0.9 % 500 mL infusion  45 mmol Intravenous PRN Lester Estrada, DO        Or   • potassium phosphate 30 mmol in sodium chloride 0.9 % 250 mL infusion  30 mmol Intravenous PRN Lester Estrada, DO        Or   • potassium phosphate 15 mmol in sodium chloride 0.9 % 100 mL infusion  15 mmol Intravenous PRN Lester Estrada, DO        Or   • sodium phosphates 45 mmol in sodium chloride 0.9 % 500 mL IVPB  45 mmol Intravenous PRN Lester Estrada, DO        Or   • sodium phosphates 30 mmol in sodium chloride 0.9 % 250 mL IVPB  30 mmol Intravenous PRN Lester Estrada DO        Or   • sodium phosphates 15 mmol in sodium chloride 0.9 % 250 mL IVPB  15 mmol Intravenous PRN Lester Estrada, DO       • sodium chloride 0.9 % flush 10 mL  10 mL Intravenous Q12H Lester Estrada DO   10 mL at 01/16/22 2031   • sodium chloride 0.9 % flush 10 mL  10 mL Intravenous PRN Lester Estrada DO       • sodium chloride 0.9 % flush 10 mL  10 mL Intravenous Q12H Lester Estrada DO       • sodium chloride 0.9 % flush 10 mL  10 mL Intravenous PRN Lester Estrada DO       • sodium chloride 0.9 % flush 10 mL  10 mL Intravenous Q12H Lester Estrada DO       • sodium chloride 0.9 % flush 10 mL  10 mL Intravenous PRN Lester Estrada DO       • traZODone (DESYREL) tablet 50 mg  50 mg Oral Nightly Lester Estrada DO   50 mg at 01/16/22 2029     pantoprazole, 8 mg/hr, Last Rate: 8 mg/hr (01/17/22 1211)      •  acetaminophen  •   "dextrose  •  dextrose  •  glucagon (human recombinant)  •  HYDROcodone-acetaminophen  •  ipratropium-albuterol  •  magnesium sulfate **OR** magnesium sulfate **OR** magnesium sulfate  •  nitroglycerin  •  ondansetron  •  ondansetron  •  potassium chloride **OR** potassium chloride **OR** potassium chloride  •  potassium phosphate infusion greater than 15 mMoles **OR** potassium phosphate infusion greater than 15 mMoles **OR** potassium phosphate **OR** sodium phosphate IVPB **OR** sodium phosphate IVPB **OR** sodium phosphate IVPB  •  sodium chloride  •  sodium chloride  •  sodium chloride    Current medication reviewed for route, type, dose and frequency and are current per MAR.    Palliative Performance Scale Score:     /83   Pulse 69   Temp 97.8 °F (36.6 °C) (Axillary)   Resp 18   Ht 165.1 cm (65\")   Wt 50 kg (110 lb 4.8 oz)   SpO2 94%   BMI 18.35 kg/m²     Intake/Output Summary (Last 24 hours) at 1/17/2022 1426  Last data filed at 1/17/2022 0617  Gross per 24 hour   Intake 700 ml   Output --   Net 700 ml       PE:      Labs:   Results from last 7 days   Lab Units 01/17/22 0137   WBC 10*3/mm3 35.35*   HEMOGLOBIN g/dL 8.7*   HEMATOCRIT % 27.3*   PLATELETS 10*3/mm3 313     Results from last 7 days   Lab Units 01/17/22 0137   SODIUM mmol/L 134*   POTASSIUM mmol/L 4.0   CHLORIDE mmol/L 91*   CO2 mmol/L 28.4   BUN mg/dL 24*   CREATININE mg/dL 0.76   GLUCOSE mg/dL 113*   CALCIUM mg/dL 8.3*     Results from last 7 days   Lab Units 01/17/22  0137 01/16/22  0449 01/16/22 0449   SODIUM mmol/L 134*   < > 136   POTASSIUM mmol/L 4.0   < > 4.2   CHLORIDE mmol/L 91*   < > 93*   CO2 mmol/L 28.4   < > 30.2*   BUN mg/dL 24*   < > 21   CREATININE mg/dL 0.76   < > 0.65   CALCIUM mg/dL 8.3*   < > 8.6   BILIRUBIN mg/dL  --   --  <0.2   ALK PHOS U/L  --   --  88   ALT (SGPT) U/L  --   --  38*   AST (SGOT) U/L  --   --  43*   GLUCOSE mg/dL 113*   < > 94    < > = values in this interval not displayed.     Imaging Results " (Last 72 Hours)     Procedure Component Value Units Date/Time    CT Abdomen Pelvis With Contrast [[id number]] Collected: 01/16/22 0843     Updated: 01/16/22 0845    Narrative:      CT Abdomen Pelvis W    INDICATION:   Acute onset of abdominal pain    TECHNIQUE:   CT of the abdomen and pelvis with contrast. Coronal and sagittal reconstructions were obtained.  Radiation dose reduction techniques included automated exposure control or exposure modulation based on body size. Radiation audit for number of CT and  nuclear cardiology exams performed in the last year: 14.      COMPARISON:   December 24, 2021    FINDINGS:    Abdomen: The liver shows no focal hepatic lesions. The gallbladder is surgically absent. The kidneys show normal enhancement. No evidence of hydronephrosis. The spleen appears unremarkable. There is prominent pancreatic atrophy. No adrenal abnormalities.  No threshold retroperitoneal adenopathy. Prominent atherosclerotic changes of the abdominal aorta with evidence of an aortic stent graft.    Pelvis: The bowel pattern is nonobstructive. There is enhancement of the bowel wall of both small bowel and colon. This finding is nonspecific but can be seen in enteritis. Prominent amount of colon gas in the distal colon. No evidence of free air. No  free fluid.    Imaging of the pelvis is obscured by beam hardening artifact from the patient's left hip arthroplasty. Bony structures are diffusely demineralized. Postsurgical changes noted in the lower lumbar spine.      Impression:        1.  There is enhancement of the bowel wall of both colon and small bowel. This is nonspecific but can be seen in enteritis.    2.  No evidence of free air or free fluid. Comparison with the study of 12/24/2021 shows similar findings.          Signer Name: Seb Hart MD   Signed: 1/16/2022 8:43 AM   Workstation Name: [id number]    [facility]    CT Chest Without Contrast Diagnostic [[id number]] Collected:  01/16/22 0558     Updated: 01/16/22 0600    Narrative:      CT Chest WO    INDICATION:   S pain and shortness of breath.    TECHNIQUE:   CT of the chest without IV contrast. Coronal and sagittal reformatted images. Radiation dose reduction techniques included automated exposure control or exposure modulation based on body size. Count of known CT and cardiac nuc med studies performed in  previous 12 months: 14.     COMPARISON:   CTA chest 12/24/2021    FINDINGS:   4 cm aneurysm of the descending thoracic aorta with extensive atherosclerotic calcification throughout the thoracic aorta. Mild cardiomegaly. No pericardial effusion. Coronary artery calcifications.    No pleural effusion. No pneumothorax. Bibasilar atelectasis/infiltrate. 8 mm noncalcified pulmonary nodule in the lateral right lower lobe. Emphysema.    Visualized upper abdomen is unremarkable. 3 cm suprarenal abdominal aortic aneurysm. Partially imaged aortobiiliac stent graft.    No acute osseous abnormality.      Impression:        1. 4 cm aneurysm of the descending thoracic aorta.  2. Mild cardiomegaly.  3. Bibasilar atelectasis/infiltrate.  4. 8mm noncalcified pulmonary nodule in the right lower lobe. Management recommendation: Consider short interval follow-up CT in 3 months, or PET/CT imaging, for pulmonary nodules greater than 8 mm in high risk patients based on current published  guidelines (Fleischner Society, 2017).  5. Emphysema.  6. 3 cm suprarenal abdominal aortic aneurysm and partially imaged aortobiiliac stent graft.    Signer Name: Guillermo Hart MD   Signed: 1/16/2022 5:58 AM   Workstation [facility]: [id number]    [facility]    XR Chest 1 View [[id number]] Collected: 01/16/22 0541     Updated: 01/16/22 0543    Narrative:      CR Chest 1 Vw    INDICATION:   Shortness of air.     COMPARISON:    Chest 12/24/2021    FINDINGS:  Portable AP view(s) of the chest.  Borderline heart size. Mediastinal contours are normal. The  lungs are clear. No pneumothorax or pleural effusion.      Impression:      No acute cardiopulmonary findings.    Signer Name: Guillermo Hart MD   Signed: 1/16/2022 5:41 AM   Workstation Name: [id number]    [facility]          Diagnostics: Reviewed    A: Mary Ly is a 76 y.o. female with  1/16/2022 with shortness of breath and hypoxia. She has a medical history of CAD status post PCI, COPD on 3 L at baseline and previous ABL due to gastric ulcer/AVMs who presented to the ER from nursing facility after being found minimally responsive and hypoxic. Pt is a resident of [facility]. Upon admission her Hbg was found to be 5.8, WBC 35K with left shift, CRP 16, PT/INR 16/1.2, troponin 0.057. Pt received 2 units of PRBC's.  Patient has had multiple recent admissions at [facility] well as [facility] with low Hbg with multiple ulcers noted as well as multiple other comorbidity/conditions.           P:Palliative was consulted to discuss GOC/ACP with patient and family. I was able to speak with both patients son Paul and daughter April to talk to them about their mothers condition and her repeated hospital visits as well as how they thought their mother felt about it. They both admit that it has been incredibly hard on their mother and state they will support whatever her decisions are regarding her goals of care for herself. I let them know that I will speak with her and discuss with her what she wants.    I was able to talk with Mary morning of 1/18/2022 to ask her what her goals of care were for herself. We talked about her frequent hospital visits and how they are becoming more with less time in between. We talked about the Watchman procedure that her daughter wanted her to get and she said that she felt that would not take care of all her problems. We discussed if she wanted to continue to seek treatment and receive blood transfusion or would she want to focus more on comfort where we  would not do any more diagnostics, test, procedures, only focus on her symptoms and keeping her comfortable. Mary stated that she is leaning more towards comfort and go back to [facility] where her son Paul lives. She wants me to speak with her children today and then we will discuss again in the am. I told her that if she wanted to talk or had questions she could have her nurse page me.    I spoke with patients daughter April today to let her know that her mother is considering to be comfort measures and no longer wants to come to hospital and receive treatment or blood transfusions. April states that this is hard to hear but will support her mother in whatever decision she chooses. April had some questions about her mothers blood thinner that I told her I would ask Dr. Delgado about.    We appreciate the consult and the opportunity to participate in Mary Ly's care. We will continue to follow along. Please do not hesitate to contact us regarding further symptom management or goals of care needs, including after hours or on weekends via our on call provider at [phone].     Time: 35 minutes spent reviewing medical and medication records, assessing and examining patient, discussing with family, answering questions, providing some guidance about a plan and documentation of care, and coordinating care with other healthcare members, with > 50% time spent face to face.     Magui Clifford, APRN    1/17/2022

## 2022-01-18 LAB
ANION GAP SERPL CALCULATED.3IONS-SCNC: 10.6 MMOL/L (ref 5–15)
ANISOCYTOSIS BLD QL: ABNORMAL
BUN SERPL-MCNC: 21 MG/DL (ref 8–23)
BUN/CREAT SERPL: 26.9 (ref 7–25)
CALCIUM SPEC-SCNC: 7.9 MG/DL (ref 8.6–10.5)
CHLORIDE SERPL-SCNC: 96 MMOL/L (ref 98–107)
CO2 SERPL-SCNC: 29.4 MMOL/L (ref 22–29)
CREAT SERPL-MCNC: 0.78 MG/DL (ref 0.57–1)
CYTOLOGIST CVX/VAG CYTO: NORMAL
DEPRECATED RDW RBC AUTO: 63.5 FL (ref 37–54)
ERYTHROCYTE [DISTWIDTH] IN BLOOD BY AUTOMATED COUNT: 18.5 % (ref 12.3–15.4)
GFR SERPL CREATININE-BSD FRML MDRD: 72 ML/MIN/1.73
GLUCOSE BLDC GLUCOMTR-MCNC: 113 MG/DL (ref 70–130)
GLUCOSE BLDC GLUCOMTR-MCNC: 117 MG/DL (ref 70–130)
GLUCOSE BLDC GLUCOMTR-MCNC: 130 MG/DL (ref 70–130)
GLUCOSE BLDC GLUCOMTR-MCNC: 141 MG/DL (ref 70–130)
GLUCOSE SERPL-MCNC: 120 MG/DL (ref 65–99)
HCT VFR BLD AUTO: 25.2 % (ref 34–46.6)
HGB BLD-MCNC: 8 G/DL (ref 12–15.9)
HYPOCHROMIA BLD QL: ABNORMAL
LYMPHOCYTES # BLD MANUAL: 1.27 10*3/MM3 (ref 0.7–3.1)
LYMPHOCYTES NFR BLD MANUAL: 7 % (ref 5–12)
MACROCYTES BLD QL SMEAR: ABNORMAL
MCH RBC QN AUTO: 30.3 PG (ref 26.6–33)
MCHC RBC AUTO-ENTMCNC: 31.7 G/DL (ref 31.5–35.7)
MCV RBC AUTO: 95.5 FL (ref 79–97)
MONOCYTES # BLD: 2.96 10*3/MM3 (ref 0.1–0.9)
NEUTROPHILS # BLD AUTO: 38.05 10*3/MM3 (ref 1.7–7)
NEUTROPHILS NFR BLD MANUAL: 80 % (ref 42.7–76)
NEUTS BAND NFR BLD MANUAL: 10 % (ref 0–5)
PATH INTERP BLD-IMP: NORMAL
PLAT MORPH BLD: NORMAL
PLATELET # BLD AUTO: 332 10*3/MM3 (ref 140–450)
PMV BLD AUTO: 9.8 FL (ref 6–12)
POLYCHROMASIA BLD QL SMEAR: ABNORMAL
POTASSIUM SERPL-SCNC: 3.3 MMOL/L (ref 3.5–5.2)
RBC # BLD AUTO: 2.64 10*6/MM3 (ref 3.77–5.28)
SODIUM SERPL-SCNC: 136 MMOL/L (ref 136–145)
VARIANT LYMPHS NFR BLD MANUAL: 3 % (ref 19.6–45.3)
WBC NRBC COR # BLD: 42.28 10*3/MM3 (ref 3.4–10.8)

## 2022-01-18 PROCEDURE — C1751 CATH, INF, PER/CENT/MIDLINE: HCPCS

## 2022-01-18 PROCEDURE — 25010000002 PIPERACILLIN SOD-TAZOBACTAM PER 1 G: Performed by: STUDENT IN AN ORGANIZED HEALTH CARE EDUCATION/TRAINING PROGRAM

## 2022-01-18 PROCEDURE — 94799 UNLISTED PULMONARY SVC/PX: CPT

## 2022-01-18 PROCEDURE — 80048 BASIC METABOLIC PNL TOTAL CA: CPT | Performed by: STUDENT IN AN ORGANIZED HEALTH CARE EDUCATION/TRAINING PROGRAM

## 2022-01-18 PROCEDURE — 0 POTASSIUM CHLORIDE 10 MEQ/100ML SOLUTION: Performed by: INTERNAL MEDICINE

## 2022-01-18 PROCEDURE — 85025 COMPLETE CBC W/AUTO DIFF WBC: CPT | Performed by: STUDENT IN AN ORGANIZED HEALTH CARE EDUCATION/TRAINING PROGRAM

## 2022-01-18 PROCEDURE — 85007 BL SMEAR W/DIFF WBC COUNT: CPT | Performed by: STUDENT IN AN ORGANIZED HEALTH CARE EDUCATION/TRAINING PROGRAM

## 2022-01-18 PROCEDURE — 36410 VNPNXR 3YR/> PHY/QHP DX/THER: CPT

## 2022-01-18 PROCEDURE — 63710000001 DRONABINOL PER 2.5 MG: Performed by: STUDENT IN AN ORGANIZED HEALTH CARE EDUCATION/TRAINING PROGRAM

## 2022-01-18 PROCEDURE — 25010000002 PROCHLORPERAZINE 10 MG/2ML SOLUTION: Performed by: INTERNAL MEDICINE

## 2022-01-18 PROCEDURE — 25010000002 METHYLPREDNISOLONE PER 40 MG: Performed by: STUDENT IN AN ORGANIZED HEALTH CARE EDUCATION/TRAINING PROGRAM

## 2022-01-18 PROCEDURE — 99233 SBSQ HOSP IP/OBS HIGH 50: CPT | Performed by: INTERNAL MEDICINE

## 2022-01-18 PROCEDURE — 82962 GLUCOSE BLOOD TEST: CPT

## 2022-01-18 RX ORDER — POTASSIUM CHLORIDE 7.45 MG/ML
10 INJECTION INTRAVENOUS
Status: COMPLETED | OUTPATIENT
Start: 2022-01-18 | End: 2022-01-18

## 2022-01-18 RX ADMIN — SODIUM CHLORIDE 8 MG/HR: 9 INJECTION, SOLUTION INTRAVENOUS at 05:20

## 2022-01-18 RX ADMIN — DOCUSATE SODIUM 200 MG: 100 CAPSULE, LIQUID FILLED ORAL at 08:25

## 2022-01-18 RX ADMIN — LACTULOSE 10 G: 10 SOLUTION ORAL at 08:27

## 2022-01-18 RX ADMIN — POTASSIUM CHLORIDE 10 MEQ: 7.46 INJECTION, SOLUTION INTRAVENOUS at 18:08

## 2022-01-18 RX ADMIN — Medication 1 TABLET: at 20:42

## 2022-01-18 RX ADMIN — MONTELUKAST SODIUM 10 MG: 10 TABLET, COATED ORAL at 17:06

## 2022-01-18 RX ADMIN — ASPIRIN 81 MG: 81 TABLET, COATED ORAL at 08:25

## 2022-01-18 RX ADMIN — PIPERACILLIN SODIUM AND TAZOBACTAM SODIUM 4.5 G: 4; .5 INJECTION, POWDER, LYOPHILIZED, FOR SOLUTION INTRAVENOUS at 20:40

## 2022-01-18 RX ADMIN — PIPERACILLIN SODIUM AND TAZOBACTAM SODIUM 4.5 G: 4; .5 INJECTION, POWDER, LYOPHILIZED, FOR SOLUTION INTRAVENOUS at 08:26

## 2022-01-18 RX ADMIN — SODIUM CHLORIDE, PRESERVATIVE FREE 10 ML: 5 INJECTION INTRAVENOUS at 08:27

## 2022-01-18 RX ADMIN — SODIUM CHLORIDE 8 MG/HR: 9 INJECTION, SOLUTION INTRAVENOUS at 23:24

## 2022-01-18 RX ADMIN — POLYVINYL ALCOHOL, POVIDONE 1 DROP: 14; 6 SOLUTION/ DROPS OPHTHALMIC at 20:45

## 2022-01-18 RX ADMIN — Medication 1 CAPSULE: at 08:26

## 2022-01-18 RX ADMIN — IPRATROPIUM BROMIDE AND ALBUTEROL SULFATE 3 ML: .5; 3 SOLUTION RESPIRATORY (INHALATION) at 13:05

## 2022-01-18 RX ADMIN — CARVEDILOL 3.12 MG: 3.12 TABLET, FILM COATED ORAL at 08:25

## 2022-01-18 RX ADMIN — ATORVASTATIN CALCIUM 40 MG: 40 TABLET, FILM COATED ORAL at 20:42

## 2022-01-18 RX ADMIN — TRAZODONE HYDROCHLORIDE 50 MG: 50 TABLET ORAL at 20:42

## 2022-01-18 RX ADMIN — Medication 1 CAPSULE: at 20:42

## 2022-01-18 RX ADMIN — GUAIFENESIN 1200 MG: 600 TABLET, EXTENDED RELEASE ORAL at 08:25

## 2022-01-18 RX ADMIN — POTASSIUM CHLORIDE 10 MEQ: 7.46 INJECTION, SOLUTION INTRAVENOUS at 14:30

## 2022-01-18 RX ADMIN — IPRATROPIUM BROMIDE AND ALBUTEROL SULFATE 3 ML: .5; 3 SOLUTION RESPIRATORY (INHALATION) at 06:37

## 2022-01-18 RX ADMIN — POLYVINYL ALCOHOL, POVIDONE 1 DROP: 14; 6 SOLUTION/ DROPS OPHTHALMIC at 08:26

## 2022-01-18 RX ADMIN — SODIUM CHLORIDE, PRESERVATIVE FREE 10 ML: 5 INJECTION INTRAVENOUS at 20:41

## 2022-01-18 RX ADMIN — GUAIFENESIN 1200 MG: 600 TABLET, EXTENDED RELEASE ORAL at 20:42

## 2022-01-18 RX ADMIN — POLYETHYLENE GLYCOL (3350) 17 G: 17 POWDER, FOR SOLUTION ORAL at 20:41

## 2022-01-18 RX ADMIN — PIPERACILLIN SODIUM AND TAZOBACTAM SODIUM 4.5 G: 4; .5 INJECTION, POWDER, LYOPHILIZED, FOR SOLUTION INTRAVENOUS at 14:30

## 2022-01-18 RX ADMIN — METHYLPREDNISOLONE SODIUM SUCCINATE 40 MG: 40 INJECTION, POWDER, FOR SOLUTION INTRAMUSCULAR; INTRAVENOUS at 08:26

## 2022-01-18 RX ADMIN — POTASSIUM CHLORIDE 10 MEQ: 7.46 INJECTION, SOLUTION INTRAVENOUS at 17:01

## 2022-01-18 RX ADMIN — DRONABINOL 2.5 MG: 2.5 CAPSULE ORAL at 17:06

## 2022-01-18 RX ADMIN — LEVOTHYROXINE SODIUM 150 MCG: 150 TABLET ORAL at 08:26

## 2022-01-18 RX ADMIN — PIPERACILLIN SODIUM AND TAZOBACTAM SODIUM 4.5 G: 4; .5 INJECTION, POWDER, LYOPHILIZED, FOR SOLUTION INTRAVENOUS at 03:17

## 2022-01-18 RX ADMIN — DRONABINOL 2.5 MG: 2.5 CAPSULE ORAL at 06:52

## 2022-01-18 RX ADMIN — CARVEDILOL 3.12 MG: 3.12 TABLET, FILM COATED ORAL at 17:06

## 2022-01-18 RX ADMIN — SODIUM CHLORIDE, PRESERVATIVE FREE 10 ML: 5 INJECTION INTRAVENOUS at 20:45

## 2022-01-18 RX ADMIN — FERROUS SULFATE TAB 325 MG (65 MG ELEMENTAL FE) 325 MG: 325 (65 FE) TAB at 08:25

## 2022-01-18 RX ADMIN — POLYETHYLENE GLYCOL (3350) 17 G: 17 POWDER, FOR SOLUTION ORAL at 08:26

## 2022-01-18 RX ADMIN — SODIUM CHLORIDE, PRESERVATIVE FREE 10 ML: 5 INJECTION INTRAVENOUS at 20:42

## 2022-01-18 RX ADMIN — IPRATROPIUM BROMIDE AND ALBUTEROL SULFATE 3 ML: .5; 3 SOLUTION RESPIRATORY (INHALATION) at 00:11

## 2022-01-18 RX ADMIN — Medication 5 MG: at 20:42

## 2022-01-18 RX ADMIN — PROCHLORPERAZINE EDISYLATE 2.5 MG: 5 INJECTION INTRAMUSCULAR; INTRAVENOUS at 06:52

## 2022-01-18 RX ADMIN — POTASSIUM CHLORIDE 10 MEQ: 7.46 INJECTION, SOLUTION INTRAVENOUS at 15:49

## 2022-01-18 NOTE — PROGRESS NOTES
"    [facility] HOSPITALIST PROGRESS NOTE     Patient Identification:  Name:  Mary Ly  Age:  76 y.o.  Sex:  female  :  1945  MRN:  [id number]  Visit Number:  [id number]  ROOM: [place] [address]     Primary Care Provider:  Leta Gardner MD    Length of stay in inpatient status:  2    Subjective     Chief Compliant:    Chief Complaint   Patient presents with   • Shortness of Breath     History of Presenting Illness:    Patient remains ill, no acute events overnight, no new complaints, Hgb stable, WBC count worse today, continued on Zosyn, suspect WBC count steroid related, patient reports she is feeling better today, reports she is more \"rested\", Hgb stable, surgery following, considering changing IV PPI to PO PPI and advancing diet but awaiting updated surgery recs, discussed case w/ palliative and introduced team to patient today, patient tired of continuing to come in for transfusions every few months, palliative stayed to continue GOC conversations.  She denies any fevers or chills.   Objective     Current Hospital Meds:Acidophilus/Pectin, 1 capsule, Oral, BID  aspirin, 81 mg, Oral, Daily  atorvastatin, 40 mg, Oral, Nightly  carvedilol, 3.125 mg, Oral, BID With Meals  docusate sodium, 200 mg, Oral, Daily  dronabinol, 2.5 mg, Oral, BID AC  ferrous sulfate, 325 mg, Oral, Daily With Breakfast  guaiFENesin, 1,200 mg, Oral, Q12H  insulin aspart, 0-7 Units, Subcutaneous, TID AC  ipratropium-albuterol, 3 mL, Nebulization, 4x Daily - RT  lactulose, 10 g, Oral, Daily  levothyroxine, 150 mcg, Oral, Daily  melatonin, 5 mg, Oral, Nightly  methylPREDNISolone sodium succinate, 40 mg, Intravenous, Daily  montelukast, 10 mg, Oral, Q PM  multivitamin, 1 tablet, Oral, Nightly  piperacillin-tazobactam, 4.5 g, Intravenous, Q6H  polyethylene glycol, 17 g, Oral, BID  Polyvinyl Alcohol-Povidone PF, 1 drop, Both Eyes, BID  sodium chloride, 10 mL, Intravenous, Q12H  sodium chloride, 10 mL, Intravenous, Q12H  sodium " chloride, 10 mL, Intravenous, Q12H  traZODone, 50 mg, Oral, Nightly    pantoprazole, 8 mg/hr, Last Rate: 8 mg/hr (01/18/22 0520)        Current Antimicrobial Therapy:  Anti-Infectives (From admission, onward)    Ordered     Dose/Rate Route Frequency Start Stop    01/16/22 1442  vancomycin 1 g/250 mL 0.9% NS (vial-mate)        Ordering Provider: Lester Estrada DO    1,000 mg  over 60 Minutes Intravenous Once 01/16/22 1600 01/16/22 1806    01/16/22 1352  piperacillin-tazobactam (ZOSYN) IVPB 4.5 g in 100 mL NS VTB        Ordering Provider: Lester Estrada DO    4.5 g  over 4 Hours Intravenous Every 6 Hours 01/16/22 1445 01/23/22 1444    01/16/22 0522  piperacillin-tazobactam (ZOSYN) IVPB 3.375 g in 100 mL NS VTB        Ordering Provider: Armen George DO    3.375 g  over 30 Minutes Intravenous Once 01/16/22 0524 01/16/22 0840        Current Diuretic Therapy:  Diuretics (From admission, onward)    None        ----------------------------------------------------------------------------------------------------------------------  Vital Signs:  Temp:  [97.5 °F (36.4 °C)-97.8 °F (36.6 °C)] 97.8 °F (36.6 °C)  Heart Rate:  [61-80] 61  Resp:  [12-24] 12  BP: (100-164)/(53-83) 127/61  SpO2:  [88 %-100 %] 99 %  on  Flow (L/min):  [2] 2;   Device (Oxygen Therapy): nasal cannula  Body mass index is 18.64 kg/m².    Wt Readings from Last 3 Encounters:   01/18/22 50.8 kg (112 lb)   12/24/21 45.4 kg (100 lb)   11/19/21 45.4 kg (100 lb)     Intake & Output (last 3 days)       01/15 0701  01/16 0700 01/16 0701 01/17 0700 01/17 0701 01/18 0700 01/18 0701 01/19 0700    P.O.   500     I.V. (mL/kg)  400 (8)      Blood  600      IV Piggyback  100 200     Total Intake(mL/kg)  1100 (22) 700 (13.8)     Net  +1100 +700             Urine Unmeasured Occurrence  1 x 3 x     Stool Unmeasured Occurrence   1 x         Diet Clear  Liquid  ----------------------------------------------------------------------------------------------------------------------  Physical exam:  Constitutional:  Chronically ill appearing, No acute distress, more awake today  HENT:  Head:  Normocephalic and atraumatic.  Mouth:  Moist mucous membranes.    Eyes:  Conjunctivae and EOM are normal. No scleral icterus.    Neck:  Neck supple.  No JVD present.    Cardiovascular:  Normal rate, regular rhythm and normal heart sounds with no murmur.  Pulmonary/Chest:  Chronic respiratory distress, no wheezes, no crackles, on NC  Abdominal:  Soft. No distension and no tenderness.   Musculoskeletal:  No tenderness, and no deformity.  .    Neurological:  Alert and oriented to person, place, and time.  No gross focal deficits  Skin:  Skin is warm and dry. No rash noted. No pallor.   Peripheral vascular: no clubbing, no cyanosis, no edema.  ----------------------------------------------------------------------------------------------------------------------  Results from last 7 days   Lab Units 01/18/22  0013 01/17/22  0137 01/16/22 2001 01/16/22 0525 01/16/22 0449 01/16/22 0449   CRP mg/dL  --   --   --  16.33*  --   --    LACTATE mmol/L  --   --   --  1.0  --   --    WBC 10*3/mm3 42.28* 35.35*  --   --   --  35.31*   HEMOGLOBIN g/dL 8.0* 8.7* 8.6*  --    < > 5.8*   HEMATOCRIT % 25.2* 27.3* 26.8*  --    < > 19.2*   MCV fL 95.5 95.8  --   --   --  100.5*   MCHC g/dL 31.7 31.9  --   --   --  30.2*   PLATELETS 10*3/mm3 332 313  --   --   --  391   INR   --   --   --  1.28*  --   --     < > = values in this interval not displayed.     Results from last 7 days   Lab Units 01/16/22  1127   PH, ARTERIAL pH units 7.383   PO2 ART mm Hg 66.8*   PCO2, ARTERIAL mm Hg 62.0*   HCO3 ART mmol/L 36.9*     Results from last 7 days   Lab Units 01/18/22  0013 01/17/22  0137 01/16/22  0449   SODIUM mmol/L 136 134* 136   POTASSIUM mmol/L 3.3* 4.0 4.2   CHLORIDE mmol/L 96* 91* 93*   CO2 mmol/L 29.4*  28.4 30.2*   BUN mg/dL 21 24* 21   CREATININE mg/dL 0.78 0.76 0.65   EGFR IF NONAFRICN AM mL/min/1.73 72 74 89   CALCIUM mg/dL 7.9* 8.3* 8.6   GLUCOSE mg/dL 120* 113* 94   ALBUMIN g/dL  --   --  2.79*   BILIRUBIN mg/dL  --   --  <0.2   ALK PHOS U/L  --   --  88   AST (SGOT) U/L  --   --  43*   ALT (SGPT) U/L  --   --  38*   Estimated Creatinine Clearance: 48 mL/min (by C-G formula based on SCr of 0.78 mg/dL).  No results found for: AMMONIA  Results from last 7 days   Lab Units 01/16/22  1050 01/16/22  0525   TROPONIN T ng/mL 0.062* 0.057*     Results from last 7 days   Lab Units 01/16/22  0525   PROBNP pg/mL 1,158.0     Results from last 7 days   Lab Units 01/16/22  1050   CHOLESTEROL mg/dL 99   TRIGLYCERIDES mg/dL 139   HDL CHOL mg/dL 37*   LDL CHOL mg/dL 38     Hemoglobin A1C   Date/Time Value Ref Range Status   01/16/2022 2001 5.10 4.80 - 5.60 % Final     Glucose   Date/Time Value Ref Range Status   01/18/2022 0545 117 70 - 130 mg/dL Final     Comment:     Meter: [id number] : [id number] sofietristan neilyl   01/17/2022 2104 181 (H) 70 - 130 mg/dL Final     Comment:     Meter: [id number] : [id number] sofiecarmen neilyl   01/17/2022 1722 216 (H) 70 - 130 mg/dL Final     Comment:     Meter: [id number] : [id number] DONITA HAY   01/17/2022 1139 116 70 - 130 mg/dL Final     Comment:     Meter: [id number] : [id number] DONITA HAY   01/17/2022 0620 114 70 - 130 mg/dL Final     Comment:     Meter: [id number] : [id number] Gibbs Kallie   01/16/2022 2037 101 70 - 130 mg/dL Final     Comment:     Meter: [id number] : [id number] Gibbs Kallie   01/16/2022 1919 89 70 - 130 mg/dL Final     Comment:     Meter: [id number] : [id number] DONITA HAY     Lab Results   Component Value Date    TSH 55.020 (H) 01/16/2022    FREET4 0.61 (L) 01/16/2022     No results found for: PREGTESTUR, PREGSERUM, HCG, HCGQUANT  Pain Management Panel     Pain Management Panel Latest Ref Rng & Units 9/21/2021 8/13/2020     AMPHETAMINES SCREEN, URINE Negative Negative Negative    BARBITURATES SCREEN Negative Negative Negative    BENZODIAZEPINE SCREEN, URINE Negative Negative Negative    BUPRENORPHINEUR Negative Negative Negative    COCAINE SCREEN, URINE Negative Negative Negative    METHADONE SCREEN, URINE Negative Negative Negative    METHAMPHETAMINEUR Negative Negative -        Brief Urine Lab Results  (Last result in the past 365 days)      Color   Clarity   Blood   Leuk Est   Nitrite   Protein   CREAT   Urine HCG        12/24/21 1530 Yellow   Clear   Negative   Moderate (2+)   Negative   Negative               Blood Culture   Date Value Ref Range Status   01/16/2022 No growth at 2 days  Preliminary   01/16/2022 No growth at 2 days  Preliminary     No results found for: URINECX  No results found for: WOUNDCX  No results found for: STOOLCX  No results found for: RESPCX  No results found for: AFBCX  Results from last 7 days   Lab Units 01/16/22 2001 01/16/22  0525   PROCALCITONIN ng/mL  --  0.22   LACTATE mmol/L  --  1.0   SED RATE mm/hr 52*  --    CRP mg/dL  --  16.33*     I have personally looked at the labs and they are summarized above.  ----------------------------------------------------------------------------------------------------------------------  Detailed radiology reports for the last 24 hours:  Imaging Results (Last 24 Hours)     ** No results found for the last 24 hours. **        Assessment & Plan    76F PMH significant for stroke, paroxysmal atrial fibrillation, diabetes mellitus, chronic anticoagulation with Eliquis, hypertension and AAA status post repair who presented w/     #C/f UGIB w/ anemia requiring transfusions and Hx AVM's w/ chronic intermittent bleeding  #Sepsis, Acute Metabolic Encephalopathy 2/2 Suspected infectious enteritis w/ Hx recurrent colitis  #Hx Ischemic Colitis w/ occluded Celiac Artery, Severe SMA stenosis w/ reconstitution, CHRISTOPHER covered by prior EVAR  - Patient presented w/ multiple recent  admissions for recurrent colitis, improves w/ IV Abx, this admission similar presentation w/ AMS, Hgb 5.8 on admission, s/p 2U PRBCs, elevated WBC count   - Admission labs showed WBC count 35K, Hgb 5.8, lactate 1.0, covid/flu negative, Bcx's NGTD.  - CTA Abd 8/2020 showed poor visualization of origin celiac or superior mesenteric arteries, cannot exclude ischemia of colon due to diffuse colonic wall thickening.  - CT Abd/Pelvis this admission showed possible enteritis  - Surgery consulted; Following, notes poor surgical candidacy   - Palliative Care consulted; Following, GOC conversations ongoing  - Continue Zosyn x 7-10 days  - WBC count worse though suspect related to steroids as patient reports she is feeling better today, trending CBC daily  - Continue IV PPI gtt for now, Hgb stable, no reported bleeding or melena, will consider switching to PO PPI BID pending stability   - Monitor in PCU, monitor on telemetry, plans to return to NH when stable     #HTN/HLD/CAD s/p PCI  #Chronic HFpEF  #Severe b/l PAD b/l LE's  #Descending Thoracic AA, AAA s/ EVAR  - Echo 1/2022 showed LVEF 66-70%, mild concentric LVH, diastolic dysfunction.  - CTA Abd previously showed multifocal plaque through superficial femoral arteries b/l, borderline hemodynamically significant stenosis but no occlusive segments, Celiac and SMA as per above, descending thoracic aortic aneurysm  - CT Abd/Pelvis 1/16/22 showed 4cm descending thoracic aortic aneurysm, mild cardiomegaly, 3cm supra-renal abd aortic aneurysm and partially imaged aortobiliac stent graft  - Continue home Statin, home ASA 81  - Continue home BB, holding home ACEI for lower BP, resume as indicated  - D/c'd home Amiodarone due to below noted emphysema  - Hold Eliquis as per bleeding noted above  - Continue to monitor on tele, strict I/O's, daily weights, trend HR and BP    #Pulm Nodule  - CT showed 8mm pulm nodule RLL, Radiology rec'd short interval f/u w/ CT in 3 months or PET/CT      #NIDDM Type II, controlled, unknown complications  - Hgb A1c 4/2020 = 5.1% though not accurate in setting of chronic bleeding  - No home oral agents, continue FSBG and SSI, add long acting as indicated.     #CKDIII  - Patient presented w/ Cr 0.65, b/l around 0.5-0.8; Today 0.78  - Trend Cr and UOP, avoid nephrotoxins, NSAIDs, dehydration and contrast as able.      #Hypothyroid  - Continue home Levothyroxine      #Chronic Hypoxic Respiratory Failure 2/2 COPD w/o Exacerbation in setting of continued Tobacco Dependence  - CT showing Emphysema  - Continue home 02 requirement, duonebs PRN, Rec'd cessation, NRT PRN     #GERD  - On IV PPI as per above     #Hx CVA  - Continue home Statin, Eliquis held as per above     #Chronic Mod Malnutrition  - BMI 18, Has been evaluated and followed by Nutrition in past     F: Oral  E: Monitor & Replace PRN  N: CLD (likely advance as tolerated soon)  Ppx: SCDs  Code: DNR/DNI (GOC conversations ongoing)     Dispo: Pending workup and medical stability     *This patient is considered high risk due to sepsis, enteritis, chronic bowel ischemia, acute anemia requiring transfusions.    VTE Prophylaxis:   Mechanical Order History:      Ordered        01/16/22 1036  Place Sequential Compression Device  Once            01/16/22 1036  Maintain Sequential Compression Device  Continuous                    Pharmalogical Order History:     None        Pierre Delgado MD  [facility] Hospitalist  01/18/22  10:15 EST

## 2022-01-18 NOTE — PLAN OF CARE
Goal Outcome Evaluation:  Plan of Care Reviewed With: patient        Progress: no change  Outcome Summary: Pt remains A&Ox4 this shift. Pt having complaints of pain throughout the shift with PRN meds given. Pt receiving PRN meds for nausea as well. Pt tolerating clear liquids this shift with no vomiting noted. Pt did receive Lactulose and had good output of stool. No blood was noted in pt's BMs. Pt remains in NSR on the monitor with occasional PVCs, no further issues noted, and no obvious source of bleeding noted. Palliative care was consulted per MD.

## 2022-01-18 NOTE — PLAN OF CARE
Chart reviewed this am.      Pt continues clear liquid po diet for bowel per discussion w/ RN.  SLP to f/u when clear to accept puree and solid po trials.     Thank you-  Lolis Costa MS [facility]-SLP        Goal Outcome Evaluation:

## 2022-01-18 NOTE — PLAN OF CARE
Goal Outcome Evaluation:           Progress: no change  Outcome Summary: VSS. 2L NC. Potassium replaced this shift. Midline placed. Patient with no complaints this shift, call light within reach.

## 2022-01-18 NOTE — PLAN OF CARE
Problem: Adult Inpatient Plan of Care  Goal: Plan of Care Review  Outcome: Ongoing, Progressing  Flowsheets (Taken 1/17/2022 1800 by Vivien Clifford RN)  Progress: no change  Plan of Care Reviewed With: patient  Goal: Patient-Specific Goal (Individualized)  Outcome: Ongoing, Progressing  Goal: Absence of Hospital-Acquired Illness or Injury  Outcome: Ongoing, Progressing  Intervention: Prevent Infection  Recent Flowsheet Documentation  Taken 1/18/2022 0200 by Lolis Garcia RN  Infection Prevention:   single patient room provided   rest/sleep promoted  Goal: Optimal Comfort and Wellbeing  Outcome: Ongoing, Progressing  Intervention: Provide Person-Centered Care  Recent Flowsheet Documentation  Taken 1/18/2022 0200 by Lolis Garcia RN  Trust Relationship/Rapport:   care explained   choices provided  Goal: Readiness for Transition of Care  Outcome: Ongoing, Progressing   Problem: Skin Injury Risk Increased  Goal: Skin Health and Integrity  Outcome: Ongoing, Progressing  Intervention: Optimize Skin Protection  Recent Flowsheet Documentation  Taken 1/18/2022 0200 by Lolis Garcia RN  Pressure Reduction Techniques: frequent weight shift encouraged  Pressure Reduction Devices: pressure-redistributing mattress utilized  Intervention: Promote and Optimize Oral Intake  Recent Flowsheet Documentation  Taken 1/18/2022 0200 by Lolis Garcia RN  Oral Nutrition Promotion: physical activity promoted   Problem: Pain Chronic (Persistent) (Comorbidity Management)  Goal: Acceptable Pain Control and Functional Ability  Outcome: Ongoing, Progressing  Intervention: Manage Persistent Pain  Recent Flowsheet Documentation  Taken 1/18/2022 0200 by Lolis Garcia RN  Sleep/Rest Enhancement: awakenings minimized  Intervention: Optimize Psychosocial Wellbeing  Recent Flowsheet Documentation  Taken 1/18/2022 0200 by Lolis Garcia RN  Supportive Measures: active listening utilized  Diversional Activities: television  Family/Support System  Care: support provided   Problem: Fall Injury Risk  Goal: Absence of Fall and Fall-Related Injury  Outcome: Ongoing, Progressing  Intervention: Identify and Manage Contributors to Fall Injury Risk  Recent Flowsheet Documentation  Taken 1/18/2022 0200 by Lolis Garcia, RN  Self-Care Promotion:   BADL personal routines maintained   BADL personal objects within reach   meal setup provided   Goal Outcome Evaluation:

## 2022-01-19 LAB
ANION GAP SERPL CALCULATED.3IONS-SCNC: 9.6 MMOL/L (ref 5–15)
ANISOCYTOSIS BLD QL: ABNORMAL
BUN SERPL-MCNC: 13 MG/DL (ref 8–23)
BUN/CREAT SERPL: 20.6 (ref 7–25)
CALCIUM SPEC-SCNC: 7.9 MG/DL (ref 8.6–10.5)
CHLORIDE SERPL-SCNC: 98 MMOL/L (ref 98–107)
CO2 SERPL-SCNC: 28.4 MMOL/L (ref 22–29)
CREAT SERPL-MCNC: 0.63 MG/DL (ref 0.57–1)
DEPRECATED RDW RBC AUTO: 63 FL (ref 37–54)
ERYTHROCYTE [DISTWIDTH] IN BLOOD BY AUTOMATED COUNT: 17.9 % (ref 12.3–15.4)
GFR SERPL CREATININE-BSD FRML MDRD: 92 ML/MIN/1.73
GLUCOSE BLDC GLUCOMTR-MCNC: 103 MG/DL (ref 70–130)
GLUCOSE BLDC GLUCOMTR-MCNC: 123 MG/DL (ref 70–130)
GLUCOSE BLDC GLUCOMTR-MCNC: 218 MG/DL (ref 70–130)
GLUCOSE SERPL-MCNC: 93 MG/DL (ref 65–99)
HCT VFR BLD AUTO: 26.2 % (ref 34–46.6)
HGB BLD-MCNC: 8 G/DL (ref 12–15.9)
HYPOCHROMIA BLD QL: ABNORMAL
LYMPHOCYTES # BLD MANUAL: 0.63 10*3/MM3 (ref 0.7–3.1)
LYMPHOCYTES NFR BLD MANUAL: 5 % (ref 5–12)
MCH RBC QN AUTO: 29.9 PG (ref 26.6–33)
MCHC RBC AUTO-ENTMCNC: 30.5 G/DL (ref 31.5–35.7)
MCV RBC AUTO: 97.8 FL (ref 79–97)
MONOCYTES # BLD: 1.57 10*3/MM3 (ref 0.1–0.9)
NEUTROPHILS # BLD AUTO: 29.22 10*3/MM3 (ref 1.7–7)
NEUTROPHILS NFR BLD MANUAL: 84 % (ref 42.7–76)
NEUTS BAND NFR BLD MANUAL: 9 % (ref 0–5)
NEUTS VAC BLD QL SMEAR: ABNORMAL
PLAT MORPH BLD: NORMAL
PLATELET # BLD AUTO: 357 10*3/MM3 (ref 140–450)
PMV BLD AUTO: 10.3 FL (ref 6–12)
POTASSIUM SERPL-SCNC: 3.7 MMOL/L (ref 3.5–5.2)
RBC # BLD AUTO: 2.68 10*6/MM3 (ref 3.77–5.28)
SODIUM SERPL-SCNC: 136 MMOL/L (ref 136–145)
TOXIC GRANULATION: ABNORMAL
VARIANT LYMPHS NFR BLD MANUAL: 2 % (ref 19.6–45.3)
WBC NRBC COR # BLD: 31.42 10*3/MM3 (ref 3.4–10.8)

## 2022-01-19 PROCEDURE — 94799 UNLISTED PULMONARY SVC/PX: CPT

## 2022-01-19 PROCEDURE — 82962 GLUCOSE BLOOD TEST: CPT

## 2022-01-19 PROCEDURE — 25010000002 PIPERACILLIN SOD-TAZOBACTAM PER 1 G: Performed by: STUDENT IN AN ORGANIZED HEALTH CARE EDUCATION/TRAINING PROGRAM

## 2022-01-19 PROCEDURE — 63710000001 ONDANSETRON PER 8 MG: Performed by: STUDENT IN AN ORGANIZED HEALTH CARE EDUCATION/TRAINING PROGRAM

## 2022-01-19 PROCEDURE — 25010000002 PROCHLORPERAZINE 10 MG/2ML SOLUTION: Performed by: INTERNAL MEDICINE

## 2022-01-19 PROCEDURE — 25010000002 METHYLPREDNISOLONE PER 40 MG: Performed by: STUDENT IN AN ORGANIZED HEALTH CARE EDUCATION/TRAINING PROGRAM

## 2022-01-19 PROCEDURE — 99232 SBSQ HOSP IP/OBS MODERATE 35: CPT | Performed by: INTERNAL MEDICINE

## 2022-01-19 PROCEDURE — 25010000002 ONDANSETRON PER 1 MG: Performed by: STUDENT IN AN ORGANIZED HEALTH CARE EDUCATION/TRAINING PROGRAM

## 2022-01-19 PROCEDURE — 99222 1ST HOSP IP/OBS MODERATE 55: CPT | Performed by: INTERNAL MEDICINE

## 2022-01-19 PROCEDURE — 92610 EVALUATE SWALLOWING FUNCTION: CPT

## 2022-01-19 PROCEDURE — 63710000001 INSULIN ASPART PER 5 UNITS: Performed by: STUDENT IN AN ORGANIZED HEALTH CARE EDUCATION/TRAINING PROGRAM

## 2022-01-19 PROCEDURE — 63710000001 DRONABINOL PER 2.5 MG: Performed by: STUDENT IN AN ORGANIZED HEALTH CARE EDUCATION/TRAINING PROGRAM

## 2022-01-19 RX ORDER — PANTOPRAZOLE SODIUM 40 MG/1
40 TABLET, DELAYED RELEASE ORAL
Status: DISCONTINUED | OUTPATIENT
Start: 2022-01-19 | End: 2022-01-20 | Stop reason: HOSPADM

## 2022-01-19 RX ORDER — LEVOTHYROXINE SODIUM 175 UG/1
175 TABLET ORAL
Status: DISCONTINUED | OUTPATIENT
Start: 2022-01-20 | End: 2022-01-20 | Stop reason: HOSPADM

## 2022-01-19 RX ADMIN — SODIUM CHLORIDE, PRESERVATIVE FREE 10 ML: 5 INJECTION INTRAVENOUS at 08:23

## 2022-01-19 RX ADMIN — SODIUM CHLORIDE 8 MG/HR: 9 INJECTION, SOLUTION INTRAVENOUS at 03:27

## 2022-01-19 RX ADMIN — ONDANSETRON 4 MG: 2 INJECTION INTRAMUSCULAR; INTRAVENOUS at 15:17

## 2022-01-19 RX ADMIN — POLYVINYL ALCOHOL, POVIDONE 1 DROP: 14; 6 SOLUTION/ DROPS OPHTHALMIC at 08:21

## 2022-01-19 RX ADMIN — SODIUM CHLORIDE, PRESERVATIVE FREE 10 ML: 5 INJECTION INTRAVENOUS at 20:10

## 2022-01-19 RX ADMIN — PROCHLORPERAZINE EDISYLATE 2.25 MG: 5 INJECTION INTRAMUSCULAR; INTRAVENOUS at 00:45

## 2022-01-19 RX ADMIN — Medication 1 CAPSULE: at 08:20

## 2022-01-19 RX ADMIN — SODIUM CHLORIDE, PRESERVATIVE FREE 10 ML: 5 INJECTION INTRAVENOUS at 20:07

## 2022-01-19 RX ADMIN — PIPERACILLIN SODIUM AND TAZOBACTAM SODIUM 4.5 G: 4; .5 INJECTION, POWDER, LYOPHILIZED, FOR SOLUTION INTRAVENOUS at 08:22

## 2022-01-19 RX ADMIN — GUAIFENESIN 1200 MG: 600 TABLET, EXTENDED RELEASE ORAL at 12:43

## 2022-01-19 RX ADMIN — ATORVASTATIN CALCIUM 40 MG: 40 TABLET, FILM COATED ORAL at 20:07

## 2022-01-19 RX ADMIN — PIPERACILLIN SODIUM AND TAZOBACTAM SODIUM 4.5 G: 4; .5 INJECTION, POWDER, LYOPHILIZED, FOR SOLUTION INTRAVENOUS at 15:14

## 2022-01-19 RX ADMIN — ASPIRIN 81 MG: 81 TABLET, COATED ORAL at 08:21

## 2022-01-19 RX ADMIN — TRAZODONE HYDROCHLORIDE 50 MG: 50 TABLET ORAL at 20:07

## 2022-01-19 RX ADMIN — POLYVINYL ALCOHOL, POVIDONE 1 DROP: 14; 6 SOLUTION/ DROPS OPHTHALMIC at 20:07

## 2022-01-19 RX ADMIN — ONDANSETRON HYDROCHLORIDE 4 MG: 4 TABLET, FILM COATED ORAL at 21:27

## 2022-01-19 RX ADMIN — IPRATROPIUM BROMIDE AND ALBUTEROL SULFATE 3 ML: .5; 3 SOLUTION RESPIRATORY (INHALATION) at 00:35

## 2022-01-19 RX ADMIN — MONTELUKAST SODIUM 10 MG: 10 TABLET, COATED ORAL at 17:38

## 2022-01-19 RX ADMIN — DOCUSATE SODIUM 200 MG: 100 CAPSULE, LIQUID FILLED ORAL at 08:21

## 2022-01-19 RX ADMIN — DRONABINOL 2.5 MG: 2.5 CAPSULE ORAL at 06:33

## 2022-01-19 RX ADMIN — Medication 1 CAPSULE: at 20:07

## 2022-01-19 RX ADMIN — PIPERACILLIN SODIUM AND TAZOBACTAM SODIUM 4.5 G: 4; .5 INJECTION, POWDER, LYOPHILIZED, FOR SOLUTION INTRAVENOUS at 20:07

## 2022-01-19 RX ADMIN — PANTOPRAZOLE SODIUM 40 MG: 40 TABLET, DELAYED RELEASE ORAL at 17:38

## 2022-01-19 RX ADMIN — Medication 1 TABLET: at 20:07

## 2022-01-19 RX ADMIN — INSULIN ASPART 3 UNITS: 100 INJECTION, SOLUTION INTRAVENOUS; SUBCUTANEOUS at 17:43

## 2022-01-19 RX ADMIN — IPRATROPIUM BROMIDE AND ALBUTEROL SULFATE 3 ML: .5; 3 SOLUTION RESPIRATORY (INHALATION) at 06:40

## 2022-01-19 RX ADMIN — Medication 5 MG: at 20:07

## 2022-01-19 RX ADMIN — METHYLPREDNISOLONE SODIUM SUCCINATE 40 MG: 40 INJECTION, POWDER, FOR SOLUTION INTRAMUSCULAR; INTRAVENOUS at 08:21

## 2022-01-19 RX ADMIN — SODIUM CHLORIDE 8 MG/HR: 9 INJECTION, SOLUTION INTRAVENOUS at 08:22

## 2022-01-19 RX ADMIN — CARVEDILOL 3.12 MG: 3.12 TABLET, FILM COATED ORAL at 08:21

## 2022-01-19 RX ADMIN — DRONABINOL 2.5 MG: 2.5 CAPSULE ORAL at 17:37

## 2022-01-19 RX ADMIN — LEVOTHYROXINE SODIUM 150 MCG: 150 TABLET ORAL at 08:21

## 2022-01-19 RX ADMIN — FERROUS SULFATE TAB 325 MG (65 MG ELEMENTAL FE) 325 MG: 325 (65 FE) TAB at 12:43

## 2022-01-19 RX ADMIN — PIPERACILLIN SODIUM AND TAZOBACTAM SODIUM 4.5 G: 4; .5 INJECTION, POWDER, LYOPHILIZED, FOR SOLUTION INTRAVENOUS at 01:58

## 2022-01-19 RX ADMIN — CARVEDILOL 3.12 MG: 3.12 TABLET, FILM COATED ORAL at 17:37

## 2022-01-19 RX ADMIN — IPRATROPIUM BROMIDE AND ALBUTEROL SULFATE 3 ML: .5; 3 SOLUTION RESPIRATORY (INHALATION) at 18:20

## 2022-01-19 NOTE — CASE MANAGEMENT/SOCIAL WORK
Discharge Planning Assessment  [facility]     Patient Name: Mary Ly  MRN: [id number]  Today's Date: 1/19/2022    Admit Date: 1/16/2022     Discharge Plan     Row Name 01/19/22 0938       Plan    Plan [facility]. Upon admission, pt had a 30 day bed old. [facility] to be contacted at discharge and EMS to be arranged for transportation.  to continue to follow and assist.    16:58: Pt has been made comfort measures. SS spoke with Wilmer at [facility] who confirmed pt could come back on comfort measures.               Continued Care and Services - Admitted Since 1/16/2022     Destination Coordination complete.    Service Provider Request Status Selected Services Address Phone Fax Patient Preferred    [facility]   [facility] [address] [facility] [id number] [phone] --              SEBLE Dubose

## 2022-01-19 NOTE — PLAN OF CARE
Goal Outcome Evaluation:   Pt remains A&O. VSS. 2L NC. Pt has had several large BM's this shift. No complaints at this time. Call light is within reach. Will continue to monitor.

## 2022-01-19 NOTE — PLAN OF CARE
Goal Outcome Evaluation:           Progress: no change  Outcome Summary: VSS. 2L NC. Patients code status changed to comfort measures this shift. PRN nausea medication given. Diet advanced to puree, tolerating well. Patient with no other complaints this shift. Call light within reach.

## 2022-01-19 NOTE — PROGRESS NOTES
"Palliative Care Daily Progress Note     S: Medical record reviewed, followed up with Primary RN Khalif and Dr. Delgado regarding patient's condition. When Palliative entered the room the pt was resting quietly, she seemed more fatigued today, she stated that she had multiple episodes of diarrhea, she denied pain and any other symptoms other than weakness and fatigue.      O:   Palliative Performance Scale Score:     /60   Pulse 66   Temp 97.6 °F (36.4 °C) (Axillary)   Resp 18   Ht 165.1 cm (65\")   Wt 50.7 kg (111 lb 12.8 oz)   SpO2 93%   BMI 18.60 kg/m²     Intake/Output Summary (Last 24 hours) at 1/19/2022 1630  Last data filed at 1/19/2022 1200  Gross per 24 hour   Intake 2714.69 ml   Output --   Net 2714.69 ml       PE:  General Appearance:    Chronically ill appearing, alert, cooperative, NAD   HEENT:    NC/AT, without obvious abnormality, EOMI, anicteric    Neck:   supple, trachea midline, no JVD   Lungs:     CTAB without w/r/r    Heart:    RRR, normal S1 and S2, no M/R/G   Abdomen:     Soft, NT, ND, NABS    Extremities:   Moves all extremities, no edema   Pulses:   Pulses palpable and equal bilaterally   Skin:   Warm, dry, pale   Neurologic:   A/Ox3, cooperative   Psych:   Calm, appropriate         Meds: Reviewed and changes noted.    Labs:   Results from last 7 days   Lab Units 01/18/22  2320   WBC 10*3/mm3 31.42*   HEMOGLOBIN g/dL 8.0*   HEMATOCRIT % 26.2*   PLATELETS 10*3/mm3 357     Results from last 7 days   Lab Units 01/18/22  2320   SODIUM mmol/L 136   POTASSIUM mmol/L 3.7   CHLORIDE mmol/L 98   CO2 mmol/L 28.4   BUN mg/dL 13   CREATININE mg/dL 0.63   GLUCOSE mg/dL 93   CALCIUM mg/dL 7.9*     Results from last 7 days   Lab Units 01/18/22  2320 01/17/22  0137 01/16/22  0449   SODIUM mmol/L 136   < > 136   POTASSIUM mmol/L 3.7   < > 4.2   CHLORIDE mmol/L 98   < > 93*   CO2 mmol/L 28.4   < > 30.2*   BUN mg/dL 13   < > 21   CREATININE mg/dL 0.63   < > 0.65   CALCIUM mg/dL 7.9*   < > 8.6 "   BILIRUBIN mg/dL  --   --  <0.2   ALK PHOS U/L  --   --  88   ALT (SGPT) U/L  --   --  38*   AST (SGOT) U/L  --   --  43*   GLUCOSE mg/dL 93   < > 94    < > = values in this interval not displayed.     Imaging Results (Last 72 Hours)     ** No results found for the last 72 hours. **            Diagnostics: Reviewed    A: Mary Ly is a 76 y.o. yo female with shortness of breath and hypoxia. She has a medical history of CAD status post PCI, COPD on 3 L at baseline and previous ABL due to gastric ulcer/AVMs who presented to the ER from nursing facility after being found minimally responsive and hypoxic. Pt is a resident of [facility]. Upon admission her Hbg was found to be 5.8, WBC 35K with left shift, CRP 16, PT/INR 16/1.2, troponin 0.057. Pt received 2 units of PRBC's.  Patient has had multiple recent admissions at [facility] as well as [facility] with low Hbg with multiple ulcers noted as well as multiple other comorbidity/conditions.         P: Today palliative followed up with Mary as she was considering being comfort measures and wanted me to talk to her children about it. She has decided today that she wants to be comfort measures and does not want any other test or procedures and wants to go back to [facility] rehab where her son is also a resident. She also states that that she does not want any further blood transfusions. We will talk with a  anout speaking with [facility]. I let Dr. Delgado know of code status and plan to return to [facility].      We will continue to follow along. Please do not hesitate to contact us regarding further sx mgmt or GOC needs, including after hours or on weekends via our on call provider at [phone].     Magui Clifford, APRN    1/19/2022

## 2022-01-19 NOTE — THERAPY EVALUATION
Acute Care - Speech Language Pathology   Swallow Initial Evaluation [facility]     Patient Name: Mary Ly  : 1945  MRN: [id number]  Today's Date: 2022               Admit Date: 2022    Mary Ly  is seen at bedside this am on PCU-203 to assess safety/efficacy of swallowing fnx, determine safest/least restrictive diet tolerance. She has a medical hx significant for CAD status post PCI, COPD on 3 L at baseline and previous ABL due to gastric ulcer/AVMs.  She presented to ED from her nursing facility after being found minimally responsive and hypoxic.  She was recently admitted to [facility] from 21 - 21 for GI bleed.  EGD during admission noted abnormal atrophic mucosa throughout the stomach and few nonbleeding gastric AVMs s/p ablation. She was again hospitalized at [facility] from 21 - 21 w/ GI bleed and EGD noted multiple ulcers in the fundus, multiple ulcers in the body from 4 to 8 mm with 1 visible vessel at its edge requiring epi injection and clipping, superficial gastritis and erythema and edema in the antrum with biopsies taken.     Pt has been tolerating a clear liquid diet per GI at this time.      Pt clear to accept po trials of puree and thins on this date per MD. Pt clear to initiate po diet pending dysphagia assessment.     Social History     Socioeconomic History   • Marital status:    Tobacco Use   • Smoking status: Current Every Day Smoker     Packs/day: 1.00     Years: 55.00     Pack years: 55.00     Types: Cigarettes   • Smokeless tobacco: Never Used   Vaping Use   • Vaping Use: Never used   Substance and Sexual Activity   • Alcohol use: Never   • Drug use: Never   • Sexual activity: Defer        No recent chest xray available for review.    Diet Orders (active) (From admission, onward)     Start     Ordered    22 1800  Dietary Nutrition Supplements Boost Breeze (Ensure Clear); orange  Daily With Lunch & Dinner       22  1252    01/16/22 1037  Diet Clear Liquid  Diet Effective Now         01/16/22 1036                Pt is observed on nasal cannula at 2L w/o complications.     Pt is positioned upright and centered in bed to accept multiple po presentations of ice chips, puree, and thin liquids via spoon, cup and straw.  Pt is able to self feed and does so across this assessment.     Facial/oral structures are symmetrical upon observation. Lingual protrusion reveals no deviation. Oral mucosa are moist, pink, and clean. Pt is evidenced w/ limited dentition. Secretions are clear, thin, and well controlled. OROM/BERYL is generally weak overall to imitate oral postures. Gag is not assessed. Volitional cough is intact w/ adequate  intensity, clear in quality, non-productive. Voice is adequate in intensity, clear in quality w/ intelligible speech.    Upon po presentations, adequate bolus anticipation and acceptance w/ good labial seal for bolus clearance via spoon bowl, cup rim stability and suction via straw. Bolus formation, manipulation and control are wfl w/ rotary mastication pattern. A-p transit is timely w/o oral residue.     Pharyngeal swallow is timely w/ adequate hyolaryngeal elevation per palpation. No overt s/s aspiration evidenced across this evaluation. No silent aspiration suspected as pt is w/o changes in vocal quality, respirations or secretions post po presentations. Pt denies odynophagia.      Visit Dx:     ICD-10-CM ICD-9-CM   1. Anemia, unspecified type  D64.9 285.9   2. Pneumonia of both lungs due to infectious organism, unspecified part of lung  J18.9 483.8   3. Sepsis, due to unspecified organism, unspecified whether acute organ dysfunction present ([facility])  A41.9 038.9     995.91   4. Elevated troponin  R77.8 790.6     Patient Active Problem List   Diagnosis   • Status post laparoscopic cholecystectomy   • Severe malnutrition (HCC)   • COPD (chronic obstructive pulmonary disease) ([facility])   • Type II diabetes mellitus  (HCC)   • Hypothyroidism   • Hyperlipidemia   • Diastolic CHF, chronic (HCC)   • Moderate malnutrition (HCC)   • Vomiting   • Upper GI bleed   • Pneumonia due to COVID-19 virus   • Cytokine release syndrome, grade 2   • Paroxysmal atrial fibrillation (HCC)   • History of cerebrovascular accident   • Anemia     Past Medical History:   Diagnosis Date   • AAA (abdominal aortic aneurysm) (HCC)     s/p repair    • Arthritis    • CAD (coronary artery disease)     s/p stenting in the past    • Chronic kidney disease (CKD), stage III (moderate) (HCC)    • COPD (chronic obstructive pulmonary disease) (HCC)    • Debility    • Diastolic CHF, chronic (HCC) 4/23/2021   • GERD (gastroesophageal reflux disease)    • History of CVA (cerebrovascular accident)    • History of ischemic colitis    • Hyperlipidemia 4/23/2021   • Hypertension    • Hypothyroidism    • Pneumonia due to COVID-19 virus 9/21/2021   • Stroke (HCC)    • Type II diabetes mellitus (HCC)      Past Surgical History:   Procedure Laterality Date   • BACK SURGERY     • CARDIAC CATHETERIZATION     • CHOLECYSTECTOMY N/A 7/17/2020    Procedure: CHOLECYSTECTOMY LAPAROSCOPIC;  Surgeon: Lonnie Meneses MD;  Location: [facility];  Service: General;  Laterality: N/A;   • COLONOSCOPY     • COLONOSCOPY N/A 9/25/2021    Procedure: COLONOSCOPY;  Surgeon: Lonnie Meneses MD;  Location: [facility];  Service: Gastroenterology;  Laterality: N/A;   • CORONARY STENT PLACEMENT     • ENDOSCOPY     • ENDOSCOPY N/A 9/25/2021    Procedure: ESOPHAGOGASTRODUODENOSCOPY;  Surgeon: Lonnie Meneses MD;  Location: [facility] OR;  Service: Gastroenterology;  Laterality: N/A;   • TONSILLECTOMY       IMPRESSION:  Pt presents w/ wfl oropharyngeal swallow w/o s/s aspiration.  No s/s indicative of silent aspiration.  No odynophagia reported.  She is felt to most benefit from initiation of po diet. Per MD, pt is clear to accept puree solids and thin liquids. Advance po diet per MD.       SLP Recommendation  and Plan       Recommendations:  1. Puree solids, thin liquids.   2. Meds whole in puree/thins.   3. Upright and centered for all po intake  4. YNES precautions.  5. Oral care protocol.  6. Advance po diet per MD.     No further formal SLP f/u warranted at this time.    D/w pt results and recommendations w/ verbal agreement.    D/w RN results and recommendations w/ verbal agreement.    Thank you for allowing me to participate in the care of your patient-  Lolis Costa MS CCC-SLP              EDUCATION  The patient has been educated in the following areas:   Dysphagia (Swallowing Impairment).              Time Calculation:                Lolis Costa MS CCC-SLP  1/19/2022   None

## 2022-01-19 NOTE — PROGRESS NOTES
[facility] HOSPITALIST PROGRESS NOTE     Patient Identification:  Name:  Mary Ly  Age:  76 y.o.  Sex:  female  :  1945  MRN:  [id number]  Visit Number:  [id number]  ROOM: [place] [id number]     Primary Care Provider:  Leta Gardner MD    Length of stay in inpatient status:  3    Subjective     Chief Compliant:    Chief Complaint   Patient presents with   • Shortness of Breath     History of Presenting Illness:    Patient stable today, no acute events overnight, no new complaints, Hgb stable, d/c'd PPI gtt and changed to PO PPI BID, advancing diet, Cr improved, cards consult pending to discuss AC need and options, palliative following for GOC conversations, discussed w/ daughter last evening, patient denies any fevers or chills today, WBC count improved.   Objective     Current Hospital Meds:Acidophilus/Pectin, 1 capsule, Oral, BID  aspirin, 81 mg, Oral, Daily  atorvastatin, 40 mg, Oral, Nightly  carvedilol, 3.125 mg, Oral, BID With Meals  docusate sodium, 200 mg, Oral, Daily  dronabinol, 2.5 mg, Oral, BID AC  ferrous sulfate, 325 mg, Oral, Daily With Breakfast  guaiFENesin, 1,200 mg, Oral, Q12H  insulin aspart, 0-7 Units, Subcutaneous, TID AC  ipratropium-albuterol, 3 mL, Nebulization, 4x Daily - RT  lactulose, 10 g, Oral, Daily  levothyroxine, 150 mcg, Oral, Daily  melatonin, 5 mg, Oral, Nightly  methylPREDNISolone sodium succinate, 40 mg, Intravenous, Daily  montelukast, 10 mg, Oral, Q PM  multivitamin, 1 tablet, Oral, Nightly  pantoprazole, 40 mg, Oral, BID AC  piperacillin-tazobactam, 4.5 g, Intravenous, Q6H  polyethylene glycol, 17 g, Oral, BID  Polyvinyl Alcohol-Povidone PF, 1 drop, Both Eyes, BID  sodium chloride, 10 mL, Intravenous, Q12H  sodium chloride, 10 mL, Intravenous, Q12H  sodium chloride, 10 mL, Intravenous, Q12H  traZODone, 50 mg, Oral, Nightly         Current Antimicrobial Therapy:  Anti-Infectives (From admission, onward)    Ordered     Dose/Rate Route Frequency Start Stop     01/16/22 1442  vancomycin 1 g/250 mL 0.9% NS (vial-mate)        Ordering Provider: Lester Estrada DO    1,000 mg  over 60 Minutes Intravenous Once 01/16/22 1600 01/16/22 1806    01/16/22 1352  piperacillin-tazobactam (ZOSYN) IVPB 4.5 g in 100 mL NS VTB        Ordering Provider: Lester Estrada DO    4.5 g  over 4 Hours Intravenous Every 6 Hours 01/16/22 1445 01/23/22 1444    01/16/22 0522  piperacillin-tazobactam (ZOSYN) IVPB 3.375 g in 100 mL NS VTB        Ordering Provider: Armen George DO    3.375 g  over 30 Minutes Intravenous Once 01/16/22 0524 01/16/22 0840        Current Diuretic Therapy:  Diuretics (From admission, onward)    None        ----------------------------------------------------------------------------------------------------------------------  Vital Signs:  Temp:  [97.5 °F (36.4 °C)-98.4 °F (36.9 °C)] 98 °F (36.7 °C)  Heart Rate:  [59-71] 64  Resp:  [12-26] 21  BP: (124-159)/(53-85) 147/70  SpO2:  [91 %-100 %] 94 %  on  Flow (L/min):  [2] 2;   Device (Oxygen Therapy): nasal cannula  Body mass index is 18.6 kg/m².    Wt Readings from Last 3 Encounters:   01/19/22 50.7 kg (111 lb 12.8 oz)   12/24/21 45.4 kg (100 lb)   11/19/21 45.4 kg (100 lb)     Intake & Output (last 3 days)       01/16 0701  01/17 0700 01/17 0701 01/18 0700 01/18 0701 01/19 0700 01/19 0701 01/20 0700    P.O.  500 1380 360    I.V. (mL/kg) 400 (8)  1172.7 (23.1)     Blood 600       IV Piggyback 100 200 442     Total Intake(mL/kg) 1100 (22) 700 (13.8) 2994.7 (59.1) 360 (7.1)    Net +1100 +700 +2994.7 +360            Urine Unmeasured Occurrence 1 x 3 x 4 x     Stool Unmeasured Occurrence  1 x 6 x 1 x        Diet Pureed; Thin  ----------------------------------------------------------------------------------------------------------------------  Physical exam:  Constitutional:  Chronically ill appearing, No acute distress  HENT:  Head:  Normocephalic and atraumatic.  Mouth:  Moist mucous membranes.     Eyes:  Conjunctivae and EOM are normal. No scleral icterus.    Neck:  Neck supple.  No JVD present.    Cardiovascular:  Normal rate, regular rhythm and normal heart sounds with no murmur.  Pulmonary/Chest:  Chronic respiratory distress, no wheezes, no crackles, on NC  Abdominal:  Soft. No distension and no tenderness.   Musculoskeletal:  No tenderness, and no deformity.  .    Neurological:  Alert and oriented to person, place, and time.  No gross focal deficits  Skin:  Skin is warm and dry. No rash noted. No pallor.   Peripheral vascular: no clubbing, no cyanosis, no edema.    *Exam stable today 1/19  ----------------------------------------------------------------------------------------------------------------------  Results from last 7 days   Lab Units 01/18/22  2320 01/18/22  0013 01/17/22  0137 01/16/22 2001 01/16/22  0525   CRP mg/dL  --   --   --   --  16.33*   LACTATE mmol/L  --   --   --   --  1.0   WBC 10*3/mm3 31.42* 42.28* 35.35*  --   --    HEMOGLOBIN g/dL 8.0* 8.0* 8.7*   < >  --    HEMATOCRIT % 26.2* 25.2* 27.3*   < >  --    MCV fL 97.8* 95.5 95.8  --   --    MCHC g/dL 30.5* 31.7 31.9  --   --    PLATELETS 10*3/mm3 357 332 313  --   --    INR   --   --   --   --  1.28*    < > = values in this interval not displayed.     Results from last 7 days   Lab Units 01/16/22  1127   PH, ARTERIAL pH units 7.383   PO2 ART mm Hg 66.8*   PCO2, ARTERIAL mm Hg 62.0*   HCO3 ART mmol/L 36.9*     Results from last 7 days   Lab Units 01/18/22  2320 01/18/22  0013 01/17/22  0137 01/16/22  0449 01/16/22 0449   SODIUM mmol/L 136 136 134*   < > 136   POTASSIUM mmol/L 3.7 3.3* 4.0   < > 4.2   CHLORIDE mmol/L 98 96* 91*   < > 93*   CO2 mmol/L 28.4 29.4* 28.4   < > 30.2*   BUN mg/dL 13 21 24*   < > 21   CREATININE mg/dL 0.63 0.78 0.76   < > 0.65   EGFR IF NONAFRICN AM mL/min/1.73 92 72 74   < > 89   CALCIUM mg/dL 7.9* 7.9* 8.3*   < > 8.6   GLUCOSE mg/dL 93 120* 113*   < > 94   ALBUMIN g/dL  --   --   --   --  2.79*    BILIRUBIN mg/dL  --   --   --   --  <0.2   ALK PHOS U/L  --   --   --   --  88   AST (SGOT) U/L  --   --   --   --  43*   ALT (SGPT) U/L  --   --   --   --  38*    < > = values in this interval not displayed.   Estimated Creatinine Clearance: 47.9 mL/min (by C-G formula based on SCr of 0.63 mg/dL).  No results found for: AMMONIA  Results from last 7 days   Lab Units 01/16/22  1050 01/16/22  0525   TROPONIN T ng/mL 0.062* 0.057*     Results from last 7 days   Lab Units 01/16/22  0525   PROBNP pg/mL 1,158.0     Results from last 7 days   Lab Units 01/16/22  1050   CHOLESTEROL mg/dL 99   TRIGLYCERIDES mg/dL 139   HDL CHOL mg/dL 37*   LDL CHOL mg/dL 38     Hemoglobin A1C   Date/Time Value Ref Range Status   01/16/2022 2001 5.10 4.80 - 5.60 % Final     Glucose   Date/Time Value Ref Range Status   01/19/2022 0632 103 70 - 130 mg/dL Final     Comment:     Meter: [id number] : [id number] SAVANA HOWARD   01/18/2022 2212 113 70 - 130 mg/dL Final     Comment:     Meter: [id number] : [id number] SAVANA LEE   01/18/2022 1702 130 70 - 130 mg/dL Final     Comment:     Meter: [id number] : [id number] JOAN GARAY   01/18/2022 1136 141 (H) 70 - 130 mg/dL Final     Comment:     Meter: [id number] : [id number] JOAN GARAY   01/18/2022 0545 117 70 - 130 mg/dL Final     Comment:     Meter: [id number] : [id number] sofie deluna   01/17/2022 2104 181 (H) 70 - 130 mg/dL Final     Comment:     Meter: [id number] : [id number] sofie neilyl   01/17/2022 1722 216 (H) 70 - 130 mg/dL Final     Comment:     Meter: [id number] : [id number] DONITA HAY   01/17/2022 1139 116 70 - 130 mg/dL Final     Comment:     Meter: [id number] : [id number] DONITA HAY     Lab Results   Component Value Date    TSH 55.020 (H) 01/16/2022    FREET4 0.61 (L) 01/16/2022     No results found for: PREGTESTUR, PREGSERUM, HCG, HCGQUANT  Pain Management Panel     Pain Management Panel Latest Ref Rng & Units 9/21/2021 8/13/2020     AMPHETAMINES SCREEN, URINE Negative Negative Negative    BARBITURATES SCREEN Negative Negative Negative    BENZODIAZEPINE SCREEN, URINE Negative Negative Negative    BUPRENORPHINEUR Negative Negative Negative    COCAINE SCREEN, URINE Negative Negative Negative    METHADONE SCREEN, URINE Negative Negative Negative    METHAMPHETAMINEUR Negative Negative -        Brief Urine Lab Results  (Last result in the past 365 days)      Color   Clarity   Blood   Leuk Est   Nitrite   Protein   CREAT   Urine HCG        12/24/21 1530 Yellow   Clear   Negative   Moderate (2+)   Negative   Negative               Blood Culture   Date Value Ref Range Status   01/16/2022 No growth at 3 days  Preliminary   01/16/2022 No growth at 3 days  Preliminary     No results found for: URINECX  No results found for: WOUNDCX  No results found for: STOOLCX  No results found for: RESPCX  No results found for: AFBCX  Results from last 7 days   Lab Units 01/16/22 2001 01/16/22  0525   PROCALCITONIN ng/mL  --  0.22   LACTATE mmol/L  --  1.0   SED RATE mm/hr 52*  --    CRP mg/dL  --  16.33*     I have personally looked at the labs and they are summarized above.  ----------------------------------------------------------------------------------------------------------------------  Detailed radiology reports for the last 24 hours:  Imaging Results (Last 24 Hours)     ** No results found for the last 24 hours. **        Assessment & Plan    76F PMH significant for stroke, paroxysmal atrial fibrillation, diabetes mellitus, chronic anticoagulation with Eliquis, hypertension and AAA status post repair who presented w/     #C/f UGIB w/ anemia requiring transfusions and Hx AVM's w/ chronic intermittent bleeding  #Sepsis, Acute Metabolic Encephalopathy 2/2 Suspected infectious enteritis w/ Hx recurrent colitis  #Hx Ischemic Colitis w/ occluded Celiac Artery, Severe SMA stenosis w/ reconstitution, CHRISTOPHER covered by prior EVAR  - Patient presented w/ multiple  recent admissions for recurrent colitis, improves w/ IV Abx, this admission similar presentation w/ AMS, Hgb 5.8 on admission, s/p 2U PRBCs, elevated WBC count   - Admission labs showed WBC count 35K, Hgb 5.8, lactate 1.0, covid/flu negative, Bcx's NGTD.  - CTA Abd 8/2020 showed poor visualization of origin celiac or superior mesenteric arteries, cannot exclude ischemia of colon due to diffuse colonic wall thickening.  - CT Abd/Pelvis this admission showed possible enteritis  - Surgery consulted; Followed, notes poor surgical candidacy   - Palliative Care consulted; Following, GOC conversations ongoing  - Continue Zosyn x 7-10 days, WBC count improved today  - Hgb stable, have advanced diet and changed to PO PPI BID  - Monitor in PCU, monitor on telemetry, plans to return to NH when stable     #HTN/HLD/CAD s/p PCI  #Chronic HFpEF  #Severe b/l PAD b/l LE's  #Descending Thoracic AA, AAA s/ EVAR  - Echo 1/2022 showed LVEF 66-70%, mild concentric LVH, diastolic dysfunction.  - CTA Abd previously showed multifocal plaque through superficial femoral arteries b/l, borderline hemodynamically significant stenosis but no occlusive segments, Celiac and SMA as per above, descending thoracic aortic aneurysm  - CT Abd/Pelvis 1/16/22 showed 4cm descending thoracic aortic aneurysm, mild cardiomegaly, 3cm supra-renal abd aortic aneurysm and partially imaged aortobiliac stent graft  - Continue home Statin, home ASA 81  - Continue home BB, holding home ACEI for lower BP, resume as indicated  - D/c'd home Amiodarone due to below noted emphysema  - Hold Eliquis as per bleeding noted above, have consulted cardiology to discuss AC, risks/benefits, need for watchman device and if could come off AC in that case.  - Continue to monitor on tele, strict I/O's, daily weights, trend HR and BP    #Pulm Nodule  - CT showed 8mm pulm nodule RLL, Radiology rec'd short interval f/u w/ CT in 3 months or PET/CT     #NIDDM Type II, controlled, unknown  complications  - Hgb A1c 4/2020 = 5.1% though not accurate in setting of chronic bleeding  - No home oral agents, continue FSBG and SSI, add long acting as indicated.     #CKDIII  - Patient presented w/ Cr 0.65, b/l around 0.5-0.8, now back to baseline  - Trend Cr and UOP, avoid nephrotoxins, NSAIDs, dehydration and contrast as able.      #Hypothyroid  - Continue home Levothyroxine      #Chronic Hypoxic Respiratory Failure 2/2 COPD w/o Exacerbation in setting of continued Tobacco Dependence  - CT showing Emphysema  - Continue home 02 requirement, duonebs PRN, Rec'd cessation, NRT PRN     #GERD  - On PPI BID now as per above     #Hx CVA  - Continue home Statin, Eliquis held as per above     #Chronic Mod Malnutrition  - BMI 18, Has been evaluated and followed by Nutrition in past     F: Oral  E: Monitor & Replace PRN  N: Pureed Diet  Ppx: SCDs  Code: DNR/DNI      Dispo: Pending medical stability, GOC conversations     *This patient is considered high risk due to sepsis, enteritis, chronic bowel ischemia, acute anemia requiring transfusions.     VTE Prophylaxis:   Mechanical Order History:      Ordered        01/16/22 1036  Place Sequential Compression Device  Once            01/16/22 1036  Maintain Sequential Compression Device  Continuous                    Pharmalogical Order History:     None        Pierre Delgado MD  [facility] Hospitalist  01/19/22  10:48 EST

## 2022-01-19 NOTE — CONSULTS
Consults  Date of Admit: 1/16/2022  Date of Consult: 01/19/22  Provider, No Known  Mary Ly  1945  Consulting Physician: Tremaine Rust MD, [facility]    Cardiology consultation    Reason for consultation:   Assessment:  1. Paroxysmal atrial fibrillation, seems to maintaining sinus rhythm on amiodarone.  2. History of recurrent GI bleed from AV malformation requiring blood transfusions, high risk for chronic anticoagulation at this time.  3. COPD    Recommendations:  1. I agree that with her recurrent GI bleed requiring blood transfusions, she would be at high risk for chronic anticoagulation and hence would hold off using any anticoagulants at this time.  2. I am not sure if she would be a candidate for watchman device in the near future but potentially could be considered later on if she stabilizes on her GI bleeding (which I doubt given her chronic bleeding history).  3. I would like to continue with amiodarone may be at a lower dose to hopefully keep her in sinus rhythm and decrease the risk of having a stroke without being anticoagulated.    History of Present Illness    Subjective     Chief Complaint   Patient presents with   • Shortness of Breath       Mary yL is a 76 y.o. female with past medical history significant for paroxysmal atrial fibrillation with a [id number] score of 6 and Hasbled score of 3, coronary artery disease with history of stenting, continued tobacco abuse, abdominal aortic aneurysm status post endovascular repair, history of upper GI bleeds with history of AV malformations with subsequent chronic anemia occasionally requiring blood transfusions, ischemic bowel disease, COPD chronically on supplemental oxygen, dyslipidemia, malnutrition.  Ms. Hernandez originally came to our emergency department after being found to be minimally unresponsive and hypoxic.  Upon arrival in the emergency department she was found to have a hemoglobin of 5.8 she has since received 2 units of packed red  blood cells and most recently hemoglobin was 8 on 1/18/2022.  Cardiology services were consulted to discuss chronic anticoagulation versus watchman versus neither.  Patient did see palliative care and is considering comfort care only measures but has not made decision yet.  Speaking to her today clinically she reports her breathing is back at her baseline she does still require supplemental oxygen which she wears at home.  She also denies palpitations or tachycardia as well as chest pains.        Past Medical History:   Diagnosis Date   • AAA (abdominal aortic aneurysm) ([facility])     s/p repair    • Arthritis    • CAD (coronary artery disease)     s/p stenting in the past    • Chronic kidney disease (CKD), stage III (moderate) ([facility])    • COPD (chronic obstructive pulmonary disease) ([facility])    • Debility    • Diastolic CHF, chronic ([facility]) 4/23/2021   • GERD (gastroesophageal reflux disease)    • History of CVA (cerebrovascular accident)    • History of ischemic colitis    • Hyperlipidemia 4/23/2021   • Hypertension    • Hypothyroidism    • Pneumonia due to COVID-19 virus 9/21/2021   • Stroke ([facility])    • Type II diabetes mellitus ([facility])      Past Surgical History:   Procedure Laterality Date   • BACK SURGERY     • CARDIAC CATHETERIZATION     • CHOLECYSTECTOMY N/A 7/17/2020    Procedure: CHOLECYSTECTOMY LAPAROSCOPIC;  Surgeon: Lonnie Meneses MD;  Location: [facility];  Service: General;  Laterality: N/A;   • COLONOSCOPY     • COLONOSCOPY N/A 9/25/2021    Procedure: COLONOSCOPY;  Surgeon: Lonnie Meneses MD;  Location: [facility];  Service: Gastroenterology;  Laterality: N/A;   • CORONARY STENT PLACEMENT     • ENDOSCOPY     • ENDOSCOPY N/A 9/25/2021    Procedure: ESOPHAGOGASTRODUODENOSCOPY;  Surgeon: Lonnie Meneses MD;  Location: [facility];  Service: Gastroenterology;  Laterality: N/A;   • TONSILLECTOMY       Family History   Problem Relation Age of Onset   • Diabetes Mother    • Heart attack Father    • No Known Problems  Sister    • Heart attack Brother    • No Known Problems Brother    • No Known Problems Sister    • No Known Problems Sister      Social History     Tobacco Use   • Smoking status: Current Every Day Smoker     Packs/day: 1.00     Years: 55.00     Pack years: 55.00     Types: Cigarettes   • Smokeless tobacco: Never Used   Vaping Use   • Vaping Use: Never used   Substance Use Topics   • Alcohol use: Never   • Drug use: Never     Medications Prior to Admission   Medication Sig Dispense Refill Last Dose   • amiodarone (PACERONE) 200 MG tablet Take 200 mg by mouth Daily.   1/15/2022 at 0800   • apixaban (ELIQUIS) 2.5 MG tablet tablet Take 2.5 mg by mouth 2 (Two) Times a Day.   1/15/2022 at 2000   • atorvastatin (LIPITOR) 40 MG tablet Take 40 mg by mouth Every Night.   1/15/2022 at 2000   • Carboxymethylcellulose Sodium (ARTIFICIAL TEARS OP) Administer 1 application to both eyes 2 (Two) Times a Day.   1/15/2022 at 1800   • Cariprazine HCl (VRAYLAR) 1.5 MG capsule capsule Take 1.5 mg by mouth Daily.   1/15/2022 at 0800   • carvedilol (COREG) 3.125 MG tablet Take 3.125 mg by mouth 2 (Two) Times a Day With Meals.   1/15/2022 at 1600   • docusate sodium (COLACE) 250 MG capsule Take 250 mg by mouth Daily.   1/15/2022 at 0800   • dronabinol (MARINOL) 2.5 MG capsule Take 2.5 mg by mouth 2 (Two) Times a Day Before Meals.   1/15/2022 at 1600   • ferrous sulfate 325 (65 FE) MG tablet Take 325 mg by mouth Daily With Breakfast.   1/15/2022 at 0800   • HYDROcodone-acetaminophen (NORCO) 5-325 MG per tablet Take 1 tablet by mouth Every 6 (Six) Hours As Needed for Moderate Pain .   1/15/2022 at Unknown time   • Lactobacillus Acid-Pectin (Acidophilus/Pectin) capsule Take 1 capsule by mouth 2 (two) times a day. HOLD FOR 10 DAYS (UNTIL FINISHED WITH H. PYLORI TREATMENT)   1/15/2022 at 1600   • lactulose (CHRONULAC) 10 GM/15ML solution Take 10 g by mouth Daily.   1/15/2022 at 0800   • levothyroxine (SYNTHROID, LEVOTHROID) 150 MCG tablet Take  150 mcg by mouth Daily.   1/15/2022 at 0600   • lisinopril (PRINIVIL,ZESTRIL) 5 MG tablet Take 5 mg by mouth Daily.   1/15/2022 at 0800   • melatonin 5 MG tablet tablet Take 5 mg by mouth Every Night.   1/15/2022 at 2000   • montelukast (SINGULAIR) 10 MG tablet Take 10 mg by mouth Every Evening.   1/15/2022 at 1600   • Multiple Vitamin (MULTIVITAMIN) tablet tablet Take 1 tablet by mouth Every Night.   1/15/2022 at 2000   • pantoprazole (PROTONIX) 40 MG EC tablet Take 40 mg by mouth 2 (Two) Times a Day.   1/15/2022 at 2000   • polyethylene glycol (MiraLax) 17 GM/SCOOP powder Take 17 g by mouth 2 (Two) Times a Day.   1/15/2022 at 1600   • SITagliptin (JANUVIA) 100 MG tablet Take 100 mg by mouth Daily.   1/15/2022 at 0800   • traZODone (DESYREL) 50 MG tablet Take 50 mg by mouth Every Night.   1/15/2022 at 2000   • ipratropium-albuterol (DUO-NEB) 0.5-2.5 mg/3 ml nebulizer Take 3 mL by nebulization 4 (Four) Times a Day As Needed for Wheezing or Shortness of Air.   Unknown at Unknown time   • ondansetron (ZOFRAN) 4 MG tablet Take 4 mg by mouth Every 6 (Six) Hours As Needed for Nausea or Vomiting.   1/13/2022 at Unknown time   • promethazine (PHENERGAN) 12.5 MG tablet Take 12.5 mg by mouth Every 8 (Eight) Hours As Needed for Nausea or Vomiting.   1/13/2022     Allergies:  Haldol [haloperidol]      Current Facility-Administered Medications:   •  acetaminophen (TYLENOL) tablet 650 mg, 650 mg, Oral, Q4H PRN, Lester Estrada DO, 650 mg at 01/17/22 1320  •  Acidophilus/Pectin capsule 1 capsule, 1 capsule, Oral, BID, Lester Estrada DO, 1 capsule at 01/19/22 0820  •  aspirin EC tablet 81 mg, 81 mg, Oral, Daily, Lester Estrada DO, 81 mg at 01/19/22 0821  •  atorvastatin (LIPITOR) tablet 40 mg, 40 mg, Oral, Nightly, Lester Estrada DO, 40 mg at 01/18/22 2042  •  carvedilol (COREG) tablet 3.125 mg, 3.125 mg, Oral, BID With Meals, Lester Estrada DO, 3.125 mg at 01/19/22 0821  •   dextrose (D50W) (25 g/50 mL) IV injection 25 g, 25 g, Intravenous, Q15 Min PRN, Lester Estrada DO  •  dextrose (GLUTOSE) oral gel 15 g, 15 g, Oral, Q15 Min PRN, Lester Estrada DO  •  docusate sodium (COLACE) capsule 200 mg, 200 mg, Oral, Daily, Lester Estrada DO, 200 mg at 01/19/22 0821  •  dronabinol (MARINOL) capsule 2.5 mg, 2.5 mg, Oral, BID AC, Lester Estrada DO, 2.5 mg at 01/19/22 0633  •  ferrous sulfate tablet 325 mg, 325 mg, Oral, Daily With Breakfast, Lester Estrada DO, 325 mg at 01/18/22 0825  •  glucagon (human recombinant) (GLUCAGEN DIAGNOSTIC) injection 1 mg, 1 mg, Subcutaneous, Q15 Min PRN, Lester Estrada DO  •  guaiFENesin (MUCINEX) 12 hr tablet 1,200 mg, 1,200 mg, Oral, Q12H, Lester Estrada DO, 1,200 mg at 01/18/22 2042  •  HYDROcodone-acetaminophen (NORCO) 5-325 MG per tablet 1 tablet, 1 tablet, Oral, Q6H PRN, Lester Estrada DO, 1 tablet at 01/17/22 1710  •  insulin aspart (novoLOG) injection 0-7 Units, 0-7 Units, Subcutaneous, TID AC, Lester Estrada DO  •  ipratropium-albuterol (DUO-NEB) nebulizer solution 3 mL, 3 mL, Nebulization, 4x Daily - RT, Lester Estrada DO, 3 mL at 01/19/22 0640  •  ipratropium-albuterol (DUO-NEB) nebulizer solution 3 mL, 3 mL, Nebulization, 4x Daily PRN, Lester Estrada DO  •  lactulose (CHRONULAC) 10 GM/15ML solution 10 g, 10 g, Oral, Daily, Lester Estrada DO, 10 g at 01/18/22 0827  •  levothyroxine (SYNTHROID, LEVOTHROID) tablet 150 mcg, 150 mcg, Oral, Daily, Lester Estrada, DO, 150 mcg at 01/19/22 0821  •  Magnesium Sulfate 2 gram Bolus, followed by 8 gram infusion (total Mg dose 10 grams)- Mg less than or equal to 1mg/dL, 2 g, Intravenous, PRN **OR** Magnesium Sulfate 2 gram / 50mL Infusion (GIVE X 3 BAGS TO EQUAL 6GM TOTAL DOSE) - Mg 1.1 - 1.5 mg/dl, 2 g, Intravenous, PRN **OR** Magnesium Sulfate 4 gram infusion- Mg 1.6-1.9  mg/dL, 4 g, Intravenous, PRN, Lester Estrada DO  •  melatonin tablet 5 mg, 5 mg, Oral, Nightly, Lester Estrada DO, 5 mg at 01/18/22 2042  •  methylPREDNISolone sodium succinate (SOLU-Medrol) injection 40 mg, 40 mg, Intravenous, Daily, Lester Estrada DO, 40 mg at 01/19/22 0821  •  montelukast (SINGULAIR) tablet 10 mg, 10 mg, Oral, Q PM, Lester Estrada DO, 10 mg at 01/18/22 1706  •  multivitamin (THERAGRAN) tablet 1 tablet, 1 tablet, Oral, Nightly, Lester Estrada DO, 1 tablet at 01/18/22 2042  •  nitroglycerin (NITROSTAT) SL tablet 0.4 mg, 0.4 mg, Sublingual, Q5 Min PRN, Lester Estrada DO  •  ondansetron (ZOFRAN) injection 4 mg, 4 mg, Intravenous, Q6H PRN, Lester Estrada DO, 4 mg at 01/17/22 1210  •  ondansetron (ZOFRAN) tablet 4 mg, 4 mg, Oral, Q6H PRN, Lester Estrada DO, 4 mg at 01/17/22 1955  •  pantoprazole (PROTONIX) 40 mg in 100 mL NS (VTB), 8 mg/hr, Intravenous, Continuous, Lester Estrada DO, Last Rate: 20 mL/hr at 01/19/22 0822, 8 mg/hr at 01/19/22 0822  •  piperacillin-tazobactam (ZOSYN) IVPB 4.5 g in 100 mL NS VTB, 4.5 g, Intravenous, Q6H, Lester Estrada DO, 4.5 g at 01/19/22 0822  •  polyethylene glycol (MIRALAX) packet 17 g, 17 g, Oral, BID, Pierre Delgado MD, 17 g at 01/18/22 2041  •  Polyvinyl Alcohol-Povidone PF (HYPOTEARS) 1.4-0.6 % ophthalmic solution 1 drop, 1 drop, Both Eyes, BID, Lester Estrada DO, 1 drop at 01/19/22 0821  •  potassium chloride (MICRO-K) CR capsule 40 mEq, 40 mEq, Oral, PRN **OR** potassium chloride (KLOR-CON) packet 40 mEq, 40 mEq, Oral, PRN **OR** potassium chloride 10 mEq in 100 mL IVPB, 10 mEq, Intravenous, Q1H PRN, eLster Estrada, DO  •  potassium phosphate 45 mmol in sodium chloride 0.9 % 500 mL infusion, 45 mmol, Intravenous, PRN **OR** potassium phosphate 30 mmol in sodium chloride 0.9 % 250 mL infusion, 30 mmol, Intravenous, PRN **OR**  potassium phosphate 15 mmol in sodium chloride 0.9 % 100 mL infusion, 15 mmol, Intravenous, PRN **OR** sodium phosphates 45 mmol in sodium chloride 0.9 % 500 mL IVPB, 45 mmol, Intravenous, PRN **OR** sodium phosphates 30 mmol in sodium chloride 0.9 % 250 mL IVPB, 30 mmol, Intravenous, PRN **OR** sodium phosphates 15 mmol in sodium chloride 0.9 % 250 mL IVPB, 15 mmol, Intravenous, PRN, Lester Estrada,   •  prochlorperazine (COMPAZINE) injection 2.5 mg, 2.5 mg, Intravenous, Q6H PRN, Pierre Delgado MD, 2.25 mg at 01/19/22 0045  •  sodium chloride 0.9 % flush 10 mL, 10 mL, Intravenous, Q12H, Lester Estrada DO, 10 mL at 01/19/22 0823  •  sodium chloride 0.9 % flush 10 mL, 10 mL, Intravenous, PRN, Lester Estrada DO  •  sodium chloride 0.9 % flush 10 mL, 10 mL, Intravenous, Q12H, Lester Estrada, , 10 mL at 01/19/22 0823  •  sodium chloride 0.9 % flush 10 mL, 10 mL, Intravenous, PRN, Lester Estrada DO  •  sodium chloride 0.9 % flush 10 mL, 10 mL, Intravenous, Q12H, Lester Estrada, , 10 mL at 01/19/22 0823  •  sodium chloride 0.9 % flush 10 mL, 10 mL, Intravenous, PRN, Lester Estrada DO  •  traZODone (DESYREL) tablet 50 mg, 50 mg, Oral, Nightly, Lester Estrada DO, 50 mg at 01/18/22 2042    Review of Systems   Constitutional: Positive for fatigue. Negative for diaphoresis.   HENT: Negative for congestion and nosebleeds.    Respiratory: Positive for shortness of breath. Negative for chest tightness.    Cardiovascular: Negative for chest pain and palpitations.   Gastrointestinal: Positive for abdominal pain. Negative for abdominal distention.   Genitourinary: Negative for dysuria and hematuria.   Musculoskeletal: Negative for arthralgias and back pain.   Skin: Negative for color change and pallor.   Neurological: Negative for dizziness and syncope.   Hematological: Negative for adenopathy. Does not bruise/bleed easily.    Psychiatric/Behavioral: Negative for agitation and behavioral problems.         Objective      Vital Signs  Temp:  [97.5 °F (36.4 °C)-98.4 °F (36.9 °C)] 98 °F (36.7 °C)  Heart Rate:  [59-71] 64  Resp:  [12-26] 21  BP: (124-159)/(53-85) 147/70  Body mass index is 18.6 kg/m².    Intake/Output Summary (Last 24 hours) at 1/19/2022 0942  Last data filed at 1/19/2022 0800  Gross per 24 hour   Intake 2814.69 ml   Output --   Net 2814.69 ml       Physical Exam  Constitutional:       Appearance: She is cachectic. She is ill-appearing.   HENT:      Head: Normocephalic and atraumatic.   Eyes:      Extraocular Movements: Extraocular movements intact.      Pupils: Pupils are equal, round, and reactive to light.   Cardiovascular:      Rate and Rhythm: Normal rate and regular rhythm.   Pulmonary:      Comments: Wearing oxygen via nasal cannula  Abdominal:      General: Abdomen is flat.      Palpations: Abdomen is soft.   Musculoskeletal:         General: No swelling or tenderness.   Skin:     General: Skin is warm and dry.   Neurological:      General: No focal deficit present.      Mental Status: She is alert and oriented to person, place, and time.   Psychiatric:         Mood and Affect: Mood normal.         Behavior: Behavior normal.       Results Review:   I reviewed the patient's new clinical results.  Results from last 7 days   Lab Units 01/16/22  1050 01/16/22  0525   TROPONIN T ng/mL 0.062* 0.057*     Results from last 7 days   Lab Units 01/18/22  2320 01/18/22  0013 01/17/22 0137 01/16/22 2001 01/16/22  0449   WBC 10*3/mm3 31.42* 42.28* 35.35*  --  35.31*   HEMOGLOBIN g/dL 8.0* 8.0* 8.7* 8.6* 5.8*   PLATELETS 10*3/mm3 357 332 313  --  391     Results from last 7 days   Lab Units 01/18/22  2320 01/18/22  0013 01/17/22  0137 01/16/22  0449   SODIUM mmol/L 136 136 134* 136   POTASSIUM mmol/L 3.7 3.3* 4.0 4.2   CHLORIDE mmol/L 98 96* 91* 93*   CO2 mmol/L 28.4 29.4* 28.4 30.2*   BUN mg/dL 13 21 24* 21   CREATININE mg/dL  0.63 0.78 0.76 0.65   CALCIUM mg/dL 7.9* 7.9* 8.3* 8.6   GLUCOSE mg/dL 93 120* 113* 94   ALT (SGPT) U/L  --   --   --  38*   AST (SGOT) U/L  --   --   --  43*     Lab Results   Component Value Date    INR 1.28 (H) 01/16/2022    INR 1.08 11/19/2021    INR 1.83 (H) 10/04/2021    INR 1.00 09/29/2021    INR 1.13 (H) 09/26/2021    INR 1.08 09/24/2021    INR 1.04 09/22/2021     Lab Results   Component Value Date    MG 2.2 10/05/2021    MG 1.9 10/03/2021    MG 2.3 10/03/2021     Lab Results   Component Value Date    TSH 55.020 (H) 01/16/2022    TRIG 139 01/16/2022    HDL 37 (L) 01/16/2022    LDL 38 01/16/2022      No results found for: BNP     Thank you very much for asking us to be involved in this patient's care.  We will follow along with you.    Santiago Vallejo PA-C   01/19/22  09:42 EST    Electronically signed by Santiago Vallejo PA-C, 01/19/22, 9:43 AM EST.    Electronically signed by Tremaine Rust MD, 01/19/22, 12:53 PM EST.

## 2022-01-20 VITALS
TEMPERATURE: 97.6 F | HEIGHT: 65 IN | OXYGEN SATURATION: 92 % | WEIGHT: 111 LBS | RESPIRATION RATE: 18 BRPM | BODY MASS INDEX: 18.49 KG/M2 | DIASTOLIC BLOOD PRESSURE: 55 MMHG | SYSTOLIC BLOOD PRESSURE: 133 MMHG | HEART RATE: 73 BPM

## 2022-01-20 LAB
ANION GAP SERPL CALCULATED.3IONS-SCNC: 11.4 MMOL/L (ref 5–15)
BASOPHILS # BLD AUTO: 0.04 10*3/MM3 (ref 0–0.2)
BASOPHILS NFR BLD AUTO: 0.2 % (ref 0–1.5)
BUN SERPL-MCNC: 9 MG/DL (ref 8–23)
BUN/CREAT SERPL: 14.8 (ref 7–25)
CALCIUM SPEC-SCNC: 7.4 MG/DL (ref 8.6–10.5)
CHLORIDE SERPL-SCNC: 99 MMOL/L (ref 98–107)
CO2 SERPL-SCNC: 26.6 MMOL/L (ref 22–29)
CREAT SERPL-MCNC: 0.61 MG/DL (ref 0.57–1)
DEPRECATED RDW RBC AUTO: 64.1 FL (ref 37–54)
EOSINOPHIL # BLD AUTO: 0.12 10*3/MM3 (ref 0–0.4)
EOSINOPHIL NFR BLD AUTO: 0.6 % (ref 0.3–6.2)
ERYTHROCYTE [DISTWIDTH] IN BLOOD BY AUTOMATED COUNT: 17.3 % (ref 12.3–15.4)
FLUAV SUBTYP SPEC NAA+PROBE: NOT DETECTED
FLUBV RNA ISLT QL NAA+PROBE: NOT DETECTED
GFR SERPL CREATININE-BSD FRML MDRD: 95 ML/MIN/1.73
GLUCOSE BLDC GLUCOMTR-MCNC: 118 MG/DL (ref 70–130)
GLUCOSE BLDC GLUCOMTR-MCNC: 158 MG/DL (ref 70–130)
GLUCOSE SERPL-MCNC: 121 MG/DL (ref 65–99)
HCT VFR BLD AUTO: 26.4 % (ref 34–46.6)
HGB BLD-MCNC: 7.9 G/DL (ref 12–15.9)
IMM GRANULOCYTES # BLD AUTO: 0.15 10*3/MM3 (ref 0–0.05)
IMM GRANULOCYTES NFR BLD AUTO: 0.7 % (ref 0–0.5)
LYMPHOCYTES # BLD AUTO: 0.79 10*3/MM3 (ref 0.7–3.1)
LYMPHOCYTES NFR BLD AUTO: 3.7 % (ref 19.6–45.3)
MCH RBC QN AUTO: 30.3 PG (ref 26.6–33)
MCHC RBC AUTO-ENTMCNC: 29.9 G/DL (ref 31.5–35.7)
MCV RBC AUTO: 101.1 FL (ref 79–97)
MONOCYTES # BLD AUTO: 1.48 10*3/MM3 (ref 0.1–0.9)
MONOCYTES NFR BLD AUTO: 7 % (ref 5–12)
NEUTROPHILS NFR BLD AUTO: 18.63 10*3/MM3 (ref 1.7–7)
NEUTROPHILS NFR BLD AUTO: 87.8 % (ref 42.7–76)
NRBC BLD AUTO-RTO: 0 /100 WBC (ref 0–0.2)
PLATELET # BLD AUTO: 329 10*3/MM3 (ref 140–450)
PMV BLD AUTO: 10.4 FL (ref 6–12)
POTASSIUM SERPL-SCNC: 3.2 MMOL/L (ref 3.5–5.2)
RBC # BLD AUTO: 2.61 10*6/MM3 (ref 3.77–5.28)
SARS-COV-2 RNA PNL SPEC NAA+PROBE: NOT DETECTED
SODIUM SERPL-SCNC: 137 MMOL/L (ref 136–145)
WBC NRBC COR # BLD: 21.21 10*3/MM3 (ref 3.4–10.8)

## 2022-01-20 PROCEDURE — 25010000002 METHYLPREDNISOLONE PER 40 MG: Performed by: STUDENT IN AN ORGANIZED HEALTH CARE EDUCATION/TRAINING PROGRAM

## 2022-01-20 PROCEDURE — 94799 UNLISTED PULMONARY SVC/PX: CPT

## 2022-01-20 PROCEDURE — 99239 HOSP IP/OBS DSCHRG MGMT >30: CPT | Performed by: INTERNAL MEDICINE

## 2022-01-20 PROCEDURE — 87636 SARSCOV2 & INF A&B AMP PRB: CPT | Performed by: INTERNAL MEDICINE

## 2022-01-20 PROCEDURE — 80048 BASIC METABOLIC PNL TOTAL CA: CPT | Performed by: STUDENT IN AN ORGANIZED HEALTH CARE EDUCATION/TRAINING PROGRAM

## 2022-01-20 PROCEDURE — 63710000001 DRONABINOL PER 2.5 MG: Performed by: STUDENT IN AN ORGANIZED HEALTH CARE EDUCATION/TRAINING PROGRAM

## 2022-01-20 PROCEDURE — 82962 GLUCOSE BLOOD TEST: CPT

## 2022-01-20 PROCEDURE — 25010000002 PIPERACILLIN SOD-TAZOBACTAM PER 1 G: Performed by: STUDENT IN AN ORGANIZED HEALTH CARE EDUCATION/TRAINING PROGRAM

## 2022-01-20 PROCEDURE — 63710000001 INSULIN ASPART PER 5 UNITS: Performed by: STUDENT IN AN ORGANIZED HEALTH CARE EDUCATION/TRAINING PROGRAM

## 2022-01-20 PROCEDURE — 99231 SBSQ HOSP IP/OBS SF/LOW 25: CPT | Performed by: INTERNAL MEDICINE

## 2022-01-20 PROCEDURE — 85025 COMPLETE CBC W/AUTO DIFF WBC: CPT | Performed by: STUDENT IN AN ORGANIZED HEALTH CARE EDUCATION/TRAINING PROGRAM

## 2022-01-20 RX ORDER — CEFDINIR 300 MG/1
300 CAPSULE ORAL 2 TIMES DAILY
Qty: 6 CAPSULE | Refills: 0 | Status: SHIPPED | OUTPATIENT
Start: 2022-01-20 | End: 2022-01-23

## 2022-01-20 RX ORDER — AMIODARONE HYDROCHLORIDE 200 MG/1
200 TABLET ORAL
Status: DISCONTINUED | OUTPATIENT
Start: 2022-01-20 | End: 2022-01-20 | Stop reason: HOSPADM

## 2022-01-20 RX ORDER — METRONIDAZOLE 500 MG/1
500 TABLET ORAL 3 TIMES DAILY
Qty: 9 TABLET | Refills: 0 | Status: SHIPPED | OUTPATIENT
Start: 2022-01-20 | End: 2022-01-23

## 2022-01-20 RX ORDER — ASPIRIN 81 MG/1
81 TABLET ORAL DAILY
Start: 2022-01-21

## 2022-01-20 RX ORDER — LEVOTHYROXINE SODIUM 175 UG/1
175 TABLET ORAL
Qty: 30 TABLET | Refills: 0 | Status: SHIPPED | OUTPATIENT
Start: 2022-01-21

## 2022-01-20 RX ADMIN — POLYVINYL ALCOHOL, POVIDONE 1 DROP: 14; 6 SOLUTION/ DROPS OPHTHALMIC at 08:37

## 2022-01-20 RX ADMIN — PIPERACILLIN SODIUM AND TAZOBACTAM SODIUM 4.5 G: 4; .5 INJECTION, POWDER, LYOPHILIZED, FOR SOLUTION INTRAVENOUS at 08:36

## 2022-01-20 RX ADMIN — DOCUSATE SODIUM 200 MG: 100 CAPSULE, LIQUID FILLED ORAL at 08:35

## 2022-01-20 RX ADMIN — FERROUS SULFATE TAB 325 MG (65 MG ELEMENTAL FE) 325 MG: 325 (65 FE) TAB at 08:35

## 2022-01-20 RX ADMIN — SODIUM CHLORIDE, PRESERVATIVE FREE 10 ML: 5 INJECTION INTRAVENOUS at 08:37

## 2022-01-20 RX ADMIN — INSULIN ASPART 2 UNITS: 100 INJECTION, SOLUTION INTRAVENOUS; SUBCUTANEOUS at 11:18

## 2022-01-20 RX ADMIN — IPRATROPIUM BROMIDE AND ALBUTEROL SULFATE 3 ML: .5; 3 SOLUTION RESPIRATORY (INHALATION) at 01:20

## 2022-01-20 RX ADMIN — METHYLPREDNISOLONE SODIUM SUCCINATE 40 MG: 40 INJECTION, POWDER, FOR SOLUTION INTRAMUSCULAR; INTRAVENOUS at 08:35

## 2022-01-20 RX ADMIN — AMIODARONE HYDROCHLORIDE 200 MG: 200 TABLET ORAL at 11:18

## 2022-01-20 RX ADMIN — IPRATROPIUM BROMIDE AND ALBUTEROL SULFATE 3 ML: .5; 3 SOLUTION RESPIRATORY (INHALATION) at 06:32

## 2022-01-20 RX ADMIN — ASPIRIN 81 MG: 81 TABLET, COATED ORAL at 08:35

## 2022-01-20 RX ADMIN — PIPERACILLIN SODIUM AND TAZOBACTAM SODIUM 4.5 G: 4; .5 INJECTION, POWDER, LYOPHILIZED, FOR SOLUTION INTRAVENOUS at 03:36

## 2022-01-20 RX ADMIN — Medication 1 CAPSULE: at 08:35

## 2022-01-20 RX ADMIN — DRONABINOL 2.5 MG: 2.5 CAPSULE ORAL at 08:35

## 2022-01-20 RX ADMIN — IPRATROPIUM BROMIDE AND ALBUTEROL SULFATE 3 ML: .5; 3 SOLUTION RESPIRATORY (INHALATION) at 12:53

## 2022-01-20 RX ADMIN — LEVOTHYROXINE SODIUM 175 MCG: 0.17 TABLET ORAL at 05:56

## 2022-01-20 RX ADMIN — PANTOPRAZOLE SODIUM 40 MG: 40 TABLET, DELAYED RELEASE ORAL at 08:35

## 2022-01-20 RX ADMIN — CARVEDILOL 3.12 MG: 3.12 TABLET, FILM COATED ORAL at 08:35

## 2022-01-20 RX ADMIN — GUAIFENESIN 1200 MG: 600 TABLET, EXTENDED RELEASE ORAL at 08:35

## 2022-01-20 NOTE — CASE MANAGEMENT/SOCIAL WORK
Discharge Planning Assessment  [facility]     Patient Name: Mary Ly  MRN: [id number]  Today's Date: 1/20/2022    Admit Date: 1/16/2022     Discharge Needs Assessment    No documentation.                Discharge Plan     Row Name 01/20/22 2803       Plan    Plan SS  faxed negative cov-id results to [facility]. SS notified Pt's daughter, April [phone] that Pt is returning to [facility] on this date. SS to arrange EMS once nurse has given report.                          Amelia Joya

## 2022-01-20 NOTE — DISCHARGE PLACEMENT REQUEST
"Mary Ly (76 y.o. Female)             Date of Birth Social Security Number Address Home Phone MRN    1945  [facility]  [facility] [place] [facility] [address] [phone] [id number]    [facility]ist Marital Status             [facility]ist        Admission Date Admission Type Admitting Provider Attending Provider Department, Room/Bed    1/16/22 Emergency Lester Estrada DO Parks, Andrew, MD [address], 3334/1P    Discharge Date Discharge Disposition Discharge Destination           Skilled Nursing Facility (DC - External)              Attending Provider: Pierre Delgado MD    Allergies: Haldol [Haloperidol]    Isolation: None   Infection: None   Code Status: No CPR   Advance Care Planning Activity    Ht: 165.1 cm (65\")   Wt: 50.3 kg (111 lb)    Admission Cmt: None   Principal Problem: None                Active Insurance as of 1/16/2022     Primary Coverage     Payor Plan Insurance Group Employer/Plan Group    ANTHEM MEDICARE REPLACEMENT ANTHEM MEDICARE ADVANTAGE [id number]     Payor Plan Address Payor Plan Phone Number Payor Plan Fax Number Effective Dates    [address] [phone]  9/1/2019 - None Entered    [facility] [address]       Subscriber Name Subscriber Birth Date Member ID       MARY LY 1945 [id number]           Secondary Coverage     Payor Plan Insurance Group Employer/Plan Group    [place]      Payor Plan Address Payor Plan Phone Number Payor Plan Fax Number Effective Dates    [address] [phone]  12/1/2019 - None Entered    [facility] [id number]       Subscriber Name Subscriber Birth Date Member ID       AMRY LY 1945 [id number]                 Emergency Contacts      (Rel.) Home Phone Work Phone Mobile Phone    ZORAIDA SCHULER (Daughter) [phone] -- --    SANDOR LY (Grandchild) [phone] -- --    MARE ANNA (Grandchild) [phone] -- --    ALEXX LY (Son) " [phone] -- --    MARYCHUY SOLORIO (Friend) -- -- [phone]               History & Physical      Sean Lester Xu, DO at 22 1342              [facility]    Patient Identification:  Name:  Mary Ly  Age:  76 y.o.  Sex:  female  :  1945  MRN:  [id number]   Admit Date: 2022   Visit Number:  [id number]  Room number:  P203/S2  Primary Care Physician:  Leta Gardner MD     Subjective     Chief complaint:    Chief Complaint   Patient presents with   • Shortness of Breath       History of presenting illness:   Patient is a 76-year-old female with history significant for CAD status post PCI, COPD on 3 L at baseline and previous ABL due to gastric ulcer/AVMs who presented to the ER from nursing facility after being found minimally responsive and hypoxic.  At the time of my exam, she is lethargic but alert and oriented x3 and answers all questions appropriately.  EMS noted when they arrived to the facility, she was awake and alert and oriented x3.  SPO2 was 96% on baseline 3 L nasal cannula.  Patient denies chest pain, worsening shortness of breath, palpitations or lower extremity edema.  She denies hematemesis, melena or hematochezia.  Denies any fever/chills or other symptomatology.    In the ER, SPO2 94 to 98% on baseline 3 L.  Labs notable for hemoglobin of 5.8, WBC 35K with left shift, CRP 16, PT/INR 16/1.2, troponin 0.057.  2 units of PRBCs were ordered, currently receiving first unit at time of exam.    CT chest noted descending thoracic aortic aneurysm, 4 cm, 8 mm noncalcified pulmonary nodule in the right lower lobe and bibasilar atelectasis/infiltrates.  CT abdomen/pelvis noted nonspecific changes that could possibly be seen with enteritis.    Records reviewed from [facility] noted:  Hospitalization - for GI bleed.  Initial hemoglobin 4.4 received 2 units PRBC.  EGD at that time noted abnormal atrophic mucosa  throughout the stomach, few nonbleeding gastric AVMs status post ablation.  Required was restarted on Eliquis and discharged back to facility.    Hospitalization 11/21-11/24.  Initial hemoglobin 4.6, also received 2 units.  EGD noted multiple ulcers in the fundus, multiple ulcers in the body from 4 to 8 mm with 1 visible vessel at its edge requiring epi injection and clipping, superficial gastritis and erythema and edema in the antrum with biopsies taken (results not available).  Eliquis held indefinitely at time of discharge and was referred to  for consideration for watchman.     ---------------------------------------------------------------------------------------------------------------------   Review of Systems   Constitutional: Positive for fatigue. Negative for chills and fever.   HENT: Negative for sneezing and sore throat.    Respiratory: Negative for cough, shortness of breath and wheezing.    Cardiovascular: Negative for chest pain, palpitations and leg swelling.   Gastrointestinal: Negative for abdominal pain, diarrhea, nausea and vomiting.   Genitourinary: Negative for dysuria, hematuria and urgency.   Musculoskeletal: Negative for arthralgias.   Neurological: Negative for syncope and light-headedness.   Hematological: Negative for adenopathy.   Psychiatric/Behavioral: Negative for agitation and confusion.     ---------------------------------------------------------------------------------------------------------------------   Past Medical History:   Diagnosis Date   • AAA (abdominal aortic aneurysm) (HCC)     s/p repair    • Arthritis    • CAD (coronary artery disease)     s/p stenting in the past    • Chronic kidney disease (CKD), stage III (moderate) ([facility])    • COPD (chronic obstructive pulmonary disease) ([facility])    • Debility    • Diastolic CHF, chronic ([facility]) 4/23/2021   • GERD (gastroesophageal reflux disease)    • History of CVA (cerebrovascular accident)    • History of ischemic colitis     • Hyperlipidemia 4/23/2021   • Hypertension    • Hypothyroidism    • Pneumonia due to COVID-19 virus 9/21/2021   • Stroke (HCC)    • Type II diabetes mellitus (HCC)      Past Surgical History:   Procedure Laterality Date   • BACK SURGERY     • CARDIAC CATHETERIZATION     • CHOLECYSTECTOMY N/A 7/17/2020    Procedure: CHOLECYSTECTOMY LAPAROSCOPIC;  Surgeon: Lonnie Meneses MD;  Location: [facility];  Service: General;  Laterality: N/A;   • COLONOSCOPY     • COLONOSCOPY N/A 9/25/2021    Procedure: COLONOSCOPY;  Surgeon: Lonnie Meneses MD;  Location: [facility];  Service: Gastroenterology;  Laterality: N/A;   • CORONARY STENT PLACEMENT     • ENDOSCOPY     • ENDOSCOPY N/A 9/25/2021    Procedure: ESOPHAGOGASTRODUODENOSCOPY;  Surgeon: Lonnie Meneses MD;  Location: [facility];  Service: Gastroenterology;  Laterality: N/A;   • TONSILLECTOMY       Family History   Problem Relation Age of Onset   • Diabetes Mother    • Heart attack Father    • No Known Problems Sister    • Heart attack Brother    • No Known Problems Brother    • No Known Problems Sister    • No Known Problems Sister      Social History     Socioeconomic History   • Marital status:    Tobacco Use   • Smoking status: Current Every Day Smoker     Packs/day: 1.00     Years: 55.00     Pack years: 55.00     Types: Cigarettes   • Smokeless tobacco: Never Used   Vaping Use   • Vaping Use: Never used   Substance and Sexual Activity   • Alcohol use: Never   • Drug use: Never   • Sexual activity: Defer     ---------------------------------------------------------------------------------------------------------------------   Allergies:  Haldol [haloperidol]  ---------------------------------------------------------------------------------------------------------------------   Medications below are reported home medications pulling from within the system; at this time, these medications have not been reconciled unless otherwise specified and are in the  verification process for further verifcation as current home medications.    Prior to Admission Medications     Prescriptions Last Dose Informant Patient Reported? Taking?    amiodarone (PACERONE) 200 MG tablet  Nursing Home Yes No    Take 200 mg by mouth Daily.    bumetanide (BUMEX) 1 MG tablet  Nursing Home Yes No    Take 1 mg by mouth 2 (Two) Times a Day.    Carboxymethylcellulose Sodium (ARTIFICIAL TEARS OP)  Nursing Home Yes No    Administer 1 application to both eyes 2 (Two) Times a Day.    Cariprazine HCl (VRAYLAR) 1.5 MG capsule capsule  Nursing Home Yes No    Take 1.5 mg by mouth Daily.    carvedilol (COREG) 3.125 MG tablet  Nursing Home Yes No    Take 3.125 mg by mouth 2 (Two) Times a Day With Meals.    docusate sodium (COLACE) 250 MG capsule  Nursing Home Yes No    Take 250 mg by mouth Daily.    ferrous sulfate 325 (65 FE) MG tablet   No No    Take 1 tablet by mouth Daily With Breakfast. HOLD FOR 10 DAYS (UNTIL FINISHED WITH H. PYLORI TREATMENT)    ipratropium-albuterol (DUO-NEB) 0.5-2.5 mg/3 ml nebulizer  Nursing Home Yes No    Take 3 mL by nebulization 4 (Four) Times a Day As Needed for Wheezing or Shortness of Air.    Lactobacillus Acid-Pectin (Acidophilus/Pectin) capsule   No No    Take 1 capsule by mouth 2 (two) times a day. HOLD FOR 10 DAYS (UNTIL FINISHED WITH H. PYLORI TREATMENT)    lactulose (CHRONULAC) 10 GM/15ML solution  Nursing Home Yes No    Take 10 g by mouth Daily As Needed.    levothyroxine (SYNTHROID, LEVOTHROID) 125 MCG tablet  Nursing Home Yes No    Take 250 mcg by mouth Daily.    lisinopril (PRINIVIL,ZESTRIL) 5 MG tablet  Nursing Home Yes No    Take 5 mg by mouth Daily.    melatonin 5 MG tablet tablet  Nursing Home Yes No    Take 5 mg by mouth Every Night.    montelukast (SINGULAIR) 10 MG tablet  Nursing Home Yes No    Take 10 mg by mouth Daily.    Multiple Vitamin (MULTIVITAMIN) tablet tablet  Nursing Home Yes No    Take 1 tablet by mouth Every Night.    O2 (OXYGEN)   Yes No     Inhale 3 L/min Daily As Needed.    pantoprazole (PROTONIX) 40 MG EC tablet  Nursing Home Yes No    Take 40 mg by mouth 2 (Two) Times a Day.    polyethylene glycol (MiraLax) 17 GM/SCOOP powder  Nursing Home Yes No    Take 17 g by mouth 2 (Two) Times a Day.    potassium chloride (K-DUR,KLOR-CON) 20 MEQ CR tablet  Nursing Home Yes No    Take 20 mEq by mouth 2 (Two) Times a Day.    promethazine (PHENERGAN) 12.5 MG tablet  Nursing Home Yes No    Take 12.5 mg by mouth Every 8 (Eight) Hours As Needed for Nausea or Vomiting.    STIOLTO RESPIMAT 2.5-2.5 MCG/ACT aerosol solution inhaler  Nursing Home Yes No    Inhale 2 puffs Daily.        Objective     Vital Signs:  Temp:  [97.1 °F (36.2 °C)-99.9 °F (37.7 °C)] 98.2 °F (36.8 °C)  Heart Rate:  [62-75] 66  Resp:  [15-20] 16  BP: (111-153)/(54-87) 111/54    Mean Arterial Pressure (Non-Invasive) for the past 24 hrs (Last 3 readings):   Noninvasive MAP (mmHg)   01/16/22 1103 86   01/16/22 0959 97   01/16/22 0925 98     SpO2:  [93 %-100 %] 98 %  on  Flow (L/min):  [2-3] 2;   Device (Oxygen Therapy): room air  Body mass index is 16.64 kg/m².    Wt Readings from Last 3 Encounters:   01/16/22 45.4 kg (100 lb)   12/24/21 45.4 kg (100 lb)   11/19/21 45.4 kg (100 lb)      ---------------------------------------------------------------------------------------------------------------------   Physical Exam:  Constitutional: Elderly, cachectic female, appears chronically ill, lethargic, NAD  HENT:  Head: Normocephalic and atraumatic.  Mouth:  Moist mucous membranes.    Eyes:  Conjunctivae and EOM are normal.  No scleral icterus.  Neck:  Neck supple.  No JVD present.    Cardiovascular:  Normal rate, regular rhythm and normal heart sounds with no murmur.  Pulmonary/Chest:  No respiratory distress, no wheezes, no crackles, with normal breath sounds and good air movement.  Abdominal:  Soft.  Bowel sounds are normal.  No distension and no tenderness.   Musculoskeletal:  No tenderness, and no  deformity.  No red or swollen joints anywhere.    Neurological:  Alert and oriented to person, place, and time.  No cranial nerve deficit.  No tongue deviation.  No facial droop.  No slurred speech.   Skin:  Skin is warm and dry.  No rash noted.  No pallor.   Peripheral vascular:  No edema and pulses on all 4 extremities.  ---------------------------------------------------------------------------------------------------------------------  EKG: Normal sinus rhythm, rate 69, QTc 490, mild T wave inversions in lead I and aVL which are less pronounced when compared to prior  ECG 12 Lead             Telemetry: reviewed    I have personally looked at both the EKG and the telemetry strips.    Last echocardiogram:  Results for orders placed during the hospital encounter of 04/22/21    Adult Transthoracic Echo Complete W/ Cont if Necessary Per Protocol    Interpretation Summary  · Normal left ventricular cavity size noted. Left ventricular wall thickness is consistent with mild concentric hypertrophy  · Left ventricular ejection fraction appears to be 61 - 65%.  · Left ventricular diastolic function is consistent with (grade I) impaired relaxation.  · There is mild calcification of the aortic valvular cusps.  · No aortic valve regurgitation is present. Mild aortic valve stenosis is present.  · There are myxomatous changes of the mitral valve apparatus present. Mild mitral valve regurgitation is present. No significant mitral valve stenosis is present.  · . There is no evidence of pericardial effusion.    --------------------------------------------------------------------------------------------------------------------  Labs:  Results from last 7 days   Lab Units 01/16/22  0525 01/16/22  0449   PROCALCITONIN ng/mL 0.22  --    LACTATE mmol/L 1.0  --    CRP mg/dL 16.33*  --    WBC 10*3/mm3  --  35.31*   HEMOGLOBIN g/dL  --  5.8*   HEMATOCRIT %  --  19.2*   MCV fL  --  100.5*   MCHC g/dL  --  30.2*   PLATELETS 10*3/mm3  --   391   INR  1.28*  --      Results from last 7 days   Lab Units 01/16/22  1127   PH, ARTERIAL pH units 7.383   PO2 ART mm Hg 66.8*   PCO2, ARTERIAL mm Hg 62.0*   HCO3 ART mmol/L 36.9*     Results from last 7 days   Lab Units 01/16/22  0449   SODIUM mmol/L 136   POTASSIUM mmol/L 4.2   CHLORIDE mmol/L 93*   CO2 mmol/L 30.2*   BUN mg/dL 21   CREATININE mg/dL 0.65   EGFR IF NONAFRICN AM mL/min/1.73 89   CALCIUM mg/dL 8.6   GLUCOSE mg/dL 94   ALBUMIN g/dL 2.79*   BILIRUBIN mg/dL <0.2   ALK PHOS U/L 88   AST (SGOT) U/L 43*   ALT (SGPT) U/L 38*   Estimated Creatinine Clearance: 42.9 mL/min (by C-G formula based on SCr of 0.65 mg/dL).    No results found for: AMMONIA  Results from last 7 days   Lab Units 01/16/22  1050 01/16/22  0525   TROPONIN T ng/mL 0.062* 0.057*   PROBNP pg/mL  --  1,158.0         No results found for: HGBA1C, POCGLU  Lab Results   Component Value Date    TSH 79.240 (H) 11/19/2021    FREET4 0.24 (L) 10/02/2021     No results found for: PREGTESTUR, PREGSERUM, HCG, HCGQUANT  Pain Management Panel     Pain Management Panel Latest Ref Rng & Units 9/21/2021 8/13/2020    AMPHETAMINES SCREEN, URINE Negative Negative Negative    BARBITURATES SCREEN Negative Negative Negative    BENZODIAZEPINE SCREEN, URINE Negative Negative Negative    BUPRENORPHINEUR Negative Negative Negative    COCAINE SCREEN, URINE Negative Negative Negative    METHADONE SCREEN, URINE Negative Negative Negative    METHAMPHETAMINEUR Negative Negative -        Brief Urine Lab Results  (Last result in the past 365 days)      Color   Clarity   Blood   Leuk Est   Nitrite   Protein   CREAT   Urine HCG        12/24/21 1530 Yellow   Clear   Negative   Moderate (2+)   Negative   Negative               No results found for: BLOODCX  No results found for: URINECX  No results found for: WOUNDCX  No results found for: STOOLCX    I have personally looked at the labs and they are summarized  above.  ----------------------------------------------------------------------------------------------------------------------  Detailed radiology reports for the last 24 hours:    Imaging Results (Last 24 Hours)     Procedure Component Value Units Date/Time    CT Abdomen Pelvis With Contrast [[id number]] Collected: 01/16/22 0843     Updated: 01/16/22 0845    Narrative:      CT Abdomen Pelvis W    INDICATION:   Acute onset of abdominal pain    TECHNIQUE:   CT of the abdomen and pelvis with contrast. Coronal and sagittal reconstructions were obtained.  Radiation dose reduction techniques included automated exposure control or exposure modulation based on body size. Radiation audit for number of CT and  nuclear cardiology exams performed in the last year: 14.      COMPARISON:   December 24, 2021    FINDINGS:    Abdomen: The liver shows no focal hepatic lesions. The gallbladder is surgically absent. The kidneys show normal enhancement. No evidence of hydronephrosis. The spleen appears unremarkable. There is prominent pancreatic atrophy. No adrenal abnormalities.  No threshold retroperitoneal adenopathy. Prominent atherosclerotic changes of the abdominal aorta with evidence of an aortic stent graft.    Pelvis: The bowel pattern is nonobstructive. There is enhancement of the bowel wall of both small bowel and colon. This finding is nonspecific but can be seen in enteritis. Prominent amount of colon gas in the distal colon. No evidence of free air. No  free fluid.    Imaging of the pelvis is obscured by beam hardening artifact from the patient's left hip arthroplasty. Bony structures are diffusely demineralized. Postsurgical changes noted in the lower lumbar spine.      Impression:        1.  There is enhancement of the bowel wall of both colon and small bowel. This is nonspecific but can be seen in enteritis.    2.  No evidence of free air or free fluid. Comparison with the study of 12/24/2021 shows similar  findings.          Signer Name: Seb Hart MD   Signed: 1/16/2022 8:43 AM   Workstation Name: [email or web address]    [facility]    CT Chest Without Contrast Diagnostic [[id number]] Collected: 01/16/22 0558     Updated: 01/16/22 0600    Narrative:      CT Chest WO    INDICATION:   S pain and shortness of breath.    TECHNIQUE:   CT of the chest without IV contrast. Coronal and sagittal reformatted images. Radiation dose reduction techniques included automated exposure control or exposure modulation based on body size. Count of known CT and cardiac nuc med studies performed in  previous 12 months: 14.     COMPARISON:   CTA chest 12/24/2021    FINDINGS:   4 cm aneurysm of the descending thoracic aorta with extensive atherosclerotic calcification throughout the thoracic aorta. Mild cardiomegaly. No pericardial effusion. Coronary artery calcifications.    No pleural effusion. No pneumothorax. Bibasilar atelectasis/infiltrate. 8 mm noncalcified pulmonary nodule in the lateral right lower lobe. Emphysema.    Visualized upper abdomen is unremarkable. 3 cm suprarenal abdominal aortic aneurysm. Partially imaged aortobiiliac stent graft.    No acute osseous abnormality.      Impression:        1. 4 cm aneurysm of the descending thoracic aorta.  2. Mild cardiomegaly.  3. Bibasilar atelectasis/infiltrate.  4. 8mm noncalcified pulmonary nodule in the right lower lobe. Management recommendation: Consider short interval follow-up CT in 3 months, or PET/CT imaging, for pulmonary nodules greater than 8 mm in high risk patients based on current published  guidelines (Fleischner Society, 2017).  5. Emphysema.  6. 3 cm suprarenal abdominal aortic aneurysm and partially imaged aortobiiliac stent graft.    Signer Name: Guillermo Hart MD   Signed: 1/16/2022 5:58 AM   Workstation Name: [email or web address]    [facility]    XR Chest 1 View [[id number]] Collected: 01/16/22 0541     Updated: 01/16/22 0543     Narrative:      CR Chest 1 Vw    INDICATION:   Shortness of air.     COMPARISON:    Chest 12/24/2021    FINDINGS:  Portable AP view(s) of the chest.  Borderline heart size. Mediastinal contours are normal. The lungs are clear. No pneumothorax or pleural effusion.      Impression:      No acute cardiopulmonary findings.    Signer Name: Guillermo Hart MD   Signed: 1/16/2022 5:41 AM   Workstation Name: [email or web address]    [facility]        Final impressions for the last 30 days of radiology reports:    CT Chest Without Contrast Diagnostic    Result Date: 1/16/2022  1. 4 cm aneurysm of the descending thoracic aorta. 2. Mild cardiomegaly. 3. Bibasilar atelectasis/infiltrate. 4. 8mm noncalcified pulmonary nodule in the right lower lobe. Management recommendation: Consider short interval follow-up CT in 3 months, or PET/CT imaging, for pulmonary nodules greater than 8 mm in high risk patients based on current published guidelines (Fleischner Society, 2017). 5. Emphysema. 6. 3 cm suprarenal abdominal aortic aneurysm and partially imaged aortobiiliac stent graft. Signer Name: Guillermo Hart MD  Signed: 1/16/2022 5:58 AM  Workstation Name: [email or web address]  [facility]    CT Abdomen Pelvis With Contrast    Result Date: 1/16/2022  1.  There is enhancement of the bowel wall of both colon and small bowel. This is nonspecific but can be seen in enteritis. 2.  No evidence of free air or free fluid. Comparison with the study of 12/24/2021 shows similar findings. Signer Name: Seb Hart MD  Signed: 1/16/2022 8:43 AM  Workstation Name: [email or web address]  [facility]    CT Abdomen Pelvis With Contrast    Result Date: 12/24/2021  1. Interval development of marked distention of the stomach with air fluid debris levels. Please correlate for clinical evidence of gastric outlet obstruction or gastroparesis. 2. Markedly redundant colon with a large colonic stool burden to the  level the rectum. Please correlate for clinical evidence of impaction. This is also worse on comparison to prior. 3. Nothing to suggest small bowel obstruction free air or drainable fluid collection. 4. Postcholecystectomy with likely postoperative biliary ductal dilatation. 5. Chronic lung disease with left greater the right base atelectasis. Signer Name: Pushpa Reina MD  Signed: 12/24/2021 4:15 PM  Workstation Name: [email or web address]  [facility]    XR Chest 1 View    Result Date: 1/16/2022  No acute cardiopulmonary findings. Signer Name: Guillermo Hart MD  Signed: 1/16/2022 5:41 AM  Workstation Name: [email or web address]  [facility]    XR Chest 1 View    Result Date: 12/24/2021   1. No acute pulmonary pathology identified  This report was finalized on 12/24/2021 1:20 PM by Dr. River Zaidi MD.      CT Chest Pulmonary Embolism    Result Date: 12/24/2021  1. No evidence of a pulmonary embolus 2. Parenchymal nodule in the right middle lobe measuring approximately 1 cm. Follow-up suggested.  This report was finalized on 12/24/2021 2:51 PM by Dr. River Zaidi MD.      I have personally looked at the radiology images and read the final radiology report.    Assessment & Plan      Patient is a 76-year-old female with history significant for CAD status post PCI, COPD on 3 L at baseline and previous ABL due to gastric ulcer/AVMs who presented to the ER from nursing facility after being found minimally responsive and hypoxic.    #Acute on chronic blood loss anemia d/t UGIB  #Recent PUD w/ active vessel s/p clip  #History of gastric ulcers and AVMs  --initial hemoglobin 5.8 on admission.  No reported melena, hematemesis or hematochezia.  Records requested from [facility] noted notable hospitalizations for anemia requiring multiple units PRBCs and repeat EGDs.   --EGD 11/4-11/12: abnormal atrophic mucosa throughout the stomach, few nonbleeding gastric AVMs status post ablation.   Required was restarted on Eliquis and discharged back to facility.  -- EGD 11/21-11/24 noted multiple ulcers in the fundus, multiple ulcers in the body from 4 to 8 mm with 1 visible vessel at its edge requiring epi injection and clipping, superficial gastritis and erythema and edema in the antrum with biopsies taken (results not available). Eliquis held indefinitely at time of discharge and was referred to  for consideration for watchman-d/c'd on bid ppi.   --Type and screen, currently receiving 2 units PRBC, repeat H&H status post second unit  --trend H/H, transfusion goal>8 in the setting of known CAD  --start protonix gtt  --General surgery consulted d/t ABL w/ hx of gastric AVMs and active vessel bleeding    #Sepsis due to community-acquired pneumonia with MDRO factors  #Chronic respiratory acidosis with metabolic alkalosis  --Patient presented after reportedly being found minimally responsive at nursing facility and hypoxia on baseline 3 L nasal cannula.  EMS noted SPO2 96% on baseline when they arrive.  She has been stable on baseline supplemental oxygen since admission.  --ABG 7.38/62/66, approximately at baseline PCO2 and compensated as evident by serum bicarbonate of 30  --Chest CT with bibasilar atelectasis/infiltrates  --Blood cultures x2 ordered, sputum culture, MRSA nasal swab  --will ask speech to see regarding possible aspiration  --Unclear given her presentation if she has a true bacterial pneumonia as she is not the best historian despite being alert and oriented x3.  She has significant leukocytosis with left shift therefore will empirically treat with antibiotics  --add solumedrol daily, duonebs, mucolytic's  --Continue vancomycin, Zosyn, de-escalate pending culture data    #Metabolic encephalopathy, mulitfactorial  -- Differential is broad, possibly related to underlying pulmonary infection as noted above.  Also has a history of hypothyroidism with most recent TSH in November greater  than 79, will repeat thyroid studies, rule out myxedema    #Paroxysmal atrial fibrillation  #Sinus bradycarida  --chart review noted issues with sinus bradycardia, had previously been on po amio and coreg which had been d/c'd at most recent d/c for [facility]  --continue to hold anticoagulation indefinitely, given recurrent bleeding (stopped at last d/c in Nov 24), has been recommended to follow-up outpatient with cardiology for consideration for watchman device on discharge in November from [facility]    #Elevated troponin  #CAD s/p PCI  #Mild aortic stenosis  --No complaints of chest pain or shortness of breath.  EKG nonischemic.  Initial troponin 0.057, repeat 0.062.  Most recent PCI greater than 10 years ago.  Does not take daily aspirin or Plavix.  --Most likely demand related ischemia due to severe anemia as noted above.  --Echo in November 2021 at [facility]: EF 55%, mild diastolic dysfunction, mild AS  --Echocardiogram ordered evaluate systolic and diastolic function  --resume home beta-blocker, add aspirin/statin    #COPD, not in acute exacerbation  #Chronic respiratory failure, on 3 L baseline  --hemodynamically stable and on baseline 3 L nasal cannula, as needed DuoNebs    #History of refractory hypertension status post Rheos device, placed in 2008  #Type 2 diabetes mellitus, check A1c, SSI, add basal and adjust as needed  #Hypothyroidism, most recent TSH >79, repeat TSH/free T4  #History of AAA repair  #History of ischemic stroke, residual lower extremity weakness, mostly bedbound PTA  #History of ischemic colitis  #History of rectal prolapse  #Bipolar disorder  #Tobacco use disorder  #Severe protein calorie malnutrition, BMI 16, nutrition consulted    CC 36  [facility] CCU CCU  Checklist:  Antibiotics: Vancomycin, Zosyn  Steroids: Solu-Medrol  DVT ppx: SCDs due to ABL  GI ppx: Protonix gtt.  Diet: Clear  Code: No CPR, limited  Risk/dispo: Patient is a high risk due to acute metabolic encephalopathy, sepsis due to  CAP with MDRO risk factors and elevated troponins.  Anticipate greater than 2 midnight stay.  Disposition expected back to nursing facility when stable.    Lester Estrada DO  [facility]  01/16/22  13:42 EST      Electronically signed by Lester Estrada DO at 01/16/22 1457       Vital Signs (last day)     Date/Time Temp Temp src Pulse Resp BP Patient Position SpO2    01/20/22 0642 -- -- -- -- -- -- 97    01/20/22 0639 98.2 (36.8) Oral 63 20 141/54 Lying 98    01/20/22 0632 -- -- 66 20 -- -- 95    01/20/22 0200 98.7 (37.1) Oral 67 20 135/67 Lying 95    01/20/22 0129 -- -- 95 20 -- -- 96    01/20/22 0120 -- -- 99 20 -- -- 94    01/19/22 2215 98.2 (36.8) Oral 68 20 149/59 Lying 95    01/19/22 2003 98.2 (36.8) Oral 66 17 131/59 Lying 91    01/19/22 1903 -- -- 65 19 111/59 Lying 90    01/19/22 1829 -- -- 65 16 -- -- 99    01/19/22 1820 -- -- 65 16 -- -- 95    01/19/22 1800 -- -- 61 -- 126/59 -- 96    01/19/22 1715 -- -- 64 -- 126/55 -- 95    01/19/22 1600 97.6 (36.4) Axillary -- -- -- -- --    01/19/22 1530 -- -- 66 -- 137/60 -- 93    01/19/22 1515 -- -- 68 -- 133/58 -- 91    01/19/22 1430 -- -- 60 18 132/61 -- 92    01/19/22 1415 -- -- 63 -- 139/57 -- 93    01/19/22 1308 -- -- 70 -- -- -- 96    01/19/22 1230 -- -- 59 -- 114/54 -- 94    01/19/22 1215 -- -- 58 15 110/53 -- 94    01/19/22 1200 98.1 (36.7) Axillary 59 -- 113/52 -- 93    01/19/22 0915 -- -- 64 21 147/70 -- 94    01/19/22 0900 -- -- 62 -- 138/62 -- 91    01/19/22 0800 98 (36.7) Oral -- -- -- -- --    01/19/22 0700 -- -- 61 18 131/63 -- 95    01/19/22 0650 -- -- 61 16 -- -- 100    01/19/22 0640 -- -- 64 14 -- -- 97    01/19/22 0603 98.1 (36.7) Axillary 61 26 127/60 Lying 95    01/19/22 0503 -- -- -- 14 -- Lying --    01/19/22 0403 -- -- 59 15 124/57 Lying 95    01/19/22 0303 -- -- 61 15 125/56 Lying 95    01/19/22 0203 -- -- 61 14 134/62 Lying 97    01/19/22 0103 -- -- 63 15 143/60 Lying 96    01/19/22 0043 -- -- 66 18 -- --  99    01/19/22 0003 98.4 (36.9) Oral 61 17 133/64 Lying 98          Intake & Output (last day)       01/19 0701  01/20 0700 01/20 0701 01/21 0700    P.O. 780 240    I.V. (mL/kg) 281.4 (5.6)     IV Piggyback 100     Total Intake(mL/kg) 1161.4 (23.1) 240 (4.8)    Net +1161.4 +240          Urine Unmeasured Occurrence 3 x     Stool Unmeasured Occurrence 5 x           Current Facility-Administered Medications   Medication Dose Route Frequency Provider Last Rate Last Admin   • acetaminophen (TYLENOL) tablet 650 mg  650 mg Oral Q4H PRN Lester Estrada DO   650 mg at 01/17/22 1320   • Acidophilus/Pectin capsule 1 capsule  1 capsule Oral BID Lester Estrada DO   1 capsule at 01/20/22 0835   • amiodarone (PACERONE) tablet 200 mg  200 mg Oral Q24H Tremaine Rust MD       • aspirin EC tablet 81 mg  81 mg Oral Daily Lester Estrada DO   81 mg at 01/20/22 0835   • atorvastatin (LIPITOR) tablet 40 mg  40 mg Oral Nightly Lester Estrada DO   40 mg at 01/19/22 2007   • carvedilol (COREG) tablet 3.125 mg  3.125 mg Oral BID With Meals Lester Estrada DO   3.125 mg at 01/20/22 0835   • dextrose (D50W) (25 g/50 mL) IV injection 25 g  25 g Intravenous Q15 Min PRN Lester Estrada DO       • dextrose (GLUTOSE) oral gel 15 g  15 g Oral Q15 Min PRN Lester Estrada DO       • docusate sodium (COLACE) capsule 200 mg  200 mg Oral Daily Lester Estrada DO   200 mg at 01/20/22 0835   • dronabinol (MARINOL) capsule 2.5 mg  2.5 mg Oral BID AC Lester Estrada DO   2.5 mg at 01/20/22 0835   • ferrous sulfate tablet 325 mg  325 mg Oral Daily With Breakfast Lester Estrada DO   325 mg at 01/20/22 0835   • glucagon (human recombinant) (GLUCAGEN DIAGNOSTIC) injection 1 mg  1 mg Subcutaneous Q15 Min PRN Lester Estrada DO       • guaiFENesin (MUCINEX) 12 hr tablet 1,200 mg  1,200 mg Oral Q12H Lester Estrada DO   1,200 mg at 01/20/22  0835   • HYDROcodone-acetaminophen (NORCO) 5-325 MG per tablet 1 tablet  1 tablet Oral Q6H PRN Lester Estrada DO   1 tablet at 01/17/22 1710   • insulin aspart (novoLOG) injection 0-7 Units  0-7 Units Subcutaneous TID AC Lester Estrada DO   3 Units at 01/19/22 1743   • ipratropium-albuterol (DUO-NEB) nebulizer solution 3 mL  3 mL Nebulization 4x Daily - RT Lester Estrada DO   3 mL at 01/20/22 0632   • ipratropium-albuterol (DUO-NEB) nebulizer solution 3 mL  3 mL Nebulization 4x Daily PRN Lester Estrada DO       • lactulose (CHRONULAC) 10 GM/15ML solution 10 g  10 g Oral Daily Lester Estrada DO   10 g at 01/18/22 0827   • levothyroxine (SYNTHROID, LEVOTHROID) tablet 175 mcg  175 mcg Oral Q AM Pierre Delgado MD   175 mcg at 01/20/22 0556   • Magnesium Sulfate 2 gram Bolus, followed by 8 gram infusion (total Mg dose 10 grams)- Mg less than or equal to 1mg/dL  2 g Intravenous PRN Lester Estrada DO        Or   • Magnesium Sulfate 2 gram / 50mL Infusion (GIVE X 3 BAGS TO EQUAL 6GM TOTAL DOSE) - Mg 1.1 - 1.5 mg/dl  2 g Intravenous PRN Lester Estrada DO        Or   • Magnesium Sulfate 4 gram infusion- Mg 1.6-1.9 mg/dL  4 g Intravenous PRN Lester Estrada DO       • melatonin tablet 5 mg  5 mg Oral Nightly Lester Estrada DO   5 mg at 01/19/22 2007   • methylPREDNISolone sodium succinate (SOLU-Medrol) injection 40 mg  40 mg Intravenous Daily Lester Estrada DO   40 mg at 01/20/22 0835   • montelukast (SINGULAIR) tablet 10 mg  10 mg Oral Q PM Lester Estrada DO   10 mg at 01/19/22 1738   • multivitamin (THERAGRAN) tablet 1 tablet  1 tablet Oral Nightly Lester Estrada DO   1 tablet at 01/19/22 2007   • nitroglycerin (NITROSTAT) SL tablet 0.4 mg  0.4 mg Sublingual Q5 Min PRN Lesetr Estrada,        • ondansetron (ZOFRAN) injection 4 mg  4 mg Intravenous Q6H PRN Lester Estrada, DO    4 mg at 01/19/22 1517   • ondansetron (ZOFRAN) tablet 4 mg  4 mg Oral Q6H PRN Lester Estrada DO   4 mg at 01/19/22 2127   • pantoprazole (PROTONIX) EC tablet 40 mg  40 mg Oral BID AC Pierre Delgado MD   40 mg at 01/20/22 0835   • piperacillin-tazobactam (ZOSYN) IVPB 4.5 g in 100 mL NS VTB  4.5 g Intravenous Q6H Lester Estrada DO   4.5 g at 01/20/22 0836   • polyethylene glycol (MIRALAX) packet 17 g  17 g Oral BID Pierre Delgado MD   17 g at 01/18/22 2041   • Polyvinyl Alcohol-Povidone PF (HYPOTEARS) 1.4-0.6 % ophthalmic solution 1 drop  1 drop Both Eyes BID Lester Estrada DO   1 drop at 01/20/22 0837   • potassium chloride (MICRO-K) CR capsule 40 mEq  40 mEq Oral PRN Lester Estrada DO        Or   • potassium chloride (KLOR-CON) packet 40 mEq  40 mEq Oral PRN Lester Estrada, DO        Or   • potassium chloride 10 mEq in 100 mL IVPB  10 mEq Intravenous Q1H PRN Lester Estrada DO       • potassium phosphate 45 mmol in sodium chloride 0.9 % 500 mL infusion  45 mmol Intravenous PRN Lester Estrada DO        Or   • potassium phosphate 30 mmol in sodium chloride 0.9 % 250 mL infusion  30 mmol Intravenous PRN Lester Estrada DO        Or   • potassium phosphate 15 mmol in sodium chloride 0.9 % 100 mL infusion  15 mmol Intravenous PRN Lester Estrada DO        Or   • sodium phosphates 45 mmol in sodium chloride 0.9 % 500 mL IVPB  45 mmol Intravenous PRN Lester Estrada DO        Or   • sodium phosphates 30 mmol in sodium chloride 0.9 % 250 mL IVPB  30 mmol Intravenous PRN Lester Estrada DO        Or   • sodium phosphates 15 mmol in sodium chloride 0.9 % 250 mL IVPB  15 mmol Intravenous PRN Lester Estrada DO       • prochlorperazine (COMPAZINE) injection 2.5 mg  2.5 mg Intravenous Q6H PRN Pierre Delgado MD   2.25 mg at 01/19/22 0045   • sodium chloride 0.9 % flush 10 mL  10 mL Intravenous Q12H  Lester Estrada, DO   10 mL at 22 0837   • sodium chloride 0.9 % flush 10 mL  10 mL Intravenous PRN Lester Estrada DO       • sodium chloride 0.9 % flush 10 mL  10 mL Intravenous Q12H Lester Estrada, DO   10 mL at 22 0837   • sodium chloride 0.9 % flush 10 mL  10 mL Intravenous PRN Lester Estrada DO       • sodium chloride 0.9 % flush 10 mL  10 mL Intravenous Q12H Lester Estrada, DO   10 mL at 22 0837   • sodium chloride 0.9 % flush 10 mL  10 mL Intravenous PRN Lester Estrada DO       • traZODone (DESYREL) tablet 50 mg  50 mg Oral Nightly Lester Estrada DO   50 mg at 22     Operative/Procedure Notes (most recent note)    No notes of this type exist for this encounter.            Physician Progress Notes (most recent note)      Tremaine Rust MD at 22 1000           LOS: 4 days     Name: Mary Ly  Age/Sex: 76 y.o. female  :  1945        PCP: Leta Gardner MD  REF: No Known Provider    Active Problems:    Anemia      Reason for follow-up: Paroxysmal atrial fibrillation    Subjective       Subjective   Mary Ly is a 76 year old female with a past medical history significant for paroxysmal atrial fibrillation with a [id number] score of 6, coronary artery disease with history of stenting, continued tobacco abuse, abdominal aortic aneurysm status post endovascular repair, history of upper GI bleed with history of AV malformations with subsequent chronic anemia requiring blood transfusions, ischemic bowel disease, COPD, and dyslipidemia.  She initially presented with hypoxia and severe anemia.  She received 2 units of packed red blood cells.  Cardiology was consulted for evaluation regarding anticoagulation in the setting of recurrent GI bleed.    Interval History: The patient is resting in bed this a.m. She is maintaining sinus rhythm. She is now comfort measures. She denies palpitations and  chest pains. She denies any blood in her stool.    ROS    Vital Signs  Temp:  [97.6 °F (36.4 °C)-98.7 °F (37.1 °C)] 98.2 °F (36.8 °C)  Heart Rate:  [58-99] 63  Resp:  [15-20] 20  BP: (110-149)/(52-67) 141/54  Vital Signs (last 72 hrs)       01/17 0700  01/18 0659 01/18 0700  01/19 0659 01/19 0700  01/20 0659 01/20 0700  01/20 1000   Most Recent      Temp (°F) 97.5 -  97.8    97.5 -  98.4    97.6 -  98.7       98.2 (36.8) 01/20 0639    Heart Rate 61 -  80    59 -  71    58 -  99       63 01/20 0639    Resp 14 -  28    12 -  26    15 -  21       20 01/20 0639    /53 -  164/72    111/73 -  159/69    110/53 -  149/59       141/54 01/20 0639    SpO2 (%) 88 -  100    92 -  100    90 -  99       97 01/20 0642        Body mass index is 18.47 kg/m².    Intake/Output Summary (Last 24 hours) at 1/20/2022 1000  Last data filed at 1/20/2022 0400  Gross per 24 hour   Intake 801.43 ml   Output --   Net 801.43 ml     Objective    Objective     Have seen and examined Ms. haddad.  Physical Exam:     General Appearance:    Alert, cooperative, in no acute distress   Head:    Normocephalic, without obvious abnormality, atraumatic   Eyes:            Conjunctivae and sclerae normal, no   icterus, no pallor, corneas clear.   Neck:   No adenopathy, supple, trachea midline, no thyromegaly, no   carotid bruit, no JVD   Lungs:     Clear to auscultation,respirations regular, even and                  unlabored    Heart:    Regularly irregular rhythm and normal rate, normal S1 and S2, no murmur, no gallop, no rub, no click   Chest Wall:    No abnormalities observed   Abdomen:     Normal bowel sounds, no masses, no organomegaly, soft        non-tender, non-distended, no guarding, no rebound                tenderness   Extremities:   Moves all extremities well, no edema, no cyanosis, no             redness   Pulses:   Pulses palpable and equal bilaterally   Skin:   No bleeding, bruising or rash       Neurologic:   Alert and oriented to  person, place, and time.          Procedures    Results review       Results Review:   Results from last 7 days   Lab Units 01/18/22  2320 01/18/22  0013 01/17/22  0137 01/16/22 2001 01/16/22  0449   WBC 10*3/mm3 31.42* 42.28* 35.35*  --  35.31*   HEMOGLOBIN g/dL 8.0* 8.0* 8.7* 8.6* 5.8*   PLATELETS 10*3/mm3 357 332 313  --  391     Results from last 7 days   Lab Units 01/18/22  2320 01/18/22 0013 01/17/22 0137 01/16/22  0449   SODIUM mmol/L 136 136 134* 136   POTASSIUM mmol/L 3.7 3.3* 4.0 4.2   CHLORIDE mmol/L 98 96* 91* 93*   CO2 mmol/L 28.4 29.4* 28.4 30.2*   BUN mg/dL 13 21 24* 21   CREATININE mg/dL 0.63 0.78 0.76 0.65   CALCIUM mg/dL 7.9* 7.9* 8.3* 8.6   GLUCOSE mg/dL 93 120* 113* 94   ALT (SGPT) U/L  --   --   --  38*   AST (SGOT) U/L  --   --   --  43*     Results from last 7 days   Lab Units 01/16/22  1050 01/16/22  0525   TROPONIN T ng/mL 0.062* 0.057*     Lab Results   Component Value Date    INR 1.28 (H) 01/16/2022    INR 1.08 11/19/2021    INR 1.83 (H) 10/04/2021    INR 1.00 09/29/2021    INR 1.13 (H) 09/26/2021    INR 1.08 09/24/2021    INR 1.04 09/22/2021     Lab Results   Component Value Date    MG 2.2 10/05/2021    MG 1.9 10/03/2021    MG 2.3 10/03/2021     Lab Results   Component Value Date    TSH 55.020 (H) 01/16/2022    TRIG 139 01/16/2022    HDL 37 (L) 01/16/2022    LDL 38 01/16/2022      Imaging Results (Last 48 Hours)     ** No results found for the last 48 hours. **        No results found for: BNP             Echo   Results for orders placed during the hospital encounter of 01/16/22    Adult Transthoracic Echo Complete W/ Cont if Necessary Per Protocol    Interpretation Summary  · Left ventricular wall thickness is consistent with mild concentric hypertrophy.  · Left ventricular ejection fraction appears to be 66 - 70%. Left ventricular systolic function is normal.  · Left ventricular diastolic function is consistent with (grade I) impaired relaxation.  · The cardiac chamber dimensions are  normal.  · No significant valvular abnormality is noted.  · There is no evidence of pericardial effusion.            I reviewed the patient's new clinical results.    Telemetry: sinus rhythm in the 70s       Medication Review:   Acidophilus/Pectin, 1 capsule, Oral, BID  amiodarone, 200 mg, Oral, Q24H  aspirin, 81 mg, Oral, Daily  atorvastatin, 40 mg, Oral, Nightly  carvedilol, 3.125 mg, Oral, BID With Meals  docusate sodium, 200 mg, Oral, Daily  dronabinol, 2.5 mg, Oral, BID AC  ferrous sulfate, 325 mg, Oral, Daily With Breakfast  guaiFENesin, 1,200 mg, Oral, Q12H  insulin aspart, 0-7 Units, Subcutaneous, TID AC  ipratropium-albuterol, 3 mL, Nebulization, 4x Daily - RT  lactulose, 10 g, Oral, Daily  levothyroxine, 175 mcg, Oral, Q AM  melatonin, 5 mg, Oral, Nightly  methylPREDNISolone sodium succinate, 40 mg, Intravenous, Daily  montelukast, 10 mg, Oral, Q PM  multivitamin, 1 tablet, Oral, Nightly  pantoprazole, 40 mg, Oral, BID AC  piperacillin-tazobactam, 4.5 g, Intravenous, Q6H  polyethylene glycol, 17 g, Oral, BID  Polyvinyl Alcohol-Povidone PF, 1 drop, Both Eyes, BID  sodium chloride, 10 mL, Intravenous, Q12H  sodium chloride, 10 mL, Intravenous, Q12H  sodium chloride, 10 mL, Intravenous, Q12H  traZODone, 50 mg, Oral, Nightly             Assessment      Assessment:  1. Paroxysmal atrial fibrillation, seems to be maintaining sinus rhythm on amiodarone at home.  2. High risk for chronic anticoagulation due to recurrent GI bleed.    Plan     Recommendations:  1. Continue with amiodarone at 200 mg a day.  2. Patient otherwise is stable for discharge home from cardiac standpoint and follow-up in our office in 2 to 3 weeks.    I discussed the patients findings and my recommendations with patient and family    Electronically signed by QUIQUE Delarosa, 01/20/22, 10:06 AM EST.      Please note that portions of this note were completed with a voice recognition program.      Electronically signed by Tremaine Rust  MD MERA at 01/20/22 1023          Consult Notes (most recent note)      Tremaine Rust MD at 01/19/22 0942            Consults  Date of Admit: 1/16/2022  Date of Consult: 01/19/22  Provider, No Known  Mary Ly  1945  Consulting Physician: Tremaine Rust MD, [facility]    Cardiology consultation    Reason for consultation:   Assessment:  1. Paroxysmal atrial fibrillation, seems to maintaining sinus rhythm on amiodarone.  2. History of recurrent GI bleed from AV malformation requiring blood transfusions, high risk for chronic anticoagulation at this time.  3. COPD    Recommendations:  1. I agree that with her recurrent GI bleed requiring blood transfusions, she would be at high risk for chronic anticoagulation and hence would hold off using any anticoagulants at this time.  2. I am not sure if she would be a candidate for watchman device in the near future but potentially could be considered later on if she stabilizes on her GI bleeding (which I doubt given her chronic bleeding history).  3. I would like to continue with amiodarone may be at a lower dose to hopefully keep her in sinus rhythm and decrease the risk of having a stroke without being anticoagulated.    History of Present Illness    Subjective     Chief Complaint   Patient presents with   • Shortness of Breath       Mary Ly is a 76 y.o. female with past medical history significant for paroxysmal atrial fibrillation with a [id number] score of 6 and Hasbled score of 3, coronary artery disease with history of stenting, continued tobacco abuse, abdominal aortic aneurysm status post endovascular repair, history of upper GI bleeds with history of AV malformations with subsequent chronic anemia occasionally requiring blood transfusions, ischemic bowel disease, COPD chronically on supplemental oxygen, dyslipidemia, malnutrition.  Ms. Hernandez originally came to our emergency department after being found to be minimally unresponsive and hypoxic.  Upon arrival  in the emergency department she was found to have a hemoglobin of 5.8 she has since received 2 units of packed red blood cells and most recently hemoglobin was 8 on 1/18/2022.  Cardiology services were consulted to discuss chronic anticoagulation versus watchman versus neither.  Patient did see palliative care and is considering comfort care only measures but has not made decision yet.  Speaking to her today clinically she reports her breathing is back at her baseline she does still require supplemental oxygen which she wears at home.  She also denies palpitations or tachycardia as well as chest pains.        Past Medical History:   Diagnosis Date   • AAA (abdominal aortic aneurysm) ([facility])     s/p repair    • Arthritis    • CAD (coronary artery disease)     s/p stenting in the past    • Chronic kidney disease (CKD), stage III (moderate) ([facility])    • COPD (chronic obstructive pulmonary disease) ([facility])    • Debility    • Diastolic CHF, chronic ([facility]) 4/23/2021   • GERD (gastroesophageal reflux disease)    • History of CVA (cerebrovascular accident)    • History of ischemic colitis    • Hyperlipidemia 4/23/2021   • Hypertension    • Hypothyroidism    • Pneumonia due to COVID-19 virus 9/21/2021   • Stroke ([facility])    • Type II diabetes mellitus ([facility])      Past Surgical History:   Procedure Laterality Date   • BACK SURGERY     • CARDIAC CATHETERIZATION     • CHOLECYSTECTOMY N/A 7/17/2020    Procedure: CHOLECYSTECTOMY LAPAROSCOPIC;  Surgeon: Lonnie Meneses MD;  Location: [facility];  Service: General;  Laterality: N/A;   • COLONOSCOPY     • COLONOSCOPY N/A 9/25/2021    Procedure: COLONOSCOPY;  Surgeon: Lonnie Meneses MD;  Location: [facility] OR;  Service: Gastroenterology;  Laterality: N/A;   • CORONARY STENT PLACEMENT     • ENDOSCOPY     • ENDOSCOPY N/A 9/25/2021    Procedure: ESOPHAGOGASTRODUODENOSCOPY;  Surgeon: Lonnie Meneses MD;  Location: [facility] OR;  Service: Gastroenterology;  Laterality: N/A;   • TONSILLECTOMY        Family History   Problem Relation Age of Onset   • Diabetes Mother    • Heart attack Father    • No Known Problems Sister    • Heart attack Brother    • No Known Problems Brother    • No Known Problems Sister    • No Known Problems Sister      Social History     Tobacco Use   • Smoking status: Current Every Day Smoker     Packs/day: 1.00     Years: 55.00     Pack years: 55.00     Types: Cigarettes   • Smokeless tobacco: Never Used   Vaping Use   • Vaping Use: Never used   Substance Use Topics   • Alcohol use: Never   • Drug use: Never     Medications Prior to Admission   Medication Sig Dispense Refill Last Dose   • amiodarone (PACERONE) 200 MG tablet Take 200 mg by mouth Daily.   1/15/2022 at 0800   • apixaban (ELIQUIS) 2.5 MG tablet tablet Take 2.5 mg by mouth 2 (Two) Times a Day.   1/15/2022 at 2000   • atorvastatin (LIPITOR) 40 MG tablet Take 40 mg by mouth Every Night.   1/15/2022 at 2000   • Carboxymethylcellulose Sodium (ARTIFICIAL TEARS OP) Administer 1 application to both eyes 2 (Two) Times a Day.   1/15/2022 at 1800   • Cariprazine HCl (VRAYLAR) 1.5 MG capsule capsule Take 1.5 mg by mouth Daily.   1/15/2022 at 0800   • carvedilol (COREG) 3.125 MG tablet Take 3.125 mg by mouth 2 (Two) Times a Day With Meals.   1/15/2022 at 1600   • docusate sodium (COLACE) 250 MG capsule Take 250 mg by mouth Daily.   1/15/2022 at 0800   • dronabinol (MARINOL) 2.5 MG capsule Take 2.5 mg by mouth 2 (Two) Times a Day Before Meals.   1/15/2022 at 1600   • ferrous sulfate 325 (65 FE) MG tablet Take 325 mg by mouth Daily With Breakfast.   1/15/2022 at 0800   • HYDROcodone-acetaminophen (NORCO) 5-325 MG per tablet Take 1 tablet by mouth Every 6 (Six) Hours As Needed for Moderate Pain .   1/15/2022 at Unknown time   • Lactobacillus Acid-Pectin (Acidophilus/Pectin) capsule Take 1 capsule by mouth 2 (two) times a day. HOLD FOR 10 DAYS (UNTIL FINISHED WITH H. PYLORI TREATMENT)   1/15/2022 at 1600   • lactulose (CHRONULAC) 10  GM/15ML solution Take 10 g by mouth Daily.   1/15/2022 at 0800   • levothyroxine (SYNTHROID, LEVOTHROID) 150 MCG tablet Take 150 mcg by mouth Daily.   1/15/2022 at 0600   • lisinopril (PRINIVIL,ZESTRIL) 5 MG tablet Take 5 mg by mouth Daily.   1/15/2022 at 0800   • melatonin 5 MG tablet tablet Take 5 mg by mouth Every Night.   1/15/2022 at 2000   • montelukast (SINGULAIR) 10 MG tablet Take 10 mg by mouth Every Evening.   1/15/2022 at 1600   • Multiple Vitamin (MULTIVITAMIN) tablet tablet Take 1 tablet by mouth Every Night.   1/15/2022 at 2000   • pantoprazole (PROTONIX) 40 MG EC tablet Take 40 mg by mouth 2 (Two) Times a Day.   1/15/2022 at 2000   • polyethylene glycol (MiraLax) 17 GM/SCOOP powder Take 17 g by mouth 2 (Two) Times a Day.   1/15/2022 at 1600   • SITagliptin (JANUVIA) 100 MG tablet Take 100 mg by mouth Daily.   1/15/2022 at 0800   • traZODone (DESYREL) 50 MG tablet Take 50 mg by mouth Every Night.   1/15/2022 at 2000   • ipratropium-albuterol (DUO-NEB) 0.5-2.5 mg/3 ml nebulizer Take 3 mL by nebulization 4 (Four) Times a Day As Needed for Wheezing or Shortness of Air.   Unknown at Unknown time   • ondansetron (ZOFRAN) 4 MG tablet Take 4 mg by mouth Every 6 (Six) Hours As Needed for Nausea or Vomiting.   1/13/2022 at Unknown time   • promethazine (PHENERGAN) 12.5 MG tablet Take 12.5 mg by mouth Every 8 (Eight) Hours As Needed for Nausea or Vomiting.   1/13/2022     Allergies:  Haldol [haloperidol]      Current Facility-Administered Medications:   •  acetaminophen (TYLENOL) tablet 650 mg, 650 mg, Oral, Q4H PRN, Lester Estrada DO, 650 mg at 01/17/22 1320  •  Acidophilus/Pectin capsule 1 capsule, 1 capsule, Oral, BID, Lester Estrada DO, 1 capsule at 01/19/22 0820  •  aspirin EC tablet 81 mg, 81 mg, Oral, Daily, Lester Estrada DO, 81 mg at 01/19/22 0821  •  atorvastatin (LIPITOR) tablet 40 mg, 40 mg, Oral, Nightly, Lester Estrada DO, 40 mg at 01/18/22 2042  •   carvedilol (COREG) tablet 3.125 mg, 3.125 mg, Oral, BID With Meals, Lester Estrada DO, 3.125 mg at 01/19/22 0821  •  dextrose (D50W) (25 g/50 mL) IV injection 25 g, 25 g, Intravenous, Q15 Min PRN, Lester Estrada DO  •  dextrose (GLUTOSE) oral gel 15 g, 15 g, Oral, Q15 Min PRN, Lester Estrada DO  •  docusate sodium (COLACE) capsule 200 mg, 200 mg, Oral, Daily, Lester Estrada DO, 200 mg at 01/19/22 0821  •  dronabinol (MARINOL) capsule 2.5 mg, 2.5 mg, Oral, BID AC, Lester Estrada DO, 2.5 mg at 01/19/22 0633  •  ferrous sulfate tablet 325 mg, 325 mg, Oral, Daily With Breakfast, Lester Estrada DO, 325 mg at 01/18/22 0825  •  glucagon (human recombinant) (GLUCAGEN DIAGNOSTIC) injection 1 mg, 1 mg, Subcutaneous, Q15 Min PRN, Lester Estrada DO  •  guaiFENesin (MUCINEX) 12 hr tablet 1,200 mg, 1,200 mg, Oral, Q12H, Lester Estrada DO, 1,200 mg at 01/18/22 2042  •  HYDROcodone-acetaminophen (NORCO) 5-325 MG per tablet 1 tablet, 1 tablet, Oral, Q6H PRN, Lseter Estrada DO, 1 tablet at 01/17/22 1710  •  insulin aspart (novoLOG) injection 0-7 Units, 0-7 Units, Subcutaneous, TID ACSean Curtis Anthony, DO  •  ipratropium-albuterol (DUO-NEB) nebulizer solution 3 mL, 3 mL, Nebulization, 4x Daily - RT, Lester Estrada DO, 3 mL at 01/19/22 0640  •  ipratropium-albuterol (DUO-NEB) nebulizer solution 3 mL, 3 mL, Nebulization, 4x Daily PRN, Lester Estrada DO  •  lactulose (CHRONULAC) 10 GM/15ML solution 10 g, 10 g, Oral, Daily, Lester Estrada DO, 10 g at 01/18/22 0827  •  levothyroxine (SYNTHROID, LEVOTHROID) tablet 150 mcg, 150 mcg, Oral, Daily, Lester Estrada DO, 150 mcg at 01/19/22 0821  •  Magnesium Sulfate 2 gram Bolus, followed by 8 gram infusion (total Mg dose 10 grams)- Mg less than or equal to 1mg/dL, 2 g, Intravenous, PRN **OR** Magnesium Sulfate 2 gram / 50mL Infusion (GIVE X 3 BAGS  TO EQUAL 6GM TOTAL DOSE) - Mg 1.1 - 1.5 mg/dl, 2 g, Intravenous, PRN **OR** Magnesium Sulfate 4 gram infusion- Mg 1.6-1.9 mg/dL, 4 g, Intravenous, PRN, Lester Estrada DO  •  melatonin tablet 5 mg, 5 mg, Oral, Nightly, Lester Estrada DO, 5 mg at 01/18/22 2042  •  methylPREDNISolone sodium succinate (SOLU-Medrol) injection 40 mg, 40 mg, Intravenous, Daily, Lester Estrada DO, 40 mg at 01/19/22 0821  •  montelukast (SINGULAIR) tablet 10 mg, 10 mg, Oral, Q PM, Lester Estrada DO, 10 mg at 01/18/22 1706  •  multivitamin (THERAGRAN) tablet 1 tablet, 1 tablet, Oral, Nightly, Lester Estrada DO, 1 tablet at 01/18/22 2042  •  nitroglycerin (NITROSTAT) SL tablet 0.4 mg, 0.4 mg, Sublingual, Q5 Min PRN, Lester Estrada DO  •  ondansetron (ZOFRAN) injection 4 mg, 4 mg, Intravenous, Q6H PRN, Lester Estrada DO, 4 mg at 01/17/22 1210  •  ondansetron (ZOFRAN) tablet 4 mg, 4 mg, Oral, Q6H PRN, Lester Estrada DO, 4 mg at 01/17/22 1955  •  pantoprazole (PROTONIX) 40 mg in 100 mL NS (VTB), 8 mg/hr, Intravenous, Continuous, Lester Estrada DO, Last Rate: 20 mL/hr at 01/19/22 0822, 8 mg/hr at 01/19/22 0822  •  piperacillin-tazobactam (ZOSYN) IVPB 4.5 g in 100 mL NS VTB, 4.5 g, Intravenous, Q6H, Lester Estrada DO, 4.5 g at 01/19/22 0822  •  polyethylene glycol (MIRALAX) packet 17 g, 17 g, Oral, BID, Pierre Delgado MD, 17 g at 01/18/22 2041  •  Polyvinyl Alcohol-Povidone PF (HYPOTEARS) 1.4-0.6 % ophthalmic solution 1 drop, 1 drop, Both Eyes, BID, Lester Estrada DO, 1 drop at 01/19/22 0821  •  potassium chloride (MICRO-K) CR capsule 40 mEq, 40 mEq, Oral, PRN **OR** potassium chloride (KLOR-CON) packet 40 mEq, 40 mEq, Oral, PRN **OR** potassium chloride 10 mEq in 100 mL IVPB, 10 mEq, Intravenous, Q1H PRN, Lester Estrada DO  •  potassium phosphate 45 mmol in sodium chloride 0.9 % 500 mL infusion, 45 mmol, Intravenous,  PRN **OR** potassium phosphate 30 mmol in sodium chloride 0.9 % 250 mL infusion, 30 mmol, Intravenous, PRN **OR** potassium phosphate 15 mmol in sodium chloride 0.9 % 100 mL infusion, 15 mmol, Intravenous, PRN **OR** sodium phosphates 45 mmol in sodium chloride 0.9 % 500 mL IVPB, 45 mmol, Intravenous, PRN **OR** sodium phosphates 30 mmol in sodium chloride 0.9 % 250 mL IVPB, 30 mmol, Intravenous, PRN **OR** sodium phosphates 15 mmol in sodium chloride 0.9 % 250 mL IVPB, 15 mmol, Intravenous, PRN, Lester Estrada DO  •  prochlorperazine (COMPAZINE) injection 2.5 mg, 2.5 mg, Intravenous, Q6H PRN, Pierre Delgado MD, 2.25 mg at 01/19/22 0045  •  sodium chloride 0.9 % flush 10 mL, 10 mL, Intravenous, Q12H, Lester Estrada DO, 10 mL at 01/19/22 0823  •  sodium chloride 0.9 % flush 10 mL, 10 mL, Intravenous, PRN, Lester Estrada DO  •  sodium chloride 0.9 % flush 10 mL, 10 mL, Intravenous, Q12H, Lester Estrada DO, 10 mL at 01/19/22 0823  •  sodium chloride 0.9 % flush 10 mL, 10 mL, Intravenous, PRN, Lester Estrada,   •  sodium chloride 0.9 % flush 10 mL, 10 mL, Intravenous, Q12H, Lester Estrada DO, 10 mL at 01/19/22 0823  •  sodium chloride 0.9 % flush 10 mL, 10 mL, Intravenous, PRN, Lester Estrada DO  •  traZODone (DESYREL) tablet 50 mg, 50 mg, Oral, Nightly, Lester Estrada DO, 50 mg at 01/18/22 2042    Review of Systems   Constitutional: Positive for fatigue. Negative for diaphoresis.   HENT: Negative for congestion and nosebleeds.    Respiratory: Positive for shortness of breath. Negative for chest tightness.    Cardiovascular: Negative for chest pain and palpitations.   Gastrointestinal: Positive for abdominal pain. Negative for abdominal distention.   Genitourinary: Negative for dysuria and hematuria.   Musculoskeletal: Negative for arthralgias and back pain.   Skin: Negative for color change and pallor.   Neurological: Negative for  dizziness and syncope.   Hematological: Negative for adenopathy. Does not bruise/bleed easily.   Psychiatric/Behavioral: Negative for agitation and behavioral problems.         Objective      Vital Signs  Temp:  [97.5 °F (36.4 °C)-98.4 °F (36.9 °C)] 98 °F (36.7 °C)  Heart Rate:  [59-71] 64  Resp:  [12-26] 21  BP: (124-159)/(53-85) 147/70  Body mass index is 18.6 kg/m².    Intake/Output Summary (Last 24 hours) at 1/19/2022 0942  Last data filed at 1/19/2022 0800  Gross per 24 hour   Intake 2814.69 ml   Output --   Net 2814.69 ml       Physical Exam  Constitutional:       Appearance: She is cachectic. She is ill-appearing.   HENT:      Head: Normocephalic and atraumatic.   Eyes:      Extraocular Movements: Extraocular movements intact.      Pupils: Pupils are equal, round, and reactive to light.   Cardiovascular:      Rate and Rhythm: Normal rate and regular rhythm.   Pulmonary:      Comments: Wearing oxygen via nasal cannula  Abdominal:      General: Abdomen is flat.      Palpations: Abdomen is soft.   Musculoskeletal:         General: No swelling or tenderness.   Skin:     General: Skin is warm and dry.   Neurological:      General: No focal deficit present.      Mental Status: She is alert and oriented to person, place, and time.   Psychiatric:         Mood and Affect: Mood normal.         Behavior: Behavior normal.       Results Review:   I reviewed the patient's new clinical results.  Results from last 7 days   Lab Units 01/16/22  1050 01/16/22  0525   TROPONIN T ng/mL 0.062* 0.057*     Results from last 7 days   Lab Units 01/18/22 2320 01/18/22  0013 01/17/22 0137 01/16/22 2001 01/16/22 0449   WBC 10*3/mm3 31.42* 42.28* 35.35*  --  35.31*   HEMOGLOBIN g/dL 8.0* 8.0* 8.7* 8.6* 5.8*   PLATELETS 10*3/mm3 357 332 313  --  391     Results from last 7 days   Lab Units 01/18/22  2320 01/18/22  0013 01/17/22  0137 01/16/22  0449   SODIUM mmol/L 136 136 134* 136   POTASSIUM mmol/L 3.7 3.3* 4.0 4.2   CHLORIDE mmol/L  98 96* 91* 93*   CO2 mmol/L 28.4 29.4* 28.4 30.2*   BUN mg/dL 13 21 24* 21   CREATININE mg/dL 0.63 0.78 0.76 0.65   CALCIUM mg/dL 7.9* 7.9* 8.3* 8.6   GLUCOSE mg/dL 93 120* 113* 94   ALT (SGPT) U/L  --   --   --  38*   AST (SGOT) U/L  --   --   --  43*     Lab Results   Component Value Date    INR 1.28 (H) 2022    INR 1.08 2021    INR 1.83 (H) 10/04/2021    INR 1.00 2021    INR 1.13 (H) 2021    INR 1.08 2021    INR 1.04 2021     Lab Results   Component Value Date    MG 2.2 10/05/2021    MG 1.9 10/03/2021    MG 2.3 10/03/2021     Lab Results   Component Value Date    TSH 55.020 (H) 2022    TRIG 139 2022    HDL 37 (L) 2022    LDL 38 2022      No results found for: BNP     Thank you very much for asking us to be involved in this patient's care.  We will follow along with you.    Santiago Vallejo PA-C   22  09:42 EST    Electronically signed by Santiago Vallejo PA-C, 22, 9:43 AM EST.    Electronically signed by Tremaine Rust MD, 22, 12:53 PM EST.       Electronically signed by Tremaine Rust MD at 22 1253       Nutrition Notes (most recent note)    No notes exist for this encounter.         Physical Therapy Notes (most recent note)    No notes exist for this encounter.         Occupational Therapy Notes (most recent note)    No notes exist for this encounter.            Speech Language Pathology Notes (most recent note)      Lolis Costa MS CCC-SLP at 22 0921          Acute Care - Speech Language Pathology   Swallow Initial Evaluation  Nik     Patient Name: Mary Ly  : 1945  MRN: [id number]  Today's Date: 2022               Admit Date: 2022    Mary Malachi  is seen at bedside this am on PCU-203 to assess safety/efficacy of swallowing fnx, determine safest/least restrictive diet tolerance. She has a medical hx significant for CAD status post PCI, COPD on 3 L at baseline and previous ABL due to gastric  ulcer/AVMs.  She presented to ED from her nursing facility after being found minimally responsive and hypoxic.  She was recently admitted to [facility] from 11/4/21 - 11/12/21 for GI bleed.  EGD during admission noted abnormal atrophic mucosa throughout the stomach and few nonbleeding gastric AVMs s/p ablation. She was again hospitalized at [facility] from 11/21/21 - 11/24/21 w/ GI bleed and EGD noted multiple ulcers in the fundus, multiple ulcers in the body from 4 to 8 mm with 1 visible vessel at its edge requiring epi injection and clipping, superficial gastritis and erythema and edema in the antrum with biopsies taken.     Pt has been tolerating a clear liquid diet per GI at this time.      Pt clear to accept po trials of puree and thins on this date per MD. Pt clear to initiate po diet pending dysphagia assessment.     Social History     Socioeconomic History   • Marital status:    Tobacco Use   • Smoking status: Current Every Day Smoker     Packs/day: 1.00     Years: 55.00     Pack years: 55.00     Types: Cigarettes   • Smokeless tobacco: Never Used   Vaping Use   • Vaping Use: Never used   Substance and Sexual Activity   • Alcohol use: Never   • Drug use: Never   • Sexual activity: Defer        No recent chest xray available for review.    Diet Orders (active) (From admission, onward)     Start     Ordered    01/18/22 1800  Dietary Nutrition Supplements Boost Breeze (Ensure Clear); orange  Daily With Lunch & Dinner       01/18/22 1252    01/16/22 1037  Diet Clear Liquid  Diet Effective Now         01/16/22 1036                Pt is observed on nasal cannula at 2L w/o complications.     Pt is positioned upright and centered in bed to accept multiple po presentations of ice chips, puree, and thin liquids via spoon, cup and straw.  Pt is able to self feed and does so across this assessment.     Facial/oral structures are symmetrical upon observation. Lingual protrusion reveals no deviation.  Oral mucosa are moist, pink, and clean. Pt is evidenced w/ limited dentition. Secretions are clear, thin, and well controlled. OROM/BERYL is generally weak overall to imitate oral postures. Gag is not assessed. Volitional cough is intact w/ adequate  intensity, clear in quality, non-productive. Voice is adequate in intensity, clear in quality w/ intelligible speech.    Upon po presentations, adequate bolus anticipation and acceptance w/ good labial seal for bolus clearance via spoon bowl, cup rim stability and suction via straw. Bolus formation, manipulation and control are wfl w/ rotary mastication pattern. A-p transit is timely w/o oral residue.     Pharyngeal swallow is timely w/ adequate hyolaryngeal elevation per palpation. No overt s/s aspiration evidenced across this evaluation. No silent aspiration suspected as pt is w/o changes in vocal quality, respirations or secretions post po presentations. Pt denies odynophagia.      Visit Dx:     ICD-10-CM ICD-9-CM   1. Anemia, unspecified type  D64.9 285.9   2. Pneumonia of both lungs due to infectious organism, unspecified part of lung  J18.9 483.8   3. Sepsis, due to unspecified organism, unspecified whether acute organ dysfunction present ([facility])  A41.9 038.9     995.91   4. Elevated troponin  R77.8 790.6     Patient Active Problem List   Diagnosis   • Status post laparoscopic cholecystectomy   • Severe malnutrition ([facility])   • COPD (chronic obstructive pulmonary disease) ([facility])   • Type II diabetes mellitus ([facility])   • Hypothyroidism   • Hyperlipidemia   • Diastolic CHF, chronic ([facility])   • Moderate malnutrition ([facility])   • Vomiting   • Upper GI bleed   • Pneumonia due to COVID-19 virus   • Cytokine release syndrome, grade 2   • Paroxysmal atrial fibrillation ([facility])   • History of cerebrovascular accident   • Anemia     Past Medical History:   Diagnosis Date   • AAA (abdominal aortic aneurysm) ([facility])     s/p repair    • Arthritis    • CAD (coronary artery disease)     s/p  stenting in the past    • Chronic kidney disease (CKD), stage III (moderate) ([facility])    • COPD (chronic obstructive pulmonary disease) ([facility])    • Debility    • Diastolic CHF, chronic (HCC) 4/23/2021   • GERD (gastroesophageal reflux disease)    • History of CVA (cerebrovascular accident)    • History of ischemic colitis    • Hyperlipidemia 4/23/2021   • Hypertension    • Hypothyroidism    • Pneumonia due to COVID-19 virus 9/21/2021   • Stroke (HCC)    • Type II diabetes mellitus ([facility])      Past Surgical History:   Procedure Laterality Date   • BACK SURGERY     • CARDIAC CATHETERIZATION     • CHOLECYSTECTOMY N/A 7/17/2020    Procedure: CHOLECYSTECTOMY LAPAROSCOPIC;  Surgeon: Lonnie Meneses MD;  Location: [facility] OR;  Service: General;  Laterality: N/A;   • COLONOSCOPY     • COLONOSCOPY N/A 9/25/2021    Procedure: COLONOSCOPY;  Surgeon: Lonnie Meneses MD;  Location: [facility] OR;  Service: Gastroenterology;  Laterality: N/A;   • CORONARY STENT PLACEMENT     • ENDOSCOPY     • ENDOSCOPY N/A 9/25/2021    Procedure: ESOPHAGOGASTRODUODENOSCOPY;  Surgeon: Lonnie Meneses MD;  Location: [facility];  Service: Gastroenterology;  Laterality: N/A;   • TONSILLECTOMY       IMPRESSION:  Pt presents w/ wfl oropharyngeal swallow w/o s/s aspiration.  No s/s indicative of silent aspiration.  No odynophagia reported.  She is felt to most benefit from initiation of po diet. Per MD, pt is clear to accept puree solids and thin liquids. Advance po diet per MD.       SLP Recommendation and Plan       Recommendations:  1. Puree solids, thin liquids.   2. Meds whole in puree/thins.   3. Upright and centered for all po intake  4. YNES precautions.  5. Oral care protocol.  6. Advance po diet per MD.     No further formal SLP f/u warranted at this time.    D/w pt results and recommendations w/ verbal agreement.    D/w RN results and recommendations w/ verbal agreement.    Thank you for allowing me to participate in the care of your patient-  Lolis  MS Igor CCC-SLP              EDUCATION  The patient has been educated in the following areas:   Dysphagia (Swallowing Impairment).              Time Calculation:                Lolis Costa MS CCC-SLP  2022    Electronically signed by Lolis Costa MS CCC-SLP at 22 1323       Respiratory Therapy Notes (most recent note)    No notes exist for this encounter.         ADL Documentation (most recent)      Most Recent Value   Transferring 2 - assistive person   Toileting 3 - assistive equipment and person   Bathing 2 - assistive person   Dressing 2 - assistive person   Eating 0 - independent   Communication 0 - understands/communicates without difficulty   Swallowing 0 - swallows foods/liquids without difficulty   Equipment Currently Used at Home walker, rolling,  pulse ox             Discharge Summary      Pierre Delgado MD at 22 0900              [facility] DISCHARGE SUMMARY    Patient Identification:  Name:  Mary Ly  Age:  76 y.o.  Sex:  female  :  1945  MRN:  [id number]  Visit Number:  [id number]    Date of Admission: 2022  Date of Discharge:  2022     PCP: Leta Gardner MD    DISCHARGE DIAGNOSIS  Comfort Measures   UGIB w/ anemia requiring transfusions and Hx AVM's w/ chronic intermittent bleeding - Stable   Sepsis - Resolved  Acute Metabolic Encephalopathy - Resolved   Infectious enteritis w/ Hx recurrent colitis - Improved  Hx Ischemic Colitis w/ occluded Celiac Artery, Severe SMA stenosis w/ reconstitution, CHRISTOPHER covered by prior EVAR  HTN  HLD  CAD s/p PCI  Chronic HFpEF  Severe b/l PAD b/l LE's  Descending Thoracic AA, AAA s/ EVAR  Pulm Nodule  NIDDM Type II, controlled, unknown complications  CKDIII  Hypothyroid  Chronic Hypoxic Respiratory Failure 2/2 COPD w/o Exacerbation in setting of continued Tobacco Dependence  GERD  Hx CVA  Chronic Mod Malnutrition    CONSULTS   General Surgery  Palliative Care    PROCEDURES PERFORMED  None    HOSPITAL  COURSE  76F PMH significant for stroke, paroxysmal atrial fibrillation, diabetes mellitus, chronic anticoagulation with Eliquis, hypertension and AAA status post repair who presented w/     #Comfort Measures   #C/f UGIB w/ anemia requiring transfusions and Hx AVM's w/ chronic intermittent bleeding  #Sepsis, Acute Metabolic Encephalopathy 2/2 Suspected infectious enteritis w/ Hx recurrent colitis  #Hx Ischemic Colitis w/ occluded Celiac Artery, Severe SMA stenosis w/ reconstitution, CHRISTOPHER covered by prior EVAR  Patient presented with multiple recent admissions for recurrent colitis, improves w/ IV Abx, this admission similar presentation w/ AMS, Hgb 5.8 on admission, s/p 2U PRBCs, elevated WBC count. Admission labs showed WBC count 35K, Hgb 5.8, lactate 1.0, covid/flu negative, Bcx's NGTD. CTA Abd 8/2020 showed poor visualization of origin celiac or superior mesenteric arteries, cannot exclude ischemia of colon due to diffuse colonic wall thickening. CT Abd/Pelvis this admission showed possible enteritis. Surgery consulted, noted poor surgical candidacy, now signed off.  S/p Zosyn x 4-5 days, switched to PO Cefdinir and Flagyl to complete 7 day course.  No further bleeding, Hgb was stable, transitioned to PO PPI from IV PPI gtt.  Palliative care consulted and followed, patient elected to be made comfort measures and forgo further IV transfusions, patient deferring AC as per below.     #HTN/HLD/CAD s/p PCI  #Chronic HFpEF  #Severe b/l PAD b/l LE's  #Descending Thoracic AA, AAA s/ EVAR  Echo 1/2022 showed LVEF 66-70%, mild concentric LVH, diastolic dysfunction. CTA Abd previously showed multifocal plaque through superficial femoral arteries b/l, borderline hemodynamically significant stenosis but no occlusive segments, Celiac and SMA as per above, descending thoracic aortic aneurysm. CT Abd/Pelvis 1/16/22 showed 4cm descending thoracic aortic aneurysm, mild cardiomegaly, 3cm supra-renal abd aortic aneurysm and partially  imaged aortobiliac stent graft. Continue home Statin, home ASA 81. Continue home BB, held home ACEI at time of discharge as has not required.  Continued home Amio per Cardiology.  Eliquis held following GOC conversations and risk/benefit conversations w/ patient who elected to hold and could relay risks of stroke, MI, other thrombus or arterial occlusion with holding to me today.    #Pulm Nodule  CT showed 8mm pulm nodule RLL, Radiology rec'd short interval f/u w/ CT in 3 months or PET/CT, can discuss w/ PCP given choice to become comfort measures.      #NIDDM Type II, controlled, unknown complications  Hgb A1c 4/2020 = 5.1% though not accurate in setting of chronic bleeding.  Resumed home oral agents.     #CKDIII  Patient presented w/ Cr 0.65, b/l around 0.5-0.8, now back to baseline. Trended Cr and UOP, avoided nephrotoxins, NSAIDs, dehydration and contrast as able.      #Hypothyroid  TSH 55, Ft4 0.6, Increased home Levothyroxine to 175mcg qd and repeat thyroid studies as outpatient in a few weeks following acute illness.     #Chronic Hypoxic Respiratory Failure 2/2 COPD w/o Exacerbation in setting of continued Tobacco Dependence  CT showing Emphysema.  Continued home 02 requirement.     #GERD  Continued home PPI BID    #Hx CVA  Continued home Statin     #Chronic Mod Malnutrition  BMI 18, Has been evaluated and followed by Nutrition in past    VITAL SIGNS:  Temp:  [97.6 °F (36.4 °C)-98.7 °F (37.1 °C)] 98.2 °F (36.8 °C)  Heart Rate:  [58-99] 63  Resp:  [15-20] 20  BP: (110-149)/(52-67) 141/54  SpO2:  [90 %-99 %] 97 %  on  Flow (L/min):  [2] 2;   Device (Oxygen Therapy): nasal cannula    Body mass index is 18.47 kg/m².  Wt Readings from Last 3 Encounters:   01/20/22 50.3 kg (111 lb)   12/24/21 45.4 kg (100 lb)   11/19/21 45.4 kg (100 lb)     PHYSICAL EXAM:  Constitutional:  Chronically ill appearing, No acute distress  HENT:  Head:  Normocephalic and atraumatic.  Mouth:  Moist mucous membranes.    Eyes:   Conjunctivae and EOM are normal. No scleral icterus.    Neck:  Neck supple.  No JVD present.    Cardiovascular:  Normal rate, regular rhythm and normal heart sounds with no murmur.  Pulmonary/Chest:  Chronic respiratory distress, no wheezes, no crackles, on NC  Abdominal:  Soft. No distension and no tenderness.   Musculoskeletal:  No tenderness, and no deformity.  .    Neurological:  Alert and oriented to person, place, and time.  No gross focal deficits  Skin:  Skin is warm and dry. No rash noted. No pallor.   Peripheral vascular: no clubbing, no cyanosis, no edema.    *Exam stable today 1/20     DISCHARGE DISPOSITION   Stable    DISCHARGE MEDICATIONS:     Discharge Medications      New Medications      Instructions Start Date   aspirin 81 MG EC tablet   81 mg, Oral, Daily   Start Date: January 21, 2022     cefdinir 300 MG capsule  Commonly known as: OMNICEF   300 mg, Oral, 2 Times Daily      metroNIDAZOLE 500 MG tablet  Commonly known as: Flagyl   500 mg, Oral, 3 Times Daily         Changes to Medications      Instructions Start Date   levothyroxine 175 MCG tablet  Commonly known as: SYNTHROID, LEVOTHROID  What changed:   · medication strength  · how much to take  · when to take this   175 mcg, Oral, Every Early Morning   Start Date: January 21, 2022        Continue These Medications      Instructions Start Date   Acidophilus/Pectin capsule   1 capsule, Oral, 2 times daily, HOLD FOR 10 DAYS (UNTIL FINISHED WITH H. PYLORI TREATMENT)      amiodarone 200 MG tablet  Commonly known as: PACERONE   200 mg, Oral, Daily      ARTIFICIAL TEARS OP   1 application, Both Eyes, 2 Times Daily      atorvastatin 40 MG tablet  Commonly known as: LIPITOR   40 mg, Oral, Nightly      Cariprazine HCl 1.5 MG capsule capsule  Commonly known as: VRAYLAR   1.5 mg, Oral, Daily      carvedilol 3.125 MG tablet  Commonly known as: COREG   3.125 mg, Oral, 2 Times Daily With Meals      docusate sodium 250 MG capsule  Commonly known as: COLACE    250 mg, Oral, Daily      dronabinol 2.5 MG capsule  Commonly known as: MARINOL   2.5 mg, Oral, 2 Times Daily Before Meals      ferrous sulfate 325 (65 FE) MG tablet   325 mg, Oral, Daily With Breakfast      HYDROcodone-acetaminophen 5-325 MG per tablet  Commonly known as: NORCO   1 tablet, Oral, Every 6 Hours PRN      ipratropium-albuterol 0.5-2.5 mg/3 ml nebulizer  Commonly known as: DUO-NEB   3 mL, Nebulization, 4 Times Daily PRN      lactulose 10 GM/15ML solution  Commonly known as: CHRONULAC   10 g, Oral, Daily      melatonin 5 MG tablet tablet   5 mg, Oral, Nightly      MiraLax 17 GM/SCOOP powder  Generic drug: polyethylene glycol   17 g, Oral, 2 Times Daily      montelukast 10 MG tablet  Commonly known as: SINGULAIR   10 mg, Oral, Every Evening      multivitamin tablet tablet  Generic drug: multivitamin   1 tablet, Oral, Nightly      ondansetron 4 MG tablet  Commonly known as: ZOFRAN   4 mg, Oral, Every 6 Hours PRN      pantoprazole 40 MG EC tablet  Commonly known as: PROTONIX   40 mg, Oral, 2 Times Daily      promethazine 12.5 MG tablet  Commonly known as: PHENERGAN   12.5 mg, Oral, Every 8 Hours PRN      SITagliptin 100 MG tablet  Commonly known as: JANUVIA   100 mg, Oral, Daily      traZODone 50 MG tablet  Commonly known as: DESYREL   50 mg, Oral, Nightly         Stop These Medications    apixaban 2.5 MG tablet tablet  Commonly known as: ELIQUIS     lisinopril 5 MG tablet  Commonly known as: PRINIVIL,ZESTRIL            Activity Instructions     Activity as Tolerated          Additional Instructions for the Follow-ups that You Need to Schedule     Discharge Follow-up with PCP   As directed       Currently Documented PCP:    Leta Gardner MD    PCP Phone Number:    [phone]     Follow Up Details: 1 week post hospital discharge            Contact information for follow-up providers     Leta Gardner MD .    Specialty: Geriatric Medicine  Why: 1 week post hospital discharge  Contact  information:  [address]  [id number]  [place] [address]  [phone]                   Contact information for after-discharge care     Destination     [facility] .    Service: Skilled Nursing  Contact information:  [place] [address]  [phone]                            TEST  RESULTS PENDING AT DISCHARGE  Pending Labs     Order Current Status    Basic Metabolic Panel Collected (01/20/22 0924)    CBC & Differential Collected (01/20/22 0924)    CBC Auto Differential Collected (01/20/22 0924)    Blood Culture - Blood, Arm, Left Preliminary result    Blood Culture - Blood, Arm, Right Preliminary result        CODE STATUS  Code Status and Medical Interventions:   Ordered at: 01/19/22 1629     Level Of Support Discussed With:    Patient     Code Status (Patient has no pulse and is not breathing):    No CPR (Do Not Attempt to Resuscitate)     Medical Interventions (Patient has pulse or is breathing):    Comfort Measures     Cindi Delgado MD  [facility]  01/20/22  09:26 EST    Please note that this discharge summary required more than 30 minutes to complete.      Electronically signed by Cindi Delgado MD at 01/20/22 0940       Discharge Order (From admission, onward)     Start     Ordered    01/20/22 0921  Discharge patient  Once        Expected Discharge Date: 01/20/22    Expected Discharge Time: Morning    Discharge Disposition: Skilled Nursing Facility (DC - External)    Physician of Record for Attribution - Please select from Treatment Team: CINDI DELGADO [[id number]]    Review needed by CMO to determine Physician of Record: No       Question Answer Comment   Physician of Record for Attribution - Please select from Treatment Team CINDI DELGADO    Review needed by CMO to determine Physician of Record No        01/20/22 0952

## 2022-01-20 NOTE — DISCHARGE SUMMARY
[facility] DISCHARGE SUMMARY    Patient Identification:  Name:  Mary Ly  Age:  76 y.o.  Sex:  female  :  1945  MRN:  [id number]  Visit Number:  [id number]    Date of Admission: 2022  Date of Discharge:  2022     PCP: Leta Gardner MD    DISCHARGE DIAGNOSIS  Comfort Measures   UGIB w/ anemia requiring transfusions and Hx AVM's w/ chronic intermittent bleeding - Stable   Sepsis - Resolved  Acute Metabolic Encephalopathy - Resolved   Infectious enteritis w/ Hx recurrent colitis - Improved  Hx Ischemic Colitis w/ occluded Celiac Artery, Severe SMA stenosis w/ reconstitution, CHRISTOPHER covered by prior EVAR  HTN  HLD  CAD s/p PCI  Chronic HFpEF  Severe b/l PAD b/l LE's  Descending Thoracic AA, AAA s/ EVAR  Pulm Nodule  NIDDM Type II, controlled, unknown complications  CKDIII  Hypothyroid  Chronic Hypoxic Respiratory Failure 2/2 COPD w/o Exacerbation in setting of continued Tobacco Dependence  GERD  Hx CVA  Chronic Mod Malnutrition    CONSULTS   General Surgery  Palliative Care    PROCEDURES PERFORMED  None    HOSPITAL COURSE  76F PMH significant for stroke, paroxysmal atrial fibrillation, diabetes mellitus, chronic anticoagulation with Eliquis, hypertension and AAA status post repair who presented w/     #Comfort Measures   #C/f UGIB w/ anemia requiring transfusions and Hx AVM's w/ chronic intermittent bleeding  #Sepsis, Acute Metabolic Encephalopathy 2/2 Suspected infectious enteritis w/ Hx recurrent colitis  #Hx Ischemic Colitis w/ occluded Celiac Artery, Severe SMA stenosis w/ reconstitution, CHRISTOPHER covered by prior EVAR  Patient presented with multiple recent admissions for recurrent colitis, improves w/ IV Abx, this admission similar presentation w/ AMS, Hgb 5.8 on admission, s/p 2U PRBCs, elevated WBC count. Admission labs showed WBC count 35K, Hgb 5.8, lactate 1.0, covid/flu negative, Bcx's NGTD. CTA Abd 2020 showed poor visualization of origin celiac or superior  mesenteric arteries, cannot exclude ischemia of colon due to diffuse colonic wall thickening. CT Abd/Pelvis this admission showed possible enteritis. Surgery consulted, noted poor surgical candidacy, now signed off.  S/p Zosyn x 4-5 days, switched to PO Cefdinir and Flagyl to complete 7 day course.  No further bleeding, Hgb was stable, transitioned to PO PPI from IV PPI gtt.  Palliative care consulted and followed, patient elected to be made comfort measures and forgo further IV transfusions, patient deferring AC as per below.     #HTN/HLD/CAD s/p PCI  #Chronic HFpEF  #Severe b/l PAD b/l LE's  #Descending Thoracic AA, AAA s/ EVAR  Echo 1/2022 showed LVEF 66-70%, mild concentric LVH, diastolic dysfunction. CTA Abd previously showed multifocal plaque through superficial femoral arteries b/l, borderline hemodynamically significant stenosis but no occlusive segments, Celiac and SMA as per above, descending thoracic aortic aneurysm. CT Abd/Pelvis 1/16/22 showed 4cm descending thoracic aortic aneurysm, mild cardiomegaly, 3cm supra-renal abd aortic aneurysm and partially imaged aortobiliac stent graft. Continue home Statin, home ASA 81. Continue home BB, held home ACEI at time of discharge as has not required.  Continued home Amio per Cardiology.  Eliquis held following GOC conversations and risk/benefit conversations w/ patient who elected to hold and could relay risks of stroke, MI, other thrombus or arterial occlusion with holding to me today.    #Pulm Nodule  CT showed 8mm pulm nodule RLL, Radiology rec'd short interval f/u w/ CT in 3 months or PET/CT, can discuss w/ PCP given choice to become comfort measures.      #NIDDM Type II, controlled, unknown complications  Hgb A1c 4/2020 = 5.1% though not accurate in setting of chronic bleeding.  Resumed home oral agents.     #CKDIII  Patient presented w/ Cr 0.65, b/l around 0.5-0.8, now back to baseline. Trended Cr and UOP, avoided nephrotoxins, NSAIDs, dehydration and  contrast as able.      #Hypothyroid  TSH 55, Ft4 0.6, Increased home Levothyroxine to 175mcg qd and repeat thyroid studies as outpatient in a few weeks following acute illness.     #Chronic Hypoxic Respiratory Failure 2/2 COPD w/o Exacerbation in setting of continued Tobacco Dependence  CT showing Emphysema.  Continued home 02 requirement.     #GERD  Continued home PPI BID    #Hx CVA  Continued home Statin     #Chronic Mod Malnutrition  BMI 18, Has been evaluated and followed by Nutrition in past    VITAL SIGNS:  Temp:  [97.6 °F (36.4 °C)-98.7 °F (37.1 °C)] 98.2 °F (36.8 °C)  Heart Rate:  [58-99] 63  Resp:  [15-20] 20  BP: (110-149)/(52-67) 141/54  SpO2:  [90 %-99 %] 97 %  on  Flow (L/min):  [2] 2;   Device (Oxygen Therapy): nasal cannula    Body mass index is 18.47 kg/m².  Wt Readings from Last 3 Encounters:   01/20/22 50.3 kg (111 lb)   12/24/21 45.4 kg (100 lb)   11/19/21 45.4 kg (100 lb)     PHYSICAL EXAM:  Constitutional:  Chronically ill appearing, No acute distress  HENT:  Head:  Normocephalic and atraumatic.  Mouth:  Moist mucous membranes.    Eyes:  Conjunctivae and EOM are normal. No scleral icterus.    Neck:  Neck supple.  No JVD present.    Cardiovascular:  Normal rate, regular rhythm and normal heart sounds with no murmur.  Pulmonary/Chest:  Chronic respiratory distress, no wheezes, no crackles, on NC  Abdominal:  Soft. No distension and no tenderness.   Musculoskeletal:  No tenderness, and no deformity.  .    Neurological:  Alert and oriented to person, place, and time.  No gross focal deficits  Skin:  Skin is warm and dry. No rash noted. No pallor.   Peripheral vascular: no clubbing, no cyanosis, no edema.    *Exam stable today 1/20     DISCHARGE DISPOSITION   Stable    DISCHARGE MEDICATIONS:     Discharge Medications      New Medications      Instructions Start Date   aspirin 81 MG EC tablet   81 mg, Oral, Daily   Start Date: January 21, 2022     cefdinir 300 MG capsule  Commonly known as: OMNICEF    300 mg, Oral, 2 Times Daily      metroNIDAZOLE 500 MG tablet  Commonly known as: Flagyl   500 mg, Oral, 3 Times Daily         Changes to Medications      Instructions Start Date   levothyroxine 175 MCG tablet  Commonly known as: SYNTHROID, LEVOTHROID  What changed:   · medication strength  · how much to take  · when to take this   175 mcg, Oral, Every Early Morning   Start Date: January 21, 2022        Continue These Medications      Instructions Start Date   Acidophilus/Pectin capsule   1 capsule, Oral, 2 times daily, HOLD FOR 10 DAYS (UNTIL FINISHED WITH H. PYLORI TREATMENT)      amiodarone 200 MG tablet  Commonly known as: PACERONE   200 mg, Oral, Daily      ARTIFICIAL TEARS OP   1 application, Both Eyes, 2 Times Daily      atorvastatin 40 MG tablet  Commonly known as: LIPITOR   40 mg, Oral, Nightly      Cariprazine HCl 1.5 MG capsule capsule  Commonly known as: VRAYLAR   1.5 mg, Oral, Daily      carvedilol 3.125 MG tablet  Commonly known as: COREG   3.125 mg, Oral, 2 Times Daily With Meals      docusate sodium 250 MG capsule  Commonly known as: COLACE   250 mg, Oral, Daily      dronabinol 2.5 MG capsule  Commonly known as: MARINOL   2.5 mg, Oral, 2 Times Daily Before Meals      ferrous sulfate 325 (65 FE) MG tablet   325 mg, Oral, Daily With Breakfast      HYDROcodone-acetaminophen 5-325 MG per tablet  Commonly known as: NORCO   1 tablet, Oral, Every 6 Hours PRN      ipratropium-albuterol 0.5-2.5 mg/3 ml nebulizer  Commonly known as: DUO-NEB   3 mL, Nebulization, 4 Times Daily PRN      lactulose 10 GM/15ML solution  Commonly known as: CHRONULAC   10 g, Oral, Daily      melatonin 5 MG tablet tablet   5 mg, Oral, Nightly      MiraLax 17 GM/SCOOP powder  Generic drug: polyethylene glycol   17 g, Oral, 2 Times Daily      montelukast 10 MG tablet  Commonly known as: SINGULAIR   10 mg, Oral, Every Evening      multivitamin tablet tablet  Generic drug: multivitamin   1 tablet, Oral, Nightly      ondansetron 4 MG  tablet  Commonly known as: ZOFRAN   4 mg, Oral, Every 6 Hours PRN      pantoprazole 40 MG EC tablet  Commonly known as: PROTONIX   40 mg, Oral, 2 Times Daily      promethazine 12.5 MG tablet  Commonly known as: PHENERGAN   12.5 mg, Oral, Every 8 Hours PRN      SITagliptin 100 MG tablet  Commonly known as: JANUVIA   100 mg, Oral, Daily      traZODone 50 MG tablet  Commonly known as: DESYREL   50 mg, Oral, Nightly         Stop These Medications    apixaban 2.5 MG tablet tablet  Commonly known as: ELIQUIS     lisinopril 5 MG tablet  Commonly known as: PRINIVIL,ZESTRIL            Activity Instructions     Activity as Tolerated          Additional Instructions for the Follow-ups that You Need to Schedule     Discharge Follow-up with PCP   As directed       Currently Documented PCP:    Leta Gardner MD    PCP Phone Number:    [phone]     Follow Up Details: 1 week post hospital discharge            Contact information for follow-up providers     Leta Gardner MD .    Specialty: Geriatric Medicine  Why: 1 week post hospital discharge  Contact information:  [address]  [id number]  [facility] [address]  [phone]                   Contact information for after-discharge care     Destination     [facility] .    Service: Skilled Nursing  Contact information:  [address] [facility] [address]  [phone]                            TEST  RESULTS PENDING AT DISCHARGE  Pending Labs     Order Current Status    Basic Metabolic Panel Collected (01/20/22 0924)    CBC & Differential Collected (01/20/22 0924)    CBC Auto Differential Collected (01/20/22 0924)    Blood Culture - Blood, Arm, Left Preliminary result    Blood Culture - Blood, Arm, Right Preliminary result        CODE STATUS  Code Status and Medical Interventions:   Ordered at: 01/19/22 6173     Level Of Support Discussed With:    Patient     Code Status (Patient has no pulse and is not breathing):    No CPR (Do Not Attempt to  Resuscitate)     Medical Interventions (Patient has pulse or is breathing):    Comfort Measures     Pierre Delgado MD  [facility]  01/20/22  09:26 EST    Please note that this discharge summary required more than 30 minutes to complete.

## 2022-01-20 NOTE — PLAN OF CARE
Goal Outcome Evaluation:           Progress: no change  Outcome Summary: Patient is being discharged to [facility] and rehab today, VSS, patient has no complaints.

## 2022-01-20 NOTE — PROGRESS NOTES
LOS: 4 days     Name: Mary Ly  Age/Sex: 76 y.o. female  :  1945        PCP: Leta Gardner MD  REF: No Known Provider    Active Problems:    Anemia      Reason for follow-up: Paroxysmal atrial fibrillation    Subjective       Subjective   Mary Ly is a 76 year old female with a past medical history significant for paroxysmal atrial fibrillation with a [id number] score of 6, coronary artery disease with history of stenting, continued tobacco abuse, abdominal aortic aneurysm status post endovascular repair, history of upper GI bleed with history of AV malformations with subsequent chronic anemia requiring blood transfusions, ischemic bowel disease, COPD, and dyslipidemia.  She initially presented with hypoxia and severe anemia.  She received 2 units of packed red blood cells.  Cardiology was consulted for evaluation regarding anticoagulation in the setting of recurrent GI bleed.    Interval History: The patient is resting in bed this a.m. She is maintaining sinus rhythm. She is now comfort measures. She denies palpitations and chest pains. She denies any blood in her stool.    ROS    Vital Signs  Temp:  [97.6 °F (36.4 °C)-98.7 °F (37.1 °C)] 98.2 °F (36.8 °C)  Heart Rate:  [58-99] 63  Resp:  [15-] 20  BP: (110-149)/(52-67) 141/54  Vital Signs (last 72 hrs)        0700   0659  0700   0659  0700   0659  0700   1000   Most Recent      Temp (°F) 97.5 -  97.8    97.5 -  98.4    97.6 -  98.7       98.2 (36.8)  0639    Heart Rate 61 -  80    59 -  71    58 -  99       63  0639    Resp 14 -  28    12 -  26    15 -  21       20  0639    /53 -  164/72    111/73 -  159/69    110/53 -  149/59       141/54  0639    SpO2 (%) 88 -  100    92 -  100    90 -  99       97  0642        Body mass index is 18.47 kg/m².    Intake/Output Summary (Last 24 hours) at 2022 1000  Last data filed at 2022 0400  Gross per 24 hour   Intake  801.43 ml   Output --   Net 801.43 ml     Objective    Objective     Have seen and examined Ms. haddad.  Physical Exam:     General Appearance:    Alert, cooperative, in no acute distress   Head:    Normocephalic, without obvious abnormality, atraumatic   Eyes:            Conjunctivae and sclerae normal, no   icterus, no pallor, corneas clear.   Neck:   No adenopathy, supple, trachea midline, no thyromegaly, no   carotid bruit, no JVD   Lungs:     Clear to auscultation,respirations regular, even and                  unlabored    Heart:    Regularly irregular rhythm and normal rate, normal S1 and S2, no murmur, no gallop, no rub, no click   Chest Wall:    No abnormalities observed   Abdomen:     Normal bowel sounds, no masses, no organomegaly, soft        non-tender, non-distended, no guarding, no rebound                tenderness   Extremities:   Moves all extremities well, no edema, no cyanosis, no             redness   Pulses:   Pulses palpable and equal bilaterally   Skin:   No bleeding, bruising or rash       Neurologic:   Alert and oriented to person, place, and time.          Procedures    Results review       Results Review:   Results from last 7 days   Lab Units 01/18/22 2320 01/18/22 0013 01/17/22 0137 01/16/22 2001 01/16/22  0449   WBC 10*3/mm3 31.42* 42.28* 35.35*  --  35.31*   HEMOGLOBIN g/dL 8.0* 8.0* 8.7* 8.6* 5.8*   PLATELETS 10*3/mm3 357 332 313  --  391     Results from last 7 days   Lab Units 01/18/22 2320 01/18/22 0013 01/17/22 0137 01/16/22  0449   SODIUM mmol/L 136 136 134* 136   POTASSIUM mmol/L 3.7 3.3* 4.0 4.2   CHLORIDE mmol/L 98 96* 91* 93*   CO2 mmol/L 28.4 29.4* 28.4 30.2*   BUN mg/dL 13 21 24* 21   CREATININE mg/dL 0.63 0.78 0.76 0.65   CALCIUM mg/dL 7.9* 7.9* 8.3* 8.6   GLUCOSE mg/dL 93 120* 113* 94   ALT (SGPT) U/L  --   --   --  38*   AST (SGOT) U/L  --   --   --  43*     Results from last 7 days   Lab Units 01/16/22  1050 01/16/22  0525   TROPONIN T ng/mL 0.062* 0.057*     Lab  Results   Component Value Date    INR 1.28 (H) 01/16/2022    INR 1.08 11/19/2021    INR 1.83 (H) 10/04/2021    INR 1.00 09/29/2021    INR 1.13 (H) 09/26/2021    INR 1.08 09/24/2021    INR 1.04 09/22/2021     Lab Results   Component Value Date    MG 2.2 10/05/2021    MG 1.9 10/03/2021    MG 2.3 10/03/2021     Lab Results   Component Value Date    TSH 55.020 (H) 01/16/2022    TRIG 139 01/16/2022    HDL 37 (L) 01/16/2022    LDL 38 01/16/2022      Imaging Results (Last 48 Hours)     ** No results found for the last 48 hours. **        No results found for: BNP             Echo   Results for orders placed during the hospital encounter of 01/16/22    Adult Transthoracic Echo Complete W/ Cont if Necessary Per Protocol    Interpretation Summary  · Left ventricular wall thickness is consistent with mild concentric hypertrophy.  · Left ventricular ejection fraction appears to be 66 - 70%. Left ventricular systolic function is normal.  · Left ventricular diastolic function is consistent with (grade I) impaired relaxation.  · The cardiac chamber dimensions are normal.  · No significant valvular abnormality is noted.  · There is no evidence of pericardial effusion.            I reviewed the patient's new clinical results.    Telemetry: sinus rhythm in the 70s       Medication Review:   Acidophilus/Pectin, 1 capsule, Oral, BID  amiodarone, 200 mg, Oral, Q24H  aspirin, 81 mg, Oral, Daily  atorvastatin, 40 mg, Oral, Nightly  carvedilol, 3.125 mg, Oral, BID With Meals  docusate sodium, 200 mg, Oral, Daily  dronabinol, 2.5 mg, Oral, BID AC  ferrous sulfate, 325 mg, Oral, Daily With Breakfast  guaiFENesin, 1,200 mg, Oral, Q12H  insulin aspart, 0-7 Units, Subcutaneous, TID AC  ipratropium-albuterol, 3 mL, Nebulization, 4x Daily - RT  lactulose, 10 g, Oral, Daily  levothyroxine, 175 mcg, Oral, Q AM  melatonin, 5 mg, Oral, Nightly  methylPREDNISolone sodium succinate, 40 mg, Intravenous, Daily  montelukast, 10 mg, Oral, Q  PM  multivitamin, 1 tablet, Oral, Nightly  pantoprazole, 40 mg, Oral, BID AC  piperacillin-tazobactam, 4.5 g, Intravenous, Q6H  polyethylene glycol, 17 g, Oral, BID  Polyvinyl Alcohol-Povidone PF, 1 drop, Both Eyes, BID  sodium chloride, 10 mL, Intravenous, Q12H  sodium chloride, 10 mL, Intravenous, Q12H  sodium chloride, 10 mL, Intravenous, Q12H  traZODone, 50 mg, Oral, Nightly             Assessment      Assessment:  1. Paroxysmal atrial fibrillation, seems to be maintaining sinus rhythm on amiodarone at home.  2. High risk for chronic anticoagulation due to recurrent GI bleed.    Plan     Recommendations:  1. Continue with amiodarone at 200 mg a day.  2. Patient otherwise is stable for discharge home from cardiac standpoint and follow-up in our office in 2 to 3 weeks.    I discussed the patients findings and my recommendations with patient and family    Electronically signed by QUIQUE Delarosa, 01/20/22, 10:06 AM EST.      Please note that portions of this note were completed with a voice recognition program.

## 2022-01-20 NOTE — DISCHARGE PLACEMENT REQUEST
"Mary Ly (76 y.o. Female)             Date of Birth Social Security Number Address Home Phone MRN    1945  [facility]  [facility] [place] [facility] [address] [phone] [id number]    Restorationist Marital Status             Yarsanism        Admission Date Admission Type Admitting Provider Attending Provider Department, Room/Bed    1/16/22 Emergency Lester Estrada DO Parks, Andrew, MD [address], 3334/1P    Discharge Date Discharge Disposition Discharge Destination           Skilled Nursing Facility (DC - External)              Attending Provider: Pierre Delgado MD    Allergies: Haldol [Haloperidol]    Isolation: None   Infection: None   Code Status: No CPR   Advance Care Planning Activity    Ht: 165.1 cm (65\")   Wt: 50.3 kg (111 lb)    Admission Cmt: None   Principal Problem: None                Active Insurance as of 1/16/2022     Primary Coverage     Payor Plan Insurance Group Employer/Plan Group    ANTHEM MEDICARE REPLACEMENT ANTHEM MEDICARE ADVANTAGE [id number]     Payor Plan Address Payor Plan Phone Number Payor Plan Fax Number Effective Dates    [address] [phone]  9/1/2019 - None Entered    [facility] [address]       Subscriber Name Subscriber Birth Date Member ID       MARY LY 1945 [id number]           Secondary Coverage     Payor Plan Insurance Group Employer/Plan Group    [place]      Payor Plan Address Payor Plan Phone Number Payor Plan Fax Number Effective Dates    [address] [phone]  12/1/2019 - None Entered    [facility] [address]       Subscriber Name Subscriber Birth Date Member ID       MARY LY 1945 [id number]                 Emergency Contacts      (Rel.) Home Phone Work Phone Mobile Phone    ZORAIDA SCHULER (Daughter) [phone] -- --    SANDOR LY (Grandchild) [phone] -- --    MARE ANNA (Grandchild) [phone] -- --    ALEXX LY (Son) " [phone] -- --    MARYCHUY SOLORIO (Friend) -- -- [phone]        COVID PRE-OP / PRE-PROCEDURE SCREENING ORDER (NO ISOLATION) - Swab, Nasopharynx [[id number]] (Order [id number])  Order  Date: 1/20/2022 Department: [address] Released By: Natividad Tian RN (auto-released) Authorizing: Pierre Delgado MD       Linked Results    Procedure Abnormality Status   COVID-19 and FLU A/B PCR - Swab, Nasopharynx Normal Final result     Reprint Order Requisition    COVID-19 and FLU A/B PCR - Swab, Nasopharynx (Order #[id number]) on 1/20/22            COVID-19 and FLU A/B PCR - Swab, Nasopharynx  Order: [id number] - Part of Panel Order [id number]   Status: Final result       Visible to patient: No (not released)       Next appt: None      Specimen Information: Nasopharynx; Swab         0 Result Notes      Component   Ref Range & Units    COVID19   Not Detected - Ref. Range Not Detected    Influenza A PCR   Not Detected Not Detected    Influenza B PCR   Not Detected Not Detected    Resulting Agency  [facility]               Narrative  Performed by:  [facility]  Fact sheet for providers: https://www.fda.gov/media/529848/download     Fact sheet for patients: https://www.fda.gov/media/762478/download     Test performed by PCR.      Specimen Collected: 01/20/22 11:00 Last Resulted: 01/20/22 11:29       Order Details       View Encounter       Lab and Collection Details       Routing       Result History               Result Care Coordination      Patient Communication    Not Released Not seen Back to Top           Provider Comment To Patient    Not Released Not seen     Routing History    Priority Sent On From To Message Type    1/18/2022  9:09 AM Lab, Background User Lester Estrada DO Results    1/18/2022  9:09 AM Lab, Background User Pierre Delgado MD Results     Result Read / Acknowledged    Acknowledge result  No acknowledgement history exists for this order.     Lab Component SmartPhrase  Guide    COVID-19 and FLU A/B PCR - Swab, Nasopharynx (Order #[id number]) on 22       Other Results from 2022     POC Glucose Once  Final result 2022    Basic Metabolic Panel  Final result 2022    CBC Auto Differential  Final result 2022    POC Glucose Once  Final result 2022    POC Glucose Once  Final result 2022    POC Glucose Once  Final result 2022    POC Glucose Once  Final result 2022    Basic Metabolic Panel  Final result 2022    CBC Auto Differential  Final result 2022    Manual Differential  Final result 2022    POC Glucose Once  Final result 2022    POC Glucose Once  Final result 2022    POC Glucose Once  Final result 2022    POC Glucose Once  Final result 2022    Basic Metabolic Panel  Final result 2022    CBC Auto Differential  Final result 2022    Manual Differential  Final result 2022    POC Glucose Once  Final result 2022    POC Glucose Once  Final result 2022    POC Glucose Once  Final result 2022    (important suggestion)  Warning: Additional results from 2022 are available but are not displayed in this report.              Discharge Summary      Pierre Delgado MD at 22 0926              [facility] DISCHARGE SUMMARY    Patient Identification:  Name:  Mary Ly  Age:  76 y.o.  Sex:  female  :  1945  MRN:  [id number]  Visit Number:  [id number]    Date of Admission: 2022  Date of Discharge:  2022     PCP: Leta Gardner MD    DISCHARGE DIAGNOSIS  Comfort Measures   UGIB w/ anemia requiring transfusions and Hx AVM's w/ chronic intermittent bleeding - Stable   Sepsis - Resolved  Acute Metabolic Encephalopathy - Resolved   Infectious enteritis w/ Hx recurrent colitis - Improved  Hx Ischemic Colitis w/ occluded Celiac Artery, Severe SMA stenosis w/ reconstitution, CHRISTOPHER covered by prior EVAR  HTN  HLD  CAD s/p PCI  Chronic HFpEF  Severe b/l  PAD b/l LE's  Descending Thoracic AA, AAA s/ EVAR  Pulm Nodule  NIDDM Type II, controlled, unknown complications  CKDIII  Hypothyroid  Chronic Hypoxic Respiratory Failure 2/2 COPD w/o Exacerbation in setting of continued Tobacco Dependence  GERD  Hx CVA  Chronic Mod Malnutrition    CONSULTS   General Surgery  Palliative Care    PROCEDURES PERFORMED  None    HOSPITAL COURSE  76F PMH significant for stroke, paroxysmal atrial fibrillation, diabetes mellitus, chronic anticoagulation with Eliquis, hypertension and AAA status post repair who presented w/     #Comfort Measures   #C/f UGIB w/ anemia requiring transfusions and Hx AVM's w/ chronic intermittent bleeding  #Sepsis, Acute Metabolic Encephalopathy 2/2 Suspected infectious enteritis w/ Hx recurrent colitis  #Hx Ischemic Colitis w/ occluded Celiac Artery, Severe SMA stenosis w/ reconstitution, CHRISTOPHER covered by prior EVAR  Patient presented with multiple recent admissions for recurrent colitis, improves w/ IV Abx, this admission similar presentation w/ AMS, Hgb 5.8 on admission, s/p 2U PRBCs, elevated WBC count. Admission labs showed WBC count 35K, Hgb 5.8, lactate 1.0, covid/flu negative, Bcx's NGTD. CTA Abd 8/2020 showed poor visualization of origin celiac or superior mesenteric arteries, cannot exclude ischemia of colon due to diffuse colonic wall thickening. CT Abd/Pelvis this admission showed possible enteritis. Surgery consulted, noted poor surgical candidacy, now signed off.  S/p Zosyn x 4-5 days, switched to PO Cefdinir and Flagyl to complete 7 day course.  No further bleeding, Hgb was stable, transitioned to PO PPI from IV PPI gtt.  Palliative care consulted and followed, patient elected to be made comfort measures and forgo further IV transfusions, patient deferring AC as per below.     #HTN/HLD/CAD s/p PCI  #Chronic HFpEF  #Severe b/l PAD b/l LE's  #Descending Thoracic AA, AAA s/ EVAR  Echo 1/2022 showed LVEF 66-70%, mild concentric LVH, diastolic  dysfunction. CTA Abd previously showed multifocal plaque through superficial femoral arteries b/l, borderline hemodynamically significant stenosis but no occlusive segments, Celiac and SMA as per above, descending thoracic aortic aneurysm. CT Abd/Pelvis 1/16/22 showed 4cm descending thoracic aortic aneurysm, mild cardiomegaly, 3cm supra-renal abd aortic aneurysm and partially imaged aortobiliac stent graft. Continue home Statin, home ASA 81. Continue home BB, held home ACEI at time of discharge as has not required.  Continued home Amio per Cardiology.  Eliquis held following GOC conversations and risk/benefit conversations w/ patient who elected to hold and could relay risks of stroke, MI, other thrombus or arterial occlusion with holding to me today.    #Pulm Nodule  CT showed 8mm pulm nodule RLL, Radiology rec'd short interval f/u w/ CT in 3 months or PET/CT, can discuss w/ PCP given choice to become comfort measures.      #NIDDM Type II, controlled, unknown complications  Hgb A1c 4/2020 = 5.1% though not accurate in setting of chronic bleeding.  Resumed home oral agents.     #CKDIII  Patient presented w/ Cr 0.65, b/l around 0.5-0.8, now back to baseline. Trended Cr and UOP, avoided nephrotoxins, NSAIDs, dehydration and contrast as able.      #Hypothyroid  TSH 55, Ft4 0.6, Increased home Levothyroxine to 175mcg qd and repeat thyroid studies as outpatient in a few weeks following acute illness.     #Chronic Hypoxic Respiratory Failure 2/2 COPD w/o Exacerbation in setting of continued Tobacco Dependence  CT showing Emphysema.  Continued home 02 requirement.     #GERD  Continued home PPI BID    #Hx CVA  Continued home Statin     #Chronic Mod Malnutrition  BMI 18, Has been evaluated and followed by Nutrition in past    VITAL SIGNS:  Temp:  [97.6 °F (36.4 °C)-98.7 °F (37.1 °C)] 98.2 °F (36.8 °C)  Heart Rate:  [58-99] 63  Resp:  [15-20] 20  BP: (110-149)/(52-67) 141/54  SpO2:  [90 %-99 %] 97 %  on  Flow (L/min):  [2] 2;    Device (Oxygen Therapy): nasal cannula    Body mass index is 18.47 kg/m².  Wt Readings from Last 3 Encounters:   01/20/22 50.3 kg (111 lb)   12/24/21 45.4 kg (100 lb)   11/19/21 45.4 kg (100 lb)     PHYSICAL EXAM:  Constitutional:  Chronically ill appearing, No acute distress  HENT:  Head:  Normocephalic and atraumatic.  Mouth:  Moist mucous membranes.    Eyes:  Conjunctivae and EOM are normal. No scleral icterus.    Neck:  Neck supple.  No JVD present.    Cardiovascular:  Normal rate, regular rhythm and normal heart sounds with no murmur.  Pulmonary/Chest:  Chronic respiratory distress, no wheezes, no crackles, on NC  Abdominal:  Soft. No distension and no tenderness.   Musculoskeletal:  No tenderness, and no deformity.  .    Neurological:  Alert and oriented to person, place, and time.  No gross focal deficits  Skin:  Skin is warm and dry. No rash noted. No pallor.   Peripheral vascular: no clubbing, no cyanosis, no edema.    *Exam stable today 1/20     DISCHARGE DISPOSITION   Stable    DISCHARGE MEDICATIONS:     Discharge Medications      New Medications      Instructions Start Date   aspirin 81 MG EC tablet   81 mg, Oral, Daily   Start Date: January 21, 2022     cefdinir 300 MG capsule  Commonly known as: OMNICEF   300 mg, Oral, 2 Times Daily      metroNIDAZOLE 500 MG tablet  Commonly known as: Flagyl   500 mg, Oral, 3 Times Daily         Changes to Medications      Instructions Start Date   levothyroxine 175 MCG tablet  Commonly known as: SYNTHROID, LEVOTHROID  What changed:   · medication strength  · how much to take  · when to take this   175 mcg, Oral, Every Early Morning   Start Date: January 21, 2022        Continue These Medications      Instructions Start Date   Acidophilus/Pectin capsule   1 capsule, Oral, 2 times daily, HOLD FOR 10 DAYS (UNTIL FINISHED WITH H. PYLORI TREATMENT)      amiodarone 200 MG tablet  Commonly known as: PACERONE   200 mg, Oral, Daily      ARTIFICIAL TEARS OP   1 application,  Both Eyes, 2 Times Daily      atorvastatin 40 MG tablet  Commonly known as: LIPITOR   40 mg, Oral, Nightly      Cariprazine HCl 1.5 MG capsule capsule  Commonly known as: VRAYLAR   1.5 mg, Oral, Daily      carvedilol 3.125 MG tablet  Commonly known as: COREG   3.125 mg, Oral, 2 Times Daily With Meals      docusate sodium 250 MG capsule  Commonly known as: COLACE   250 mg, Oral, Daily      dronabinol 2.5 MG capsule  Commonly known as: MARINOL   2.5 mg, Oral, 2 Times Daily Before Meals      ferrous sulfate 325 (65 FE) MG tablet   325 mg, Oral, Daily With Breakfast      HYDROcodone-acetaminophen 5-325 MG per tablet  Commonly known as: NORCO   1 tablet, Oral, Every 6 Hours PRN      ipratropium-albuterol 0.5-2.5 mg/3 ml nebulizer  Commonly known as: DUO-NEB   3 mL, Nebulization, 4 Times Daily PRN      lactulose 10 GM/15ML solution  Commonly known as: CHRONULAC   10 g, Oral, Daily      melatonin 5 MG tablet tablet   5 mg, Oral, Nightly      MiraLax 17 GM/SCOOP powder  Generic drug: polyethylene glycol   17 g, Oral, 2 Times Daily      montelukast 10 MG tablet  Commonly known as: SINGULAIR   10 mg, Oral, Every Evening      multivitamin tablet tablet  Generic drug: multivitamin   1 tablet, Oral, Nightly      ondansetron 4 MG tablet  Commonly known as: ZOFRAN   4 mg, Oral, Every 6 Hours PRN      pantoprazole 40 MG EC tablet  Commonly known as: PROTONIX   40 mg, Oral, 2 Times Daily      promethazine 12.5 MG tablet  Commonly known as: PHENERGAN   12.5 mg, Oral, Every 8 Hours PRN      SITagliptin 100 MG tablet  Commonly known as: JANUVIA   100 mg, Oral, Daily      traZODone 50 MG tablet  Commonly known as: DESYREL   50 mg, Oral, Nightly         Stop These Medications    apixaban 2.5 MG tablet tablet  Commonly known as: ELIQUIS     lisinopril 5 MG tablet  Commonly known as: PRINIVIL,ZESTRIL            Activity Instructions     Activity as Tolerated          Additional Instructions for the Follow-ups that You Need to Schedule      Discharge Follow-up with PCP   As directed       Currently Documented PCP:    Leta Gardner MD    PCP Phone Number:    [phone]     Follow Up Details: 1 week post hospital discharge            Contact information for follow-up providers     Leta Gardner MD .    Specialty: Geriatric Medicine  Why: 1 week post hospital discharge  Contact information:  [id number]  [place] [address]  [phone]                   Contact information for after-discharge care     Destination     [facility] .    Service: Skilled Nursing  Contact information:  [address]  [facility] [address]  [phone]                            TEST  RESULTS PENDING AT DISCHARGE  Pending Labs     Order Current Status    Basic Metabolic Panel Collected (01/20/22 0924)    CBC & Differential Collected (01/20/22 0924)    CBC Auto Differential Collected (01/20/22 0924)    Blood Culture - Blood, Arm, Left Preliminary result    Blood Culture - Blood, Arm, Right Preliminary result        CODE STATUS  Code Status and Medical Interventions:   Ordered at: 01/19/22 1629     Level Of Support Discussed With:    Patient     Code Status (Patient has no pulse and is not breathing):    No CPR (Do Not Attempt to Resuscitate)     Medical Interventions (Patient has pulse or is breathing):    Comfort Measures     Cindi Delgado MD  [facility]  01/20/22  09:26 EST    Please note that this discharge summary required more than 30 minutes to complete.      Electronically signed by Cindi Delgado MD at 01/20/22 0940       Discharge Order (From admission, onward)     Start     Ordered    01/20/22 0921  Discharge patient  Once        Expected Discharge Date: 01/20/22    Expected Discharge Time: Morning    Discharge Disposition: Skilled Nursing Facility (DC - External)    Physician of Record for Attribution - Please select from Treatment Team: CINDI DEGLADO [[id number]]    Review needed by CMO to determine  Physician of Record: No       Question Answer Comment   Physician of Record for Attribution - Please select from Treatment Team CINDI WALKER    Review needed by CMO to determine Physician of Record No        01/20/22 0940

## 2022-01-20 NOTE — CASE MANAGEMENT/SOCIAL WORK
Discharge Planning Assessment   Nik     Patient Name: Mary Ly  MRN: [id number]  Today's Date: 1/20/2022    Admit Date: 1/16/2022     Discharge Needs Assessment    No documentation.                Discharge Plan     Row Name 01/20/22 1347       Plan    Final Note SS contacted [facility] per Veronika and scheduled transport.                            Amelia Joya

## 2022-01-20 NOTE — CASE MANAGEMENT/SOCIAL WORK
Discharge Planning Assessment  [facility]     Patient Name: Mary Ly  MRN: [id number]  Today's Date: 1/20/2022    Admit Date: 1/16/2022       Discharge Plan     Row Name 01/20/22 1049       Plan    Final Discharge Disposition Code 03 - skilled nursing facility (SNF)    Final Note Pt is being discharged back to [facility] today pending Covid test result. [facility]. Wilmer states pt is returning comfort measures only without hospice services at this time. [facility] RN to call report [facility] after Covid test result is available. SS will contact pt's family once Covid test result is available. SS to follow.              Continued Care and Services - Admitted Since 1/16/2022     Destination Coordination complete.    Service Provider Request Status Selected Services Address Phone Fax Patient Preferred    [facility]   Selected Skilled Nursing [address] [place] [facility] [address] [phone] --              SEBLE Reeder

## 2022-01-20 NOTE — PROGRESS NOTES
"Palliative Care Daily Progress Note     S: Medical record reviewed, followed up with Primary RN Carol Ann and  regarding patient's condition. When palliative care enter the room the patient was resting quietly. She stated she just felt weak and tired this am, she had no other complaints, pain or otherwise. She said she was happy to go back to nursing facility where her son is also a resident.      O:   Palliative Performance Scale Score:     /88 (BP Location: Left arm)   Pulse 88   Temp 97.3 °F (36.3 °C)   Resp 18   Ht 165.1 cm (65\")   Wt 50.3 kg (111 lb)   SpO2 96%   BMI 18.47 kg/m²     Intake/Output Summary (Last 24 hours) at 1/20/2022 1332  Last data filed at 1/20/2022 0900  Gross per 24 hour   Intake 721.43 ml   Output --   Net 721.43 ml       PE:  General Appearance:    Chronically ill appearing, alert, cooperative, NAD   HEENT:    NC/AT, without obvious abnormality, EOMI, anicteric    Neck:   supple, trachea midline, no JVD   Lungs:     CTAB without w/r/r    Heart:    RRR, normal S1 and S2, no M/R/G   Abdomen:     Soft, NT, ND, NABS    Extremities:   Moves all extremities, no edema   Pulses:   Pulses palpable and equal bilaterally   Skin:   Warm, dry, pale   Neurologic:   A/Ox3, cooperative   Psych:   Calm, appropriate            Meds: Reviewed and changes noted.    Labs:   Results from last 7 days   Lab Units 01/20/22  0924   WBC 10*3/mm3 21.21*   HEMOGLOBIN g/dL 7.9*   HEMATOCRIT % 26.4*   PLATELETS 10*3/mm3 329     Results from last 7 days   Lab Units 01/20/22  0924   SODIUM mmol/L 137   POTASSIUM mmol/L 3.2*   CHLORIDE mmol/L 99   CO2 mmol/L 26.6   BUN mg/dL 9   CREATININE mg/dL 0.61   GLUCOSE mg/dL 121*   CALCIUM mg/dL 7.4*     Results from last 7 days   Lab Units 01/20/22  0924 01/17/22  0137 01/16/22  0449   SODIUM mmol/L 137   < > 136   POTASSIUM mmol/L 3.2*   < > 4.2   CHLORIDE mmol/L 99   < > 93*   CO2 mmol/L 26.6   < > 30.2*   BUN mg/dL 9   < > 21   CREATININE mg/dL 0.61   < > 0.65 "   CALCIUM mg/dL 7.4*   < > 8.6   BILIRUBIN mg/dL  --   --  <0.2   ALK PHOS U/L  --   --  88   ALT (SGPT) U/L  --   --  38*   AST (SGOT) U/L  --   --  43*   GLUCOSE mg/dL 121*   < > 94    < > = values in this interval not displayed.     Imaging Results (Last 72 Hours)     ** No results found for the last 72 hours. **            Diagnostics: Reviewed    A: Mary Ly is a 76 y.o. yo female with shortness of breath and hypoxia. She has a medical history of CAD status post PCI, COPD on 3 L at baseline and previous ABL due to gastric ulcer/AVMs who presented to the ER from nursing facility after being found minimally responsive and hypoxic. Pt is a resident of [facility]. Upon admission her Hbg was found to be 5.8, WBC 35K with left shift, CRP 16, PT/INR 16/1.2, troponin 0.057. Pt received 2 units of PRBC's.  Patient has had multiple recent admissions at [facility] as well as [facility] with low Hbg with multiple ulcers noted as well as multiple other comorbidity/conditions.  Today 1/20/2022  She was 97.3, HR 88, RR 18, and BP of 122/88 and was saturating 96% on 2 L NC.         P:  Plan is for patient to return to [facility] with comfort measures today.    We will continue to follow along. Please do not hesitate to contact us regarding further sx mgmt or GOC needs, including after hours or on weekends via our on call provider at [phone].     Magui Clifford, APRN    1/20/2022

## 2022-01-20 NOTE — PLAN OF CARE
Patient resting in bed comfortably. No s/s of any acute distress or changes. Tolerating the oxygen at 2LNC. VSS. IV ATB continue. HOB elevated. Bed alarm on and call bell in reach. Will continue to follow the POC.

## 2022-01-21 LAB
BACTERIA SPEC AEROBE CULT: NORMAL
BACTERIA SPEC AEROBE CULT: NORMAL

## 2022-07-25 NOTE — ED NOTES
I called the [facility] about transferring the patient to [facility], I spoke with Marta. No beds [place].     Brian Campbell  03/10/21 0130     Home

## (undated) DEVICE — HOLDER: Brand: DEROYAL

## (undated) DEVICE — CYSTO/BLADDER IRRIGATION SET, REGULATING CLAMP

## (undated) DEVICE — SUT VIC 2/0 UR6 27IN J602H

## (undated) DEVICE — DRAPE,UTILTY,TAPE,15X26, 4EA/PK: Brand: MEDLINE

## (undated) DEVICE — ANTIBACTERIAL UNDYED BRAIDED (POLYGLACTIN 910), SYNTHETIC ABSORBABLE SUTURE: Brand: COATED VICRYL

## (undated) DEVICE — TROCAR: Brand: KII® SLEEVE

## (undated) DEVICE — THE BITE BLOCK MAXI, LATEX FREE STRAP IS USED TO PROTECT THE ENDOSCOPE INSERTION TUBE FROM BEING BITTEN BY THE PATIENT.

## (undated) DEVICE — APPL CHLORAPREP HI/LITE 26ML ORNG

## (undated) DEVICE — ENDOCUT SCISSOR TIP, DISPOSABLE: Brand: RENEW

## (undated) DEVICE — SYR LUERLOK 30CC

## (undated) DEVICE — COR LAP CHOLE: Brand: MEDLINE INDUSTRIES, INC.

## (undated) DEVICE — Device

## (undated) DEVICE — PAD GRND REM POLYHESIVE A/ DISP

## (undated) DEVICE — ENDOPATH XCEL BLADELESS TROCARS WITH STABILITY SLEEVES: Brand: ENDOPATH XCEL

## (undated) DEVICE — ENDOPATH ELECTROSURGERY PROBE PLUS II 5MM RIGHT ANGLE MONOPOLAR ELECTROSURGERY SUCTION AND IRRRIGATION SHAFTS WITH RIGHT ANGLE ELECTRODE - USE ONLY WITH PROBE PLUS II HANDLES: Brand: ENDOPATH

## (undated) DEVICE — ENDOGATOR TUBING FOR ENDOGATOR EGP-100 IRRIGATION PUMP,OLYMPUS OFP PUMP, OLYMPUS AFU-100 PUMP AND ERBE EIP2 PUMP: Brand: ENDOGATOR

## (undated) DEVICE — GLV SURG PREMIERPRO MIC LTX PF SZ7.5 BRN

## (undated) DEVICE — CONN Y IRR DISP 1P/U

## (undated) DEVICE — ENDOGATOR AUXILIARY WATER JET CONNECTOR: Brand: ENDOGATOR

## (undated) DEVICE — ENDOPATH ELECTROSURGERY PROBE PLUS II PISTOL HAND CONTROL PISTOL GRIP HANDLES WITH SUCTION AND IRRRIGATION FOR HAND CONTROL MONOPOLAR ELCTROSURGERY USE ONLY WITH PROBE PLUS II SHAFTS: Brand: ENDOPATH

## (undated) DEVICE — TROCAR: Brand: KII FIOS FIRST ENTRY

## (undated) DEVICE — [HIGH FLOW INSUFFLATOR,  DO NOT USE IF PACKAGE IS DAMAGED,  KEEP DRY,  KEEP AWAY FROM SUNLIGHT,  PROTECT FROM HEAT AND RADIOACTIVE SOURCES.]: Brand: PNEUMOSURE